# Patient Record
Sex: FEMALE | Race: WHITE | HISPANIC OR LATINO | Employment: OTHER | URBAN - METROPOLITAN AREA
[De-identification: names, ages, dates, MRNs, and addresses within clinical notes are randomized per-mention and may not be internally consistent; named-entity substitution may affect disease eponyms.]

---

## 2017-11-07 ENCOUNTER — OFFICE VISIT (OUTPATIENT)
Dept: FAMILY MEDICINE | Facility: CLINIC | Age: 62
End: 2017-11-07
Payer: MEDICAID

## 2017-11-07 VITALS
OXYGEN SATURATION: 98 % | BODY MASS INDEX: 34.61 KG/M2 | DIASTOLIC BLOOD PRESSURE: 84 MMHG | SYSTOLIC BLOOD PRESSURE: 128 MMHG | WEIGHT: 233.69 LBS | TEMPERATURE: 98 F | HEART RATE: 108 BPM | HEIGHT: 69 IN

## 2017-11-07 DIAGNOSIS — I10 ESSENTIAL HYPERTENSION: ICD-10-CM

## 2017-11-07 DIAGNOSIS — E78.5 DM TYPE 2 WITH DIABETIC DYSLIPIDEMIA: ICD-10-CM

## 2017-11-07 DIAGNOSIS — J45.20 MILD INTERMITTENT ASTHMA, UNSPECIFIED WHETHER COMPLICATED: ICD-10-CM

## 2017-11-07 DIAGNOSIS — E11.69 DM TYPE 2 WITH DIABETIC DYSLIPIDEMIA: ICD-10-CM

## 2017-11-07 PROCEDURE — 99213 OFFICE O/P EST LOW 20 MIN: CPT | Mod: PBBFAC,PO | Performed by: FAMILY MEDICINE

## 2017-11-07 PROCEDURE — 99999 PR PBB SHADOW E&M-EST. PATIENT-LVL III: CPT | Mod: PBBFAC,,, | Performed by: FAMILY MEDICINE

## 2017-11-07 PROCEDURE — 90732 PPSV23 VACC 2 YRS+ SUBQ/IM: CPT | Mod: PBBFAC,PO

## 2017-11-07 PROCEDURE — 90472 IMMUNIZATION ADMIN EACH ADD: CPT | Mod: PBBFAC,PO

## 2017-11-07 PROCEDURE — 99204 OFFICE O/P NEW MOD 45 MIN: CPT | Mod: 25,S$PBB,, | Performed by: FAMILY MEDICINE

## 2017-11-07 PROCEDURE — 90736 HZV VACCINE LIVE SUBQ: CPT | Mod: PBBFAC,PO

## 2017-11-07 RX ORDER — GABAPENTIN 100 MG/1
100 CAPSULE ORAL 3 TIMES DAILY
Qty: 90 CAPSULE | Refills: 11 | Status: SHIPPED | OUTPATIENT
Start: 2017-11-07 | End: 2018-02-20 | Stop reason: SDUPTHER

## 2017-11-07 RX ORDER — LISINOPRIL AND HYDROCHLOROTHIAZIDE 20; 25 MG/1; MG/1
1 TABLET ORAL DAILY
COMMUNITY
End: 2018-02-08

## 2017-11-07 RX ORDER — LOVASTATIN 20 MG/1
20 TABLET ORAL NIGHTLY
COMMUNITY
End: 2017-11-10

## 2017-11-07 RX ORDER — INSULIN GLARGINE 300 [IU]/ML
38 INJECTION, SOLUTION SUBCUTANEOUS
COMMUNITY
End: 2017-12-11 | Stop reason: SDUPTHER

## 2017-11-07 RX ORDER — METFORMIN HYDROCHLORIDE 850 MG/1
850 TABLET ORAL 2 TIMES DAILY WITH MEALS
COMMUNITY
End: 2017-12-11 | Stop reason: SDUPTHER

## 2017-11-07 RX ORDER — ALBUTEROL SULFATE 90 UG/1
2 AEROSOL, METERED RESPIRATORY (INHALATION) EVERY 6 HOURS PRN
COMMUNITY
End: 2018-02-20 | Stop reason: SDUPTHER

## 2017-11-07 RX ORDER — GLIMEPIRIDE 2 MG/1
2 TABLET ORAL
COMMUNITY
End: 2017-12-11 | Stop reason: SDUPTHER

## 2017-11-07 RX ORDER — TRAZODONE HYDROCHLORIDE 50 MG/1
50 TABLET ORAL NIGHTLY PRN
Qty: 30 TABLET | Refills: 3 | Status: SHIPPED | OUTPATIENT
Start: 2017-11-07 | End: 2018-01-09 | Stop reason: SDUPTHER

## 2017-11-07 NOTE — PROGRESS NOTES
Subjective:       Patient ID: Kay Rob is a 62 y.o. female.    Chief Complaint: Annual Exam    62 y old female here to get established with DM , ht, dlp , mild intermittent asthma and obesity . Not On aspirin,will like to have immunizations UTD . exercise : sedentary . Opthalmology  : due for itzel , Fastin bs are 220  and  2 hrs pp : 220 .seldom uses recsue inhalre . + feet pain and insomnia . Forgetuful also        Review of Systems   Constitutional: Negative.    HENT: Negative.    Respiratory: Negative.    Cardiovascular: Negative.    Gastrointestinal: Negative.    Genitourinary: Negative.    Musculoskeletal: Positive for arthralgias.   Hematological: Negative.    Psychiatric/Behavioral: Negative.        Objective:      Physical Exam   Constitutional: She is oriented to person, place, and time. No distress.   HENT:   Head: Normocephalic and atraumatic.   Right Ear: External ear normal.   Left Ear: External ear normal.   Mouth/Throat: No oropharyngeal exudate.   Eyes: Conjunctivae and EOM are normal. Pupils are equal, round, and reactive to light. Right eye exhibits no discharge. Left eye exhibits no discharge. No scleral icterus.   Neck: Normal range of motion. Neck supple. No JVD present. No tracheal deviation present. No thyromegaly present.   Cardiovascular: Normal rate, regular rhythm and normal heart sounds.  Exam reveals no gallop and no friction rub.    No murmur heard.  Pulses:       Dorsalis pedis pulses are 2+ on the right side, and 2+ on the left side.        Posterior tibial pulses are 2+ on the right side, and 2+ on the left side.   Pulmonary/Chest: Effort normal and breath sounds normal. No stridor. No respiratory distress. She has no wheezes. She has no rales. She exhibits no tenderness.   Abdominal: Soft. Bowel sounds are normal. She exhibits no distension. There is no tenderness. There is no rebound and no guarding.   Musculoskeletal: Normal range of motion.        Right foot: There is normal  range of motion and no deformity.        Left foot: There is normal range of motion and no deformity.   Feet:   Right Foot:   Protective Sensation: 10 sites tested. 0 sites sensed.   Skin Integrity: Positive for dry skin.   Left Foot:   Protective Sensation: 10 sites tested. 0 sites sensed.   Skin Integrity: Positive for dry skin.   Lymphadenopathy:     She has no cervical adenopathy.   Neurological: She is alert and oriented to person, place, and time.   Skin: Skin is warm and dry. She is not diaphoretic.   Psychiatric: She has a normal mood and affect. Her behavior is normal. Judgment and thought content normal.       Assessment:         Kay was seen today for annual exam.    Diagnoses and all orders for this visit:    DM type 2 with diabetic dyslipidemia  -     Ambulatory referral to Ophthalmology  -     CBC auto differential; Future  -     Comprehensive metabolic panel; Future  -     Hemoglobin A1c; Future  -     Lipid panel; Future  -     Microalbumin/creatinine urine ratio; Future    Essential hypertension  -     Ambulatory referral to Ophthalmology  -     CBC auto differential; Future  -     Comprehensive metabolic panel; Future  -     Hemoglobin A1c; Future  -     Lipid panel; Future  -     Microalbumin/creatinine urine ratio; Future    Mild intermittent asthma, unspecified whether complicated    Other orders  -     traZODone (DESYREL) 50 MG tablet; Take 1 tablet (50 mg total) by mouth nightly as needed for Insomnia.  -     (In Office Administered) Zoster Vaccine - Live  -     (In Office Administered) Pneumococcal Polysaccharide Vaccine (23 Valent) (SQ/IM)  -     gabapentin (NEURONTIN) 100 MG capsule; Take 1 capsule (100 mg total) by mouth 3 (three) times daily.      Plan:   .Go up on Toujeo by 6 units every 3 days  until fbs and 2 hr pp are less than 130   Pt. encouraged to follow a strict diabetic type diet.   Encouraged daily physical activity/exercise for at least 30mins if tolerated.   Weight  control recommenced as always.   Discussed how lifestyle changes make biggest impact on diabetes control.   Continue home glucose monitoring once daily and keep log.   Counseling provided today about hypoglycemia as well as about how diabetes increases risk of cardiovascular, cerebrovascular ,peripheral vascular disease and other serious health complications. He has been instructed to call if developing any new symptoms or hypoglycemia related symptoms.   See encounter for associated orders/medications.   - Lipid panel; Future    Controlled. Cont  Med   Stable .     Kay was seen today for annual exam.    Diagnoses and all orders for this visit:    DM type 2 with diabetic dyslipidemia    Essential hypertension    Mild intermittent asthma, unspecified whether complicated

## 2017-11-07 NOTE — PATIENT INSTRUCTIONS
empiece a subir las dosis de insulina de las siguiente manera:   6 unidades  cada 3 pinto hasat que las azucares en ayuna  y dos horas despues de wiley esten por debajo de 130 .

## 2017-11-09 ENCOUNTER — LAB VISIT (OUTPATIENT)
Dept: LAB | Facility: HOSPITAL | Age: 62
End: 2017-11-09
Attending: FAMILY MEDICINE
Payer: MEDICAID

## 2017-11-09 DIAGNOSIS — E11.69 DM TYPE 2 WITH DIABETIC DYSLIPIDEMIA: ICD-10-CM

## 2017-11-09 DIAGNOSIS — I10 ESSENTIAL HYPERTENSION: ICD-10-CM

## 2017-11-09 DIAGNOSIS — E78.5 DM TYPE 2 WITH DIABETIC DYSLIPIDEMIA: ICD-10-CM

## 2017-11-09 LAB
CREAT UR-MCNC: 116 MG/DL
MICROALBUMIN UR DL<=1MG/L-MCNC: 101 UG/ML
MICROALBUMIN/CREATININE RATIO: 87.1 UG/MG

## 2017-11-09 PROCEDURE — 82570 ASSAY OF URINE CREATININE: CPT

## 2017-11-10 ENCOUNTER — OFFICE VISIT (OUTPATIENT)
Dept: FAMILY MEDICINE | Facility: CLINIC | Age: 62
End: 2017-11-10
Payer: MEDICAID

## 2017-11-10 ENCOUNTER — TELEPHONE (OUTPATIENT)
Dept: FAMILY MEDICINE | Facility: CLINIC | Age: 62
End: 2017-11-10

## 2017-11-10 VITALS
OXYGEN SATURATION: 97 % | WEIGHT: 231 LBS | DIASTOLIC BLOOD PRESSURE: 88 MMHG | TEMPERATURE: 98 F | BODY MASS INDEX: 36.26 KG/M2 | HEART RATE: 100 BPM | SYSTOLIC BLOOD PRESSURE: 130 MMHG | HEIGHT: 67 IN

## 2017-11-10 DIAGNOSIS — E66.01 SEVERE OBESITY (BMI 35.0-39.9) WITH COMORBIDITY: ICD-10-CM

## 2017-11-10 DIAGNOSIS — G47.00 INSOMNIA, UNSPECIFIED TYPE: ICD-10-CM

## 2017-11-10 DIAGNOSIS — E78.5 DM TYPE 2 WITH DIABETIC DYSLIPIDEMIA: Primary | ICD-10-CM

## 2017-11-10 DIAGNOSIS — I10 HYPERTENSION, UNSPECIFIED TYPE: ICD-10-CM

## 2017-11-10 DIAGNOSIS — E11.69 DM TYPE 2 WITH DIABETIC DYSLIPIDEMIA: Primary | ICD-10-CM

## 2017-11-10 PROCEDURE — 99214 OFFICE O/P EST MOD 30 MIN: CPT | Mod: S$PBB,,, | Performed by: FAMILY MEDICINE

## 2017-11-10 PROCEDURE — 99999 PR PBB SHADOW E&M-EST. PATIENT-LVL III: CPT | Mod: PBBFAC,,, | Performed by: FAMILY MEDICINE

## 2017-11-10 PROCEDURE — 99213 OFFICE O/P EST LOW 20 MIN: CPT | Mod: PBBFAC,PO | Performed by: FAMILY MEDICINE

## 2017-11-10 RX ORDER — ATORVASTATIN CALCIUM 40 MG/1
40 TABLET, FILM COATED ORAL DAILY
Qty: 30 TABLET | Refills: 11 | Status: SHIPPED | OUTPATIENT
Start: 2017-11-10 | End: 2017-12-11

## 2017-11-10 NOTE — TELEPHONE ENCOUNTER
Patient has order for appt to see podiatrist per Dr. Stanton's request. I can not schedule appt due to insurance. Forwarding appt to podiatry to see if they can schedule appt. Patient is [place],  also is a patient (jhony Verduzco)

## 2017-11-11 NOTE — PROGRESS NOTES
Subjective:       Patient ID: Kay Rob is a 62 y.o. female.    Chief Complaint: No chief complaint on file.    62 y old female with Sever obesity , dlp , htn , insomnia and uncontrolled dm here for f.u . Doing well on trazodone . FBS are coming down since increasing Toujeo to 44 units . She has not had any 200 mg/d;l readings . She ha sot started exercising . She has been consistent with statin       Review of Systems   Constitutional: Negative.    HENT: Negative.    Respiratory: Negative.    Cardiovascular: Negative.    Gastrointestinal: Negative.    Genitourinary: Negative.    Musculoskeletal: Negative.        Objective:      Physical Exam   Constitutional: She is oriented to person, place, and time. She appears well-developed and well-nourished. No distress.   HENT:   Head: Normocephalic and atraumatic.   Right Ear: External ear normal.   Left Ear: External ear normal.   Mouth/Throat: No oropharyngeal exudate.   Eyes: Conjunctivae and EOM are normal. Pupils are equal, round, and reactive to light. Right eye exhibits no discharge. Left eye exhibits no discharge. No scleral icterus.   Neck: Normal range of motion. Neck supple. No JVD present. No tracheal deviation present. No thyromegaly present.   Cardiovascular: Normal rate, regular rhythm and normal heart sounds.  Exam reveals no gallop and no friction rub.    No murmur heard.  Pulmonary/Chest: Effort normal and breath sounds normal. No stridor. No respiratory distress. She has no wheezes. She has no rales. She exhibits no tenderness.   Abdominal: Soft. Bowel sounds are normal. She exhibits no distension. There is no tenderness. There is no rebound and no guarding.   Musculoskeletal: Normal range of motion.   Lymphadenopathy:     She has no cervical adenopathy.   Neurological: She is alert and oriented to person, place, and time.   Skin: Skin is warm and dry. She is not diaphoretic.   Psychiatric: She has a normal mood and affect. Her behavior is normal.  Judgment and thought content normal.       Assessment:       Diagnoses and all orders for this visit:    DM type 2 with diabetic dyslipidemia  -     Ambulatory consult to Podiatry    Hypertension, unspecified type    Insomnia, unspecified type    Severe obesity (BMI 35.0-39.9) with comorbidity    Other orders  -     atorvastatin (LIPITOR) 40 MG tablet; Take 1 tablet (40 mg total) by mouth once daily.      Plan:     Diagnoses and all orders for this visit:    DM type 2 with diabetic dyslipidemia  -     Ambulatory consult to Podiatry     Improving >cont increasing Toujeo by 6 units every 4 d until fbs and 2 hr pp are less than 130   Pt. encouraged to follow a strict diabetic type diet.   Encouraged daily physical activity/exercise for at least 30mins if tolerated.   Weight control recommenced as always.   Discussed how lifestyle changes make biggest impact on diabetes control.   Continue home glucose monitoring once daily and keep log.   Counseling provided today about hypoglycemia as well as about how diabetes increases risk of cardiovascular, cerebrovascular ,peripheral vascular disease and other serious health complications. He has been instructed to call if developing any new symptoms or hypoglycemia related symptoms.   See encounter for associated orders/medications.   -  Controlled . Cont med   Improving . Cont med   The patient is asked to make an attempt to improve diet and exercise patterns to aid in medical management of this problem.  Hugh in 1 m for JOEY

## 2017-11-24 ENCOUNTER — OFFICE VISIT (OUTPATIENT)
Dept: OPHTHALMOLOGY | Facility: CLINIC | Age: 62
End: 2017-11-24
Payer: MEDICAID

## 2017-11-24 DIAGNOSIS — H25.13 CATARACT, NUCLEAR SCLEROTIC SENILE, BILATERAL: ICD-10-CM

## 2017-11-24 DIAGNOSIS — H25.013 CATARACT CORTICAL, SENILE, BILATERAL: ICD-10-CM

## 2017-11-24 DIAGNOSIS — E11.9 DIABETES MELLITUS TYPE 2 WITHOUT RETINOPATHY: Primary | ICD-10-CM

## 2017-11-24 DIAGNOSIS — H52.7 REFRACTIVE ERROR: ICD-10-CM

## 2017-11-24 PROCEDURE — 92004 COMPRE OPH EXAM NEW PT 1/>: CPT | Mod: S$PBB,,, | Performed by: OPTOMETRIST

## 2017-11-24 PROCEDURE — 99212 OFFICE O/P EST SF 10 MIN: CPT | Mod: PBBFAC | Performed by: OPTOMETRIST

## 2017-11-24 PROCEDURE — 99999 PR PBB SHADOW E&M-EST. PATIENT-LVL II: CPT | Mod: PBBFAC,,, | Performed by: OPTOMETRIST

## 2017-11-24 PROCEDURE — 92015 DETERMINE REFRACTIVE STATE: CPT | Mod: ,,, | Performed by: OPTOMETRIST

## 2017-11-24 NOTE — LETTER
November 24, 2017      Lucy Negron MD  [address] [facility] [address]           [facility]  [address] [place] [id number]  Phone: [phone]  Fax: [phone]          Patient: Kay Rob   MR Number: [id number]   YOB: 1955   Date of Visit: 11/24/2017       Dear Dr. Lucy Negron:    Thank you for referring Kay Rob to me for evaluation. Attached you will find relevant portions of my assessment and plan of care.    If you have questions, please do not hesitate to call me. I look forward to following Kay Rob along with you.    Sincerely,    MALLY Garcia, OD    Enclosure  CC:  No Recipients    If you would like to receive this communication electronically, please contact [email or web address] or [phone] to request more information on [employer] Link access.    For providers and/or their staff who would like to refer a patient to [facility], please contact us through our one-stop-shop provider referral line, [facility], at [phone].    If you feel you have received this communication in error or would no longer like to receive these types of communications, please e-mail [email or web address]

## 2017-11-24 NOTE — PROGRESS NOTES
HPI     Diabetic Eye Exam    Additional comments: Latest BS was 175 this morning.            Comments   [place] [facility] [place] states that her eye sight is bad. Pt is a   diabetic. Pt does not wear any eye correction. Was diagnosed with DM in   2007.    Patient speaks no english and is with her relative who speaks little   english.       Last edited by MALLY Garcia, OD on 11/24/2017  4:42 PM. (History)            Assessment /Plan     For exam results, see Encounter Report.    Diabetes mellitus type 2 without retinopathy    Cataract, nuclear sclerotic senile, bilateral    Cataract cortical, senile, bilateral    Refractive error      *As noted above she speaks almost no english and her son speaks english only a bit more.  She has marked NS/cortical cataracts OU and needs CE.  I could not adequately view her fundi due to the cataracts.  She is diabetic and conceivably could have undetected DR and CSME but obscured by the cataracts.  We will arrange a CE evaluation with Dr. Miguel (NA).  She will definitely need a MOCT but we are at the [facility] clinic today with no instrument.   I did not change glasses today as MR did not increase her VA much.  RTC to me per Lamont.  OH OK OU otherwise.

## 2017-12-11 ENCOUNTER — OFFICE VISIT (OUTPATIENT)
Dept: FAMILY MEDICINE | Facility: CLINIC | Age: 62
End: 2017-12-11
Payer: MEDICAID

## 2017-12-11 ENCOUNTER — TELEPHONE (OUTPATIENT)
Dept: FAMILY MEDICINE | Facility: CLINIC | Age: 62
End: 2017-12-11

## 2017-12-11 VITALS
HEIGHT: 67 IN | HEART RATE: 106 BPM | OXYGEN SATURATION: 99 % | SYSTOLIC BLOOD PRESSURE: 132 MMHG | BODY MASS INDEX: 36.4 KG/M2 | TEMPERATURE: 98 F | DIASTOLIC BLOOD PRESSURE: 76 MMHG | WEIGHT: 231.94 LBS

## 2017-12-11 DIAGNOSIS — E78.5 DM TYPE 2 WITH DIABETIC DYSLIPIDEMIA: ICD-10-CM

## 2017-12-11 DIAGNOSIS — Z12.11 COLON CANCER SCREENING: ICD-10-CM

## 2017-12-11 DIAGNOSIS — R21 RASH: ICD-10-CM

## 2017-12-11 DIAGNOSIS — E11.69 DM TYPE 2 WITH DIABETIC DYSLIPIDEMIA: ICD-10-CM

## 2017-12-11 DIAGNOSIS — Z12.39 BREAST CANCER SCREENING: Primary | ICD-10-CM

## 2017-12-11 DIAGNOSIS — I10 HYPERTENSION, UNSPECIFIED TYPE: ICD-10-CM

## 2017-12-11 DIAGNOSIS — Z01.419 ENCOUNTER FOR GYNECOLOGICAL EXAMINATION WITHOUT ABNORMAL FINDING: ICD-10-CM

## 2017-12-11 PROCEDURE — 99999 PR PBB SHADOW E&M-EST. PATIENT-LVL IV: CPT | Mod: PBBFAC,,, | Performed by: FAMILY MEDICINE

## 2017-12-11 PROCEDURE — 99214 OFFICE O/P EST MOD 30 MIN: CPT | Mod: PBBFAC,PO | Performed by: FAMILY MEDICINE

## 2017-12-11 PROCEDURE — 90746 HEPB VACCINE 3 DOSE ADULT IM: CPT | Mod: PBBFAC,PO

## 2017-12-11 PROCEDURE — 90686 IIV4 VACC NO PRSV 0.5 ML IM: CPT | Mod: PBBFAC,PO

## 2017-12-11 PROCEDURE — 90472 IMMUNIZATION ADMIN EACH ADD: CPT | Mod: PBBFAC,PO

## 2017-12-11 PROCEDURE — 99214 OFFICE O/P EST MOD 30 MIN: CPT | Mod: S$PBB,,, | Performed by: FAMILY MEDICINE

## 2017-12-11 RX ORDER — GLIMEPIRIDE 2 MG/1
2 TABLET ORAL
Qty: 90 TABLET | Refills: 0 | Status: SHIPPED | OUTPATIENT
Start: 2017-12-11 | End: 2018-02-08 | Stop reason: SDUPTHER

## 2017-12-11 RX ORDER — METFORMIN HYDROCHLORIDE 850 MG/1
850 TABLET ORAL 2 TIMES DAILY WITH MEALS
Qty: 180 TABLET | Refills: 0 | Status: SHIPPED | OUTPATIENT
Start: 2017-12-11 | End: 2018-04-23 | Stop reason: SDUPTHER

## 2017-12-11 RX ORDER — LOVASTATIN 20 MG/1
20 TABLET ORAL NIGHTLY
COMMUNITY
End: 2017-12-11

## 2017-12-11 RX ORDER — PEN NEEDLE, DIABETIC 30 GX3/16"
NEEDLE, DISPOSABLE MISCELLANEOUS
Qty: 90 EACH | Refills: 0 | Status: SHIPPED | OUTPATIENT
Start: 2017-12-11 | End: 2018-10-15 | Stop reason: SDUPTHER

## 2017-12-11 RX ORDER — ATORVASTATIN CALCIUM 40 MG/1
40 TABLET, FILM COATED ORAL DAILY
Qty: 30 TABLET | Refills: 11 | Status: SHIPPED | OUTPATIENT
Start: 2017-12-11 | End: 2019-01-16 | Stop reason: SDUPTHER

## 2017-12-11 RX ORDER — ALBUTEROL SULFATE 90 UG/1
2 AEROSOL, METERED RESPIRATORY (INHALATION) EVERY 6 HOURS PRN
Qty: 18 G | Status: CANCELLED | OUTPATIENT
Start: 2017-12-11

## 2017-12-11 RX ORDER — INSULIN GLARGINE 300 [IU]/ML
INJECTION, SOLUTION SUBCUTANEOUS
Qty: 1 SYRINGE | Refills: 0 | Status: SHIPPED | OUTPATIENT
Start: 2017-12-11 | End: 2018-01-09 | Stop reason: SDUPTHER

## 2017-12-11 NOTE — TELEPHONE ENCOUNTER
Patient has referral to see GYN but at this time it will not allow me to schedule patient until April or may and patient needs appointment sooner than that. Patient also needs a Bhutanese speaking doctor. Can someone please help with scheduling patient? Please advise.

## 2017-12-11 NOTE — PROGRESS NOTES
"Subjective:       Patient ID: Kay Rob is a 62 y.o. female.    Chief Complaint: Follow-up and Diabetes    62 y old female with type 2 uncontrolled diabetes , HTN and DLP here for f.u . checking FBS: 195, 178  And 2 hrs pp  210 .  Doing some stretches at home .  Not on any diet  . On Toujeo 68 units , amaryl 2 mg qd and metformin 1700 mg qd. She also has burning  plaques on scalp . She has a hx of " allergies" . She has not changed  any cosmetic product .       Diabetes       Review of Systems   Constitutional: Negative.    HENT: Negative.    Respiratory: Negative.    Cardiovascular: Negative.    Genitourinary: Negative.    Musculoskeletal: Negative.    Skin: Positive for rash.   Psychiatric/Behavioral: Negative.        Objective:      Physical Exam   Constitutional: She is oriented to person, place, and time. She appears well-developed and well-nourished. No distress.   HENT:   Head: Normocephalic and atraumatic.   Right Ear: External ear normal.   Left Ear: External ear normal.   Mouth/Throat: No oropharyngeal exudate.   Eyes: Conjunctivae and EOM are normal. Pupils are equal, round, and reactive to light. Right eye exhibits no discharge. Left eye exhibits no discharge. No scleral icterus.   Neck: Normal range of motion. Neck supple. No JVD present. No tracheal deviation present. No thyromegaly present.   Cardiovascular: Normal rate, regular rhythm and normal heart sounds.  Exam reveals no gallop and no friction rub.    No murmur heard.  Pulmonary/Chest: Effort normal and breath sounds normal. No stridor. No respiratory distress. She has no wheezes. She has no rales. She exhibits no tenderness.   Abdominal: Soft. Bowel sounds are normal. She exhibits no distension. There is no tenderness. There is no rebound and no guarding.   Musculoskeletal: Normal range of motion.   Lymphadenopathy:     She has no cervical adenopathy.   Neurological: She is alert and oriented to person, place, and time.   Skin: Skin is " "warm and dry. She is not diaphoretic.        erythematous plaques with desquamation noted    Psychiatric: She has a normal mood and affect. Her behavior is normal. Judgment and thought content normal.       Assessment:       Kay was seen today for follow-up and diabetes.    Diagnoses and all orders for this visit:    Breast cancer screening  -     Mammo Digital Screening Bilateral With CAD; Future    Colon cancer screening  -     Fecal Immunochemical Test (iFOBT); Future    Encounter for gynecological examination without abnormal finding  -     Ambulatory consult to Gynecology    Hypertension, unspecified type    DM type 2 with diabetic dyslipidemia    Rash    Other orders  -     insulin glargine, TOUJEO, (TOUJEO SOLOSTAR) 300 unit/mL (1.5 mL) InPn pen; 68 units sq qd  -     glimepiride (AMARYL) 2 MG tablet; Take 1 tablet (2 mg total) by mouth before breakfast.  -     metFORMIN (GLUCOPHAGE) 850 MG tablet; Take 1 tablet (850 mg total) by mouth 2 (two) times daily with meals.  -     Cancel: albuterol (VENTOLIN HFA) 90 mcg/actuation inhaler; Inhale 2 puffs into the lungs every 6 (six) hours as needed for Wheezing. Rescue  -     atorvastatin (LIPITOR) 40 MG tablet; Take 1 tablet (40 mg total) by mouth once daily.  -     (In Office Administered) Tdap Vaccine  -     (In Office Administered) Hepatitis B Vaccine (Adult) (IM)  -     liraglutide 0.6 mg/0.1 mL, 18 mg/3 mL, subq PNIJ (VICTOZA 2-KIRK) 0.6 mg/0.1 mL (18 mg/3 mL) PnIj; Inject 0.6 mg into the skin once daily.  -     pen needle, diabetic 31 gauge x 5/16" Ndle; Use daily with Victoza  -     fluocinolone (SYNALAR) 0.01 % Sham; Apply no more than 1 ounce to scalp once daily; work into lather and allow to remain on scalp for ~5 minutes. Remove from hair and scalp by rinsing thoroughly with water.      Plan:   Controlled. Cont med   Uncontrolled. Start  Victoza. SE Discussed. Diet and ex  F.u in 1 m    Seborrheic derm ?     "

## 2018-01-04 ENCOUNTER — HOSPITAL ENCOUNTER (OUTPATIENT)
Dept: RADIOLOGY | Facility: HOSPITAL | Age: 63
Discharge: HOME | End: 2018-01-04
Attending: FAMILY MEDICINE
Payer: MEDICAID

## 2018-01-04 ENCOUNTER — APPOINTMENT (OUTPATIENT)
Dept: LAB | Facility: HOSPITAL | Age: 63
End: 2018-01-04
Attending: FAMILY MEDICINE
Payer: MEDICAID

## 2018-01-04 DIAGNOSIS — Z12.39 BREAST CANCER SCREENING: ICD-10-CM

## 2018-01-04 PROCEDURE — 82274 ASSAY TEST FOR BLOOD FECAL: CPT

## 2018-01-04 PROCEDURE — 77067 SCR MAMMO BI INCL CAD: CPT | Mod: TC

## 2018-01-04 PROCEDURE — 77067 SCR MAMMO BI INCL CAD: CPT | Mod: 26,,, | Performed by: RADIOLOGY

## 2018-01-08 ENCOUNTER — TELEPHONE (OUTPATIENT)
Dept: FAMILY MEDICINE | Facility: CLINIC | Age: 63
End: 2018-01-08

## 2018-01-08 DIAGNOSIS — K92.1 BLOOD IN STOOL: Primary | ICD-10-CM

## 2018-01-08 NOTE — TELEPHONE ENCOUNTER
----- Message from Nithin Washington sent at 1/8/2018  9:24 AM CST -----  Contact: pt daughter - matthias   States she's rtn nurses call rg her mammo and can be reached at [phone]/thanks/dbw

## 2018-01-09 ENCOUNTER — OFFICE VISIT (OUTPATIENT)
Dept: FAMILY MEDICINE | Facility: CLINIC | Age: 63
End: 2018-01-09
Payer: MEDICAID

## 2018-01-09 VITALS
OXYGEN SATURATION: 96 % | WEIGHT: 229.63 LBS | HEART RATE: 104 BPM | SYSTOLIC BLOOD PRESSURE: 138 MMHG | BODY MASS INDEX: 36.04 KG/M2 | TEMPERATURE: 96 F | DIASTOLIC BLOOD PRESSURE: 88 MMHG | HEIGHT: 67 IN

## 2018-01-09 DIAGNOSIS — G47.00 INSOMNIA, UNSPECIFIED TYPE: ICD-10-CM

## 2018-01-09 DIAGNOSIS — E11.69 DM TYPE 2 WITH DIABETIC DYSLIPIDEMIA: ICD-10-CM

## 2018-01-09 DIAGNOSIS — L21.9 SEBORRHEIC DERMATITIS: Primary | ICD-10-CM

## 2018-01-09 DIAGNOSIS — E78.5 DM TYPE 2 WITH DIABETIC DYSLIPIDEMIA: ICD-10-CM

## 2018-01-09 PROCEDURE — 99999 PR PBB SHADOW E&M-EST. PATIENT-LVL IV: CPT | Mod: PBBFAC,,, | Performed by: FAMILY MEDICINE

## 2018-01-09 PROCEDURE — 99214 OFFICE O/P EST MOD 30 MIN: CPT | Mod: PBBFAC,PO | Performed by: FAMILY MEDICINE

## 2018-01-09 PROCEDURE — 99214 OFFICE O/P EST MOD 30 MIN: CPT | Mod: S$PBB,,, | Performed by: FAMILY MEDICINE

## 2018-01-09 RX ORDER — TRAZODONE HYDROCHLORIDE 50 MG/1
50 TABLET ORAL NIGHTLY PRN
Qty: 30 TABLET | Refills: 3 | Status: SHIPPED | OUTPATIENT
Start: 2018-01-09 | End: 2018-02-20 | Stop reason: SDUPTHER

## 2018-01-09 RX ORDER — INSULIN GLARGINE 300 [IU]/ML
INJECTION, SOLUTION SUBCUTANEOUS
Qty: 1 SYRINGE | Refills: 0 | Status: SHIPPED | OUTPATIENT
Start: 2018-01-09 | End: 2018-01-31 | Stop reason: SDUPTHER

## 2018-01-09 NOTE — PROGRESS NOTES
Subjective:       Patient ID: Kay Rob is a 62 y.o. female.    Chief Complaint: Follow-up    62 y old female with uncontrolled dm , Seborrheic dermatitis of scalp and insomnia here for f.u after starting victoza and topical fluocinolone . Fastin : 174   2 hr pp : 250 . Not on any diet .Using 86 units of Toujeo. Rash on scalp resolved but it recur  once she stopped  using  Shampoo. Previously sleeping well with trazodone but meds has not been dispensed, she is unsure why       Review of Systems   Constitutional: Negative.    HENT: Negative.    Respiratory: Negative.    Cardiovascular: Negative.    Genitourinary: Negative.    Musculoskeletal: Negative.    Hematological: Negative.    Psychiatric/Behavioral: Negative.        Objective:      Physical Exam   Constitutional: She is oriented to person, place, and time. She appears well-developed and well-nourished. No distress.   HENT:   Head: Normocephalic and atraumatic.   Right Ear: External ear normal.   Left Ear: External ear normal.   Mouth/Throat: No oropharyngeal exudate.   Eyes: Conjunctivae and EOM are normal. Pupils are equal, round, and reactive to light. Right eye exhibits no discharge. Left eye exhibits no discharge. No scleral icterus.   Neck: Normal range of motion. Neck supple. No JVD present. No tracheal deviation present. No thyromegaly present.   Cardiovascular: Normal rate, regular rhythm and normal heart sounds.  Exam reveals no gallop and no friction rub.    No murmur heard.  Pulmonary/Chest: Effort normal and breath sounds normal. No stridor. No respiratory distress. She has no wheezes. She has no rales. She exhibits no tenderness.   Abdominal: Soft. Bowel sounds are normal. She exhibits no distension. There is no tenderness. There is no rebound and no guarding.   Musculoskeletal: Normal range of motion.   Lymphadenopathy:     She has no cervical adenopathy.   Neurological: She is alert and oriented to person, place, and time.   Skin: Skin is  warm and dry. She is not diaphoretic.   Psychiatric: She has a normal mood and affect. Her behavior is normal. Judgment and thought content normal.       Assessment:     Kay was seen today for follow-up.    Diagnoses and all orders for this visit:    Seborrheic dermatitis  -     Ambulatory consult to Dermatology    DM type 2 with diabetic dyslipidemia    Insomnia, unspecified type    Other orders  -     traZODone (DESYREL) 50 MG tablet; Take 1 tablet (50 mg total) by mouth nightly as needed for Insomnia.  -     insulin glargine, TOUJEO, (TOUJEO SOLOSTAR) 300 unit/mL (1.5 mL) InPn pen; 86  units sq qd  -     fluocinolone (SYNALAR) 0.01 % Sham; Apply no more than 1 ounce to scalp once daily; work into lather and allow to remain on scalp for ~5 minutes. Remove from hair and scalp by rinsing thoroughly with water.      Plan:   See derm , use shampoo once weekly to prevent recurrence   Increase toujeo to 92 unit s, increase victoza also to 1.2 mg sq qd .Keep BSL . Diet and ex. F.u in 1 m    Resume trazodone

## 2018-01-15 ENCOUNTER — DOCUMENTATION ONLY (OUTPATIENT)
Dept: ENDOSCOPY | Facility: HOSPITAL | Age: 63
End: 2018-01-15

## 2018-01-31 RX ORDER — INSULIN GLARGINE 300 U/ML
INJECTION, SOLUTION SUBCUTANEOUS
Qty: 6 ML | Refills: 0 | Status: SHIPPED | OUTPATIENT
Start: 2018-01-31 | End: 2018-02-20 | Stop reason: SDUPTHER

## 2018-02-06 ENCOUNTER — OFFICE VISIT (OUTPATIENT)
Dept: OBSTETRICS AND GYNECOLOGY | Facility: CLINIC | Age: 63
End: 2018-02-06
Payer: MEDICAID

## 2018-02-06 VITALS
SYSTOLIC BLOOD PRESSURE: 150 MMHG | BODY MASS INDEX: 36.88 KG/M2 | HEIGHT: 67 IN | WEIGHT: 235 LBS | DIASTOLIC BLOOD PRESSURE: 98 MMHG

## 2018-02-06 DIAGNOSIS — Z01.419 WELL WOMAN EXAM WITH ROUTINE GYNECOLOGICAL EXAM: Primary | ICD-10-CM

## 2018-02-06 PROCEDURE — 99212 OFFICE O/P EST SF 10 MIN: CPT | Mod: PBBFAC | Performed by: OBSTETRICS & GYNECOLOGY

## 2018-02-06 PROCEDURE — 99386 PREV VISIT NEW AGE 40-64: CPT | Mod: S$PBB,,, | Performed by: OBSTETRICS & GYNECOLOGY

## 2018-02-06 PROCEDURE — 88175 CYTOPATH C/V AUTO FLUID REDO: CPT

## 2018-02-06 PROCEDURE — 99999 PR PBB SHADOW E&M-EST. PATIENT-LVL II: CPT | Mod: PBBFAC,,, | Performed by: OBSTETRICS & GYNECOLOGY

## 2018-02-06 NOTE — PROGRESS NOTES
Subjective:       Patient ID: Kay Rob is a 62 y.o. female.    Chief Complaint:  Annual Exam      History of Present Illness  HPI  Annual Exam-Postmenopausal  Patient presents for annual exam. The patient has no complaints today. The patient is not sexually active. GYN screening history: last pap: approximate date  and was normal and last mammogram: approximate date  and was normal. The patient is not taking hormone replacement therapy. Patient denies post-menopausal vaginal bleeding. The patient wears seatbelts: yes. The patient participates in regular exercise: no. Has the patient ever been transfused or tattooed?: no. The patient reports that there is not domestic violence in her life.  Pt reports that a fibroid was noted on ultrasound in .  No associated symptoms.        GYN & OB History  No LMP recorded. Patient is postmenopausal.   Date of Last Pap: No result found    OB History    Para Term  AB Living   3 2 2   1 2   SAB TAB Ectopic Multiple Live Births   1       2      # Outcome Date GA Lbr Oliver/2nd Weight Sex Delivery Anes PTL Lv   3 SAB            2 Term     M Vag-Spont   ESEQUIEL   1 Term     F Vag-Spont   ESEQUIEL          Review of Systems  Review of Systems   Constitutional: Negative for activity change, appetite change, chills, fatigue, fever and unexpected weight change.   Respiratory: Negative for shortness of breath.    Cardiovascular: Negative for chest pain, palpitations and leg swelling.   Gastrointestinal: Negative for abdominal pain, constipation, diarrhea, nausea and vomiting.   Genitourinary: Negative for dysuria, flank pain, frequency, genital sores, hematuria, pelvic pain, vaginal bleeding, vaginal discharge, vaginal pain, urinary incontinence, postcoital bleeding and vaginal odor.   Musculoskeletal: Negative for back pain.   Neurological: Negative for syncope and headaches.   Breast: Negative for breast mass, breast pain, nipple discharge and skin changes           Objective:    Physical Exam:   Constitutional: She is oriented to person, place, and time. She appears well-developed and well-nourished. No distress.    HENT:   Head: Normocephalic and atraumatic.    Eyes: EOM are normal. Pupils are equal, round, and reactive to light.    Neck: Normal range of motion. Neck supple.    Cardiovascular: Normal rate, regular rhythm and normal heart sounds.     Pulmonary/Chest: Effort normal and breath sounds normal.        Abdominal: Soft. Bowel sounds are normal. She exhibits no distension. There is no tenderness.     Genitourinary: Vagina normal and uterus normal. Pelvic exam was performed with patient supine. There is no rash, tenderness, lesion or injury on the right labia. There is no rash, tenderness, lesion or injury on the left labia. Uterus is not deviated, not enlarged and not tender. Cervix is normal. Right adnexum displays no mass, no tenderness and no fullness. Left adnexum displays no mass, no tenderness and no fullness. No erythema, tenderness or bleeding in the vagina. No foreign body in the vagina. No signs of injury around the vagina. No vaginal discharge found. Cervix exhibits no motion tenderness, no discharge and no friability. Additional cervical findings: pap smear done          Musculoskeletal: Normal range of motion and moves all extremeties. She exhibits no edema or tenderness.       Neurological: She is alert and oriented to person, place, and time.    Skin: Skin is warm and dry.    Psychiatric: She has a normal mood and affect. Her behavior is normal. Thought content normal.          Assessment:        1. Well woman exam with routine gynecological exam              Plan:      Well woman exam with routine gynecological exam  -     Liquid-based pap smear, screening  -     Pt was counseled on cervical/vaginal screening guidelines and recommendations.  Pt has a history of at least 3 consecutive normal pap results in the past 10 years.  Denies history of Gyn  malignancy or dysplasia.  If today's result is negative, pt will no longer require routine pap screening or routine screening pelvic examinations.  If pt still desires screening Gyn exams, would recommend minimum 2 year interval.  Pt may follow with PCP for routine health maintenance needs.  -     Pt was advised on current breast cancer screening recommendations.  Pt defers breast exam today and screening MMG is up to date.      Follow-up in about 2 years (around 2/6/2020).

## 2018-02-08 ENCOUNTER — OFFICE VISIT (OUTPATIENT)
Dept: FAMILY MEDICINE | Facility: CLINIC | Age: 63
End: 2018-02-08
Payer: MEDICAID

## 2018-02-08 ENCOUNTER — LAB VISIT (OUTPATIENT)
Dept: LAB | Facility: HOSPITAL | Age: 63
End: 2018-02-08
Attending: FAMILY MEDICINE
Payer: MEDICAID

## 2018-02-08 VITALS
HEIGHT: 67 IN | HEART RATE: 103 BPM | TEMPERATURE: 98 F | WEIGHT: 232.69 LBS | BODY MASS INDEX: 36.52 KG/M2 | SYSTOLIC BLOOD PRESSURE: 130 MMHG | DIASTOLIC BLOOD PRESSURE: 84 MMHG | OXYGEN SATURATION: 97 %

## 2018-02-08 DIAGNOSIS — E78.5 DM TYPE 2 WITH DIABETIC DYSLIPIDEMIA: Primary | ICD-10-CM

## 2018-02-08 DIAGNOSIS — E78.5 DM TYPE 2 WITH DIABETIC DYSLIPIDEMIA: ICD-10-CM

## 2018-02-08 DIAGNOSIS — E11.69 DM TYPE 2 WITH DIABETIC DYSLIPIDEMIA: ICD-10-CM

## 2018-02-08 DIAGNOSIS — I10 HYPERTENSION, UNSPECIFIED TYPE: ICD-10-CM

## 2018-02-08 DIAGNOSIS — E11.69 DM TYPE 2 WITH DIABETIC DYSLIPIDEMIA: Primary | ICD-10-CM

## 2018-02-08 LAB
ALBUMIN SERPL BCP-MCNC: 3.5 G/DL
ALP SERPL-CCNC: 129 U/L
ALT SERPL W/O P-5'-P-CCNC: 13 U/L
ANION GAP SERPL CALC-SCNC: 8 MMOL/L
AST SERPL-CCNC: 17 U/L
BASOPHILS # BLD AUTO: 0.05 K/UL
BASOPHILS NFR BLD: 0.5 %
BILIRUB SERPL-MCNC: 0.5 MG/DL
BUN SERPL-MCNC: 15 MG/DL
CALCIUM SERPL-MCNC: 10.2 MG/DL
CHLORIDE SERPL-SCNC: 101 MMOL/L
CHOLEST SERPL-MCNC: 154 MG/DL
CHOLEST/HDLC SERPL: 4.5 {RATIO}
CO2 SERPL-SCNC: 30 MMOL/L
CREAT SERPL-MCNC: 0.8 MG/DL
DIFFERENTIAL METHOD: ABNORMAL
EOSINOPHIL # BLD AUTO: 0.3 K/UL
EOSINOPHIL NFR BLD: 2.5 %
ERYTHROCYTE [DISTWIDTH] IN BLOOD BY AUTOMATED COUNT: 12.9 %
EST. GFR  (AFRICAN AMERICAN): >60 ML/MIN/1.73 M^2
EST. GFR  (NON AFRICAN AMERICAN): >60 ML/MIN/1.73 M^2
ESTIMATED AVG GLUCOSE: 214 MG/DL
GLUCOSE SERPL-MCNC: 190 MG/DL
HBA1C MFR BLD HPLC: 9.1 %
HCT VFR BLD AUTO: 44.2 %
HDLC SERPL-MCNC: 34 MG/DL
HDLC SERPL: 22.1 %
HGB BLD-MCNC: 14.6 G/DL
IMM GRANULOCYTES # BLD AUTO: 0.04 K/UL
IMM GRANULOCYTES NFR BLD AUTO: 0.4 %
LDLC SERPL CALC-MCNC: 90.4 MG/DL
LYMPHOCYTES # BLD AUTO: 1.8 K/UL
LYMPHOCYTES NFR BLD: 18.1 %
MCH RBC QN AUTO: 28.5 PG
MCHC RBC AUTO-ENTMCNC: 33 G/DL
MCV RBC AUTO: 86 FL
MONOCYTES # BLD AUTO: 0.5 K/UL
MONOCYTES NFR BLD: 4.7 %
NEUTROPHILS # BLD AUTO: 7.4 K/UL
NEUTROPHILS NFR BLD: 73.8 %
NONHDLC SERPL-MCNC: 120 MG/DL
NRBC BLD-RTO: 0 /100 WBC
PLATELET # BLD AUTO: 266 K/UL
PMV BLD AUTO: 10.4 FL
POTASSIUM SERPL-SCNC: 4.7 MMOL/L
PROT SERPL-MCNC: 7.4 G/DL
RBC # BLD AUTO: 5.12 M/UL
SODIUM SERPL-SCNC: 139 MMOL/L
TRIGL SERPL-MCNC: 148 MG/DL
WBC # BLD AUTO: 10.05 K/UL

## 2018-02-08 PROCEDURE — 99214 OFFICE O/P EST MOD 30 MIN: CPT | Mod: 25,S$PBB,, | Performed by: FAMILY MEDICINE

## 2018-02-08 PROCEDURE — 80053 COMPREHEN METABOLIC PANEL: CPT

## 2018-02-08 PROCEDURE — 83036 HEMOGLOBIN GLYCOSYLATED A1C: CPT

## 2018-02-08 PROCEDURE — 85025 COMPLETE CBC W/AUTO DIFF WBC: CPT

## 2018-02-08 PROCEDURE — 36415 COLL VENOUS BLD VENIPUNCTURE: CPT | Mod: PO

## 2018-02-08 PROCEDURE — 90471 IMMUNIZATION ADMIN: CPT | Mod: PBBFAC,PO

## 2018-02-08 PROCEDURE — 99999 PR PBB SHADOW E&M-EST. PATIENT-LVL III: CPT | Mod: PBBFAC,,, | Performed by: FAMILY MEDICINE

## 2018-02-08 PROCEDURE — 80061 LIPID PANEL: CPT

## 2018-02-08 PROCEDURE — 99213 OFFICE O/P EST LOW 20 MIN: CPT | Mod: PO | Performed by: FAMILY MEDICINE

## 2018-02-08 PROCEDURE — 3008F BODY MASS INDEX DOCD: CPT | Mod: ,,, | Performed by: FAMILY MEDICINE

## 2018-02-08 RX ORDER — METFORMIN HYDROCHLORIDE 850 MG/1
850 TABLET ORAL 2 TIMES DAILY WITH MEALS
Qty: 180 TABLET | Refills: 0 | Status: CANCELLED | OUTPATIENT
Start: 2018-02-08

## 2018-02-08 RX ORDER — ALBUTEROL SULFATE 90 UG/1
2 AEROSOL, METERED RESPIRATORY (INHALATION) EVERY 6 HOURS PRN
Qty: 18 G | Status: CANCELLED | OUTPATIENT
Start: 2018-02-08

## 2018-02-08 RX ORDER — INSULIN GLARGINE 300 [IU]/ML
INJECTION, SOLUTION SUBCUTANEOUS
Qty: 6 ML | Refills: 0 | Status: CANCELLED | OUTPATIENT
Start: 2018-02-08

## 2018-02-08 RX ORDER — LISINOPRIL AND HYDROCHLOROTHIAZIDE 20; 25 MG/1; MG/1
1 TABLET ORAL DAILY
Qty: 30 TABLET | Refills: 11 | Status: SHIPPED | OUTPATIENT
Start: 2018-02-08 | End: 2019-01-29 | Stop reason: SDUPTHER

## 2018-02-08 RX ORDER — GLIMEPIRIDE 2 MG/1
2 TABLET ORAL
Qty: 90 TABLET | Refills: 0 | Status: CANCELLED | OUTPATIENT
Start: 2018-02-08

## 2018-02-08 RX ORDER — GLIMEPIRIDE 2 MG/1
2 TABLET ORAL
Qty: 90 TABLET | Refills: 0 | Status: SHIPPED | OUTPATIENT
Start: 2018-02-08 | End: 2018-03-19 | Stop reason: SDUPTHER

## 2018-02-08 NOTE — PROGRESS NOTES
Subjective:       Patient ID: Kay Rob is a 62 y.o. female.    Chief Complaint: Follow-up and Headache    62 y old  Female here for f/u on DM , HTN and DLP . On aspirin,  hep B   vacc :1/3 , exercises: some walking , Opthalmology: seen By Adam Diaz bs are : 200, 275  and  2 hrs pp : 180, 200    , off note is that she has been off of metformin , glimepiride , atorvastatin and victoza  Since december . For some reason  pharmacy has not dispense them   . On Toujeo : 92 units qd       Headache        Review of Systems   Constitutional: Negative.    HENT: Negative.    Respiratory: Negative.    Cardiovascular: Negative.    Gastrointestinal: Negative.    Genitourinary: Negative.    Musculoskeletal: Negative.    Neurological: Positive for headaches.       Objective:      Physical Exam   Constitutional: She is oriented to person, place, and time. She appears well-developed and well-nourished. No distress.   HENT:   Head: Normocephalic and atraumatic.   Right Ear: External ear normal.   Left Ear: External ear normal.   Mouth/Throat: No oropharyngeal exudate.   Eyes: Conjunctivae and EOM are normal. Pupils are equal, round, and reactive to light. Right eye exhibits no discharge. Left eye exhibits no discharge. No scleral icterus.   Neck: Normal range of motion. Neck supple. No JVD present. No tracheal deviation present. No thyromegaly present.   Cardiovascular: Normal rate, regular rhythm and normal heart sounds.  Exam reveals no gallop and no friction rub.    No murmur heard.  Pulmonary/Chest: Effort normal and breath sounds normal. No stridor. No respiratory distress. She has no wheezes. She has no rales. She exhibits no tenderness.   Abdominal: Soft. Bowel sounds are normal. She exhibits no distension. There is no tenderness. There is no rebound and no guarding.   Musculoskeletal: Normal range of motion.   Lymphadenopathy:     She has no cervical adenopathy.   Neurological: She is alert and oriented to  person, place, and time.   Skin: Skin is warm and dry. She is not diaphoretic.   Psychiatric: She has a normal mood and affect. Her behavior is normal. Judgment and thought content normal.       Assessment:     Kay was seen today for follow-up and headache.    Diagnoses and all orders for this visit:    DM type 2 with diabetic dyslipidemia  -     CBC auto differential; Future  -     Comprehensive metabolic panel; Future  -     Hemoglobin A1c; Future  -     Lipid panel; Future    Hypertension, unspecified type    Other orders  -     Cancel: insulin glargine, TOUJEO, (TOUJEO SOLOSTAR) 300 unit/mL (1.5 mL) InPn pen;   -     Cancel: metFORMIN (GLUCOPHAGE) 850 MG tablet; Take 1 tablet (850 mg total) by mouth 2 (two) times daily with meals.  -     Cancel: glimepiride (AMARYL) 2 MG tablet; Take 1 tablet (2 mg total) by mouth before breakfast.  -     Cancel: albuterol (VENTOLIN HFA) 90 mcg/actuation inhaler; Inhale 2 puffs into the lungs every 6 (six) hours as needed for Wheezing. Rescue  -     lisinopril-hydrochlorothiazide (PRINZIDE,ZESTORETIC) 20-25 mg Tab; Take 1 tablet by mouth once daily.  -     Hepatitis B Vaccine (Adult) (IM)      Plan:   .Pt. encouraged to follow a strict diabetic type diet.   Encouraged daily physical activity/exercise for at least 30mins if tolerated.   Weight control recommenced as always.   Discussed how lifestyle changes make biggest impact on diabetes control.   Continue home glucose monitoring once daily and keep log.   Counseling provided today about hypoglycemia as well as about how diabetes increases risk of cardiovascular, cerebrovascular ,peripheral vascular disease and other serious health complications. He has been instructed to call if developing any new symptoms or hypoglycemia related symptoms.   See encounter for associated orders/medications.   - Lipid panel; Future      Controlled. Cont med

## 2018-02-08 NOTE — TELEPHONE ENCOUNTER
Spoke with pharmacist and she stated that all patients meds have been ready to  for the past 8 days except for glimepride which is out of refills. Also states pts meds from December were picked up. Informed would forward info to provider.

## 2018-02-13 ENCOUNTER — TELEPHONE (OUTPATIENT)
Dept: OBSTETRICS AND GYNECOLOGY | Facility: CLINIC | Age: 63
End: 2018-02-13

## 2018-02-13 NOTE — TELEPHONE ENCOUNTER
----- Message from Andrzej Roman MD sent at 2/12/2018  5:27 PM CST -----  Please inform patient that her pap smear result is normal.

## 2018-02-20 ENCOUNTER — OFFICE VISIT (OUTPATIENT)
Dept: FAMILY MEDICINE | Facility: CLINIC | Age: 63
End: 2018-02-20
Payer: MEDICAID

## 2018-02-20 ENCOUNTER — LAB VISIT (OUTPATIENT)
Dept: LAB | Facility: HOSPITAL | Age: 63
End: 2018-02-20
Attending: FAMILY MEDICINE
Payer: MEDICAID

## 2018-02-20 VITALS
DIASTOLIC BLOOD PRESSURE: 70 MMHG | HEIGHT: 67 IN | BODY MASS INDEX: 36.36 KG/M2 | OXYGEN SATURATION: 96 % | SYSTOLIC BLOOD PRESSURE: 130 MMHG | HEART RATE: 100 BPM | WEIGHT: 231.69 LBS | TEMPERATURE: 99 F

## 2018-02-20 DIAGNOSIS — E78.5 DM TYPE 2 WITH DIABETIC DYSLIPIDEMIA: ICD-10-CM

## 2018-02-20 DIAGNOSIS — M79.10 MYALGIA: ICD-10-CM

## 2018-02-20 DIAGNOSIS — M79.10 MYALGIA: Primary | ICD-10-CM

## 2018-02-20 DIAGNOSIS — E11.69 DM TYPE 2 WITH DIABETIC DYSLIPIDEMIA: ICD-10-CM

## 2018-02-20 DIAGNOSIS — J45.20 MILD INTERMITTENT ASTHMA, UNSPECIFIED WHETHER COMPLICATED: ICD-10-CM

## 2018-02-20 LAB
25(OH)D3+25(OH)D2 SERPL-MCNC: 23 NG/ML
CK SERPL-CCNC: 71 U/L

## 2018-02-20 PROCEDURE — 36415 COLL VENOUS BLD VENIPUNCTURE: CPT | Mod: PO

## 2018-02-20 PROCEDURE — 99999 PR PBB SHADOW E&M-EST. PATIENT-LVL III: CPT | Mod: PBBFAC,,, | Performed by: FAMILY MEDICINE

## 2018-02-20 PROCEDURE — 99214 OFFICE O/P EST MOD 30 MIN: CPT | Mod: S$PBB,,, | Performed by: FAMILY MEDICINE

## 2018-02-20 PROCEDURE — 82306 VITAMIN D 25 HYDROXY: CPT

## 2018-02-20 PROCEDURE — 84443 ASSAY THYROID STIM HORMONE: CPT

## 2018-02-20 PROCEDURE — 3008F BODY MASS INDEX DOCD: CPT | Mod: ,,, | Performed by: FAMILY MEDICINE

## 2018-02-20 PROCEDURE — 82550 ASSAY OF CK (CPK): CPT

## 2018-02-20 PROCEDURE — 99213 OFFICE O/P EST LOW 20 MIN: CPT | Mod: PBBFAC,PO | Performed by: FAMILY MEDICINE

## 2018-02-20 RX ORDER — ALBUTEROL SULFATE 90 UG/1
2 AEROSOL, METERED RESPIRATORY (INHALATION) EVERY 6 HOURS PRN
Qty: 18 G | Refills: 0 | Status: SHIPPED | OUTPATIENT
Start: 2018-02-20 | End: 2019-01-29 | Stop reason: SDUPTHER

## 2018-02-20 RX ORDER — INSULIN GLARGINE 300 [IU]/ML
INJECTION, SOLUTION SUBCUTANEOUS
Qty: 6 ML | Refills: 0 | Status: SHIPPED | OUTPATIENT
Start: 2018-02-20 | End: 2018-03-13 | Stop reason: SDUPTHER

## 2018-02-20 RX ORDER — TRAZODONE HYDROCHLORIDE 50 MG/1
50 TABLET ORAL NIGHTLY PRN
Qty: 30 TABLET | Refills: 3 | Status: SHIPPED | OUTPATIENT
Start: 2018-02-20 | End: 2018-07-26 | Stop reason: SDUPTHER

## 2018-02-20 RX ORDER — KETOCONAZOLE 20 MG/ML
SHAMPOO, SUSPENSION TOPICAL
Qty: 120 ML | Refills: 0 | Status: SHIPPED | OUTPATIENT
Start: 2018-02-20 | End: 2018-03-19 | Stop reason: SDUPTHER

## 2018-02-20 RX ORDER — GABAPENTIN 100 MG/1
100 CAPSULE ORAL 3 TIMES DAILY
Qty: 90 CAPSULE | Refills: 11 | Status: SHIPPED | OUTPATIENT
Start: 2018-02-20 | End: 2019-01-29 | Stop reason: SDUPTHER

## 2018-02-20 NOTE — PROGRESS NOTES
Subjective:       Patient ID: Kay Rob is a 62 y.o. female.    Chief Complaint: Follow-up and Diabetes    62 y old female with type 2 dm , DLP and mild intermittent  asthma here for f.u after taking Med for DM as prescribed. FBS: 130 , 2 hr pp 150 . Not exercising  , tries to eat healthy . Uses rescue inhaler  no more than once montshly . + NOcturanl   leg mylagias over the last  3 m . No mayor back issues.  rubs legs which help       Review of Systems   Constitutional: Negative.    HENT: Negative.    Respiratory: Negative.    Cardiovascular: Negative.    Gastrointestinal: Negative.    Genitourinary: Negative.    Musculoskeletal: Positive for myalgias.   Neurological: Negative.    Hematological: Negative.        Objective:      Physical Exam   Constitutional: She is oriented to person, place, and time. She appears well-developed and well-nourished. No distress.   HENT:   Head: Normocephalic and atraumatic.   Right Ear: External ear normal.   Left Ear: External ear normal.   Mouth/Throat: No oropharyngeal exudate.   Eyes: Conjunctivae and EOM are normal. Pupils are equal, round, and reactive to light. Right eye exhibits no discharge. Left eye exhibits no discharge. No scleral icterus.   Neck: Normal range of motion. Neck supple. No JVD present. No tracheal deviation present. No thyromegaly present.   Cardiovascular: Normal rate, regular rhythm and normal heart sounds.  Exam reveals no gallop and no friction rub.    No murmur heard.  Pulmonary/Chest: Effort normal and breath sounds normal. No stridor. No respiratory distress. She has no wheezes. She has no rales. She exhibits no tenderness.   Abdominal: Soft. Bowel sounds are normal. She exhibits no distension. There is no tenderness. There is no rebound and no guarding.   Musculoskeletal: Normal range of motion.   Lymphadenopathy:     She has no cervical adenopathy.   Neurological: She is alert and oriented to person, place, and time.   Skin: Skin is  warm and dry. She is not diaphoretic.   Psychiatric: She has a normal mood and affect. Her behavior is normal. Judgment and thought content normal.       Assessment:       1. Myalgia    2. Mild intermittent asthma, unspecified whether complicated    3. DM type 2 with diabetic dyslipidemia        Plan:     Kay was seen today for follow-up and diabetes.    Diagnoses and all orders for this visit:    Myalgia  -     CK; Future    Mild intermittent asthma, unspecified whether complicated    DM type 2 with diabetic dyslipidemia    Other orders  -     Cancel: fluocinolone (SYNALAR) 0.01 % Sham; Apply no more than 1 ounce to scalp once daily; work into lather and allow to remain on scalp for ~5 minutes. Remove from hair and scalp by rinsing thoroughly with water.  -     insulin glargine, TOUJEO, (TOUJEO SOLOSTAR U-300 INSULIN) 300 unit/mL (1.5 mL) InPn pen; Administer 92 units sq  Daily  -     gabapentin (NEURONTIN) 100 MG capsule; Take 1 capsule (100 mg total) by mouth 3 (three) times daily.  -     traZODone (DESYREL) 50 MG tablet; Take 1 tablet (50 mg total) by mouth nightly as needed for Insomnia.  -     albuterol (VENTOLIN HFA) 90 mcg/actuation inhaler; Inhale 2 puffs into the lungs every 6 (six) hours as needed for Wheezing. Rescue  -     ketoconazole (NIZORAL) 2 % shampoo; Apply 5 to 10 mL to wet scalp, lather, leave on 3 to 5 minutes, and rinse; apply twice weekly for 2 to 4 weeks.     labs . Stop statin for 1 w   Controlled. Med rf   Controlled . Cont meds. F.u in 3 m

## 2018-02-21 LAB — TSH SERPL DL<=0.005 MIU/L-ACNC: 1.56 UIU/ML

## 2018-03-13 ENCOUNTER — TELEPHONE (OUTPATIENT)
Dept: FAMILY MEDICINE | Facility: CLINIC | Age: 63
End: 2018-03-13

## 2018-03-13 RX ORDER — INSULIN GLARGINE 300 U/ML
INJECTION, SOLUTION SUBCUTANEOUS
Qty: 6 SYRINGE | Refills: 0 | Status: SHIPPED | OUTPATIENT
Start: 2018-03-13 | End: 2018-03-19 | Stop reason: SDUPTHER

## 2018-03-13 NOTE — TELEPHONE ENCOUNTER
----- Message from Saima Mon sent at 3/13/2018  1:56 PM CDT -----  Contact: pt's daughter  She's returning a missed call, please advise [phone] (home)

## 2018-03-13 NOTE — TELEPHONE ENCOUNTER
Contact interpretor and left message for patient to contact Dr. Stanton office. Pt needs to schedule an appointment for leg cramps and resume taking her lipitor.

## 2018-03-13 NOTE — TELEPHONE ENCOUNTER
Called interpretor and had her communicate test results to patient. Pt verbalized understanding but stated that she stopped taking the atorvastatin in Jan and February but still is experiencing arm and leg cramps after being off of medication. Dylon sheldone

## 2018-03-13 NOTE — TELEPHONE ENCOUNTER
----- Message from Maryam Crowder sent at 3/13/2018 10:21 AM CDT -----  results needed.....[phone]

## 2018-03-19 RX ORDER — INSULIN GLARGINE 300 U/ML
INJECTION, SOLUTION SUBCUTANEOUS
Qty: 15 ML | Refills: 0 | Status: SHIPPED | OUTPATIENT
Start: 2018-03-19 | End: 2018-07-20 | Stop reason: SDUPTHER

## 2018-03-19 RX ORDER — INSULIN GLARGINE 300 U/ML
INJECTION, SOLUTION SUBCUTANEOUS
Qty: 6 ML | Refills: 0 | Status: SHIPPED | OUTPATIENT
Start: 2018-03-19 | End: 2018-05-07 | Stop reason: SDUPTHER

## 2018-03-19 RX ORDER — KETOCONAZOLE 20 MG/ML
SHAMPOO, SUSPENSION TOPICAL
Qty: 120 ML | Refills: 0 | Status: SHIPPED | OUTPATIENT
Start: 2018-03-19 | End: 2018-11-01 | Stop reason: SDUPTHER

## 2018-03-20 RX ORDER — GLIMEPIRIDE 2 MG/1
TABLET ORAL
Qty: 90 TABLET | Refills: 0 | Status: SHIPPED | OUTPATIENT
Start: 2018-03-20 | End: 2018-10-18 | Stop reason: SDUPTHER

## 2018-04-23 RX ORDER — METFORMIN HYDROCHLORIDE 850 MG/1
TABLET ORAL
Qty: 180 TABLET | Refills: 0 | Status: SHIPPED | OUTPATIENT
Start: 2018-04-23 | End: 2018-07-20 | Stop reason: SDUPTHER

## 2018-04-23 RX ORDER — INSULIN GLARGINE 300 U/ML
INJECTION, SOLUTION SUBCUTANEOUS
Qty: 6 ML | Refills: 0 | Status: SHIPPED | OUTPATIENT
Start: 2018-04-23 | End: 2018-05-07 | Stop reason: SDUPTHER

## 2018-04-23 RX ORDER — KETOCONAZOLE 20 MG/ML
SHAMPOO, SUSPENSION TOPICAL
Qty: 120 ML | Refills: 0 | Status: SHIPPED | OUTPATIENT
Start: 2018-04-23 | End: 2018-05-07 | Stop reason: SDUPTHER

## 2018-04-25 ENCOUNTER — TELEPHONE (OUTPATIENT)
Dept: FAMILY MEDICINE | Facility: CLINIC | Age: 63
End: 2018-04-25

## 2018-05-07 ENCOUNTER — INITIAL CONSULT (OUTPATIENT)
Dept: DERMATOLOGY | Facility: CLINIC | Age: 63
End: 2018-05-07
Payer: MEDICARE

## 2018-05-07 DIAGNOSIS — L85.3 XEROSIS CUTIS: ICD-10-CM

## 2018-05-07 DIAGNOSIS — Q82.8 KERATODERMA: ICD-10-CM

## 2018-05-07 DIAGNOSIS — L21.9 SEBORRHEIC DERMATITIS: Primary | ICD-10-CM

## 2018-05-07 PROCEDURE — 99202 OFFICE O/P NEW SF 15 MIN: CPT | Mod: S$PBB,,, | Performed by: DERMATOLOGY

## 2018-05-07 PROCEDURE — 99213 OFFICE O/P EST LOW 20 MIN: CPT | Mod: PBBFAC,PO | Performed by: DERMATOLOGY

## 2018-05-07 PROCEDURE — 99999 PR PBB SHADOW E&M-EST. PATIENT-LVL III: CPT | Mod: PBBFAC,,, | Performed by: DERMATOLOGY

## 2018-05-07 RX ORDER — CLOBETASOL PROPIONATE 0.46 MG/ML
SOLUTION TOPICAL
Qty: 50 ML | Refills: 3 | Status: SHIPPED | OUTPATIENT
Start: 2018-05-07 | End: 2019-01-02 | Stop reason: SDUPTHER

## 2018-05-07 RX ORDER — AMMONIUM LACTATE 12 G/100G
LOTION TOPICAL
Qty: 225 G | Refills: 11 | Status: SHIPPED | OUTPATIENT
Start: 2018-05-07 | End: 2019-01-29 | Stop reason: SDUPTHER

## 2018-05-07 RX ORDER — TRIAMCINOLONE ACETONIDE 0.25 MG/G
CREAM TOPICAL
Qty: 80 G | Refills: 1 | Status: SHIPPED | OUTPATIENT
Start: 2018-05-07 | End: 2018-07-26 | Stop reason: SDUPTHER

## 2018-05-07 NOTE — LETTER
May 7, 2018      Lucy Negron MD  [address] [address]           [facility]  [address]  [place] [address]  Phone: [phone]  Fax: [phone]          Patient: Kay Rob   MR Number: [id number]   YOB: 1955   Date of Visit: 5/7/2018       Dear Dr. Lucy Negron:    Thank you for referring Kay Rob to me for evaluation. Attached you will find relevant portions of my assessment and plan of care.    If you have questions, please do not hesitate to call me. I look forward to following Kay Rob along with you.    Sincerely,    Erin Yi MD    Enclosure  CC:  No Recipients    If you would like to receive this communication electronically, please contact [email or web address] or [phone] to request more information on [employer] Link access.    For providers and/or their staff who would like to refer a patient to [facility], please contact us through our one-stop-shop provider referral line, [facility], at [phone].    If you feel you have received this communication in error or would no longer like to receive these types of communications, please e-mail [email or web address]

## 2018-05-07 NOTE — PATIENT INSTRUCTIONS
Start biotin 2876-3405 mcg daily    XEROSIS (DRY SKIN)      1. Definition    Xerosis is the term for dry skin.  We all have a natural oil coating over our skin produced by the skin oil glands.  If this oil is removed, the skin becomes dry which can lead to cracking, which can lead to inflammation.  Xerosis is usually a long-term problem that recurs often, especially in the winter.    2. Cause     Long hot baths or showers can remove our natural oil and lead to xerosis.  One should never take more than one bath or shower a day and for no longer than ten minutes.   Use of harsh soaps such as Zest, Dial, Ethiopian Spring, Lever and Ivory can worsen and cause xerosis.   Cold winter weather worsens xerosis because the amount of moisture contained in cold air is much less than the amount of moisture in warm air.    3. Treatment     Treatment is intended to restore the natural oil to your skin.  Keep the skin lubricated.     Do not take more than one bath or shower a day.  Use lukewarm water, not hot.  Hot water dries out the skin.     Use a gentle moisturizing soap such as Cetaphil soap, Dove, or Cetaphil Restoraderm cleanser.     When toweling dry, dont rub.  Blot the skin so there is still some water left on the skin.  You should apply a moisturizing cream to all of the skin such as Cerave cream, Cetaphil cream, Restoraderm or Eucerin Original Formula cream.   Alpha hydroxyacid lotions, i.e., AmLactin, also work very well for preventing dry skin, but may burn when used on inflamed or reddened skin.      [facility]   [facility]  [address] [place] [address]    Dept: [phone]   Dept Fax: [phone]

## 2018-05-07 NOTE — PROGRESS NOTES
Subjective:       Patient ID:  Kay Rob is a 63 y.o. female who presents for   Chief Complaint   Patient presents with    Dry Skin     History of Present Illness: The patient presents with chief complaint of dryness.  Location: scalp  Duration: 1 year  Signs/Symptoms: dryness, scaling    Prior treatments: fluocinolone solution once-twice/week, ketoconazole shampoo once/week    She also c/o dryness of the feet, currently using vitamin E and eucerin.           Review of Systems   Constitutional: Negative for fever and chills.   Gastrointestinal: Negative for nausea and vomiting.   Skin: Positive for itching and dry skin. Negative for daily sunscreen use, activity-related sunscreen use and recent sunburn.   Hematologic/Lymphatic: Does not bruise/bleed easily.        Objective:    Physical Exam   Constitutional: She appears well-developed and well-nourished. No distress.   Neurological: She is alert and oriented to person, place, and time. She is not disoriented.   Psychiatric: She has a normal mood and affect.   Skin:   Areas Examined (abnormalities noted in diagram):   Scalp / Hair Palpated and Inspected  Head / Face Inspection Performed  Neck Inspection Performed  Chest / Axilla Inspection Performed  Abdomen Inspection Performed  Back Inspection Performed  RUE Inspected  LUE Inspection Performed  Nails and Digits Inspection Performed                   Assessment / Plan:        Seborrheic dermatitis  -     clobetasol (TEMOVATE) 0.05 % external solution; Apply to affected areas of scalp 1-2 times daily as needed for itch  Dispense: 50 mL; Refill: 3  -     triamcinolone acetonide 0.025% (KENALOG) 0.025 % cream; AAA bid as needed for flares.  Mild steroid.  Dispense: 80 g; Refill: 1  -     Mild, will start above meds, continue ketoconazole shampoo, increase to 2 times/week.     Xerosis cutis  -     ammonium lactate (AMLACTIN) 12 % lotion; Use daily.  Apply to damp skin after bathing.  Dispense: 225 g; Refill:  11  -     Discussed dry skin care, AVS given.  Will start ammonium lactate 12% lotion lotion daily to damp skin.     Keratoderma  -     Ambulatory Referral to Podiatry  -     In setting of diabetes, will refer to podiatry.              Follow-up if symptoms worsen or fail to improve.

## 2018-05-28 ENCOUNTER — OFFICE VISIT (OUTPATIENT)
Dept: PODIATRY | Facility: CLINIC | Age: 63
End: 2018-05-28
Payer: MEDICARE

## 2018-05-28 VITALS
HEART RATE: 102 BPM | BODY MASS INDEX: 36.79 KG/M2 | WEIGHT: 234.38 LBS | SYSTOLIC BLOOD PRESSURE: 127 MMHG | DIASTOLIC BLOOD PRESSURE: 81 MMHG | HEIGHT: 67 IN

## 2018-05-28 DIAGNOSIS — E11.49 TYPE II DIABETES MELLITUS WITH NEUROLOGICAL MANIFESTATIONS: ICD-10-CM

## 2018-05-28 DIAGNOSIS — R23.4 FISSURE IN SKIN OF FOOT: ICD-10-CM

## 2018-05-28 DIAGNOSIS — E11.9 COMPREHENSIVE DIABETIC FOOT EXAMINATION, TYPE 2 DM, ENCOUNTER FOR: Primary | ICD-10-CM

## 2018-05-28 PROCEDURE — 99213 OFFICE O/P EST LOW 20 MIN: CPT | Mod: PBBFAC | Performed by: PODIATRIST

## 2018-05-28 PROCEDURE — 99204 OFFICE O/P NEW MOD 45 MIN: CPT | Mod: S$PBB,,, | Performed by: PODIATRIST

## 2018-05-28 PROCEDURE — 99999 PR PBB SHADOW E&M-EST. PATIENT-LVL III: CPT | Mod: PBBFAC,,, | Performed by: PODIATRIST

## 2018-05-28 RX ORDER — MUPIROCIN 20 MG/G
OINTMENT TOPICAL DAILY
Qty: 30 G | Refills: 1 | Status: SHIPPED | OUTPATIENT
Start: 2018-05-28 | End: 2019-01-29 | Stop reason: SDUPTHER

## 2018-05-28 RX ORDER — AMMONIUM LACTATE 12 G/100G
1 CREAM TOPICAL 2 TIMES DAILY
Qty: 1 TUBE | Refills: 3 | Status: SHIPPED | OUTPATIENT
Start: 2018-05-28 | End: 2018-06-04 | Stop reason: SDUPTHER

## 2018-05-28 NOTE — LETTER
May 28, 2018      Erin Yi MD  [address] [place]  [place] [facility] [address]           [facility]  [address] [place] [address]  Phone: [phone]  Fax: [phone]          Patient: Kay Rob   MR Number: [id number]   YOB: 1955   Date of Visit: 5/28/2018       Dear Dr. Erin Yi:    Thank you for referring Kay Rob to me for evaluation. Attached you will find relevant portions of my assessment and plan of care.    If you have questions, please do not hesitate to call me. I look forward to following Kay Rob along with you.    Sincerely,    Molly Gil DPM    Enclosure  CC:  No Recipients    If you would like to receive this communication electronically, please contact [email or web address] or [phone] to request more information on [employer] Link access.    For providers and/or their staff who would like to refer a patient to [facility], please contact us through our one-stop-shop provider referral line, [facility], at [phone].    If you feel you have received this communication in error or would no longer like to receive these types of communications, please e-mail [email or web address]

## 2018-05-29 NOTE — PROGRESS NOTES
Subjective:     Patient ID: Kay Rob is a 63 y.o. female.    Chief Complaint: Diabetic Foot Exam (PCP: LEXIE Ramirez 2/20/2018, patient is accompanied by her daughter and granddaughters )    Kay is a 63 y.o. female who presents to the clinic for evaluation and treatment of high risk feet. Kay has a past medical history of Arthritis; Asthma; Diabetes mellitus; Hyperlipidemia; and Hypertension. The patient's chief complaint is crack in her heel. This patient has documented high risk feet requiring routine maintenance secondary to diabetes mellitis and those secondary complications of diabetes, as mentioned.. Patient states she applies Eucerin to heels.     PCP: JUAN A RAMIREZ MD    Date Last Seen by PCP: 2/20/18    Current shoe gear:  Affected Foot: Extra depth shoes     Unaffected Foot: Extra depth shoes    Hemoglobin A1C   Date Value Ref Range Status   02/08/2018 9.1 (H) 4.0 - 5.6 % Final     Comment:     According to ADA guidelines, hemoglobin A1c <7.0% represents  optimal control in non-pregnant diabetic patients. Different  metrics may apply to specific patient populations.   Standards of Medical Care in Diabetes-2016.  For the purpose of screening for the presence of diabetes:  <5.7%     Consistent with the absence of diabetes  5.7-6.4%  Consistent with increasing risk for diabetes   (prediabetes)  >or=6.5%  Consistent with diabetes  Currently, no consensus exists for use of hemoglobin A1c  for diagnosis of diabetes for children.  This Hemoglobin A1c assay has significant interference with fetal   hemoglobin   (HbF). The results are invalid for patients with abnormal amounts of   HbF,   including those with known Hereditary Persistence   of Fetal Hemoglobin. Heterozygous hemoglobin variants (HbAS, HbAC,   HbAD, HbAE, HbA2) do not significantly interfere with this assay;   however, presence of multiple variants in a sample may impact the %   interference.     11/09/2017 10.9 (H) 4.0 -  5.6 % Final     Comment:     According to ADA guidelines, hemoglobin A1c <7.0% represents  optimal control in non-pregnant diabetic patients. Different  metrics may apply to specific patient populations.   Standards of Medical Care in Diabetes-2016.  For the purpose of screening for the presence of diabetes:  <5.7%     Consistent with the absence of diabetes  5.7-6.4%  Consistent with increasing risk for diabetes   (prediabetes)  >or=6.5%  Consistent with diabetes  Currently, no consensus exists for use of hemoglobin A1c  for diagnosis of diabetes for children.  This Hemoglobin A1c assay has significant interference with fetal   hemoglobin   (HbF). The results are invalid for patients with abnormal amounts of   HbF,   including those with known Hereditary Persistence   of Fetal Hemoglobin. Heterozygous hemoglobin variants (HbAS, HbAC,   HbAD, HbAE, HbA2) do not significantly interfere with this assay;   however, presence of multiple variants in a sample may impact the %   interference.             Patient Active Problem List   Diagnosis    DM type 2 with diabetic dyslipidemia    Essential hypertension    Mild intermittent asthma    Severe obesity (BMI 35.0-39.9) with comorbidity       Medication List with Changes/Refills   New Medications    AMMONIUM LACTATE 12 % CREA    Apply 1 Act topically 2 (two) times daily.    MUPIROCIN (BACTROBAN) 2 % OINTMENT    Apply topically once daily.   Current Medications    ALBUTEROL (VENTOLIN HFA) 90 MCG/ACTUATION INHALER    Inhale 2 puffs into the lungs every 6 (six) hours as needed for Wheezing. Rescue    AMMONIUM LACTATE (AMLACTIN) 12 % LOTION    Use daily.  Apply to damp skin after bathing.    ATORVASTATIN (LIPITOR) 40 MG TABLET    Take 1 tablet (40 mg total) by mouth once daily.    CLOBETASOL (TEMOVATE) 0.05 % EXTERNAL SOLUTION    Apply to affected areas of scalp 1-2 times daily as needed for itch    EMOLLIENT COMBINATION NO.69 (EUCERIN SKIN CALMING) CREA    Apply topically.  "   FLUOCINOLONE (SYNALAR) 0.01 % SHAM    Apply no more than 1 ounce to scalp once daily; work into lather and allow to remain on scalp for ~5 minutes. Remove from hair and scalp by rinsing thoroughly with water.    GABAPENTIN (NEURONTIN) 100 MG CAPSULE    Take 1 capsule (100 mg total) by mouth 3 (three) times daily.    GLIMEPIRIDE (AMARYL) 2 MG TABLET    TAKE 1 TABLET BY MOUTH ONCE DAILY BEFORE  BREAKFAST    KETOCONAZOLE (NIZORAL) 2 % SHAMPOO    APPLY 5 TO 10 MLS TO WET SCALP, LATHER, LEAVE ON 3 TO 5 MINUTES, AND RINSE, APPLY TWICE WEEKLY FOR 2 TO 4 WEEKS    LIRAGLUTIDE 0.6 MG/0.1 ML, 18 MG/3 ML, SUBQ PNIJ (VICTOZA 2-KIRK) 0.6 MG/0.1 ML (18 MG/3 ML) PNIJ    Inject 1.2 mg into the skin once daily.    LISINOPRIL-HYDROCHLOROTHIAZIDE (PRINZIDE,ZESTORETIC) 20-25 MG TAB    Take 1 tablet by mouth once daily.    METFORMIN (GLUCOPHAGE) 850 MG TABLET    TAKE ONE TABLET BY MOUTH TWICE DAILY WITH MEALS    PEN NEEDLE, DIABETIC 31 GAUGE X 5/16" NDLE    Use daily with Victoza    TOUJEO SOLOSTAR U-300 INSULIN 300 UNIT/ML (1.5 ML) INPN PEN    INJECT 92 UNITS SUBCUTANEOUSLY ONCE DAILY    TRAZODONE (DESYREL) 50 MG TABLET    Take 1 tablet (50 mg total) by mouth nightly as needed for Insomnia.    TRIAMCINOLONE ACETONIDE 0.025% (KENALOG) 0.025 % CREAM    AAA bid as needed for flares.  Mild steroid.       Review of patient's allergies indicates:   Allergen Reactions    Pollen extracts        History reviewed. No pertinent surgical history.    Family History   Problem Relation Age of Onset    Cancer Father         Prostate Ca    Diabetes Sister        Social History     Social History    Marital status:      Spouse name: N/A    Number of children: N/A    Years of education: N/A     Occupational History    Not on file.     Social History Main Topics    Smoking status: Never Smoker    Smokeless tobacco: Never Used    Alcohol use No    Drug use: No    Sexual activity: Yes     Other Topics Concern    Not on file     Social " "History Narrative    No narrative on file       Vitals:    05/28/18 1441   BP: 127/81   Pulse: 102   Weight: 106.3 kg (234 lb 5.6 oz)   Height: 5' 7" (1.702 m)   PainSc: 0-No pain       Hemoglobin A1C   Date Value Ref Range Status   02/08/2018 9.1 (H) 4.0 - 5.6 % Final     Comment:     According to ADA guidelines, hemoglobin A1c <7.0% represents  optimal control in non-pregnant diabetic patients. Different  metrics may apply to specific patient populations.   Standards of Medical Care in Diabetes-2016.  For the purpose of screening for the presence of diabetes:  <5.7%     Consistent with the absence of diabetes  5.7-6.4%  Consistent with increasing risk for diabetes   (prediabetes)  >or=6.5%  Consistent with diabetes  Currently, no consensus exists for use of hemoglobin A1c  for diagnosis of diabetes for children.  This Hemoglobin A1c assay has significant interference with fetal   hemoglobin   (HbF). The results are invalid for patients with abnormal amounts of   HbF,   including those with known Hereditary Persistence   of Fetal Hemoglobin. Heterozygous hemoglobin variants (HbAS, HbAC,   HbAD, HbAE, HbA2) do not significantly interfere with this assay;   however, presence of multiple variants in a sample may impact the %   interference.     11/09/2017 10.9 (H) 4.0 - 5.6 % Final     Comment:     According to ADA guidelines, hemoglobin A1c <7.0% represents  optimal control in non-pregnant diabetic patients. Different  metrics may apply to specific patient populations.   Standards of Medical Care in Diabetes-2016.  For the purpose of screening for the presence of diabetes:  <5.7%     Consistent with the absence of diabetes  5.7-6.4%  Consistent with increasing risk for diabetes   (prediabetes)  >or=6.5%  Consistent with diabetes  Currently, no consensus exists for use of hemoglobin A1c  for diagnosis of diabetes for children.  This Hemoglobin A1c assay has significant interference with fetal   hemoglobin   (HbF). " The results are invalid for patients with abnormal amounts of   HbF,   including those with known Hereditary Persistence   of Fetal Hemoglobin. Heterozygous hemoglobin variants (HbAS, HbAC,   HbAD, HbAE, HbA2) do not significantly interfere with this assay;   however, presence of multiple variants in a sample may impact the %   interference.         Review of Systems   Constitutional: Negative for chills and fever.   Respiratory: Negative for shortness of breath.    Cardiovascular: Negative for chest pain, palpitations, orthopnea, claudication and leg swelling.   Gastrointestinal: Negative for diarrhea, nausea and vomiting.   Musculoskeletal: Negative for joint pain.   Skin: Negative for rash.        Cracks in bilateral heels   Neurological: Negative for dizziness, tingling, sensory change, focal weakness and weakness.   Psychiatric/Behavioral: Negative.              Objective:      PHYSICAL EXAM: Apperance: Alert and orient in no distress,well developed, and with good attention to grooming and body habits  Patient presents ambulating in extra depth shoes.   LOWER EXTREMITY EXAM:  VASCULAR: Dorsalis pedis pulses 2/4 bilateral and Posterior Tibial pulses 2/4 bilateral. Capillary fill time <4 seconds bilateral. Mild edema observed bilateral. Varicosities absent bilateral. Skin temperature of the lower extremities is warm to warm, proximal to distal. Hair growth dim bilateral.  DERMATOLOGICAL: No skin rashes, subcutaneous nodules, lesions, or ulcers observed bilateral. Nails 1,2,3,4,5 bilateral normal length. Webspaces 1,2,3,4 bilateral clean, dry and without evidence of break in skin integrity. Dry skin with cracks noted to bilateral plantar center heels. No erythema, drainage, or increased temp noted.   NEUROLOGICAL: Light touch, sharp-dull, proprioception all present and equal bilaterally.  Vibratory sensation diminished at bilateral hallux. Protective sensation absent at 6/10 sites as tested with a [place]-Arnie  5.07 monofilament.   MUSCULOSKELETAL: Muscle strength is 5/5 for foot inverters, everters, plantarflexors, and dorsiflexors. Muscle tone is normal.       Assessment:       Encounter Diagnoses   Name Primary?    Comprehensive diabetic foot examination, type 2 DM, encounter for Yes    Fissure in skin of foot     Type II diabetes mellitus with neurological manifestations          Plan:   Comprehensive diabetic foot examination, type 2 DM, encounter for    Fissure in skin of foot  -     mupirocin (BACTROBAN) 2 % ointment; Apply topically once daily.  Dispense: 30 g; Refill: 1  -     ammonium lactate 12 % Crea; Apply 1 Act topically 2 (two) times daily.  Dispense: 1 Tube; Refill: 3    Type II diabetes mellitus with neurological manifestations      I counseled the patient on her conditions, regarding findings of my examination, my impressions, and usual treatment plan.   Greater than 50% of this visit spent on counseling and coordination of care.  Greater than 15 minutes of a 20 minute appointment spent on education about the diabetic foot, neuropathy, and prevention of limb loss.  Shoe inspection. Diabetic Foot Education. Patient reminded of the importance of good nutrition and blood sugar control to help prevent podiatric complications of diabetes. Patient instructed on proper foot hygeine. We discussed wearing proper shoe gear, daily foot inspections, never walking without protective shoe gear, never putting sharp instruments to feet.    Prescription written for Ammonium lactate cream to be applied to heels twice daily.   Prescription written for Bactroban ointment to be applied once daily.   Patient  will continue to monitor the areas daily, inspect feet, wear protective shoe gear when ambulatory, moisturizer to maintain skin integrity. Patient reminded of the importance of good nutrition and blood sugar control to help prevent podiatric complications of diabetes.  Patient to return 2 weeks or sooner if needed.                  Molly Gil, DPM  [facility]

## 2018-06-04 ENCOUNTER — OFFICE VISIT (OUTPATIENT)
Dept: OPHTHALMOLOGY | Facility: CLINIC | Age: 63
End: 2018-06-04
Payer: MEDICARE

## 2018-06-04 DIAGNOSIS — H40.013 OPEN ANGLE WITH BORDERLINE FINDINGS, LOW RISK, BILATERAL: ICD-10-CM

## 2018-06-04 DIAGNOSIS — H25.13 CATARACT, NUCLEAR SCLEROTIC SENILE, BILATERAL: Primary | ICD-10-CM

## 2018-06-04 PROCEDURE — 99999 PR PBB SHADOW E&M-EST. PATIENT-LVL III: CPT | Mod: PBBFAC,,, | Performed by: OPHTHALMOLOGY

## 2018-06-04 PROCEDURE — 99213 OFFICE O/P EST LOW 20 MIN: CPT | Mod: PBBFAC,PO | Performed by: OPHTHALMOLOGY

## 2018-06-04 PROCEDURE — 92136 OPHTHALMIC BIOMETRY: CPT | Mod: PBBFAC,PO,RT | Performed by: OPHTHALMOLOGY

## 2018-06-04 PROCEDURE — 92014 COMPRE OPH EXAM EST PT 1/>: CPT | Mod: S$PBB,,, | Performed by: OPHTHALMOLOGY

## 2018-06-04 RX ORDER — GATIFLOXACIN 5 MG/ML
1 SOLUTION/ DROPS OPHTHALMIC 2 TIMES DAILY
Qty: 1 BOTTLE | Refills: 2 | Status: SHIPPED | OUTPATIENT
Start: 2018-06-04 | End: 2018-08-21

## 2018-06-04 RX ORDER — PREDNISOLONE ACETATE 10 MG/ML
1 SUSPENSION/ DROPS OPHTHALMIC 4 TIMES DAILY
Qty: 1 BOTTLE | Refills: 2 | Status: SHIPPED | OUTPATIENT
Start: 2018-06-04 | End: 2019-01-02

## 2018-06-04 RX ORDER — KETOROLAC TROMETHAMINE 5 MG/ML
1 SOLUTION OPHTHALMIC 4 TIMES DAILY
Qty: 1 BOTTLE | Refills: 2 | Status: SHIPPED | OUTPATIENT
Start: 2018-06-04 | End: 2018-08-21

## 2018-06-04 NOTE — PROGRESS NOTES
SUBJECTIVE:   Kay Rob is a 63 y.o. female   Uncorrected distance visual acuity was 20/80 in the right eye and 20/100 in the left eye.   Chief Complaint   Patient presents with    Blurred Vision        HPI:  HPI     Cataract Eval  Blurred VA, worse with glare OU  No pain or discomfort  BS not under control (200 range)    MLC Referral    FMHX: Ejg, mother, grandmother  DM 1998  NSC OU    Last edited by Marilynn Bragg on 6/4/2018 10:06 AM. (History)        Assessment /Plan :  1. Cataract, nuclear sclerotic senile, bilateral    Visually Significant Cataract OU:   Patient reports decreased vision consistent with the clinical amount of lenticular opacity,  which reaches the level of visual significance and affects activities of daily living such as  read, watch TV. Risks, benefits, and alternatives to cataract surgery were  discussed.  IOL options were discussed, including Premium IOL'S and the associated  side effects and additional patient cost associated with them as well as patient's visual  goals. The pt expressed a desire to proceed with surgery with the potential for some  reasonable degree of visual improvement and was consented.  Risks of loss of vision and eye reviewed as well as possibility of need for spectacle correction after surgery even with premium implants. Verbal and written preop  instructions were provided to the pt.            Pt is interested in traditional monofocal IOL aiming for:       Distance OU and understands that  glasses will be generally needed at all times for near vision and often for finer distance correction especially for higher degrees of astigmatism.             Final visual acuity may be limited by astigmatism, diabetes and glaucoma damage    Pt wishes to have Phaco/IOL  OD     Requests a Monofocal IOL.    Will aim for distance    Other considerations: NONE       2. Open angle with borderline findings, low risk, bilateral will reeval IOP after CE's   3. Uncontrolled type 2  diabetes mellitus with both eyes affected by moderate nonproliferative retinopathy and macular edema, with long-term current use of insulin     Cataract OS as well but has BDR with ME OS and will see [facility] first for Rx before CE OS

## 2018-06-05 RX ORDER — INSULIN GLARGINE 300 U/ML
INJECTION, SOLUTION SUBCUTANEOUS
Qty: 6 SYRINGE | Refills: 0 | Status: SHIPPED | OUTPATIENT
Start: 2018-06-05 | End: 2018-07-19 | Stop reason: SDUPTHER

## 2018-06-11 ENCOUNTER — OFFICE VISIT (OUTPATIENT)
Dept: OPHTHALMOLOGY | Facility: CLINIC | Age: 63
End: 2018-06-11
Payer: MEDICARE

## 2018-06-11 DIAGNOSIS — H25.13 CATARACT, NUCLEAR SCLEROTIC SENILE, BILATERAL: ICD-10-CM

## 2018-06-11 PROCEDURE — 99999 PR PBB SHADOW E&M-EST. PATIENT-LVL II: CPT | Mod: PBBFAC,,, | Performed by: OPHTHALMOLOGY

## 2018-06-11 PROCEDURE — 92014 COMPRE OPH EXAM EST PT 1/>: CPT | Mod: S$PBB,,, | Performed by: OPHTHALMOLOGY

## 2018-06-11 PROCEDURE — 99212 OFFICE O/P EST SF 10 MIN: CPT | Mod: PBBFAC,PO | Performed by: OPHTHALMOLOGY

## 2018-06-11 NOTE — PROGRESS NOTES
===============================  06/11/2018   Kay Rob,   63 y.o. female   Last visit [facility]: :Visit date not found   Last visit eye dept. 6/4/2018  VA:  Uncorrected distance visual acuity was 20/100 in the right eye and 20/200 in the left eye.  Tonometry     Tonometry (Applanation, 11:00 AM)       Right Left    Pressure 21 22               Not recorded         Not recorded        Chief Complaint   Patient presents with    Diabetes     RETINAL EVAL PER  CPG     Ophthalmic Medications     Ophthalmic - Anti-inflammatory, Glucocorticoids Start End    prednisoLONE acetate (PRED FORTE) 1 % DrpS 6/4/2018     Sig: Place 1 drop into the right eye 4 (four) times daily. Start one day before surgery    Route: Right Eye    Ophthalmic - Anti-inflammatory, NSAIDs Start End    ketorolac 0.5% (ACULAR) 0.5 % Drop 6/4/2018     Sig: Place 1 drop into the right eye 4 (four) times daily. Eyedrops to start one day before surgery    Route: Right Eye         HPI     Diabetes    Additional comments: RETINAL EVAL PER  CPG           Comments   MLC Referral    FMX COAG: Mother, Grandmother  DM 1998  NSC OU       Last edited by Eli Kothari on 6/11/2018 10:49 AM. 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 Miguel OD ok, OS DME/BVO  Pending CE OD  OS avgf to plateau  Ok OD CE  Hold off OS until treatment complete  rtc with me for 5 week post op   Repeat bilateral MOCT    *Does not speak english*       ===========================

## 2018-07-19 ENCOUNTER — OFFICE VISIT (OUTPATIENT)
Dept: OPHTHALMOLOGY | Facility: CLINIC | Age: 63
End: 2018-07-19
Payer: MEDICARE

## 2018-07-19 DIAGNOSIS — Z98.890 POST-OPERATIVE STATE: Primary | ICD-10-CM

## 2018-07-19 PROCEDURE — 99999 PR PBB SHADOW E&M-EST. PATIENT-LVL II: CPT | Mod: PBBFAC,,, | Performed by: OPHTHALMOLOGY

## 2018-07-19 PROCEDURE — 99024 POSTOP FOLLOW-UP VISIT: CPT | Mod: POP,,, | Performed by: OPHTHALMOLOGY

## 2018-07-19 PROCEDURE — 99212 OFFICE O/P EST SF 10 MIN: CPT | Mod: PBBFAC,PO | Performed by: OPHTHALMOLOGY

## 2018-07-19 RX ORDER — INSULIN GLARGINE 300 U/ML
INJECTION, SOLUTION SUBCUTANEOUS
Refills: 0 | OUTPATIENT
Start: 2018-07-19

## 2018-07-19 RX ORDER — GLIMEPIRIDE 2 MG/1
TABLET ORAL
Qty: 90 TABLET | Refills: 0 | OUTPATIENT
Start: 2018-07-19

## 2018-07-19 RX ORDER — METFORMIN HYDROCHLORIDE 850 MG/1
TABLET ORAL
Qty: 180 TABLET | Refills: 0 | OUTPATIENT
Start: 2018-07-19

## 2018-07-19 NOTE — PROGRESS NOTES
SUBJECTIVE:   Kay Rob is a 63 y.o. female   Uncorrected distance visual acuity was 20/200 in the right eye and not recorded in the left eye.   Chief Complaint   Patient presents with    Post-op Evaluation     1 day PO PCIOL OD        HPI:  HPI     Post-op Evaluation    Additional comments: 1 day PO PCIOL OD           Comments   1 day PO PCIOL OD  Scratchy OD    MLC Referral    FMX COAG: Mother, Grandmother  DM 1998  NSC OS  PCIOL OD +21.5 SN60WF (distance) 7-18-18    OD: Gatifloxacin, BID, Pred, Ketorolac QID       Last edited by Marilynn Bragg on 7/19/2018  7:33 AM. (History)        Assessment /Plan :  1. Post-operative state Exam:  SLE:  S/C: normal  K : trace k fold  AC: 1+ cell and flare  Iris: normal  Lens: PCIOL    IMP:  PO Day 1 S/P Phaco/IOL OD : Doing well.    Plan:  Continue gtts to operative eye:  Pred 1% QID  Ketorolac QID  Zymaxid BID  Reinstructed in importance of absolute compliance with Post-OP instructions including medications, shield at bedtime, and limitation of activities. Follow up appointments in approximately one and six weeks or call immediately for increased pain, redness or vision loss.         Dilated OD to assure no changes since cataract surgery

## 2018-07-20 RX ORDER — METFORMIN HYDROCHLORIDE 850 MG/1
850 TABLET ORAL 2 TIMES DAILY WITH MEALS
Qty: 60 TABLET | Refills: 0 | Status: SHIPPED | OUTPATIENT
Start: 2018-07-20 | End: 2018-10-15 | Stop reason: SDUPTHER

## 2018-07-20 RX ORDER — INSULIN GLARGINE 300 [IU]/ML
INJECTION, SOLUTION SUBCUTANEOUS
Qty: 15 ML | Refills: 0 | Status: SHIPPED | OUTPATIENT
Start: 2018-07-20 | End: 2018-09-11 | Stop reason: SDUPTHER

## 2018-07-24 ENCOUNTER — OFFICE VISIT (OUTPATIENT)
Dept: OPHTHALMOLOGY | Facility: CLINIC | Age: 63
End: 2018-07-24
Payer: MEDICARE

## 2018-07-24 DIAGNOSIS — H25.12 NUCLEAR SCLEROSIS OF LEFT EYE: ICD-10-CM

## 2018-07-24 DIAGNOSIS — Z98.890 POST-OPERATIVE STATE: Primary | ICD-10-CM

## 2018-07-24 PROCEDURE — 99999 PR PBB SHADOW E&M-EST. PATIENT-LVL II: CPT | Mod: PBBFAC,,, | Performed by: OPHTHALMOLOGY

## 2018-07-24 PROCEDURE — 99212 OFFICE O/P EST SF 10 MIN: CPT | Mod: PBBFAC | Performed by: OPHTHALMOLOGY

## 2018-07-24 PROCEDURE — 99024 POSTOP FOLLOW-UP VISIT: CPT | Mod: POP,,, | Performed by: OPHTHALMOLOGY

## 2018-07-24 NOTE — PROGRESS NOTES
SUBJECTIVE:   Kay Rob is a 63 y.o. female   Uncorrected distance visual acuity was 20/30 -1 in the right eye and not recorded in the left eye.   Chief Complaint   Patient presents with    Post-op Evaluation     Gatifloxacin BID OD, Pred and Ketorolac QID OD        HPI:  HPI     Post-op Evaluation    Additional comments: Gatifloxacin BID OD, Pred and Ketorolac QID OD           Comments   5 days post phaco OD    Patient states that her right eye itches occasionally. Her right eye feels   dry.    [facility] Referral    [facility] COAG: Mother, Grandmother  DM 1998  NSC OS  PCIOL OD +21.5 SN60WF (distance) 7-18-18    OD: Gatifloxacin, BID, Pred and Ketorolac QID       Last edited by Karo Nichols on 7/24/2018  9:02 AM. (History)        Assessment /Plan :  1. Post-operative state Slit lamp exam:  L/L: nl  K: clear, wound sealed  AC: trace cell and flare  Iris/Lens: IOL centered and stable      IMP:  PO Week 1 S/P Phaco/ IOL OD : Doing well with no evidence of infection or abnormal inflammation.     Plan:  Pt given and instructed in one week PO instructions. D/C zymaxid and start to taper off Ketorlac and Pred Forte 1% weekly. Can resume normal activitites and d/c eye shield. OTC reading glasses can be used until evaluated for final MR. Follow up in one month or PRN pain, redness, vision loss, or other concerns.     2. Nuclear sclerosis of left eye    3. 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

## 2018-07-26 ENCOUNTER — OFFICE VISIT (OUTPATIENT)
Dept: FAMILY MEDICINE | Facility: CLINIC | Age: 63
End: 2018-07-26
Payer: MEDICARE

## 2018-07-26 VITALS
TEMPERATURE: 98 F | HEART RATE: 98 BPM | HEIGHT: 67 IN | SYSTOLIC BLOOD PRESSURE: 120 MMHG | DIASTOLIC BLOOD PRESSURE: 86 MMHG | BODY MASS INDEX: 35.9 KG/M2 | WEIGHT: 228.75 LBS | OXYGEN SATURATION: 98 %

## 2018-07-26 DIAGNOSIS — L21.9 SEBORRHEIC DERMATITIS: ICD-10-CM

## 2018-07-26 DIAGNOSIS — E78.5 DM TYPE 2 WITH DIABETIC DYSLIPIDEMIA: Primary | ICD-10-CM

## 2018-07-26 DIAGNOSIS — G47.00 INSOMNIA, UNSPECIFIED TYPE: ICD-10-CM

## 2018-07-26 DIAGNOSIS — E11.69 DM TYPE 2 WITH DIABETIC DYSLIPIDEMIA: Primary | ICD-10-CM

## 2018-07-26 DIAGNOSIS — I10 HYPERTENSION, UNSPECIFIED TYPE: ICD-10-CM

## 2018-07-26 LAB
ALBUMIN/CREAT UR: 287.6 UG/MG
CREAT UR-MCNC: 129 MG/DL
MICROALBUMIN UR DL<=1MG/L-MCNC: 371 UG/ML

## 2018-07-26 PROCEDURE — 82043 UR ALBUMIN QUANTITATIVE: CPT

## 2018-07-26 PROCEDURE — 99213 OFFICE O/P EST LOW 20 MIN: CPT | Mod: PBBFAC,PO | Performed by: FAMILY MEDICINE

## 2018-07-26 PROCEDURE — 99214 OFFICE O/P EST MOD 30 MIN: CPT | Mod: S$PBB,,, | Performed by: FAMILY MEDICINE

## 2018-07-26 PROCEDURE — 99999 PR PBB SHADOW E&M-EST. PATIENT-LVL III: CPT | Mod: PBBFAC,,, | Performed by: FAMILY MEDICINE

## 2018-07-26 RX ORDER — TRIAMCINOLONE ACETONIDE 0.25 MG/G
CREAM TOPICAL
Qty: 80 G | Refills: 1 | Status: SHIPPED | OUTPATIENT
Start: 2018-07-26 | End: 2019-12-10 | Stop reason: SDUPTHER

## 2018-07-26 RX ORDER — TRAZODONE HYDROCHLORIDE 100 MG/1
100 TABLET ORAL NIGHTLY PRN
Qty: 30 TABLET | Refills: 1 | Status: SHIPPED | OUTPATIENT
Start: 2018-07-26 | End: 2019-01-29 | Stop reason: SDUPTHER

## 2018-07-26 RX ORDER — DICLOFENAC SODIUM 10 MG/G
2 GEL TOPICAL DAILY
Qty: 100 G | Refills: 0 | Status: SHIPPED | OUTPATIENT
Start: 2018-07-26 | End: 2019-01-29 | Stop reason: SDUPTHER

## 2018-07-26 NOTE — PROGRESS NOTES
Subjective:       Patient ID: Kay Rob is a 63 y.o. female.    Chief Complaint: Annual Exam    63 y old female with type 2 dm , DLP ,seborrheic dermatitis of scalp , insomnia and mild intermittent  asthma here for f.u . On aspirin .  Not exercising  , tries to eat healthy . Uses rescue inhaler  no more than once monthly . Not sleeping too good  On trazodone  50 mg . Stressed out  .She has non proliferative retinopathy and macular edema on L eye. Just had R eye phacoemulsification . Using 80 units of Toujoe over the last 3 m since pen was not lasting 1 whole month       Review of Systems   Constitutional: Negative.    HENT: Negative.    Eyes: Negative.    Respiratory: Negative.    Cardiovascular: Negative.    Gastrointestinal: Negative.    Genitourinary: Negative.    Musculoskeletal: Negative.    Skin: Negative.    Neurological: Negative.    Hematological: Negative.    Psychiatric/Behavioral: Positive for agitation.       Objective:      Physical Exam   Constitutional: She is oriented to person, place, and time. No distress.   HENT:   Head: Normocephalic and atraumatic.   Right Ear: External ear normal.   Left Ear: External ear normal.   Mouth/Throat: No oropharyngeal exudate.   Eyes: Conjunctivae and EOM are normal. Pupils are equal, round, and reactive to light. Right eye exhibits no discharge. Left eye exhibits no discharge. No scleral icterus.   Neck: Normal range of motion. Neck supple. No JVD present. No tracheal deviation present. No thyromegaly present.   Cardiovascular: Normal rate, regular rhythm and normal heart sounds.  Exam reveals no gallop and no friction rub.    No murmur heard.  Pulmonary/Chest: Effort normal and breath sounds normal. No stridor. No respiratory distress. She has no wheezes. She has no rales. She exhibits no tenderness.   Abdominal: Soft. Bowel sounds are normal. She exhibits no distension. There is no tenderness. There is no rebound and no guarding.   Musculoskeletal: Normal  range of motion.   Lymphadenopathy:     She has no cervical adenopathy.   Neurological: She is alert and oriented to person, place, and time.   Skin: Skin is warm and dry. She is not diaphoretic.   Psychiatric: She has a normal mood and affect. Her behavior is normal. Judgment and thought content normal.       Assessment:      Kay was seen today for annual exam.    Diagnoses and all orders for this visit:    DM type 2 with diabetic dyslipidemia  -     CBC auto differential; Future  -     Comprehensive metabolic panel; Future  -     Hemoglobin A1c; Future  -     Lipid panel; Future  -     Microalbumin/creatinine urine ratio    Seborrheic dermatitis  -     triamcinolone acetonide 0.025% (KENALOG) 0.025 % cream; AAA bid as needed for flares.  Mild steroid.    Hypertension, unspecified type    Insomnia, unspecified type    Other orders  -     diclofenac sodium 1 % Gel; Apply 2 g topically once daily.  -     traZODone (DESYREL) 100 MG tablet; Take 1 tablet (100 mg total) by mouth nightly as needed for Insomnia.  -     fluocinolone (SYNALAR) 0.01 % Sham; Apply no more than 1 ounce to scalp once daily; work into lather and allow to remain on scalp for ~5 minutes. Remove from hair and scalp by rinsing thoroughly with water.      Plan:   . Type II diabetes mellitus  Stable and well controlled currently. Continue Current medications as prescribed. No medication changes made today.   Pt. encouraged to follow a strict diabetic type diet.   Encouraged daily physical activity/exercise for at least 30mins if tolerated.   Weight control recommenced as always.   Discussed how lifestyle changes make biggest impact on diabetes control.   Continue home glucose monitoring once daily and keep log.   Counseling provided today about hypoglycemia as well as about how diabetes increases risk of cardiovascular, cerebrovascular ,peripheral vascular disease and other serious health complications. He has been instructed to call if developing  any new symptoms or hypoglycemia related symptoms.   See encounter for associated orders/medications.     Stable . Med rf   Controlled. Cont med   Increase trazodone . Contact us in 2 w with progress

## 2018-07-31 ENCOUNTER — LAB VISIT (OUTPATIENT)
Dept: LAB | Facility: HOSPITAL | Age: 63
End: 2018-07-31
Attending: FAMILY MEDICINE
Payer: MEDICARE

## 2018-07-31 DIAGNOSIS — E78.5 DM TYPE 2 WITH DIABETIC DYSLIPIDEMIA: ICD-10-CM

## 2018-07-31 DIAGNOSIS — E11.69 DM TYPE 2 WITH DIABETIC DYSLIPIDEMIA: ICD-10-CM

## 2018-07-31 LAB
ALBUMIN SERPL BCP-MCNC: 3.7 G/DL
ALP SERPL-CCNC: 136 U/L
ALT SERPL W/O P-5'-P-CCNC: 14 U/L
ANION GAP SERPL CALC-SCNC: 9 MMOL/L
AST SERPL-CCNC: 18 U/L
BASOPHILS # BLD AUTO: 0.04 K/UL
BASOPHILS NFR BLD: 0.5 %
BILIRUB SERPL-MCNC: 0.7 MG/DL
BUN SERPL-MCNC: 16 MG/DL
CALCIUM SERPL-MCNC: 9.9 MG/DL
CHLORIDE SERPL-SCNC: 101 MMOL/L
CHOLEST SERPL-MCNC: 117 MG/DL
CHOLEST/HDLC SERPL: 4.5 {RATIO}
CO2 SERPL-SCNC: 31 MMOL/L
CREAT SERPL-MCNC: 0.8 MG/DL
DIFFERENTIAL METHOD: ABNORMAL
EOSINOPHIL # BLD AUTO: 0.2 K/UL
EOSINOPHIL NFR BLD: 2.7 %
ERYTHROCYTE [DISTWIDTH] IN BLOOD BY AUTOMATED COUNT: 13.3 %
EST. GFR  (AFRICAN AMERICAN): >60 ML/MIN/1.73 M^2
EST. GFR  (NON AFRICAN AMERICAN): >60 ML/MIN/1.73 M^2
ESTIMATED AVG GLUCOSE: 266 MG/DL
GLUCOSE SERPL-MCNC: 196 MG/DL
HBA1C MFR BLD HPLC: 10.9 %
HCT VFR BLD AUTO: 43.2 %
HDLC SERPL-MCNC: 26 MG/DL
HDLC SERPL: 22.2 %
HGB BLD-MCNC: 14.4 G/DL
IMM GRANULOCYTES # BLD AUTO: 0.02 K/UL
IMM GRANULOCYTES NFR BLD AUTO: 0.2 %
LDLC SERPL CALC-MCNC: 68.2 MG/DL
LYMPHOCYTES # BLD AUTO: 1.4 K/UL
LYMPHOCYTES NFR BLD: 17.7 %
MCH RBC QN AUTO: 29.4 PG
MCHC RBC AUTO-ENTMCNC: 33.3 G/DL
MCV RBC AUTO: 88 FL
MONOCYTES # BLD AUTO: 0.4 K/UL
MONOCYTES NFR BLD: 5.5 %
NEUTROPHILS # BLD AUTO: 5.9 K/UL
NEUTROPHILS NFR BLD: 73.4 %
NONHDLC SERPL-MCNC: 91 MG/DL
NRBC BLD-RTO: 0 /100 WBC
PLATELET # BLD AUTO: 247 K/UL
PMV BLD AUTO: 11.6 FL
POTASSIUM SERPL-SCNC: 4.9 MMOL/L
PROT SERPL-MCNC: 7 G/DL
RBC # BLD AUTO: 4.89 M/UL
SODIUM SERPL-SCNC: 141 MMOL/L
TRIGL SERPL-MCNC: 114 MG/DL
WBC # BLD AUTO: 8.06 K/UL

## 2018-07-31 PROCEDURE — 36415 COLL VENOUS BLD VENIPUNCTURE: CPT | Mod: PO

## 2018-07-31 PROCEDURE — 83036 HEMOGLOBIN GLYCOSYLATED A1C: CPT

## 2018-07-31 PROCEDURE — 80061 LIPID PANEL: CPT

## 2018-07-31 PROCEDURE — 85025 COMPLETE CBC W/AUTO DIFF WBC: CPT

## 2018-07-31 PROCEDURE — 80053 COMPREHEN METABOLIC PANEL: CPT

## 2018-08-01 ENCOUNTER — TELEPHONE (OUTPATIENT)
Dept: FAMILY MEDICINE | Facility: CLINIC | Age: 63
End: 2018-08-01

## 2018-08-09 ENCOUNTER — TELEPHONE (OUTPATIENT)
Dept: DERMATOLOGY | Facility: CLINIC | Age: 63
End: 2018-08-09

## 2018-08-09 NOTE — TELEPHONE ENCOUNTER
Patient does not have a phone number other than the daughters. I spoke with daughter to let her know that we are having trouble processing mom's medication. I have been on the phone with her insurance for 2 days and cannot get it sorted out. Her address that is on file and verified by daughter is not what medicare has. Prior Auth will not go through. I have tried handling it with the Pharmacy and they were not able to help either. I informed daughter that her mom would have to call her insurance company to find out why her PA will not go through.

## 2018-08-14 ENCOUNTER — TELEPHONE (OUTPATIENT)
Dept: FAMILY MEDICINE | Facility: CLINIC | Age: 63
End: 2018-08-14

## 2018-08-14 NOTE — TELEPHONE ENCOUNTER
----- Message from Faith Mcgarry sent at 8/14/2018  3:37 PM CDT -----  Contact: daughter/Cass Verduzco   States she's returning call regarding her lab results. Please call Cass Verduzco at [phone]. Thank you

## 2018-08-21 ENCOUNTER — OFFICE VISIT (OUTPATIENT)
Dept: OPHTHALMOLOGY | Facility: CLINIC | Age: 63
End: 2018-08-21
Payer: MEDICARE

## 2018-08-21 DIAGNOSIS — H40.013 OPEN ANGLE WITH BORDERLINE FINDINGS, LOW RISK, BILATERAL: ICD-10-CM

## 2018-08-21 PROCEDURE — 92014 COMPRE OPH EXAM EST PT 1/>: CPT | Mod: S$PBB,,, | Performed by: OPHTHALMOLOGY

## 2018-08-21 PROCEDURE — 99213 OFFICE O/P EST LOW 20 MIN: CPT | Mod: PBBFAC,PO,25 | Performed by: OPHTHALMOLOGY

## 2018-08-21 PROCEDURE — 99999 PR PBB SHADOW E&M-EST. PATIENT-LVL III: CPT | Mod: PBBFAC,,, | Performed by: OPHTHALMOLOGY

## 2018-08-21 PROCEDURE — 92134 CPTRZ OPH DX IMG PST SGM RTA: CPT | Mod: PBBFAC,PO | Performed by: OPHTHALMOLOGY

## 2018-08-21 NOTE — PROGRESS NOTES
===============================  08/21/2018   Kay Rob,   63 y.o. female   Last visit [facility]: :6/11/2018   Last visit [facility] dept. 7/19/2018  [facility]:  Uncorrected distance visual acuity was 20/30 in the right eye and 20/80 in the left eye.  Tonometry     Tonometry (Applanation, 8:53 AM)       Right Left    Pressure 20 22               Not recorded        Manifest Refraction     Manifest Refraction       Sphere Cylinder [place] Dist [facility]    Right [place] +[phone] 20/30    Left [place]   20/80              Chief Complaint   Patient presents with    Post-op Evaluation     S/P CE OD 7/18/18, pt states od vision is good and os is blurry.used AB drops and pred forte yesterday, AB is now out hasnt used any drops today     Ophthalmic Medications     Ophthalmic - Anti-inflammatory, Glucocorticoids Start End    prednisoLONE acetate (PRED FORTE) 1 % DrpS 6/4/2018     Sig: Place 1 drop into the right eye 4 (four) times daily. Start one day before surgery    Route: Right Eye    Ophthalmic - Anti-inflammatory, NSAIDs Start End    ketorolac 0.5% (ACULAR) 0.5 % Drop (Discontinued) 6/4/2018 8/21/2018    Sig: Place 1 drop into the right eye 4 (four) times daily. Eyedrops to start one day before surgery    Route: Right Eye         HPI     Post-op Evaluation      Additional comments: S/P CE OD 7/18/18, pt states od vision is good and   os is blurry.used AB drops and pred forte yesterday, AB is now out hasnt   used any drops today              Comments     FMHX COAG: Mother, Grandmother  DM 1998  BDR OU / CME OS  NSC OS  PCIOL OD +21.5 SN60WF (distance) 7-18-18            Last edited by MARYAM Knox MD on 8/21/2018  8:55 AM. 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 To plateau then CE    rtc for OS Avastin       ===========================

## 2018-08-28 ENCOUNTER — OFFICE VISIT (OUTPATIENT)
Dept: PODIATRY | Facility: CLINIC | Age: 63
End: 2018-08-28
Payer: MEDICARE

## 2018-08-28 VITALS
SYSTOLIC BLOOD PRESSURE: 137 MMHG | RESPIRATION RATE: 16 BRPM | BODY MASS INDEX: 36.06 KG/M2 | HEART RATE: 93 BPM | WEIGHT: 229.75 LBS | HEIGHT: 67 IN | DIASTOLIC BLOOD PRESSURE: 82 MMHG

## 2018-08-28 DIAGNOSIS — L85.3 XEROSIS CUTIS: ICD-10-CM

## 2018-08-28 DIAGNOSIS — B35.1 ONYCHOMYCOSIS: ICD-10-CM

## 2018-08-28 DIAGNOSIS — E11.49 TYPE II DIABETES MELLITUS WITH NEUROLOGICAL MANIFESTATIONS: ICD-10-CM

## 2018-08-28 DIAGNOSIS — R23.4 FISSURE IN SKIN OF FOOT: Primary | ICD-10-CM

## 2018-08-28 PROCEDURE — 11719 TRIM NAIL(S) ANY NUMBER: CPT | Mod: 59,PBBFAC | Performed by: PODIATRIST

## 2018-08-28 PROCEDURE — 11720 DEBRIDE NAIL 1-5: CPT | Mod: PBBFAC | Performed by: PODIATRIST

## 2018-08-28 PROCEDURE — 97597 DBRDMT OPN WND 1ST 20 CM/<: CPT | Mod: PBBFAC

## 2018-08-28 PROCEDURE — 11720 DEBRIDE NAIL 1-5: CPT | Mod: 59,S$PBB,, | Performed by: PODIATRIST

## 2018-08-28 PROCEDURE — 11719 TRIM NAIL(S) ANY NUMBER: CPT | Mod: S$PBB,,, | Performed by: PODIATRIST

## 2018-08-28 PROCEDURE — 99213 OFFICE O/P EST LOW 20 MIN: CPT | Mod: PBBFAC | Performed by: PODIATRIST

## 2018-08-28 PROCEDURE — 97597 DBRDMT OPN WND 1ST 20 CM/<: CPT | Mod: S$PBB,59,, | Performed by: PODIATRIST

## 2018-08-28 PROCEDURE — 99213 OFFICE O/P EST LOW 20 MIN: CPT | Mod: 25,S$PBB,, | Performed by: PODIATRIST

## 2018-08-28 PROCEDURE — 99999 PR PBB SHADOW E&M-EST. PATIENT-LVL III: CPT | Mod: PBBFAC,,, | Performed by: PODIATRIST

## 2018-08-28 RX ORDER — UREA 40 %
CREAM (GRAM) TOPICAL 2 TIMES DAILY
Qty: 85 G | Refills: 5 | Status: SHIPPED | OUTPATIENT
Start: 2018-08-28 | End: 2019-01-29 | Stop reason: SDUPTHER

## 2018-08-28 NOTE — PROGRESS NOTES
Subjective:       Patient ID: Kay Rob is a 63 y.o. female.    Chief Complaint: Nail Care (bottoms of heels, toe nails, pt wearing sandles, c/o no pain, PCP Maximino)      HPI: Patient presents to the office with the chief complaint of elongated, thickened and dystrophic nail plates to the B/L foot. This patient is a Diabetic Type II, complicated with Peripheral Neuropathy. Patient does follow with Primary Care and/or Endocrinology for management of Diabetes Mellitus. This patient's PMD is Lucy Negron MD. This patient last saw his/her primary care provider on 07/26/2018.  She also presents for follow-up concerning bilateral heel fissures.  States improvement.  She has been using AmLactin + topical steroid.  She presents this afternoon with her son-in-law as well as her .    Hemoglobin A1C   Date Value Ref Range Status   07/31/2018 10.9 (H) 4.0 - 5.6 % Final     Comment:     ADA Screening Guidelines:  5.7-6.4%  Consistent with prediabetes  >or=6.5%  Consistent with diabetes  High levels of fetal hemoglobin interfere with the HbA1C  assay. Heterozygous hemoglobin variants (HbS, HgC, etc)do  not significantly interfere with this assay.   However, presence of multiple variants may affect accuracy.     02/08/2018 9.1 (H) 4.0 - 5.6 % Final     Comment:     According to ADA guidelines, hemoglobin A1c <7.0% represents  optimal control in non-pregnant diabetic patients. Different  metrics may apply to specific patient populations.   Standards of Medical Care in Diabetes-2016.  For the purpose of screening for the presence of diabetes:  <5.7%     Consistent with the absence of diabetes  5.7-6.4%  Consistent with increasing risk for diabetes   (prediabetes)  >or=6.5%  Consistent with diabetes  Currently, no consensus exists for use of hemoglobin A1c  for diagnosis of diabetes for children.  This Hemoglobin A1c assay has significant interference with fetal   hemoglobin   (HbF). The results are invalid  for patients with abnormal amounts of   HbF,   including those with known Hereditary Persistence   of Fetal Hemoglobin. Heterozygous hemoglobin variants (HbAS, HbAC,   HbAD, HbAE, HbA2) do not significantly interfere with this assay;   however, presence of multiple variants in a sample may impact the %   interference.     11/09/2017 10.9 (H) 4.0 - 5.6 % Final     Comment:     According to ADA guidelines, hemoglobin A1c <7.0% represents  optimal control in non-pregnant diabetic patients. Different  metrics may apply to specific patient populations.   Standards of Medical Care in Diabetes-2016.  For the purpose of screening for the presence of diabetes:  <5.7%     Consistent with the absence of diabetes  5.7-6.4%  Consistent with increasing risk for diabetes   (prediabetes)  >or=6.5%  Consistent with diabetes  Currently, no consensus exists for use of hemoglobin A1c  for diagnosis of diabetes for children.  This Hemoglobin A1c assay has significant interference with fetal   hemoglobin   (HbF). The results are invalid for patients with abnormal amounts of   HbF,   including those with known Hereditary Persistence   of Fetal Hemoglobin. Heterozygous hemoglobin variants (HbAS, HbAC,   HbAD, HbAE, HbA2) do not significantly interfere with this assay;   however, presence of multiple variants in a sample may impact the %   interference.     .    Review of patient's allergies indicates:   Allergen Reactions    Pollen extracts        Past Medical History:   Diagnosis Date    Arthritis     Asthma     Cataract     Diabetes mellitus     Diabetic retinopathy     Hyperlipidemia     Hypertension        Family History   Problem Relation Age of Onset    Cancer Father         Prostate Ca    Hypertension Father     Diabetes Sister     Glaucoma Mother     Hypertension Mother     Glaucoma Maternal Grandmother     Blindness Neg Hx     Macular degeneration Neg Hx     Retinal detachment Neg Hx     Strabismus Neg Hx   "      Social History     Socioeconomic History    Marital status:      Spouse name: Not on file    Number of children: Not on file    Years of education: Not on file    Highest education level: Not on file   Social Needs    Financial resource strain: Not on file    Food insecurity - worry: Not on file    Food insecurity - inability: Not on file    Transportation needs - medical: Not on file    Transportation needs - non-medical: Not on file   Occupational History    Not on file   Tobacco Use    Smoking status: Never Smoker    Smokeless tobacco: Never Used   Substance and Sexual Activity    Alcohol use: No    Drug use: No    Sexual activity: Yes   Other Topics Concern    Not on file   Social History Narrative    Not on file       Past Surgical History:   Procedure Laterality Date    CATARACT EXTRACTION W/  INTRAOCULAR LENS IMPLANT Right 07/18/2018       Review of Systems   Constitutional: Negative for chills, fatigue and fever.   HENT: Negative for hearing loss.    Eyes: Negative for photophobia and visual disturbance.   Respiratory: Negative for cough, chest tightness, shortness of breath and wheezing.    Cardiovascular: Positive for leg swelling. Negative for chest pain and palpitations.   Gastrointestinal: Negative for constipation, diarrhea, nausea and vomiting.   Endocrine: Negative for cold intolerance and heat intolerance.   Genitourinary: Negative for flank pain.   Musculoskeletal: Positive for arthralgias and gait problem. Negative for neck pain and neck stiffness.   Skin: Positive for color change and wound.   Neurological: Positive for numbness. Negative for light-headedness and headaches.   Psychiatric/Behavioral: Negative for sleep disturbance.          Objective:   /82 (BP Location: Right arm, Patient Position: Sitting, BP Method: Medium (Automatic))   Pulse 93   Resp 16   Ht 5' 7" (1.702 m)   Wt 104.2 kg (229 lb 11.5 oz)   BMI 35.98 kg/m²     LOWER EXTREMITY PHYSICAL " EXAMINATION    NEUROLOGY: Protective sensation is not intact to the left and right plantar surfaces of the foot and digits, as the patient has no sensation/detection at greater than 4 distinct points of contact with 5.07 Hornell Arnie monofilament. Sensation to light touch is intact on the left and right foot. Proprioception is intact, bilateral. Sensation to pin prick is reduced to absent. Vibratory sensation is diminished    DERMATOLOGY: On the left foot, nails 1 and 5 are suggestive of onychomycotic changes. On the right foot, nails 1 and 2 are suggestive of onychomycotic changes. These nail plates are thickened, are dystrophic, chaulky in appearance and malodorous with substantial subungual debris. These nail plates are yellow to brown in appearance. The remaining nail plates are elongated and do not have suggestive clinical features of onychomycosis.  Bilateral plantar, and posterior heel calluses and fissures are noted. The fissuring is worse on the right lower extremity, in terms of discomfort to palpation.  In terms of this year of volume of hyperkeratotic tissues, the left lower extremity is worse.  No local signs infection.  There is no erythema or cellulitis noted. No fluctuance.  No drainage.  No calor.    ORTHOPEDIC: Manual Muscle Testing is 5/5 in all planes on the left and right, without pains, with and without resistance. Gait pattern is non-antalgic.    VASCULAR: Hair growth is sparse on the left and right dorsal foot and at the digits. The left dorsalis pedis pulse is 1/4 and on the right is 1/4, and the left posterior tibial pulse is  1/4 on the left and is  1/4 on the right. Varicosities are absent. Spider veins are noted to the bilateral lower extremity. Warm to warm, proximal to distal. Capillary refill time is within normal limits and less  than 3 seconds.  No lower extremity edema is noted.    Physical Exam    Assessment:     1. Fissure in skin of foot    2. Xerosis cutis    3. Type II  diabetes mellitus with neurological manifestations    4. Onychomycosis        Plan:     Fissure in skin of foot  Xerosis cutis  Urea (CARMOL) 40 % Crea; Apply topically 2 (two) times daily.  Dispense: 85 g; Refill: 5    Recommend twice to 3 times daily topical emollient. Did discuss with the patient concerning dry and thin skin in relation to complications due to diabetes.    The bilateral heel fissures were surgically debrided after adequate prep with alcohol and/or betadine paint. Selective wound debridement was performed using sharp #10/#15 blade and tissue nipper, with removal of all non-viable skin and soft tissues; all necrotic tissues were debrided. Post debridement measurements are as above. Hemostasis was achieved. No viable tissues were debrided. Patient tolerated procedure well and without complications.  She may continue daily AmLactate for now.  I did prescribe Urea topical, to be used at least twice daily.  Thorough discussion is had with the patient today, concerning the diagnosis, its etiology, and the treatment algorithm at present.      Type II diabetes mellitus with neurological manifestations  Patient advised to follow up with Primary Care Physician for management of comorbid states.  (Diabetic) Foot counseling and education is provided at this visit. Patient is advised to wear socks and shoes at all times.  Do not walk barefoot, or with just socks, even when indoors.  Be sure to check and inspect the inside of the shoe before putting them on her feet.  Protect your feet at all times.  Walking shoes and/or athletic shoes are the best types of shoe gear. Do not wear vinyl or plastic type shoe gear, as they do not stretch and/or breathe.  Protect your feet from hot and/or cold. Elevate the extremities when sitting.  Do not wear excessively tight socks and/or shoe gear. Wiggle your toes for a few minutes throughout the day. Move your ankles up and down, in and out, to help blood flow in your lower  extremity.     Onychomycosis  The onychomycotic nail plates, as outlined above, are sharply debrided with double action nail nipper, and/or with the assistance of a mechanical rotary aditya, with removal of all offending nail and nail border(s), for reduction of pains. Nails are reduced in terms of length, width and girth with removal of subungual debris to facilitate pain free weight bearing and ambulation. The elongated nails as outlined in the objective portion of this note, were trimmed to appropriate length, with a double action nail nipper, for alleviation/reduction of pains as well. Follow up in approx. 3-4 months.      Protective Sensation (w/ 10 gram monofilament):  Right: Absent  Left: Absent    Visual Inspection:  Callus -  Bilateral, Dry Skin -  Bilateral and Onychomycosis -  Bilateral    Pedal Pulses:   Right: Diminshed  Left: Diminshed    Posterior tibialis:   Right:Diminshed  Left: Diminshed        Future Appointments   Date Time Provider Department Center   9/26/2018  7:45 AM MARYAM Knox MD [facility]   1/2/2019  9:30 AM Kirt Gray DPM [facility]

## 2018-08-31 ENCOUNTER — TELEPHONE (OUTPATIENT)
Dept: DIABETES | Facility: CLINIC | Age: 63
End: 2018-08-31

## 2018-09-11 ENCOUNTER — TELEPHONE (OUTPATIENT)
Dept: OPHTHALMOLOGY | Facility: CLINIC | Age: 63
End: 2018-09-11

## 2018-09-11 RX ORDER — INSULIN GLARGINE 300 U/ML
INJECTION, SOLUTION SUBCUTANEOUS
Qty: 6 ML | Refills: 0 | Status: SHIPPED | OUTPATIENT
Start: 2018-09-11 | End: 2018-11-27 | Stop reason: SDUPTHER

## 2018-09-11 NOTE — TELEPHONE ENCOUNTER
Dr. Miguel received a fax today requesting prior authorization for Gatifloxacin (instill one drop twice daily into the right eye (start one day before surgery)). The patient had her cataract surgery on 7/18/18. At her visit on 7/24/18, the patient was to discontinue the Gatifloxacin on 7/26/18. I called and notified the patient's pharmacy, the [facility] in [place]. I spoke to Adriana at the pharmacy. I attempted to call the patient, but her voice mailbox was full.

## 2018-09-19 ENCOUNTER — TELEPHONE (OUTPATIENT)
Dept: DERMATOLOGY | Facility: CLINIC | Age: 63
End: 2018-09-19

## 2018-09-26 ENCOUNTER — PROCEDURE VISIT (OUTPATIENT)
Dept: OPHTHALMOLOGY | Facility: CLINIC | Age: 63
End: 2018-09-26
Payer: MEDICARE

## 2018-09-26 DIAGNOSIS — H40.013 OPEN ANGLE WITH BORDERLINE FINDINGS, LOW RISK, BILATERAL: ICD-10-CM

## 2018-09-26 PROCEDURE — 92134 CPTRZ OPH DX IMG PST SGM RTA: CPT | Mod: PBBFAC,PO | Performed by: OPHTHALMOLOGY

## 2018-09-26 PROCEDURE — 67028 INJECTION EYE DRUG: CPT | Mod: PBBFAC,PO,LT | Performed by: OPHTHALMOLOGY

## 2018-09-26 PROCEDURE — 67028 INJECTION EYE DRUG: CPT | Mod: S$PBB,79,LT, | Performed by: OPHTHALMOLOGY

## 2018-09-26 PROCEDURE — 99499 UNLISTED E&M SERVICE: CPT | Mod: S$PBB,,, | Performed by: OPHTHALMOLOGY

## 2018-09-26 RX ORDER — POLYMYXIN B SULFATE AND TRIMETHOPRIM 1; 10000 MG/ML; [USP'U]/ML
1 SOLUTION OPHTHALMIC 4 TIMES DAILY
Qty: 10 ML | Refills: 0 | Status: SHIPPED | OUTPATIENT
Start: 2018-09-26 | End: 2018-10-26

## 2018-09-26 RX ADMIN — BEVACIZUMAB 1.25 MG: 100 INJECTION, SOLUTION INTRAVENOUS at 11:09

## 2018-09-26 NOTE — PROGRESS NOTES
===============================  09/26/2018   Kay Rob,   63 y.o. female   Last visit [facility]: :8/21/2018   Last visit [facility] dept. 8/21/2018  [facility]:  Uncorrected distance visual acuity was 20/30 in the right eye and 20/200 in the left eye.   Not recorded         Not recorded         Not recorded        Chief Complaint   Patient presents with    PDR/ME     AVASTIN OS     Ophthalmic Medications     Ophthalmic - Anti-inflammatory, Glucocorticoids Start End    prednisoLONE acetate (PRED FORTE) 1 % DrpS 6/4/2018     Sig: Place 1 drop into the right eye 4 (four) times daily. Start one day before surgery    Route: Right Eye         HPI     PDR/ME      Additional comments: AVASTIN OS              Comments     MLC Referral    [facility] COAG: Mother, Grandmother  DM 1998  BDR OU / CME OS  NSC OS  PCIOL OD +21.5 SN60WF (distance) 7-18-18    OD:pred forte 1 x a day          Last edited by Eli Kothari on 9/26/2018  7:44 AM. 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, with long-term current use of insulin - Primary    Relevant Medications    bevacizumab (AVASTIN) 2.5 mg/0.10 mL 1.25 mg (Start on 9/26/2018 11:45 AM)    Other Relevant Orders    Posterior Segment OCT Retina-Both eyes    Prior Authorization Order    Open angle with borderline findings, low risk, bilateral        Os dme  preop to os CE   oct musb better    could guess that  CE des fiollow shot 2 /\ 9/26/2018  Diagnosis :  Os dme 3  Today:   Avastin (Bevacizumab) 1.25 mg/0.05 ml Intravitreal Injection , OS   Follow up: rtc 1  mo    Cat eval this monts dr mgm      Instructed to call 24/7 for any worsening of vision. Check Both eyes daily. Gave patient my home phone number.      Procedure  Note:   OS}  Avastin (Bevacizumab) 1.25 mg/0.05 ml Intravitreal Injection    I have  explained the Risks, Benefits and Alternatives of the procedure in detail.  The patient voices understanding and all questions have been answered.  The patient agrees to proceed as discussed.  Xylocaine with Epi 2%  subconj bleb  was used for anesthesia.  Topical betadine was used for antisepsis.  0.05 cc was  injected 3.7 mm from corneal limbus in the inferotemporal quadrant.  Following injection the IOP was less than thirty (<30) by tonopen.  The eye was then thoroughly irrigated with BSS.  Patient tolerated procedure well.  No complications were observed.  The Patient was educated that mild irritation tonight was normal secondary to topical antispsis use.  Pt was advised to call at any time day or night for pain, redness, or any decline in vision. I gave the patient my home number as well as the clinic on call number. Daily visual checks and Amsler grid testing were reviewed.  polytrim Antibiotic Drops to be used 4 times daily for 4 days  MARYAM Knox MD  Procedure ordered: y  Consent: y  Pre auth: y  MAR:y  Opnote: y  Charge capture:y      .       ===========================

## 2018-10-09 ENCOUNTER — OFFICE VISIT (OUTPATIENT)
Dept: OPHTHALMOLOGY | Facility: CLINIC | Age: 63
End: 2018-10-09
Payer: MEDICARE

## 2018-10-09 DIAGNOSIS — Z98.890 POST-OPERATIVE STATE: Primary | ICD-10-CM

## 2018-10-09 PROCEDURE — 99024 POSTOP FOLLOW-UP VISIT: CPT | Mod: POP,,, | Performed by: OPHTHALMOLOGY

## 2018-10-09 PROCEDURE — 99999 PR PBB SHADOW E&M-EST. PATIENT-LVL II: CPT | Mod: PBBFAC,,, | Performed by: OPHTHALMOLOGY

## 2018-10-09 PROCEDURE — 99212 OFFICE O/P EST SF 10 MIN: CPT | Mod: PBBFAC,PO | Performed by: OPHTHALMOLOGY

## 2018-10-09 RX ORDER — NEOMYCIN SULFATE, POLYMYXIN B SULFATE AND DEXAMETHASONE 3.5; 10000; 1 MG/ML; [USP'U]/ML; MG/ML
1 SUSPENSION/ DROPS OPHTHALMIC 4 TIMES DAILY
Qty: 5 ML | Refills: 0 | Status: SHIPPED | OUTPATIENT
Start: 2018-10-09 | End: 2018-10-16

## 2018-10-09 NOTE — PROGRESS NOTES
===============================  10/09/2018   Kay Rob,   63 y.o. female   Last visit [facility]: :9/26/2018   Last visit eye dept. 9/26/2018  [facility]:  Uncorrected distance visual acuity was 20/30 in the right eye and 20/100 in the left eye.   Not recorded         Not recorded         Not recorded        Chief Complaint   Patient presents with    PDR/ME     pt does not have blood shot eyes/  some blurry vision today/ sometimes they itch     Ophthalmic Medications     Ophthalmic - Anti-inflammatory, Glucocorticoids Start End    prednisoLONE acetate (PRED FORTE) 1 % DrpS 6/4/2018     Sig: Place 1 drop into the right eye 4 (four) times daily. Start one day before surgery    Route: Right Eye         HPI     PDR/ME      Additional comments: pt does not have blood shot eyes/  some blurry   vision today/ sometimes they itch              Comments     MLC Referral    FMX COAG: Mother, Grandmother  DM 1998  BDR OU / CME OS  NSC OS  PCIOL OD +21.5 SN60WF (distance) 7-18-18    AVASTIN OS 9/26/18    OD:pred forte 1 x a day          Last edited by Eli Kothari on 10/9/2018 10:44 AM. (History)          ________________  10/9/2018  Problem List Items Addressed This Visit     None      Visit Diagnoses     Post-operative state    -  Primary          .here today with questions regarding tx.  Long discussion  rtc as scheduled.       ===========================

## 2018-10-15 RX ORDER — GLIMEPIRIDE 2 MG/1
TABLET ORAL
Qty: 90 TABLET | Refills: 0 | Status: CANCELLED | OUTPATIENT
Start: 2018-10-15

## 2018-10-15 RX ORDER — PEN NEEDLE, DIABETIC 30 GX3/16"
NEEDLE, DISPOSABLE MISCELLANEOUS
Qty: 100 EACH | Refills: 0 | Status: SHIPPED | OUTPATIENT
Start: 2018-10-15 | End: 2019-03-07 | Stop reason: SDUPTHER

## 2018-10-15 RX ORDER — METFORMIN HYDROCHLORIDE 850 MG/1
TABLET ORAL
Qty: 60 TABLET | Refills: 0 | Status: SHIPPED | OUTPATIENT
Start: 2018-10-15 | End: 2019-01-29 | Stop reason: SDUPTHER

## 2018-10-18 ENCOUNTER — OFFICE VISIT (OUTPATIENT)
Dept: FAMILY MEDICINE | Facility: CLINIC | Age: 63
End: 2018-10-18
Payer: MEDICARE

## 2018-10-18 ENCOUNTER — LAB VISIT (OUTPATIENT)
Dept: LAB | Facility: HOSPITAL | Age: 63
End: 2018-10-18
Attending: FAMILY MEDICINE
Payer: MEDICARE

## 2018-10-18 ENCOUNTER — PATIENT OUTREACH (OUTPATIENT)
Dept: ADMINISTRATIVE | Facility: HOSPITAL | Age: 63
End: 2018-10-18

## 2018-10-18 VITALS
HEART RATE: 114 BPM | WEIGHT: 228.81 LBS | HEIGHT: 67 IN | SYSTOLIC BLOOD PRESSURE: 108 MMHG | OXYGEN SATURATION: 96 % | DIASTOLIC BLOOD PRESSURE: 68 MMHG | BODY MASS INDEX: 35.91 KG/M2 | TEMPERATURE: 96 F

## 2018-10-18 DIAGNOSIS — E11.3299 TYPE 2 DIABETES MELLITUS WITH MILD NONPROLIFERATIVE RETINOPATHY, WITH LONG-TERM CURRENT USE OF INSULIN, MACULAR EDEMA PRESENCE UNSPECIFIED, UNSPECIFIED LATERALITY: ICD-10-CM

## 2018-10-18 DIAGNOSIS — K92.1 BLOOD IN STOOL: Primary | ICD-10-CM

## 2018-10-18 DIAGNOSIS — E08.3213 DIABETES MELLITUS DUE TO UNDERLYING CONDITION WITH BOTH EYES AFFECTED BY MILD NONPROLIFERATIVE RETINOPATHY AND MACULAR EDEMA, WITH LONG-TERM CURRENT USE OF INSULIN: ICD-10-CM

## 2018-10-18 DIAGNOSIS — Z79.4 TYPE 2 DIABETES MELLITUS WITH MILD NONPROLIFERATIVE RETINOPATHY, WITH LONG-TERM CURRENT USE OF INSULIN, MACULAR EDEMA PRESENCE UNSPECIFIED, UNSPECIFIED LATERALITY: ICD-10-CM

## 2018-10-18 DIAGNOSIS — Z79.4 DIABETES MELLITUS DUE TO UNDERLYING CONDITION WITH BOTH EYES AFFECTED BY MILD NONPROLIFERATIVE RETINOPATHY AND MACULAR EDEMA, WITH LONG-TERM CURRENT USE OF INSULIN: ICD-10-CM

## 2018-10-18 DIAGNOSIS — I10 HYPERTENSION, UNSPECIFIED TYPE: ICD-10-CM

## 2018-10-18 LAB
ALBUMIN SERPL BCP-MCNC: 3.7 G/DL
ALP SERPL-CCNC: 129 U/L
ALT SERPL W/O P-5'-P-CCNC: 13 U/L
ANION GAP SERPL CALC-SCNC: 7 MMOL/L
AST SERPL-CCNC: 18 U/L
BASOPHILS # BLD AUTO: 0.04 K/UL
BASOPHILS NFR BLD: 0.4 %
BILIRUB SERPL-MCNC: 0.7 MG/DL
BUN SERPL-MCNC: 25 MG/DL
CALCIUM SERPL-MCNC: 10.5 MG/DL
CHLORIDE SERPL-SCNC: 99 MMOL/L
CO2 SERPL-SCNC: 31 MMOL/L
CREAT SERPL-MCNC: 1 MG/DL
DIFFERENTIAL METHOD: ABNORMAL
EOSINOPHIL # BLD AUTO: 0.2 K/UL
EOSINOPHIL NFR BLD: 1.5 %
ERYTHROCYTE [DISTWIDTH] IN BLOOD BY AUTOMATED COUNT: 12.6 %
EST. GFR  (AFRICAN AMERICAN): >60 ML/MIN/1.73 M^2
EST. GFR  (NON AFRICAN AMERICAN): 59.3 ML/MIN/1.73 M^2
ESTIMATED AVG GLUCOSE: 237 MG/DL
GLUCOSE SERPL-MCNC: 207 MG/DL
HBA1C MFR BLD HPLC: 9.9 %
HCT VFR BLD AUTO: 43.5 %
HGB BLD-MCNC: 14.3 G/DL
IMM GRANULOCYTES # BLD AUTO: 0.04 K/UL
IMM GRANULOCYTES NFR BLD AUTO: 0.4 %
LYMPHOCYTES # BLD AUTO: 1.6 K/UL
LYMPHOCYTES NFR BLD: 13.7 %
MCH RBC QN AUTO: 30 PG
MCHC RBC AUTO-ENTMCNC: 32.9 G/DL
MCV RBC AUTO: 91 FL
MONOCYTES # BLD AUTO: 0.6 K/UL
MONOCYTES NFR BLD: 5.4 %
NEUTROPHILS # BLD AUTO: 9 K/UL
NEUTROPHILS NFR BLD: 78.6 %
NRBC BLD-RTO: 0 /100 WBC
PLATELET # BLD AUTO: 284 K/UL
PMV BLD AUTO: 11.3 FL
POTASSIUM SERPL-SCNC: 4.8 MMOL/L
PROT SERPL-MCNC: 7.1 G/DL
RBC # BLD AUTO: 4.76 M/UL
SODIUM SERPL-SCNC: 137 MMOL/L
WBC # BLD AUTO: 11.42 K/UL

## 2018-10-18 PROCEDURE — G0010 ADMIN HEPATITIS B VACCINE: HCPCS | Mod: PBBFAC,PO

## 2018-10-18 PROCEDURE — 80053 COMPREHEN METABOLIC PANEL: CPT

## 2018-10-18 PROCEDURE — 85025 COMPLETE CBC W/AUTO DIFF WBC: CPT

## 2018-10-18 PROCEDURE — 99214 OFFICE O/P EST MOD 30 MIN: CPT | Mod: PBBFAC,PO,25 | Performed by: FAMILY MEDICINE

## 2018-10-18 PROCEDURE — 36415 COLL VENOUS BLD VENIPUNCTURE: CPT | Mod: PO

## 2018-10-18 PROCEDURE — 90662 IIV NO PRSV INCREASED AG IM: CPT | Mod: PBBFAC,PO

## 2018-10-18 PROCEDURE — 99999 PR PBB SHADOW E&M-EST. PATIENT-LVL IV: CPT | Mod: PBBFAC,,, | Performed by: FAMILY MEDICINE

## 2018-10-18 PROCEDURE — 99214 OFFICE O/P EST MOD 30 MIN: CPT | Mod: 25,S$PBB,, | Performed by: FAMILY MEDICINE

## 2018-10-18 PROCEDURE — 83036 HEMOGLOBIN GLYCOSYLATED A1C: CPT

## 2018-10-18 RX ORDER — GLIMEPIRIDE 2 MG/1
2 TABLET ORAL DAILY
Qty: 90 TABLET | Refills: 0 | Status: SHIPPED | OUTPATIENT
Start: 2018-10-18 | End: 2019-01-16 | Stop reason: SDUPTHER

## 2018-10-18 NOTE — PROGRESS NOTES
Pt's son in law called me back. Pt will see Dr Negron at 9:40am today. I notified Dr Negron who will discuss with pt and let me know if pt will have or has had colonoscopy outside of [facility].

## 2018-10-18 NOTE — PROGRESS NOTES
Subjective:       Patient ID: Kay Rob is a 63 y.o. female.    Chief Complaint: Follow-up    63 y old  Female here for f/u on DM, HTN and dlp .  On aspirin, due for 3rd hep B and seasonal influenza  , exercises chasing after grandchildren . , Opthalmology  : sees Dr Knox , getting avastin injections , Fastin bs are  160, 170 and  2 hrs pp  : 150 - 160 .  + Losses stools  over the last 3 m , Not daily ,  No blood in stools , no  Recent  abx intake , no sick contacts .  + FIT in January . She has not had colo       Review of Systems   Constitutional: Negative.    HENT: Negative.    Eyes: Negative.    Respiratory: Negative.    Cardiovascular: Negative.    Gastrointestinal: Positive for diarrhea. Negative for blood in stool.   Genitourinary: Negative.    Musculoskeletal: Negative.    Skin: Negative.    Hematological: Negative.        Objective:      Physical Exam   Constitutional: She is oriented to person, place, and time. She appears well-developed and well-nourished. No distress.   HENT:   Head: Normocephalic and atraumatic.   Right Ear: External ear normal.   Left Ear: External ear normal.   Mouth/Throat: No oropharyngeal exudate.   Eyes: Conjunctivae and EOM are normal. Pupils are equal, round, and reactive to light. Right eye exhibits no discharge. Left eye exhibits no discharge. No scleral icterus.   Neck: Normal range of motion. Neck supple. No JVD present. No tracheal deviation present. No thyromegaly present.   Cardiovascular: Normal rate, regular rhythm and normal heart sounds. Exam reveals no gallop and no friction rub.   No murmur heard.  Pulmonary/Chest: Effort normal and breath sounds normal. No stridor. No respiratory distress. She has no wheezes. She has no rales. She exhibits no tenderness.   Abdominal: Soft. Bowel sounds are normal. She exhibits no distension. There is no tenderness. There is no rebound and no guarding.   Musculoskeletal: Normal range of motion.   Lymphadenopathy:     She has  no cervical adenopathy.   Neurological: She is alert and oriented to person, place, and time.   Skin: Skin is warm and dry. She is not diaphoretic.   Psychiatric: She has a normal mood and affect. Her behavior is normal. Judgment and thought content normal.       Assessment:     Kay was seen today for follow-up.    Diagnoses and all orders for this visit:    Blood in stool  -     Case request GI: COLONOSCOPY  -     Ambulatory consult to Gastroenterology    Type 2 diabetes mellitus with mild nonproliferative retinopathy, with long-term current use of insulin, macular edema presence unspecified, unspecified laterality  -     Ambulatory consult to Diabetic Education  -     CBC auto differential; Future  -     Comprehensive metabolic panel; Future  -     Hemoglobin A1c; Future  -     Microalbumin/creatinine urine ratio    Diabetes mellitus due to underlying condition with both eyes affected by mild nonproliferative retinopathy and macular edema, with long-term current use of insulin   -     Ambulatory consult to Diabetic Education    Hypertension, unspecified type    Other orders  -     (In Office Administered) Hepatitis B Vaccine (Pediatric/Adolescent) (3-Dose) (IM)  -     (In Office Administered) Hepatitis B Vaccine (Pediatric/Adolescent) (3-Dose) (IM)  -     glimepiride (AMARYL) 2 MG tablet; Take 1 tablet (2 mg total) by mouth once daily.  -     Influenza - High Dose (65+) (PF) (IM)      Plan:   [place] , GI     Pt. encouraged to follow a strict diabetic type diet.   Encouraged daily physical activity/exercise for at least 30mins if tolerated.   Weight control recommenced as always.   Discussed how lifestyle changes make biggest impact on diabetes control.   Continue home glucose monitoring once daily and keep log.   Counseling provided today about hypoglycemia as well as about how diabetes increases risk of cardiovascular, cerebrovascular ,peripheral vascular disease and other serious health complications. He has  been instructed to call if developing any new symptoms or hypoglycemia related symptoms.   See encounter for associated orders/medications.   - Lipid panel; Future  Controlled. Cont med

## 2018-10-24 ENCOUNTER — DOCUMENTATION ONLY (OUTPATIENT)
Dept: ENDOSCOPY | Facility: HOSPITAL | Age: 63
End: 2018-10-24

## 2018-10-24 RX ORDER — SODIUM, POTASSIUM,MAG SULFATES 17.5-3.13G
SOLUTION, RECONSTITUTED, ORAL ORAL
Qty: 354 ML | Refills: 0 | Status: SHIPPED | OUTPATIENT
Start: 2018-10-24 | End: 2018-12-20 | Stop reason: SDUPTHER

## 2018-10-24 NOTE — PROGRESS NOTES
Pt daughter (Cass) left a message stating she is unable to take calls during our business hours due to her job. She requested her mother appt be scheduled for early am any day and instructions be mailed to address on file.     After reviewing pcp notes dated 10/18/18 I noted it stated pt needs an office visit with GI dept due to blood in stool. Pt has already been scheduled for an office appt on 10/29/18.     I left daughter a message clarifying appt on 10/29/18 with GI provider. Case request has been canceled.

## 2018-10-26 ENCOUNTER — TELEPHONE (OUTPATIENT)
Dept: DIABETES | Facility: CLINIC | Age: 63
End: 2018-10-26

## 2018-10-26 NOTE — TELEPHONE ENCOUNTER
----- Message from Arleen Gonzalez sent at 10/26/2018 12:03 PM CDT -----  Contact: Cass- daughter  Calling to r/s 12:30 appt on today. Please call Cass back at [phone].      Thanks,   Arleen Gonzalez

## 2018-10-29 ENCOUNTER — TELEPHONE (OUTPATIENT)
Dept: OPHTHALMOLOGY | Facility: CLINIC | Age: 63
End: 2018-10-29

## 2018-10-29 NOTE — TELEPHONE ENCOUNTER
----- Message from Lynne Macias sent at 10/29/2018 11:22 AM CDT -----  Contact: pt  Requesting call back to reschedule proc . Please call back at [phone].

## 2018-10-30 ENCOUNTER — TELEPHONE (OUTPATIENT)
Dept: OPHTHALMOLOGY | Facility: CLINIC | Age: 63
End: 2018-10-30

## 2018-10-30 NOTE — TELEPHONE ENCOUNTER
----- Message from Lynne Macias sent at 10/30/2018  1:24 PM CDT -----  Contact: pt  Returning a call from Dr. Knox's office. Please call patient at  [phone].

## 2018-11-01 ENCOUNTER — OFFICE VISIT (OUTPATIENT)
Dept: DERMATOLOGY | Facility: CLINIC | Age: 63
End: 2018-11-01
Payer: MEDICARE

## 2018-11-01 DIAGNOSIS — L21.9 SEBORRHEIC DERMATITIS: Primary | ICD-10-CM

## 2018-11-01 PROCEDURE — 99999 PR PBB SHADOW E&M-EST. PATIENT-LVL II: CPT | Mod: PBBFAC,,, | Performed by: STUDENT IN AN ORGANIZED HEALTH CARE EDUCATION/TRAINING PROGRAM

## 2018-11-01 PROCEDURE — 99213 OFFICE O/P EST LOW 20 MIN: CPT | Mod: S$PBB,,, | Performed by: STUDENT IN AN ORGANIZED HEALTH CARE EDUCATION/TRAINING PROGRAM

## 2018-11-01 PROCEDURE — 99212 OFFICE O/P EST SF 10 MIN: CPT | Mod: PBBFAC,PO | Performed by: STUDENT IN AN ORGANIZED HEALTH CARE EDUCATION/TRAINING PROGRAM

## 2018-11-01 RX ORDER — KETOCONAZOLE 20 MG/ML
SHAMPOO, SUSPENSION TOPICAL
Qty: 120 ML | Refills: 3 | Status: SHIPPED | OUTPATIENT
Start: 2018-11-02 | End: 2019-12-10 | Stop reason: SDUPTHER

## 2018-11-01 RX ORDER — FLUCONAZOLE 200 MG/1
TABLET ORAL
Qty: 4 TABLET | Refills: 0 | Status: SHIPPED | OUTPATIENT
Start: 2018-11-01 | End: 2020-07-20

## 2018-11-01 RX ORDER — CLOBETASOL PROPIONATE 0.46 MG/ML
SOLUTION TOPICAL 2 TIMES DAILY
Qty: 50 ML | Refills: 2 | Status: SHIPPED | OUTPATIENT
Start: 2018-11-01 | End: 2019-01-29 | Stop reason: SDUPTHER

## 2018-11-01 NOTE — PROGRESS NOTES
Subjective:       Patient ID:  Kay Rob is a 63 y.o. female who presents for   Chief Complaint   Patient presents with    Hair/Scalp Problem     c/o itching to scalp tx triamcinolone, clobetasol, fluocinolone     Itching     c/o itching to neck and chest      History of Present Illness: The patient presents with chief complaint of itching. She was last seen on 5/7/18. She reports continued itching of the scalp. She states that the medications help temporarily, but do not make the itching go away completely.    Location: scalp and neck  Duration: 1 year   Signs/Symptoms: itching   Prior treatments: clobetasol, triamcinolone, fluocinolone, mupirocin     Of note, patient with asthma and while during the examination, appeared to have labored breathing and appeared uncomfortable.           Review of Systems   Skin: Positive for itching and rash.        Objective:    Physical Exam   Constitutional: She appears well-developed and well-nourished. No distress (labored breathing).   Neurological: She is alert and oriented to person, place, and time. She is not disoriented.   Psychiatric: She has a normal mood and affect.   Skin:   Areas Examined (abnormalities noted in diagram):   Scalp / Hair Palpated and Inspected  Head / Face Inspection Performed  Neck Inspection Performed  RUE Inspected  LUE Inspection Performed  Nails and Digits Inspection Performed              Diagram Legend     Erythematous scaling macule/papule c/w actinic keratosis       Vascular papule c/w angioma      Pigmented verrucoid papule/plaque c/w seborrheic keratosis      Yellow umbilicated papule c/w sebaceous hyperplasia      Irregularly shaped tan macule c/w lentigo     1-2 mm smooth white papules consistent with Milia      Movable subcutaneous cyst with punctum c/w epidermal inclusion cyst      Subcutaneous movable cyst c/w pilar cyst      Firm pink to brown papule c/w dermatofibroma      Pedunculated fleshy papule(s) c/w skin tag(s)       Evenly pigmented macule c/w junctional nevus     Mildly variegated pigmented, slightly irregular-bordered macule c/w mildly atypical nevus      Flesh colored to evenly pigmented papule c/w intradermal nevus       Pink pearly papule/plaque c/w basal cell carcinoma      Erythematous hyperkeratotic cursted plaque c/w SCC      Surgical scar with no sign of skin cancer recurrence      Open and closed comedones      Inflammatory papules and pustules      Verrucoid papule consistent consistent with wart     Erythematous eczematous patches and plaques     Dystrophic onycholytic nail with subungual debris c/w onychomycosis     Umbilicated papule    Erythematous-base heme-crusted tan verrucoid plaque consistent with inflamed seborrheic keratosis     Erythematous Silvery Scaling Plaque c/w Psoriasis     See annotation      Assessment / Plan:        Seborrheic dermatitis  -     ketoconazole (NIZORAL) 2 % shampoo; Apply topically 3 (three) times a week.  Dispense: 120 mL; Refill: 3  -     clobetasol (TEMOVATE) 0.05 % external solution; Apply topically 2 (two) times daily. To scalp only  Dispense: 50 mL; Refill: 2  -     fluconazole (DIFLUCAN) 200 MG Tab; Take 1 tablet by mouth once weekly.  Dispense: 4 tablet; Refill: 0    Patient with asthma and labored breathing. She was able to talk in full sentences and did not appear to be in distress. No audible wheezing was appreciated. Discussed in depth with patient and son to go to the urgent care or ER immediately for evaluation.            Follow-up in about 6 weeks (around 12/13/2018).

## 2018-11-01 NOTE — PATIENT INSTRUCTIONS
¿Qué es la dermatitis seborreica?    La dermatitis seborreica es un tipo común de erupción que hace que la piel se enrojezca, tenga escamas y grasitud. Aparece en la piel que tiene glándulas sebáceas, robert en la jose, el cuero cabelludo y la parte superior del pecho. Tiende a durar mucho tiempo o a irse y regresar. La dermatitis seborreica no se propaga de paris persona a otra.  ¿Cuáles son las causas de la dermatitis seborreica?  No se sabe aún cuál es la causa. En parte, puede deberse a un tipo de levadura que crece en la piel, junto con un exceso de grasitud. Los expertos siguen estudiando esta afección. Es más común en los hombres que en las mujeres y puede ocurrir a cualquier edad. Sucede con más frecuencia en las personas que tienen VIH/SIDA, enfermedad de Parkinson, pancreatitis alcohólica, hepatitis o cáncer. También puede empeorar fatou los momentos de estrés.  Síntomas de la dermatitis seborreica  Los síntomas pueden incluir las siguientes cosas en la piel:  · Bultos  · Escamas o costras amarillentas  · Grietas  · Grasitud  · Picazón  · Supuración de líquido  · Dolor  · Piel enrojecida o de color naranja  Estos síntomas pueden darse en la piel de:  · Alrededor de la nariz  · Detrás de las orejas  · La perkins  · Las araceli  · El cuero cabelludo, también conocido robert caspa  · La parte superior del pecho  También es posible que tenga acné, los párpados inflamados (blefaritis) u otras afecciones de la piel al mismo tiempo.  Tratamiento para la dermatitis seborreica  El tratamiento puede disminuir los síntomas fatou cierto tiempo. Los tipos de tratamientos que se usan con más frecuencia incluyen:  · Champú, enjuague corporal o crema antimicóticos. Contienen medicamentos tales robert ketoconazol, fluconazol, sulfuro de selenio, ciclopirox o aceite del árbol del té.  · Crema o pomada con corticosteroides. Contienenmedicamentos tales comohidrocortisona o acetónido de fluocinolona.  · Crema o pomadainhibidora de  la calcineurina. Contienen medicamentos tales robert pimecrolimus o tacrolimus.  · Champú o crema con otros medicamentos. Contienen medicamentos tales robert alquitrán de hulla, ácido salicílico o piritiona de zinc.  · Cremas y enjuagues con sulfacetamida sódica. También pueden ser de ayuda.  Lave shelby piel con suavidad. Puede quitarse las escamas con aceite o un cepillo, restregando muy suavemente.  Cómo vivir con dermatitis seborreica  La dermatitis seborreica es paris afección crónica (continua). Puede irse y luego volver. Probablemente necesitará usar champú, crema o pomada con medicamento paris o dos veces a la semana. [place] puede ayudar a prevenir que los síntomas regresen o empeoren.  Cuándo llamar a shelby proveedor de atención médica  Llame a shelby proveedor de atención médica de inmediato si nota alguno de los siguientes síntomas:  · Los síntomas no mejoran, o empeoran  · Aparecen síntomas nuevos   Date Last Reviewed: 5/1/2016  © 7225-7668 [employer]. [address] [place] [address], [place] [address]. All rights reserved. This information is not intended as a substitute for professional medical care. Always follow your healthcare professional's instructions.

## 2018-11-07 ENCOUNTER — TELEPHONE (OUTPATIENT)
Dept: DIABETES | Facility: CLINIC | Age: 63
End: 2018-11-07

## 2018-11-07 NOTE — TELEPHONE ENCOUNTER
----- Message from Zaheer Ivey LPN sent at 10/26/2018  4:16 PM CDT -----  Can you pls. Reach out and call this patient. She needs to rs her appt.

## 2018-11-21 ENCOUNTER — PROCEDURE VISIT (OUTPATIENT)
Dept: OPHTHALMOLOGY | Facility: CLINIC | Age: 63
End: 2018-11-21
Payer: MEDICARE

## 2018-11-21 PROCEDURE — 67028 INJECTION EYE DRUG: CPT | Mod: S$PBB,LT,, | Performed by: OPHTHALMOLOGY

## 2018-11-21 PROCEDURE — 67028 INJECTION EYE DRUG: CPT | Mod: PBBFAC,PO,LT | Performed by: OPHTHALMOLOGY

## 2018-11-21 PROCEDURE — C9257 BEVACIZUMAB INJECTION: HCPCS | Mod: PBBFAC,JG,PO | Performed by: OPHTHALMOLOGY

## 2018-11-21 PROCEDURE — 92134 CPTRZ OPH DX IMG PST SGM RTA: CPT | Mod: PBBFAC,PO | Performed by: OPHTHALMOLOGY

## 2018-11-21 PROCEDURE — 99499 UNLISTED E&M SERVICE: CPT | Mod: S$PBB,,, | Performed by: OPHTHALMOLOGY

## 2018-11-21 RX ADMIN — BEVACIZUMAB 1.25 MG: 100 INJECTION, SOLUTION INTRAVENOUS at 09:11

## 2018-11-21 NOTE — PROGRESS NOTES
HPI     pdr / dme      Additional comments: avastin os              Comments     FMHX COAG: Mother, Grandmother  DM 1998  BDR OU / CME OS  NSC OS  PCIOL OD +21.5 SN60WF (distance) 7-18-18    AVASTIN OS last 9/26/18    OD:pred forte 1 x a day          Last edited by ANDREE Stroud on 11/21/2018  8:39 AM. (History)            Assessment /Plan     For exam results, see Encounter Report.    There are no diagnoses linked to this encounter.  ***

## 2018-11-21 NOTE — PROGRESS NOTES
===============================  11/21/2018   Kay Rob,   63 y.o. female   Last visit [facility]: :10/9/2018   Last visit eye dept. 10/9/2018  [facility]:  Uncorrected distance visual acuity was 20/30 in the right eye and 20/100 in the left eye.   Not recorded         Not recorded         Not recorded        Chief Complaint   Patient presents with    pdr / dme     avastin os     Ophthalmic Medications     Ophthalmic - Anti-inflammatory, Glucocorticoids Start End    prednisoLONE acetate (PRED FORTE) 1 % DrpS 6/4/2018     Sig: Place 1 drop into the right eye 4 (four) times daily. Start one day before surgery    Route: Right Eye         HPI     pdr / dme      Additional comments: avastin os              Comments     FMX COAG: Mother, Grandmother  DM 1998  BDR OU / CME OS  NSC OS  PCIOL OD +21.5 SN60WF (distance) 7-18-18    AVASTIN OS last 9/26/18    OD:pred forte 1 x a day          Last edited by ANDREE Stroud on 11/21/2018  8:39 AM. 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 Check Both eyes daily. Gave patient my home phone number.      Procedure  Note:   OS}  avstpnp    I have explained the Risks, Benefits and Alternatives of the procedure in detail.  The patient voices understanding and all questions have been  answered.  The patient agrees to proceed as discussed.  Xylocaine with Epi 2%  subconj bleb  was used for anesthesia.  Topical betadine was used for antisepsis.  0.05 cc was  injected 3.7 mm from corneal limbus in the inferotemporal quadrant.  Following injection the IOP was less than thirty (<30) by tonopen.  The eye was then thoroughly irrigated with BSS.  Patient tolerated procedure well.  No complications were observed.  The Patient was educated that mild irritation tonight was normal secondary to topical antispsis use.  Pt was advised to call at any time day or night for pain, redness, or any decline in vision. I gave the patient my home number as well as the clinic on call number. Daily visual checks and Amsler grid testing were reviewed.  polytrim Antibiotic Drops to be used 4 times daily for 4 days  MARYAM Knox MD  Procedure ordered: y  Consent: y  Pre auth: y  MAR:y  Opnote: y  Charge capture:y            .       ===========================

## 2018-11-28 ENCOUNTER — TELEPHONE (OUTPATIENT)
Dept: ADMINISTRATIVE | Facility: HOSPITAL | Age: 63
End: 2018-11-28

## 2018-11-28 ENCOUNTER — PATIENT OUTREACH (OUTPATIENT)
Dept: ADMINISTRATIVE | Facility: HOSPITAL | Age: 63
End: 2018-11-28

## 2018-11-28 DIAGNOSIS — Z12.11 COLON CANCER SCREENING: Primary | ICD-10-CM

## 2018-11-28 RX ORDER — INSULIN GLARGINE 300 U/ML
INJECTION, SOLUTION SUBCUTANEOUS
Qty: 6 ML | Refills: 1 | Status: SHIPPED | OUTPATIENT
Start: 2018-11-28 | End: 2019-03-04 | Stop reason: SDUPTHER

## 2018-11-28 NOTE — PROGRESS NOTES
I spoke with pt's daughter Cass that stated she had not scheduled her Colonoscopy yet. She did take down Procedure 's phone number [phone]. She was instructed if no one answered to leave her name and phone number for someone to return her call this is per Procedure 's instructions.

## 2018-12-04 ENCOUNTER — TELEPHONE (OUTPATIENT)
Dept: ENDOSCOPY | Facility: HOSPITAL | Age: 63
End: 2018-12-04

## 2018-12-10 ENCOUNTER — TELEPHONE (OUTPATIENT)
Dept: ENDOSCOPY | Facility: HOSPITAL | Age: 63
End: 2018-12-10

## 2018-12-12 ENCOUNTER — DOCUMENTATION ONLY (OUTPATIENT)
Dept: ENDOSCOPY | Facility: HOSPITAL | Age: 63
End: 2018-12-12

## 2018-12-12 NOTE — PROGRESS NOTES
Endoscopy Scheduling Questionnaire:    Call Type:Incoming call    1. Have you been admitted overnight to the hospital in the past 3 months? no  2. Do you get CP and SOB while walking up a flight of stairs? no  3. Have you had a stent placed in the past 12 months? no  4. Have you had a stroke or heart attack in the past 6 months? no  5. Have you had any chest pain in the past 3 months? no      If so, have you been evaluated by your PCP or Cardiologist? no  6. Do you take weight loss medications? no  7. Have you been diagnosed with Diverticulitis within the past 3 months? no  8. Are you having any GI symptoms that you feel need to be evaluated prior to your procedure? no  9. Are you on dialysis? no  10. Are you diabetic? yes  11. Do you have any other health issues that you feel might limit your ability to safely have the procedure and/or sedation? no  12. Is the patient over 79 yo? no        If so, has the patient been seen by their PCP or GI in the last 3 months? N/A       -I have reviewed the last colonoscopy for recommendations regarding surveillance and bowel prep  is NA  -I have reviewed the patient's medications and allergies. She is not on high risk medications and will require cardiac clearance. A clearance request NA  -I have verified the pharmacy information. The prep being used is Suprep. The patient's prep instructions were sent via [facility] patient portal..    Date Endoscopy Scheduled: 12/22/18  Or  Date Gastro office visit Scheduled: NA

## 2018-12-20 RX ORDER — SODIUM, POTASSIUM,MAG SULFATES 17.5-3.13G
SOLUTION, RECONSTITUTED, ORAL ORAL
Qty: 1 KIT | Refills: 0 | Status: ON HOLD | OUTPATIENT
Start: 2018-12-20 | End: 2018-12-22 | Stop reason: HOSPADM

## 2018-12-21 ENCOUNTER — ANESTHESIA EVENT (OUTPATIENT)
Dept: ENDOSCOPY | Facility: HOSPITAL | Age: 63
End: 2018-12-21
Payer: MEDICARE

## 2018-12-21 NOTE — ANESTHESIA PREPROCEDURE EVALUATION
12/21/2018  Kay Rob is a 63 y.o., female.    Anesthesia Evaluation    I have reviewed the Patient Summary Reports.    I have reviewed the Nursing Notes.   I have reviewed the Medications.     Review of Systems  Anesthesia Hx:  No problems with previous Anesthesia  Denies Family Hx of Anesthesia complications.   Denies Personal Hx of Anesthesia complications.   Social:  Non-Smoker    Cardiovascular:   Hypertension, well controlled hyperlipidemia    Pulmonary:   Asthma mild    Hepatic/GI:   Bowel Prep. Last drink at 9a   Neurological:  Neurology Normal  Denies CVA. Denies Seizures.    Endocrine:   Diabetes, poorly controlled, type 2        Physical Exam   Airway/Jaw/Neck:  Airway Findings: Mouth Opening: Normal Tongue: Normal  General Airway Assessment: Adult  Mallampati: II  TM Distance: Normal, at least 6 cm  Jaw/Neck Findings:  Neck ROM: Normal ROM       Chest/Lungs:  Chest/Lungs Findings: Clear to auscultation, Normal Respiratory Rate     Heart/Vascular:  Heart Findings: Rhythm: Regular Rhythm             Anesthesia Plan  Type of Anesthesia, risks & benefits discussed:  Anesthesia Type:  MAC  Patient's Preference:   Intra-op Monitoring Plan:   Intra-op Monitoring Plan Comments:   Post Op Pain Control Plan:   Post Op Pain Control Plan Comments:   Induction:    Beta Blocker:  Patient is not currently on a Beta-Blocker (No further documentation required).       Informed Consent: Patient understands risks and agrees with Anesthesia plan.  Questions answered. Anesthesia consent signed with patient.  ASA Score: 2     Day of Surgery Review of History & Physical: I have interviewed and examined the patient. I have reviewed the patient's H&P dated:  There are no significant changes.  H&P update referred to the surgeon.         Ready For Surgery From Anesthesia Perspective.

## 2018-12-22 ENCOUNTER — ANESTHESIA (OUTPATIENT)
Dept: ENDOSCOPY | Facility: HOSPITAL | Age: 63
End: 2018-12-22
Payer: MEDICARE

## 2018-12-22 ENCOUNTER — HOSPITAL ENCOUNTER (OUTPATIENT)
Facility: HOSPITAL | Age: 63
Discharge: HOME | End: 2018-12-22
Attending: INTERNAL MEDICINE | Admitting: INTERNAL MEDICINE
Payer: MEDICARE

## 2018-12-22 VITALS
HEART RATE: 82 BPM | RESPIRATION RATE: 18 BRPM | SYSTOLIC BLOOD PRESSURE: 116 MMHG | TEMPERATURE: 98 F | WEIGHT: 227 LBS | BODY MASS INDEX: 33.62 KG/M2 | HEIGHT: 69 IN | DIASTOLIC BLOOD PRESSURE: 77 MMHG | OXYGEN SATURATION: 95 %

## 2018-12-22 DIAGNOSIS — D12.4 BENIGN NEOPLASM OF DESCENDING COLON: ICD-10-CM

## 2018-12-22 DIAGNOSIS — D12.0 BENIGN NEOPLASM OF CECUM: ICD-10-CM

## 2018-12-22 DIAGNOSIS — Z12.11 SPECIAL SCREENING FOR MALIGNANT NEOPLASMS, COLON: Primary | ICD-10-CM

## 2018-12-22 DIAGNOSIS — D12.7 BENIGN NEOPLASM OF RECTOSIGMOID JUNCTION: ICD-10-CM

## 2018-12-22 DIAGNOSIS — D12.2 BENIGN NEOPLASM OF ASCENDING COLON: ICD-10-CM

## 2018-12-22 DIAGNOSIS — D12.3 BENIGN NEOPLASM OF TRANSVERSE COLON: ICD-10-CM

## 2018-12-22 PROCEDURE — 45385 COLONOSCOPY W/LESION REMOVAL: CPT | Performed by: INTERNAL MEDICINE

## 2018-12-22 PROCEDURE — 37000009 HC ANESTHESIA EA ADD 15 MINS: Performed by: INTERNAL MEDICINE

## 2018-12-22 PROCEDURE — 27201089 HC SNARE, DISP (ANY): Performed by: INTERNAL MEDICINE

## 2018-12-22 PROCEDURE — 25000003 PHARM REV CODE 250: Performed by: NURSE ANESTHETIST, CERTIFIED REGISTERED

## 2018-12-22 PROCEDURE — 88305 TISSUE EXAM BY PATHOLOGIST: CPT | Performed by: PATHOLOGY

## 2018-12-22 PROCEDURE — 63600175 PHARM REV CODE 636 W HCPCS: Performed by: NURSE ANESTHETIST, CERTIFIED REGISTERED

## 2018-12-22 PROCEDURE — 45380 COLONOSCOPY AND BIOPSY: CPT | Performed by: INTERNAL MEDICINE

## 2018-12-22 PROCEDURE — 88305 TISSUE SPECIMEN TO PATHOLOGY - SURGERY: ICD-10-PCS | Mod: 26,,, | Performed by: PATHOLOGY

## 2018-12-22 PROCEDURE — 25000003 PHARM REV CODE 250: Performed by: INTERNAL MEDICINE

## 2018-12-22 PROCEDURE — 45380 PR COLONOSCOPY,BIOPSY: ICD-10-PCS | Mod: 59,,, | Performed by: INTERNAL MEDICINE

## 2018-12-22 PROCEDURE — 45385 PR COLONOSCOPY,REMV LESN,SNARE: ICD-10-PCS | Mod: PT,,, | Performed by: INTERNAL MEDICINE

## 2018-12-22 PROCEDURE — 88305 TISSUE EXAM BY PATHOLOGIST: CPT | Mod: 26,,, | Performed by: PATHOLOGY

## 2018-12-22 PROCEDURE — 45380 COLONOSCOPY AND BIOPSY: CPT | Mod: 59,,, | Performed by: INTERNAL MEDICINE

## 2018-12-22 PROCEDURE — 45385 COLONOSCOPY W/LESION REMOVAL: CPT | Mod: PT,,, | Performed by: INTERNAL MEDICINE

## 2018-12-22 PROCEDURE — 27201012 HC FORCEPS, HOT/COLD, DISP: Performed by: INTERNAL MEDICINE

## 2018-12-22 PROCEDURE — 37000008 HC ANESTHESIA 1ST 15 MINUTES: Performed by: INTERNAL MEDICINE

## 2018-12-22 RX ORDER — SODIUM CHLORIDE 0.9 % (FLUSH) 0.9 %
3 SYRINGE (ML) INJECTION
Status: DISCONTINUED | OUTPATIENT
Start: 2018-12-22 | End: 2018-12-22 | Stop reason: HOSPADM

## 2018-12-22 RX ORDER — PROPOFOL 10 MG/ML
VIAL (ML) INTRAVENOUS
Status: DISCONTINUED | OUTPATIENT
Start: 2018-12-22 | End: 2018-12-22

## 2018-12-22 RX ORDER — SODIUM CHLORIDE, SODIUM LACTATE, POTASSIUM CHLORIDE, CALCIUM CHLORIDE 600; 310; 30; 20 MG/100ML; MG/100ML; MG/100ML; MG/100ML
INJECTION, SOLUTION INTRAVENOUS CONTINUOUS
Status: DISCONTINUED | OUTPATIENT
Start: 2018-12-22 | End: 2018-12-22 | Stop reason: HOSPADM

## 2018-12-22 RX ORDER — LIDOCAINE HYDROCHLORIDE 20 MG/ML
INJECTION, SOLUTION EPIDURAL; INFILTRATION; INTRACAUDAL; PERINEURAL
Status: DISCONTINUED | OUTPATIENT
Start: 2018-12-22 | End: 2018-12-22

## 2018-12-22 RX ADMIN — PROPOFOL 50 MG: 10 INJECTION, EMULSION INTRAVENOUS at 02:12

## 2018-12-22 RX ADMIN — LIDOCAINE HYDROCHLORIDE 50 MG: 20 INJECTION, SOLUTION EPIDURAL; INFILTRATION; INTRACAUDAL; PERINEURAL at 01:12

## 2018-12-22 RX ADMIN — PROPOFOL 150 MG: 10 INJECTION, EMULSION INTRAVENOUS at 01:12

## 2018-12-22 RX ADMIN — PROPOFOL 50 MG: 10 INJECTION, EMULSION INTRAVENOUS at 01:12

## 2018-12-22 RX ADMIN — SODIUM CHLORIDE, SODIUM LACTATE, POTASSIUM CHLORIDE, AND CALCIUM CHLORIDE: .6; .31; .03; .02 INJECTION, SOLUTION INTRAVENOUS at 12:12

## 2018-12-22 NOTE — DISCHARGE SUMMARY
Endoscopy Discharge Summary      Admit Date: 12/22/2018    Discharge Date and Time:  12/22/2018 2:15 PM    Attending Physician: Zak Dover MD     Discharge Physician: Zak Dover MD     Principal Admitting Diagnoses: Special screening for malignant neoplasms, colon         Discharge Diagnosis: The primary encounter diagnosis was Special screening for malignant neoplasms, colon. Diagnoses of Benign neoplasm of cecum, Benign neoplasm of ascending colon, Benign neoplasm of transverse colon, Benign neoplasm of descending colon, and Benign neoplasm of rectosigmoid junction were also pertinent to this visit.     Discharged Condition: Good    Indication for Admission: Special screening for malignant neoplasms, colon     Hospital Course: Patient was admitted for an inpatient procedure and tolerated the procedure well with no complications.    Significant Diagnostic Studies:  COLON    Pathology (if any):  Specimen (12h ago, onward)    Start     Ordered    12/22/18 1402  Specimen to Pathology - Surgery  Once     Comments:  1. Colon polyps     Start Status   12/22/18 1402 Collected (12/22/18 1408)       12/22/18 1408          Disposition: Home.    Bleeding: None    No Implants         Follow Up/Patient Instructions:   Current Discharge Medication List      CONTINUE these medications which have NOT CHANGED    Details   ammonium lactate (AMLACTIN) 12 % lotion Use daily.  Apply to damp skin after bathing.  Qty: 225 g, Refills: 11    Associated Diagnoses: Xerosis cutis      atorvastatin (LIPITOR) 40 MG tablet Take 1 tablet (40 mg total) by mouth once daily.  Qty: 30 tablet, Refills: 11      !! clobetasol (TEMOVATE) 0.05 % external solution Apply to affected areas of scalp 1-2 times daily as needed for itch  Qty: 50 mL, Refills: 3    Associated Diagnoses: Seborrheic dermatitis      !! clobetasol (TEMOVATE) 0.05 % external solution Apply topically 2 (two) times daily. To scalp only  Qty: 50 mL, Refills: 2    Associated  "Diagnoses: Seborrheic dermatitis      diclofenac sodium 1 % Gel Apply 2 g topically once daily.  Qty: 100 g, Refills: 0      emollient combination no.69 (EUCERIN SKIN CALMING) Crea Apply topically.      fluconazole (DIFLUCAN) 200 MG Tab Take 1 tablet by mouth once weekly.  Qty: 4 tablet, Refills: 0    Associated Diagnoses: Seborrheic dermatitis      fluocinolone (SYNALAR) 0.01 % Sham Apply no more than 1 ounce to scalp once daily; work into lather and allow to remain on scalp for ~5 minutes. Remove from hair and scalp by rinsing thoroughly with water.  Qty: 120 mL, Refills: 2      gabapentin (NEURONTIN) 100 MG capsule Take 1 capsule (100 mg total) by mouth 3 (three) times daily.  Qty: 90 capsule, Refills: 11      glimepiride (AMARYL) 2 MG tablet Take 1 tablet (2 mg total) by mouth once daily.  Qty: 90 tablet, Refills: 0      ketoconazole (NIZORAL) 2 % shampoo Apply topically 3 (three) times a week.  Qty: 120 mL, Refills: 3    Comments: Please consider 90 day supplies to promote better adherence  Associated Diagnoses: Seborrheic dermatitis      liraglutide 0.6 mg/0.1 mL, 18 mg/3 mL, subq PNIJ (VICTOZA 2-KIRK) 0.6 mg/0.1 mL (18 mg/3 mL) PnIj Inject 1.2 mg into the skin once daily.  Qty: 6 mL, Refills: 11      lisinopril-hydrochlorothiazide (PRINZIDE,ZESTORETIC) 20-25 mg Tab Take 1 tablet by mouth once daily.  Qty: 30 tablet, Refills: 11      metFORMIN (GLUCOPHAGE) 850 MG tablet TAKE 1 TABLET BY MOUTH TWICE DAILY WITH MEALS  Qty: 60 tablet, Refills: 0    Comments: Please consider 90 day supplies to promote better adherence      mupirocin (BACTROBAN) 2 % ointment Apply topically once daily.  Qty: 30 g, Refills: 1    Associated Diagnoses: Fissure in skin of foot      pen needle, diabetic (BD ULTRA-FINE SHORT PEN NEEDLE) 31 gauge x 5/16" Ndle USE ONE  ONCE DAILY WITH VICTOZA  Qty: 100 each, Refills: 0      prednisoLONE acetate (PRED FORTE) 1 % DrpS Place 1 drop into the right eye 4 (four) times daily. Start one day before " surgery  Qty: 1 Bottle, Refills: 2    Associated Diagnoses: Cataract, nuclear sclerotic senile, bilateral      TOUJEO SOLOSTAR U-300 INSULIN 300 unit/mL (1.5 mL) InPn pen INJECT 92 UNITS SUBCUTANEOUSLY ONCE DAILY  Qty: 6 mL, Refills: 1    Comments: Please consider 90 day supplies to promote better adherence      traZODone (DESYREL) 100 MG tablet Take 1 tablet (100 mg total) by mouth nightly as needed for Insomnia.  Qty: 30 tablet, Refills: 1      triamcinolone acetonide 0.025% (KENALOG) 0.025 % cream AAA bid as needed for flares.  Mild steroid.  Qty: 80 g, Refills: 1    Associated Diagnoses: Seborrheic dermatitis      urea (CARMOL) 40 % Crea Apply topically 2 (two) times daily.  Qty: 85 g, Refills: 5    Associated Diagnoses: Xerosis cutis; Fissure in skin of foot      albuterol (VENTOLIN HFA) 90 mcg/actuation inhaler Inhale 2 puffs into the lungs every 6 (six) hours as needed for Wheezing. Rescue  Qty: 18 g, Refills: 0       !! - Potential duplicate medications found. Please discuss with provider.      STOP taking these medications       sodium,potassium,mag sulfates (SUPREP BOWEL PREP KIT) 17.5-3.13-1.6 gram SolR Comments:   Reason for Stopping:               Discharge Procedure Orders   Diet general     Call MD for:  temperature >100.4     Call MD for:  persistent nausea and vomiting     Call MD for:  severe uncontrolled pain     Call MD for:  difficulty breathing, headache or visual disturbances     Call MD for:  redness, tenderness, or signs of infection (pain, swelling, redness, odor or green/yellow discharge around incision site)     Call MD for:  hives     Call MD for:  persistent dizziness or light-headedness     No dressing needed       Follow-up Information     Lucy Negron MD.    Specialty:  Family Medicine  Why:  As needed  Contact information:  [address] [place] [id number] [phone]

## 2018-12-22 NOTE — PROGRESS NOTES
Pt adamant about daughter being at bs to assist with any possible questions or understanding, does not want a

## 2018-12-22 NOTE — ANESTHESIA RELEASE NOTE
"Anesthesia Release from PACU Note    Patient: Kay Galarza    Procedure(s) Performed: Procedure(s) (LRB):  COLONOSCOPY (N/A)    Anesthesia type: MAC    Post pain: Adequate analgesia    Post assessment: no apparent anesthetic complications, tolerated procedure well and no evidence of recall    Last Vitals:   Visit Vitals  /63   Pulse 80   Temp 36.6 °C (97.9 °F) (Tympanic)   Resp 16   Ht 5' 9" (1.753 m)   Wt 103 kg (227 lb)   SpO2 (!) 94%   Breastfeeding? No   BMI 33.52 kg/m²       Post vital signs: stable    Level of consciousness: awake, alert  and oriented    Nausea/Vomiting: no nausea/no vomiting    Complications: none    Airway Patency: patent    Respiratory: unassisted, spontaneous ventilation, room air    Cardiovascular: stable and blood pressure at baseline    Hydration: euvolemic  "

## 2018-12-22 NOTE — [employer] PATIENT INSTRUCTIONS
Discharge Summary/Instructions after an Endoscopic Procedure  Patient Name: Kay Galarza  Patient MRN: [id number]  Patient   YOB: 1955 Saturday, December 22, 2018 Zak Dover MD  RESTRICTIONS:  During your procedure today, you received medications for sedation.  These   medications may affect your judgment, balance and coordination.  Therefore,   for 24 hours, you have the following restrictions:   - DO NOT drive a car, operate machinery, make legal/financial decisions,   sign important papers or drink alcohol.    ACTIVITY:  Today: no heavy lifting, straining or running due to procedural   sedation/anesthesia.  The following day: return to full activity including work.  DIET:  Eat and drink normally unless instructed otherwise.     TREATMENT FOR COMMON SIDE EFFECTS:  - Mild abdominal pain, nausea, belching, bloating or excessive gas:  rest,   eat lightly and use a heating pad.  - Sore Throat: treat with throat lozenges and/or gargle with warm salt   water.  - Because air was used during the procedure, expelling large amounts of air   from your rectum or belching is normal.  - If a bowel prep was taken, you may not have a bowel movement for 1-3 days.    This is normal.  SYMPTOMS TO WATCH FOR AND REPORT TO YOUR PHYSICIAN:  1. Abdominal pain or bloating, other than gas cramps.  2. Chest pain.  3. Back pain.  4. Signs of infection such as: chills or fever occurring within 24 hours   after the procedure.  5. Rectal bleeding, which would show as bright red, maroon, or black stools.   (A tablespoon of blood from the rectum is not serious, especially if   hemorrhoids are present.)  6. Vomiting.  7. Weakness or dizziness.  GO DIRECTLY TO THE NEAREST EMERGENCY ROOM IF YOU HAVE ANY OF THE FOLLOWING:      Difficulty breathing              Chills and/or fever over 101 F   Persistent vomiting and/or vomiting blood   Severe abdominal pain   Severe chest pain   Black, tarry stools   Bleeding- more than one  tablespoon   Any other symptom or condition that you feel may need urgent attention  Your doctor recommends these additional instructions:  If any biopsies were taken, your doctors clinic will contact you in 1 to 2   weeks with any results.  - The patient will be observed post-procedure, until all discharge criteria   are met.   - Discharge patient to home (ambulatory).   - Patient has a contact number available for emergencies.  The signs and   symptoms of potential delayed complications were discussed with the   patient.  Return to normal activities tomorrow.  Written discharge   instructions were provided to the patient.   - Resume previous diet.   - Continue present medications.   - Await pathology results.   - Repeat colonoscopy in 3 years for surveillance of multiple adenomas.   - Return to referring physician as previously scheduled.  For questions, problems or results please call your physician Zak Dover MD at Work:  [phone]  If you have any questions about the above instructions, call the GI   department at [phone] or call the endoscopy unit at [phone]   from 7am until 3 pm.  [facility], EMERGENCY ROOM PHONE NUMBER:   [phone]  IF A COMPLICATION OR EMERGENCY SITUATION ARISES AND YOU ARE UNABLE TO REACH   YOUR PHYSICIAN - GO DIRECTLY TO THE EMERGENCY ROOM.  I have read or have had read to me these discharge instructions for my   procedure and have received a written copy.  I understand these   instructions and will follow-up with my physician if I have any questions.     __________________________________       _____________________________________  Nurse Signature                                          Patient/Designated   Responsible Party Signature  Zak Dover MD  12/22/2018 2:12:54 PM  This report has been verified and signed electronically.  [employer]

## 2018-12-22 NOTE — TRANSFER OF CARE
"Anesthesia Transfer of Care Note    Patient: Kay Galarza    Procedure(s) Performed: Procedure(s) (LRB):  COLONOSCOPY (N/A)    Patient location: PACU    Anesthesia Type: MAC    Post pain: adequate analgesia    Post assessment: no apparent anesthetic complications and tolerated procedure well    Post vital signs: stable    Level of consciousness: awake and responds to stimulation    Nausea/Vomiting: no nausea/vomiting    Complications: none    Transfer of care protocol was followed      Last vitals:   Visit Vitals  /63   Pulse 80   Temp 36.6 °C (97.9 °F) (Tympanic)   Resp 16   Ht 5' 9" (1.753 m)   Wt 103 kg (227 lb)   SpO2 (!) 94%   Breastfeeding? No   BMI 33.52 kg/m²     "

## 2018-12-22 NOTE — DISCHARGE INSTRUCTIONS
Dear Kay Galarza      If you develop fevers, severe abdominal pain, significant bleeding, nausea or vomiting, please contact us or come to our Emergency Department at [facility].  Our  contact number is [phone] available for emergencies or any question that you may have.      You may return to normal activities tomorrow.  Written discharge instructions were provided to you today and have them handy since you may not remember our conversation today.       If you take Aspirin, please resume taking it at your prior dose today. If we obtained biopsies or removed polyps during your procedure, we will contact you within 5 to 6 days with the results.      Thanks for trusting us with your healthcare needs.      Sincerely,     Zak Dover M.D.        To rate your experience with Dr. Dover, please click on the link below     [email or web address]

## 2018-12-22 NOTE — ANESTHESIA POSTPROCEDURE EVALUATION
"Anesthesia Post Evaluation    Patient: Kay Galarza    Procedure(s) Performed: Procedure(s) (LRB):  COLONOSCOPY (N/A)    Final Anesthesia Type: MAC  Patient location during evaluation: PACU  Patient participation: Yes- Able to Participate  Level of consciousness: awake and alert and oriented  Post-procedure vital signs: reviewed and stable  Pain management: adequate  Airway patency: patent  PONV status at discharge: No PONV  Anesthetic complications: no      Cardiovascular status: blood pressure returned to baseline, stable and hemodynamically stable  Respiratory status: unassisted, spontaneous ventilation and room air  Hydration status: euvolemic  Follow-up not needed.        Visit Vitals  /63   Pulse 80   Temp 36.6 °C (97.9 °F) (Tympanic)   Resp 16   Ht 5' 9" (1.753 m)   Wt 103 kg (227 lb)   SpO2 (!) 94%   Breastfeeding? No   BMI 33.52 kg/m²       Pain/Pool Score: Pool Score: 8 (12/22/2018  2:14 PM)        "

## 2018-12-22 NOTE — H&P
[facility]  Gastroenterology  H&P    Patient Name: Kay Galarza  MRN: [id number]  Admission Date: 12/22/2018  Code Status: Full Code    Attending Provider: Zak Dover MD   Primary Care Physician: Lucy Negron MD  Principal Problem:Special screening for malignant neoplasms, colon    Subjective:     History of Present Illness/Reasons for procedure(s): Screening colonoscopy.     Past Medical History:   Diagnosis Date    Arthritis     Asthma     Cataract     Diabetes mellitus     Diabetic retinopathy     Hyperlipidemia     Hypertension        No current facility-administered medications on file prior to encounter.      Current Outpatient Medications on File Prior to Encounter   Medication Sig Dispense Refill    ammonium lactate (AMLACTIN) 12 % lotion Use daily.  Apply to damp skin after bathing. 225 g 11    atorvastatin (LIPITOR) 40 MG tablet Take 1 tablet (40 mg total) by mouth once daily. 30 tablet 11    clobetasol (TEMOVATE) 0.05 % external solution Apply to affected areas of scalp 1-2 times daily as needed for itch 50 mL 3    clobetasol (TEMOVATE) 0.05 % external solution Apply topically 2 (two) times daily. To scalp only 50 mL 2    diclofenac sodium 1 % Gel Apply 2 g topically once daily. 100 g 0    emollient combination no.69 (EUCERIN SKIN CALMING) Crea Apply topically.      fluconazole (DIFLUCAN) 200 MG Tab Take 1 tablet by mouth once weekly. 4 tablet 0    fluocinolone (SYNALAR) 0.01 % Sham Apply no more than 1 ounce to scalp once daily; work into lather and allow to remain on scalp for ~5 minutes. Remove from hair and scalp by rinsing thoroughly with water. 120 mL 2    gabapentin (NEURONTIN) 100 MG capsule Take 1 capsule (100 mg total) by mouth 3 (three) times daily. 90 capsule 11    glimepiride (AMARYL) 2 MG tablet Take 1 tablet (2 mg total) by mouth once daily. 90 tablet 0    ketoconazole (NIZORAL) 2 % shampoo Apply topically 3 (three) times a  "week. 120 mL 3    liraglutide 0.6 mg/0.1 mL, 18 mg/3 mL, subq PNIJ (VICTOZA 2-KIRK) 0.6 mg/0.1 mL (18 mg/3 mL) PnIj Inject 1.2 mg into the skin once daily. (Patient taking differently: Inject 0.6 mg into the skin once daily. ) 6 mL 11    lisinopril-hydrochlorothiazide (PRINZIDE,ZESTORETIC) 20-25 mg Tab Take 1 tablet by mouth once daily. 30 tablet 11    metFORMIN (GLUCOPHAGE) 850 MG tablet TAKE 1 TABLET BY MOUTH TWICE DAILY WITH MEALS 60 tablet 0    mupirocin (BACTROBAN) 2 % ointment Apply topically once daily. 30 g 1    pen needle, diabetic (BD ULTRA-FINE SHORT PEN NEEDLE) 31 gauge x 5/16" Ndle USE ONE  ONCE DAILY WITH VICTOZA 100 each 0    prednisoLONE acetate (PRED FORTE) 1 % DrpS Place 1 drop into the right eye 4 (four) times daily. Start one day before surgery 1 Bottle 2    TOUJEO SOLOSTAR U-300 INSULIN 300 unit/mL (1.5 mL) InPn pen INJECT 92 UNITS SUBCUTANEOUSLY ONCE DAILY 6 mL 1    traZODone (DESYREL) 100 MG tablet Take 1 tablet (100 mg total) by mouth nightly as needed for Insomnia. 30 tablet 1    triamcinolone acetonide 0.025% (KENALOG) 0.025 % cream AAA bid as needed for flares.  Mild steroid. 80 g 1    urea (CARMOL) 40 % Crea Apply topically 2 (two) times daily. 85 g 5    albuterol (VENTOLIN HFA) 90 mcg/actuation inhaler Inhale 2 puffs into the lungs every 6 (six) hours as needed for Wheezing. Rescue 18 g 0       Past Surgical History:   Procedure Laterality Date    CATARACT EXTRACTION W/  INTRAOCULAR LENS IMPLANT Right 07/18/2018       Review of patient's allergies indicates:   Allergen Reactions    Pollen extracts      Family History     Problem Relation (Age of Onset)    Cancer Father    Diabetes Sister    Glaucoma Mother, Maternal Grandmother    Hypertension Father, Mother    Stroke Father        Tobacco Use    Smoking status: Never Smoker    Smokeless tobacco: Never Used   Substance and Sexual Activity    Alcohol use: No    Drug use: No    Sexual activity: Yes     Review of Systems "   Constitutional: Negative for activity change, chills, diaphoresis, fatigue and fever.   HENT: Negative for ear pain, facial swelling, nosebleeds, rhinorrhea, sneezing, sore throat and trouble swallowing.    Eyes: Negative for photophobia, pain and visual disturbance.   Respiratory: Negative for apnea, cough, chest tightness, shortness of breath and wheezing.    Gastrointestinal: Negative for abdominal pain, blood in stool, constipation, nausea, rectal pain and vomiting.   Genitourinary: Negative for decreased urine volume, difficulty urinating, dysuria, flank pain and hematuria.   Musculoskeletal: Negative for arthralgias, back pain, gait problem, neck pain and neck stiffness.   Skin: Negative for pallor and rash.   Neurological: Negative for seizures, syncope, speech difficulty, weakness and headaches.   Hematological: Negative for adenopathy. Does not bruise/bleed easily.   Psychiatric/Behavioral: Negative for behavioral problems, confusion and hallucinations.     Objective:     Vital Signs (Most Recent):  Temp: 97.9 °F (36.6 °C) (12/22/18 1229)  Pulse: 77 (12/22/18 1229)  Resp: 18 (12/22/18 1229)  BP: 109/60 (12/22/18 1229)  SpO2: 98 % (12/22/18 1229) Vital Signs (24h Range):  Temp:  [97.9 °F (36.6 °C)] 97.9 °F (36.6 °C)  Pulse:  [77] 77  Resp:  [18] 18  SpO2:  [98 %] 98 %  BP: (109)/(60) 109/60     Weight: 103 kg (227 lb) (12/22/18 1229)  Body mass index is 33.52 kg/m².    No intake or output data in the 24 hours ending 12/22/18 1304    Lines/Drains/Airways     Peripheral Intravenous Line                 Peripheral IV - Single Lumen 12/22/18 1230 Hand less than 1 day                Physical Exam   Constitutional: She is oriented to person, place, and time. She appears well-developed and well-nourished.   HENT:   Head: Normocephalic and atraumatic.   Eyes: EOM are normal. Pupils are equal, round, and reactive to light.   Neck: Normal range of motion. Neck supple. No thyromegaly present.   Cardiovascular: Normal  rate, regular rhythm, normal heart sounds and intact distal pulses.   No murmur heard.  Pulmonary/Chest: Effort normal and breath sounds normal. No respiratory distress. She has no wheezes. She has no rales.   Abdominal: Soft. Bowel sounds are normal. She exhibits no distension and no mass. There is no tenderness.   Musculoskeletal: Normal range of motion. She exhibits no edema or tenderness.   Lymphadenopathy:     She has no cervical adenopathy.   Neurological: She is alert and oriented to person, place, and time. She has normal reflexes. No cranial nerve deficit.   Skin: Skin is warm and dry. No erythema.   Psychiatric: She has a normal mood and affect. Her behavior is normal.       Lab Summary Latest Ref Rng & Units 10/18/2018   Hemoglobin 12.0 - 16.0 g/dL 14.3   Hematocrit 37.0 - 48.5 % 43.5   White count 3.90 - 12.70 K/uL 11.42   Platelet count 150 - 350 K/uL 284   Sodium 136 - 145 mmol/L 137   Potassium 3.5 - 5.1 mmol/L 4.8   Calcium 8.7 - 10.5 mg/dL 10.5   Phosphorus - (None)   Creatinine 0.5 - 1.4 mg/dL 1.0   AST 10 - 40 U/L 18   Alk Phos 55 - 135 U/L 129   Bilirubin 0.1 - 1.0 mg/dL 0.7   Glucose 70 - 110 mg/dL 207(H)   Cholesterol - (None)   HDL cholesterol - (None)   Triglycerides - (None)   LDL calc - (None)   Total protein 6.0 - 8.4 g/dL 7.1   Albumin 3.5 - 5.2 g/dL 3.7   A1C - (None)   PSA Screen - (None)   Some recent data might be hidden        Assessment/Plan:     Active Diagnoses:    Diagnosis Date Noted POA    PRINCIPAL PROBLEM:  Special screening for malignant neoplasms, colon [Z12.11] 12/22/2018 Not Applicable      Problems Resolved During this Admission:       Risks, benefits and alternative options were discussed with the patient. Risks including but not limited to infection, bleeding, heart or respiratory problems and perforation that may require surgery were all explained to the patient. The patient had a chance for questions if there were doubts or concerns about the test. The referring  provider will be notified that our consultation is complete and available through the patient's records.    Thanks for letting us participate in the care of this nice patient,      Zak Doevr MD  Gastroenterology  [facility]

## 2018-12-24 LAB — POCT GLUCOSE: 257 MG/DL (ref 70–110)

## 2019-01-02 ENCOUNTER — PROCEDURE VISIT (OUTPATIENT)
Dept: OPHTHALMOLOGY | Facility: CLINIC | Age: 64
End: 2019-01-02
Payer: MEDICARE

## 2019-01-02 PROCEDURE — 67028 PR INJECT INTRAVITREAL PHARMCOLOGIC: ICD-10-PCS | Mod: S$PBB,LT,, | Performed by: OPHTHALMOLOGY

## 2019-01-02 PROCEDURE — 99499 UNLISTED E&M SERVICE: CPT | Mod: S$PBB,,, | Performed by: OPHTHALMOLOGY

## 2019-01-02 PROCEDURE — 67028 INJECTION EYE DRUG: CPT | Mod: PBBFAC,PO,LT | Performed by: OPHTHALMOLOGY

## 2019-01-02 PROCEDURE — 92134 CPTRZ OPH DX IMG PST SGM RTA: CPT | Mod: PBBFAC,PO | Performed by: OPHTHALMOLOGY

## 2019-01-02 PROCEDURE — 67028 INJECTION EYE DRUG: CPT | Mod: S$PBB,LT,, | Performed by: OPHTHALMOLOGY

## 2019-01-02 PROCEDURE — 92134 POSTERIOR SEGMENT OCT RETINA (OCULAR COHERENCE TOMOGRAPHY)-BOTH EYES: ICD-10-PCS | Mod: 26,S$PBB,, | Performed by: OPHTHALMOLOGY

## 2019-01-02 PROCEDURE — C9257 BEVACIZUMAB INJECTION: HCPCS | Mod: PBBFAC,JG,PO | Performed by: OPHTHALMOLOGY

## 2019-01-02 PROCEDURE — 99499 NO LOS: ICD-10-PCS | Mod: S$PBB,,, | Performed by: OPHTHALMOLOGY

## 2019-01-02 RX ORDER — POLYMYXIN B SULFATE AND TRIMETHOPRIM 1; 10000 MG/ML; [USP'U]/ML
1 SOLUTION OPHTHALMIC 4 TIMES DAILY
Qty: 1 BOTTLE | Refills: 0 | Status: SHIPPED | OUTPATIENT
Start: 2019-01-02 | End: 2019-01-06

## 2019-01-02 RX ADMIN — BEVACIZUMAB 1.25 MG: 100 INJECTION, SOLUTION INTRAVENOUS at 08:01

## 2019-01-02 NOTE — PROGRESS NOTES
===============================  01/02/2019   Kay Galarza,   63 y.o. female   Last visit JC: :11/21/2018   Last visit eye dept. 11/21/2018  [facility]:  Uncorrected distance visual acuity was 20/30 in the right eye and 20/100 in the left eye.   Not recorded         Not recorded         Not recorded        Chief Complaint   Patient presents with    dme     avastin os     Ophthalmic Medications     Ophthalmic - Anti-inflammatory, Glucocorticoids Start End    prednisoLONE acetate (PRED FORTE) 1 % DrpS (Discontinued) 6/4/2018 1/2/2019    Sig: Place 1 drop into the right eye 4 (four) times daily. Start one day before surgery    Route: Right Eye    Reason for Discontinue: Patient no longer taking         HPI     dme      Additional comments: avastin os              Comments     FMHX COAG: Mother, Grandmother  DM 1998  BDR OU / CME OS  NSC OS  PCIOL OD +21.5 SN60WF (distance) 7-18-18    AVASTIN OS last 11/21/18    OD:pred forte 1 x a day          Last edited by ANDREE Stroud on 1/2/2019  7:38 AM. 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  1/2/2019     Follow up: 1 mo        Instructed to call 24/7 for any worsening of vision. Check Both eyes daily. Gave patient my home phone number.      Procedure  Note:   OS}  Avastin (Bevacizumab) 1.25 mg/0.05 ml Intravitreal Injection    I have explained the Risks, Benefits and Alternatives of the procedure in  detail.  The patient voices understanding and all questions have been answered.  The patient agrees to proceed as discussed.  Xylocaine with Epi 2%  subconj bleb  was used for anesthesia.  Topical betadine was used for antisepsis.  0.05 cc was  injected 3.7 mm from corneal limbus in the inferotemporal quadrant.  Following injection the IOP was less than thirty (<30) by tonopen.  The eye was then thoroughly irrigated with BSS.  Patient tolerated procedure well.  No complications were observed.  The Patient was educated that mild irritation tonight was normal secondary to topical antispsis use.  Pt was advised to call at any time day or night for pain, redness, or any decline in vision. I gave the patient my home number as well as the clinic on call number. Daily visual checks and Amsler grid testing were reviewed.  polytrim Antibiotic Drops to be used 4 times daily for 4 days  MARYAM Knox MD  Procedure ordered: y  Consent: y  Pre auth: y  MAR:y  Opnote: y  Charge capture:y           ===========================

## 2019-01-10 ENCOUNTER — OFFICE VISIT (OUTPATIENT)
Dept: PODIATRY | Facility: CLINIC | Age: 64
End: 2019-01-10
Payer: MEDICARE

## 2019-01-10 VITALS
WEIGHT: 229.25 LBS | BODY MASS INDEX: 33.96 KG/M2 | DIASTOLIC BLOOD PRESSURE: 100 MMHG | SYSTOLIC BLOOD PRESSURE: 161 MMHG | RESPIRATION RATE: 16 BRPM | HEIGHT: 69 IN

## 2019-01-10 DIAGNOSIS — R23.4 FISSURE IN SKIN OF FOOT: ICD-10-CM

## 2019-01-10 DIAGNOSIS — B35.1 ONYCHOMYCOSIS: ICD-10-CM

## 2019-01-10 DIAGNOSIS — L85.3 XEROSIS CUTIS: ICD-10-CM

## 2019-01-10 DIAGNOSIS — E11.49 TYPE II DIABETES MELLITUS WITH NEUROLOGICAL MANIFESTATIONS: Primary | ICD-10-CM

## 2019-01-10 DIAGNOSIS — L84 CORN OR CALLUS: ICD-10-CM

## 2019-01-10 PROCEDURE — 11720 PR DEBRIDEMENT OF NAIL(S), 1-5: ICD-10-PCS | Mod: S$PBB,59,, | Performed by: PODIATRIST

## 2019-01-10 PROCEDURE — 11720 DEBRIDE NAIL 1-5: CPT | Mod: PBBFAC | Performed by: PODIATRIST

## 2019-01-10 PROCEDURE — 11056 PR TRIM BENIGN HYPERKERATOTIC SKIN LESION,2-4: ICD-10-PCS | Mod: S$PBB,,, | Performed by: PODIATRIST

## 2019-01-10 PROCEDURE — 11719 TRIM NAIL(S) ANY NUMBER: CPT | Mod: 59,PBBFAC | Performed by: PODIATRIST

## 2019-01-10 PROCEDURE — 11719 PR TRIM NAIL(S): ICD-10-PCS | Mod: 59,S$PBB,, | Performed by: PODIATRIST

## 2019-01-10 PROCEDURE — 11719 TRIM NAIL(S) ANY NUMBER: CPT | Mod: 59,S$PBB,, | Performed by: PODIATRIST

## 2019-01-10 PROCEDURE — 11720 DEBRIDE NAIL 1-5: CPT | Mod: S$PBB,59,, | Performed by: PODIATRIST

## 2019-01-10 PROCEDURE — 99213 OFFICE O/P EST LOW 20 MIN: CPT | Mod: 25,S$PBB,, | Performed by: PODIATRIST

## 2019-01-10 PROCEDURE — 99213 PR OFFICE/OUTPT VISIT, EST, LEVL III, 20-29 MIN: ICD-10-PCS | Mod: 25,S$PBB,, | Performed by: PODIATRIST

## 2019-01-10 PROCEDURE — 99999 PR PBB SHADOW E&M-EST. PATIENT-LVL III: ICD-10-PCS | Mod: PBBFAC,,, | Performed by: PODIATRIST

## 2019-01-10 PROCEDURE — 99213 OFFICE O/P EST LOW 20 MIN: CPT | Mod: PBBFAC,25 | Performed by: PODIATRIST

## 2019-01-10 PROCEDURE — 11056 PARNG/CUTG B9 HYPRKR LES 2-4: CPT | Mod: S$PBB,,, | Performed by: PODIATRIST

## 2019-01-10 PROCEDURE — 99999 PR PBB SHADOW E&M-EST. PATIENT-LVL III: CPT | Mod: PBBFAC,,, | Performed by: PODIATRIST

## 2019-01-10 PROCEDURE — 11056 PARNG/CUTG B9 HYPRKR LES 2-4: CPT | Mod: 59,PBBFAC | Performed by: PODIATRIST

## 2019-01-10 NOTE — PROGRESS NOTES
Subjective:       Patient ID: Kay Galarza is a 63 y.o. female.    Chief Complaint: Routine Foot Care (no c/o pain, wears tennis shoes with socks, diabetic Pt, PCP Dr. Abebe.)      HPI: Patient presents to the office with the chief complaint of elongated, thickened and dystrophic nail plates to the B/L foot. Patient also complains of calluses to the left and/or right foot. This patient is a Diabetic Type II, complicated with Peripheral Neuropathy. Patient does follow with Primary Care and/or Endocrinology for management of Diabetes Mellitus. This patient's PMD is Lucy Negron MD. This patient last saw his/her primary care provider on 10/18/18. Patient was prescribed Urea 40% at her last appointment, but she could not obtain the medication.    Hemoglobin A1C   Date Value Ref Range Status   10/18/2018 9.9 (H) 4.0 - 5.6 % Final     Comment:     ADA Screening Guidelines:  5.7-6.4%  Consistent with prediabetes  >or=6.5%  Consistent with diabetes  High levels of fetal hemoglobin interfere with the HbA1C  assay. Heterozygous hemoglobin variants (HbS, HgC, etc)do  not significantly interfere with this assay.   However, presence of multiple variants may affect accuracy.     07/31/2018 10.9 (H) 4.0 - 5.6 % Final     Comment:     ADA Screening Guidelines:  5.7-6.4%  Consistent with prediabetes  >or=6.5%  Consistent with diabetes  High levels of fetal hemoglobin interfere with the HbA1C  assay. Heterozygous hemoglobin variants (HbS, HgC, etc)do  not significantly interfere with this assay.   However, presence of multiple variants may affect accuracy.     02/08/2018 9.1 (H) 4.0 - 5.6 % Final     Comment:     According to ADA guidelines, hemoglobin A1c <7.0% represents  optimal control in non-pregnant diabetic patients. Different  metrics may apply to specific patient populations.   Standards of Medical Care in Diabetes-2016.  For the purpose of screening for the presence of diabetes:  <5.7%      Consistent with the absence of diabetes  5.7-6.4%  Consistent with increasing risk for diabetes   (prediabetes)  >or=6.5%  Consistent with diabetes  Currently, no consensus exists for use of hemoglobin A1c  for diagnosis of diabetes for children.  This Hemoglobin A1c assay has significant interference with fetal   hemoglobin   (HbF). The results are invalid for patients with abnormal amounts of   HbF,   including those with known Hereditary Persistence   of Fetal Hemoglobin. Heterozygous hemoglobin variants (HbAS, HbAC,   HbAD, HbAE, HbA2) do not significantly interfere with this assay;   however, presence of multiple variants in a sample may impact the %   interference.     .     Review of patient's allergies indicates:   Allergen Reactions    Pollen extracts        Past Medical History:   Diagnosis Date    Arthritis     Asthma     Cataract     Diabetes mellitus     Diabetic retinopathy     Hyperlipidemia     Hypertension        Family History   Problem Relation Age of Onset    Cancer Father         Prostate Ca    Hypertension Father     Stroke Father     Diabetes Sister     Glaucoma Mother     Hypertension Mother     Glaucoma Maternal Grandmother     Blindness Neg Hx     Macular degeneration Neg Hx     Retinal detachment Neg Hx     Strabismus Neg Hx        Social History     Socioeconomic History    Marital status:      Spouse name: Not on file    Number of children: Not on file    Years of education: Not on file    Highest education level: Not on file   Social Needs    Financial resource strain: Not on file    Food insecurity - worry: Not on file    Food insecurity - inability: Not on file    Transportation needs - medical: Not on file    Transportation needs - non-medical: Not on file   Occupational History    Not on file   Tobacco Use    Smoking status: Never Smoker    Smokeless tobacco: Never Used   Substance and Sexual Activity    Alcohol use: No    Drug use: No     "Sexual activity: Yes   Other Topics Concern    Not on file   Social History Narrative    Not on file       Past Surgical History:   Procedure Laterality Date    CATARACT EXTRACTION W/  INTRAOCULAR LENS IMPLANT Right 07/18/2018    COLONOSCOPY N/A 12/22/2018    Performed by Zak Dover MD at [facility]       Review of Systems   Constitutional: Negative for chills, fatigue and fever.   HENT: Negative for hearing loss.    Eyes: Negative for photophobia and visual disturbance.   Respiratory: Negative for cough, chest tightness, shortness of breath and wheezing.    Cardiovascular: Negative for chest pain and palpitations.   Gastrointestinal: Negative for constipation, diarrhea, nausea and vomiting.   Endocrine: Negative for cold intolerance and heat intolerance.   Genitourinary: Negative for flank pain.   Musculoskeletal: Negative for neck pain and neck stiffness.   Skin: Negative for wound.   Neurological: Positive for numbness. Negative for light-headedness and headaches.   Psychiatric/Behavioral: Negative for sleep disturbance.          Objective:   BP (!) 161/100 (BP Location: Right arm, Patient Position: Sitting, BP Method: Large (Automatic))   Resp 16   Ht 5' 9" (1.753 m)   Wt 104 kg (229 lb 4.5 oz)   BMI 33.86 kg/m²     LOWER EXTREMITY PHYSICAL EXAMINATION  ORTHOPEDIC: Manual Muscle Testing is 5/5 in all planes on the left and right, without pains, with and without resistance. Gait pattern is non-antalgic.    NEUROLOGY: Sensation to light touch is intact. Proprioception is intact, bilateral. Sensation to pin prick is reduced to absent. Vibratory sensation is diminished to the left and right lower extremity. Examination with 5.07 [place] Arnie monofilament reveals that protective sensation is not intact to the left and right plantar surfaces of the foot and digits, as the patient has no sensation/detection at greater than 4 distinct points of contact.    DERMATOLOGY: On the left foot, nails 1 and 5 are " suggestive of onychomycotic changes. On the right foot, nails 1 and 2 are suggestive of onychomycotic changes. These nail plates are thickened, are dystrophic, chaulky in appearance and malodorous with substantial subungual debris. These nail plates are yellow to brown in appearance. The remaining nail plates are elongated and do not have suggestive clinical features of onychomycosis. Bilateral plantar, and posterior heel calluses and fissures are noted. No local signs infection.      VASCULAR: Hair growth is sparse on the left and right dorsal foot and at the digits. The left dorsalis pedis pulse is 1/4 and on the right is 1/4, and the left posterior tibial pulse is  1/4 on the left and is  1/4 on the right. Varicosities are absent. Spider veins are noted to the bilateral lower extremity. Warm to warm, proximal to distal. Capillary refill time is within normal limits and less  than 3 seconds.       Assessment:     1. Type II diabetes mellitus with neurological manifestations    2. Xerosis cutis    3. Fissure in skin of foot    4. Onychomycosis    5. Corn or callus        Plan:     Type II diabetes mellitus with neurological manifestations  I counseled the patient on his/her Diabetic Mellitus regarding today's clinical examination and objection findings. We did also discuss recent medication changes, pertinent labs and imaging evaluations and other medical consultation notes and progress notes. Greater than 50% of this visit was spent on counseling and coordination of care. Greater than 20 minutes of this appt. was spent on education about the diabetic foot, in relation to PVD and/or neuropathy, and the prevention of limb loss.    Shoe gear is inspected and wear and proper fit/type. Patient is reminded of the importance of good nutrition and blood sugar control to help prevent podiatric complications of diabetes. Patient instructed on proper foot hygeine. We discussed wearing proper shoe gear, daily foot  inspections, never walking without protective shoe gear, never putting sharp instruments to feet.  Patient  will continue to monitor the areas daily, inspect feet, wear protective shoe gear when ambulatory, moisturizer to maintain skin integrity.    (Diabetic) Foot counseling and education is provided at this visit. Patient is advised to wear socks and shoes at all times.  Do not walk barefoot, or with just socks, even when indoors.  Be sure to check and inspect the inside of the shoe before putting them on her feet.  Protect your feet at all times.  Walking shoes and/or athletic shoes are the best types of shoe gear. Do not wear vinyl or plastic type shoe gear, as they do not stretch and/or breathe.  Protect your feet from hot and/or cold. Elevate the extremities when sitting.  Do not wear excessively tight socks and/or shoe gear. Wiggle your toes for a few minutes throughout the day. Move your ankles up and down, in and out, to help blood flow in your lower extremity.      Xerosis cutis  Fissure in skin of foot  The corn or callus  Hypertrophic skin formation, as outlined within the examination portion of this note, is/are trimmed/pared surgically debrided with sharp #10/#15 blade, to alleviate discomfort with weight bearing and ambulation, and to lessen the possibility of skin complications, e.g., ulceration due to pressure. No ulceration(s) is are noted with/post debridement.    Prescription is written for Urea compounding cream/ointment/lotion. Prescription will be filled by [facility], [place], [place].     Onychomycosis  The onychomycotic nail plates, as outlined above, are sharply debrided with double action nail nipper, and/or with the assistance of a mechanical rotary aditya, with removal of all offending nail and nail border(s), for reduction of pains. Nails are reduced in terms of length, width and girth with removal of subungual debris to facilitate pain free weight bearing and ambulation.  The elongated nails as outlined in the objective portion of this note, were trimmed to appropriate length, with a double action nail nipper, for alleviation/reduction of pains as well. Follow up in approx. 3-4 months.      Protective Sensation (w/ 10 gram monofilament):  Right: Absent  Left: Absent    Visual Inspection:  Callus -  Bilateral, Dry Skin -  Bilateral and Onychomycosis -  Bilateral    Pedal Pulses:   Right: Diminshed  Left: Diminshed    Posterior tibialis:   Right:Diminshed  Left: Diminshed          Future Appointments   Date Time Provider Department Center   1/30/2019  2:00 PM MARYAM Knox MD [facility]   2/6/2019  2:15 PM Jose De Jesus Miguel MD [facility]   5/9/2019 10:00 AM Kirt Gray DPM [facility]

## 2019-01-15 ENCOUNTER — TELEPHONE (OUTPATIENT)
Dept: ENDOSCOPY | Facility: HOSPITAL | Age: 64
End: 2019-01-15

## 2019-01-15 NOTE — TELEPHONE ENCOUNTER
11/29/2018 Per [facility], Person answered & did not listen to the entire message////

## 2019-01-17 RX ORDER — GLIMEPIRIDE 2 MG/1
TABLET ORAL
Qty: 30 TABLET | Refills: 0 | Status: SHIPPED | OUTPATIENT
Start: 2019-01-17 | End: 2019-01-29 | Stop reason: SDUPTHER

## 2019-01-17 RX ORDER — ATORVASTATIN CALCIUM 40 MG/1
TABLET, FILM COATED ORAL
Qty: 30 TABLET | Refills: 0 | Status: SHIPPED | OUTPATIENT
Start: 2019-01-17 | End: 2019-01-29 | Stop reason: SDUPTHER

## 2019-01-17 RX ORDER — LIRAGLUTIDE 6 MG/ML
INJECTION SUBCUTANEOUS
Qty: 1 SYRINGE | Refills: 0 | Status: SHIPPED | OUTPATIENT
Start: 2019-01-17 | End: 2019-03-04 | Stop reason: SDUPTHER

## 2019-01-18 NOTE — TELEPHONE ENCOUNTER
Spoke with pt's daughter, Cass, informed her that patient is due for appointment. She verbalized understanding and scheduled appointment on 1/29/19 at 8:40 am.

## 2019-01-29 ENCOUNTER — OFFICE VISIT (OUTPATIENT)
Dept: FAMILY MEDICINE | Facility: CLINIC | Age: 64
End: 2019-01-29
Attending: FAMILY MEDICINE
Payer: MEDICARE

## 2019-01-29 ENCOUNTER — LAB VISIT (OUTPATIENT)
Dept: LAB | Facility: HOSPITAL | Age: 64
End: 2019-01-29
Attending: FAMILY MEDICINE
Payer: MEDICARE

## 2019-01-29 VITALS
DIASTOLIC BLOOD PRESSURE: 74 MMHG | HEART RATE: 100 BPM | OXYGEN SATURATION: 95 % | HEIGHT: 69 IN | SYSTOLIC BLOOD PRESSURE: 118 MMHG | WEIGHT: 233.25 LBS | TEMPERATURE: 97 F | BODY MASS INDEX: 34.55 KG/M2

## 2019-01-29 DIAGNOSIS — L85.3 XEROSIS CUTIS: ICD-10-CM

## 2019-01-29 DIAGNOSIS — E11.69 DM TYPE 2 WITH DIABETIC DYSLIPIDEMIA: Primary | ICD-10-CM

## 2019-01-29 DIAGNOSIS — E78.5 DM TYPE 2 WITH DIABETIC DYSLIPIDEMIA: ICD-10-CM

## 2019-01-29 DIAGNOSIS — M89.9 DISORDER OF BONE: ICD-10-CM

## 2019-01-29 DIAGNOSIS — Z13.820 OSTEOPOROSIS SCREENING: ICD-10-CM

## 2019-01-29 DIAGNOSIS — Z12.39 BREAST CANCER SCREENING: ICD-10-CM

## 2019-01-29 DIAGNOSIS — E11.69 DM TYPE 2 WITH DIABETIC DYSLIPIDEMIA: ICD-10-CM

## 2019-01-29 DIAGNOSIS — L21.9 SEBORRHEIC DERMATITIS: ICD-10-CM

## 2019-01-29 DIAGNOSIS — I10 HYPERTENSION, UNSPECIFIED TYPE: ICD-10-CM

## 2019-01-29 DIAGNOSIS — R23.4 FISSURE IN SKIN OF FOOT: ICD-10-CM

## 2019-01-29 DIAGNOSIS — E78.5 DM TYPE 2 WITH DIABETIC DYSLIPIDEMIA: Primary | ICD-10-CM

## 2019-01-29 LAB
ALBUMIN SERPL BCP-MCNC: 3.7 G/DL
ALBUMIN/CREAT UR: 92.2 UG/MG
ALP SERPL-CCNC: 149 U/L
ALT SERPL W/O P-5'-P-CCNC: 14 U/L
ANION GAP SERPL CALC-SCNC: 9 MMOL/L
AST SERPL-CCNC: 15 U/L
BASOPHILS # BLD AUTO: 0.06 K/UL
BASOPHILS NFR BLD: 0.6 %
BILIRUB SERPL-MCNC: 0.7 MG/DL
BUN SERPL-MCNC: 29 MG/DL
CALCIUM SERPL-MCNC: 10 MG/DL
CHLORIDE SERPL-SCNC: 95 MMOL/L
CO2 SERPL-SCNC: 30 MMOL/L
CREAT SERPL-MCNC: 1.1 MG/DL
CREAT UR-MCNC: 116 MG/DL
DIFFERENTIAL METHOD: ABNORMAL
EOSINOPHIL # BLD AUTO: 0.3 K/UL
EOSINOPHIL NFR BLD: 2.7 %
ERYTHROCYTE [DISTWIDTH] IN BLOOD BY AUTOMATED COUNT: 12.6 %
EST. GFR  (AFRICAN AMERICAN): >60 ML/MIN/1.73 M^2
EST. GFR  (NON AFRICAN AMERICAN): 52.8 ML/MIN/1.73 M^2
ESTIMATED AVG GLUCOSE: 298 MG/DL
GLUCOSE SERPL-MCNC: 319 MG/DL
HBA1C MFR BLD HPLC: 12 %
HCT VFR BLD AUTO: 44.4 %
HGB BLD-MCNC: 15.1 G/DL
IMM GRANULOCYTES # BLD AUTO: 0.03 K/UL
IMM GRANULOCYTES NFR BLD AUTO: 0.3 %
LYMPHOCYTES # BLD AUTO: 1.6 K/UL
LYMPHOCYTES NFR BLD: 17.1 %
MCH RBC QN AUTO: 30.7 PG
MCHC RBC AUTO-ENTMCNC: 34 G/DL
MCV RBC AUTO: 90 FL
MICROALBUMIN UR DL<=1MG/L-MCNC: 107 UG/ML
MONOCYTES # BLD AUTO: 0.5 K/UL
MONOCYTES NFR BLD: 5.5 %
NEUTROPHILS # BLD AUTO: 6.9 K/UL
NEUTROPHILS NFR BLD: 73.8 %
NRBC BLD-RTO: 0 /100 WBC
PLATELET # BLD AUTO: 286 K/UL
PMV BLD AUTO: 11 FL
POTASSIUM SERPL-SCNC: 4.5 MMOL/L
PROT SERPL-MCNC: 7.4 G/DL
RBC # BLD AUTO: 4.92 M/UL
SODIUM SERPL-SCNC: 134 MMOL/L
WBC # BLD AUTO: 9.34 K/UL

## 2019-01-29 PROCEDURE — G0009 ADMIN PNEUMOCOCCAL VACCINE: HCPCS | Mod: PBBFAC,PO

## 2019-01-29 PROCEDURE — 85025 COMPLETE CBC W/AUTO DIFF WBC: CPT

## 2019-01-29 PROCEDURE — 83036 HEMOGLOBIN GLYCOSYLATED A1C: CPT

## 2019-01-29 PROCEDURE — 99214 PR OFFICE/OUTPT VISIT, EST, LEVL IV, 30-39 MIN: ICD-10-PCS | Mod: 25,S$PBB,, | Performed by: FAMILY MEDICINE

## 2019-01-29 PROCEDURE — 82043 UR ALBUMIN QUANTITATIVE: CPT

## 2019-01-29 PROCEDURE — 99214 OFFICE O/P EST MOD 30 MIN: CPT | Mod: PBBFAC,PO,25 | Performed by: FAMILY MEDICINE

## 2019-01-29 PROCEDURE — 99999 PR PBB SHADOW E&M-EST. PATIENT-LVL IV: ICD-10-PCS | Mod: PBBFAC,,, | Performed by: FAMILY MEDICINE

## 2019-01-29 PROCEDURE — 99999 PR PBB SHADOW E&M-EST. PATIENT-LVL IV: CPT | Mod: PBBFAC,,, | Performed by: FAMILY MEDICINE

## 2019-01-29 PROCEDURE — 80053 COMPREHEN METABOLIC PANEL: CPT

## 2019-01-29 PROCEDURE — 99214 OFFICE O/P EST MOD 30 MIN: CPT | Mod: 25,S$PBB,, | Performed by: FAMILY MEDICINE

## 2019-01-29 PROCEDURE — 36415 COLL VENOUS BLD VENIPUNCTURE: CPT | Mod: PO

## 2019-01-29 RX ORDER — TRAZODONE HYDROCHLORIDE 100 MG/1
100 TABLET ORAL NIGHTLY PRN
Qty: 30 TABLET | Refills: 11 | Status: SHIPPED | OUTPATIENT
Start: 2019-01-29 | End: 2019-05-07 | Stop reason: SDUPTHER

## 2019-01-29 RX ORDER — MUPIROCIN 20 MG/G
OINTMENT TOPICAL DAILY
Qty: 30 G | Refills: 1 | Status: SHIPPED | OUTPATIENT
Start: 2019-01-29 | End: 2019-05-07 | Stop reason: SDUPTHER

## 2019-01-29 RX ORDER — ALBUTEROL SULFATE 90 UG/1
2 AEROSOL, METERED RESPIRATORY (INHALATION) EVERY 6 HOURS PRN
Qty: 18 G | Refills: 2 | Status: SHIPPED | OUTPATIENT
Start: 2019-01-29 | End: 2019-12-10 | Stop reason: SDUPTHER

## 2019-01-29 RX ORDER — UREA 40 %
CREAM (GRAM) TOPICAL 2 TIMES DAILY
Qty: 85 G | Refills: 5 | Status: SHIPPED | OUTPATIENT
Start: 2019-01-29 | End: 2019-12-10

## 2019-01-29 RX ORDER — LISINOPRIL AND HYDROCHLOROTHIAZIDE 20; 25 MG/1; MG/1
1 TABLET ORAL DAILY
Qty: 30 TABLET | Refills: 11 | Status: SHIPPED | OUTPATIENT
Start: 2019-01-29 | End: 2019-05-07 | Stop reason: SDUPTHER

## 2019-01-29 RX ORDER — AMMONIUM LACTATE 12 G/100G
LOTION TOPICAL
Qty: 225 G | Refills: 11 | Status: SHIPPED | OUTPATIENT
Start: 2019-01-29 | End: 2019-05-07 | Stop reason: SDUPTHER

## 2019-01-29 RX ORDER — METFORMIN HYDROCHLORIDE 850 MG/1
850 TABLET ORAL 2 TIMES DAILY WITH MEALS
Qty: 60 TABLET | Refills: 6 | Status: SHIPPED | OUTPATIENT
Start: 2019-01-29 | End: 2019-06-26 | Stop reason: DRUGHIGH

## 2019-01-29 RX ORDER — DICLOFENAC SODIUM 10 MG/G
2 GEL TOPICAL DAILY
Qty: 100 G | Refills: 0 | Status: SHIPPED | OUTPATIENT
Start: 2019-01-29 | End: 2019-05-15 | Stop reason: SDUPTHER

## 2019-01-29 RX ORDER — GLIMEPIRIDE 2 MG/1
2 TABLET ORAL DAILY
Qty: 30 TABLET | Refills: 6 | Status: SHIPPED | OUTPATIENT
Start: 2019-01-29 | End: 2019-05-07 | Stop reason: SDUPTHER

## 2019-01-29 RX ORDER — GABAPENTIN 100 MG/1
100 CAPSULE ORAL 3 TIMES DAILY
Qty: 90 CAPSULE | Refills: 11 | Status: SHIPPED | OUTPATIENT
Start: 2019-01-29 | End: 2019-12-10 | Stop reason: SDUPTHER

## 2019-01-29 RX ORDER — CLOBETASOL PROPIONATE 0.46 MG/ML
SOLUTION TOPICAL 2 TIMES DAILY
Qty: 50 ML | Refills: 2 | Status: SHIPPED | OUTPATIENT
Start: 2019-01-29 | End: 2019-05-07 | Stop reason: SDUPTHER

## 2019-01-29 RX ORDER — ATORVASTATIN CALCIUM 40 MG/1
40 TABLET, FILM COATED ORAL DAILY
Qty: 30 TABLET | Refills: 11 | Status: SHIPPED | OUTPATIENT
Start: 2019-01-29 | End: 2020-04-02

## 2019-01-29 NOTE — PROGRESS NOTES
Subjective:       Patient ID: Kay Galarza is a 63 y.o. female.    Chief Complaint: Follow-up    63 y old  Female here for f/u on DM, HTN and dlp .  On aspirin, , exercises chasing after grandchildren . , Opthalmology  : sees Dr Knox , getting avastin injections , Fastin bs are   200-250 nd  2 hrs pp  : 150 - 160 . She was not able to keep DM educ due to conflict on schedules and lack of trasnporation       Review of Systems   Constitutional: Negative.    HENT: Negative.    Eyes: Negative.    Respiratory: Negative.    Cardiovascular: Negative.    Gastrointestinal: Negative.    Genitourinary: Negative.    Musculoskeletal: Negative.    Skin: Negative.    Hematological: Negative.        Objective:      Physical Exam   Constitutional: She is oriented to person, place, and time. No distress.   HENT:   Head: Normocephalic and atraumatic.   Right Ear: External ear normal.   Left Ear: External ear normal.   Mouth/Throat: No oropharyngeal exudate.   Eyes: Conjunctivae and EOM are normal. Pupils are equal, round, and reactive to light. Right eye exhibits no discharge. Left eye exhibits no discharge. No scleral icterus.   Neck: Normal range of motion. Neck supple. No JVD present. No tracheal deviation present. No thyromegaly present.   Cardiovascular: Normal rate, regular rhythm and normal heart sounds. Exam reveals no gallop and no friction rub.   No murmur heard.  Pulmonary/Chest: Effort normal and breath sounds normal. No stridor. No respiratory distress. She has no wheezes. She has no rales. She exhibits no tenderness.   Abdominal: Soft. Bowel sounds are normal. She exhibits no distension. There is no tenderness. There is no rebound and no guarding.   Musculoskeletal: Normal range of motion.   Lymphadenopathy:     She has no cervical adenopathy.   Neurological: She is alert and oriented to person, place, and time.   Skin: Skin is warm and dry. She is not diaphoretic.   Psychiatric: She has a normal mood and  affect. Her behavior is normal. Judgment and thought content normal.       Assessment:       Kay was seen today for follow-up.    Diagnoses and all orders for this visit:    DM type 2 with diabetic dyslipidemia  -     CBC auto differential; Future  -     Comprehensive metabolic panel; Future  -     Hemoglobin A1c; Future  -     Microalbumin/creatinine urine ratio    Xerosis cutis  -     urea (CARMOL) 40 % Crea; Apply topically 2 (two) times daily.  -     ammonium lactate (AMLACTIN) 12 % lotion; Use daily.  Apply to damp skin after bathing.    Fissure in skin of foot  -     urea (CARMOL) 40 % Crea; Apply topically 2 (two) times daily.  -     mupirocin (BACTROBAN) 2 % ointment; Apply topically once daily.    Seborrheic dermatitis  -     clobetasol (TEMOVATE) 0.05 % external solution; Apply topically 2 (two) times daily. To scalp only    Hypertension, unspecified type  -     CBC auto differential; Future  -     Comprehensive metabolic panel; Future  -     Hemoglobin A1c; Future  -     Microalbumin/creatinine urine ratio    Breast cancer screening  -     Mammo Digital Screening Bilateral With CAD; Future    Osteoporosis screening  -     DXA Bone Density Spine And Hip; Future    Disorder of bone   -     DXA Bone Density Spine And Hip; Future    Other orders  -     traZODone (DESYREL) 100 MG tablet; Take 1 tablet (100 mg total) by mouth nightly as needed for Insomnia.  -     metFORMIN (GLUCOPHAGE) 850 MG tablet; Take 1 tablet (850 mg total) by mouth 2 (two) times daily with meals.  -     lisinopril-hydrochlorothiazide (PRINZIDE,ZESTORETIC) 20-25 mg Tab; Take 1 tablet by mouth once daily.  -     glimepiride (AMARYL) 2 MG tablet; Take 1 tablet (2 mg total) by mouth once daily.  -     gabapentin (NEURONTIN) 100 MG capsule; Take 1 capsule (100 mg total) by mouth 3 (three) times daily.  -     atorvastatin (LIPITOR) 40 MG tablet; Take 1 tablet (40 mg total) by mouth once daily.  -     albuterol (VENTOLIN HFA) 90  mcg/actuation inhaler; Inhale 2 puffs into the lungs every 6 (six) hours as needed for Wheezing. Rescue  -     diclofenac sodium (VOLTAREN) 1 % Gel; Apply 2 g topically once daily.  -     Discontinue: fluocinolone (SYNALAR) 0.01 % Sham; Apply no more than 1 ounce to scalp once daily; work into lather and allow to remain on scalp for ~5 minutes. Remove from hair and scalp by rinsing thoroughly with water.  -     emollient combination no.69 (EUCERIN SKIN CALMING) Crea; Apply BID  -     (In Office Administered) Pneumococcal Conjugate Vaccine (13 Valent) (IM)  -     fluocinolone (SYNALAR) 0.01 % Sham; Apply no more than 1 ounce to scalp once daily; work into lather and allow to remain on scalp for ~5 minutes. Remove from hair and scalp by rinsing thoroughly with water.      Plan:     Kay was seen today for follow-up.    Diagnoses and all orders for this visit:    Xerosis cutis  -     urea (CARMOL) 40 % Crea; Apply topically 2 (two) times daily.    Fissure in skin of foot  -     urea (CARMOL) 40 % Crea; Apply topically 2 (two) times daily.    Other orders  -     traZODone (DESYREL) 100 MG tablet; Take 1 tablet (100 mg total) by mouth nightly as needed for Insomnia.  -     metFORMIN (GLUCOPHAGE) 850 MG tablet; Take 1 tablet (850 mg total) by mouth 2 (two) times daily with meals.  -     lisinopril-hydrochlorothiazide (PRINZIDE,ZESTORETIC) 20-25 mg Tab; Take 1 tablet by mouth once daily.  -     glimepiride (AMARYL) 2 MG tablet; Take 1 tablet (2 mg total) by mouth once daily.  -     gabapentin (NEURONTIN) 100 MG capsule; Take 1 capsule (100 mg total) by mouth 3 (three) times daily.  -     atorvastatin (LIPITOR) 40 MG tablet; Take 1 tablet (40 mg total) by mouth once daily.  -     albuterol (VENTOLIN HFA) 90 mcg/actuation inhaler; Inhale 2 puffs into the lungs every 6 (six) hours as needed for Wheezing. Rescue     Pt. encouraged to follow a strict diabetic type diet.   Encouraged daily physical activity/exercise for at  least 30mins if tolerated.   Weight control recommenced as always.   Discussed how lifestyle changes make biggest impact on diabetes control.   Continue home glucose monitoring once daily and keep log.   Counseling provided today about hypoglycemia as well as about how diabetes increases risk of cardiovascular, cerebrovascular ,peripheral vascular disease and other serious health complications. He has been instructed to call if developing any new symptoms or hypoglycemia related symptoms.   See encounter for associated orders/medications.   - Lipid panel; Future  Controlled. Cont med

## 2019-02-04 RX ORDER — ALBUTEROL SULFATE 90 UG/1
2 AEROSOL, METERED RESPIRATORY (INHALATION) EVERY 6 HOURS PRN
Qty: 8.5 INHALER | Refills: 0 | Status: SHIPPED | OUTPATIENT
Start: 2019-02-04 | End: 2019-05-07 | Stop reason: SDUPTHER

## 2019-02-04 NOTE — TELEPHONE ENCOUNTER
Received fax from [email or web address] stating a PA is needed for Ventolin HFA. Asked to send a new RX for plain Albuterol HFA so they can run different brands to see which is covered.

## 2019-02-06 ENCOUNTER — OFFICE VISIT (OUTPATIENT)
Dept: OPHTHALMOLOGY | Facility: CLINIC | Age: 64
End: 2019-02-06
Payer: MEDICARE

## 2019-02-06 DIAGNOSIS — H25.12 NUCLEAR SCLEROSIS OF LEFT EYE: Primary | ICD-10-CM

## 2019-02-06 DIAGNOSIS — H04.129 DRY EYE: ICD-10-CM

## 2019-02-06 DIAGNOSIS — H26.9 CORTICAL CATARACT OF LEFT EYE: ICD-10-CM

## 2019-02-06 DIAGNOSIS — Z96.1 PSEUDOPHAKIA OF RIGHT EYE: ICD-10-CM

## 2019-02-06 PROCEDURE — 92014 COMPRE OPH EXAM EST PT 1/>: CPT | Mod: S$PBB,,, | Performed by: OPHTHALMOLOGY

## 2019-02-06 PROCEDURE — 99999 PR PBB SHADOW E&M-EST. PATIENT-LVL II: ICD-10-PCS | Mod: PBBFAC,,, | Performed by: OPHTHALMOLOGY

## 2019-02-06 PROCEDURE — 99999 PR PBB SHADOW E&M-EST. PATIENT-LVL II: CPT | Mod: PBBFAC,,, | Performed by: OPHTHALMOLOGY

## 2019-02-06 PROCEDURE — 92014 PR EYE EXAM, EST PATIENT,COMPREHESV: ICD-10-PCS | Mod: S$PBB,,, | Performed by: OPHTHALMOLOGY

## 2019-02-06 PROCEDURE — 99212 OFFICE O/P EST SF 10 MIN: CPT | Mod: PBBFAC,PN | Performed by: OPHTHALMOLOGY

## 2019-02-06 RX ORDER — PREDNISOLONE ACETATE 10 MG/ML
1 SUSPENSION/ DROPS OPHTHALMIC 4 TIMES DAILY
Qty: 1 BOTTLE | Refills: 2 | Status: SHIPPED | OUTPATIENT
Start: 2019-02-06 | End: 2019-05-07

## 2019-02-06 RX ORDER — KETOROLAC TROMETHAMINE 5 MG/ML
1 SOLUTION OPHTHALMIC 4 TIMES DAILY
Qty: 1 BOTTLE | Refills: 2 | Status: SHIPPED | OUTPATIENT
Start: 2019-02-06 | End: 2019-05-07

## 2019-02-06 RX ORDER — GATIFLOXACIN 5 MG/ML
1 SOLUTION/ DROPS OPHTHALMIC 2 TIMES DAILY
Qty: 1 BOTTLE | Refills: 2 | Status: SHIPPED | OUTPATIENT
Start: 2019-02-06 | End: 2019-05-07

## 2019-02-06 NOTE — PROGRESS NOTES
SUBJECTIVE:   Kay Galarza is a 63 y.o. female   Uncorrected distance visual acuity was 20/25 in the right eye and 20/200 in the left eye.   Chief Complaint   Patient presents with    Cataract        HPI:  HPI     Pt here for cataract eval OS. No pain or discomfort. VA stable. Pt states   that she wanted to stop receiving the shots from Dr. Knox due to them   bothering her. She used the antibiotic gtts for 4 days and then stopped.     FMHX COAG: Mother, Grandmother  DM 1998  BDR OU / CME OS  NSC OS  PCIOL OD +21.5 SN60WF (distance) 7-18-18    AVASTIN OS last 1/2/19    OD:pred forte 1 x a day    Last edited by Steve Johnson, Patient Care Assistant on 2/6/2019  2:32   PM. (History)        Assessment /Plan :  1. Nuclear sclerosis of left eye and cortical cataract left eye Visually Significant Cataract OS:   Patient reports decreased vision consistent with the clinical amount of lenticular opacity,  which reaches the level of visual significance and affects activities of daily living such as  watch TV. Risks, benefits, and alternatives to cataract surgery were  discussed.  IOL options were discussed, including Premium IOL'S and the associated  side effects and additional patient cost associated with them as well as patient's visual  goals. The pt expressed a desire to proceed with surgery with the potential for some  reasonable degree of visual improvement and was consented.  Risks of loss of vision and eye reviewed as well as possibility of need for spectacle correction after surgery even with premium implants. Verbal and written preop  instructions were provided to the patient.       Pt is interested in traditional monofocal IOL aiming for:    Distance OU and understands that glasses will be generally needed at all times for near vision and often for finer distance correction especially for higher degrees of astigmatism.       Final visual acuity may be limited by astigmatism and diabetes    Pt wishes  to have Phaco/IOL  OS     Requests a Monofocal IOL.    Will aim for distance    Other considerations: None                 2. Pseudophakia of right eye stable   3. 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 Knox   4.      Dry eyes Findings and symptoms consistent with dry eyes. Dry eye instruction sheet reviewed with patient           recommending:AT's qid/titrate prn and Lubricating Oint qhs and prn

## 2019-02-21 ENCOUNTER — HOSPITAL ENCOUNTER (OUTPATIENT)
Dept: RADIOLOGY | Facility: HOSPITAL | Age: 64
Discharge: HOME | End: 2019-02-21
Attending: FAMILY MEDICINE
Payer: MEDICARE

## 2019-02-21 VITALS — BODY MASS INDEX: 34.51 KG/M2 | WEIGHT: 233 LBS | HEIGHT: 69 IN

## 2019-02-21 DIAGNOSIS — Z12.39 BREAST CANCER SCREENING: ICD-10-CM

## 2019-02-21 PROCEDURE — 77067 SCR MAMMO BI INCL CAD: CPT | Mod: TC

## 2019-02-21 PROCEDURE — 77067 SCR MAMMO BI INCL CAD: CPT | Mod: 26,,, | Performed by: RADIOLOGY

## 2019-02-21 PROCEDURE — 77067 MAMMO DIGITAL SCREENING BILAT WITH CAD: ICD-10-PCS | Mod: 26,,, | Performed by: RADIOLOGY

## 2019-03-04 RX ORDER — LIRAGLUTIDE 6 MG/ML
INJECTION SUBCUTANEOUS
Qty: 6 SYRINGE | Refills: 0 | Status: SHIPPED | OUTPATIENT
Start: 2019-03-04 | End: 2019-05-02 | Stop reason: SDUPTHER

## 2019-03-05 RX ORDER — INSULIN GLARGINE 300 U/ML
INJECTION, SOLUTION SUBCUTANEOUS
Qty: 6 ML | Refills: 1 | Status: SHIPPED | OUTPATIENT
Start: 2019-03-05 | End: 2019-05-15 | Stop reason: SDUPTHER

## 2019-03-08 RX ORDER — PEN NEEDLE, DIABETIC 30 GX3/16"
NEEDLE, DISPOSABLE MISCELLANEOUS
Qty: 100 EACH | Refills: 0 | Status: SHIPPED | OUTPATIENT
Start: 2019-03-08 | End: 2021-01-25 | Stop reason: SDUPTHER

## 2019-03-13 ENCOUNTER — OUTSIDE PLACE OF SERVICE (OUTPATIENT)
Dept: ADMINISTRATIVE | Facility: OTHER | Age: 64
End: 2019-03-13
Payer: MEDICARE

## 2019-03-13 PROCEDURE — 66984 XCAPSL CTRC RMVL W/O ECP: CPT | Mod: LT,,, | Performed by: OPHTHALMOLOGY

## 2019-03-13 PROCEDURE — 66984 PR REMOVAL, CATARACT, W/INSRT INTRAOC LENS, W/O ENDO CYCLO: ICD-10-PCS | Mod: LT,,, | Performed by: OPHTHALMOLOGY

## 2019-03-14 ENCOUNTER — OFFICE VISIT (OUTPATIENT)
Dept: OPHTHALMOLOGY | Facility: CLINIC | Age: 64
End: 2019-03-14
Payer: MEDICARE

## 2019-03-14 DIAGNOSIS — Z98.890 POST-OPERATIVE STATE: Primary | ICD-10-CM

## 2019-03-14 PROCEDURE — 99024 POSTOP FOLLOW-UP VISIT: CPT | Mod: POP,,, | Performed by: OPHTHALMOLOGY

## 2019-03-14 PROCEDURE — 99212 OFFICE O/P EST SF 10 MIN: CPT | Mod: PBBFAC,PN | Performed by: OPHTHALMOLOGY

## 2019-03-14 PROCEDURE — 99999 PR PBB SHADOW E&M-EST. PATIENT-LVL II: CPT | Mod: PBBFAC,,, | Performed by: OPHTHALMOLOGY

## 2019-03-14 PROCEDURE — 99024 PR POST-OP FOLLOW-UP VISIT: ICD-10-PCS | Mod: POP,,, | Performed by: OPHTHALMOLOGY

## 2019-03-14 PROCEDURE — 99999 PR PBB SHADOW E&M-EST. PATIENT-LVL II: ICD-10-PCS | Mod: PBBFAC,,, | Performed by: OPHTHALMOLOGY

## 2019-03-14 RX ORDER — GATIFLOXACIN 5 MG/ML
1 SOLUTION/ DROPS OPHTHALMIC 2 TIMES DAILY
Qty: 1 BOTTLE | Refills: 2 | Status: SHIPPED | OUTPATIENT
Start: 2019-03-14 | End: 2019-05-07

## 2019-03-14 NOTE — PROGRESS NOTES
SUBJECTIVE:   Kay Galarza is a 63 y.o. female   Uncorrected distance visual acuity was not recorded in the right eye and 20/100 +2 in the left eye.   Chief Complaint   Patient presents with    Post-op Evaluation        HPI:  HPI     Patient returns for a one day Phaco OS , patient states 0/10 on pain   scale.      MUST GET ERICA  FMHX COAG: Mother, Grandmother  DM 1998  BDR OU / CME OS  PCIOL OD +21.5 SN60WF (distance) 7-18-18  PCIOL OS +21.5 SN60WF (distance) 3-13-19  AVASTIN OS last 1/2/19      OS Pred Qid, Ket Qid, Gat bid     Last edited by GORGE Alejo on 3/14/2019  8:46 AM. (History)        Assessment /Plan :  1. Post-operative state Exam:  SLE:  S/C: normal  K : trace k fold  AC: trace cell and flare  Iris: normal  Lens: PCIOL    IMP:  PO Day 1 S/P Phaco/IOL OS : Doing well.    Plan:  Continue gtts to operative eye:  Pred 1% QID  Ketorolac QID  Zymaxid BID  Reinstructed in importance of absolute compliance with Post-OP instructions including medications, shield at bedtime, and limitation of activities. Follow up appointments in approximately one and six weeks or call immediately for increased pain, redness or vision loss.

## 2019-03-21 ENCOUNTER — OFFICE VISIT (OUTPATIENT)
Dept: OPHTHALMOLOGY | Facility: CLINIC | Age: 64
End: 2019-03-21
Payer: MEDICARE

## 2019-03-21 DIAGNOSIS — Z98.890 POST-OPERATIVE STATE: Primary | ICD-10-CM

## 2019-03-21 PROCEDURE — 99212 OFFICE O/P EST SF 10 MIN: CPT | Mod: PBBFAC,PN | Performed by: OPHTHALMOLOGY

## 2019-03-21 PROCEDURE — 99024 POSTOP FOLLOW-UP VISIT: CPT | Mod: POP,,, | Performed by: OPHTHALMOLOGY

## 2019-03-21 PROCEDURE — 99999 PR PBB SHADOW E&M-EST. PATIENT-LVL II: CPT | Mod: PBBFAC,,, | Performed by: OPHTHALMOLOGY

## 2019-03-21 PROCEDURE — 99024 PR POST-OP FOLLOW-UP VISIT: ICD-10-PCS | Mod: POP,,, | Performed by: OPHTHALMOLOGY

## 2019-03-21 PROCEDURE — 99999 PR PBB SHADOW E&M-EST. PATIENT-LVL II: ICD-10-PCS | Mod: PBBFAC,,, | Performed by: OPHTHALMOLOGY

## 2019-03-21 NOTE — PROGRESS NOTES
SUBJECTIVE:   Kay Galarza is a 63 y.o. female   Uncorrected distance visual acuity was not recorded in the right eye and 20/30 -1 in the left eye.   Chief Complaint   Patient presents with    Post-op Evaluation     1 week PCIOL OS        HPI:  HPI     Post-op Evaluation      Additional comments: 1 week PCIOL OS              Comments     The patient states her eyes are doing well and she denies any ocular pain   at this time.  100% drop compliance    MUST GET ERICA    FMHX COAG: Mother, Grandmother  DM 1998  BDR OU / CME OS  PCIOL OD +21.5 SN60WF (distance) 7-18-18  PCIOL OS +21.5 SN60WF (distance) 3-13-19  AVASTIN OS last 1/2/19      OS Pred Qid, Ket Qid, cipro bid          Last edited by Anahy Curtis on 3/21/2019  8:31 AM. (History)        Assessment /Plan :  1. Post-operative state Slit lamp exam:  L/L: nl  K: clear, wound sealed  AC: 0 cell and flare  Iris/Lens: IOL centered and stable      IMP:  PO Week 1 S/P Phaco/ IOL OS : Doing well with no evidence of infection or abnormal inflammation.     Plan:  Pt given and instructed in one week PO instructions. D/C zymaxid and start to taper off Ketorlac and Pred Forte 1% weekly. Can resume normal activitites and d/c eye shield. OTC reading glasses can be used until evaluated for final MR. Follow up in one month or PRN pain, redness, vision loss, or other concerns.

## 2019-04-26 ENCOUNTER — OFFICE VISIT (OUTPATIENT)
Dept: OPHTHALMOLOGY | Facility: CLINIC | Age: 64
End: 2019-04-26
Payer: MEDICARE

## 2019-04-26 DIAGNOSIS — Z96.1 PSEUDOPHAKIA OF LEFT EYE: ICD-10-CM

## 2019-04-26 DIAGNOSIS — Z96.1 PSEUDOPHAKIA OF RIGHT EYE: ICD-10-CM

## 2019-04-26 DIAGNOSIS — Z98.890 POST-OPERATIVE STATE: Primary | ICD-10-CM

## 2019-04-26 PROCEDURE — 99999 PR PBB SHADOW E&M-EST. PATIENT-LVL II: CPT | Mod: PBBFAC,,, | Performed by: OPTOMETRIST

## 2019-04-26 PROCEDURE — 99024 PR POST-OP FOLLOW-UP VISIT: ICD-10-PCS | Mod: POP,,, | Performed by: OPTOMETRIST

## 2019-04-26 PROCEDURE — 99212 OFFICE O/P EST SF 10 MIN: CPT | Mod: PBBFAC | Performed by: OPTOMETRIST

## 2019-04-26 PROCEDURE — 99999 PR PBB SHADOW E&M-EST. PATIENT-LVL II: ICD-10-PCS | Mod: PBBFAC,,, | Performed by: OPTOMETRIST

## 2019-04-26 PROCEDURE — 99024 POSTOP FOLLOW-UP VISIT: CPT | Mod: POP,,, | Performed by: OPTOMETRIST

## 2019-04-26 NOTE — PROGRESS NOTES
HPI     Last CPG exam 03/21/2019  5 wk PO  PCIOL OS 03/13/2019  PCIOL OD 07/18/2018  Medication: Pred QID, KET QID, Cipro BID  (Completed)    Diabetic/NIDDM  Does not speak english well      Last edited by Petar De La Torre MA on 4/26/2019  9:25 AM. (History)            Assessment /Plan     For exam results, see Encounter Report.    Post-operative state    Pseudophakia of right eye    Pseudophakia of left eye      Stable OU at 5 week PO OU.  RTC 6 months.  +2.50 readers OTC.

## 2019-05-03 RX ORDER — PEN NEEDLE, DIABETIC 30 GX3/16"
NEEDLE, DISPOSABLE MISCELLANEOUS
Qty: 100 EACH | Refills: 0 | OUTPATIENT
Start: 2019-05-03

## 2019-05-03 RX ORDER — INSULIN GLARGINE 300 U/ML
INJECTION, SOLUTION SUBCUTANEOUS
Refills: 1 | OUTPATIENT
Start: 2019-05-03

## 2019-05-03 RX ORDER — LIRAGLUTIDE 6 MG/ML
INJECTION SUBCUTANEOUS
Qty: 6 SYRINGE | Refills: 0 | Status: SHIPPED | OUTPATIENT
Start: 2019-05-03 | End: 2019-06-26 | Stop reason: ALTCHOICE

## 2019-05-03 NOTE — TELEPHONE ENCOUNTER
Informed pts daughter about syringes at the pharmacy. Appt scheduled for 5/7/19 at 10:20 a.m. Pts daughter verbalized understanding.

## 2019-05-07 ENCOUNTER — TELEPHONE (OUTPATIENT)
Dept: FAMILY MEDICINE | Facility: CLINIC | Age: 64
End: 2019-05-07

## 2019-05-07 ENCOUNTER — OFFICE VISIT (OUTPATIENT)
Dept: FAMILY MEDICINE | Facility: CLINIC | Age: 64
End: 2019-05-07
Attending: FAMILY MEDICINE
Payer: MEDICARE

## 2019-05-07 ENCOUNTER — LAB VISIT (OUTPATIENT)
Dept: LAB | Facility: HOSPITAL | Age: 64
End: 2019-05-07
Attending: FAMILY MEDICINE
Payer: MEDICARE

## 2019-05-07 VITALS
TEMPERATURE: 99 F | HEIGHT: 69 IN | HEART RATE: 105 BPM | BODY MASS INDEX: 35.15 KG/M2 | SYSTOLIC BLOOD PRESSURE: 138 MMHG | WEIGHT: 237.31 LBS | DIASTOLIC BLOOD PRESSURE: 84 MMHG | OXYGEN SATURATION: 98 %

## 2019-05-07 DIAGNOSIS — E66.01 SEVERE OBESITY (BMI 35.0-35.9 WITH COMORBIDITY): ICD-10-CM

## 2019-05-07 DIAGNOSIS — R23.4 FISSURE IN SKIN OF FOOT: ICD-10-CM

## 2019-05-07 DIAGNOSIS — E11.69 DM TYPE 2 WITH DIABETIC DYSLIPIDEMIA: ICD-10-CM

## 2019-05-07 DIAGNOSIS — L85.3 XEROSIS CUTIS: ICD-10-CM

## 2019-05-07 DIAGNOSIS — I10 HYPERTENSION, UNSPECIFIED TYPE: ICD-10-CM

## 2019-05-07 DIAGNOSIS — E78.5 DM TYPE 2 WITH DIABETIC DYSLIPIDEMIA: ICD-10-CM

## 2019-05-07 DIAGNOSIS — E11.69 DM TYPE 2 WITH DIABETIC DYSLIPIDEMIA: Primary | ICD-10-CM

## 2019-05-07 DIAGNOSIS — L21.9 SEBORRHEIC DERMATITIS: ICD-10-CM

## 2019-05-07 DIAGNOSIS — E78.5 DM TYPE 2 WITH DIABETIC DYSLIPIDEMIA: Primary | ICD-10-CM

## 2019-05-07 DIAGNOSIS — I87.2 VENOUS INSUFFICIENCY: ICD-10-CM

## 2019-05-07 LAB
ESTIMATED AVG GLUCOSE: 255 MG/DL (ref 68–131)
HBA1C MFR BLD HPLC: 10.5 % (ref 4–5.6)

## 2019-05-07 PROCEDURE — 99214 PR OFFICE/OUTPT VISIT, EST, LEVL IV, 30-39 MIN: ICD-10-PCS | Mod: S$PBB,,, | Performed by: FAMILY MEDICINE

## 2019-05-07 PROCEDURE — 83036 HEMOGLOBIN GLYCOSYLATED A1C: CPT

## 2019-05-07 PROCEDURE — 99214 OFFICE O/P EST MOD 30 MIN: CPT | Mod: S$PBB,,, | Performed by: FAMILY MEDICINE

## 2019-05-07 PROCEDURE — 99214 OFFICE O/P EST MOD 30 MIN: CPT | Mod: PBBFAC,PO | Performed by: FAMILY MEDICINE

## 2019-05-07 PROCEDURE — 99999 PR PBB SHADOW E&M-EST. PATIENT-LVL IV: ICD-10-PCS | Mod: PBBFAC,,, | Performed by: FAMILY MEDICINE

## 2019-05-07 PROCEDURE — 99999 PR PBB SHADOW E&M-EST. PATIENT-LVL IV: CPT | Mod: PBBFAC,,, | Performed by: FAMILY MEDICINE

## 2019-05-07 PROCEDURE — 36415 COLL VENOUS BLD VENIPUNCTURE: CPT | Mod: PO

## 2019-05-07 RX ORDER — TRAZODONE HYDROCHLORIDE 100 MG/1
100 TABLET ORAL NIGHTLY PRN
Qty: 30 TABLET | Refills: 11 | Status: SHIPPED | OUTPATIENT
Start: 2019-05-07 | End: 2019-08-22 | Stop reason: SDUPTHER

## 2019-05-07 RX ORDER — MUPIROCIN 20 MG/G
OINTMENT TOPICAL DAILY
Qty: 30 G | Refills: 1 | Status: SHIPPED | OUTPATIENT
Start: 2019-05-07 | End: 2020-07-13

## 2019-05-07 RX ORDER — CLOBETASOL PROPIONATE 0.46 MG/ML
SOLUTION TOPICAL 2 TIMES DAILY
Qty: 50 ML | Refills: 2 | Status: SHIPPED | OUTPATIENT
Start: 2019-05-07 | End: 2019-12-10 | Stop reason: SDUPTHER

## 2019-05-07 RX ORDER — AMMONIUM LACTATE 12 G/100G
LOTION TOPICAL
Qty: 225 G | Refills: 11 | Status: SHIPPED | OUTPATIENT
Start: 2019-05-07 | End: 2019-12-10 | Stop reason: SDUPTHER

## 2019-05-07 RX ORDER — GLIMEPIRIDE 2 MG/1
2 TABLET ORAL DAILY
Qty: 30 TABLET | Refills: 6 | Status: SHIPPED | OUTPATIENT
Start: 2019-05-07 | End: 2019-06-26 | Stop reason: DRUGHIGH

## 2019-05-07 RX ORDER — ALBUTEROL SULFATE 90 UG/1
2 AEROSOL, METERED RESPIRATORY (INHALATION) EVERY 6 HOURS PRN
Qty: 8.5 INHALER | Refills: 0 | Status: SHIPPED | OUTPATIENT
Start: 2019-05-07 | End: 2019-12-10 | Stop reason: SDUPTHER

## 2019-05-07 RX ORDER — TRAZODONE HYDROCHLORIDE 100 MG/1
100 TABLET ORAL NIGHTLY PRN
Qty: 30 TABLET | Refills: 11 | Status: SHIPPED | OUTPATIENT
Start: 2019-05-07 | End: 2019-05-07 | Stop reason: SDUPTHER

## 2019-05-07 RX ORDER — LISINOPRIL AND HYDROCHLOROTHIAZIDE 20; 25 MG/1; MG/1
1 TABLET ORAL DAILY
Qty: 30 TABLET | Refills: 11 | Status: SHIPPED | OUTPATIENT
Start: 2019-05-07 | End: 2019-08-22 | Stop reason: SDUPTHER

## 2019-05-07 NOTE — PROGRESS NOTES
Subjective:       Patient ID: Kay Galarza is a 64 y.o. female.    Chief Complaint: Follow-up    63 y old  Female here for f/u on DM, severe obesity , HTN and dlp .  On aspirin, , exercises chasing after grandchildren . , Opthalmology  : sees Dr Knox , getting avastin injections , Fastin bs are   148, 130  And  2 hrs pp  : 200-212 . ON 80 units of Toujeo and 0.6 mg of vicotoza. She was not able to keep DM educ due to conflict on schedules and lack of trasnporation          Review of Systems   Constitutional: Negative.    HENT: Negative.    Eyes: Negative.    Respiratory: Negative.    Cardiovascular: Negative.    Gastrointestinal: Negative.    Genitourinary: Negative.    Musculoskeletal: Negative.    Skin: Negative.    Hematological: Negative.        Objective:      Physical Exam   Constitutional: She is oriented to person, place, and time. No distress.   HENT:   Head: Normocephalic and atraumatic.   Right Ear: External ear normal.   Left Ear: External ear normal.   Mouth/Throat: No oropharyngeal exudate.   Eyes: Pupils are equal, round, and reactive to light. Conjunctivae and EOM are normal. Right eye exhibits no discharge. Left eye exhibits no discharge. No scleral icterus.   Neck: Normal range of motion. Neck supple. No JVD present. No tracheal deviation present. No thyromegaly present.   Cardiovascular: Normal rate, regular rhythm and normal heart sounds. Exam reveals no gallop and no friction rub.   No murmur heard.  Pulmonary/Chest: Effort normal and breath sounds normal. No stridor. No respiratory distress. She has no wheezes. She has no rales. She exhibits no tenderness.   Abdominal: Soft. Bowel sounds are normal. She exhibits no distension. There is no tenderness. There is no rebound and no guarding.   Musculoskeletal: Normal range of motion.   Lymphadenopathy:     She has no cervical adenopathy.   Neurological: She is alert and oriented to person, place, and time.   Skin: Skin is warm and  dry. She is not diaphoretic.   Psychiatric: She has a normal mood and affect. Her behavior is normal. Judgment and thought content normal.       Assessment:     Kay was seen today for follow-up.    Diagnoses and all orders for this visit:    DM type 2 with diabetic dyslipidemia  -     Hemoglobin A1c; Future  -     Ambulatory consult to Diabetic Education    Fissure in skin of foot  -     mupirocin (BACTROBAN) 2 % ointment; Apply topically once daily.    Seborrheic dermatitis  -     clobetasol (TEMOVATE) 0.05 % external solution; Apply topically 2 (two) times daily. To scalp only    Xerosis cutis  -     ammonium lactate (AMLACTIN) 12 % lotion; Use daily.  Apply to damp skin after bathing.    Venous insufficiency  -     Ambulatory Referral to Vascular Surgery    Hypertension, unspecified type    Severe obesity (BMI 35.0-35.9 with comorbidity)    Other orders  -     glimepiride (AMARYL) 2 MG tablet; Take 1 tablet (2 mg total) by mouth once daily.  -     lisinopril-hydrochlorothiazide (PRINZIDE,ZESTORETIC) 20-25 mg Tab; Take 1 tablet by mouth once daily.  -     traZODone (DESYREL) 100 MG tablet; Take 1 tablet (100 mg total) by mouth nightly as needed for Insomnia.  -     albuterol (PROVENTIL/VENTOLIN HFA) 90 mcg/actuation inhaler; Inhale 2 puffs into the lungs every 6 (six) hours as needed for Wheezing. Rescue      Plan:   Keep DM educ itzel   Controlled BP . Cont meds  The patient is asked to make an attempt to improve diet and exercise patterns to aid in medical management of this problem.

## 2019-05-09 ENCOUNTER — TELEPHONE (OUTPATIENT)
Dept: FAMILY MEDICINE | Facility: CLINIC | Age: 64
End: 2019-05-09

## 2019-05-09 NOTE — TELEPHONE ENCOUNTER
Spoke with pt's daughter, informed her of patients test results and Dr. Stanton's recommendations. She verbalized understanding.

## 2019-05-09 NOTE — TELEPHONE ENCOUNTER
----- Message from Memo Lucas sent at 5/9/2019 12:06 PM CDT -----  Contact: Nmecacrw-Drgs-[phone]  .Type:  Patient Returning Call    Who Called: Tea  Who Left Message for Patient:pt does not know  Does the patient know what this is regarding?:no  Would the patient rather a call back or a response via [facility]? Call back  Best Call Back Number:[phone]  Additional Information:

## 2019-05-14 DIAGNOSIS — L21.9 SEBORRHEIC DERMATITIS: ICD-10-CM

## 2019-05-15 RX ORDER — INSULIN GLARGINE 300 U/ML
INJECTION, SOLUTION SUBCUTANEOUS
Qty: 12 ML | Refills: 0 | Status: SHIPPED | OUTPATIENT
Start: 2019-05-15 | End: 2019-06-26

## 2019-05-15 RX ORDER — FLUCONAZOLE 200 MG/1
TABLET ORAL
Qty: 4 TABLET | Refills: 0 | OUTPATIENT
Start: 2019-05-15

## 2019-05-15 RX ORDER — DICLOFENAC SODIUM 10 MG/G
2 GEL TOPICAL DAILY
Qty: 100 G | Refills: 0 | Status: SHIPPED | OUTPATIENT
Start: 2019-05-15 | End: 2019-12-10 | Stop reason: SDUPTHER

## 2019-06-26 ENCOUNTER — OFFICE VISIT (OUTPATIENT)
Dept: DIABETES | Facility: CLINIC | Age: 64
End: 2019-06-26
Attending: FAMILY MEDICINE
Payer: MEDICARE

## 2019-06-26 VITALS
DIASTOLIC BLOOD PRESSURE: 88 MMHG | WEIGHT: 231.5 LBS | HEIGHT: 69 IN | SYSTOLIC BLOOD PRESSURE: 126 MMHG | BODY MASS INDEX: 34.29 KG/M2

## 2019-06-26 DIAGNOSIS — E66.01 SEVERE OBESITY (BMI 35.0-39.9) WITH COMORBIDITY: ICD-10-CM

## 2019-06-26 DIAGNOSIS — I10 ESSENTIAL HYPERTENSION: ICD-10-CM

## 2019-06-26 LAB — GLUCOSE SERPL-MCNC: 290 MG/DL (ref 70–110)

## 2019-06-26 PROCEDURE — 99999 PR PBB SHADOW E&M-EST. PATIENT-LVL IV: ICD-10-PCS | Mod: PBBFAC,,, | Performed by: NURSE PRACTITIONER

## 2019-06-26 PROCEDURE — 99214 OFFICE O/P EST MOD 30 MIN: CPT | Mod: PBBFAC,PO | Performed by: NURSE PRACTITIONER

## 2019-06-26 PROCEDURE — 99214 PR OFFICE/OUTPT VISIT, EST, LEVL IV, 30-39 MIN: ICD-10-PCS | Mod: S$PBB,,, | Performed by: NURSE PRACTITIONER

## 2019-06-26 PROCEDURE — 99999 PR PBB SHADOW E&M-EST. PATIENT-LVL IV: CPT | Mod: PBBFAC,,, | Performed by: NURSE PRACTITIONER

## 2019-06-26 PROCEDURE — 82962 GLUCOSE BLOOD TEST: CPT | Mod: PBBFAC,PO | Performed by: NURSE PRACTITIONER

## 2019-06-26 PROCEDURE — 99214 OFFICE O/P EST MOD 30 MIN: CPT | Mod: S$PBB,,, | Performed by: NURSE PRACTITIONER

## 2019-06-26 RX ORDER — INSULIN GLARGINE 300 [IU]/ML
50 INJECTION, SOLUTION SUBCUTANEOUS 2 TIMES DAILY
Qty: 10 ML | Refills: 3 | Status: SHIPPED | OUTPATIENT
Start: 2019-06-26 | End: 2019-12-10 | Stop reason: SDUPTHER

## 2019-06-26 RX ORDER — GLIMEPIRIDE 4 MG/1
4 TABLET ORAL
Qty: 30 TABLET | Refills: 3 | Status: SHIPPED | OUTPATIENT
Start: 2019-06-26 | End: 2019-08-22 | Stop reason: DRUGHIGH

## 2019-06-26 RX ORDER — DAPAGLIFLOZIN AND METFORMIN HYDROCHLORIDE 10; 1000 MG/1; MG/1
1 TABLET, FILM COATED, EXTENDED RELEASE ORAL DAILY
Qty: 30 EACH | Refills: 3 | Status: SHIPPED | OUTPATIENT
Start: 2019-06-26 | End: 2019-12-10

## 2019-06-26 NOTE — LETTER
June 28, 2019      Lucy Negron MD  [address] [facility] [address]           [facility] - Diabetes Management  [address] [facility] [address]  Phone: [phone]  Fax: [phone]          Patient: Kay Galarza   MR Number: [id number]   YOB: 1955   Date of Visit: 6/26/2019       Dear Dr. Lucy Negron:    Thank you for referring Kay Galarza to me for evaluation. Attached you will find relevant portions of my assessment and plan of care.    If you have questions, please do not hesitate to call me. I look forward to following Kay Galarza along with you.    Sincerely,    Sana Dennison, PhD, NP-C    Enclosure  CC:  No Recipients    If you would like to receive this communication electronically, please contact [email or web address] or [phone] to request more information on [employer] Link access.    For providers and/or their staff who would like to refer a patient to [facility], please contact us through our one-stop-shop provider referral line, [facility], at [phone].    If you feel you have received this communication in error or would no longer like to receive these types of communications, please e-mail [email or web address]

## 2019-06-26 NOTE — PATIENT INSTRUCTIONS
Increase glimepiride 4 mg before breakfast.    Stop Victoza.  Take Trulicity 0.75 mg weekly.    Split Toujeo 50 units twice a day.    Start Xigduo 10 - 1,000 mg daily

## 2019-06-26 NOTE — PROGRESS NOTES
"PCP: Lucy Negron MD    Subjective:     Chief Complaint: Diabetes Consult    HISTORY OF PRESENT ILLNESS: 64 y.o.  female presenting, with daughter, for diabetes consult. A  was used during the visit.  Patient has had Type II diabetes since 2017 and has the following complications: peripheral neuropathy. She  is to be enrolled in diabetes education classes.  Blood glucose testing is performed regularly. She did not bring meter today, but per recall, fasting BGs > 200 s.  She denies hypoglycemia symptoms.     She denies any recent hospital admissions or emergency room visits.  She voices stomach upset and nausea with Victoza therefore she is only taking low dose of 0.6 mg daily.    Height: 5' 9" (175.3 cm)  //  Weight: 105 kg (231 lb 7.7 oz), Body mass index is 34.18 kg/m².  Home Blood Glucose reading this AM: 220 mg/dl fasting  Her blood sugar in clinic today is: 290 mg/dl at 3:34 pm      Labs Reviewed.       DM MEDICATIONS:   Toujeo 92 units daily  Metformin 850 mg BID  Glimepiride 2 mg before breakfast  Victoza  0.6 mg daily    Review of Systems   Constitutional: Negative for appetite change, fatigue and unexpected weight change.   Eyes: Negative for visual disturbance.   Gastrointestinal: Negative for abdominal pain, constipation, diarrhea and nausea.   Endocrine: Negative for polydipsia, polyphagia and polyuria.   Neurological: Positive for numbness. Negative for dizziness and headaches.   Psychiatric/Behavioral: Negative for confusion and decreased concentration. The patient is not nervous/anxious.        Diabetes Management Status  Statin: Taking  ACE/ARB: Taking    Screening or Prevention Patient's value Goal Complete/Controlled?   HgA1C Testing and Control   Lab Results   Component Value Date    HGBA1C 10.5 (H) 05/07/2019      Annually/Less than 8% No   Lipid profile : 07/31/2018 Annually Yes   LDL control Lab Results   Component Value Date    LDLCALC 68.2 07/31/2018 "    Annually/Less than 100 mg/dl  Yes   Nephropathy screening Lab Results   Component Value Date    MICALBCREAT 92.2 (H) 2019     No results found for: PROTEINUA Annually Yes   Blood pressure BP Readings from Last 1 Encounters:   19 126/88    Less than 140/90 Yes   Dilated retinal exam : 2019 Annually Yes, Dr. Garcia   Foot exam   : 01/10/2019 Annually Yes     ACTIVITY LEVEL: Sedentary. Discussed activities, benefits, methods, and precautions.  MEAL PLANNING: Patient reports number of meals per day to be 3 and number of snacks per day to be 2.   Patient is encouraged to carb count and consume no more than 45 - 60 grams of carbohydrates in each meal, and 1800 k / vinnie per day.      BLOOD GLUCOSE TESTIN to 2 times daily  SOCIAL HISTORY: .  Never smoker.     Objective:      Physical Exam   Constitutional: She appears well-developed and well-nourished.   HENT:   Head: Normocephalic and atraumatic.   Eyes: Pupils are equal, round, and reactive to light. EOM are normal.   Neck: Normal range of motion.   Cardiovascular: Normal rate and regular rhythm.   Pulses:       Dorsalis pedis pulses are 2+ on the right side, and 2+ on the left side.   Pulmonary/Chest: Effort normal and breath sounds normal.   Musculoskeletal: Normal range of motion.   Feet:   Right Foot:   Protective Sensation: 6 sites tested. 6 sites sensed.   Skin Integrity: Negative for ulcer, callus or dry skin.   Left Foot:   Protective Sensation: 6 sites tested. 6 sites sensed.   Skin Integrity: Negative for ulcer, blister, callus or dry skin.   Skin: Skin is warm and dry. No rash noted.   Psychiatric: She has a normal mood and affect. Her behavior is normal. Judgment and thought content normal.       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  Stop Victoza due to GI upset.  We discussed trying an alternative GLP. Start Trulicity 0.75 mg weekly.  Discussed importance of rotating sites.  Reviewed possible GI side effects.  Patient verbalized understanding.  She is taking high dose basal insulin, so advised to split Toujeo 50 units BID.      - I also discussed starting combo metformin / SGLT-2. I explained MOA and potential side effects.  Start Xigduo 10 - 1,000 mg daily.  I advised to stay hydrated and monitor for symptoms of bladder and yeast infection.    Orders:  -   POCT Glucose, Hand-Held Device    2.) Severe obesity (BMI 35.0-39.9) with comorbidity    3.) Essential hypertension - continue meds as prescribed    Additional Plan Details:    1.) Continue monitoring blood sugar 3 x daily, fasting and ac dinner or at bedtime. Discussed ADA goal for fasting blood sugar, 80 - 130 mg/dL; pp blood sugars below 180 mg/dl. Also, discussed prevention of hypoglycemia and the need to adjust goals to higher levels if persistent hypoglycemia.  Reminded to bring BG records or meter to each visit for review.  2.) Return to clinic in 1 month  for follow up. The patient was explained the above plan and given opportunity to ask questions.  She understands, chooses and consents to this plan and accepts all the risks, which include but are not limited to the risks mentioned above. She understands the alternative of having no testing, interventions or treatments at this time. She left content and without further questions.     Sana Dennison, MAYA-C, CDE    A total of 30 minutes was spent in face to face time, of which over 50 % was spent in counseling patient on disease process, complications, treatment, and side effects of medications.

## 2019-08-02 ENCOUNTER — PATIENT OUTREACH (OUTPATIENT)
Dept: ADMINISTRATIVE | Facility: HOSPITAL | Age: 64
End: 2019-08-02

## 2019-08-02 NOTE — PROGRESS NOTES
LIPID PANEL bulk outreach. Attempting to contact pt to schedule over due HM:lipid panel. Unable to reach patient at this time. Left voicemail.

## 2019-08-05 RX ORDER — METFORMIN HYDROCHLORIDE 850 MG/1
TABLET ORAL
Qty: 180 TABLET | Refills: 0 | Status: SHIPPED | OUTPATIENT
Start: 2019-08-05 | End: 2019-08-07 | Stop reason: ALTCHOICE

## 2019-08-06 NOTE — PROGRESS NOTES
"PCP: Lucy Negron MD    Subjective:     Chief Complaint: Diabetes Follow Up    HISTORY OF PRESENT ILLNESS: 64 y.o.  female presenting for diabetes follow up. A  was used during the visit.  Patient has had Type II diabetes since 2017 and has the following complications: peripheral neuropathy. She  is to be enrolled in diabetes education classes.  Blood glucose testing is performed regularly ( see below for readings ).    She denies any recent hospital admissions or emergency room visits.  She is tolerating  Trulicity without GI upset, but does voice that she was seen by Urgent care a few weeks ago due to cold, congestion, and allergies.         Height: 5' 9" (175.3 cm)  //  Weight: 102.6 kg (226 lb 3.1 oz), Body mass index is 33.4 kg/m².  Home Blood Glucose reading this AM: 131  mg/dl fasting  Her blood sugar in clinic today is: 122 mg/dl at 3:34 pm      Labs Reviewed.       DM MEDICATIONS:   Toujeo 50 units BID  Xigduo 10 - 1,000 mg daily  Glimepiride 4 mg before breakfast  Trulicity 0.75 mg weekly    Review of Systems   Constitutional: Negative for appetite change, fatigue and unexpected weight change.   HENT: Positive for congestion and sore throat.    Eyes: Negative for visual disturbance.   Cardiovascular: Positive for leg swelling (L foot).   Gastrointestinal: Negative for abdominal pain, constipation, diarrhea and nausea.   Endocrine: Negative for polydipsia, polyphagia and polyuria.   Neurological: Positive for numbness. Negative for dizziness and headaches.   Psychiatric/Behavioral: Negative for confusion and decreased concentration. The patient is not nervous/anxious.        Diabetes Management Status  Statin: Taking  ACE/ARB: Taking    Screening or Prevention Patient's value Goal Complete/Controlled?   HgA1C Testing and Control   Lab Results   Component Value Date    HGBA1C 10.5 (H) 05/07/2019      Annually/Less than 8% No   Lipid profile : 07/31/2018 Annually Yes "   LDL control Lab Results   Component Value Date    LDLCALC 68.2 2018    Annually/Less than 100 mg/dl  Yes   Nephropathy screening Lab Results   Component Value Date    MICALBCREAT 92.2 (H) 2019     No results found for: PROTEINUA Annually Yes   Blood pressure BP Readings from Last 1 Encounters:   19 108/64    Less than 140/90 Yes   Dilated retinal exam : 2019 Annually Yes, Dr. Garcia   Foot exam   : 2019 Annually Yes     ACTIVITY LEVEL: Sedentary. Discussed activities, benefits, methods, and precautions.  MEAL PLANNING: Patient reports number of meals per day to be 3 and number of snacks per day to be 2.   Patient is encouraged to carb count and consume no more than 45 - 60 grams of carbohydrates in each meal, and 1800 k / vinnie per day.      BLOOD GLUCOSE TESTIN to 2 times daily  SOCIAL HISTORY: .  Never smoker.     Objective:      Physical Exam   Constitutional: She appears well-developed and well-nourished.   HENT:   Head: Normocephalic and atraumatic.   Eyes: Pupils are equal, round, and reactive to light. EOM are normal.   Neck: Normal range of motion.   Cardiovascular: Normal rate and regular rhythm.   Pulses:       Dorsalis pedis pulses are 2+ on the right side, and 2+ on the left side.   Pulmonary/Chest: Effort normal and breath sounds normal.   Musculoskeletal: Normal range of motion.   Feet:   Right Foot:   Protective Sensation: 6 sites tested. 6 sites sensed.   Skin Integrity: Negative for ulcer, callus or dry skin.   Left Foot:   Protective Sensation: 6 sites tested. 6 sites sensed.   Skin Integrity: Negative for ulcer, blister, callus or dry skin (foot swelling).   Skin: Skin is warm and dry. No rash noted.   Psychiatric: She has a normal mood and affect. Her behavior is normal. Judgment and thought content normal.       Assessment / Plan:     1.) Uncontrolled type 2 diabetes mellitus with both eyes affected by moderate nonproliferative retinopathy and macular  edema, with long-term current use of insulin  Comments:  - Continue glimepiride 4 mg before breakfast.  Increase Trulicity 1.5 mg weekly.  ContinueToujeo 50 units BID. Continue Xigduo 10 - 1,000 mg daily. She admits to staying hydrated and denies symptoms of bladder and yeast  Infection.  She is concerned about left leg swelling and symptoms of cold, congestion.   Appointment with PCP scheduled to address these concerns.    Orders:  -   POCT Glucose, Hand-Held Device  -   Future Labs scheduled: A1C, CMP, Lipid    2.) Severe obesity (BMI 35.0-39.9) with comorbidity    3.) Essential hypertension - continue meds as prescribed    Additional Plan Details:    1.) Continue monitoring blood sugar 3 x daily, fasting and ac dinner or at bedtime. Discussed ADA goal for fasting blood sugar, 80 - 130 mg/dL; pp blood sugars below 180 mg/dl. Also, discussed prevention of hypoglycemia and the need to adjust goals to higher levels if persistent hypoglycemia.  Reminded to bring BG records or meter to each visit for review.  2.) Return to clinic in 2 months  for follow up. The patient was explained the above plan and given opportunity to ask questions.  She understands, chooses and consents to this plan and accepts all the risks, which include but are not limited to the risks mentioned above. She understands the alternative of having no testing, interventions or treatments at this time. She left content and without further questions.     Sana Dennison, MAYA-C, CDE    A total of 30 minutes was spent in face to face time, of which over 50 % was spent in counseling patient on disease process, complications, treatment, and side effects of medications.

## 2019-08-07 ENCOUNTER — OFFICE VISIT (OUTPATIENT)
Dept: DIABETES | Facility: CLINIC | Age: 64
End: 2019-08-07
Payer: MEDICARE

## 2019-08-07 VITALS
HEIGHT: 69 IN | WEIGHT: 226.19 LBS | DIASTOLIC BLOOD PRESSURE: 64 MMHG | SYSTOLIC BLOOD PRESSURE: 108 MMHG | BODY MASS INDEX: 33.5 KG/M2

## 2019-08-07 DIAGNOSIS — I10 ESSENTIAL HYPERTENSION: ICD-10-CM

## 2019-08-07 DIAGNOSIS — E66.01 SEVERE OBESITY (BMI 35.0-39.9) WITH COMORBIDITY: ICD-10-CM

## 2019-08-07 LAB — GLUCOSE SERPL-MCNC: 122 MG/DL (ref 70–110)

## 2019-08-07 PROCEDURE — 99999 PR PBB SHADOW E&M-EST. PATIENT-LVL IV: CPT | Mod: PBBFAC,,, | Performed by: NURSE PRACTITIONER

## 2019-08-07 PROCEDURE — 82962 GLUCOSE BLOOD TEST: CPT | Mod: PBBFAC,PO | Performed by: NURSE PRACTITIONER

## 2019-08-07 PROCEDURE — 99214 PR OFFICE/OUTPT VISIT, EST, LEVL IV, 30-39 MIN: ICD-10-PCS | Mod: S$PBB,,, | Performed by: NURSE PRACTITIONER

## 2019-08-07 PROCEDURE — 99999 PR PBB SHADOW E&M-EST. PATIENT-LVL IV: ICD-10-PCS | Mod: PBBFAC,,, | Performed by: NURSE PRACTITIONER

## 2019-08-07 PROCEDURE — 99214 OFFICE O/P EST MOD 30 MIN: CPT | Mod: S$PBB,,, | Performed by: NURSE PRACTITIONER

## 2019-08-07 PROCEDURE — 99214 OFFICE O/P EST MOD 30 MIN: CPT | Mod: PBBFAC,PO | Performed by: NURSE PRACTITIONER

## 2019-08-07 NOTE — PATIENT INSTRUCTIONS
Dividir Toujeo 92 units dos veces al tomás    Suman Xigduo 10 - 1,000 mg diario    Aumentar glimepiride 4 mg desayuo    Aumentar Trulicity 1.5mg semanal

## 2019-08-22 ENCOUNTER — HOSPITAL ENCOUNTER (OUTPATIENT)
Dept: RADIOLOGY | Facility: HOSPITAL | Age: 64
Discharge: HOME | End: 2019-08-22
Attending: FAMILY MEDICINE
Payer: MEDICARE

## 2019-08-22 ENCOUNTER — OFFICE VISIT (OUTPATIENT)
Dept: FAMILY MEDICINE | Facility: CLINIC | Age: 64
End: 2019-08-22
Attending: FAMILY MEDICINE
Payer: MEDICARE

## 2019-08-22 VITALS
HEIGHT: 69 IN | SYSTOLIC BLOOD PRESSURE: 130 MMHG | DIASTOLIC BLOOD PRESSURE: 82 MMHG | WEIGHT: 223.69 LBS | HEART RATE: 94 BPM | OXYGEN SATURATION: 95 % | TEMPERATURE: 98 F | BODY MASS INDEX: 33.13 KG/M2

## 2019-08-22 DIAGNOSIS — R60.9 EDEMA, UNSPECIFIED TYPE: ICD-10-CM

## 2019-08-22 DIAGNOSIS — M25.472 EDEMA OF LEFT ANKLE: Primary | ICD-10-CM

## 2019-08-22 DIAGNOSIS — I10 HYPERTENSION, UNSPECIFIED TYPE: ICD-10-CM

## 2019-08-22 DIAGNOSIS — M25.472 EDEMA OF LEFT ANKLE: ICD-10-CM

## 2019-08-22 LAB
ALBUMIN/CREAT UR: 202.7 UG/MG (ref 0–30)
CREAT UR-MCNC: 226 MG/DL (ref 15–325)
MICROALBUMIN UR DL<=1MG/L-MCNC: 458 UG/ML

## 2019-08-22 PROCEDURE — 82043 UR ALBUMIN QUANTITATIVE: CPT

## 2019-08-22 PROCEDURE — 99214 OFFICE O/P EST MOD 30 MIN: CPT | Mod: S$PBB,,, | Performed by: FAMILY MEDICINE

## 2019-08-22 PROCEDURE — 73610 X-RAY EXAM OF ANKLE: CPT | Mod: TC,PO,LT

## 2019-08-22 PROCEDURE — 99214 OFFICE O/P EST MOD 30 MIN: CPT | Mod: PBBFAC,25,PO | Performed by: FAMILY MEDICINE

## 2019-08-22 PROCEDURE — 73630 X-RAY EXAM OF FOOT: CPT | Mod: TC,PO,LT

## 2019-08-22 PROCEDURE — 73630 X-RAY EXAM OF FOOT: CPT | Mod: 26,LT,, | Performed by: RADIOLOGY

## 2019-08-22 PROCEDURE — 73630 XR FOOT COMPLETE 3 VIEW LEFT: ICD-10-PCS | Mod: 26,LT,, | Performed by: RADIOLOGY

## 2019-08-22 PROCEDURE — 99214 PR OFFICE/OUTPT VISIT, EST, LEVL IV, 30-39 MIN: ICD-10-PCS | Mod: S$PBB,,, | Performed by: FAMILY MEDICINE

## 2019-08-22 PROCEDURE — 73610 X-RAY EXAM OF ANKLE: CPT | Mod: 26,LT,, | Performed by: RADIOLOGY

## 2019-08-22 PROCEDURE — 99999 PR PBB SHADOW E&M-EST. PATIENT-LVL IV: CPT | Mod: PBBFAC,,, | Performed by: FAMILY MEDICINE

## 2019-08-22 PROCEDURE — 73610 XR ANKLE COMPLETE 3 VIEW LEFT: ICD-10-PCS | Mod: 26,LT,, | Performed by: RADIOLOGY

## 2019-08-22 PROCEDURE — 99999 PR PBB SHADOW E&M-EST. PATIENT-LVL IV: ICD-10-PCS | Mod: PBBFAC,,, | Performed by: FAMILY MEDICINE

## 2019-08-22 RX ORDER — GLIMEPIRIDE 4 MG/1
4 TABLET ORAL
COMMUNITY
End: 2019-12-10 | Stop reason: SDUPTHER

## 2019-08-22 RX ORDER — TRAZODONE HYDROCHLORIDE 100 MG/1
100 TABLET ORAL NIGHTLY PRN
Qty: 90 TABLET | Refills: 0 | Status: SHIPPED | OUTPATIENT
Start: 2019-08-22 | End: 2019-12-10

## 2019-08-22 RX ORDER — GLIMEPIRIDE 2 MG/1
2 TABLET ORAL DAILY
Refills: 6 | COMMUNITY
Start: 2019-08-08 | End: 2019-08-22 | Stop reason: DRUGHIGH

## 2019-08-22 RX ORDER — LISINOPRIL AND HYDROCHLOROTHIAZIDE 20; 25 MG/1; MG/1
1 TABLET ORAL DAILY
Qty: 90 TABLET | Refills: 0 | Status: SHIPPED | OUTPATIENT
Start: 2019-08-22 | End: 2019-12-10 | Stop reason: SDUPTHER

## 2019-08-23 ENCOUNTER — TELEPHONE (OUTPATIENT)
Dept: FAMILY MEDICINE | Facility: CLINIC | Age: 64
End: 2019-08-23

## 2019-08-23 ENCOUNTER — HOSPITAL ENCOUNTER (OUTPATIENT)
Dept: RADIOLOGY | Facility: HOSPITAL | Age: 64
Discharge: HOME | End: 2019-08-23
Attending: FAMILY MEDICINE
Payer: MEDICARE

## 2019-08-23 DIAGNOSIS — R60.9 EDEMA, UNSPECIFIED TYPE: ICD-10-CM

## 2019-08-23 DIAGNOSIS — M25.472 EDEMA OF LEFT ANKLE: ICD-10-CM

## 2019-08-23 PROCEDURE — 93971 EXTREMITY STUDY: CPT | Mod: TC,LT

## 2019-08-23 NOTE — TELEPHONE ENCOUNTER
----- Message from Gustavo Gonzalez sent at 8/23/2019  2:14 PM CDT -----  Contact: joy   .Type:  Test Results    Who Called:  Daughter   Name of Test (Lab/Mammo/Etc): ultrasoun d   Date of Test: 8/23   Ordering Provider: guerrero   Where the test was performed: [facility]  Would the patient rather a call back or a response via [facility]?callback  Best Call Back Number:  ..[phone] (home)    Additional Information:            ..[phone] (home)

## 2019-08-26 ENCOUNTER — TELEPHONE (OUTPATIENT)
Dept: FAMILY MEDICINE | Facility: CLINIC | Age: 64
End: 2019-08-26

## 2019-08-26 DIAGNOSIS — M79.672 FOOT PAIN, LEFT: Primary | ICD-10-CM

## 2019-08-26 NOTE — TELEPHONE ENCOUNTER
----- Message from Judy Gonzalez LPN sent at 8/26/2019  3:22 PM CDT -----  Spoke to pts daughter about pts test results. Pts daughter verbalized understanding. Pts daughter states the pt is still having swelling and pain and would like to do the CT before 9/1/19 as discussed at her previous appt. Please advise.

## 2019-08-27 ENCOUNTER — TELEPHONE (OUTPATIENT)
Dept: FAMILY MEDICINE | Facility: CLINIC | Age: 64
End: 2019-08-27

## 2019-08-27 NOTE — TELEPHONE ENCOUNTER
----- Message from Minh Jensen sent at 8/27/2019  4:35 PM CDT -----  Contact: Daughter-Cass- [phone]   .Type:  Patient Returning Call    Who Called:Cass   Who Left Message for Patient:Unsure   Does the patient know what this is regarding?:unsure   Would the patient rather a call back or a response via [facility]? Call back   Best Call Back Number:[phone]    Additional Information:

## 2019-08-27 NOTE — TELEPHONE ENCOUNTER
----- Message from Rachel Beyer sent at 8/27/2019 12:05 PM CDT -----  Contact: Pt/Daughter (Mateusz)  .Type:  Patient Returning Call    Who Called: Pt/Daughter  Who Left Message for Patient: Nurse  Does the patient know what this is regarding?: rteturn call  Would the patient rather a call back or a response via [facility]? Call back   Best Call Back Number: .[phone] (home)   Additional Information:

## 2019-08-28 ENCOUNTER — TELEPHONE (OUTPATIENT)
Dept: FAMILY MEDICINE | Facility: CLINIC | Age: 64
End: 2019-08-28

## 2019-08-28 NOTE — TELEPHONE ENCOUNTER
Spoke with pt's daughter, Cass, informed her that I received a message from the pre-service department regarding CT. Advised her that her CT is not in network in [place] and is only in network at [facility], [place], or [facility]. Rescheduled to [place] location [facility]) on 8/29/19 at 11:30 am. Address given to patient's daughter.

## 2019-08-29 ENCOUNTER — TELEPHONE (OUTPATIENT)
Dept: FAMILY MEDICINE | Facility: CLINIC | Age: 64
End: 2019-08-29

## 2019-08-29 NOTE — TELEPHONE ENCOUNTER
----- Message from Ashlee Macias sent at 8/29/2019 12:52 PM CDT -----  Contact: Cass - Daughter  Request a call concerning this pt CT scan, no additional info given and can pt reached at [phone]///thxMW

## 2019-09-04 ENCOUNTER — TELEPHONE (OUTPATIENT)
Dept: FAMILY MEDICINE | Facility: CLINIC | Age: 64
End: 2019-09-04

## 2019-09-04 NOTE — TELEPHONE ENCOUNTER
Message left for patient's daughter, Cass, to return call to clinic in regards to scheduling CT scan.

## 2019-09-04 NOTE — TELEPHONE ENCOUNTER
----- Message from Lucai Snyder sent at 9/4/2019  3:50 PM CDT -----  Contact: Kimberly's daughter  Type:  Patient Returning Call    Who Called: Kimberly's daughter  Who Left Message for Patient: Kym  Does the patient know what this is regarding?: Yes   Would the patient rather a call back or a response via [facility]? Call back   Best Call Back Number: [phone] ([place])   Additional Information: n/a

## 2019-09-04 NOTE — TELEPHONE ENCOUNTER
Spoke with pt's daughter, NENA Odell scheduled for 9/8/19 at 8 am at the hospital on [facility]. She verbalized understanding.  Message sent to Geovanna Joy (pre-service dept) in [employer] to notify her.

## 2019-09-04 NOTE — TELEPHONE ENCOUNTER
----- Message from Maryam Crowder sent at 9/4/2019  9:49 AM CDT -----  Contact: ms rizzo-daughter  needs call back, will elaborate..[phone] (home)

## 2019-09-04 NOTE — TELEPHONE ENCOUNTER
Pt's daughter, Cass, called stating that pt was not able to make it to her CT scan that was scheduled in [place] on 8/30/19. She states that pt did get [employer] insurance, effective 9/1/19 and she called the insurance company and they will cover CT at [place]. She stated that we need to fax over a referral to them at [phone]. Advised Cass that I will call her back after I get some more information.    Spoke with Tammy at the front, she added pt's new insurance in chart.  Also, spoke with Geovanna in the pre-service derpartment and informed her of above message. She states that she will talk with the ladies who deal with [employer] and get back with me.

## 2019-09-08 ENCOUNTER — HOSPITAL ENCOUNTER (OUTPATIENT)
Dept: RADIOLOGY | Facility: HOSPITAL | Age: 64
Discharge: HOME | End: 2019-09-08
Attending: FAMILY MEDICINE
Payer: MEDICARE

## 2019-09-08 DIAGNOSIS — M79.672 FOOT PAIN, LEFT: ICD-10-CM

## 2019-09-08 PROCEDURE — 73700 CT LOWER EXTREMITY W/O DYE: CPT | Mod: TC,LT

## 2019-09-09 ENCOUNTER — TELEPHONE (OUTPATIENT)
Dept: FAMILY MEDICINE | Facility: CLINIC | Age: 64
End: 2019-09-09

## 2019-09-09 DIAGNOSIS — S92.902A CLOSED FRACTURE OF LEFT FOOT, INITIAL ENCOUNTER: Primary | ICD-10-CM

## 2019-09-10 ENCOUNTER — TELEPHONE (OUTPATIENT)
Dept: FAMILY MEDICINE | Facility: CLINIC | Age: 64
End: 2019-09-10

## 2019-09-10 ENCOUNTER — TELEPHONE (OUTPATIENT)
Dept: ORTHOPEDICS | Facility: CLINIC | Age: 64
End: 2019-09-10

## 2019-09-10 NOTE — TELEPHONE ENCOUNTER
----- Message from Missy Etienne sent at 9/10/2019  3:28 PM CDT -----  Contact: Patient's Daughter- Cass  .Type:  Test Results    Who Called: Cass  Name of Test (Lab/Mammo/Etc): CT scan  Date of Test: 09/08/19  Ordering Provider: Maximino  Where the test was performed: [facility]   Would the patient rather a call back or a response via [facility]? Call  Best Call Back Number: .[phone] (home)     Additional Information:

## 2019-09-11 ENCOUNTER — PATIENT OUTREACH (OUTPATIENT)
Dept: ADMINISTRATIVE | Facility: HOSPITAL | Age: 64
End: 2019-09-11

## 2019-09-11 NOTE — PROGRESS NOTES
DIABETES OUTREACH: Attempting to contact pt to schedule over due HM & F/U. Unable to reach patient at this time. Left voicemail.     LAST HA1c:DATE: 08/22/2019   DUE DATE:11/22/2019  LAST LIPID PANEL: DATE:08/22/2019   DUE DATE:08/22/2020  LAST MICRO ALBUMIN:DATE:08/22/2019    DUE DATE:08/22/2020    LAST HA1c:8.3    F/U:PCP: No appt  F/U DM: 10/02/19

## 2019-09-20 ENCOUNTER — TELEPHONE (OUTPATIENT)
Dept: FAMILY MEDICINE | Facility: CLINIC | Age: 64
End: 2019-09-20

## 2019-09-20 NOTE — TELEPHONE ENCOUNTER
----- Message from Haylee Lama sent at 9/20/2019  9:39 AM CDT -----  Contact: daughter Cass  Type:  Needs Medical Advice    Who Called: Cass  Symptoms (please be specific):  Swollen rt foot   How long has patient had these symptoms:  2 months  Pharmacy name and phone #:    [id number] - [place], [place] - [address] [address]  [address] [address]  [place] [address]  Phone: [phone] Fax: [phone]    Would the patient rather a call back or a response via [facility]? call  Best Call Back Number: [phone]  Additional Information: please call [facility]!!

## 2019-09-24 ENCOUNTER — HOSPITAL ENCOUNTER (EMERGENCY)
Facility: HOSPITAL | Age: 64
Discharge: HOME | End: 2019-09-25
Payer: MEDICARE

## 2019-09-24 DIAGNOSIS — M79.672 LEFT FOOT PAIN: Primary | ICD-10-CM

## 2019-09-24 DIAGNOSIS — M79.89 FOOT SWELLING: ICD-10-CM

## 2019-09-24 DIAGNOSIS — M10.9 ACUTE GOUT OF LEFT FOOT, UNSPECIFIED CAUSE: ICD-10-CM

## 2019-09-24 DIAGNOSIS — S92.902A CLOSED FRACTURE OF LEFT FOOT, INITIAL ENCOUNTER: ICD-10-CM

## 2019-09-24 LAB
ALBUMIN SERPL BCP-MCNC: 3.6 G/DL (ref 3.5–5.2)
ALP SERPL-CCNC: 124 U/L (ref 55–135)
ALT SERPL W/O P-5'-P-CCNC: 9 U/L (ref 10–44)
ANION GAP SERPL CALC-SCNC: 10 MMOL/L (ref 8–16)
AST SERPL-CCNC: 10 U/L (ref 10–40)
BASOPHILS # BLD AUTO: 0.04 K/UL (ref 0–0.2)
BASOPHILS NFR BLD: 0.3 % (ref 0–1.9)
BILIRUB SERPL-MCNC: 0.5 MG/DL (ref 0.1–1)
BUN SERPL-MCNC: 26 MG/DL (ref 8–23)
CALCIUM SERPL-MCNC: 10.3 MG/DL (ref 8.7–10.5)
CHLORIDE SERPL-SCNC: 97 MMOL/L (ref 95–110)
CO2 SERPL-SCNC: 29 MMOL/L (ref 23–29)
CREAT SERPL-MCNC: 1.2 MG/DL (ref 0.5–1.4)
CRP SERPL-MCNC: 12.7 MG/L (ref 0–8.2)
DIFFERENTIAL METHOD: ABNORMAL
EOSINOPHIL # BLD AUTO: 0.3 K/UL (ref 0–0.5)
EOSINOPHIL NFR BLD: 2 % (ref 0–8)
ERYTHROCYTE [DISTWIDTH] IN BLOOD BY AUTOMATED COUNT: 13 % (ref 11.5–14.5)
EST. GFR  (AFRICAN AMERICAN): 54 ML/MIN/1.73 M^2
EST. GFR  (NON AFRICAN AMERICAN): 47 ML/MIN/1.73 M^2
GLUCOSE SERPL-MCNC: 202 MG/DL (ref 70–110)
HCT VFR BLD AUTO: 40.1 % (ref 37–48.5)
HGB BLD-MCNC: 13.6 G/DL (ref 12–16)
LYMPHOCYTES # BLD AUTO: 1.9 K/UL (ref 1–4.8)
LYMPHOCYTES NFR BLD: 14.5 % (ref 18–48)
MCH RBC QN AUTO: 29.9 PG (ref 27–31)
MCHC RBC AUTO-ENTMCNC: 33.9 G/DL (ref 32–36)
MCV RBC AUTO: 88 FL (ref 82–98)
MONOCYTES # BLD AUTO: 0.9 K/UL (ref 0.3–1)
MONOCYTES NFR BLD: 7.1 % (ref 4–15)
NEUTROPHILS # BLD AUTO: 9.8 K/UL (ref 1.8–7.7)
NEUTROPHILS NFR BLD: 76.1 % (ref 38–73)
PLATELET # BLD AUTO: 300 K/UL (ref 150–350)
PMV BLD AUTO: 9.6 FL (ref 9.2–12.9)
POTASSIUM SERPL-SCNC: 4.7 MMOL/L (ref 3.5–5.1)
PROT SERPL-MCNC: 7.4 G/DL (ref 6–8.4)
RBC # BLD AUTO: 4.55 M/UL (ref 4–5.4)
SODIUM SERPL-SCNC: 136 MMOL/L (ref 136–145)
URATE SERPL-MCNC: 6.8 MG/DL (ref 2.4–5.7)
WBC # BLD AUTO: 12.9 K/UL (ref 3.9–12.7)

## 2019-09-24 PROCEDURE — 29515 APPLICATION SHORT LEG SPLINT: CPT | Mod: LT

## 2019-09-24 PROCEDURE — 85025 COMPLETE CBC W/AUTO DIFF WBC: CPT

## 2019-09-24 PROCEDURE — 80053 COMPREHEN METABOLIC PANEL: CPT

## 2019-09-24 PROCEDURE — 86140 C-REACTIVE PROTEIN: CPT

## 2019-09-24 PROCEDURE — 85651 RBC SED RATE NONAUTOMATED: CPT

## 2019-09-24 PROCEDURE — 99284 EMERGENCY DEPT VISIT MOD MDM: CPT | Mod: 25

## 2019-09-24 PROCEDURE — 63600175 PHARM REV CODE 636 W HCPCS: Performed by: NURSE PRACTITIONER

## 2019-09-24 PROCEDURE — 84550 ASSAY OF BLOOD/URIC ACID: CPT

## 2019-09-24 PROCEDURE — 96375 TX/PRO/DX INJ NEW DRUG ADDON: CPT

## 2019-09-24 PROCEDURE — 96374 THER/PROPH/DIAG INJ IV PUSH: CPT | Mod: 59

## 2019-09-24 RX ORDER — MORPHINE SULFATE 4 MG/ML
4 INJECTION, SOLUTION INTRAMUSCULAR; INTRAVENOUS
Status: COMPLETED | OUTPATIENT
Start: 2019-09-24 | End: 2019-09-24

## 2019-09-24 RX ORDER — ONDANSETRON 2 MG/ML
4 INJECTION INTRAMUSCULAR; INTRAVENOUS
Status: COMPLETED | OUTPATIENT
Start: 2019-09-24 | End: 2019-09-24

## 2019-09-24 RX ADMIN — ONDANSETRON 4 MG: 2 INJECTION INTRAMUSCULAR; INTRAVENOUS at 10:09

## 2019-09-24 RX ADMIN — MORPHINE SULFATE 4 MG: 4 INJECTION, SOLUTION INTRAMUSCULAR; INTRAVENOUS at 10:09

## 2019-09-25 VITALS
HEART RATE: 89 BPM | OXYGEN SATURATION: 99 % | WEIGHT: 219.38 LBS | RESPIRATION RATE: 14 BRPM | HEIGHT: 66 IN | SYSTOLIC BLOOD PRESSURE: 123 MMHG | TEMPERATURE: 99 F | BODY MASS INDEX: 35.26 KG/M2 | DIASTOLIC BLOOD PRESSURE: 72 MMHG

## 2019-09-25 LAB — ERYTHROCYTE [SEDIMENTATION RATE] IN BLOOD BY WESTERGREN METHOD: 59 MM/HR (ref 0–20)

## 2019-09-25 RX ORDER — HYDROCODONE BITARTRATE AND ACETAMINOPHEN 7.5; 325 MG/1; MG/1
1 TABLET ORAL EVERY 12 HOURS PRN
Qty: 8 TABLET | Refills: 0 | Status: SHIPPED | OUTPATIENT
Start: 2019-09-25 | End: 2019-09-29

## 2019-09-25 RX ORDER — INDOMETHACIN 50 MG/1
50 CAPSULE ORAL
Qty: 15 CAPSULE | Refills: 0 | Status: SHIPPED | OUTPATIENT
Start: 2019-09-25 | End: 2019-12-10 | Stop reason: SDUPTHER

## 2019-09-25 NOTE — ED PROVIDER NOTES
SCRIBE #1 NOTE: I, Cherie Mueller, am scribing for, and in the presence of, Hudson Pérez NP. I have scribed the entire note.       History     Chief Complaint   Patient presents with    Foot Swelling     left foot swelling for months. Has not been able to follow up with MD. c/o pain     Review of patient's allergies indicates:   Allergen Reactions    Pollen extracts          History of Present Illness     HPI    9/24/2019, 10:10 PM  History obtained from the patient and daughter      History of Present Illness: Kay Galarza is a 64 y.o. female patient with a PMHx of HTN, DM, asthma, HLD, and diabetic retinopathy who presents to the Emergency Department for evaluation of L foot pain which onset gradually a few months ago. Pt had a CT of her L foot on 9/8 and US of LUE on 8/22. Daughter states pt has not been able to follow up with a doctor [place]. Symptoms are constant and moderate in severity. No mitigating or exacerbating factors reported. Associated sxs include increased swelling. Patient denies any fever, chills, n/v, gait problem, back pain, increased warmth/erythema, dizziness, extremity weakness/numbness, and all other sxs at this time. No prior Tx reported. No further complaints or concerns at this time.       Arrival mode: Personal vehicle    PCP: Lucy Negron MD        Past Medical History:  Past Medical History:   Diagnosis Date    Arthritis     Asthma     Cataract     Diabetes mellitus     Diabetic retinopathy     Hyperlipidemia     Hypertension        Past Surgical History:  Past Surgical History:   Procedure Laterality Date    CATARACT EXTRACTION W/  INTRAOCULAR LENS IMPLANT Right 07/18/2018    CATARACT EXTRACTION W/  INTRAOCULAR LENS IMPLANT Left 03/13/2019    COLONOSCOPY N/A 12/22/2018    Procedure: COLONOSCOPY;  Surgeon: Zak Dover MD;  Location: [facility];  Service: Endoscopy;  Laterality: N/A;         Family  History:  Family History   Problem Relation Age of Onset    Cancer Father         Prostate Ca    Hypertension Father     Stroke Father     Diabetes Sister     Glaucoma Mother     Hypertension Mother     Glaucoma Maternal Grandmother     Blindness Neg Hx     Macular degeneration Neg Hx     Retinal detachment Neg Hx     Strabismus Neg Hx        Social History:  Social History     Tobacco Use    Smoking status: Never Smoker    Smokeless tobacco: Never Used   Substance and Sexual Activity    Alcohol use: No    Drug use: No    Sexual activity: Yes        Review of Systems     Review of Systems   Constitutional: Negative for chills and fever.   HENT: Negative for rhinorrhea and sore throat.    Respiratory: Negative for cough and shortness of breath.    Cardiovascular: Negative for chest pain.   Gastrointestinal: Negative for abdominal pain, nausea and vomiting.   Genitourinary: Negative for dysuria, frequency and urgency.   Musculoskeletal: Negative for back pain and gait problem.        (+) L foot pain and swelling   Skin: Negative for rash.        (-) increased warmth/erythema   Neurological: Negative for dizziness, weakness and numbness.   Hematological: Does not bruise/bleed easily.   All other systems reviewed and are negative.       Physical Exam     Initial Vitals [09/24/19 2110]   BP Pulse Resp Temp SpO2   121/73 100 19 97.4 °F (36.3 °C) 99 %      MAP       --          Physical Exam  Nursing Notes and Vital Signs Reviewed.  Constitutional: Patient is in no acute distress. Well-developed and well-nourished.  Head: Atraumatic. Normocephalic.  Eyes: PERRL. EOM intact. Conjunctivae are not pale. No scleral icterus.  ENT: Mucous membranes are moist. Oropharynx is clear and symmetric.    Neck: Supple. Full ROM. No lymphadenopathy.  Cardiovascular: Regular rate. Regular rhythm. No murmurs, rubs, or gallops. Distal pulses are 2+ and symmetric.  Pulmonary/Chest: No respiratory distress. Clear to  "auscultation bilaterally. No wheezing or rales.  Abdominal: Soft and non-distended.  There is no tenderness.  No rebound, guarding, or rigidity. Good bowel sounds.  Genitourinary: No CVA tenderness  Musculoskeletal: Moves all extremities. No obvious deformities. No edema. No calf tenderness.  Left Foot:  No obvious deformity. Swelling and tenderness of the L foot.  She is able to bear weight.   Ankle dorsiflexion and ankle plantar flexion are intact.  Intact sensation to light touch. Distal capillary refill takes less than 2 seconds.  PT and DP pulses are 2+ bilaterally.  Skin: Warm and dry.  Neurological:  Alert, awake, and appropriate.  Normal speech.  No acute focal neurological deficits are appreciated.  Psychiatric: Normal affect. Good eye contact. Appropriate in content.     ED Course   Orthopedic Injury  Date/Time: 9/25/2019 1:31 AM  Performed by: Hudson Pérez NP  Authorized by: Hudson Pérez NP     Injury:     Injury location:  Foot    Location details:  Left foot    Injury type:  Fracture          Selections made in this section will also lock the Injury type section above.:     Manipulation performed?: No      Immobilization:  Brace    Supplies used: walking boot   Post-procedure assessment:     Neurovascular status: Neurovascularly intact      Range of motion: splinted      Patient tolerance:  Patient tolerated the procedure well with no immediate complications      ED Vital Signs:  Vitals:    09/24/19 2110 09/25/19 0059   BP: 121/73 123/72   Pulse: 100 89   Resp: 19 14   Temp: 97.4 °F (36.3 °C) 98.6 °F (37 °C)   TempSrc: Oral Oral   SpO2: 99% 99%   Weight: 99.5 kg (219 lb 5.7 oz)    Height: 5' 6" (1.676 m)        Abnormal Lab Results:  Labs Reviewed   CBC W/ AUTO DIFFERENTIAL - Abnormal; Notable for the following components:       Result Value    WBC 12.90 (*)     Gran # (ANC) 9.8 (*)     Gran% 76.1 (*)     Lymph% 14.5 (*)     All other components within normal limits   COMPREHENSIVE METABOLIC PANEL - " Abnormal; Notable for the following components:    Glucose 202 (*)     BUN, Bld 26 (*)     ALT 9 (*)     eGFR if  54 (*)     eGFR if non  47 (*)     All other components within normal limits   URIC ACID - Abnormal; Notable for the following components:    Uric Acid 6.8 (*)     All other components within normal limits   C-REACTIVE PROTEIN - Abnormal; Notable for the following components:    CRP 12.7 (*)     All other components within normal limits   SEDIMENTATION RATE - Abnormal; Notable for the following components:    Sed Rate 59 (*)     All other components within normal limits        All Lab Results:  Results for orders placed or performed during the hospital encounter of 09/24/19   CBC auto differential   Result Value Ref Range    WBC 12.90 (H) 3.90 - 12.70 K/uL    RBC 4.55 4.00 - 5.40 M/uL    Hemoglobin 13.6 12.0 - 16.0 g/dL    Hematocrit 40.1 37.0 - 48.5 %    Mean Corpuscular Volume 88 82 - 98 fL    Mean Corpuscular Hemoglobin 29.9 27.0 - 31.0 pg    Mean Corpuscular Hemoglobin Conc 33.9 32.0 - 36.0 g/dL    RDW 13.0 11.5 - 14.5 %    Platelets 300 150 - 350 K/uL    MPV 9.6 9.2 - 12.9 fL    Gran # (ANC) 9.8 (H) 1.8 - 7.7 K/uL    Lymph # 1.9 1.0 - 4.8 K/uL    Mono # 0.9 0.3 - 1.0 K/uL    Eos # 0.3 0.0 - 0.5 K/uL    Baso # 0.04 0.00 - 0.20 K/uL    Gran% 76.1 (H) 38.0 - 73.0 %    Lymph% 14.5 (L) 18.0 - 48.0 %    Mono% 7.1 4.0 - 15.0 %    Eosinophil% 2.0 0.0 - 8.0 %    Basophil% 0.3 0.0 - 1.9 %    Differential Method Automated    Comprehensive metabolic panel   Result Value Ref Range    Sodium 136 136 - 145 mmol/L    Potassium 4.7 3.5 - 5.1 mmol/L    Chloride 97 95 - 110 mmol/L    CO2 29 23 - 29 mmol/L    Glucose 202 (H) 70 - 110 mg/dL    BUN, Bld 26 (H) 8 - 23 mg/dL    Creatinine 1.2 0.5 - 1.4 mg/dL    Calcium 10.3 8.7 - 10.5 mg/dL    Total Protein 7.4 6.0 - 8.4 g/dL    Albumin 3.6 3.5 - 5.2 g/dL    Total Bilirubin 0.5 0.1 - 1.0 mg/dL    Alkaline Phosphatase 124 55 - 135 U/L    AST  10 10 - 40 U/L    ALT 9 (L) 10 - 44 U/L    Anion Gap 10 8 - 16 mmol/L    eGFR if African American 54 (A) >60 mL/min/1.73 m^2    eGFR if non African American 47 (A) >60 mL/min/1.73 m^2   Uric acid   Result Value Ref Range    Uric Acid 6.8 (H) 2.4 - 5.7 mg/dL   C-reactive protein   Result Value Ref Range    CRP 12.7 (H) 0.0 - 8.2 mg/L   Sedimentation rate   Result Value Ref Range    Sed Rate 59 (H) 0 - 20 mm/Hr         Imaging Results:  Imaging Results    None       EXAMINATION:  US LOWER EXTREMITY VEINS LEFT    CLINICAL HISTORY:  Edema, unspecified    TECHNIQUE:  Duplex and color flow Doppler evaluation and graded compression of the left lower extremity veins was performed.    COMPARISON:  None    FINDINGS:  Left thigh veins: The common femoral, femoral, popliteal, upper greater saphenous, and deep femoral veins are patent and free of thrombus. The veins are normally compressible and have normal phasic flow and augmentation response.    Left calf veins: The visualized calf veins are patent.    Contralateral CFV: The contralateral (right) common femoral vein is patent and free of thrombus.    Miscellaneous: None   Impression       No evidence of deep venous thrombosis in the left lower extremity.      Electronically signed by: Alfred Vivas  Date: 08/23/2019  Time: 11:15       EXAMINATION:  CT FOOT WITHOUT CONTRAST LEFT    CLINICAL HISTORY:  Fracture, foot;Foot pain, chronic, etiol unknown, first study;  Pain in left foot    TECHNIQUE:  Standard thin-section axial images obtained with 2D reformatted sagittal and coronal images.    COMPARISON:  None    FINDINGS:  The left ankle and hindfoot demonstrate no acute fracture or dislocation.  Small posterior and plantar calcaneal enthesophytes are identified.    The bones of the midfoot demonstrate a small chronic fracture of the distal and dorsal aspect of the middle cuneiform at the 2nd TMT joint.  Fracture fragment measures 0.7 cm in maximal dimension and demonstrates  minimal dorsal displacement.  The remaining bones of the midfoot are intact.    Bones of the forefoot demonstrate a punctate chronic appearing fracture identified at the plantar and lateral margins of the base of the 1st metatarsal measuring 0.3 cm.  A adjacent punctate fracture fragments identified at the dorsal and lateral margins of the distal aspect of the medial cuneiform.  The remaining metatarsals are normal.  The sesamoids are normal.  The phalanges are normal.    The tibiotalar and subtalar joints are well preserved.  Talonavicular and calcaneocuboid joints are well preserved.  A small accessory cuboid is noted.  Navicular-cuneiform joints are well preserved.  Low to moderate degenerative change of the medial-medial cuneiform joint which could represent posttraumatic arthritic change.  The remaining tarsal-tarsal joints are well preserved.  Tarsometatarsal joints remain well preserved despite the previously discussed chronic fractures of the 1st and 2nd TMT joints.  The remaining TMT joints are normal.    Metatarsophalangeal joints and interphalangeal joints of the toes are normal.    Soft tissues demonstrate nonspecific mild-to-moderate dorsal and volar subcutaneous soft tissue edema.   Impression       1. 0.7 cm chronic appearing fracture of the dorsal and distal articular surface of the middle cuneiform at the 2nd TMT joint.  Mild dorsal displacement of fracture fragment.  No significant posttraumatic arthritic change of the 2nd TMT joint.  2. 0.3 cm chronic nondisplaced fracture identified at the plantar medial aspect of the base of the 1st metatarsal.  Adjacent old appearing punctate chip or avulsion fractures identified of the dorsal and lateral margins of the distal articular surface of the medial cuneiform.  Tarsometatarsal joints remain normal in alignment.  No evidence of Lisfranc fracture.  3. Low to moderate degenerative change of the medial-medial cuneiform joint which could reflect  posttraumatic arthritic change.  All CT scans at this facility are performed  using dose modulation techniques as appropriate to performed exam including the following:  automated exposure control; adjustment of mA and/or kV according to the patients size (this includes techniques or standardized protocols for targeted exams where dose is matched to indication/reason for exam: i.e. extremities or head);  iterative reconstruction technique.      Electronically signed by: Antione Baumann  Date: 09/08/2019  Time: 08:34              The Emergency Provider reviewed the vital signs and test results, which are outlined above.     ED Discussion     I discussed with patient and/or family/caretaker that evaluation in the ED does not suggest any emergent or life threatening medical conditions requiring immediate intervention beyond what was provided in the ED, and I believe patient is safe for discharge. Regardless, an unremarkable evaluation in the ED does not preclude the development or presence of a serious of life threatening condition. As such, patient was instructed to return immediately for any worsening or change in current symptoms.    Educated patient on diet and lifestyle changes that may help prevent gouty attacks. Recommendations include: avoid alcohol; reduce purine-rich foods - especially anchovies, sardines, oils, herring, organ meat, legumes (dried beans and peas), gravies, mushrooms, spinach, asparagus, cauliflower, consommé, and baking or morales's yeast; limit amount of food eaten at each meal; avoid fatty foods such as salad dressings, ice cream, and fried foods; and to lose weight slowly if dieting as quick weight loss may cause uric acid kidney stones to form. The patient was advised to take medications for treatment as prescribed and to take NSAIDs for pain relief as soon as symptoms begin. Encouraged patient to follow up with primary care provider for re-evaluation in 2-3 days.       Medical Decision  Making:   Clinical Tests:   Radiological Study: Reviewed           ED Medication(s):  Medications   morphine injection 4 mg (4 mg Intravenous Given 9/24/19 2238)   ondansetron injection 4 mg (4 mg Intravenous Given 9/24/19 2239)       Discharge Medication List as of 9/25/2019 12:11 AM      START taking these medications    Details   HYDROcodone-acetaminophen (NORCO) 7.5-325 mg per tablet Take 1 tablet by mouth every 12 (twelve) hours as needed for Pain., Starting Wed 9/25/2019, Until Sun 9/29/2019, Print      indomethacin (INDOCIN) 50 MG capsule Take 1 capsule (50 mg total) by mouth 3 (three) times daily with meals., Starting Wed 9/25/2019, Print             Follow-up Information     Schedule an appointment as soon as possible for a visit  with Lucy Negron MD.    Specialty:  Family Medicine  Contact information:  [address] [facility] [address]  [phone]             [facility]    Specialty:  Emergency Medicine  Why:  As needed, If symptoms worsen  Contact information:  [id number] [place] [id number] [phone]                     Scribe Attestation:   Scribe #1: I performed the above scribed service and the documentation accurately describes the services I performed. I attest to the accuracy of the note.     Attending:   Physician Attestation Statement for Scribe #1: I, Hudson Pérez NP, personally performed the services described in this documentation, as scribed by Cherie Mueller, in my presence, and it is both accurate and complete.           Clinical Impression       ICD-10-CM ICD-9-CM   1. Left foot pain M79.672 729.5   2. Left Foot swelling M79.89 729.81   3. Closed fracture of left foot, initial encounter S92.902A 825.20   4. Acute gout of left foot, unspecified cause M10.9 274.01       Disposition:   Disposition: Discharged  Condition: Stable         Hudson Pérez NP  09/25/19 0131

## 2019-09-30 ENCOUNTER — TELEPHONE (OUTPATIENT)
Dept: FAMILY MEDICINE | Facility: CLINIC | Age: 64
End: 2019-09-30

## 2019-09-30 NOTE — TELEPHONE ENCOUNTER
----- Message from Oneida Oh sent at 9/30/2019  4:41 PM CDT -----  Contact: Daughter- Cass Odell is requesting call back in regards to swelling of pt's foot.            [facility] call back at [phone]

## 2019-09-30 NOTE — TELEPHONE ENCOUNTER
Spoke with pt's daughter, Cass, she states that pt went to ER on 9/24/19 for foot pain and is scheduled with Podiatry on 10/7/19 but states pt cannot wait that long. Should pt see you or ortho before then? Please advise?

## 2019-10-01 NOTE — TELEPHONE ENCOUNTER
Spoke with pt's daughter, Oz Odell scheduled for 10/2/19 at 1:40 pm. She verbalized understanding.

## 2019-10-03 ENCOUNTER — TELEPHONE (OUTPATIENT)
Dept: ORTHOPEDICS | Facility: CLINIC | Age: 64
End: 2019-10-03

## 2019-10-03 NOTE — TELEPHONE ENCOUNTER
1st attempt to reschedule their appointment. Called the patient's daughter Cass about their appointment for tomorrow with Eli Prieto. Informed the patient's daughter Cass that we have to reschedule their appointment with podiatry due to the fact that they where scheduled incorrectly. Left a message for the patient or Cass to give the office a call back to reschedule. FP

## 2019-10-04 ENCOUNTER — OFFICE VISIT (OUTPATIENT)
Dept: PODIATRY | Facility: CLINIC | Age: 64
End: 2019-10-04
Payer: MEDICARE

## 2019-10-04 VITALS
HEIGHT: 66 IN | HEART RATE: 98 BPM | DIASTOLIC BLOOD PRESSURE: 86 MMHG | SYSTOLIC BLOOD PRESSURE: 140 MMHG | BODY MASS INDEX: 35.5 KG/M2 | WEIGHT: 220.88 LBS

## 2019-10-04 DIAGNOSIS — M14.672 CHARCOT'S JOINT OF LEFT FOOT: Primary | ICD-10-CM

## 2019-10-04 DIAGNOSIS — E66.01 SEVERE OBESITY (BMI 35.0-39.9) WITH COMORBIDITY: ICD-10-CM

## 2019-10-04 DIAGNOSIS — E11.49 TYPE II DIABETES MELLITUS WITH NEUROLOGICAL MANIFESTATIONS: ICD-10-CM

## 2019-10-04 DIAGNOSIS — M10.9 GOUT OF LEFT FOOT, UNSPECIFIED CAUSE, UNSPECIFIED CHRONICITY: ICD-10-CM

## 2019-10-04 DIAGNOSIS — I10 ESSENTIAL HYPERTENSION: ICD-10-CM

## 2019-10-04 PROCEDURE — 99999 PR PBB SHADOW E&M-EST. PATIENT-LVL III: ICD-10-PCS | Mod: PBBFAC,,, | Performed by: PODIATRIST

## 2019-10-04 PROCEDURE — 1101F PT FALLS ASSESS-DOCD LE1/YR: CPT | Mod: CPTII,S$GLB,ICN, | Performed by: PODIATRIST

## 2019-10-04 PROCEDURE — 3079F PR MOST RECENT DIASTOLIC BLOOD PRESSURE 80-89 MM HG: ICD-10-PCS | Mod: CPTII,S$GLB,ICN, | Performed by: PODIATRIST

## 2019-10-04 PROCEDURE — 99214 PR OFFICE/OUTPT VISIT, EST, LEVL IV, 30-39 MIN: ICD-10-PCS | Mod: 25,S$GLB,ICN, | Performed by: PODIATRIST

## 2019-10-04 PROCEDURE — 29515 PR APPLY LOWER LEG SPLINT: ICD-10-PCS | Mod: LT,S$GLB,ICN, | Performed by: PODIATRIST

## 2019-10-04 PROCEDURE — 99214 OFFICE O/P EST MOD 30 MIN: CPT | Mod: 25,S$GLB,ICN, | Performed by: PODIATRIST

## 2019-10-04 PROCEDURE — 99999 PR PBB SHADOW E&M-EST. PATIENT-LVL III: CPT | Mod: PBBFAC,,, | Performed by: PODIATRIST

## 2019-10-04 PROCEDURE — 29515 APPLICATION SHORT LEG SPLINT: CPT | Mod: LT,S$GLB,ICN, | Performed by: PODIATRIST

## 2019-10-04 PROCEDURE — 3079F DIAST BP 80-89 MM HG: CPT | Mod: CPTII,S$GLB,ICN, | Performed by: PODIATRIST

## 2019-10-04 PROCEDURE — 3077F SYST BP >= 140 MM HG: CPT | Mod: CPTII,S$GLB,ICN, | Performed by: PODIATRIST

## 2019-10-04 PROCEDURE — 1101F PR PT FALLS ASSESS DOC 0-1 FALLS W/OUT INJ PAST YR: ICD-10-PCS | Mod: CPTII,S$GLB,ICN, | Performed by: PODIATRIST

## 2019-10-04 PROCEDURE — 3077F PR MOST RECENT SYSTOLIC BLOOD PRESSURE >= 140 MM HG: ICD-10-PCS | Mod: CPTII,S$GLB,ICN, | Performed by: PODIATRIST

## 2019-10-04 NOTE — PROGRESS NOTES
Subjective:       Patient ID: Kay Galarza is a 64 y.o. female.    Chief Complaint: Foot Injury (L Foot swelling for the last 2 months, diabetic, PCP Maximino Bee last seen 8/22/19, wearing walking boot,rates pain 10/10.)      HPI: Kay Galarza presents to the clinic today with her son-in-law present.  The patient speaks Cook Islander and a video  was utilized for this interpretation.    Patient states she has been having a red, hot, swollen foot over the last month or so.  She states that it is extremely painful and she has noticed for a change to her foot appearance over this time.  She recently went to the emergency room approximately 10 days ago for increased pain in the left foot and ankle region foot where an x-ray was performed and labs were done.  Labs showed an elevated uric acid and she was treated for gout with indomethacin 3 times daily and hydrocodone.  Over this time she continued to have increased pain and swelling in the left foot and ankle region.  When asked her to point to the location of the pain she points to her mid foot area.  She denies any recent trauma to the area.  Previously had a CT scan of the left foot which showed old fractures.  She states her pain is a 10/10 and has been ambulating in a full-length Cam boot with little improvement.  She has a past medical history of diabetes mellitus type 2 with peripheral neuropathy, retinopathy and macular edema, hypertension, severe obesity.  She denies a recent trauma to the area.    Per her son along who does speak English, she is very active towards the end of the day while at home for an up and ambulatory throughout this time.     Patient's Primary Care Provider is Lucy Negron MD.     Review of patient's allergies indicates:   Allergen Reactions    Pollen extracts        Past Medical History:   Diagnosis Date    Arthritis     Asthma     Cataract     Diabetes mellitus     Diabetic  retinopathy     Hyperlipidemia     Hypertension        Family History   Problem Relation Age of Onset    Cancer Father         Prostate Ca    Hypertension Father     Stroke Father     Diabetes Sister     Glaucoma Mother     Hypertension Mother     Glaucoma Maternal Grandmother     Blindness Neg Hx     Macular degeneration Neg Hx     Retinal detachment Neg Hx     Strabismus Neg Hx        Social History     Socioeconomic History    Marital status:      Spouse name: Not on file    Number of children: Not on file    Years of education: Not on file    Highest education level: Not on file   Occupational History    Not on file   Social Needs    Financial resource strain: Not on file    Food insecurity:     Worry: Not on file     Inability: Not on file    Transportation needs:     Medical: Not on file     Non-medical: Not on file   Tobacco Use    Smoking status: Never Smoker    Smokeless tobacco: Never Used   Substance and Sexual Activity    Alcohol use: No    Drug use: No    Sexual activity: Yes   Lifestyle    Physical activity:     Days per week: Not on file     Minutes per session: Not on file    Stress: Not on file   Relationships    Social connections:     Talks on phone: Not on file     Gets together: Not on file     Attends [employer] service: Not on file     Active member of club or organization: Not on file     Attends meetings of clubs or organizations: Not on file     Relationship status: Not on file   Other Topics Concern    Not on file   Social History Narrative    Not on file       Past Surgical History:   Procedure Laterality Date    CATARACT EXTRACTION W/  INTRAOCULAR LENS IMPLANT Right 07/18/2018    CATARACT EXTRACTION W/  INTRAOCULAR LENS IMPLANT Left 03/13/2019    COLONOSCOPY N/A 12/22/2018    Procedure: COLONOSCOPY;  Surgeon: Zak Dover MD;  Location: [facility];  Service: Endoscopy;  Laterality: N/A;       Review of Systems   Constitutional: Negative for  "activity change, appetite change, chills and fever.   HENT: Negative for sinus pain, sore throat and voice change.    Eyes: Negative for pain, redness and visual disturbance.   Respiratory: Negative for cough and shortness of breath.    Cardiovascular: Negative for chest pain and palpitations.   Gastrointestinal: Negative for diarrhea, nausea and vomiting.   Musculoskeletal: Positive for gait problem and joint swelling (Left naviculocuneiform joint left). Negative for back pain.   Skin: Positive for color change. Negative for pallor, rash and wound.   Neurological: Negative for dizziness, weakness and numbness.   Psychiatric/Behavioral: The patient is not nervous/anxious.          Objective:   BP (!) 140/86 (BP Location: Left arm, Patient Position: Sitting, BP Method: Large (Automatic))   Pulse 98   Ht 5' 6" (1.676 m)   Wt 100.2 kg (220 lb 14.4 oz)   BMI 35.65 kg/m²     CT Foot Without Contrast Left  Narrative: EXAMINATION:  CT FOOT WITHOUT CONTRAST LEFT    CLINICAL HISTORY:  Fracture, foot;Foot pain, chronic, etiol unknown, first study;  Pain in left foot    TECHNIQUE:  Standard thin-section axial images obtained with 2D reformatted sagittal and coronal images.    COMPARISON:  None    FINDINGS:  The left ankle and hindfoot demonstrate no acute fracture or dislocation.  Small posterior and plantar calcaneal enthesophytes are identified.    The bones of the midfoot demonstrate a small chronic fracture of the distal and dorsal aspect of the middle cuneiform at the 2nd TMT joint.  Fracture fragment measures 0.7 cm in maximal dimension and demonstrates minimal dorsal displacement.  The remaining bones of the midfoot are intact.    Bones of the forefoot demonstrate a punctate chronic appearing fracture identified at the plantar and lateral margins of the base of the 1st metatarsal measuring 0.3 cm.  A adjacent punctate fracture fragments identified at the dorsal and lateral margins of the distal aspect of the medial " cuneiform.  The remaining metatarsals are normal.  The sesamoids are normal.  The phalanges are normal.    The tibiotalar and subtalar joints are well preserved.  Talonavicular and calcaneocuboid joints are well preserved.  A small accessory cuboid is noted.  Navicular-cuneiform joints are well preserved.  Low to moderate degenerative change of the medial-medial cuneiform joint which could represent posttraumatic arthritic change.  The remaining tarsal-tarsal joints are well preserved.  Tarsometatarsal joints remain well preserved despite the previously discussed chronic fractures of the 1st and 2nd TMT joints.  The remaining TMT joints are normal.    Metatarsophalangeal joints and interphalangeal joints of the toes are normal.    Soft tissues demonstrate nonspecific mild-to-moderate dorsal and volar subcutaneous soft tissue edema.  Impression: 1. 0.7 cm chronic appearing fracture of the dorsal and distal articular surface of the middle cuneiform at the 2nd TMT joint.  Mild dorsal displacement of fracture fragment.  No significant posttraumatic arthritic change of the 2nd TMT joint.  2. 0.3 cm chronic nondisplaced fracture identified at the plantar medial aspect of the base of the 1st metatarsal.  Adjacent old appearing punctate chip or avulsion fractures identified of the dorsal and lateral margins of the distal articular surface of the medial cuneiform.  Tarsometatarsal joints remain normal in alignment.  No evidence of Lisfranc fracture.  3. Low to moderate degenerative change of the medial-medial cuneiform joint which could reflect posttraumatic arthritic change.  All CT scans at this facility are performed  using dose modulation techniques as appropriate to performed exam including the following:  automated exposure control; adjustment of mA and/or kV according to the patients size (this includes techniques or standardized protocols for targeted exams where dose is matched to indication/reason for exam: i.e.  extremities or head);  iterative reconstruction technique.    Electronically signed by: Antione Baumann  Date:    09/08/2019  Time:    08:34      Physical Exam   Constitutional: She is oriented to person, place, and time. She appears well-developed and well-nourished. No distress.   HENT:   Head: Normocephalic and atraumatic.   Cardiovascular: Normal rate.   Pulses:       Dorsalis pedis pulses are 2+ on the right side, and 2+ on the left side.        Posterior tibial pulses are 3+ on the right side, and 3+ on the left side.   Pulmonary/Chest: Effort normal.   Musculoskeletal:        Right foot: Normal. There is normal range of motion, no tenderness, no swelling and no deformity.        Left foot: Normal. There is deformity (Swelling with palpable medial navicular cuneiform joint on the left foot). There is normal range of motion, no tenderness and no swelling.   Feet:   Right Foot:   Protective Sensation: 10 sites tested. 4 sites sensed.   Skin Integrity: Negative for ulcer, skin breakdown, erythema, warmth, callus or dry skin.   Left Foot:   Protective Sensation: 10 sites tested. 3 sites sensed.   Skin Integrity: Positive for erythema and warmth. Negative for ulcer, skin breakdown, callus or dry skin.   Neurological: She is alert and oriented to person, place, and time. She has normal strength. She displays normal reflexes. A sensory deficit is present. She exhibits normal muscle tone.   Skin: Skin is warm. Capillary refill takes 2 to 3 seconds. No rash noted. She is not diaphoretic. There is erythema. No pallor.   Nursing note and vitals reviewed.      LOWER EXTREMITY PHYSICAL EXAMINATION    VASCULAR: The right DP pulse is 2/4 and the left DP is 2/4. The right PT pulse is3/4 and the left PT pulse is 3/4.  Temperature of the left lower extremity has increased warmth and compared to the right.. Elevation palor is noted. Capillary refill time is less than 3 seconds. Hair growth is sparse to absent to the dorsal  foot and digits.    DERMATOLOGY: Skin is supple, dry and intact. There is erythema noted to the medial aspect of the left foot at the navicular cuneiform joint with edema present throughout the left lower extremity and foot. Patient is increased discomfort and pain on light palpation of this area medially to the foot. There is no breaks in the skin. There is no signs of ulceration noted. Temperature of the skin is quite warm in comparison to the contralateral limb.    NEUROLOGY: Protective sensation is diminished via 5.07 [place] Arnie monofilament with 4/10 on right and 3/10 on left. Proprioception is intact. Sensation to light touch is decreased    ORTHOPEDIC:  Pain on palpation of the medial left foot at the naviculocuneiform joint.  There is increased prominence at this location.  The foot appears to be in a collapsing position with a valgus appearance to the forefoot.  This was compared to the contralateral limb which has a rectus foot type.  The patient has significant pain on attempted range of motion of the foot and lower leg.  She is able to actively dorsiflex and plantar flex the ankle joint but does have discomfort.  Patient was ambulating with full weight-bearing in a Cam boot.  She does not have pain on compression or palpation of the lateral ankle joint. No pain at sinus tarsi.  No pain with range of motion of the 1st metatarsal cuneiform joint.    Assessment:     1. Charcot's joint of left foot    2. Type II diabetes mellitus with neurological manifestations    3. Uncontrolled type 2 diabetes mellitus with both eyes affected by moderate nonproliferative retinopathy and macular edema, with long-term current use of insulin    4. Severe obesity (BMI 35.0-39.9) with comorbidity    5. Essential hypertension    6. Gout of left foot, unspecified cause, unspecified chronicity          Plan:     Charcot's joint of left foot    Type II diabetes mellitus with neurological manifestations    Uncontrolled type 2  diabetes mellitus with both eyes affected by moderate nonproliferative retinopathy and macular edema, with long-term current use of insulin    Severe obesity (BMI 35.0-39.9) with comorbidity    Essential hypertension    Gout of left foot, unspecified cause, unspecified chronicity      Patient seen and evaluated with a  present via video monitoring.  Her son-in-law who does speak English is present in the room as well. I reviewed the x-rays and CTs which were previously taken.  X-rays do not show any acute fracture dislocation.  The CT scan does show an old fracture to the 2nd tarsometatarsal joint and Lisfranc joint dorsally.  The joint spaces appear to be intact to the ankle and hindfoot without significant diastasis.     Patient has a history of diabetes mellitus with peripheral neuropathy and diabetic retinopathy.  In understanding the timeline of the pain which has occurred for 1-2 months now with increased swelling, redness, edema, I am concerned that she may be in a stage I Charcot event which is causing the significant discomfort.  She did have an elevated uric acid in the emergency room however she has been or indomethacin treatments for this for 10 days now without resolution.  She has also been taking hydrocodone which she was prescribed in the emergency room which also has not helped with this discomfort.  We discussed possible ankle arthrocentesis for a gouty crystals however given that the symptoms would have usually resolved over this period of time I more convinced of a Charcot event.    Patient has uncontrolled diabetes mellitus with increased redness, swelling, warmth centralized to the navicular cuneiform joint or tarsi 0 metatarsal joint medially on the left foot which is consistent with the Brosky I classification for Charcot based on location.    I continue to educate the patient on the importance of diabetes mellitus with control of hyperglycemia and the ramifications of not healing  or development of Charcot associated with these.  We also discussed the importance of continuing nonweightbearing to prevent from developing an open wound which could get an infection and lead to more complex situation.    In treating stage I Charcot (acute), for we will apply a a protective device to the lower extremity and have patient be completely nonweightbearing on the affected lower extremity.  A consider doing a total contact cast however I am concerned about the swelling that she may continue to have.  I discussed with her son-in-law, who is poking lifts, about continuing her in the Cam boot and having her nonweightbearing however he was very concerned that she would continue to walk on this affected extremity against recommendations.  I applied a short leg Sir Antione Xie compression wrap with a posterior splint to assist with swelling and protection of left lower extremity.  I recommend that she continues to elevate the lower extremities to help with the inflammation which will help with thereby pain.  She will also be completely nonweightbearing on the affected extremity and crutches were dispensed.  Signed long discussed purchasing a knee scooter on [employer] as well.  She will keep this on for 2 weeks and not take showers or get this wet.  She will follow up in clinic in 2 weeks at which point we will repeat x-rays and reassess the foot.

## 2019-10-04 NOTE — PATIENT INSTRUCTIONS
Tratamiento del pie de Tayo  La única manera de detener el avance de la enfermedad de Tayo es dejar de apoyar el pie el tiempo suficiente para que sane. Shelby médico le recetará un tratamiento para ayudarle a recuperarse.  Disminución de la carga del pie  Mientras se recupera de paris fractura, es indispensable que usted no apoye ningún peso en el pie para disminuir shelby carga. Shelby médico podría pedirle que se abstenga de caminar, o indicarle dispositivos especiales robert muletas. Recuerde: ya que el pie de Tayo suele ser indoloro, aunque usted se sienta perfectamente, no apoye el pie por ningún motivo hasta que shelby médico le diga que puede hacerlo.  Uso de dispositivos especiales  Ciertos dispositivos especiales pueden ayudarle a disminuir el trabajo del pie, para que los huesos se suelden correctamente. Un yeso le permite caminar sin tener que poner peso en el pie. Omar vez necesite andar en silla de meche, muletas  o aparato ortopédico (con o sin un yeso). Podrían también recetarle paris ortesis dinámica (Charcot restraint orthotic walker o CROW) para ayudarle a proteger shelby pie mientras se mosiliza. Además, es posible que le rabia usar otros dispositivos para mantener elevado el pie mientras está en shelby casa, o paris venda elástica o media especial que comprime el pie para reducir la hinchazón y ayudar con la recuperación.  Uso de calzado a la medida  Paris vez que shelby pie haya sanado, omar vez tenga que usar zapatos o plantillas (ortosis) hechos a la medida. El calzado a la medida se fabrica para ajustarse perfectamente a la forma de shelby pie, lo cual puede evitar que se formen úlceras por la fricción de los zapatos comerciales. Si le aparecen úlceras a pesar de usar el calzado a la medida, omar vez tengan que hacerle paris cirugía para corregir la deformidad.  Date Last Reviewed: 9/21/2015  © 8384-9756 [employer]. [address] [place] [address], [place] [address]. Todos los derechos reservados. Esta información no  pretende sustituir la atención médica profesional. Sólo shelby médico puede diagnosticar y tratar un problema de debby.

## 2019-10-09 ENCOUNTER — TELEPHONE (OUTPATIENT)
Dept: PODIATRY | Facility: CLINIC | Age: 64
End: 2019-10-09

## 2019-10-09 DIAGNOSIS — M14.672 CHARCOT'S JOINT OF LEFT FOOT: Primary | ICD-10-CM

## 2019-10-09 NOTE — TELEPHONE ENCOUNTER
Returned daughter's phone call. There was no answer. I left a voice message requesting a call back.    ----- Message from Holland Jensen sent at 10/9/2019  2:09 PM CDT -----  ..Type:  Patient Returning Call    Who Called:Cass ( daughter )   Who Left Message for Patient:  Does the patient know what this is regarding?: walker   Would the patient rather a call back or a response via [facility]? Call back   Best Call Back Number:[phone]  Additional Information: Cass ( daughter ) is requesting a call from nurse to a knee scooter due to pt is un able to get around

## 2019-10-14 DIAGNOSIS — M14.672 CHARCOT'S JOINT OF LEFT FOOT: Primary | ICD-10-CM

## 2019-10-21 ENCOUNTER — HOSPITAL ENCOUNTER (OUTPATIENT)
Dept: RADIOLOGY | Facility: HOSPITAL | Age: 64
Discharge: HOME | End: 2019-10-21
Attending: PODIATRIST
Payer: MEDICARE

## 2019-10-21 ENCOUNTER — OFFICE VISIT (OUTPATIENT)
Dept: PODIATRY | Facility: CLINIC | Age: 64
End: 2019-10-21
Payer: MEDICARE

## 2019-10-21 VITALS
DIASTOLIC BLOOD PRESSURE: 105 MMHG | BODY MASS INDEX: 35.5 KG/M2 | HEART RATE: 108 BPM | HEIGHT: 66 IN | WEIGHT: 220.88 LBS | SYSTOLIC BLOOD PRESSURE: 158 MMHG

## 2019-10-21 DIAGNOSIS — S93.325S LISFRANC DISLOCATION, LEFT, SEQUELA: ICD-10-CM

## 2019-10-21 DIAGNOSIS — M14.672 CHARCOT'S JOINT OF LEFT FOOT: ICD-10-CM

## 2019-10-21 DIAGNOSIS — E11.49 TYPE II DIABETES MELLITUS WITH NEUROLOGICAL MANIFESTATIONS: ICD-10-CM

## 2019-10-21 DIAGNOSIS — S93.325S LISFRANC DISLOCATION, LEFT, SEQUELA: Primary | ICD-10-CM

## 2019-10-21 PROCEDURE — 3080F DIAST BP >= 90 MM HG: CPT | Mod: CPTII,S$GLB,, | Performed by: PODIATRIST

## 2019-10-21 PROCEDURE — 73700 CT LOWER EXTREMITY W/O DYE: CPT | Mod: TC,LT

## 2019-10-21 PROCEDURE — 73630 X-RAY EXAM OF FOOT: CPT | Mod: 26,LT,, | Performed by: RADIOLOGY

## 2019-10-21 PROCEDURE — 73630 X-RAY EXAM OF FOOT: CPT | Mod: TC,LT

## 2019-10-21 PROCEDURE — 3080F PR MOST RECENT DIASTOLIC BLOOD PRESSURE >= 90 MM HG: ICD-10-PCS | Mod: CPTII,S$GLB,, | Performed by: PODIATRIST

## 2019-10-21 PROCEDURE — 3077F PR MOST RECENT SYSTOLIC BLOOD PRESSURE >= 140 MM HG: ICD-10-PCS | Mod: CPTII,S$GLB,, | Performed by: PODIATRIST

## 2019-10-21 PROCEDURE — 99999 PR PBB SHADOW E&M-EST. PATIENT-LVL III: ICD-10-PCS | Mod: PBBFAC,,, | Performed by: PODIATRIST

## 2019-10-21 PROCEDURE — 29405 APPL SHORT LEG CAST: CPT | Mod: LT,S$GLB,, | Performed by: PODIATRIST

## 2019-10-21 PROCEDURE — 99214 PR OFFICE/OUTPT VISIT, EST, LEVL IV, 30-39 MIN: ICD-10-PCS | Mod: 25,S$GLB,, | Performed by: PODIATRIST

## 2019-10-21 PROCEDURE — 73630 XR FOOT COMPLETE 3 VIEW LEFT: ICD-10-PCS | Mod: 26,LT,, | Performed by: RADIOLOGY

## 2019-10-21 PROCEDURE — 1101F PT FALLS ASSESS-DOCD LE1/YR: CPT | Mod: CPTII,S$GLB,, | Performed by: PODIATRIST

## 2019-10-21 PROCEDURE — 1101F PR PT FALLS ASSESS DOC 0-1 FALLS W/OUT INJ PAST YR: ICD-10-PCS | Mod: CPTII,S$GLB,, | Performed by: PODIATRIST

## 2019-10-21 PROCEDURE — 99214 OFFICE O/P EST MOD 30 MIN: CPT | Mod: 25,S$GLB,, | Performed by: PODIATRIST

## 2019-10-21 PROCEDURE — 99999 PR PBB SHADOW E&M-EST. PATIENT-LVL III: CPT | Mod: PBBFAC,,, | Performed by: PODIATRIST

## 2019-10-21 PROCEDURE — 29405 PR APPLY SHORT LEG CAST: ICD-10-PCS | Mod: LT,S$GLB,, | Performed by: PODIATRIST

## 2019-10-21 PROCEDURE — 3077F SYST BP >= 140 MM HG: CPT | Mod: CPTII,S$GLB,, | Performed by: PODIATRIST

## 2019-10-21 NOTE — PROGRESS NOTES
Subjective:       Patient ID: Kay Galarza is a 64 y.o. female.    Chief Complaint: Follow-up (2 wk Charcot Left Foot)    HPI: Kay Galarza presents to the office today for a follow-up appointment of her left foot pain.  Patient was seen approximately 2 weeks ago without having a traumatic injury or known event and subsequent developed increased pain and tenderness in the left foot with structural changes medially.  She presents today in clinic with her son-in-law, who speaks English, and a  provided.  In discussion with the patient she states that she has been minimally walking on the affected extremity.  And reports that she has not taken off the splint.  Patient reports no pain to the left foot.    In discussion with her son-in-law, who she lives with, he states that she had previously put a sandal on the bottom of the splint and had been ambulating in the house.  She has also ambulated in the CAM boot over the splint at home. He also reports that she did remove the splint several days ago to wash the bandages since they were dirty.  I inquired about a knee scooter, but patient son-in-law stated that they did not purchase the appropriate one and she has been ambulating in the house with a walker. I did also discuss this with the patient's daughter, Cass, over the phone while in the appointment.     A [place]an video  was utilized during this interview.    Review of patient's allergies indicates:   Allergen Reactions    Pollen extracts        Past Medical History:   Diagnosis Date    Arthritis     Asthma     Cataract     Diabetes mellitus     Diabetic retinopathy     Hyperlipidemia     Hypertension        Family History   Problem Relation Age of Onset    Cancer Father         Prostate Ca    Hypertension Father     Stroke Father     Diabetes Sister     Glaucoma Mother     Hypertension Mother     Glaucoma Maternal Grandmother     Blindness Neg  Hx     Macular degeneration Neg Hx     Retinal detachment Neg Hx     Strabismus Neg Hx        Social History     Socioeconomic History    Marital status:      Spouse name: Not on file    Number of children: Not on file    Years of education: Not on file    Highest education level: Not on file   Occupational History    Not on file   Social Needs    Financial resource strain: Not on file    Food insecurity:     Worry: Not on file     Inability: Not on file    Transportation needs:     Medical: Not on file     Non-medical: Not on file   Tobacco Use    Smoking status: Never Smoker    Smokeless tobacco: Never Used   Substance and Sexual Activity    Alcohol use: No    Drug use: No    Sexual activity: Yes   Lifestyle    Physical activity:     Days per week: Not on file     Minutes per session: Not on file    Stress: Not on file   Relationships    Social connections:     Talks on phone: Not on file     Gets together: Not on file     Attends Baptism service: Not on file     Active member of club or organization: Not on file     Attends meetings of clubs or organizations: Not on file     Relationship status: Not on file   Other Topics Concern    Not on file   Social History Narrative    Not on file       Past Surgical History:   Procedure Laterality Date    CATARACT EXTRACTION W/  INTRAOCULAR LENS IMPLANT Right 07/18/2018    CATARACT EXTRACTION W/  INTRAOCULAR LENS IMPLANT Left 03/13/2019    COLONOSCOPY N/A 12/22/2018    Procedure: COLONOSCOPY;  Surgeon: Zak Dover MD;  Location: [facility];  Service: Endoscopy;  Laterality: N/A;       Review of Systems   Constitutional: Negative for activity change, appetite change, chills and fever.   HENT: Negative for sinus pain, sore throat and voice change.    Eyes: Negative for pain, redness and visual disturbance.   Respiratory: Negative for cough and shortness of breath.    Cardiovascular: Negative for chest pain and palpitations.  "  Gastrointestinal: Negative for diarrhea, nausea and vomiting.   Musculoskeletal: Positive for gait problem (Difficulty remaining nonweightbearing as directed on left lower extremity). Negative for back pain and joint swelling.   Skin: Negative for color change and wound.   Neurological: Negative for dizziness, weakness and numbness.   Psychiatric/Behavioral: The patient is not nervous/anxious.           Objective:   BP (!) 158/105   Pulse 108   Ht 5' 6" (1.676 m)   Wt 100.2 kg (220 lb 14.4 oz)   BMI 35.65 kg/m²     CT Foot Without Contrast Left  Narrative: EXAMINATION:  CT FOOT WITHOUT CONTRAST LEFT    CLINICAL HISTORY:  Dislocation of tarsometatarsal joint of left foot, sequela, <Reason For Exam>    TECHNIQUE:  Standard CT technique.  All CT scans at this facility are performed  using dose modulation techniques as appropriate to performed exam including the following:  automated exposure control; adjustment of mA and/or kV according to the patients size (this includes techniques or standardized protocols for targeted exams where dose is matched to indication/reason for exam: i.e. extremities or head);  iterative reconstruction technique.    COMPARISON:  CT scan 09/18/2019 as well as plain films 10/21/2019    FINDINGS:  There is marked deformity now seen along the Lisfranc joint with lateral dislocation or subluxation seen involving the forefoot compared to the tarsal bones.  There appear to be multiple small erosive changes seen involving the cuneiform bones as well as the bases of the 1st 2nd 3rd and 4th metatarsals.  There is diffuse soft tissue thickening seen related to soft tissues around the midfoot.  No focal fluid collections.  There also appears to be interval development of benign appearing periosteal reaction related to the 2nd and 3rd metatarsal shafts.  Impression: Marked deformity involving the Lisfranc joint with lateral dislocation subluxation.  There are now erosive changes related to the " cuneiform bones as well as the bases of the 1st 2nd 3rd and 4th metatarsals.  Findings could be related to a Charcot joint however fracture with secondary infection cannot be excluded.  Correlate.    Electronically signed by: Kitty Horton MD  Date:    10/21/2019  Time:    11:27  X-Ray Foot Complete Left  Narrative: EXAMINATION:  XR FOOT COMPLETE 3 VIEW LEFT    CLINICAL HISTORY:  .  Charcot's joint, left ankle and foot    TECHNIQUE:  AP, lateral and oblique views of the left foot were performed.    COMPARISON:  The CT from 09/08/2019.    FINDINGS:  There is Lisfranc injury with lateral displacement of the base of the 2nd metatarsal with respect to the middle cuneiform.  There is also associated lateral displacement of the 1st metatarsal.  Density and/or debris is noted along the dorsal aspect of the midfoot.  There is surrounding soft tissue swelling.  Scattered multi articular degenerative changes including at the 1st MTP joint and DIP joints and at the junction of the midfoot with the forefoot.  Dorsal and plantar calcaneal enthesophytes are seen.    This report was flagged in Epic as abnormal.  Impression: As above    Electronically signed by: Vivek eWeks MD  Date:    10/21/2019  Time:    09:11       Physical Exam   Constitutional: She is oriented to person, place, and time. She appears well-developed and well-nourished. No distress.   HENT:   Head: Normocephalic and atraumatic.   Cardiovascular: Normal rate.   Pulses:       Dorsalis pedis pulses are 3+ on the right side, and 3+ on the left side.        Posterior tibial pulses are 2+ on the right side, and 2+ on the left side.   Pulmonary/Chest: Effort normal.   Musculoskeletal: She exhibits edema and deformity (Medial dislocation of the navicular or cuneiform medially with lateral deviation of the metatarsals). She exhibits no tenderness.        Left foot: There is swelling and deformity. There is no tenderness, no bony tenderness and normal capillary  refill.   Feet:   Right Foot:   Protective Sensation: 10 sites tested. 2 sites sensed.   Left Foot:   Protective Sensation: 10 sites tested. 0 sites sensed.   Skin Integrity: Positive for warmth. Negative for ulcer, blister, skin breakdown, erythema, callus or dry skin.   Neurological: She is alert and oriented to person, place, and time. She displays no atrophy. A sensory deficit is present. She exhibits normal muscle tone. Gait abnormal.   Skin: Capillary refill takes 2 to 3 seconds. She is not diaphoretic.   Nursing note and vitals reviewed.     LOWER EXTREMITY PHYSICAL EXAMINATION    Vascular: Capillary refill time is intact. Edema remains on the left foot and lower leg.  Proximal to distal temperature is warm to warm. Palpation of pulses to the lower extremity on the left and right. The left dorsalis pedis pulse is 3/4 and on the right is 3/4. The left posterior tibial pulse is 2/4 on the left and is 2/4 on the right. Hair growth is diminished to absent on the bilateral dorsal foot and at the digits.      Neurological: Sensation to light touch is absent. Proprioception is diminshed, bilateral. Sensation to pin prick is reduced to absent. Vibratory sensation is diminished to the left and right lower extremity. Examination with 5.07 [place] Arnie monofilament reveals that protective sensation is 0/ 10 to the left and  2/10 to the right plantar surfaces of the foot and digits, as the patient has no sensation/detection at greater than 4 distinct points of contact.     Musculoskeletal:  Patient is able to actively dorsiflex and plantar flex the at the ankle joint without pain.  There is medial deviation of the midfoot in relation to the forefoot.  It appears that the navicular or the cuneiform is medially dislocated with lateral deviation of the metatarsals.  Patient does not have any pain on palpation of the affected lower extremity on the left foot. Patient has been ambulating against medical advice as there is  dirty noted to the plantar surface of the posterior splint.     Dermatological:  Skin is thin, shiny, and atrophic.  There is slight erythema noted to the medial aspect of the left midfoot region.  There is no open wounds or tenting of the skin.  There are no ulcerations, calluses, or lesions noted to the dorsal or plantar aspect of the left foot.  Hair growth is sparse to absent.    Imaging:     Results for orders placed during the hospital encounter of 10/21/19   X-Ray Foot Complete Left    Narrative EXAMINATION:  XR FOOT COMPLETE 3 VIEW LEFT    CLINICAL HISTORY:  .  Charcot's joint, left ankle and foot    TECHNIQUE:  AP, lateral and oblique views of the left foot were performed.    COMPARISON:  The CT from 09/08/2019.    FINDINGS:  There is Lisfranc injury with lateral displacement of the base of the 2nd metatarsal with respect to the middle cuneiform.  There is also associated lateral displacement of the 1st metatarsal.  Density and/or debris is noted along the dorsal aspect of the midfoot.  There is surrounding soft tissue swelling.  Scattered multi articular degenerative changes including at the 1st MTP joint and DIP joints and at the junction of the midfoot with the forefoot.  Dorsal and plantar calcaneal enthesophytes are seen.    This report was flagged in Epic as abnormal.      Impression As above      Electronically signed by: Vivek Weeks MD  Date:    10/21/2019  Time:    09:11            Assessment:     1. Lisfranc dislocation, left, sequela    2. Charcot's joint of left foot    3. Type II diabetes mellitus with neurological manifestations        Plan:     Lisfranc dislocation, left, sequela  -     CT Foot Without Contrast Left; Future; Expected date: 10/28/2019    Charcot's joint of left foot    Type II diabetes mellitus with neurological manifestations      A serious discussion was undertaken with the patient using a video  about the importance of being non weight-bearing on the left lower  extremity.  I reminded her multiple times that the weight-bearing could lead to breakdown of the skin, and development of wounds, which could ultimately result in bone infection.  I continue to encourage her to remain absolutely nonweightbearing on the left lower extremity.  In discussion with the patient's son-in-law, who speaks English, he states that the patient has been ultimately walking on the affected extremity against medical advice.  We discussed the importance of having a way for the patient to be mobile while in the house.  We discussed multiple times about a knee scooter, but patient states that she did not want to put her weight on her knee.  She has a walker at home.    A CT scan was also ordered after the follow-up x-rays demonstrated medial dislocation of the medial cuneiform with lateral deviation of the 1st and 2nd metatarsals consistent with a Lisfranc dislocation.  This is likely result of noncompliance with nonweightbearing and Charcot neuroarthropathy.  We did discuss surgical intervention, however patient stated that at this time, she would like other options.  We also discussed bracing once the residual inflammation has calmed down and x-rays shows remodeling.    Due to the potential of developing wounds associated with weight-bearing, the patient was placed in a short-leg cast on the left lower extremity.  Patient follow-up in 2-3 weeks for review of CT scan and cast change.  Continue discussed with the patient that she may ultimately require surgical intervention to repair the left foot      Future Appointments   Date Time Provider Department Center   10/25/2019 10:20 AM MALLY Garcia OD [facility]   11/4/2019  9:00 AM Martha Abebe DPM [facility]

## 2019-11-04 ENCOUNTER — OFFICE VISIT (OUTPATIENT)
Dept: PODIATRY | Facility: CLINIC | Age: 64
End: 2019-11-04
Payer: MEDICARE

## 2019-11-04 ENCOUNTER — HOSPITAL ENCOUNTER (OUTPATIENT)
Dept: RADIOLOGY | Facility: HOSPITAL | Age: 64
Discharge: HOME | End: 2019-11-04
Attending: PODIATRIST
Payer: MEDICARE

## 2019-11-04 VITALS
DIASTOLIC BLOOD PRESSURE: 92 MMHG | HEIGHT: 66 IN | WEIGHT: 220 LBS | BODY MASS INDEX: 35.36 KG/M2 | SYSTOLIC BLOOD PRESSURE: 143 MMHG | HEART RATE: 82 BPM

## 2019-11-04 DIAGNOSIS — S93.325S LISFRANC DISLOCATION, LEFT, SEQUELA: ICD-10-CM

## 2019-11-04 DIAGNOSIS — E11.49 TYPE II DIABETES MELLITUS WITH NEUROLOGICAL MANIFESTATIONS: ICD-10-CM

## 2019-11-04 DIAGNOSIS — S91.109A OPEN WOUND OF TOE OF LEFT FOOT: ICD-10-CM

## 2019-11-04 DIAGNOSIS — M14.672 CHARCOT'S JOINT OF LEFT FOOT: Primary | ICD-10-CM

## 2019-11-04 DIAGNOSIS — M14.672 CHARCOT'S JOINT OF LEFT FOOT: ICD-10-CM

## 2019-11-04 PROCEDURE — 99214 PR OFFICE/OUTPT VISIT, EST, LEVL IV, 30-39 MIN: ICD-10-PCS | Mod: S$GLB,,, | Performed by: PODIATRIST

## 2019-11-04 PROCEDURE — 3077F SYST BP >= 140 MM HG: CPT | Mod: CPTII,S$GLB,, | Performed by: PODIATRIST

## 2019-11-04 PROCEDURE — 73630 X-RAY EXAM OF FOOT: CPT | Mod: 26,LT,, | Performed by: RADIOLOGY

## 2019-11-04 PROCEDURE — 99999 PR PBB SHADOW E&M-EST. PATIENT-LVL III: CPT | Mod: PBBFAC,,, | Performed by: PODIATRIST

## 2019-11-04 PROCEDURE — 3080F PR MOST RECENT DIASTOLIC BLOOD PRESSURE >= 90 MM HG: ICD-10-PCS | Mod: CPTII,S$GLB,, | Performed by: PODIATRIST

## 2019-11-04 PROCEDURE — 1101F PT FALLS ASSESS-DOCD LE1/YR: CPT | Mod: CPTII,S$GLB,, | Performed by: PODIATRIST

## 2019-11-04 PROCEDURE — 1101F PR PT FALLS ASSESS DOC 0-1 FALLS W/OUT INJ PAST YR: ICD-10-PCS | Mod: CPTII,S$GLB,, | Performed by: PODIATRIST

## 2019-11-04 PROCEDURE — 99214 OFFICE O/P EST MOD 30 MIN: CPT | Mod: S$GLB,,, | Performed by: PODIATRIST

## 2019-11-04 PROCEDURE — 73630 XR FOOT COMPLETE 3 VIEW LEFT: ICD-10-PCS | Mod: 26,LT,, | Performed by: RADIOLOGY

## 2019-11-04 PROCEDURE — 3077F PR MOST RECENT SYSTOLIC BLOOD PRESSURE >= 140 MM HG: ICD-10-PCS | Mod: CPTII,S$GLB,, | Performed by: PODIATRIST

## 2019-11-04 PROCEDURE — 3080F DIAST BP >= 90 MM HG: CPT | Mod: CPTII,S$GLB,, | Performed by: PODIATRIST

## 2019-11-04 PROCEDURE — 73630 X-RAY EXAM OF FOOT: CPT | Mod: TC,LT

## 2019-11-04 PROCEDURE — 99999 PR PBB SHADOW E&M-EST. PATIENT-LVL III: ICD-10-PCS | Mod: PBBFAC,,, | Performed by: PODIATRIST

## 2019-11-04 RX ORDER — MUPIROCIN 20 MG/G
OINTMENT TOPICAL 2 TIMES DAILY
Status: DISCONTINUED | OUTPATIENT
Start: 2019-11-04 | End: 2021-01-27 | Stop reason: HOSPADM

## 2019-11-04 RX ORDER — CEPHALEXIN 500 MG/1
500 CAPSULE ORAL EVERY 8 HOURS
Qty: 21 CAPSULE | Refills: 0 | Status: SHIPPED | OUTPATIENT
Start: 2019-11-04 | End: 2019-11-11

## 2019-11-04 NOTE — PROGRESS NOTES
Subjective:       Patient ID: Kay Galarza is a 64 y.o. female.    Chief Complaint: Follow-up (2 week charcot foot; pt presents with wound to dorsal aspect to left great toe; pt reports cast rubbed the wound; pt reports the wound doesn't hurt; pt reports no pain to foot/ankle.)    HPI: Kay Galarza presents to the office with her son present to evaluate the left foot. She states that the cast rubbed a wound into the top of the left big toe. Per patient, states that she has been compliant with the nonweightbearing.  She has been utilizing a knee scooter to continue protecting the left foot.  Patient reports that she is having 0/10 pain to the left foot and ankle     Aura Zavala (nursing manager) was able to provide [place]an translation as the video monitor was not working properly.     Review of patient's allergies indicates:   Allergen Reactions    Pollen extracts        Past Medical History:   Diagnosis Date    Arthritis     Asthma     Cataract     Diabetes mellitus     Diabetic retinopathy     Hyperlipidemia     Hypertension        Family History   Problem Relation Age of Onset    Cancer Father         Prostate Ca    Hypertension Father     Stroke Father     Diabetes Sister     Glaucoma Mother     Hypertension Mother     Glaucoma Maternal Grandmother     Blindness Neg Hx     Macular degeneration Neg Hx     Retinal detachment Neg Hx     Strabismus Neg Hx        Social History     Socioeconomic History    Marital status:      Spouse name: Not on file    Number of children: Not on file    Years of education: Not on file    Highest education level: Not on file   Occupational History    Not on file   Social Needs    Financial resource strain: Not on file    Food insecurity:     Worry: Not on file     Inability: Not on file    Transportation needs:     Medical: Not on file     Non-medical: Not on file   Tobacco Use    Smoking status: Never Smoker     "Smokeless tobacco: Never Used   Substance and Sexual Activity    Alcohol use: No    Drug use: No    Sexual activity: Yes   Lifestyle    Physical activity:     Days per week: Not on file     Minutes per session: Not on file    Stress: Not on file   Relationships    Social connections:     Talks on phone: Not on file     Gets together: Not on file     Attends Anabaptism service: Not on file     Active member of club or organization: Not on file     Attends meetings of clubs or organizations: Not on file     Relationship status: Not on file   Other Topics Concern    Not on file   Social History Narrative    Not on file       Past Surgical History:   Procedure Laterality Date    CATARACT EXTRACTION W/  INTRAOCULAR LENS IMPLANT Right 07/18/2018    CATARACT EXTRACTION W/  INTRAOCULAR LENS IMPLANT Left 03/13/2019    COLONOSCOPY N/A 12/22/2018    Procedure: COLONOSCOPY;  Surgeon: Zak Dover MD;  Location: [facility];  Service: Endoscopy;  Laterality: N/A;       Review of Systems   Constitutional: Negative for activity change, appetite change, chills and fever.   HENT: Negative for sinus pain, sore throat and voice change.    Eyes: Negative for pain, redness and visual disturbance.   Respiratory: Negative for cough and shortness of breath.    Cardiovascular: Negative for chest pain and palpitations.   Gastrointestinal: Negative for diarrhea, nausea and vomiting.   Musculoskeletal: Positive for gait problem. Negative for back pain and joint swelling.   Skin: Positive for wound. Negative for color change, pallor and rash.   Neurological: Negative for dizziness, weakness and numbness.   Psychiatric/Behavioral: The patient is not nervous/anxious.           Objective:   BP (!) 143/92 (BP Location: Left arm, Patient Position: Sitting)   Pulse 82   Ht 5' 6" (1.676 m)   Wt 99.8 kg (220 lb)   BMI 35.51 kg/m²     X-Ray Foot Complete Left  Narrative: EXAMINATION:  XR FOOT COMPLETE 3 VIEW LEFT    CLINICAL " HISTORY:  evaluate worsening of Charcot event with TMTJ dislocation;. Dislocation of tarsometatarsal joint of left foot, sequela    TECHNIQUE:  AP, lateral, and oblique views of the foot were performed.    COMPARISON:  10/21/2019    FINDINGS:  There is a Lisfranc fracture dislocation again noted.  With lateral displacement the 2nd through 5th metatarsals and lateral displacement of the 1st metatarsal also noted.  On the lateral film there are is the suggestion of some debris versus fracture fragments dorsal to the midfoot.  The alignment is thought to be similar to the pre casting films from 10/21/2019. Dorsal and plantar calcaneal enthesophytes are present.  Impression: As above    Electronically signed by: Oracio Bone DO  Date:    11/04/2019  Time:    09:17       Physical Exam   LOWER EXTREMITY PHYSICAL EXAMINATION    Vascular:  Capillary refill is intact. Edema remains but is decreased the left foot and lower leg.  Proximal to distal temperature is warm to warm to touch.  Palpation of pulses to the left lower extremity demonstrating dorsalis pedis 3/4 and posterior tibial 2/4.  Pedal hair growth is diminished to the dorsal aspect the left foot digits     Neurological:  Sensation light touch is absent.  Proprioception is diminished.  Sensation to pinprick is absent.  Vibratory sensation is diminished.  Examination was 5.07 [place]-Arnie monofilament demonstrating 0/10 to left foot.    Musculoskeletal:  Patient is able to actively dorsiflex and plantar flex at the ankle joint without pain.  There is medial deviation of the midfoot in relation to the forefoot which has been constant since last appointment.  The medial cuneiform is dislocated medially in relation to the metatarsals.  Patient does not have pain to the lower extremity.  The cast has been dorsally proximal to the metatarsophalangeal joints as there is a wound which has developed likely associated with friction.    Dermatological:  Skin is thin,  shiny, and atrophic.  Slight erythema remains to the medial aspect of the left midfoot, however this is decreased.  There is no open wounds or tenting of the skin at this location.  There is an open wound dorsally over the 1st metatarsophalangeal joint as there was friction with the cast. This wound is limited to the breakdown of skin. There is a slight increase in erythema however there is no increase in temperature or drainage, purulence or streaking.  The wound does not probe to deep soft tissue, tendon, or bone.    Imaging:     Results for orders placed during the hospital encounter of 11/04/19   X-Ray Foot Complete Left    Narrative EXAMINATION:  XR FOOT COMPLETE 3 VIEW LEFT    CLINICAL HISTORY:  evaluate worsening of Charcot event with TMTJ dislocation;. Dislocation of tarsometatarsal joint of left foot, sequela    TECHNIQUE:  AP, lateral, and oblique views of the foot were performed.    COMPARISON:  10/21/2019    FINDINGS:  There is a Lisfranc fracture dislocation again noted.  With lateral displacement the 2nd through 5th metatarsals and lateral displacement of the 1st metatarsal also noted.  On the lateral film there are is the suggestion of some debris versus fracture fragments dorsal to the midfoot.  The alignment is thought to be similar to the pre casting films from 10/21/2019. Dorsal and plantar calcaneal enthesophytes are present.      Impression As above      Electronically signed by: Oracio Bone DO  Date:    11/04/2019  Time:    09:17        No results found for this or any previous visit.        Assessment:     1. Charcot's joint of left foot    2. Lisfranc dislocation, left, sequela    3. Open wound of toe of left foot    4. Type II diabetes mellitus with neurological manifestations        Plan:     Charcot's joint of left foot    Lisfranc dislocation, left, sequela    Open wound of toe of left foot    Type II diabetes mellitus with neurological manifestations    Other orders  -     cephALEXin  (KEFLEX) 500 MG capsule; Take 1 capsule (500 mg total) by mouth every 8 (eight) hours. for 7 days  Dispense: 21 capsule; Refill: 0  -     mupirocin 2 % ointment     X-rays were taken today in clinic to evaluate the changes due to Charcot foot deformity with Lisfranc dislocation.  These were also compared to CT scans taken from 10/21.  Both her showing Lisfranc joint disruption with lateral dislocation and subluxation of the forefoot in relation to the tarsal bones.  There are multiple small fractures and fragments noted as well. It appears the 2nd and 3rd metatarsals are deviated proximally and laterally from the adjacent cuneiform.  There are so erosive changes to the bases of the 1st, 2nd, 3rd metatarsals which is consistent with Charcot neuroarthropathy.    Discussed options spur both surgically and nonsurgically to help correct this pathology.  We discussed bracing, I do not recommend as her foot is destabilized and could worsen which could lead to development of further wounds or skin breakdown and can lead to increased risk of bone infection.  We also discussed surgical intervention which would result in fusions of the tarsometatarsal joints 1, 2, 3 with robust hardware and absolute nonweightbearing throughout the postoperative period to prevent from recurrence and stabilize the midfoot.  Patient and son, who spoke English, are interested in surgically reconstructing the left foot. Patient would like to wait until January for this to be performed.  The cast was removed and local wound care was performed with Betadine paint, 4 x 4, and Kerlix.  A prescription for mupirocin was sent to the pharmacy as well as states cephalexin to prevent any infection.  Patient perform daily dressing changes.    Patient will need to be partial to nonweightbearing on the left foot with a Cam boot until surgical intervention.  Recommend patient continues to monitor the foot to make sure there is no further changes or dislocations  which could lead to development of wound.    Patient follow-up in 1 month       Future Appointments   Date Time Provider Department Center   12/19/2019  9:20 AM Martha Abebe DPM [facility]

## 2019-11-21 ENCOUNTER — PATIENT OUTREACH (OUTPATIENT)
Dept: ADMINISTRATIVE | Facility: HOSPITAL | Age: 64
End: 2019-11-21

## 2019-11-21 NOTE — PROGRESS NOTES
DIABETES OUTREACH: Attempting to contact pt to schedule over due HM & F/U. Unable to reach patient at this time. Left voicemail.

## 2019-12-09 RX ORDER — GLIMEPIRIDE 2 MG/1
TABLET ORAL
Qty: 30 TABLET | Refills: 2 | Status: SHIPPED | OUTPATIENT
Start: 2019-12-09 | End: 2019-12-10

## 2019-12-10 ENCOUNTER — OFFICE VISIT (OUTPATIENT)
Dept: FAMILY MEDICINE | Facility: CLINIC | Age: 64
End: 2019-12-10
Attending: FAMILY MEDICINE
Payer: MEDICARE

## 2019-12-10 ENCOUNTER — HOSPITAL ENCOUNTER (OUTPATIENT)
Dept: RADIOLOGY | Facility: HOSPITAL | Age: 64
Discharge: HOME | End: 2019-12-10
Attending: FAMILY MEDICINE
Payer: MEDICARE

## 2019-12-10 ENCOUNTER — TELEPHONE (OUTPATIENT)
Dept: FAMILY MEDICINE | Facility: CLINIC | Age: 64
End: 2019-12-10

## 2019-12-10 VITALS
HEART RATE: 101 BPM | BODY MASS INDEX: 34.91 KG/M2 | OXYGEN SATURATION: 98 % | DIASTOLIC BLOOD PRESSURE: 78 MMHG | TEMPERATURE: 98 F | WEIGHT: 217.25 LBS | HEIGHT: 66 IN | SYSTOLIC BLOOD PRESSURE: 128 MMHG

## 2019-12-10 DIAGNOSIS — E66.01 SEVERE OBESITY (BMI 35.0-35.9 WITH COMORBIDITY): ICD-10-CM

## 2019-12-10 DIAGNOSIS — L21.9 SEBORRHEIC DERMATITIS: ICD-10-CM

## 2019-12-10 DIAGNOSIS — I10 HYPERTENSION, UNSPECIFIED TYPE: ICD-10-CM

## 2019-12-10 DIAGNOSIS — E11.610 CHARCOT FOOT DUE TO DIABETES MELLITUS: ICD-10-CM

## 2019-12-10 DIAGNOSIS — F32.1 CURRENT MODERATE EPISODE OF MAJOR DEPRESSIVE DISORDER WITHOUT PRIOR EPISODE: ICD-10-CM

## 2019-12-10 DIAGNOSIS — Z01.818 PRE-OP EVALUATION: ICD-10-CM

## 2019-12-10 DIAGNOSIS — I51.7 CARDIOMEGALY: Primary | ICD-10-CM

## 2019-12-10 DIAGNOSIS — E11.69 DM TYPE 2 WITH DIABETIC DYSLIPIDEMIA: ICD-10-CM

## 2019-12-10 DIAGNOSIS — E78.5 DM TYPE 2 WITH DIABETIC DYSLIPIDEMIA: ICD-10-CM

## 2019-12-10 DIAGNOSIS — J45.20 MILD INTERMITTENT ASTHMA, UNSPECIFIED WHETHER COMPLICATED: ICD-10-CM

## 2019-12-10 DIAGNOSIS — L85.3 XEROSIS CUTIS: ICD-10-CM

## 2019-12-10 DIAGNOSIS — E11.69 DM TYPE 2 WITH DIABETIC DYSLIPIDEMIA: Primary | ICD-10-CM

## 2019-12-10 DIAGNOSIS — E78.5 DM TYPE 2 WITH DIABETIC DYSLIPIDEMIA: Primary | ICD-10-CM

## 2019-12-10 LAB
ALBUMIN/CREAT UR: 216.9 UG/MG (ref 0–30)
CREAT UR-MCNC: 231 MG/DL (ref 15–325)
MICROALBUMIN UR DL<=1MG/L-MCNC: 501 UG/ML

## 2019-12-10 PROCEDURE — 93010 ELECTROCARDIOGRAM REPORT: CPT | Mod: S$GLB,,, | Performed by: INTERNAL MEDICINE

## 2019-12-10 PROCEDURE — 82043 UR ALBUMIN QUANTITATIVE: CPT

## 2019-12-10 PROCEDURE — 1101F PR PT FALLS ASSESS DOC 0-1 FALLS W/OUT INJ PAST YR: ICD-10-PCS | Mod: CPTII,S$GLB,, | Performed by: FAMILY MEDICINE

## 2019-12-10 PROCEDURE — 99999 PR PBB SHADOW E&M-EST. PATIENT-LVL IV: ICD-10-PCS | Mod: PBBFAC,,, | Performed by: FAMILY MEDICINE

## 2019-12-10 PROCEDURE — 3078F PR MOST RECENT DIASTOLIC BLOOD PRESSURE < 80 MM HG: ICD-10-PCS | Mod: CPTII,S$GLB,, | Performed by: FAMILY MEDICINE

## 2019-12-10 PROCEDURE — G0008 FLU VACCINE - HIGH DOSE (65+) PRESERVATIVE FREE IM: ICD-10-PCS | Mod: S$GLB,,, | Performed by: FAMILY MEDICINE

## 2019-12-10 PROCEDURE — 3074F PR MOST RECENT SYSTOLIC BLOOD PRESSURE < 130 MM HG: ICD-10-PCS | Mod: CPTII,S$GLB,, | Performed by: FAMILY MEDICINE

## 2019-12-10 PROCEDURE — 3078F DIAST BP <80 MM HG: CPT | Mod: CPTII,S$GLB,, | Performed by: FAMILY MEDICINE

## 2019-12-10 PROCEDURE — 99214 PR OFFICE/OUTPT VISIT, EST, LEVL IV, 30-39 MIN: ICD-10-PCS | Mod: 25,S$GLB,, | Performed by: FAMILY MEDICINE

## 2019-12-10 PROCEDURE — 90662 FLU VACCINE - HIGH DOSE (65+) PRESERVATIVE FREE IM: ICD-10-PCS | Mod: S$GLB,,, | Performed by: FAMILY MEDICINE

## 2019-12-10 PROCEDURE — 3074F SYST BP LT 130 MM HG: CPT | Mod: CPTII,S$GLB,, | Performed by: FAMILY MEDICINE

## 2019-12-10 PROCEDURE — 71046 XR CHEST PA AND LATERAL: ICD-10-PCS | Mod: 26,,, | Performed by: RADIOLOGY

## 2019-12-10 PROCEDURE — 99999 PR PBB SHADOW E&M-EST. PATIENT-LVL IV: CPT | Mod: PBBFAC,,, | Performed by: FAMILY MEDICINE

## 2019-12-10 PROCEDURE — 1101F PT FALLS ASSESS-DOCD LE1/YR: CPT | Mod: CPTII,S$GLB,, | Performed by: FAMILY MEDICINE

## 2019-12-10 PROCEDURE — 1159F MED LIST DOCD IN RCRD: CPT | Mod: S$GLB,,, | Performed by: FAMILY MEDICINE

## 2019-12-10 PROCEDURE — 1159F PR MEDICATION LIST DOCUMENTED IN MEDICAL RECORD: ICD-10-PCS | Mod: S$GLB,,, | Performed by: FAMILY MEDICINE

## 2019-12-10 PROCEDURE — 71046 X-RAY EXAM CHEST 2 VIEWS: CPT | Mod: 26,,, | Performed by: RADIOLOGY

## 2019-12-10 PROCEDURE — G0008 ADMIN INFLUENZA VIRUS VAC: HCPCS | Mod: S$GLB,,, | Performed by: FAMILY MEDICINE

## 2019-12-10 PROCEDURE — 93005 EKG 12-LEAD: ICD-10-PCS | Mod: S$GLB,,, | Performed by: FAMILY MEDICINE

## 2019-12-10 PROCEDURE — 1126F AMNT PAIN NOTED NONE PRSNT: CPT | Mod: S$GLB,,, | Performed by: FAMILY MEDICINE

## 2019-12-10 PROCEDURE — 93005 ELECTROCARDIOGRAM TRACING: CPT | Mod: S$GLB,,, | Performed by: FAMILY MEDICINE

## 2019-12-10 PROCEDURE — 99214 OFFICE O/P EST MOD 30 MIN: CPT | Mod: 25,S$GLB,, | Performed by: FAMILY MEDICINE

## 2019-12-10 PROCEDURE — 90662 IIV NO PRSV INCREASED AG IM: CPT | Mod: S$GLB,,, | Performed by: FAMILY MEDICINE

## 2019-12-10 PROCEDURE — 1126F PR PAIN SEVERITY QUANTIFIED, NO PAIN PRESENT: ICD-10-PCS | Mod: S$GLB,,, | Performed by: FAMILY MEDICINE

## 2019-12-10 PROCEDURE — 71046 X-RAY EXAM CHEST 2 VIEWS: CPT | Mod: TC,PO

## 2019-12-10 PROCEDURE — 93010 EKG 12-LEAD: ICD-10-PCS | Mod: S$GLB,,, | Performed by: INTERNAL MEDICINE

## 2019-12-10 RX ORDER — ALBUTEROL SULFATE 90 UG/1
2 AEROSOL, METERED RESPIRATORY (INHALATION) EVERY 6 HOURS PRN
Qty: 8.5 INHALER | Refills: 0 | Status: SHIPPED | OUTPATIENT
Start: 2019-12-10 | End: 2020-07-20 | Stop reason: SDUPTHER

## 2019-12-10 RX ORDER — LISINOPRIL AND HYDROCHLOROTHIAZIDE 20; 25 MG/1; MG/1
1 TABLET ORAL DAILY
Qty: 90 TABLET | Refills: 0 | Status: SHIPPED | OUTPATIENT
Start: 2019-12-10 | End: 2020-07-20 | Stop reason: SDUPTHER

## 2019-12-10 RX ORDER — KETOCONAZOLE 20 MG/ML
SHAMPOO, SUSPENSION TOPICAL
Qty: 120 ML | Refills: 3 | Status: SHIPPED | OUTPATIENT
Start: 2019-12-11 | End: 2020-07-20 | Stop reason: SDUPTHER

## 2019-12-10 RX ORDER — LEVOCETIRIZINE DIHYDROCHLORIDE 5 MG/1
5 TABLET, FILM COATED ORAL NIGHTLY
Qty: 30 TABLET | Refills: 11 | Status: SHIPPED | OUTPATIENT
Start: 2019-12-10 | End: 2020-07-20 | Stop reason: SDUPTHER

## 2019-12-10 RX ORDER — GLIMEPIRIDE 4 MG/1
4 TABLET ORAL
Qty: 90 TABLET | Refills: 4 | Status: SHIPPED | OUTPATIENT
Start: 2019-12-10 | End: 2020-07-20 | Stop reason: SDUPTHER

## 2019-12-10 RX ORDER — INDOMETHACIN 50 MG/1
50 CAPSULE ORAL
Qty: 15 CAPSULE | Refills: 0 | OUTPATIENT
Start: 2019-12-10 | End: 2020-07-04

## 2019-12-10 RX ORDER — TRIAMCINOLONE ACETONIDE 0.25 MG/G
CREAM TOPICAL
Qty: 80 G | Refills: 1 | Status: SHIPPED | OUTPATIENT
Start: 2019-12-10 | End: 2020-10-26 | Stop reason: SDUPTHER

## 2019-12-10 RX ORDER — GABAPENTIN 100 MG/1
100 CAPSULE ORAL 3 TIMES DAILY
Qty: 90 CAPSULE | Refills: 11 | Status: SHIPPED | OUTPATIENT
Start: 2019-12-10 | End: 2020-07-20 | Stop reason: SDUPTHER

## 2019-12-10 RX ORDER — TRAZODONE HYDROCHLORIDE 150 MG/1
150 TABLET ORAL NIGHTLY PRN
Qty: 90 TABLET | Refills: 0 | Status: SHIPPED | OUTPATIENT
Start: 2019-12-10 | End: 2020-07-20 | Stop reason: SDUPTHER

## 2019-12-10 RX ORDER — AMMONIUM LACTATE 12 G/100G
LOTION TOPICAL
Qty: 225 G | Refills: 11 | Status: SHIPPED | OUTPATIENT
Start: 2019-12-10 | End: 2020-10-26 | Stop reason: SDUPTHER

## 2019-12-10 RX ORDER — FLUTICASONE PROPIONATE 50 MCG
2 SPRAY, SUSPENSION (ML) NASAL DAILY
Qty: 16 G | Refills: 0 | Status: SHIPPED | OUTPATIENT
Start: 2019-12-10 | End: 2020-07-20 | Stop reason: SDUPTHER

## 2019-12-10 RX ORDER — CLOBETASOL PROPIONATE 0.46 MG/ML
SOLUTION TOPICAL 2 TIMES DAILY
Qty: 50 ML | Refills: 2 | Status: SHIPPED | OUTPATIENT
Start: 2019-12-10 | End: 2020-10-26 | Stop reason: SDUPTHER

## 2019-12-10 RX ORDER — INSULIN GLARGINE 300 [IU]/ML
50 INJECTION, SOLUTION SUBCUTANEOUS 2 TIMES DAILY
Qty: 10 ML | Refills: 3 | Status: SHIPPED | OUTPATIENT
Start: 2019-12-10 | End: 2020-07-20 | Stop reason: SDUPTHER

## 2019-12-10 RX ORDER — DICLOFENAC SODIUM 10 MG/G
2 GEL TOPICAL DAILY
Qty: 100 G | Refills: 0 | Status: SHIPPED | OUTPATIENT
Start: 2019-12-10 | End: 2020-04-03

## 2019-12-10 NOTE — PROGRESS NOTES
Subjective:       Patient ID: Kay Galarza is a 64 y.o. female.    Chief Complaint: Pre-op Exam    64y old  Female here for f/u on t2 DM , htn ,mild int asthma , depression  dlp and obesity . On aspirin, Immunizations are UTD , Ophthalmology: curret  , Fastin bs are  200 and  2 hrs pp  :200/ Off of trrulicity for 2 m  . Will also have left foot reconstruction by Dr Abebe due to Charcots in January 2020. Frustrated with non weight bearing status . Most recent surgery was lens  implant . Denies any  Adverse  reaction to anesthesia. She has been out of rescue inhaler for months     Review of Systems   Constitutional: Negative.    HENT: Negative.    Eyes: Negative.    Respiratory: Negative.    Cardiovascular: Negative.    Gastrointestinal: Negative.    Genitourinary: Negative.    Musculoskeletal: Positive for arthralgias.   Skin: Negative.    Hematological: Negative.    Psychiatric/Behavioral: Positive for dysphoric mood.       Objective:      Physical Exam   Constitutional: She is oriented to person, place, and time. No distress.   HENT:   Head: Normocephalic and atraumatic.   Right Ear: External ear normal.   Left Ear: External ear normal.   Mouth/Throat: No oropharyngeal exudate.   Eyes: Pupils are equal, round, and reactive to light. Conjunctivae and EOM are normal. Right eye exhibits no discharge. Left eye exhibits no discharge. No scleral icterus.   Neck: Normal range of motion. Neck supple. No JVD present. No tracheal deviation present. No thyromegaly present.   Cardiovascular: Normal rate, regular rhythm and normal heart sounds. Exam reveals no gallop and no friction rub.   No murmur heard.  Pulmonary/Chest: Effort normal and breath sounds normal. No stridor. No respiratory distress. She has no wheezes. She has no rales. She exhibits no tenderness.   Abdominal: Soft. Bowel sounds are normal. She exhibits no distension. There is no tenderness. There is no rebound and no guarding.    Musculoskeletal: Normal range of motion.   Lymphadenopathy:     She has no cervical adenopathy.   Neurological: She is alert and oriented to person, place, and time.   Skin: Skin is warm and dry. She is not diaphoretic.   Psychiatric: She has a normal mood and affect. Her behavior is normal. Judgment and thought content normal.       Assessment:     Kay was seen today for pre-op exam.    Diagnoses and all orders for this visit:    DM type 2 with diabetic dyslipidemia  -     CBC auto differential; Future  -     Hemoglobin A1c; Future  -     Comprehensive metabolic panel; Future  -     Microalbumin/creatinine urine ratio  -     IN OFFICE EKG 12-LEAD (to Muse)  -     X-Ray Chest PA And Lateral; Future    Hypertension, unspecified type  -     CBC auto differential; Future  -     Hemoglobin A1c; Future  -     Comprehensive metabolic panel; Future  -     Microalbumin/creatinine urine ratio  -     IN OFFICE EKG 12-LEAD (to Muse)  -     X-Ray Chest PA And Lateral; Future    Severe obesity (BMI 35.0-35.9 with comorbidity)  -     CBC auto differential; Future  -     Hemoglobin A1c; Future  -     Comprehensive metabolic panel; Future  -     Microalbumin/creatinine urine ratio  -     IN OFFICE EKG 12-LEAD (to Muse)  -     X-Ray Chest PA And Lateral; Future    Current moderate episode of major depressive disorder without prior episode  -     CBC auto differential; Future  -     Hemoglobin A1c; Future  -     Comprehensive metabolic panel; Future  -     Microalbumin/creatinine urine ratio  -     IN OFFICE EKG 12-LEAD (to [place])    Pre-op evaluation    Mild intermittent asthma, unspecified whether complicated    Charcot foot due to diabetes mellitus    Xerosis cutis  -     ammonium lactate (AMLACTIN) 12 % lotion; Use daily.  Apply to damp skin after bathing.    Seborrheic dermatitis  -     clobetasol (TEMOVATE) 0.05 % external solution; Apply topically 2 (two) times daily. To scalp only  -     ketoconazole (NIZORAL) 2 %  shampoo; Apply topically 3 (three) times a week.  -     triamcinolone acetonide 0.025% (KENALOG) 0.025 % cream; AAA bid as needed for flares.  Mild steroid.    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  -     insulin glargine, TOUJEO, (TOUJEO SOLOSTAR U-300 INSULIN) 300 unit/mL (1.5 mL) InPn pen; Inject 50 Units into the skin 2 (two) times daily.    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 Stop Victoza.  Take Trulicity 0.75 mg weekly. Split Toujeo 50 units twice a day. Start Xigduo 10 - 1,000 mg  Orders:  -     dulaglutide (TRULICITY) 1.5 mg/0.5 mL PnIj; Inject 1.5 mg into the skin every 7 days.  -     insulin glargine, TOUJEO, (TOUJEO SOLOSTAR U-300 INSULIN) 300 unit/mL (1.5 mL) InPn pen; Inject 50 Units into the skin 2 (two) times daily.    Other orders  -     albuterol (PROVENTIL/VENTOLIN HFA) 90 mcg/actuation inhaler; Inhale 2 puffs into the lungs every 6 (six) hours as needed for Wheezing. Rescue  -     diclofenac sodium (VOLTAREN) 1 % Gel; Apply 2 g topically once daily.  -     fluocinolone (SYNALAR) 0.01 % Sham; Apply no more than 1 ounce to scalp once daily; work into lather and allow to remain on scalp for ~5 minutes. Remove from hair and scalp by rinsing thoroughly with water.  -     gabapentin (NEURONTIN) 100 MG capsule; Take 1 capsule (100 mg total) by mouth 3 (three) times daily.  -     glimepiride (AMARYL) 4 MG tablet; Take 1 tablet (4 mg total) by mouth daily with breakfast.  -     indomethacin (INDOCIN) 50 MG capsule; Take 1 capsule (50 mg total) by mouth 3 (three) times daily with meals.  -      lisinopril-hydrochlorothiazide (PRINZIDE,ZESTORETIC) 20-25 mg Tab; Take 1 tablet by mouth once daily.  -     traZODone (DESYREL) 150 MG tablet; Take 1 tablet (150 mg total) by mouth nightly as needed for Insomnia.  -     fluticasone propionate (FLONASE) 50 mcg/actuation nasal spray; 2 sprays (100 mcg total) by Each Nostril route once daily.  -     levocetirizine (XYZAL) 5 MG tablet; Take 1 tablet (5 mg total) by mouth every evening.      Plan:   Improved . Still uncontrolled. Diet and ex   Controlled . Cont med    Risk has been discussed with patient who expressed and verbalized understanding. Based on my assessment , it is okay to proceed with surgery PROVIDED RISK IS ACCEPTABLE TO BOTH THE PATIENT AND THE SURGEON PERFORMING THE SURGERY.   Stable ,prn albuterol

## 2019-12-18 DIAGNOSIS — M14.672 CHARCOT'S JOINT OF LEFT FOOT: ICD-10-CM

## 2019-12-18 DIAGNOSIS — S93.325S LISFRANC DISLOCATION, LEFT, SEQUELA: Primary | ICD-10-CM

## 2019-12-19 ENCOUNTER — HOSPITAL ENCOUNTER (OUTPATIENT)
Dept: RADIOLOGY | Facility: HOSPITAL | Age: 64
Discharge: HOME | End: 2019-12-19
Attending: PODIATRIST
Payer: MEDICARE

## 2019-12-19 ENCOUNTER — OFFICE VISIT (OUTPATIENT)
Dept: PODIATRY | Facility: CLINIC | Age: 64
End: 2019-12-19
Payer: MEDICARE

## 2019-12-19 ENCOUNTER — TELEPHONE (OUTPATIENT)
Dept: FAMILY MEDICINE | Facility: CLINIC | Age: 64
End: 2019-12-19

## 2019-12-19 ENCOUNTER — CLINICAL SUPPORT (OUTPATIENT)
Dept: CARDIOLOGY | Facility: CLINIC | Age: 64
End: 2019-12-19
Attending: FAMILY MEDICINE
Payer: MEDICARE

## 2019-12-19 VITALS
BODY MASS INDEX: 34.9 KG/M2 | WEIGHT: 217.13 LBS | SYSTOLIC BLOOD PRESSURE: 129 MMHG | HEIGHT: 66 IN | HEART RATE: 77 BPM | DIASTOLIC BLOOD PRESSURE: 84 MMHG

## 2019-12-19 DIAGNOSIS — E11.49 TYPE II DIABETES MELLITUS WITH NEUROLOGICAL MANIFESTATIONS: ICD-10-CM

## 2019-12-19 DIAGNOSIS — Z91.199 NONCOMPLIANCE: ICD-10-CM

## 2019-12-19 DIAGNOSIS — M14.672 CHARCOT'S JOINT OF LEFT FOOT: Primary | ICD-10-CM

## 2019-12-19 DIAGNOSIS — M14.672 CHARCOT'S JOINT OF LEFT FOOT: ICD-10-CM

## 2019-12-19 DIAGNOSIS — S93.325S LISFRANC DISLOCATION, LEFT, SEQUELA: ICD-10-CM

## 2019-12-19 DIAGNOSIS — I51.7 CARDIOMEGALY: ICD-10-CM

## 2019-12-19 LAB
AORTIC VALVE REGURGITATION: NORMAL
DIASTOLIC DYSFUNCTION: NO
ESTIMATED PA SYSTOLIC PRESSURE: 20.47
RETIRED EF AND QEF - SEE NOTES: 55 (ref 55–65)

## 2019-12-19 PROCEDURE — 1159F MED LIST DOCD IN RCRD: CPT | Mod: S$GLB,,, | Performed by: PODIATRIST

## 2019-12-19 PROCEDURE — 1101F PR PT FALLS ASSESS DOC 0-1 FALLS W/OUT INJ PAST YR: ICD-10-PCS | Mod: CPTII,S$GLB,, | Performed by: PODIATRIST

## 2019-12-19 PROCEDURE — 3079F PR MOST RECENT DIASTOLIC BLOOD PRESSURE 80-89 MM HG: ICD-10-PCS | Mod: CPTII,S$GLB,, | Performed by: PODIATRIST

## 2019-12-19 PROCEDURE — 93306 TTE W/DOPPLER COMPLETE: CPT | Mod: S$GLB,,, | Performed by: INTERNAL MEDICINE

## 2019-12-19 PROCEDURE — 93306 2D ECHO WITH COLOR FLOW DOPPLER: ICD-10-PCS | Mod: S$GLB,,, | Performed by: INTERNAL MEDICINE

## 2019-12-19 PROCEDURE — 99999 PR PBB SHADOW E&M-EST. PATIENT-LVL IV: ICD-10-PCS | Mod: PBBFAC,,, | Performed by: PODIATRIST

## 2019-12-19 PROCEDURE — 73630 X-RAY EXAM OF FOOT: CPT | Mod: TC,LT

## 2019-12-19 PROCEDURE — 1159F PR MEDICATION LIST DOCUMENTED IN MEDICAL RECORD: ICD-10-PCS | Mod: S$GLB,,, | Performed by: PODIATRIST

## 2019-12-19 PROCEDURE — 1101F PT FALLS ASSESS-DOCD LE1/YR: CPT | Mod: CPTII,S$GLB,, | Performed by: PODIATRIST

## 2019-12-19 PROCEDURE — 3074F SYST BP LT 130 MM HG: CPT | Mod: CPTII,S$GLB,, | Performed by: PODIATRIST

## 2019-12-19 PROCEDURE — 99213 OFFICE O/P EST LOW 20 MIN: CPT | Mod: S$GLB,,, | Performed by: PODIATRIST

## 2019-12-19 PROCEDURE — 1125F AMNT PAIN NOTED PAIN PRSNT: CPT | Mod: S$GLB,,, | Performed by: PODIATRIST

## 2019-12-19 PROCEDURE — 73630 XR FOOT COMPLETE 3 VIEW LEFT: ICD-10-PCS | Mod: 26,LT,, | Performed by: RADIOLOGY

## 2019-12-19 PROCEDURE — 3079F DIAST BP 80-89 MM HG: CPT | Mod: CPTII,S$GLB,, | Performed by: PODIATRIST

## 2019-12-19 PROCEDURE — 3074F PR MOST RECENT SYSTOLIC BLOOD PRESSURE < 130 MM HG: ICD-10-PCS | Mod: CPTII,S$GLB,, | Performed by: PODIATRIST

## 2019-12-19 PROCEDURE — 99999 PR PBB SHADOW E&M-EST. PATIENT-LVL IV: CPT | Mod: PBBFAC,,, | Performed by: PODIATRIST

## 2019-12-19 PROCEDURE — 99213 PR OFFICE/OUTPT VISIT, EST, LEVL III, 20-29 MIN: ICD-10-PCS | Mod: S$GLB,,, | Performed by: PODIATRIST

## 2019-12-19 PROCEDURE — 1125F PR PAIN SEVERITY QUANTIFIED, PAIN PRESENT: ICD-10-PCS | Mod: S$GLB,,, | Performed by: PODIATRIST

## 2019-12-19 PROCEDURE — 73630 X-RAY EXAM OF FOOT: CPT | Mod: 26,LT,, | Performed by: RADIOLOGY

## 2019-12-19 NOTE — PROGRESS NOTES
Subjective:       Patient ID: Kay Galarza is a 64 y.o. female.    Chief Complaint: Follow-up (Charcot joint of left foot f/u. Noted swelling. Pt sates pain rating 7/10 and her foot is tired. Ambulates with a scooter. Diabetic pt. Last seen on 12/10/19 by Dr Stanton)    HPI: Kay Galarza presents to the office today, to evaluate the left foot. States she did notice some soreness recently, but states that she is not having pain.  She states that she has been ambulating with her knee scooter but also reports that she has been full weight-bearing while walking to the restroom.  She states that her left foot feels tired and sore because the Cam boot is heavy when she is walking.  States that she last saw Dr. Hall on 12/10/19 for an anticipated preoperative appointment.     was used over the phone as the video  was not working properly    Review of patient's allergies indicates:   Allergen Reactions    Pollen extracts        Past Medical History:   Diagnosis Date    Arthritis     Asthma     Cataract     Diabetes mellitus     Diabetes mellitus, type 2     Diabetic retinopathy     Hyperlipidemia     Hypertension        Family History   Problem Relation Age of Onset    Cancer Father         Prostate Ca    Hypertension Father     Stroke Father     Diabetes Sister     Glaucoma Mother     Hypertension Mother     Glaucoma Maternal Grandmother     Blindness Neg Hx     Macular degeneration Neg Hx     Retinal detachment Neg Hx     Strabismus Neg Hx        Social History     Socioeconomic History    Marital status:      Spouse name: Not on file    Number of children: Not on file    Years of education: Not on file    Highest education level: Not on file   Occupational History    Not on file   Social Needs    Financial resource strain: Not on file    Food insecurity:     Worry: Not on file     Inability: Not on file    Transportation  "needs:     Medical: Not on file     Non-medical: Not on file   Tobacco Use    Smoking status: Never Smoker    Smokeless tobacco: Never Used   Substance and Sexual Activity    Alcohol use: No    Drug use: No    Sexual activity: Yes   Lifestyle    Physical activity:     Days per week: Not on file     Minutes per session: Not on file    Stress: Not on file   Relationships    Social connections:     Talks on phone: Not on file     Gets together: Not on file     Attends [employer] service: Not on file     Active member of club or organization: Not on file     Attends meetings of clubs or organizations: Not on file     Relationship status: Not on file   Other Topics Concern    Not on file   Social History Narrative    Not on file       Past Surgical History:   Procedure Laterality Date    CATARACT EXTRACTION W/  INTRAOCULAR LENS IMPLANT Right 07/18/2018    CATARACT EXTRACTION W/  INTRAOCULAR LENS IMPLANT Left 03/13/2019    COLONOSCOPY N/A 12/22/2018    Procedure: COLONOSCOPY;  Surgeon: Zak Dover MD;  Location: [facility];  Service: Endoscopy;  Laterality: N/A;       Review of Systems   Constitutional: Negative for activity change, appetite change, chills and fever.   HENT: Negative for sinus pain, sore throat and voice change.    Eyes: Negative for pain, redness and visual disturbance.   Respiratory: Negative for cough and shortness of breath.    Cardiovascular: Negative for chest pain and palpitations.   Gastrointestinal: Negative for diarrhea, nausea and vomiting.   Musculoskeletal: Positive for gait problem and joint swelling. Negative for back pain.   Skin: Positive for color change (Erythema confined to the left midfoot at area of Charcot dislocation). Negative for wound.   Neurological: Negative for dizziness, weakness and numbness.   Psychiatric/Behavioral: The patient is not nervous/anxious.           Objective:   /84   Pulse 77   Ht 5' 6" (1.676 m)   Wt 98.5 kg (217 lb 2.5 oz)   BMI " 35.05 kg/m²     X-Ray Foot Complete Left  Narrative: EXAMINATION:  XR FOOT COMPLETE 3 VIEW LEFT    CLINICAL HISTORY:  .  Dislocation of tarsometatarsal joint of left foot, sequela    TECHNIQUE:  AP, lateral and oblique views of the left foot were performed.    COMPARISON:  November 4, 2019    FINDINGS:  Images obtained out of cast.    Stable Lisfranc fracture dislocation.  Widening of the 1st and 2nd metatarsal space at the bases.  There is lateral subluxation of the 2nd through 5th metatarsals as well as minimal lateral subluxation of the 1st metatarsal.  There is appearance of fracture fragments projecting the soft tissues over the dorsum of the midfoot.  Multi articular degenerative changes throughout the foot with pes planus noted.  Dorsal and plantar calcaneal spurs.  Impression: As above.    Electronically signed by: Toby Joseph MD  Date:    12/19/2019  Time:    12:04  2D Echo w/ Color Flow Doppler  Date of Procedure: 12/19/2019    TEST DESCRIPTION   Technical Quality: This is a technically adequate study.     Aorta: The aortic root is normal in size, measuring 2.9 cm at sinotubular junction and 3.1 cm at Sinuses of Valsalva. The proximal ascending aorta is normal in size, measuring 3.3 cm across.     Left Atrium: The left atrial volume index is normal, measuring 30.08 cc/m2.     Left Ventricle: The left ventricle is normal in size, with an end-diastolic diameter of 4.0 cm, and an end-systolic diameter of 2.6 cm. LV wall thickness is normal, with the septum measuring 1.5 cm and the posterior wall measuring 1.3 cm across. Relative   wall thickness was increased at 0.65, and the LV mass index was increased at 119.3 g/m2 consistent with concentric left ventricular hypertrophy. There are no regional wall motion abnormalities. Left ventricular systolic function appears normal. Visually   estimated ejection fraction is 55-60%. The LV Doppler derived stroke volume equals 74.0 ccs.     Diastolic indices: E wave  velocity 0.9 m/s, E/A ratio 0.6,  msec., E/e' ratio(avg) 11. Diastolic function is normal.     Right Atrium: The right atrium is normal in size, measuring 4.3 cm in length and 2.8 cm in width in the apical view.     Right Ventricle: The right ventricle is normal in size measuring 2.6 cm at the base in the apical right ventricle-focused view. Global right ventricular systolic function appears normal. The estimated PA systolic pressure is 20 mmHg.     Aortic Valve:  The aortic valve is normal in structure. Additionally, there is mild aortic regurgitation.     Mitral Valve:  The mitral valve is normal in structure. There is mitral annular calcification.     Tricuspid Valve:  The tricuspid valve is normal in structure.     Pulmonary Valve:  The pulmonic valve is normal in structure. There is mild pulmonic regurgitation.     IVC: IVC is normal in size and collapses > 50% with a sniff, suggesting normal right atrial pressure of 3 mmHg.     Intracavitary: There is no evidence of pericardial effusion, intracavity mass, thrombi, or vegetation.     CONCLUSIONS     1 - Concentric hypertrophy.     2 - No wall motion abnormalities.     3 - Normal left ventricular systolic function (EF 55-60%).     4 - Normal left ventricular diastolic function.     5 - Normal right ventricular systolic function .     6 - The estimated PA systolic pressure is 20 mmHg.     7 - Mild aortic regurgitation.     This document has been electronically    SIGNED BY: Ruddy Castañeda MD On: 12/19/2019 10:09       Physical Exam   LOWER EXTREMITY PHYSICAL EXAMINATION    Vascular:  Capillary refill time is intact.  Edema has decreased to the left lower extremity but still present to the medial left midfoot.  Temperature of the lower leg and the digits is cool.  Temperature to the dislocated midfoot has increased warmth.  Dorsalis pedis pulse on the left 3/4 and posterior tibial pulses 2/4.  Pedal hair growth is diminished to dorsal aspect of foot  digits    Neurological:  Sensation light touch is absent.  Proprioception is diminished.  Sensation to pinprick is absent.  Vibratory sensation is diminished.  Protective sensation is absent with 5.07 [place] monofilament    Musculoskeletal:  Patient able to dorsiflex and plantar flex the foot at the ankle joint without pain.  There is a large medial dislocation of the midfoot with prominence.  The medial cuneiform is medially dislocated in relation to the metatarsals.  Patient has no pain on palpation of the left midfoot.  She does have a Cam boot and utilizes a scooter for mobility.    Dermatological:  Skin is thin, shiny, and atrophic.  Patient still has slight erythema remaining to the medial aspect of the left midfoot.  There is increased warmth at this location when compared to the lower leg and forefoot region likely resulting from continued Charcot changes versus noncompliance with weight-bearing on unstable midfoot.  There are no longer wounds present to the dorsal aspect of the 1st metatarsophalangeal joint.    Imaging:     Results for orders placed during the hospital encounter of 11/04/19   X-Ray Foot Complete Left    Narrative EXAMINATION:  XR FOOT COMPLETE 3 VIEW LEFT    CLINICAL HISTORY:  evaluate worsening of Charcot event with TMTJ dislocation;. Dislocation of tarsometatarsal joint of left foot, sequela    TECHNIQUE:  AP, lateral, and oblique views of the foot were performed.    COMPARISON:  10/21/2019    FINDINGS:  There is a Lisfranc fracture dislocation again noted.  With lateral displacement the 2nd through 5th metatarsals and lateral displacement of the 1st metatarsal also noted.  On the lateral film there are is the suggestion of some debris versus fracture fragments dorsal to the midfoot.  The alignment is thought to be similar to the pre casting films from 10/21/2019. Dorsal and plantar calcaneal enthesophytes are present.      Impression As above      Electronically signed  by: Oracio Bone DO  Date:    11/04/2019  Time:    09:17        No results found for this or any previous visit.        Assessment:     1. Charcot's neuro arthropathy with dislocation of Lisfranc Joint of left foot    2. Noncompliance    3. 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 There continues to be osseous breakdown and fragmentation of the Lisfranc joint on the left foot secondary to Charcot neuro arthropathy with history of diabetes mellitus with peripheral neuropathy.  Discussed with the patient that with the increased warmth through the midfoot and compared to the forefoot and lower leg, I am hesitant to do surgery at this time as it is likely that the joints will continue to change and cause further breakdown of the bones.  Patient appears to still be in stage 1/early stage II Eickenholtz.  I encouraged patient to continue to be protected in the Cam boot.  I did discussed with patient that we could consider bracing/crow walker to allow the area to calm down prior to any surgical plans.    Noncompliance  Patient states that she has been weight-bearing on the left foot which was against medical advice due to the concerns of further breakdown of the joint and underlying osseous structures leading to further disruption and worsening of the Charcot athropathy.  As patient was leaving the room she did walk several steps with complete full weight-bearing on the left leg after instructions to remain nonweightbearing to partial weight-bearing with assistive device which reinforces the need to delay surgery as well.    Type II diabetes mellitus with neurological manifestations  Encourage patient to continue to control blood sugars as this will be detrimental for healing.  Also encouraged her to continue to be more compliant with her  treatment has the underlying bones are not the appropriate position and can lead to wound complications secondary to uncontrolled blood sugars.     Will have patient follow up in several months to reexamine the left foot and determine possible treatment options.      Future Appointments   Date Time Provider Department Center   3/3/2020  9:00 AM Martha Abebe DPM [facility]

## 2019-12-19 NOTE — TELEPHONE ENCOUNTER
----- Message from Lucy Negron MD sent at 12/10/2019  1:00 PM CST -----  Might have fluid built up in lungs. Can we add bnp to labs. Also has enlarged heart , Will order echocardiogram

## 2019-12-30 ENCOUNTER — TELEPHONE (OUTPATIENT)
Dept: PODIATRY | Facility: CLINIC | Age: 64
End: 2019-12-30

## 2019-12-30 DIAGNOSIS — M14.672 CHARCOT'S JOINT OF LEFT FOOT: Primary | ICD-10-CM

## 2019-12-30 NOTE — TELEPHONE ENCOUNTER
Tried contacting pt's daughter message below. She was unavailable, left detailed messaged with call back number.          ----- Message from Memo Lucas sent at 12/30/2019 12:49 PM CST -----  Contact: Fxyqlmjv-Vovw-[phone]  Pt's daughter would like return call from nurse regarding order for foot brace. Please call back at [phone].  Farheen Hwang

## 2020-01-02 ENCOUNTER — TELEPHONE (OUTPATIENT)
Dept: PODIATRY | Facility: CLINIC | Age: 65
End: 2020-01-02

## 2020-01-02 NOTE — TELEPHONE ENCOUNTER
Returned patients daughter's call regarding message below. Informed her that I had faxed off the order for the brace to [facility] on [address] [facility] and provided her with their contact information. At her request I also scheduled an appointment for the patient on 01/07/2020 at 8:00am to discuss surgery options. She was satisfied and phone call ended pleasantly.      ----- Message from Charline Lacey sent at 1/2/2020  1:28 PM CST -----  Contact: Tea-daughter  Requesting a call back regarding foot brace. She would like to know how will she get the brace. Will it be mailed or will they have to pick it up? Also requesting an update about surgery Please call Tea back at [phone]

## 2020-01-03 ENCOUNTER — TELEPHONE (OUTPATIENT)
Dept: PODIATRY | Facility: CLINIC | Age: 65
End: 2020-01-03

## 2020-01-03 NOTE — TELEPHONE ENCOUNTER
Contacted pt about her brace left pt a message that it was fax yesterday to [facility] on [facility].    Lynn Bazzi MA  Podiatry Surgical Department  [facility]              ----- Message from Ana De La Torre sent at 1/3/2020 12:53 PM CST -----  Contact: Amparo/pt daughter   Caller is calling in regards to orders for a foot brace. Caller states that [facility] hasn't received the order and was calling to give the fax number to have order fax. Fax number is [phone].      [phone]

## 2020-01-03 NOTE — TELEPHONE ENCOUNTER
Spoke with pt daughter she wanted to know if the physician has sent of to [facility]. I informed the pt that it was faxed over yesterday I told her i would fax it over again today.         ----- Message from Tatiana Gentile sent at 1/3/2020  1:26 PM CST -----  Contact: pt   Pt daughter calling to get the order for her mother brace fax again please call daughter to discuss. [phone]        Thanks   Tatiana Gentile

## 2020-01-08 ENCOUNTER — OFFICE VISIT (OUTPATIENT)
Dept: PODIATRY | Facility: CLINIC | Age: 65
End: 2020-01-08
Payer: MEDICARE

## 2020-01-08 ENCOUNTER — HOSPITAL ENCOUNTER (OUTPATIENT)
Dept: RADIOLOGY | Facility: HOSPITAL | Age: 65
Discharge: HOME | End: 2020-01-08
Attending: PODIATRIST
Payer: MEDICARE

## 2020-01-08 VITALS
BODY MASS INDEX: 34.9 KG/M2 | WEIGHT: 217.13 LBS | DIASTOLIC BLOOD PRESSURE: 86 MMHG | HEIGHT: 66 IN | SYSTOLIC BLOOD PRESSURE: 133 MMHG | HEART RATE: 94 BPM

## 2020-01-08 DIAGNOSIS — M79.672 FOOT PAIN, LEFT: ICD-10-CM

## 2020-01-08 DIAGNOSIS — M79.672 FOOT PAIN, LEFT: Primary | ICD-10-CM

## 2020-01-08 DIAGNOSIS — E66.01 SEVERE OBESITY (BMI 35.0-39.9) WITH COMORBIDITY: ICD-10-CM

## 2020-01-08 DIAGNOSIS — S93.325S LISFRANC DISLOCATION, LEFT, SEQUELA: ICD-10-CM

## 2020-01-08 DIAGNOSIS — Z91.199 NONCOMPLIANCE: ICD-10-CM

## 2020-01-08 DIAGNOSIS — M14.672 CHARCOT'S JOINT OF LEFT FOOT: Primary | ICD-10-CM

## 2020-01-08 PROCEDURE — 1125F PR PAIN SEVERITY QUANTIFIED, PAIN PRESENT: ICD-10-PCS | Mod: S$GLB,,, | Performed by: PODIATRIST

## 2020-01-08 PROCEDURE — 3075F PR MOST RECENT SYSTOLIC BLOOD PRESS GE 130-139MM HG: ICD-10-PCS | Mod: CPTII,S$GLB,, | Performed by: PODIATRIST

## 2020-01-08 PROCEDURE — 73630 X-RAY EXAM OF FOOT: CPT | Mod: TC,LT

## 2020-01-08 PROCEDURE — 1101F PT FALLS ASSESS-DOCD LE1/YR: CPT | Mod: CPTII,S$GLB,, | Performed by: PODIATRIST

## 2020-01-08 PROCEDURE — 3075F SYST BP GE 130 - 139MM HG: CPT | Mod: CPTII,S$GLB,, | Performed by: PODIATRIST

## 2020-01-08 PROCEDURE — 3079F PR MOST RECENT DIASTOLIC BLOOD PRESSURE 80-89 MM HG: ICD-10-PCS | Mod: CPTII,S$GLB,, | Performed by: PODIATRIST

## 2020-01-08 PROCEDURE — 99213 PR OFFICE/OUTPT VISIT, EST, LEVL III, 20-29 MIN: ICD-10-PCS | Mod: S$GLB,,, | Performed by: PODIATRIST

## 2020-01-08 PROCEDURE — 1101F PR PT FALLS ASSESS DOC 0-1 FALLS W/OUT INJ PAST YR: ICD-10-PCS | Mod: CPTII,S$GLB,, | Performed by: PODIATRIST

## 2020-01-08 PROCEDURE — 99213 OFFICE O/P EST LOW 20 MIN: CPT | Mod: S$GLB,,, | Performed by: PODIATRIST

## 2020-01-08 PROCEDURE — 3079F DIAST BP 80-89 MM HG: CPT | Mod: CPTII,S$GLB,, | Performed by: PODIATRIST

## 2020-01-08 PROCEDURE — 73630 XR FOOT COMPLETE 3 VIEW LEFT: ICD-10-PCS | Mod: 26,LT,, | Performed by: RADIOLOGY

## 2020-01-08 PROCEDURE — 99999 PR PBB SHADOW E&M-EST. PATIENT-LVL III: CPT | Mod: PBBFAC,,, | Performed by: PODIATRIST

## 2020-01-08 PROCEDURE — 99999 PR PBB SHADOW E&M-EST. PATIENT-LVL III: ICD-10-PCS | Mod: PBBFAC,,, | Performed by: PODIATRIST

## 2020-01-08 PROCEDURE — 1159F PR MEDICATION LIST DOCUMENTED IN MEDICAL RECORD: ICD-10-PCS | Mod: S$GLB,,, | Performed by: PODIATRIST

## 2020-01-08 PROCEDURE — 1159F MED LIST DOCD IN RCRD: CPT | Mod: S$GLB,,, | Performed by: PODIATRIST

## 2020-01-08 PROCEDURE — 1125F AMNT PAIN NOTED PAIN PRSNT: CPT | Mod: S$GLB,,, | Performed by: PODIATRIST

## 2020-01-08 PROCEDURE — 73630 X-RAY EXAM OF FOOT: CPT | Mod: 26,LT,, | Performed by: RADIOLOGY

## 2020-01-08 NOTE — PROGRESS NOTES
Subjective:       Patient ID: Kay Galarza is a 64 y.o. female.    Chief Complaint: Follow-up (Charcot's joint of left foot. Pt reports hot sensation and occasional sharp pains rating 8/10. Ambulates in a wheelchair.)    HPI: Kay Galarza presents to the office today with her daughter Cass, who is able to provide Hungarian translation.  The daughter states that the patient does ambulate to and from the bathroom and occasionally continues to ambulate on the foot.  She states that the foot is and continues to be warm to touch patient reports occasional pains to the left foot and rates these is an 8/10.  Patient is currently in a Cam boot and is supposed to be nonweightbearing.  She did get fitted for a crow walker.     Review of patient's allergies indicates:   Allergen Reactions    Pollen extracts        Past Medical History:   Diagnosis Date    Arthritis     Asthma     Cataract     Diabetes mellitus     Diabetes mellitus, type 2     Diabetic retinopathy     Hyperlipidemia     Hypertension        Family History   Problem Relation Age of Onset    Cancer Father         Prostate Ca    Hypertension Father     Stroke Father     Diabetes Sister     Glaucoma Mother     Hypertension Mother     Glaucoma Maternal Grandmother     Blindness Neg Hx     Macular degeneration Neg Hx     Retinal detachment Neg Hx     Strabismus Neg Hx        Social History     Socioeconomic History    Marital status:      Spouse name: Not on file    Number of children: Not on file    Years of education: Not on file    Highest education level: Not on file   Occupational History    Not on file   Social Needs    Financial resource strain: Not on file    Food insecurity:     Worry: Not on file     Inability: Not on file    Transportation needs:     Medical: Not on file     Non-medical: Not on file   Tobacco Use    Smoking status: Never Smoker    Smokeless tobacco: Never Used   Substance and  "Sexual Activity    Alcohol use: No    Drug use: No    Sexual activity: Yes   Lifestyle    Physical activity:     Days per week: Not on file     Minutes per session: Not on file    Stress: Not on file   Relationships    Social connections:     Talks on phone: Not on file     Gets together: Not on file     Attends [employer] service: Not on file     Active member of club or organization: Not on file     Attends meetings of clubs or organizations: Not on file     Relationship status: Not on file   Other Topics Concern    Not on file   Social History Narrative    Not on file       Past Surgical History:   Procedure Laterality Date    CATARACT EXTRACTION W/  INTRAOCULAR LENS IMPLANT Right 07/18/2018    CATARACT EXTRACTION W/  INTRAOCULAR LENS IMPLANT Left 03/13/2019    COLONOSCOPY N/A 12/22/2018    Procedure: COLONOSCOPY;  Surgeon: Zak Dover MD;  Location: [facility];  Service: Endoscopy;  Laterality: N/A;       Review of Systems       Objective:   /86   Pulse 94   Ht 5' 6" (1.676 m)   Wt 98.5 kg (217 lb 2.5 oz)   BMI 35.05 kg/m²     X-Ray Foot Complete Left  Narrative: EXAMINATION:  XR FOOT COMPLETE 3 VIEW LEFT    CLINICAL HISTORY:  .  Pain in left foot    TECHNIQUE:  AP, lateral and oblique views of the left foot were performed.    COMPARISON:  12/19/2019    FINDINGS:  Midfoot demonstrates similar appearance with subcortical cystic changes in chronic Lisfranc injury.  Mild dorsal soft tissue edema suspected.  Remaining foot unchanged.  Impression: Similar appearance of the foot    Electronically signed by: Emanuel Pulido MD  Date:    01/08/2020  Time:    14:28       Physical Exam   LOWER EXTREMITY PHYSICAL EXAMINATION    Vascular:   Dorsalis pedis pulse on the right 2/4, on the left 3/4.  Posterior tibial pulse on the right 2/4, on the left 3/4.  Capillary refill intact less than 3 sec of bilateral lower extremities.  Pedal hair growth is sparse to absent to the dorsal aspect of the foot and " digits bilaterally.  There is increased warmth to the left midfoot and comparison to the digits and the left lower extremity.  There is increased warmth in comparison to the contralateral lower extremity as well..     Neurological:  Sensation light touch is absent.  Proprioception is diminished.  Sensation to pinprick is absent.  Vibratory sensation is diminished.  Protective sensation is absent with 5.07 [place]Arnie monofilament.    Musculoskeletal:  Patient able to dorsiflex plantar flex the ankle joint without pain.  There is medial dislocation of the midfoot with large prominence of the medial cuneiform.  There is no tenting of the skin at this location.  There is also a lateral dislocation of the metatarsals in relation to the cuneiforms.  Patient does not have pain on palpation of the left midfoot region.  She has been ambulating with a knee scooter and Cam boot    Dermatological:  Skin is thin, shiny, and atrophic.  There is still increased erythema to the medial aspect the left midfoot.  There is also increased warmth at this location in comparison to the lower leg and the digits. This is also compared to the contralateral lower extremity which shows increased skin temperature a left medial midfoot as well. This is likely result of continued Charcot changes versus noncompliant with weight-bearing on an unstable midfoot.    Imaging:     Results for orders placed during the hospital encounter of 01/08/20   X-Ray Foot Complete Left    Narrative EXAMINATION:  XR FOOT COMPLETE 3 VIEW LEFT    CLINICAL HISTORY:  .  Pain in left foot    TECHNIQUE:  AP, lateral and oblique views of the left foot were performed.    COMPARISON:  12/19/2019    FINDINGS:  Midfoot demonstrates similar appearance with subcortical cystic changes in chronic Lisfranc injury.  Mild dorsal soft tissue edema suspected.  Remaining foot unchanged.      Impression Similar appearance of the foot      Electronically signed by: Emanuel Pulido  MD  Date:    01/08/2020  Time:    14:28        No results found for this or any previous visit.        Assessment:     1. Charcot's neuro arthropathy with dislocation of Lisfranc Joint of left foot    2. Lisfranc dislocation, left, sequela    3. Severe obesity (BMI 35.0-39.9) with comorbidity    4. Noncompliance        Plan:     Charcot's neuro arthropathy with dislocation of Lisfranc Joint of left foot  New x-rays were taken today in clinic to show continuous osseous breakdown and fragmentation of the Lisfranc joint left foot. This is in combination with Charcot neuro arthropathy and associated shin with diabetes mellitus with peripheral neuropathy.  I discussed with the patient, and her daughter, that I recommend continuing conservative treatments with protected nonweightbearing in a Cam boot until the crow walker is made.  I discussed the importance of waiting for the skin temperature to normalize prior to proceeding with surgical intervention.  I discussed with patient that if she would like a 2nd opinion I would be more than happy to accommodate that for her.    Patient and daughter would like to see Dr. Gray in approximately 1-2 months to see how well the patient does with compliance with nonweightbearing recommendations and determine if external fixator frame versus beaming procedure may be and option    Lisfranc dislocation, left, sequela    Severe obesity (BMI 35.0-39.9) with comorbidity  Patient is counseled and reminded concerning the importance of good nutrition and healthy eating habits, which may include blood sugar control, to prevent and/or help podiatric foot and ankle complications.    Noncompliance  Continue discussed the importance of absolute nonweightbearing on the left lower extremity as this could resulted in further breakdown and lead to worsening of the Charcot.  I discussed the importance of doing this as this will prevent wound breakdown and can lead to osteomyelitis/other infections or  amputations of the lower extremity.          Future Appointments   Date Time Provider Department Center   1/8/2020  3:15 PM [facility] XR1- ON [facility] [place]   2/4/2020  8:15 AM Kirt Gray DPM [facility]

## 2020-01-21 RX ORDER — DULAGLUTIDE 1.5 MG/.5ML
INJECTION, SOLUTION SUBCUTANEOUS
Qty: 4 ML | Refills: 0 | OUTPATIENT
Start: 2020-01-21

## 2020-01-27 NOTE — TELEPHONE ENCOUNTER
----- Message from Stacey Damon sent at 1/27/2020  3:34 PM CST -----  Contact: pt daughter  .Type:  RX Refill Request    Who Called:  Pt daughter   Refill or New Rx: refill   RX Name and Strength: trulicity   How is the patient currently taking it? (ex. 1XDay):   Is this a 30 day or 90 day RX:   Preferred Pharmacy with phone number:     [facility] [address] [place] - [address]  [address]  [place] [address]  Phone: [phone] Fax: [phone]      Local or Mail Order: local   Ordering Provider:   Would the patient rather a call back or a response via [facility]? Call back   Best Call Back Number: [phone] (home)     Additional Information:pharmacy stated authorization was denied

## 2020-03-02 ENCOUNTER — PATIENT OUTREACH (OUTPATIENT)
Dept: ADMINISTRATIVE | Facility: OTHER | Age: 65
End: 2020-03-02

## 2020-03-02 DIAGNOSIS — Z12.31 ENCOUNTER FOR SCREENING MAMMOGRAM FOR MALIGNANT NEOPLASM OF BREAST: Primary | ICD-10-CM

## 2020-03-03 ENCOUNTER — OFFICE VISIT (OUTPATIENT)
Dept: PODIATRY | Facility: CLINIC | Age: 65
End: 2020-03-03
Payer: MEDICARE

## 2020-03-03 VITALS
HEART RATE: 89 BPM | BODY MASS INDEX: 34.9 KG/M2 | RESPIRATION RATE: 17 BRPM | DIASTOLIC BLOOD PRESSURE: 70 MMHG | HEIGHT: 66 IN | WEIGHT: 217.13 LBS | SYSTOLIC BLOOD PRESSURE: 120 MMHG

## 2020-03-03 DIAGNOSIS — E66.01 SEVERE OBESITY (BMI 35.0-39.9) WITH COMORBIDITY: ICD-10-CM

## 2020-03-03 DIAGNOSIS — M14.672 CHARCOT'S JOINT OF LEFT FOOT: Primary | ICD-10-CM

## 2020-03-03 DIAGNOSIS — S93.325S LISFRANC DISLOCATION, LEFT, SEQUELA: ICD-10-CM

## 2020-03-03 DIAGNOSIS — E11.49 TYPE II DIABETES MELLITUS WITH NEUROLOGICAL MANIFESTATIONS: ICD-10-CM

## 2020-03-03 PROCEDURE — 3051F PR MOST RECENT HEMOGLOBIN A1C LEVEL 7.0 - < 8.0%: ICD-10-PCS | Mod: CPTII,S$GLB,, | Performed by: PODIATRIST

## 2020-03-03 PROCEDURE — 99999 PR PBB SHADOW E&M-EST. PATIENT-LVL III: CPT | Mod: PBBFAC,,, | Performed by: PODIATRIST

## 2020-03-03 PROCEDURE — 3078F PR MOST RECENT DIASTOLIC BLOOD PRESSURE < 80 MM HG: ICD-10-PCS | Mod: CPTII,S$GLB,, | Performed by: PODIATRIST

## 2020-03-03 PROCEDURE — 3074F PR MOST RECENT SYSTOLIC BLOOD PRESSURE < 130 MM HG: ICD-10-PCS | Mod: CPTII,S$GLB,, | Performed by: PODIATRIST

## 2020-03-03 PROCEDURE — 3074F SYST BP LT 130 MM HG: CPT | Mod: CPTII,S$GLB,, | Performed by: PODIATRIST

## 2020-03-03 PROCEDURE — 3078F DIAST BP <80 MM HG: CPT | Mod: CPTII,S$GLB,, | Performed by: PODIATRIST

## 2020-03-03 PROCEDURE — 1101F PT FALLS ASSESS-DOCD LE1/YR: CPT | Mod: CPTII,S$GLB,, | Performed by: PODIATRIST

## 2020-03-03 PROCEDURE — 1101F PR PT FALLS ASSESS DOC 0-1 FALLS W/OUT INJ PAST YR: ICD-10-PCS | Mod: CPTII,S$GLB,, | Performed by: PODIATRIST

## 2020-03-03 PROCEDURE — 3051F HG A1C>EQUAL 7.0%<8.0%: CPT | Mod: CPTII,S$GLB,, | Performed by: PODIATRIST

## 2020-03-03 PROCEDURE — 99999 PR PBB SHADOW E&M-EST. PATIENT-LVL III: ICD-10-PCS | Mod: PBBFAC,,, | Performed by: PODIATRIST

## 2020-03-03 PROCEDURE — 99214 PR OFFICE/OUTPT VISIT, EST, LEVL IV, 30-39 MIN: ICD-10-PCS | Mod: S$GLB,,, | Performed by: PODIATRIST

## 2020-03-03 PROCEDURE — 99214 OFFICE O/P EST MOD 30 MIN: CPT | Mod: S$GLB,,, | Performed by: PODIATRIST

## 2020-03-03 PROCEDURE — 1159F MED LIST DOCD IN RCRD: CPT | Mod: S$GLB,,, | Performed by: PODIATRIST

## 2020-03-03 PROCEDURE — 1159F PR MEDICATION LIST DOCUMENTED IN MEDICAL RECORD: ICD-10-PCS | Mod: S$GLB,,, | Performed by: PODIATRIST

## 2020-03-03 NOTE — PROGRESS NOTES
Subjective:       Patient ID: Kay Galarza is a 64 y.o. female.    Chief Complaint: Foot Problem (left foot charcot, rates pain 0/10, wears walking boot, PCP Dr. Negron)    HPI: Kay Galarza presents to the office today, for follow up concerning LLE Charcot Foot. States controlled blood sugars. Presents with a family member. Denies pains, but states tightness and numbness about the left foot. States no wounds at presents. Denies calor or erythema.  Patient presents ambulatory with a CROW Walker.     Hemoglobin A1C   Date Value Ref Range Status   12/10/2019 7.5 (H) 4.0 - 5.6 % Final     Comment:     ADA Screening Guidelines:  5.7-6.4%  Consistent with prediabetes  >or=6.5%  Consistent with diabetes  High levels of fetal hemoglobin interfere with the HbA1C  assay. Heterozygous hemoglobin variants (HbS, HgC, etc)do  not significantly interfere with this assay.   However, presence of multiple variants may affect accuracy.     08/22/2019 8.3 (H) 4.0 - 5.6 % Final     Comment:     ADA Screening Guidelines:  5.7-6.4%  Consistent with prediabetes  >or=6.5%  Consistent with diabetes  High levels of fetal hemoglobin interfere with the HbA1C  assay. Heterozygous hemoglobin variants (HbS, HgC, etc)do  not significantly interfere with this assay.   However, presence of multiple variants may affect accuracy.     05/07/2019 10.5 (H) 4.0 - 5.6 % Final     Comment:     ADA Screening Guidelines:  5.7-6.4%  Consistent with prediabetes  >or=6.5%  Consistent with diabetes  High levels of fetal hemoglobin interfere with the HbA1C  assay. Heterozygous hemoglobin variants (HbS, HgC, etc)do  not significantly interfere with this assay.   However, presence of multiple variants may affect accuracy.           Review of patient's allergies indicates:   Allergen Reactions    Pollen extracts        Past Medical History:   Diagnosis Date    Arthritis     Asthma     Cataract     Diabetes mellitus      Diabetes mellitus, type 2     Diabetic retinopathy     Hyperlipidemia     Hypertension        Family History   Problem Relation Age of Onset    Cancer Father         Prostate Ca    Hypertension Father     Stroke Father     Diabetes Sister     Glaucoma Mother     Hypertension Mother     Glaucoma Maternal Grandmother     Blindness Neg Hx     Macular degeneration Neg Hx     Retinal detachment Neg Hx     Strabismus Neg Hx        Social History     Socioeconomic History    Marital status:      Spouse name: Not on file    Number of children: Not on file    Years of education: Not on file    Highest education level: Not on file   Occupational History    Not on file   Social Needs    Financial resource strain: Not on file    Food insecurity:     Worry: Not on file     Inability: Not on file    Transportation needs:     Medical: Not on file     Non-medical: Not on file   Tobacco Use    Smoking status: Never Smoker    Smokeless tobacco: Never Used   Substance and Sexual Activity    Alcohol use: No    Drug use: No    Sexual activity: Yes   Lifestyle    Physical activity:     Days per week: Not on file     Minutes per session: Not on file    Stress: Not on file   Relationships    Social connections:     Talks on phone: Not on file     Gets together: Not on file     Attends [employer] service: Not on file     Active member of club or organization: Not on file     Attends meetings of clubs or organizations: Not on file     Relationship status: Not on file   Other Topics Concern    Not on file   Social History Narrative    Not on file       Past Surgical History:   Procedure Laterality Date    CATARACT EXTRACTION W/  INTRAOCULAR LENS IMPLANT Right 07/18/2018    CATARACT EXTRACTION W/  INTRAOCULAR LENS IMPLANT Left 03/13/2019    COLONOSCOPY N/A 12/22/2018    Procedure: COLONOSCOPY;  Surgeon: Zak Dover MD;  Location: [facility];  Service: Endoscopy;  Laterality: N/A;       Review of  "Systems   Constitutional: Negative for chills, fatigue and fever.   HENT: Negative for hearing loss.    Eyes: Negative for photophobia and visual disturbance.   Respiratory: Negative for cough, chest tightness, shortness of breath and wheezing.    Cardiovascular: Negative for chest pain and palpitations.   Gastrointestinal: Negative for constipation, diarrhea, nausea and vomiting.   Endocrine: Negative for cold intolerance and heat intolerance.   Genitourinary: Negative for flank pain.   Musculoskeletal: Positive for arthralgias and gait problem. Negative for neck pain and neck stiffness.   Skin: Negative for wound.   Neurological: Positive for numbness. Negative for light-headedness and headaches.   Psychiatric/Behavioral: Negative for sleep disturbance.          Objective:   /70 (BP Location: Left arm, Patient Position: Sitting, BP Method: Medium (Automatic))   Pulse 89   Resp 17   Ht 5' 6" (1.676 m)   Wt 98.5 kg (217 lb 2.5 oz)   BMI 35.05 kg/m²         Physical Exam  LOWER EXTREMITY PHYSICAL EXAMINATION  NEUROLOGY: Sensation to light touch is intact. Proprioception is intact, bilateral. Sensation to pin prick is reduced to absent. Vibratory sensation is diminished to the left and right lower extremity. Examination with 5.07 [place]nie monofilament reveals that protective sensation is not intact to the left and right plantar surfaces of the foot and digits, as the patient has no sensation/detection at greater than 4 distinct points of contact.    VASCULAR: On the left foot, the dorsalis pedis pulse is 1/4 and the posterior tibial pulse is 2/4. Capillary refill time is less than 3 seconds. Hair growth is sparse on the dorsum of the foot and at the digits. No rubor is present. Proximal to distal temperature is warm to warm.    DERMATOLOGY: Skin is supple, dry and intact. No ecchymosis is noted. No hypertrophic skin formation. No erythema or cellulitis is noted. No ulcerations are " noted.    ORTHOPEDIC:  Equinus contracture is noted, left lower extremity. Pes planus foot type with palpable dorsal osteoarthropathy and fractures/fragmentation at the 1st through 3rd tarsometatarsal to relation.  No prominent palpable navicular bone the cuboid bone is noted. Midfoot breech with exaggerated eversion is noted.    Assessment:     1. Charcot's neuro arthropathy with dislocation of Lisfranc Joint of left foot    2. Lisfranc dislocation, left, sequela    3. Severe obesity (BMI 35.0-39.9) with comorbidity    4. Type II diabetes mellitus with neurological manifestations        Plan:     Charcot's neuro arthropathy with dislocation of Lisfranc Joint of left foot  Lisfranc dislocation, left, sequela  Thorough discussion is had with the patient today, concerning the diagnosis, its etiology, and the treatment algorithm at present.  XRAYS are reviewed in detail with the patient. All questions and concerns regarding findings and its/their implications are outlined and discussed.    Patient may continue to ambulate with the CROW Walker for now. Please have the DME provider adjust the CROW Walker as it is causing abrasion at the lateral border of the midfoot.  Did discuss possible transition to orthopedic shoe gear, but patient denies this time as she will likely have surgical intervention in the coming months.    The procedure of (multiple joint arthrodesis with Achilles tendon lengthening, left foot) was thoroughly explained to the patient. Its necessity and/or purpose and the implications therein were outlined, including any pertinent advantages and/or disadvantages, and possible complications, if any. Possible complications include recurrence of pathology and/or deformity, infection (cellulitis, drainage, purulence, malodor, etc...), pain, numbness, neuritis, edema, burning, loss of function, need for further surgery, possible need for removal of any implanted hardware, soft tissue contracture and/or  scarring, etc... No guarantees were given and/or implied. Post-operative expectations and weightbearing protocol is thoroughly explained the patient, who acknowledges understanding.    Severe obesity (BMI 35.0-39.9) with comorbidity  Patient is counseled and reminded concerning the importance of good nutrition and healthy eating habits, which may include blood sugar control, to prevent and/or help podiatric foot and ankle complications.    Type II diabetes mellitus with neurological manifestations  I counseled the patient on his/her Diabetic Mellitus regarding today's clinical examination and objection findings. We did also discuss recent medication changes, pertinent labs and imaging evaluations and other medical consultation notes and progress notes. Greater than 50% of this visit was spent on counseling and coordination of care. Greater than 20 minutes of this appt. was spent on education about the diabetic foot, in relation to PVD and/or neuropathy, and the prevention of limb loss.     Shoe gear is inspected and wear and proper fit/type. Patient is reminded of the importance of good nutrition and blood sugar control to help prevent podiatric complications of diabetes. Patient instructed on proper foot hygeine. We discussed wearing proper shoe gear, daily foot inspections, never walking without protective shoe gear, never putting sharp instruments to feet.  Patient  will continue to monitor the areas daily, inspect feet, wear protective shoe gear when ambulatory, moisturizer to maintain skin integrity.     Patient's DMI/DMII is managed by Primary Care Provider and/or Endocrinology Advanced Practice Provider. As per the most recent glycohemoglobin level, this patient is at goal.              No future appointments.

## 2020-04-02 RX ORDER — ATORVASTATIN CALCIUM 40 MG/1
TABLET, FILM COATED ORAL
Qty: 90 TABLET | Refills: 0 | Status: SHIPPED | OUTPATIENT
Start: 2020-04-02 | End: 2020-07-20 | Stop reason: SDUPTHER

## 2020-04-02 RX ORDER — DULAGLUTIDE 1.5 MG/.5ML
INJECTION, SOLUTION SUBCUTANEOUS
Qty: 12 ML | Refills: 0 | Status: SHIPPED | OUTPATIENT
Start: 2020-04-02 | End: 2020-04-20

## 2020-04-03 RX ORDER — DICLOFENAC SODIUM 10 MG/G
GEL TOPICAL 2 TIMES DAILY
Qty: 100 G | Refills: 0 | Status: SHIPPED | OUTPATIENT
Start: 2020-04-03 | End: 2020-07-20 | Stop reason: SDUPTHER

## 2020-04-20 RX ORDER — DULAGLUTIDE 1.5 MG/.5ML
INJECTION, SOLUTION SUBCUTANEOUS
Qty: 12 ML | Refills: 0 | Status: SHIPPED | OUTPATIENT
Start: 2020-04-20 | End: 2020-07-20 | Stop reason: SDUPTHER

## 2020-04-21 RX ORDER — ATORVASTATIN CALCIUM 40 MG/1
TABLET, FILM COATED ORAL
Qty: 90 TABLET | Refills: 0 | OUTPATIENT
Start: 2020-04-21

## 2020-07-04 ENCOUNTER — HOSPITAL ENCOUNTER (EMERGENCY)
Facility: HOSPITAL | Age: 65
Discharge: HOME | End: 2020-07-04
Attending: EMERGENCY MEDICINE
Payer: MEDICARE

## 2020-07-04 VITALS
SYSTOLIC BLOOD PRESSURE: 182 MMHG | HEIGHT: 64 IN | DIASTOLIC BLOOD PRESSURE: 81 MMHG | BODY MASS INDEX: 37.05 KG/M2 | HEART RATE: 89 BPM | RESPIRATION RATE: 16 BRPM | OXYGEN SATURATION: 97 % | TEMPERATURE: 99 F | WEIGHT: 217 LBS

## 2020-07-04 DIAGNOSIS — S82.842A BIMALLEOLAR FRACTURE OF LEFT ANKLE, CLOSED, INITIAL ENCOUNTER: Primary | ICD-10-CM

## 2020-07-04 DIAGNOSIS — M79.672 LEFT FOOT PAIN: ICD-10-CM

## 2020-07-04 DIAGNOSIS — M79.605 LEFT LEG PAIN: ICD-10-CM

## 2020-07-04 DIAGNOSIS — S99.912A LEFT ANKLE INJURY: ICD-10-CM

## 2020-07-04 PROCEDURE — 27808 TREATMENT OF ANKLE FRACTURE: CPT | Mod: LT

## 2020-07-04 PROCEDURE — 99284 EMERGENCY DEPT VISIT MOD MDM: CPT | Mod: 25

## 2020-07-04 PROCEDURE — 29515 APPLICATION SHORT LEG SPLINT: CPT

## 2020-07-04 PROCEDURE — 25000003 PHARM REV CODE 250: Performed by: EMERGENCY MEDICINE

## 2020-07-04 PROCEDURE — 96376 TX/PRO/DX INJ SAME DRUG ADON: CPT

## 2020-07-04 PROCEDURE — 96375 TX/PRO/DX INJ NEW DRUG ADDON: CPT

## 2020-07-04 PROCEDURE — 96374 THER/PROPH/DIAG INJ IV PUSH: CPT

## 2020-07-04 PROCEDURE — 63600175 PHARM REV CODE 636 W HCPCS: Performed by: EMERGENCY MEDICINE

## 2020-07-04 RX ORDER — OXYCODONE AND ACETAMINOPHEN 5; 325 MG/1; MG/1
1 TABLET ORAL EVERY 4 HOURS PRN
Qty: 25 TABLET | Refills: 0 | Status: SHIPPED | OUTPATIENT
Start: 2020-07-04 | End: 2020-07-17

## 2020-07-04 RX ORDER — FENTANYL CITRATE 50 UG/ML
50 INJECTION, SOLUTION INTRAMUSCULAR; INTRAVENOUS
Status: COMPLETED | OUTPATIENT
Start: 2020-07-04 | End: 2020-07-04

## 2020-07-04 RX ORDER — NAPROXEN 500 MG/1
500 TABLET ORAL 2 TIMES DAILY PRN
Qty: 20 TABLET | Refills: 0 | Status: SHIPPED | OUTPATIENT
Start: 2020-07-04 | End: 2020-07-18

## 2020-07-04 RX ORDER — MORPHINE SULFATE 4 MG/ML
4 INJECTION, SOLUTION INTRAMUSCULAR; INTRAVENOUS
Status: COMPLETED | OUTPATIENT
Start: 2020-07-04 | End: 2020-07-04

## 2020-07-04 RX ORDER — ACETAMINOPHEN 500 MG
1000 TABLET ORAL
Status: COMPLETED | OUTPATIENT
Start: 2020-07-04 | End: 2020-07-04

## 2020-07-04 RX ORDER — KETOROLAC TROMETHAMINE 30 MG/ML
15 INJECTION, SOLUTION INTRAMUSCULAR; INTRAVENOUS
Status: COMPLETED | OUTPATIENT
Start: 2020-07-04 | End: 2020-07-04

## 2020-07-04 RX ADMIN — BACITRACIN, NEOMYCIN, POLYMYXIN B 1 EACH: 400; 3.5; 5 OINTMENT TOPICAL at 10:07

## 2020-07-04 RX ADMIN — FENTANYL CITRATE 50 MCG: 50 INJECTION, SOLUTION INTRAMUSCULAR; INTRAVENOUS at 07:07

## 2020-07-04 RX ADMIN — MORPHINE SULFATE 4 MG: 4 INJECTION INTRAVENOUS at 10:07

## 2020-07-04 RX ADMIN — ACETAMINOPHEN 1000 MG: 500 TABLET ORAL at 10:07

## 2020-07-04 RX ADMIN — KETOROLAC TROMETHAMINE 15 MG: 30 INJECTION, SOLUTION INTRAMUSCULAR at 08:07

## 2020-07-04 RX ADMIN — MORPHINE SULFATE 4 MG: 4 INJECTION INTRAVENOUS at 08:07

## 2020-07-05 NOTE — ED PROVIDER NOTES
SCRIBE #1 NOTE: I, Griselda Grant, am scribing for, and in the presence of, Heri Castorena MD. I have scribed the entire note.      History      Chief Complaint   Patient presents with    Fall     left ankle paon, 100 mcg fentanyl given by EMS       Review of patient's allergies indicates:   Allergen Reactions    Pollen extracts         HPI   HPI    7/4/2020, 7:32 PM   History obtained from the patient      History of Present Illness: Kay Galarza is a 65 y.o. female patient, with a history of charcot joint, diabetes, hypertension, and hyperlipidemia, who presents to the Emergency Department for an evaluation of sharp left ankle pain which onset suddenly just prior to arrival, when the patient states that her dogs ran through her legs, causing her to trip and fall while walking and twist her left ankle. Symptoms are constant and moderate in severity. The pain is exacerbated with palpation and movement. No associated sxs. Patient denies any head trauma, numbness, bleeding, weakness, and all other sxs at this time. No prior treatment reported. No further complaints or concerns at this time.       Arrival mode: EMS      PCP: Lucy Negron MD       Past Medical History:  Past Medical History:   Diagnosis Date    Arthritis     Asthma     Cataract     Diabetes mellitus     Diabetes mellitus, type 2     Diabetic retinopathy     Hyperlipidemia     Hypertension        Past Surgical History:  Past Surgical History:   Procedure Laterality Date    CATARACT EXTRACTION W/  INTRAOCULAR LENS IMPLANT Right 07/18/2018    CATARACT EXTRACTION W/  INTRAOCULAR LENS IMPLANT Left 03/13/2019    COLONOSCOPY N/A 12/22/2018    Procedure: COLONOSCOPY;  Surgeon: Zak Dover MD;  Location: [facility];  Service: Endoscopy;  Laterality: N/A;         Family History:  Family History   Problem Relation Age of Onset    Cancer Father         Prostate Ca    Hypertension Father     Stroke Father      Diabetes Sister     Glaucoma Mother     Hypertension Mother     Glaucoma Maternal Grandmother     Blindness Neg Hx     Macular degeneration Neg Hx     Retinal detachment Neg Hx     Strabismus Neg Hx        Social History:  Social History     Tobacco Use    Smoking status: Never Smoker    Smokeless tobacco: Never Used   Substance and Sexual Activity    Alcohol use: No    Drug use: No    Sexual activity: Yes       ROS   Review of Systems   Constitutional: Negative for fever.        Negative for head trauma.   HENT: Negative for sore throat.    Respiratory: Negative for shortness of breath.    Cardiovascular: Negative for chest pain.   Gastrointestinal: Negative for nausea.   Genitourinary: Negative for dysuria.   Musculoskeletal: Positive for arthralgias (left ankle). Negative for back pain.   Skin: Negative for rash.   Neurological: Negative for weakness and numbness.   Hematological: Does not bruise/bleed easily.   All other systems reviewed and are negative.    Physical Exam      Initial Vitals [07/04/20 1859]   BP Pulse Resp Temp SpO2   (!) 166/86 88 16 98.6 °F (37 °C) 98 %      MAP       --          Physical Exam  Nursing Notes and Vital Signs Reviewed.  Constitutional: Patient is in moderate distress. Well-developed and well-nourished.  Head: Atraumatic. Normocephalic.  Eyes: PERRL. EOM intact. Conjunctivae are not pale. No scleral icterus.  ENT: Mucous membranes are moist. Oropharynx is clear and symmetric.    Neck: Supple. Full ROM.   Cardiovascular: Regular rate. Regular rhythm. Distal pulses are 2+ and symmetric. Thready dorsalis pedis pulse of left ankle with bruising and ankle swelling.  Pulmonary/Chest: No respiratory distress.   Abdominal: Soft and non-distended.  There is no tenderness.  No rebound, guarding, or rigidity. Good bowel sounds.  Genitourinary: No CVA tenderness  Musculoskeletal: Moves all extremities.Left foot with obvious ankle deformity and external rotation. Left ankle  "swelling. No calf tenderness.  Skin: Warm and dry.  Neurological:  Alert, awake, and appropriate.  Normal speech.  No acute focal neurological deficits are appreciated.  Psychiatric: Normal affect. Good eye contact. Appropriate in content.    ED Course    Closed treatment of left ankle bimalleolar fracture    Date/Time: 7/4/2020 10:37 PM  Performed by: Heri Castorena MD  Authorized by: Heri Castorena MD     Injury:     Injury location:  Ankle    Location details:  Left ankle    Injury type:  Fracture    Fracture type: bimalleolar        Pre-procedure assessment:     Neurovascular status: Neurovascularly intact      Distal perfusion: normal      Neurological function: normal      Range of motion: reduced        Selections made in this section will also lock the Injury type section above.:     Manipulation performed?: No      Immobilization:  Splint    Splint type:  Short leg (with ankle stirrup)    Supplies used:  Ortho-Glass, elastic bandage and cotton padding    Complications: No    Post-procedure assessment:     Neurovascular status: Neurovascularly intact      Distal perfusion: normal      Neurological function: normal      Range of motion: normal      Patient tolerance:  Patient tolerated the procedure well with no immediate complications     Imaging obtained, pain medication prescription provided, anticipatory care discussed, and f/u provided with orthopedics.       ED Vital Signs:  Vitals:    07/04/20 1859 07/04/20 1941 07/04/20 2013 07/04/20 2047   BP: (!) 166/86  (!) 176/93    Pulse: 88  86    Resp: 16 16 16 18   Temp: 98.6 °F (37 °C)      TempSrc: Oral      SpO2: 98%      Weight: 98.4 kg (217 lb)      Height: 5' 4" (1.626 m)       07/04/20 2155 07/04/20 2202 07/04/20 2313   BP: (!) 182/90  (!) 182/81   Pulse: 87  89   Resp: 18 18 16   Temp:      TempSrc:      SpO2: 97%  97%   Weight:      Height:          Abnormal Lab Results:  Labs Reviewed - No data to display     All Lab Results:   Results for " orders placed or performed in visit on 12/19/19   2D Echo w/ Color Flow Doppler   Result Value Ref Range    QEF 55 55 - 65    Diastolic Dysfunction No     Aortic Valve Regurgitation MILD     Est. PA Systolic Pressure 20.47          Imaging Results:  Imaging Results          X-Ray Tibia Fibula 2 View Left (Final result)  Result time 07/04/20 20:43:39    Final result by Joseph Padron Jr., MD (07/04/20 20:43:39)                 Impression:      Bimalleolar fracture again demonstrated with no more proximal fracture evident.      Electronically signed by: Joseph Padron Jr., MD  Date:    07/04/2020  Time:    20:43             Narrative:    EXAMINATION:  XR TIBIA FIBULA 2 VIEW LEFT    CLINICAL HISTORY:  Pain in left leg    TECHNIQUE:  AP and lateral views of the left tibia and fibula were performed.    COMPARISON:  None.    FINDINGS:  Osteopenia.  Again demonstrated are the previously described medial and lateral malleolar fractures of the ankle.  The more proximal tibia and fibular intact.  Swelling of the soft tissues.                               X-Ray Foot Complete Left (Final result)  Result time 07/04/20 20:05:37    Final result by Joseph Padron Jr., MD (07/04/20 20:05:37)                 Impression:      Medial and lateral malleolar fractures.  Chronic Lisfranc injury of the midfoot.  No definitive evidence of osteomyelitis.      Electronically signed by: Joseph Padron Jr., MD  Date:    07/04/2020  Time:    20:05             Narrative:    EXAMINATION:  XR FOOT COMPLETE 3 VIEW LEFT    CLINICAL HISTORY:  . Trauma.    COMPARISON:  Multiple prior radiographs dating back to November 2019 were reviewed.    FINDINGS:  Again demonstrated os chronic deformity of the midfoot with chronic Lisfranc injury.  The appearance of the tarsals and metatarsals is unchanged compared to the study from January 8, 2020. No focal osteopenia, osseous erosion, or periosteal reaction.  There are fractures of the lateral and medial  malleoli.Diffuse soft tissue swelling.                               X-Ray Ankle Complete Left (Final result)  Result time 07/04/20 20:03:44    Final result by Joseph Padron Jr., MD (07/04/20 20:03:44)                 Impression:      Bimalleolar fracture of the left ankle.  This could be subacute in nature given the poor definition of the fracture fragment margins.      Electronically signed by: Jsoeph Padron Jr., MD  Date:    07/04/2020  Time:    20:03             Narrative:    EXAMINATION:  XR ANKLE COMPLETE 3 VIEW LEFT    CLINICAL HISTORY:  Unspecified injury of left ankle, initial encounter.    COMPARISON:  None    FINDINGS:  Again demonstrated is chronic deformity of the midfoot relating to trauma and secondary osteoarthritis.  There is an obliquely oriented, complete fracture through the lateral malleolus with an associated medial malleolar transverse fracture.  The fracture fragment margins are somewhat indistinct.  Diffuse soft tissue swelling and edema.                                        The Emergency Provider reviewed the vital signs and test results, which are outlined above.    ED Discussion       8:13 PM: Discussed pt's case with Dr. Moose Calvillo (Orthopedic Surgery) who recommends applying a splint with f/u in clinic.    10:34 PM: Reassessed pt at this time.  Pt states her condition has improved at this time. Discussed with pt all pertinent ED information and results. Discussed pt dx and plan of tx. Gave pt all f/u and return to the ED instructions. All questions and concerns were addressed at this time. Pt expresses understanding of information and instructions, and is comfortable with plan to discharge. Pt is stable for discharge.    I discussed with patient and/or family/caretaker that evaluation in the ED does not suggest any emergent or life threatening medical conditions requiring immediate intervention beyond what was provided in the ED, and I believe patient is safe for discharge.   Regardless, an unremarkable evaluation in the ED does not preclude the development or presence of a serious of life threatening condition. As such, patient was instructed to return immediately for any worsening or change in current symptoms.         ED Medication(s):  Medications   fentaNYL injection 50 mcg (50 mcg Intravenous Given 7/4/20 1941)   morphine injection 4 mg (4 mg Intravenous Given 7/4/20 2047)   ketorolac injection 15 mg (15 mg Intravenous Given 7/4/20 2047)   morphine injection 4 mg (4 mg Intravenous Given 7/4/20 2202)   acetaminophen tablet 1,000 mg (1,000 mg Oral Given 7/4/20 2203)   neomycin-bacitracnZn-polymyxnB packet (1 each Topical (Top) Given 7/4/20 2203)       Follow-up Information     Moose Calvillo MD. Schedule an appointment as soon as possible for a visit in 3 days.    Specialty: Orthopedic Surgery  Contact information:  [address] [place] [address]  [phone]             [facility] - .    Specialty: Emergency Medicine  Why: As needed, If symptoms worsen  Contact information:  [address]  [facility] [id number] [phone]                 Discharge Medication List as of 7/4/2020 10:56 PM      START taking these medications    Details   naproxen (NAPROSYN) 500 MG tablet Take 1 tablet (500 mg total) by mouth 2 (two) times daily as needed (pain)., Starting Sat 7/4/2020, Until Sat 7/18/2020, Print      oxyCODONE-acetaminophen (PERCOCET) 5-325 mg per tablet Take 1 tablet by mouth every 4 (four) hours as needed for Pain (Take 1-2 tabs every 4 hours as needed for pain not relieved by naproxen)., Starting Sat 7/4/2020, Print               Medical Decision Making    Medical Decision Making:   Clinical Tests:   Radiological Study: Ordered and Reviewed           Scribe Attestation:   Scribe #1: I performed the above scribed service and the documentation accurately describes the services I performed. I attest to the accuracy of the  note.    Attending:   Physician Attestation Statement for Scribe #1: I, Heri Castorena MD, personally performed the services described in this documentation, as scribed by Griselda Grant, in my presence, and it is both accurate and complete.          Clinical Impression       ICD-10-CM ICD-9-CM   1. Bimalleolar fracture of left ankle, closed, initial encounter  S82.842A 824.4   2. Left ankle injury  S99.912A 959.7   3. Left foot pain  M79.672 729.5   4. Left leg pain  M79.605 729.5       Disposition:   Disposition: Discharged  Condition: Stable         Heri Castorena MD  07/05/20 0132

## 2020-07-08 ENCOUNTER — TELEPHONE (OUTPATIENT)
Dept: PODIATRY | Facility: CLINIC | Age: 65
End: 2020-07-08

## 2020-07-08 ENCOUNTER — OFFICE VISIT (OUTPATIENT)
Dept: PODIATRY | Facility: CLINIC | Age: 65
End: 2020-07-08
Payer: MEDICARE

## 2020-07-08 ENCOUNTER — PATIENT OUTREACH (OUTPATIENT)
Dept: ADMINISTRATIVE | Facility: OTHER | Age: 65
End: 2020-07-08

## 2020-07-08 ENCOUNTER — HOSPITAL ENCOUNTER (OUTPATIENT)
Dept: RADIOLOGY | Facility: HOSPITAL | Age: 65
Discharge: HOME | End: 2020-07-08
Attending: ORTHOPAEDIC SURGERY
Payer: MEDICARE

## 2020-07-08 VITALS
SYSTOLIC BLOOD PRESSURE: 168 MMHG | WEIGHT: 231.5 LBS | DIASTOLIC BLOOD PRESSURE: 95 MMHG | HEIGHT: 64 IN | HEART RATE: 97 BPM | BODY MASS INDEX: 39.52 KG/M2

## 2020-07-08 DIAGNOSIS — S82.852A CLOSED TRIMALLEOLAR FRACTURE OF LEFT ANKLE, INITIAL ENCOUNTER: Primary | ICD-10-CM

## 2020-07-08 DIAGNOSIS — E66.01 SEVERE OBESITY (BMI 35.0-39.9) WITH COMORBIDITY: ICD-10-CM

## 2020-07-08 DIAGNOSIS — E11.49 TYPE II DIABETES MELLITUS WITH NEUROLOGICAL MANIFESTATIONS: ICD-10-CM

## 2020-07-08 DIAGNOSIS — S90.522A: ICD-10-CM

## 2020-07-08 DIAGNOSIS — Z01.818 PREOP TESTING: Primary | ICD-10-CM

## 2020-07-08 DIAGNOSIS — M25.572 LEFT ANKLE PAIN, UNSPECIFIED CHRONICITY: Primary | ICD-10-CM

## 2020-07-08 DIAGNOSIS — M25.472 PAIN AND SWELLING OF LEFT ANKLE: ICD-10-CM

## 2020-07-08 DIAGNOSIS — M25.572 PAIN AND SWELLING OF LEFT ANKLE: ICD-10-CM

## 2020-07-08 DIAGNOSIS — M14.672 CHARCOT'S JOINT OF LEFT FOOT: ICD-10-CM

## 2020-07-08 DIAGNOSIS — M25.572 LEFT ANKLE PAIN, UNSPECIFIED CHRONICITY: ICD-10-CM

## 2020-07-08 PROCEDURE — 1159F MED LIST DOCD IN RCRD: CPT | Mod: S$GLB,,, | Performed by: PODIATRIST

## 2020-07-08 PROCEDURE — 3080F PR MOST RECENT DIASTOLIC BLOOD PRESSURE >= 90 MM HG: ICD-10-PCS | Mod: CPTII,S$GLB,, | Performed by: PODIATRIST

## 2020-07-08 PROCEDURE — 99214 PR OFFICE/OUTPT VISIT, EST, LEVL IV, 30-39 MIN: ICD-10-PCS | Mod: 25,S$GLB,, | Performed by: PODIATRIST

## 2020-07-08 PROCEDURE — 99999 PR PBB SHADOW E&M-EST. PATIENT-LVL V: CPT | Mod: PBBFAC,,, | Performed by: PODIATRIST

## 2020-07-08 PROCEDURE — 3077F SYST BP >= 140 MM HG: CPT | Mod: CPTII,S$GLB,, | Performed by: PODIATRIST

## 2020-07-08 PROCEDURE — 97597 PR DEBRIDEMENT OPEN WOUND 20 SQ CM<: ICD-10-PCS | Mod: S$GLB,,, | Performed by: PODIATRIST

## 2020-07-08 PROCEDURE — 3051F PR MOST RECENT HEMOGLOBIN A1C LEVEL 7.0 - < 8.0%: ICD-10-PCS | Mod: CPTII,S$GLB,, | Performed by: PODIATRIST

## 2020-07-08 PROCEDURE — 73610 X-RAY EXAM OF ANKLE: CPT | Mod: 26,LT,, | Performed by: RADIOLOGY

## 2020-07-08 PROCEDURE — 1101F PR PT FALLS ASSESS DOC 0-1 FALLS W/OUT INJ PAST YR: ICD-10-PCS | Mod: CPTII,S$GLB,, | Performed by: PODIATRIST

## 2020-07-08 PROCEDURE — 3051F HG A1C>EQUAL 7.0%<8.0%: CPT | Mod: CPTII,S$GLB,, | Performed by: PODIATRIST

## 2020-07-08 PROCEDURE — 29515 APPLICATION SHORT LEG SPLINT: CPT | Mod: 59,LT,S$GLB, | Performed by: PODIATRIST

## 2020-07-08 PROCEDURE — 73610 X-RAY EXAM OF ANKLE: CPT | Mod: TC,LT

## 2020-07-08 PROCEDURE — 1101F PT FALLS ASSESS-DOCD LE1/YR: CPT | Mod: CPTII,S$GLB,, | Performed by: PODIATRIST

## 2020-07-08 PROCEDURE — 3008F PR BODY MASS INDEX (BMI) DOCUMENTED: ICD-10-PCS | Mod: CPTII,S$GLB,, | Performed by: PODIATRIST

## 2020-07-08 PROCEDURE — 3008F BODY MASS INDEX DOCD: CPT | Mod: CPTII,S$GLB,, | Performed by: PODIATRIST

## 2020-07-08 PROCEDURE — 3080F DIAST BP >= 90 MM HG: CPT | Mod: CPTII,S$GLB,, | Performed by: PODIATRIST

## 2020-07-08 PROCEDURE — 97597 DBRDMT OPN WND 1ST 20 CM/<: CPT | Mod: S$GLB,,, | Performed by: PODIATRIST

## 2020-07-08 PROCEDURE — 3077F PR MOST RECENT SYSTOLIC BLOOD PRESSURE >= 140 MM HG: ICD-10-PCS | Mod: CPTII,S$GLB,, | Performed by: PODIATRIST

## 2020-07-08 PROCEDURE — 1159F PR MEDICATION LIST DOCUMENTED IN MEDICAL RECORD: ICD-10-PCS | Mod: S$GLB,,, | Performed by: PODIATRIST

## 2020-07-08 PROCEDURE — 99999 PR PBB SHADOW E&M-EST. PATIENT-LVL V: ICD-10-PCS | Mod: PBBFAC,,, | Performed by: PODIATRIST

## 2020-07-08 PROCEDURE — 99214 OFFICE O/P EST MOD 30 MIN: CPT | Mod: 25,S$GLB,, | Performed by: PODIATRIST

## 2020-07-08 PROCEDURE — 29515 PR APPLY LOWER LEG SPLINT: ICD-10-PCS | Mod: 59,LT,S$GLB, | Performed by: PODIATRIST

## 2020-07-08 PROCEDURE — 73610 XR ANKLE COMPLETE 3 VIEW LEFT: ICD-10-PCS | Mod: 26,LT,, | Performed by: RADIOLOGY

## 2020-07-08 RX ORDER — DOXYCYCLINE 100 MG/1
100 CAPSULE ORAL EVERY 12 HOURS
Qty: 14 CAPSULE | Refills: 0 | Status: SHIPPED | OUTPATIENT
Start: 2020-07-08 | End: 2020-07-15

## 2020-07-08 NOTE — PROGRESS NOTES
Subjective:       Patient ID: Kay Galarza is a 65 y.o. female.    Chief Complaint: Ankle Injury (left foot and ankle, wear soft cast, reports pain 8/10, PCP Dr. Negron )      HPI: Kay Galarza presents to the clinic today for evaluation concerning a left ankle fracture.  Patient was seen in the [facility] ER on 7/4/2020 or x-rays did reveal the aforementioned. She presents with an AO splint.  Patient presents with her daughter.  Patient was referred by Dr. Tafoya from [facility] due to insurance coverage issues. Patient presents with several fracture blisters this afternoon due to no RICE therapy therapy since application of the cast.  Reviewing the most recent x-rays as compared to the pre reduction x-rays, the alignment was better prior.  Patient denies local or systemic signs infection.  She is a DMII with peripheral neuropathy and a known left foot Charcot deformity.  Patient ambulatory with a wheelchair.  Pains are approximately 8/10.    Review of patient's allergies indicates:   Allergen Reactions    Pollen extracts        Past Medical History:   Diagnosis Date    Arthritis     Asthma     Cataract     Diabetes mellitus     Diabetes mellitus, type 2     Diabetic retinopathy     Hyperlipidemia     Hypertension        Family History   Problem Relation Age of Onset    Cancer Father         Prostate Ca    Hypertension Father     Stroke Father     Diabetes Sister     Glaucoma Mother     Hypertension Mother     Glaucoma Maternal Grandmother     Blindness Neg Hx     Macular degeneration Neg Hx     Retinal detachment Neg Hx     Strabismus Neg Hx        Social History     Socioeconomic History    Marital status:      Spouse name: Not on file    Number of children: Not on file    Years of education: Not on file    Highest education level: Not on file   Occupational History    Not on file   Social Needs    Financial resource strain: Not on file     Food insecurity     Worry: Not on file     Inability: Not on file    Transportation needs     Medical: Not on file     Non-medical: Not on file   Tobacco Use    Smoking status: Never Smoker    Smokeless tobacco: Never Used   Substance and Sexual Activity    Alcohol use: No    Drug use: No    Sexual activity: Yes   Lifestyle    Physical activity     Days per week: Not on file     Minutes per session: Not on file    Stress: Not on file   Relationships    Social connections     Talks on phone: Not on file     Gets together: Not on file     Attends Hinduism service: Not on file     Active member of club or organization: Not on file     Attends meetings of clubs or organizations: Not on file     Relationship status: Not on file   Other Topics Concern    Not on file   Social History Narrative    Not on file       Past Surgical History:   Procedure Laterality Date    CATARACT EXTRACTION W/  INTRAOCULAR LENS IMPLANT Right 07/18/2018    CATARACT EXTRACTION W/  INTRAOCULAR LENS IMPLANT Left 03/13/2019    COLONOSCOPY N/A 12/22/2018    Procedure: COLONOSCOPY;  Surgeon: Zak Dover MD;  Location: [facility];  Service: Endoscopy;  Laterality: N/A;       Review of Systems   Constitutional: Negative for chills, fatigue and fever.   HENT: Negative for hearing loss.    Eyes: Negative for photophobia and visual disturbance.   Respiratory: Negative for cough, chest tightness, shortness of breath and wheezing.    Cardiovascular: Positive for leg swelling. Negative for chest pain and palpitations.   Gastrointestinal: Negative for constipation, diarrhea, nausea and vomiting.   Endocrine: Negative for cold intolerance and heat intolerance.   Genitourinary: Negative for flank pain.   Musculoskeletal: Positive for arthralgias and gait problem. Negative for neck pain and neck stiffness.   Skin: Positive for color change and wound.   Neurological: Positive for numbness. Negative for light-headedness and headaches.  "  Psychiatric/Behavioral: Negative for sleep disturbance.         Objective:   BP (!) 168/95   Pulse 97   Ht 5' 4" (1.626 m)   Wt 105 kg (231 lb 7.7 oz)   BMI 39.73 kg/m²     X-Ray Ankle Complete Left  Narrative: EXAMINATION:  XR ANKLE COMPLETE 3 VIEW LEFT    CLINICAL HISTORY:  Pain in left ankle and joints of left foot    TECHNIQUE:  AP, lateral and oblique views of the left ankle were performed.    COMPARISON:  07/04/2020    FINDINGS:  Splint material in place.  Redemonstration of mild malleolar fracture with ankle mortise disruption.  There is medial displacement of the tibia with respect to the talus.  Chronic midfoot findings unchanged.  Ankle soft tissue edema present greater laterally.  Impression: As above    Electronically signed by: Emanuel Pulido MD  Date:    07/08/2020  Time:    09:10      Physical Exam    LOWER EXTREMITY PHYSICAL EXAMINATION    VASCULAR: On the left foot, the dorsalis pedis pulse is 2/4 and the posterior tibial pulse is 2/4. Capillary refill time is less than 3 seconds. Hair growth is present on the dorsum of the foot and at the digits. No rubor is present. Proximal to distal temperature is warm to warm.    DERMATOLOGY: Skin is supple, dry and intact. No ulcerations or wounds are noted. No cellulitis is noted.  Substantial erythema with ecchymosis noted for circumferential distal lower leg/ankle fracture blisters.    NEUROLOGY: Sensation to light touch is intact. Proprioception is intact, bilateral. Sensation to pin prick is reduced to absent. Vibratory sensation is diminished to the left and right lower extremity. Examination with 5.07 [place]nie monofilament reveals that protective sensation is not intact to the left and right plantar surfaces of the foot and digits, as the patient has no sensation/detection at greater than 4 distinct points of contact.     ORTHOPEDIC:  Discomfort to palpation, medial and lateral aspects ankle joint.  Discomfort to palpation posterior and " "anterior aspects of the ankle joint.  Moderate edema is noted.  Patient unable unable to bear weight. Ankle instability is noted.    Assessment:     1. Closed trimalleolar fracture of left ankle, initial encounter    2. Pain and swelling of left ankle    3. 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 Type II diabetes mellitus with neurological manifestations    5. Severe obesity (BMI 35.0-39.9) with comorbidity    6. 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 for 7 days  Dispense: 14 capsule; Refill: 0        Thorough discussion is had with the patient today, concerning the diagnosis, its etiology, and the treatment algorithm at present.  XRAYS are reviewed in detail with the patient. All questions and concerns regarding findings and its/their implications are outlined and discussed.  LLE short leg posterior splint application (AO type) in standard fashion using 5" OrthoGlass fiberglass splinting material (4" at the medial and lateral aspects) approx. 24" with web-roll/cast padding and ACE bandage.  Patient will be nonweightbearing wheelchair. RICE therapy ATC.  Debridement of fracture blister followed by Xeroform application.  Do not change dressings until follow-up.  Patient will have definitive ORIF in the coming weeks. Splint " application within inversion and slight plantarflexion.             Future Appointments   Date Time Provider Department Center   7/8/2020  2:30 PM LABORATORY, [facility]   7/13/2020  1:45 PM Kirt Gray DPM [facility]

## 2020-07-08 NOTE — PROGRESS NOTES
Requested updates within Care Everywhere.  Patient's chart was reviewed for overdue JAMES topics.  Immunizations reconciled.

## 2020-07-08 NOTE — TELEPHONE ENCOUNTER
Spoke with pt daughter to inform her that the medication is been called into the [facility] in [place].        Lynn Bazzi MA  Podiatry Surgical Department  [facility]                          ----- Message from Sana Chen sent at 7/8/2020 10:13 AM CDT -----  Contact: [place], [phone]  Calling regarding the antibotic.  Was it called into [place] [facility]?

## 2020-07-13 ENCOUNTER — OFFICE VISIT (OUTPATIENT)
Dept: PODIATRY | Facility: CLINIC | Age: 65
End: 2020-07-13
Payer: MEDICARE

## 2020-07-13 VITALS
HEIGHT: 64 IN | HEART RATE: 85 BPM | DIASTOLIC BLOOD PRESSURE: 74 MMHG | WEIGHT: 231.5 LBS | BODY MASS INDEX: 39.52 KG/M2 | SYSTOLIC BLOOD PRESSURE: 133 MMHG | RESPIRATION RATE: 17 BRPM

## 2020-07-13 DIAGNOSIS — M14.672 CHARCOT'S JOINT OF LEFT FOOT: Primary | ICD-10-CM

## 2020-07-13 DIAGNOSIS — S90.522D BLISTER OF LEFT ANKLE, SUBSEQUENT ENCOUNTER: ICD-10-CM

## 2020-07-13 DIAGNOSIS — M25.472 PAIN AND SWELLING OF LEFT ANKLE: ICD-10-CM

## 2020-07-13 DIAGNOSIS — E66.01 SEVERE OBESITY (BMI 35.0-39.9) WITH COMORBIDITY: ICD-10-CM

## 2020-07-13 DIAGNOSIS — E11.49 TYPE II DIABETES MELLITUS WITH NEUROLOGICAL MANIFESTATIONS: ICD-10-CM

## 2020-07-13 DIAGNOSIS — M25.572 PAIN AND SWELLING OF LEFT ANKLE: ICD-10-CM

## 2020-07-13 DIAGNOSIS — S82.852D CLOSED TRIMALLEOLAR FRACTURE OF LEFT ANKLE WITH ROUTINE HEALING, SUBSEQUENT ENCOUNTER: Primary | ICD-10-CM

## 2020-07-13 DIAGNOSIS — M14.672 CHARCOT'S JOINT OF LEFT FOOT: ICD-10-CM

## 2020-07-13 PROCEDURE — 29515 PR APPLY LOWER LEG SPLINT: ICD-10-PCS | Mod: LT,S$GLB,, | Performed by: PODIATRIST

## 2020-07-13 PROCEDURE — 3008F PR BODY MASS INDEX (BMI) DOCUMENTED: ICD-10-PCS | Mod: CPTII,S$GLB,, | Performed by: PODIATRIST

## 2020-07-13 PROCEDURE — 3078F PR MOST RECENT DIASTOLIC BLOOD PRESSURE < 80 MM HG: ICD-10-PCS | Mod: CPTII,S$GLB,, | Performed by: PODIATRIST

## 2020-07-13 PROCEDURE — 1101F PT FALLS ASSESS-DOCD LE1/YR: CPT | Mod: CPTII,S$GLB,, | Performed by: PODIATRIST

## 2020-07-13 PROCEDURE — 3051F HG A1C>EQUAL 7.0%<8.0%: CPT | Mod: CPTII,S$GLB,, | Performed by: PODIATRIST

## 2020-07-13 PROCEDURE — 3075F SYST BP GE 130 - 139MM HG: CPT | Mod: CPTII,S$GLB,, | Performed by: PODIATRIST

## 2020-07-13 PROCEDURE — 1159F PR MEDICATION LIST DOCUMENTED IN MEDICAL RECORD: ICD-10-PCS | Mod: S$GLB,,, | Performed by: PODIATRIST

## 2020-07-13 PROCEDURE — 99214 PR OFFICE/OUTPT VISIT, EST, LEVL IV, 30-39 MIN: ICD-10-PCS | Mod: 25,S$GLB,, | Performed by: PODIATRIST

## 2020-07-13 PROCEDURE — 1125F AMNT PAIN NOTED PAIN PRSNT: CPT | Mod: S$GLB,,, | Performed by: PODIATRIST

## 2020-07-13 PROCEDURE — 1125F PR PAIN SEVERITY QUANTIFIED, PAIN PRESENT: ICD-10-PCS | Mod: S$GLB,,, | Performed by: PODIATRIST

## 2020-07-13 PROCEDURE — 3051F PR MOST RECENT HEMOGLOBIN A1C LEVEL 7.0 - < 8.0%: ICD-10-PCS | Mod: CPTII,S$GLB,, | Performed by: PODIATRIST

## 2020-07-13 PROCEDURE — 99214 OFFICE O/P EST MOD 30 MIN: CPT | Mod: 25,S$GLB,, | Performed by: PODIATRIST

## 2020-07-13 PROCEDURE — 99999 PR PBB SHADOW E&M-EST. PATIENT-LVL V: CPT | Mod: PBBFAC,,, | Performed by: PODIATRIST

## 2020-07-13 PROCEDURE — 29515 APPLICATION SHORT LEG SPLINT: CPT | Mod: LT,S$GLB,, | Performed by: PODIATRIST

## 2020-07-13 PROCEDURE — 1159F MED LIST DOCD IN RCRD: CPT | Mod: S$GLB,,, | Performed by: PODIATRIST

## 2020-07-13 PROCEDURE — 3075F PR MOST RECENT SYSTOLIC BLOOD PRESS GE 130-139MM HG: ICD-10-PCS | Mod: CPTII,S$GLB,, | Performed by: PODIATRIST

## 2020-07-13 PROCEDURE — 3008F BODY MASS INDEX DOCD: CPT | Mod: CPTII,S$GLB,, | Performed by: PODIATRIST

## 2020-07-13 PROCEDURE — 3078F DIAST BP <80 MM HG: CPT | Mod: CPTII,S$GLB,, | Performed by: PODIATRIST

## 2020-07-13 PROCEDURE — 99999 PR PBB SHADOW E&M-EST. PATIENT-LVL V: ICD-10-PCS | Mod: PBBFAC,,, | Performed by: PODIATRIST

## 2020-07-13 PROCEDURE — 1101F PR PT FALLS ASSESS DOC 0-1 FALLS W/OUT INJ PAST YR: ICD-10-PCS | Mod: CPTII,S$GLB,, | Performed by: PODIATRIST

## 2020-07-13 RX ORDER — CIPROFLOXACIN 750 MG/1
750 TABLET, FILM COATED ORAL EVERY 12 HOURS
Qty: 14 TABLET | Refills: 0 | Status: SHIPPED | OUTPATIENT
Start: 2020-07-13 | End: 2020-07-20

## 2020-07-13 NOTE — PROGRESS NOTES
Subjective:       Patient ID: Kay Galarza is a 65 y.o. female.    Chief Complaint: Ankle Injury (fracture of left ankle/ankle wound, reports pain 7/10, PCP Dr. Negron )    HPI: Kay Galarza presents to the clinic today for follow-up evaluation concerning left trimalleolar ankle fracture.  Patient was seen in the office on last week for the aforementioned.  At that time, no operative intervention due to fracture blisters.  The fracture blisters were lanced and debrided here in the office on last week.  Patient has been immobilized with a AO splint since that time with petroleum gauze atop the blisters.  Patient is a diabetic, as we controlled with left lower extremity Charcot foot type and peripheral neuropathy.  She presents this afternoon with her son.  RICE therapy is stated.  Patient states frequent limb elevation.  Patient does continue oral Doxycycline.    Hemoglobin A1C   Date Value Ref Range Status   12/10/2019 7.5 (H) 4.0 - 5.6 % Final     Comment:     ADA Screening Guidelines:  5.7-6.4%  Consistent with prediabetes  >or=6.5%  Consistent with diabetes  High levels of fetal hemoglobin interfere with the HbA1C  assay. Heterozygous hemoglobin variants (HbS, HgC, etc)do  not significantly interfere with this assay.   However, presence of multiple variants may affect accuracy.     08/22/2019 8.3 (H) 4.0 - 5.6 % Final     Comment:     ADA Screening Guidelines:  5.7-6.4%  Consistent with prediabetes  >or=6.5%  Consistent with diabetes  High levels of fetal hemoglobin interfere with the HbA1C  assay. Heterozygous hemoglobin variants (HbS, HgC, etc)do  not significantly interfere with this assay.   However, presence of multiple variants may affect accuracy.     05/07/2019 10.5 (H) 4.0 - 5.6 % Final     Comment:     ADA Screening Guidelines:  5.7-6.4%  Consistent with prediabetes  >or=6.5%  Consistent with diabetes  High levels of fetal hemoglobin interfere with the HbA1C  assay.  Heterozygous hemoglobin variants (HbS, HgC, etc)do  not significantly interfere with this assay.   However, presence of multiple variants may affect accuracy.           Review of patient's allergies indicates:   Allergen Reactions    Pollen extracts        Past Medical History:   Diagnosis Date    Arthritis     Asthma     Cataract     Diabetes mellitus     Diabetes mellitus, type 2     Diabetic retinopathy     Hyperlipidemia     Hypertension        Family History   Problem Relation Age of Onset    Cancer Father         Prostate Ca    Hypertension Father     Stroke Father     Diabetes Sister     Glaucoma Mother     Hypertension Mother     Glaucoma Maternal Grandmother     Blindness Neg Hx     Macular degeneration Neg Hx     Retinal detachment Neg Hx     Strabismus Neg Hx        Social History     Socioeconomic History    Marital status:      Spouse name: Not on file    Number of children: Not on file    Years of education: Not on file    Highest education level: Not on file   Occupational History    Not on file   Social Needs    Financial resource strain: Not on file    Food insecurity     Worry: Not on file     Inability: Not on file    Transportation needs     Medical: Not on file     Non-medical: Not on file   Tobacco Use    Smoking status: Never Smoker    Smokeless tobacco: Never Used   Substance and Sexual Activity    Alcohol use: No    Drug use: No    Sexual activity: Yes   Lifestyle    Physical activity     Days per week: Not on file     Minutes per session: Not on file    Stress: Not on file   Relationships    Social connections     Talks on phone: Not on file     Gets together: Not on file     Attends Sikh service: Not on file     Active member of club or organization: Not on file     Attends meetings of clubs or organizations: Not on file     Relationship status: Not on file   Other Topics Concern    Not on file   Social History Narrative    Not on file  "      Past Surgical History:   Procedure Laterality Date    CATARACT EXTRACTION W/  INTRAOCULAR LENS IMPLANT Right 07/18/2018    CATARACT EXTRACTION W/  INTRAOCULAR LENS IMPLANT Left 03/13/2019    COLONOSCOPY N/A 12/22/2018    Procedure: COLONOSCOPY;  Surgeon: Zak Dover MD;  Location: [facility];  Service: Endoscopy;  Laterality: N/A;       Review of Systems   Constitutional: Negative for chills, fatigue and fever.   HENT: Negative for hearing loss.    Eyes: Negative for photophobia and visual disturbance.   Respiratory: Negative for cough, chest tightness, shortness of breath and wheezing.    Cardiovascular: Positive for leg swelling. Negative for chest pain and palpitations.   Gastrointestinal: Negative for constipation, diarrhea, nausea and vomiting.   Endocrine: Negative for cold intolerance and heat intolerance.   Genitourinary: Negative for flank pain.   Musculoskeletal: Positive for arthralgias and gait problem. Negative for neck pain and neck stiffness.   Skin: Negative for wound.   Neurological: Positive for numbness. Negative for light-headedness and headaches.   Psychiatric/Behavioral: Negative for sleep disturbance.         Objective:   /74   Pulse 85   Resp 17   Ht 5' 4" (1.626 m)   Wt 105 kg (231 lb 7.7 oz)   BMI 39.73 kg/m²         Physical Exam    LOWER EXTREMITY PHYSICAL EXAMINATION    DERMATOLOGY: Skin is supple, dry and intact. The fracture blisters at the lateral medial aspects and left ankle are improved.  Granulation tissues are noted.  Prior to removal of dressings there does appear to be some greenish/yellow Pseudomonal growth.  No malodor is noted.  No purulence noted.    VASCULAR: On the left foot, the dorsalis pedis pulse is 2/4 and the posterior tibial pulse is 2/4. Capillary refill time is less than 3 seconds. Hair growth is present on the dorsum of the foot and at the digits. No rubor is present. Proximal to distal temperature is warm to warm.  No calf pains or " "tenderness is noted with palpation.    NEUROLOGY: Protective sensation is not intact to the left plantar surfaces of the foot and digits, as the patient has no sensation/detection at greater than 4 distinct points of contact with 5.07 [place] Arnie monofilament. Sensation to light touch is intact on the right foot. Proprioception is intact. Sensation to pin prick is reduced to absent.     ORTHOPEDIC:  Unstable ankle fracture when not supportive.  Edema is improved.  Discomfort to palpation to every aspect of the ankle joint.  Stable Charcot foot type, left lower extremity.    Assessment:     1. Closed trimalleolar fracture of left ankle with routine healing, subsequent encounter    2. Pain and swelling of left ankle    3. 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 Severe obesity (BMI 35.0-39.9) with comorbidity    5. Type II diabetes mellitus with neurological manifestations    6. Blister of left ankle, subsequent encounter          Plan:     Closed trimalleolar fracture of left ankle with routine healing, subsequent encounter  Pain and swelling of left ankle  Thorough discussion is had with the patient today, concerning the diagnosis, its etiology, and the treatment algorithm at present.    LLE short leg AO splint application in standard fashion using 5" OrthoGlass fiberglass splinting material, approx. 24" with web-roll/cast padding and ACE bandage.    RICE therapy ATC.  Pain control.  Please add Ciprofloxacin to Doxycycline.  Follow-up on Friday.  We will plan for open reduction and internal fixation on next week.    Patient will need preop medical clearance for her primary care provider, Lucy Negron MD.      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  Patient requests that her left Charcot foot " be reconstructed and fixed at the same time as the ankle procedure.  As such, please obtain CT of the left foot without contrast.      Severe obesity (BMI 35.0-39.9) with comorbidity  Patient is counseled and reminded concerning the importance of good nutrition and healthy eating habits, which may include blood sugar control, to prevent and/or help podiatric foot and ankle complications.    Type II diabetes mellitus with neurological manifestations  Patient advised to follow up with Primary Care Physician for management of comorbid states.    Blister of left ankle, subsequent encounter  -     ciprofloxacin HCl (CIPRO) 750 MG tablet; Take 1 tablet (750 mg total) by mouth every 12 (twelve) hours. for 7 days  Dispense: 14 tablet; Refill: 0          Future Appointments   Date Time Provider Department Center   7/14/2020  2:00 PM [facility] CT1 LIMIT 500 LBS [facility] CT SCAN [place]   7/17/2020 12:00 PM Kirt Gray DPM [facility]   7/20/2020 11:20 AM Lucy Negron MD [facility]

## 2020-07-14 ENCOUNTER — HOSPITAL ENCOUNTER (OUTPATIENT)
Dept: RADIOLOGY | Facility: HOSPITAL | Age: 65
Discharge: HOME | End: 2020-07-14
Attending: PODIATRIST
Payer: MEDICARE

## 2020-07-14 DIAGNOSIS — M14.672 CHARCOT'S JOINT OF LEFT FOOT: ICD-10-CM

## 2020-07-14 PROCEDURE — 73700 CT LOWER EXTREMITY W/O DYE: CPT | Mod: TC,LT

## 2020-07-15 DIAGNOSIS — Z09 POSTOP CHECK: Primary | ICD-10-CM

## 2020-07-16 NOTE — DISCHARGE INSTRUCTIONS
Weightbearing Status and Wound care:  1. Do not intentionally place weight upon or walk on the operative extremity.  2. When ambulating, use either crutches, a Knee-Scooter (Roll-A-Bout), rolling walker, standard walker or wheelchair.  3. During daytime/awake hours, if a CAST or POSTERIOR SPLINT is in place, ice the posterior aspect of the leg, behind the knee, 20 minutes on (w/ ice) and followed by 20 minutes off (w/o ice) until follow up; repeat accordingly until follow up. IF no CAST or POSTERIOR SPLINT is in place, ice the anterior aspect of the ankle, in a similar manner. During night time/sleep hours, simply elevating the limb above the level of the heart is sufficient.   4. Elevate the extremity above the level of the heart at all times when sitting/sleeping.  5. Take the pain mediations as prescribed for the initial 3 or so days, then only as needed.  6. Take any prescribed blood thinners as detailed on the prescription.  7. For the initial 3-4 days, when pains are at their worst, taking Motrin/Advil/Ibuprofen or Aleve can help with pain relief, in addition to the prescribed opioid/narcotic medication.   8. Do not change the dressings.  9. Do not get the dressings wet.

## 2020-07-17 ENCOUNTER — OFFICE VISIT (OUTPATIENT)
Dept: PODIATRY | Facility: CLINIC | Age: 65
End: 2020-07-17
Payer: MEDICARE

## 2020-07-17 VITALS
HEIGHT: 64 IN | BODY MASS INDEX: 39.52 KG/M2 | HEART RATE: 93 BPM | SYSTOLIC BLOOD PRESSURE: 103 MMHG | WEIGHT: 231.5 LBS | DIASTOLIC BLOOD PRESSURE: 67 MMHG

## 2020-07-17 DIAGNOSIS — M25.472 PAIN AND SWELLING OF LEFT ANKLE: ICD-10-CM

## 2020-07-17 DIAGNOSIS — E11.49 TYPE II DIABETES MELLITUS WITH NEUROLOGICAL MANIFESTATIONS: ICD-10-CM

## 2020-07-17 DIAGNOSIS — E66.01 SEVERE OBESITY (BMI 35.0-39.9) WITH COMORBIDITY: ICD-10-CM

## 2020-07-17 DIAGNOSIS — S90.522D BLISTER OF LEFT ANKLE, SUBSEQUENT ENCOUNTER: ICD-10-CM

## 2020-07-17 DIAGNOSIS — S82.852D CLOSED TRIMALLEOLAR FRACTURE OF LEFT ANKLE WITH ROUTINE HEALING, SUBSEQUENT ENCOUNTER: Primary | ICD-10-CM

## 2020-07-17 DIAGNOSIS — M14.672 CHARCOT'S JOINT OF LEFT FOOT: ICD-10-CM

## 2020-07-17 DIAGNOSIS — M25.572 PAIN AND SWELLING OF LEFT ANKLE: ICD-10-CM

## 2020-07-17 PROCEDURE — 3008F BODY MASS INDEX DOCD: CPT | Mod: CPTII,S$GLB,, | Performed by: PODIATRIST

## 2020-07-17 PROCEDURE — 1159F MED LIST DOCD IN RCRD: CPT | Mod: S$GLB,,, | Performed by: PODIATRIST

## 2020-07-17 PROCEDURE — 3051F PR MOST RECENT HEMOGLOBIN A1C LEVEL 7.0 - < 8.0%: ICD-10-PCS | Mod: CPTII,S$GLB,, | Performed by: PODIATRIST

## 2020-07-17 PROCEDURE — 1101F PT FALLS ASSESS-DOCD LE1/YR: CPT | Mod: CPTII,S$GLB,, | Performed by: PODIATRIST

## 2020-07-17 PROCEDURE — 3074F PR MOST RECENT SYSTOLIC BLOOD PRESSURE < 130 MM HG: ICD-10-PCS | Mod: CPTII,S$GLB,, | Performed by: PODIATRIST

## 2020-07-17 PROCEDURE — 1159F PR MEDICATION LIST DOCUMENTED IN MEDICAL RECORD: ICD-10-PCS | Mod: S$GLB,,, | Performed by: PODIATRIST

## 2020-07-17 PROCEDURE — 3078F PR MOST RECENT DIASTOLIC BLOOD PRESSURE < 80 MM HG: ICD-10-PCS | Mod: CPTII,S$GLB,, | Performed by: PODIATRIST

## 2020-07-17 PROCEDURE — 99999 PR PBB SHADOW E&M-EST. PATIENT-LVL V: ICD-10-PCS | Mod: PBBFAC,,, | Performed by: PODIATRIST

## 2020-07-17 PROCEDURE — 99999 PR PBB SHADOW E&M-EST. PATIENT-LVL V: CPT | Mod: PBBFAC,,, | Performed by: PODIATRIST

## 2020-07-17 PROCEDURE — 3051F HG A1C>EQUAL 7.0%<8.0%: CPT | Mod: CPTII,S$GLB,, | Performed by: PODIATRIST

## 2020-07-17 PROCEDURE — 3008F PR BODY MASS INDEX (BMI) DOCUMENTED: ICD-10-PCS | Mod: CPTII,S$GLB,, | Performed by: PODIATRIST

## 2020-07-17 PROCEDURE — 1125F PR PAIN SEVERITY QUANTIFIED, PAIN PRESENT: ICD-10-PCS | Mod: S$GLB,,, | Performed by: PODIATRIST

## 2020-07-17 PROCEDURE — 99214 OFFICE O/P EST MOD 30 MIN: CPT | Mod: S$GLB,,, | Performed by: PODIATRIST

## 2020-07-17 PROCEDURE — 1101F PR PT FALLS ASSESS DOC 0-1 FALLS W/OUT INJ PAST YR: ICD-10-PCS | Mod: CPTII,S$GLB,, | Performed by: PODIATRIST

## 2020-07-17 PROCEDURE — 99214 PR OFFICE/OUTPT VISIT, EST, LEVL IV, 30-39 MIN: ICD-10-PCS | Mod: S$GLB,,, | Performed by: PODIATRIST

## 2020-07-17 PROCEDURE — 1125F AMNT PAIN NOTED PAIN PRSNT: CPT | Mod: S$GLB,,, | Performed by: PODIATRIST

## 2020-07-17 PROCEDURE — 3074F SYST BP LT 130 MM HG: CPT | Mod: CPTII,S$GLB,, | Performed by: PODIATRIST

## 2020-07-17 PROCEDURE — 3078F DIAST BP <80 MM HG: CPT | Mod: CPTII,S$GLB,, | Performed by: PODIATRIST

## 2020-07-17 RX ORDER — OXYCODONE AND ACETAMINOPHEN 5; 325 MG/1; MG/1
1 TABLET ORAL EVERY 6 HOURS PRN
Qty: 28 TABLET | Refills: 0 | Status: SHIPPED | OUTPATIENT
Start: 2020-07-17 | End: 2020-07-24

## 2020-07-17 NOTE — PROGRESS NOTES
Subjective:       Patient ID: Kay Galarza is a 65 y.o. female.    Chief Complaint: Wound Check (rates pain 7/10 soft cast w/wrapping diabetic pt pcp Dr. Negron.)    HPI: Kay Galarza presents to the clinic today for follow-up evaluation concerning left trimalleolar ankle fracture.  Patient is preop for open reduction and internal fixation of the left ankle fracture in this coming week.  She did have recent CT scan evaluation of the foot/ankle earlier this week for possible reconstructive surgery of the left Charcot foot.  This was not my original intention, but patient did bring this up on last week, stating that she would like to have the foot and ankle done at the same time.  Patient does not smoke cigarettes.  She is adequately controlled in terms of blood sugar.  He does have peripheral neuropathy.  She presents this afternoon with her son.  Patient's pains or 7/10.  She requests a refill for pain medications.  She does take NSAIDs as needed.  No oral anticoagulation is in use.  Patient does continue oral antibiotic at this time.  States RICE therapy seldom.      Hemoglobin A1C   Date Value Ref Range Status   12/10/2019 7.5 (H) 4.0 - 5.6 % Final     Comment:     ADA Screening Guidelines:  5.7-6.4%  Consistent with prediabetes  >or=6.5%  Consistent with diabetes  High levels of fetal hemoglobin interfere with the HbA1C  assay. Heterozygous hemoglobin variants (HbS, HgC, etc)do  not significantly interfere with this assay.   However, presence of multiple variants may affect accuracy.     08/22/2019 8.3 (H) 4.0 - 5.6 % Final     Comment:     ADA Screening Guidelines:  5.7-6.4%  Consistent with prediabetes  >or=6.5%  Consistent with diabetes  High levels of fetal hemoglobin interfere with the HbA1C  assay. Heterozygous hemoglobin variants (HbS, HgC, etc)do  not significantly interfere with this assay.   However, presence of multiple variants may affect accuracy.     05/07/2019  10.5 (H) 4.0 - 5.6 % Final     Comment:     ADA Screening Guidelines:  5.7-6.4%  Consistent with prediabetes  >or=6.5%  Consistent with diabetes  High levels of fetal hemoglobin interfere with the HbA1C  assay. Heterozygous hemoglobin variants (HbS, HgC, etc)do  not significantly interfere with this assay.   However, presence of multiple variants may affect accuracy.           Review of patient's allergies indicates:   Allergen Reactions    Pollen extracts        Past Medical History:   Diagnosis Date    Arthritis     Asthma     Cataract     Diabetes mellitus     Diabetes mellitus, type 2     Diabetic retinopathy     Hyperlipidemia     Hypertension        Family History   Problem Relation Age of Onset    Cancer Father         Prostate Ca    Hypertension Father     Stroke Father     Diabetes Sister     Glaucoma Mother     Hypertension Mother     Glaucoma Maternal Grandmother     Blindness Neg Hx     Macular degeneration Neg Hx     Retinal detachment Neg Hx     Strabismus Neg Hx        Social History     Socioeconomic History    Marital status:      Spouse name: Not on file    Number of children: Not on file    Years of education: Not on file    Highest education level: Not on file   Occupational History    Not on file   Social Needs    Financial resource strain: Not on file    Food insecurity     Worry: Not on file     Inability: Not on file    Transportation needs     Medical: Not on file     Non-medical: Not on file   Tobacco Use    Smoking status: Never Smoker    Smokeless tobacco: Never Used   Substance and Sexual Activity    Alcohol use: No    Drug use: No    Sexual activity: Yes   Lifestyle    Physical activity     Days per week: Not on file     Minutes per session: Not on file    Stress: Not on file   Relationships    Social connections     Talks on phone: Not on file     Gets together: Not on file     Attends Spiritism service: Not on file     Active member of club  "or organization: Not on file     Attends meetings of clubs or organizations: Not on file     Relationship status: Not on file   Other Topics Concern    Not on file   Social History Narrative    Not on file       Past Surgical History:   Procedure Laterality Date    CATARACT EXTRACTION W/  INTRAOCULAR LENS IMPLANT Right 07/18/2018    CATARACT EXTRACTION W/  INTRAOCULAR LENS IMPLANT Left 03/13/2019    COLONOSCOPY N/A 12/22/2018    Procedure: COLONOSCOPY;  Surgeon: Zak Dover MD;  Location: [facility];  Service: Endoscopy;  Laterality: N/A;       Review of Systems   Constitutional: Negative for chills, fatigue and fever.   HENT: Negative for hearing loss.    Eyes: Negative for photophobia and visual disturbance.   Respiratory: Negative for cough, chest tightness, shortness of breath and wheezing.    Cardiovascular: Positive for leg swelling. Negative for chest pain and palpitations.   Gastrointestinal: Negative for constipation, diarrhea, nausea and vomiting.   Endocrine: Negative for cold intolerance and heat intolerance.   Genitourinary: Negative for flank pain.   Musculoskeletal: Positive for arthralgias and gait problem. Negative for neck pain and neck stiffness.   Skin: Negative for wound.   Neurological: Positive for numbness. Negative for light-headedness and headaches.   Psychiatric/Behavioral: Negative for sleep disturbance.         Objective:   /67   Pulse 93   Ht 5' 4" (1.626 m)   Wt 105 kg (231 lb 7.7 oz)   BMI 39.73 kg/m²         Physical Exam    LOWER EXTREMITY PHYSICAL EXAMINATION  VASCULAR: On the left foot, the dorsalis pedis pulse is 2/4 and the posterior tibial pulse is 2/4. Capillary refill time is less than 3 seconds. Hair growth is present on the dorsum of the foot and at the digits. No rubor is present. Proximal to distal temperature is warm to warm.  No calf pains or tenderness is noted with palpation.    DERMATOLOGY: The fracture blisters at the lateral and medial aspects and " left ankle are improved as compared to prior. Granulation tissues are noted  No local Pseudomonal colonization is noted. No malodor is noted.     NEUROLOGY: Protective sensation is not intact to the left and right plantar surfaces of the foot and digits, as the patient has no sensation/detection at greater than 4 distinct points of contact with 5.07 [place] Arnie monofilament. Sensation to light touch is intact on the left and right foot. Proprioception is intact, bilateral. Sensation to pin prick is reduced to absent. Vibratory sensation is diminished.    ORTHOPEDIC: Unstable ankle fracture when not supported. Improved edema is noted, mild this time. Discomfort to palpation to the ankle joint, circumferentially. Stable Charcot foot type, left lower extremity with pes planus foot type, with palpable 1st TMTJ and midfoot. Dorsal and plantar spurring is noted at the 1st through 2nd TMTJ.     Assessment:     1. Closed trimalleolar fracture of left ankle with routine healing, subsequent encounter    2. Pain and swelling of left ankle    3. Severe obesity (BMI 35.0-39.9) with comorbidity    4. Type II diabetes mellitus with neurological manifestations    5. Blister of left ankle, subsequent encounter    6. 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  Dispense: 28 tablet; Refill: 0    Thorough discussion is had with the patient today, concerning the diagnosis, its etiology, and the treatment algorithm at present.  CT Scan is reviewed in detail with the patient. All questions and concerns regarding findings and its/their implications are outlined and discussed.    Patient does not like the LLE short leg AO splint and requests  walking boot.  Following local wound care to the blisters, the foot is splinted with Ace wrapping and web roll.  Thereafter a large Aircast boot is applied.  Patient will remain nonweightbearing leave the Aircast walking boot on at all times, except for when cleaning the blisters.  Patient will clean the blisters with the assistance of her son who is a nurse or her daughter.  When taking of the boot, please make sure the foot/ankle stays inverted to neutral due to the fracture patterns.    RICE therapy ATC.  Pain control.  Continue oral Ciprofloxacin. We will plan for open reduction and internal fixation on next week.    Patient will need preop medical clearance for her primary care provider, Lucy Negron MD. OOVID19 testing on next Monday prior to PCP appt.       GENERAL anesthesia will be used for this procedure.      HOLD all NSAIDs.       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  XRAYS are reviewed in detail with the patient. All questions and concerns regarding findings and its/their implications are outlined and discussed.  CT Scan is reviewed in detail with the patient. All questions and concerns regarding findings and its/their implications are outlined and discussed.    The procedure of (Charcot foot reconstruction via medial column fusion and 2/3 tarsometatarsal joint arthrodesis) was thoroughly explained to the patient. Its necessity and/or purpose and the implications therein were outlined, including any pertinent advantages and/or disadvantages, and possible complications, if any. Possible complications include recurrence of pathology and/or deformity, infection (cellulitis, drainage, purulence, malodor, etc...), pain, numbness, neuritis, edema, burning, loss of function, need for further surgery, possible need for removal of  any implanted hardware, soft tissue contracture and/or scarring, etc... No guarantees were given and/or implied. Post-operative expectations and weightbearing protocol is thoroughly explained the patient, who acknowledges understanding.     GENERAL anesthesia will be used for this procedure.    HOLD all NSAIDs.       Severe obesity (BMI 35.0-39.9) with comorbidity  Patient is counseled and reminded concerning the importance of good nutrition and healthy eating habits, which may include blood sugar control, to prevent and/or help podiatric foot and ankle complications.    Type II diabetes mellitus with neurological manifestations  Patient advised to follow up with Primary Care Physician for management of comorbid states.    Blister of left ankle, subsequent encounter          Future Appointments   Date Time Provider Department Center   7/20/2020  9:10 AM COVID TESTING, [facility] URGENT CARE [facility]   7/20/2020 11:20 AM Lucy Negron MD [facility] IM [facility]   7/31/2020 10:00 AM Kirt Gray DPM [facility]

## 2020-07-17 NOTE — H&P (VIEW-ONLY)
Subjective:       Patient ID: Kay Galarza is a 65 y.o. female.    Chief Complaint: Wound Check (rates pain 7/10 soft cast w/wrapping diabetic pt pcp Dr. Negron.)    HPI: Kay Galarza presents to the clinic today for follow-up evaluation concerning left trimalleolar ankle fracture.  Patient is preop for open reduction and internal fixation of the left ankle fracture in this coming week.  She did have recent CT scan evaluation of the foot/ankle earlier this week for possible reconstructive surgery of the left Charcot foot.  This was not my original intention, but patient did bring this up on last week, stating that she would like to have the foot and ankle done at the same time.  Patient does not smoke cigarettes.  She is adequately controlled in terms of blood sugar.  He does have peripheral neuropathy.  She presents this afternoon with her son.  Patient's pains or 7/10.  She requests a refill for pain medications.  She does take NSAIDs as needed.  No oral anticoagulation is in use.  Patient does continue oral antibiotic at this time.  States RICE therapy seldom.      Hemoglobin A1C   Date Value Ref Range Status   12/10/2019 7.5 (H) 4.0 - 5.6 % Final     Comment:     ADA Screening Guidelines:  5.7-6.4%  Consistent with prediabetes  >or=6.5%  Consistent with diabetes  High levels of fetal hemoglobin interfere with the HbA1C  assay. Heterozygous hemoglobin variants (HbS, HgC, etc)do  not significantly interfere with this assay.   However, presence of multiple variants may affect accuracy.     08/22/2019 8.3 (H) 4.0 - 5.6 % Final     Comment:     ADA Screening Guidelines:  5.7-6.4%  Consistent with prediabetes  >or=6.5%  Consistent with diabetes  High levels of fetal hemoglobin interfere with the HbA1C  assay. Heterozygous hemoglobin variants (HbS, HgC, etc)do  not significantly interfere with this assay.   However, presence of multiple variants may affect accuracy.     05/07/2019  10.5 (H) 4.0 - 5.6 % Final     Comment:     ADA Screening Guidelines:  5.7-6.4%  Consistent with prediabetes  >or=6.5%  Consistent with diabetes  High levels of fetal hemoglobin interfere with the HbA1C  assay. Heterozygous hemoglobin variants (HbS, HgC, etc)do  not significantly interfere with this assay.   However, presence of multiple variants may affect accuracy.           Review of patient's allergies indicates:   Allergen Reactions    Pollen extracts        Past Medical History:   Diagnosis Date    Arthritis     Asthma     Cataract     Diabetes mellitus     Diabetes mellitus, type 2     Diabetic retinopathy     Hyperlipidemia     Hypertension        Family History   Problem Relation Age of Onset    Cancer Father         Prostate Ca    Hypertension Father     Stroke Father     Diabetes Sister     Glaucoma Mother     Hypertension Mother     Glaucoma Maternal Grandmother     Blindness Neg Hx     Macular degeneration Neg Hx     Retinal detachment Neg Hx     Strabismus Neg Hx        Social History     Socioeconomic History    Marital status:      Spouse name: Not on file    Number of children: Not on file    Years of education: Not on file    Highest education level: Not on file   Occupational History    Not on file   Social Needs    Financial resource strain: Not on file    Food insecurity     Worry: Not on file     Inability: Not on file    Transportation needs     Medical: Not on file     Non-medical: Not on file   Tobacco Use    Smoking status: Never Smoker    Smokeless tobacco: Never Used   Substance and Sexual Activity    Alcohol use: No    Drug use: No    Sexual activity: Yes   Lifestyle    Physical activity     Days per week: Not on file     Minutes per session: Not on file    Stress: Not on file   Relationships    Social connections     Talks on phone: Not on file     Gets together: Not on file     Attends Spiritism service: Not on file     Active member of club  "or organization: Not on file     Attends meetings of clubs or organizations: Not on file     Relationship status: Not on file   Other Topics Concern    Not on file   Social History Narrative    Not on file       Past Surgical History:   Procedure Laterality Date    CATARACT EXTRACTION W/  INTRAOCULAR LENS IMPLANT Right 07/18/2018    CATARACT EXTRACTION W/  INTRAOCULAR LENS IMPLANT Left 03/13/2019    COLONOSCOPY N/A 12/22/2018    Procedure: COLONOSCOPY;  Surgeon: Zak Dover MD;  Location: [facility];  Service: Endoscopy;  Laterality: N/A;       Review of Systems   Constitutional: Negative for chills, fatigue and fever.   HENT: Negative for hearing loss.    Eyes: Negative for photophobia and visual disturbance.   Respiratory: Negative for cough, chest tightness, shortness of breath and wheezing.    Cardiovascular: Positive for leg swelling. Negative for chest pain and palpitations.   Gastrointestinal: Negative for constipation, diarrhea, nausea and vomiting.   Endocrine: Negative for cold intolerance and heat intolerance.   Genitourinary: Negative for flank pain.   Musculoskeletal: Positive for arthralgias and gait problem. Negative for neck pain and neck stiffness.   Skin: Negative for wound.   Neurological: Positive for numbness. Negative for light-headedness and headaches.   Psychiatric/Behavioral: Negative for sleep disturbance.         Objective:   /67   Pulse 93   Ht 5' 4" (1.626 m)   Wt 105 kg (231 lb 7.7 oz)   BMI 39.73 kg/m²         Physical Exam    LOWER EXTREMITY PHYSICAL EXAMINATION  VASCULAR: On the left foot, the dorsalis pedis pulse is 2/4 and the posterior tibial pulse is 2/4. Capillary refill time is less than 3 seconds. Hair growth is present on the dorsum of the foot and at the digits. No rubor is present. Proximal to distal temperature is warm to warm.  No calf pains or tenderness is noted with palpation.    DERMATOLOGY: The fracture blisters at the lateral and medial aspects and " left ankle are improved as compared to prior. Granulation tissues are noted  No local Pseudomonal colonization is noted. No malodor is noted.     NEUROLOGY: Protective sensation is not intact to the left and right plantar surfaces of the foot and digits, as the patient has no sensation/detection at greater than 4 distinct points of contact with 5.07 [place] Arnie monofilament. Sensation to light touch is intact on the left and right foot. Proprioception is intact, bilateral. Sensation to pin prick is reduced to absent. Vibratory sensation is diminished.    ORTHOPEDIC: Unstable ankle fracture when not supported. Improved edema is noted, mild this time. Discomfort to palpation to the ankle joint, circumferentially. Stable Charcot foot type, left lower extremity with pes planus foot type, with palpable 1st TMTJ and midfoot. Dorsal and plantar spurring is noted at the 1st through 2nd TMTJ.     Assessment:     1. Closed trimalleolar fracture of left ankle with routine healing, subsequent encounter    2. Pain and swelling of left ankle    3. Severe obesity (BMI 35.0-39.9) with comorbidity    4. Type II diabetes mellitus with neurological manifestations    5. Blister of left ankle, subsequent encounter    6. 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  Dispense: 28 tablet; Refill: 0    Thorough discussion is had with the patient today, concerning the diagnosis, its etiology, and the treatment algorithm at present.  CT Scan is reviewed in detail with the patient. All questions and concerns regarding findings and its/their implications are outlined and discussed.    Patient does not like the LLE short leg AO splint and requests  walking boot.  Following local wound care to the blisters, the foot is splinted with Ace wrapping and web roll.  Thereafter a large Aircast boot is applied.  Patient will remain nonweightbearing leave the Aircast walking boot on at all times, except for when cleaning the blisters.  Patient will clean the blisters with the assistance of her son who is a nurse or her daughter.  When taking of the boot, please make sure the foot/ankle stays inverted to neutral due to the fracture patterns.    RICE therapy ATC.  Pain control.  Continue oral Ciprofloxacin. We will plan for open reduction and internal fixation on next week.    Patient will need preop medical clearance for her primary care provider, Lucy Negron MD. OOVID19 testing on next Monday prior to PCP appt.       GENERAL anesthesia will be used for this procedure.      HOLD all NSAIDs.       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  XRAYS are reviewed in detail with the patient. All questions and concerns regarding findings and its/their implications are outlined and discussed.  CT Scan is reviewed in detail with the patient. All questions and concerns regarding findings and its/their implications are outlined and discussed.    The procedure of (Charcot foot reconstruction via medial column fusion and 2/3 tarsometatarsal joint arthrodesis) was thoroughly explained to the patient. Its necessity and/or purpose and the implications therein were outlined, including any pertinent advantages and/or disadvantages, and possible complications, if any. Possible complications include recurrence of pathology and/or deformity, infection (cellulitis, drainage, purulence, malodor, etc...), pain, numbness, neuritis, edema, burning, loss of function, need for further surgery, possible need for removal of  any implanted hardware, soft tissue contracture and/or scarring, etc... No guarantees were given and/or implied. Post-operative expectations and weightbearing protocol is thoroughly explained the patient, who acknowledges understanding.     GENERAL anesthesia will be used for this procedure.    HOLD all NSAIDs.       Severe obesity (BMI 35.0-39.9) with comorbidity  Patient is counseled and reminded concerning the importance of good nutrition and healthy eating habits, which may include blood sugar control, to prevent and/or help podiatric foot and ankle complications.    Type II diabetes mellitus with neurological manifestations  Patient advised to follow up with Primary Care Physician for management of comorbid states.    Blister of left ankle, subsequent encounter          Future Appointments   Date Time Provider Department Center   7/20/2020  9:10 AM COVID TESTING, [facility] URGENT CARE [facility] URGCARE [facility]   7/20/2020 11:20 AM Lucy Negron MD [facility] IM [facility]   7/31/2020 10:00 AM Kirt Gray DPM [facility]

## 2020-07-20 ENCOUNTER — TELEPHONE (OUTPATIENT)
Dept: PODIATRY | Facility: CLINIC | Age: 65
End: 2020-07-20

## 2020-07-20 ENCOUNTER — OFFICE VISIT (OUTPATIENT)
Dept: FAMILY MEDICINE | Facility: CLINIC | Age: 65
End: 2020-07-20
Attending: FAMILY MEDICINE
Payer: MEDICARE

## 2020-07-20 ENCOUNTER — LAB VISIT (OUTPATIENT)
Dept: LAB | Facility: HOSPITAL | Age: 65
End: 2020-07-20
Attending: FAMILY MEDICINE
Payer: MEDICARE

## 2020-07-20 VITALS
DIASTOLIC BLOOD PRESSURE: 78 MMHG | TEMPERATURE: 100 F | HEIGHT: 64 IN | SYSTOLIC BLOOD PRESSURE: 128 MMHG | HEART RATE: 88 BPM | BODY MASS INDEX: 39.73 KG/M2 | OXYGEN SATURATION: 97 %

## 2020-07-20 DIAGNOSIS — J45.909 MILD ASTHMA WITHOUT COMPLICATION, UNSPECIFIED WHETHER PERSISTENT: ICD-10-CM

## 2020-07-20 DIAGNOSIS — E66.01 SEVERE OBESITY (BMI 35.0-39.9) WITH COMORBIDITY: ICD-10-CM

## 2020-07-20 DIAGNOSIS — L21.9 SEBORRHEIC DERMATITIS: ICD-10-CM

## 2020-07-20 DIAGNOSIS — I10 HYPERTENSION, UNSPECIFIED TYPE: ICD-10-CM

## 2020-07-20 DIAGNOSIS — Z01.818 PRE-OPERATIVE EXAM: Primary | ICD-10-CM

## 2020-07-20 PROCEDURE — 99214 PR OFFICE/OUTPT VISIT, EST, LEVL IV, 30-39 MIN: ICD-10-PCS | Mod: S$GLB,,, | Performed by: FAMILY MEDICINE

## 2020-07-20 PROCEDURE — 1101F PT FALLS ASSESS-DOCD LE1/YR: CPT | Mod: CPTII,S$GLB,, | Performed by: FAMILY MEDICINE

## 2020-07-20 PROCEDURE — 3051F HG A1C>EQUAL 7.0%<8.0%: CPT | Mod: CPTII,S$GLB,, | Performed by: FAMILY MEDICINE

## 2020-07-20 PROCEDURE — 99999 PR PBB SHADOW E&M-EST. PATIENT-LVL IV: ICD-10-PCS | Mod: PBBFAC,,, | Performed by: FAMILY MEDICINE

## 2020-07-20 PROCEDURE — 3074F PR MOST RECENT SYSTOLIC BLOOD PRESSURE < 130 MM HG: ICD-10-PCS | Mod: CPTII,S$GLB,, | Performed by: FAMILY MEDICINE

## 2020-07-20 PROCEDURE — 93010 EKG 12-LEAD: ICD-10-PCS | Mod: S$GLB,,, | Performed by: INTERNAL MEDICINE

## 2020-07-20 PROCEDURE — 93005 ELECTROCARDIOGRAM TRACING: CPT | Mod: S$GLB,,, | Performed by: FAMILY MEDICINE

## 2020-07-20 PROCEDURE — 3051F PR MOST RECENT HEMOGLOBIN A1C LEVEL 7.0 - < 8.0%: ICD-10-PCS | Mod: CPTII,S$GLB,, | Performed by: FAMILY MEDICINE

## 2020-07-20 PROCEDURE — 3074F SYST BP LT 130 MM HG: CPT | Mod: CPTII,S$GLB,, | Performed by: FAMILY MEDICINE

## 2020-07-20 PROCEDURE — 93005 EKG 12-LEAD: ICD-10-PCS | Mod: S$GLB,,, | Performed by: FAMILY MEDICINE

## 2020-07-20 PROCEDURE — 1101F PR PT FALLS ASSESS DOC 0-1 FALLS W/OUT INJ PAST YR: ICD-10-PCS | Mod: CPTII,S$GLB,, | Performed by: FAMILY MEDICINE

## 2020-07-20 PROCEDURE — 1159F PR MEDICATION LIST DOCUMENTED IN MEDICAL RECORD: ICD-10-PCS | Mod: S$GLB,,, | Performed by: FAMILY MEDICINE

## 2020-07-20 PROCEDURE — 1125F PR PAIN SEVERITY QUANTIFIED, PAIN PRESENT: ICD-10-PCS | Mod: S$GLB,,, | Performed by: FAMILY MEDICINE

## 2020-07-20 PROCEDURE — 99999 PR PBB SHADOW E&M-EST. PATIENT-LVL IV: CPT | Mod: PBBFAC,,, | Performed by: FAMILY MEDICINE

## 2020-07-20 PROCEDURE — 93010 ELECTROCARDIOGRAM REPORT: CPT | Mod: S$GLB,,, | Performed by: INTERNAL MEDICINE

## 2020-07-20 PROCEDURE — 99214 OFFICE O/P EST MOD 30 MIN: CPT | Mod: S$GLB,,, | Performed by: FAMILY MEDICINE

## 2020-07-20 PROCEDURE — 3078F DIAST BP <80 MM HG: CPT | Mod: CPTII,S$GLB,, | Performed by: FAMILY MEDICINE

## 2020-07-20 PROCEDURE — 3008F PR BODY MASS INDEX (BMI) DOCUMENTED: ICD-10-PCS | Mod: CPTII,S$GLB,, | Performed by: FAMILY MEDICINE

## 2020-07-20 PROCEDURE — 3078F PR MOST RECENT DIASTOLIC BLOOD PRESSURE < 80 MM HG: ICD-10-PCS | Mod: CPTII,S$GLB,, | Performed by: FAMILY MEDICINE

## 2020-07-20 PROCEDURE — 1125F AMNT PAIN NOTED PAIN PRSNT: CPT | Mod: S$GLB,,, | Performed by: FAMILY MEDICINE

## 2020-07-20 PROCEDURE — 1159F MED LIST DOCD IN RCRD: CPT | Mod: S$GLB,,, | Performed by: FAMILY MEDICINE

## 2020-07-20 PROCEDURE — 83036 HEMOGLOBIN GLYCOSYLATED A1C: CPT

## 2020-07-20 PROCEDURE — 3008F BODY MASS INDEX DOCD: CPT | Mod: CPTII,S$GLB,, | Performed by: FAMILY MEDICINE

## 2020-07-20 PROCEDURE — 36415 COLL VENOUS BLD VENIPUNCTURE: CPT | Mod: PO

## 2020-07-20 RX ORDER — ATORVASTATIN CALCIUM 40 MG/1
40 TABLET, FILM COATED ORAL DAILY
Qty: 90 TABLET | Refills: 0 | Status: SHIPPED | OUTPATIENT
Start: 2020-07-20 | End: 2020-10-20 | Stop reason: SDUPTHER

## 2020-07-20 RX ORDER — LEVOCETIRIZINE DIHYDROCHLORIDE 5 MG/1
5 TABLET, FILM COATED ORAL NIGHTLY
Qty: 30 TABLET | Refills: 11 | Status: SHIPPED | OUTPATIENT
Start: 2020-07-20 | End: 2020-10-26 | Stop reason: SDUPTHER

## 2020-07-20 RX ORDER — DICLOFENAC SODIUM 10 MG/G
GEL TOPICAL 2 TIMES DAILY
Qty: 100 G | Refills: 0 | Status: SHIPPED | OUTPATIENT
Start: 2020-07-20 | End: 2022-02-01

## 2020-07-20 RX ORDER — INSULIN GLARGINE 300 U/ML
50 INJECTION, SOLUTION SUBCUTANEOUS 2 TIMES DAILY
Qty: 10 ML | Refills: 3 | Status: SHIPPED | OUTPATIENT
Start: 2020-07-20 | End: 2020-10-26 | Stop reason: SDUPTHER

## 2020-07-20 RX ORDER — GLIMEPIRIDE 4 MG/1
4 TABLET ORAL
Qty: 90 TABLET | Refills: 4 | Status: SHIPPED | OUTPATIENT
Start: 2020-07-20 | End: 2021-01-25 | Stop reason: SDUPTHER

## 2020-07-20 RX ORDER — LISINOPRIL AND HYDROCHLOROTHIAZIDE 20; 25 MG/1; MG/1
1 TABLET ORAL DAILY
Qty: 90 TABLET | Refills: 0 | Status: SHIPPED | OUTPATIENT
Start: 2020-07-20 | End: 2020-10-20 | Stop reason: SDUPTHER

## 2020-07-20 RX ORDER — GABAPENTIN 100 MG/1
100 CAPSULE ORAL 3 TIMES DAILY
Qty: 90 CAPSULE | Refills: 11 | Status: SHIPPED | OUTPATIENT
Start: 2020-07-20 | End: 2020-10-26 | Stop reason: SDUPTHER

## 2020-07-20 RX ORDER — TRAZODONE HYDROCHLORIDE 150 MG/1
150 TABLET ORAL NIGHTLY PRN
Qty: 90 TABLET | Refills: 0 | Status: SHIPPED | OUTPATIENT
Start: 2020-07-20 | End: 2020-10-20 | Stop reason: SDUPTHER

## 2020-07-20 RX ORDER — DULAGLUTIDE 1.5 MG/.5ML
INJECTION, SOLUTION SUBCUTANEOUS
Qty: 12 ML | Refills: 0 | Status: SHIPPED | OUTPATIENT
Start: 2020-07-20 | End: 2020-10-26 | Stop reason: SDUPTHER

## 2020-07-20 RX ORDER — KETOCONAZOLE 20 MG/ML
SHAMPOO, SUSPENSION TOPICAL
Qty: 120 ML | Refills: 3 | Status: SHIPPED | OUTPATIENT
Start: 2020-07-20 | End: 2020-10-26 | Stop reason: SDUPTHER

## 2020-07-20 RX ORDER — FLUTICASONE PROPIONATE 50 MCG
2 SPRAY, SUSPENSION (ML) NASAL DAILY
Qty: 16 G | Refills: 0 | Status: SHIPPED | OUTPATIENT
Start: 2020-07-20 | End: 2021-01-25 | Stop reason: SDUPTHER

## 2020-07-20 RX ORDER — ALBUTEROL SULFATE 90 UG/1
2 AEROSOL, METERED RESPIRATORY (INHALATION) EVERY 6 HOURS PRN
Qty: 18 G | Refills: 0 | Status: SHIPPED | OUTPATIENT
Start: 2020-07-20 | End: 2020-10-26 | Stop reason: SDUPTHER

## 2020-07-20 NOTE — PROGRESS NOTES
Subjective:       Patient ID: Kay Galarza is a 65 y.o. female.    Chief Complaint: Pre-op Exam    64y old  Female with  With t2 DM , htn ,mild int asthma , depression  dlp and obesity here for pre op clearance for L ankle ORIF on 7/20 by Dr Chava Medina bs are  200 and  2 hrs pp  :200 . Most recent surgery was lens  implant . Denies any  Adverse  reaction to anesthesia. She has  Not used  rescue inhaler for months     Review of Systems   Constitutional: Negative.    HENT: Negative.    Eyes: Negative.    Respiratory: Negative.    Cardiovascular: Negative.    Gastrointestinal: Negative.    Genitourinary: Negative.    Musculoskeletal: Positive for arthralgias.   Skin: Negative.    Hematological: Negative.        Objective:      Physical Exam  Constitutional:       General: She is not in acute distress.     Appearance: She is well-developed. She is obese. She is not diaphoretic.   HENT:      Head: Normocephalic and atraumatic.      Right Ear: External ear normal.      Left Ear: External ear normal.      Mouth/Throat:      Pharynx: No oropharyngeal exudate.   Eyes:      General: No scleral icterus.        Right eye: No discharge.         Left eye: No discharge.      Conjunctiva/sclera: Conjunctivae normal.      Pupils: Pupils are equal, round, and reactive to light.   Neck:      Musculoskeletal: Normal range of motion and neck supple.      Thyroid: No thyromegaly.      Vascular: No JVD.      Trachea: No tracheal deviation.   Cardiovascular:      Rate and Rhythm: Normal rate and regular rhythm.      Heart sounds: Normal heart sounds. No murmur. No friction rub. No gallop.    Pulmonary:      Effort: Pulmonary effort is normal. No respiratory distress.      Breath sounds: Normal breath sounds. No stridor. No wheezing or rales.   Chest:      Chest wall: No tenderness.   Abdominal:      General: Bowel sounds are normal. There is no distension.      Palpations: Abdomen is soft.      Tenderness: There is no  abdominal tenderness. There is no guarding or rebound.   Musculoskeletal: Normal range of motion.   Lymphadenopathy:      Cervical: No cervical adenopathy.   Skin:     General: Skin is warm and dry.   Neurological:      Mental Status: She is alert and oriented to person, place, and time.   Psychiatric:         Behavior: Behavior normal.         Thought Content: Thought content normal.         Judgment: Judgment normal.         Assessment:     Kay was seen today for pre-op exam.    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 Stop Victoza.  Take Trulicity 0.75 mg weekly. Split Toujeo 50 units twice a day. Start Xigduo 10 - 1,000 mg  Orders:  -     dulaglutide (TRULICITY) 1.5 mg/0.5 mL pen injector; INJECT 1.5 MG INTO THE SKIN EVERY 7 DAYS  -     insulin glargine, TOUJEO, (TOUJEO SOLOSTAR U-300 INSULIN) 300 unit/mL (1.5 mL) InPn pen; Inject 50 Units into the skin 2 (two) times daily.  -     Hemoglobin A1C; Future    Seborrheic dermatitis  -     ketoconazole (NIZORAL) 2 % shampoo; Apply topically 3 (three) times a week.  -     fluocinolone (SYNALAR) 0.01 % Sham; Apply no more than 1 ounce to scalp once daily; work into lather and allow to remain on scalp for ~5 minutes. Remove from hair and scalp by rinsing  thoroughly with water.    Hypertension, unspecified type    Severe obesity (BMI 35.0-39.9) with comorbidity    Mild asthma without complication, unspecified whether persistent    Other orders  -     albuterol (PROVENTIL/VENTOLIN HFA) 90 mcg/actuation inhaler; Inhale 2 puffs into the lungs every 6 (six) hours as needed for Wheezing. Rescue  -     atorvastatin (LIPITOR) 40 MG tablet; Take 1 tablet (40 mg total) by mouth once daily.  -     lisinopriL-hydrochlorothiazide (PRINZIDE,ZESTORETIC) 20-25 mg Tab; Take 1 tablet by mouth once daily.  -     gabapentin (NEURONTIN) 100 MG capsule; Take 1 capsule (100 mg total) by mouth 3 (three) times daily.  -     traZODone (DESYREL) 150 MG tablet; Take 1 tablet (150 mg total) by mouth nightly as needed for Insomnia.  -     fluticasone propionate (FLONASE) 50 mcg/actuation nasal spray; 2 sprays (100 mcg total) by Each Nostril route once daily.  -     glimepiride (AMARYL) 4 MG tablet; Take 1 tablet (4 mg total) by mouth daily with breakfast.  -     diclofenac sodium (VOLTAREN) 1 % Gel; Apply topically 2 (two) times daily.  -     levocetirizine (XYZAL) 5 MG tablet; Take 1 tablet (5 mg total) by mouth every evening.      Plan:

## 2020-07-20 NOTE — TELEPHONE ENCOUNTER
Spoke with pt daughter she wanted to inform the Dr that the pt had took her naproxen I informed her that I spoke with the Dr and he said not to take any more before her surgery.          Lynn Bazzi MA  Podiatry Surgical Department  [facility]                ----- Message from Charline Lacey sent at 7/20/2020 11:33 AM CDT -----  Contact: Cass-daughter  Cass requesting a call back regarding pt. She states that the pt would be ok to have surgery on Wednesday even though her last dose of Naproxen was taken on yesterday. Please call Cass back at [phone]

## 2020-07-21 LAB
ESTIMATED AVG GLUCOSE: 232 MG/DL (ref 68–131)
HBA1C MFR BLD HPLC: 9.7 % (ref 4–5.6)

## 2020-07-22 RX ORDER — LANOLIN ALCOHOL/MO/W.PET/CERES
500 CREAM (GRAM) TOPICAL NIGHTLY
COMMUNITY
End: 2021-04-09 | Stop reason: SDUPTHER

## 2020-07-23 ENCOUNTER — ANESTHESIA EVENT (OUTPATIENT)
Dept: SURGERY | Facility: HOSPITAL | Age: 65
End: 2020-07-23
Payer: MEDICARE

## 2020-07-23 ENCOUNTER — HOSPITAL ENCOUNTER (OUTPATIENT)
Facility: HOSPITAL | Age: 65
Discharge: HOME HEALTH | End: 2020-07-24
Attending: PODIATRIST | Admitting: PODIATRIST
Payer: MEDICARE

## 2020-07-23 ENCOUNTER — ANESTHESIA (OUTPATIENT)
Dept: SURGERY | Facility: HOSPITAL | Age: 65
End: 2020-07-23
Payer: MEDICARE

## 2020-07-23 DIAGNOSIS — E11.610 DIABETIC CHARCOT'S FOOT: ICD-10-CM

## 2020-07-23 DIAGNOSIS — S93.325S LISFRANC DISLOCATION, LEFT, SEQUELA: Primary | ICD-10-CM

## 2020-07-23 PROBLEM — E78.5 HLD (HYPERLIPIDEMIA): Status: ACTIVE | Noted: 2020-07-23

## 2020-07-23 LAB
POCT GLUCOSE: 132 MG/DL (ref 70–110)
POCT GLUCOSE: 198 MG/DL (ref 70–110)

## 2020-07-23 PROCEDURE — 27822 TREATMENT OF ANKLE FRACTURE: CPT | Mod: LT,,, | Performed by: PODIATRIST

## 2020-07-23 PROCEDURE — 27822 PR OPEN TX TRIMALLEOLAR ANKLE FX W/O FIX PST LIP: ICD-10-PCS | Mod: LT,,, | Performed by: PODIATRIST

## 2020-07-23 PROCEDURE — C1769 GUIDE WIRE: HCPCS | Performed by: PODIATRIST

## 2020-07-23 PROCEDURE — 36000709 HC OR TIME LEV III EA ADD 15 MIN: Performed by: PODIATRIST

## 2020-07-23 PROCEDURE — 25000003 PHARM REV CODE 250: Performed by: NURSE ANESTHETIST, CERTIFIED REGISTERED

## 2020-07-23 PROCEDURE — 99900035 HC TECH TIME PER 15 MIN (STAT)

## 2020-07-23 PROCEDURE — S0020 INJECTION, BUPIVICAINE HYDRO: HCPCS | Performed by: PODIATRIST

## 2020-07-23 PROCEDURE — 01480 ANES OPEN PX LOWER L/A/F NOS: CPT | Performed by: PODIATRIST

## 2020-07-23 PROCEDURE — 71000033 HC RECOVERY, INTIAL HOUR: Performed by: PODIATRIST

## 2020-07-23 PROCEDURE — 27201423 OPTIME MED/SURG SUP & DEVICES STERILE SUPPLY: Performed by: PODIATRIST

## 2020-07-23 PROCEDURE — 25000003 PHARM REV CODE 250: Performed by: NURSE PRACTITIONER

## 2020-07-23 PROCEDURE — 28730 PR FUSION FOOT BONES,MIDTARSAL,MULTI: ICD-10-PCS | Mod: 51,LT,, | Performed by: PODIATRIST

## 2020-07-23 PROCEDURE — 63600175 PHARM REV CODE 636 W HCPCS: Performed by: ANESTHESIOLOGY

## 2020-07-23 PROCEDURE — 28730 FUSION OF FOOT BONES: CPT | Mod: 51,LT,, | Performed by: PODIATRIST

## 2020-07-23 PROCEDURE — 36000708 HC OR TIME LEV III 1ST 15 MIN: Performed by: PODIATRIST

## 2020-07-23 PROCEDURE — 25000003 PHARM REV CODE 250: Performed by: INTERNAL MEDICINE

## 2020-07-23 PROCEDURE — C1713 ANCHOR/SCREW BN/BN,TIS/BN: HCPCS | Performed by: PODIATRIST

## 2020-07-23 PROCEDURE — 27800903 OPTIME MED/SURG SUP & DEVICES OTHER IMPLANTS: Performed by: PODIATRIST

## 2020-07-23 PROCEDURE — 27829 PR OPEN TX DISTAL TIBIOFIBULAR JOINT DISRUPTION: ICD-10-PCS | Mod: 51,LT,, | Performed by: PODIATRIST

## 2020-07-23 PROCEDURE — 71000039 HC RECOVERY, EACH ADD'L HOUR: Performed by: PODIATRIST

## 2020-07-23 PROCEDURE — 63600175 PHARM REV CODE 636 W HCPCS: Performed by: NURSE ANESTHETIST, CERTIFIED REGISTERED

## 2020-07-23 PROCEDURE — 25000003 PHARM REV CODE 250: Performed by: PODIATRIST

## 2020-07-23 PROCEDURE — 27829 TREAT LOWER LEG JOINT: CPT | Mod: 51,LT,, | Performed by: PODIATRIST

## 2020-07-23 PROCEDURE — 37000008 HC ANESTHESIA 1ST 15 MINUTES: Performed by: PODIATRIST

## 2020-07-23 PROCEDURE — 37000009 HC ANESTHESIA EA ADD 15 MINS: Performed by: PODIATRIST

## 2020-07-23 DEVICE — IMPLANTABLE DEVICE
Type: IMPLANTABLE DEVICE | Site: ANKLE | Status: NON-FUNCTIONAL
Removed: 2023-05-26

## 2020-07-23 DEVICE — BONE CHIP CANC 1.7-10MM 15ML: Type: IMPLANTABLE DEVICE | Site: FOOT | Status: FUNCTIONAL

## 2020-07-23 RX ORDER — GLYCOPYRROLATE 0.2 MG/ML
INJECTION INTRAMUSCULAR; INTRAVENOUS
Status: DISCONTINUED | OUTPATIENT
Start: 2020-07-23 | End: 2020-07-23

## 2020-07-23 RX ORDER — PROPOFOL 10 MG/ML
VIAL (ML) INTRAVENOUS
Status: DISCONTINUED | OUTPATIENT
Start: 2020-07-23 | End: 2020-07-23

## 2020-07-23 RX ORDER — MEPERIDINE HYDROCHLORIDE 25 MG/ML
12.5 INJECTION INTRAMUSCULAR; INTRAVENOUS; SUBCUTANEOUS ONCE AS NEEDED
Status: DISCONTINUED | OUTPATIENT
Start: 2020-07-23 | End: 2020-07-23 | Stop reason: HOSPADM

## 2020-07-23 RX ORDER — SODIUM CHLORIDE, SODIUM LACTATE, POTASSIUM CHLORIDE, CALCIUM CHLORIDE 600; 310; 30; 20 MG/100ML; MG/100ML; MG/100ML; MG/100ML
INJECTION, SOLUTION INTRAVENOUS CONTINUOUS PRN
Status: DISCONTINUED | OUTPATIENT
Start: 2020-07-23 | End: 2020-07-23

## 2020-07-23 RX ORDER — LISINOPRIL AND HYDROCHLOROTHIAZIDE 20; 25 MG/1; MG/1
1 TABLET ORAL DAILY
Status: DISCONTINUED | OUTPATIENT
Start: 2020-07-24 | End: 2020-07-23

## 2020-07-23 RX ORDER — INSULIN ASPART 100 [IU]/ML
0-5 INJECTION, SOLUTION INTRAVENOUS; SUBCUTANEOUS
Status: DISCONTINUED | OUTPATIENT
Start: 2020-07-23 | End: 2020-07-24 | Stop reason: HOSPADM

## 2020-07-23 RX ORDER — METOCLOPRAMIDE HYDROCHLORIDE 5 MG/ML
10 INJECTION INTRAMUSCULAR; INTRAVENOUS EVERY 10 MIN PRN
Status: DISCONTINUED | OUTPATIENT
Start: 2020-07-23 | End: 2020-07-23 | Stop reason: HOSPADM

## 2020-07-23 RX ORDER — IBUPROFEN 200 MG
16 TABLET ORAL
Status: DISCONTINUED | OUTPATIENT
Start: 2020-07-23 | End: 2020-07-24 | Stop reason: HOSPADM

## 2020-07-23 RX ORDER — CLINDAMYCIN PHOSPHATE 900 MG/50ML
900 INJECTION, SOLUTION INTRAVENOUS
Status: COMPLETED | OUTPATIENT
Start: 2020-07-23 | End: 2020-07-23

## 2020-07-23 RX ORDER — DOCUSATE SODIUM 100 MG/1
100 CAPSULE, LIQUID FILLED ORAL 2 TIMES DAILY
Status: DISCONTINUED | OUTPATIENT
Start: 2020-07-23 | End: 2020-07-24 | Stop reason: HOSPADM

## 2020-07-23 RX ORDER — LIDOCAINE HYDROCHLORIDE 20 MG/ML
INJECTION, SOLUTION EPIDURAL; INFILTRATION; INTRACAUDAL; PERINEURAL
Status: DISCONTINUED | OUTPATIENT
Start: 2020-07-23 | End: 2020-07-23

## 2020-07-23 RX ORDER — PHENYLEPHRINE HYDROCHLORIDE 10 MG/ML
INJECTION INTRAVENOUS
Status: DISCONTINUED | OUTPATIENT
Start: 2020-07-23 | End: 2020-07-23

## 2020-07-23 RX ORDER — CLINDAMYCIN PHOSPHATE 900 MG/50ML
900 INJECTION, SOLUTION INTRAVENOUS
Status: COMPLETED | OUTPATIENT
Start: 2020-07-23 | End: 2020-07-24

## 2020-07-23 RX ORDER — LIDOCAINE HYDROCHLORIDE AND EPINEPHRINE 10; 10 MG/ML; UG/ML
INJECTION, SOLUTION INFILTRATION; PERINEURAL
Status: DISCONTINUED | OUTPATIENT
Start: 2020-07-23 | End: 2020-07-23 | Stop reason: HOSPADM

## 2020-07-23 RX ORDER — ATORVASTATIN CALCIUM 40 MG/1
40 TABLET, FILM COATED ORAL DAILY
Status: DISCONTINUED | OUTPATIENT
Start: 2020-07-24 | End: 2020-07-24 | Stop reason: HOSPADM

## 2020-07-23 RX ORDER — ONDANSETRON 2 MG/ML
INJECTION INTRAMUSCULAR; INTRAVENOUS
Status: DISCONTINUED | OUTPATIENT
Start: 2020-07-23 | End: 2020-07-23

## 2020-07-23 RX ORDER — BUPIVACAINE HYDROCHLORIDE 5 MG/ML
INJECTION, SOLUTION EPIDURAL; INTRACAUDAL
Status: DISCONTINUED | OUTPATIENT
Start: 2020-07-23 | End: 2020-07-23 | Stop reason: HOSPADM

## 2020-07-23 RX ORDER — BUPIVACAINE HYDROCHLORIDE AND EPINEPHRINE 5; 5 MG/ML; UG/ML
INJECTION, SOLUTION EPIDURAL; INTRACAUDAL; PERINEURAL
Status: DISCONTINUED | OUTPATIENT
Start: 2020-07-23 | End: 2020-07-23 | Stop reason: HOSPADM

## 2020-07-23 RX ORDER — CLINDAMYCIN HYDROCHLORIDE 300 MG/1
300 CAPSULE ORAL EVERY 8 HOURS
Qty: 15 CAPSULE | Refills: 0 | Status: SHIPPED | OUTPATIENT
Start: 2020-07-23 | End: 2020-07-28

## 2020-07-23 RX ORDER — ROCURONIUM BROMIDE 10 MG/ML
INJECTION, SOLUTION INTRAVENOUS
Status: DISCONTINUED | OUTPATIENT
Start: 2020-07-23 | End: 2020-07-23

## 2020-07-23 RX ORDER — OXYCODONE AND ACETAMINOPHEN 10; 325 MG/1; MG/1
1 TABLET ORAL EVERY 6 HOURS PRN
Status: DISCONTINUED | OUTPATIENT
Start: 2020-07-23 | End: 2020-07-24 | Stop reason: HOSPADM

## 2020-07-23 RX ORDER — ACETAMINOPHEN 325 MG/1
650 TABLET ORAL EVERY 6 HOURS PRN
Status: DISCONTINUED | OUTPATIENT
Start: 2020-07-23 | End: 2020-07-24 | Stop reason: HOSPADM

## 2020-07-23 RX ORDER — SODIUM CHLORIDE 0.9 % (FLUSH) 0.9 %
3 SYRINGE (ML) INJECTION EVERY 8 HOURS
Status: DISCONTINUED | OUTPATIENT
Start: 2020-07-23 | End: 2020-07-23 | Stop reason: HOSPADM

## 2020-07-23 RX ORDER — GABAPENTIN 100 MG/1
100 CAPSULE ORAL 3 TIMES DAILY
Status: DISCONTINUED | OUTPATIENT
Start: 2020-07-23 | End: 2020-07-24 | Stop reason: HOSPADM

## 2020-07-23 RX ORDER — HYDROMORPHONE HYDROCHLORIDE 2 MG/ML
0.2 INJECTION, SOLUTION INTRAMUSCULAR; INTRAVENOUS; SUBCUTANEOUS EVERY 5 MIN PRN
Status: DISCONTINUED | OUTPATIENT
Start: 2020-07-23 | End: 2020-07-23 | Stop reason: HOSPADM

## 2020-07-23 RX ORDER — LISINOPRIL 20 MG/1
20 TABLET ORAL DAILY
Status: DISCONTINUED | OUTPATIENT
Start: 2020-07-24 | End: 2020-07-24 | Stop reason: HOSPADM

## 2020-07-23 RX ORDER — ALBUTEROL SULFATE 90 UG/1
2 AEROSOL, METERED RESPIRATORY (INHALATION) EVERY 6 HOURS PRN
Status: DISCONTINUED | OUTPATIENT
Start: 2020-07-23 | End: 2020-07-23 | Stop reason: CLARIF

## 2020-07-23 RX ORDER — MORPHINE SULFATE 4 MG/ML
4 INJECTION, SOLUTION INTRAMUSCULAR; INTRAVENOUS EVERY 4 HOURS PRN
Status: DISCONTINUED | OUTPATIENT
Start: 2020-07-23 | End: 2020-07-24 | Stop reason: HOSPADM

## 2020-07-23 RX ORDER — CIPROFLOXACIN 750 MG/1
750 TABLET, FILM COATED ORAL EVERY 12 HOURS
Qty: 14 TABLET | Refills: 0 | Status: SHIPPED | OUTPATIENT
Start: 2020-07-23 | End: 2020-07-30

## 2020-07-23 RX ORDER — ONDANSETRON 2 MG/ML
4 INJECTION INTRAMUSCULAR; INTRAVENOUS EVERY 6 HOURS PRN
Status: DISCONTINUED | OUTPATIENT
Start: 2020-07-23 | End: 2020-07-24 | Stop reason: HOSPADM

## 2020-07-23 RX ORDER — IBUPROFEN 200 MG
24 TABLET ORAL
Status: DISCONTINUED | OUTPATIENT
Start: 2020-07-23 | End: 2020-07-24 | Stop reason: HOSPADM

## 2020-07-23 RX ORDER — FENTANYL CITRATE 50 UG/ML
INJECTION, SOLUTION INTRAMUSCULAR; INTRAVENOUS
Status: DISCONTINUED | OUTPATIENT
Start: 2020-07-23 | End: 2020-07-23

## 2020-07-23 RX ORDER — DIPHENHYDRAMINE HYDROCHLORIDE 50 MG/ML
25 INJECTION INTRAMUSCULAR; INTRAVENOUS EVERY 6 HOURS PRN
Status: DISCONTINUED | OUTPATIENT
Start: 2020-07-23 | End: 2020-07-23 | Stop reason: HOSPADM

## 2020-07-23 RX ORDER — ALBUTEROL SULFATE 0.83 MG/ML
2.5 SOLUTION RESPIRATORY (INHALATION) EVERY 6 HOURS PRN
Status: DISCONTINUED | OUTPATIENT
Start: 2020-07-23 | End: 2020-07-24 | Stop reason: HOSPADM

## 2020-07-23 RX ORDER — ACETAMINOPHEN 10 MG/ML
INJECTION, SOLUTION INTRAVENOUS
Status: DISCONTINUED | OUTPATIENT
Start: 2020-07-23 | End: 2020-07-23

## 2020-07-23 RX ORDER — NEOSTIGMINE METHYLSULFATE 1 MG/ML
INJECTION, SOLUTION INTRAVENOUS
Status: DISCONTINUED | OUTPATIENT
Start: 2020-07-23 | End: 2020-07-23

## 2020-07-23 RX ORDER — GLUCAGON 1 MG
1 KIT INJECTION
Status: DISCONTINUED | OUTPATIENT
Start: 2020-07-23 | End: 2020-07-24 | Stop reason: HOSPADM

## 2020-07-23 RX ORDER — SUCCINYLCHOLINE CHLORIDE 20 MG/ML
INJECTION INTRAMUSCULAR; INTRAVENOUS
Status: DISCONTINUED | OUTPATIENT
Start: 2020-07-23 | End: 2020-07-23

## 2020-07-23 RX ORDER — HYDROMORPHONE HYDROCHLORIDE 2 MG/ML
0.2 INJECTION, SOLUTION INTRAMUSCULAR; INTRAVENOUS; SUBCUTANEOUS EVERY 5 MIN PRN
Status: COMPLETED | OUTPATIENT
Start: 2020-07-23 | End: 2020-07-23

## 2020-07-23 RX ORDER — HYDROCHLOROTHIAZIDE 25 MG/1
25 TABLET ORAL DAILY
Status: DISCONTINUED | OUTPATIENT
Start: 2020-07-24 | End: 2020-07-24 | Stop reason: HOSPADM

## 2020-07-23 RX ADMIN — HYDROMORPHONE HYDROCHLORIDE 0.2 MG: 2 INJECTION INTRAMUSCULAR; INTRAVENOUS; SUBCUTANEOUS at 05:07

## 2020-07-23 RX ADMIN — SODIUM CHLORIDE, SODIUM LACTATE, POTASSIUM CHLORIDE, AND CALCIUM CHLORIDE: .6; .31; .03; .02 INJECTION, SOLUTION INTRAVENOUS at 11:07

## 2020-07-23 RX ADMIN — FENTANYL CITRATE 50 MCG: 50 INJECTION, SOLUTION INTRAMUSCULAR; INTRAVENOUS at 04:07

## 2020-07-23 RX ADMIN — CLINDAMYCIN IN 5 PERCENT DEXTROSE 900 MG: 18 INJECTION, SOLUTION INTRAVENOUS at 08:07

## 2020-07-23 RX ADMIN — PHENYLEPHRINE HYDROCHLORIDE 100 MCG: 10 INJECTION INTRAVENOUS at 12:07

## 2020-07-23 RX ADMIN — ROCURONIUM BROMIDE 25 MG: 10 INJECTION, SOLUTION INTRAVENOUS at 12:07

## 2020-07-23 RX ADMIN — PHENYLEPHRINE HYDROCHLORIDE 200 MCG: 10 INJECTION INTRAVENOUS at 11:07

## 2020-07-23 RX ADMIN — CLINDAMYCIN PHOSPHATE 900 MG: 18 INJECTION, SOLUTION INTRAVENOUS at 11:07

## 2020-07-23 RX ADMIN — FENTANYL CITRATE 50 MCG: 50 INJECTION, SOLUTION INTRAMUSCULAR; INTRAVENOUS at 11:07

## 2020-07-23 RX ADMIN — GABAPENTIN 100 MG: 100 CAPSULE ORAL at 08:07

## 2020-07-23 RX ADMIN — DOCUSATE SODIUM 100 MG: 100 CAPSULE, LIQUID FILLED ORAL at 08:07

## 2020-07-23 RX ADMIN — NEOSTIGMINE METHYLSULFATE 5 MG: 1 INJECTION INTRAVENOUS at 04:07

## 2020-07-23 RX ADMIN — ROCURONIUM BROMIDE 5 MG: 10 INJECTION, SOLUTION INTRAVENOUS at 11:07

## 2020-07-23 RX ADMIN — ROBINUL 0.8 MG: 0.2 INJECTION INTRAMUSCULAR; INTRAVENOUS at 04:07

## 2020-07-23 RX ADMIN — PROPOFOL 150 MG: 10 INJECTION, EMULSION INTRAVENOUS at 11:07

## 2020-07-23 RX ADMIN — SUCCINYLCHOLINE CHLORIDE 200 MG: 20 INJECTION, SOLUTION INTRAMUSCULAR; INTRAVENOUS at 11:07

## 2020-07-23 RX ADMIN — LIDOCAINE HYDROCHLORIDE 100 MG: 20 INJECTION, SOLUTION EPIDURAL; INFILTRATION; INTRACAUDAL; PERINEURAL at 11:07

## 2020-07-23 RX ADMIN — ROCURONIUM BROMIDE 10 MG: 10 INJECTION, SOLUTION INTRAVENOUS at 02:07

## 2020-07-23 RX ADMIN — ONDANSETRON 4 MG: 2 INJECTION, SOLUTION INTRAMUSCULAR; INTRAVENOUS at 03:07

## 2020-07-23 RX ADMIN — ROCURONIUM BROMIDE 10 MG: 10 INJECTION, SOLUTION INTRAVENOUS at 12:07

## 2020-07-23 RX ADMIN — OXYCODONE HYDROCHLORIDE AND ACETAMINOPHEN 1 TABLET: 10; 325 TABLET ORAL at 08:07

## 2020-07-23 RX ADMIN — ACETAMINOPHEN 1000 MG: 10 INJECTION, SOLUTION INTRAVENOUS at 02:07

## 2020-07-23 RX ADMIN — ONDANSETRON 4 MG: 2 INJECTION, SOLUTION INTRAMUSCULAR; INTRAVENOUS at 11:07

## 2020-07-23 NOTE — ANESTHESIA POSTPROCEDURE EVALUATION
Anesthesia Post Evaluation    Patient: Kay Galarza    Procedure(s) Performed: Procedure(s) (LRB):  ORIF, ANKLE (Left)  FIXATION, SYNDESMOSIS, ANKLE (Left)  FUSION, JOINT, MIDFOOT (Left)    Final Anesthesia Type: general    Patient location during evaluation: PACU  Patient participation: Yes- Able to Participate  Level of consciousness: awake and alert  Post-procedure vital signs: reviewed and stable  Pain management: adequate  Airway patency: patent  JOHN mitigation strategies: Extubation while patient is awake  PONV status at discharge: No PONV  Anesthetic complications: no      Cardiovascular status: hemodynamically stable  Respiratory status: spontaneous ventilation  Hydration status: euvolemic  Follow-up not needed.          Vitals Value Taken Time   /60 07/23/20 1800   Temp 36.3 °C (97.4 °F) 07/23/20 1625   Pulse 71 07/23/20 1805   Resp 10 07/23/20 1805   SpO2 99 % 07/23/20 1805   Vitals shown include unvalidated device data.      No case tracking events are documented in the log.      Pain/Pool Score: Pain Rating Prior to Med Admin: 6 (7/23/2020  5:53 PM)  Pool Score: 8 (7/23/2020  5:30 PM)

## 2020-07-23 NOTE — OP NOTE
[facility]  Brief Operative Note    Surgery Date: 7/23/2020     Surgeon(s) and Role:     * Kirt Gray DPM - Primary    Assisting Surgeon: None    Pre-op Diagnosis:  Closed trimalleolar fracture of left ankle, initial encounter [S82.852A]  Pain and swelling of left ankle [M25.572, M25.472]    Post-op Diagnosis:  Post-Op Diagnosis Codes:     * Closed trimalleolar fracture of left ankle, initial encounter [S82.852A]     * Pain and swelling of left ankle [M25.572, M25.472]    Procedure(s) (LRB):  ORIF, ANKLE (Left)  FIXATION, SYNDESMOSIS, ANKLE (Left)  FUSION, JOINT, MIDFOOT (Left)    Anesthesia: General    Description of the findings of the procedure(s):    1. ORIF of tri-malleolar ankle fracture, LLE.   2. Charcot foot reconstruction, LLE.    Estimated Blood Loss: * No values recorded between 7/23/2020 11:54 AM and 7/23/2020  4:24 PM *         Specimens:   Specimen (12h ago, onward)    None            Discharge Note    OUTCOME: Patient tolerated treatment/procedure well without complication and is now ready for discharge.    DISPOSITION: Admitted as an Inpatient    FINAL DIAGNOSIS:  <principal problem not specified>    FOLLOWUP: In clinic    DISCHARGE INSTRUCTIONS:  No discharge procedures on file.

## 2020-07-23 NOTE — H&P
[facility] Hospital Medicine  History & Physical    Patient Name: Kay Galarza  MRN: [id number]  Admission Date: 7/23/2020  Attending Physician: Radha Falcon MD   Primary Care Provider: Lucy Negron MD         Patient information was obtained from patient, relative(s) and ER records.     Subjective:     Principal Problem:Lisfranc dislocation, left, sequela    Chief Complaint:   Chief Complaint   Patient presents with    Foot Injury        HPI: Ms Rob is a 65 year old female with PMHx of DM, HTN and HLD who underwent ORIF of tri-malleolar ankle fracture and charcot foot reconstruction by Dr Gray today. Tolerated procedure well, vital signs stable. Denies associated symptoms of chest pain, shortness of breath, fever, chills, nausea, vomiting or diaphoresis. Patient speak Estonian, daughter at bedside and assist with translation. She is a full code, daughter, Cass, is surrogate decision maker. Patient is placed in post op extended recovery under the care of Hospital Medicine.     Past Medical History:   Diagnosis Date    Arthritis     DERIAN KNEES    Asthma     Cataract     Diabetes mellitus     Diabetes mellitus, type 2     Diabetic retinopathy     Hyperlipidemia     Hypertension        Past Surgical History:   Procedure Laterality Date    CATARACT EXTRACTION W/  INTRAOCULAR LENS IMPLANT Right 07/18/2018    CATARACT EXTRACTION W/  INTRAOCULAR LENS IMPLANT Left 03/13/2019    COLONOSCOPY N/A 12/22/2018    Procedure: COLONOSCOPY;  Surgeon: Zak Dover MD;  Location: [facility];  Service: Endoscopy;  Laterality: N/A;    ENDOSCOPIC VEIN LASER TREATMENT Bilateral 1990's       Review of patient's allergies indicates:   Allergen Reactions    Pollen extracts        No current facility-administered medications on file prior to encounter.      Current Outpatient Medications on File Prior to Encounter   Medication Sig    clobetasol (TEMOVATE) 0.05 % external  "solution Apply topically 2 (two) times daily. To scalp only    emollient combination no.69 (EUCERIN SKIN CALMING) Crea Apply BID    magnesium oxide (MAG-OX) 400 mg (241.3 mg magnesium) tablet Take 500 mg by mouth every evening.    ammonium lactate (AMLACTIN) 12 % lotion Use daily.  Apply to damp skin after bathing.    pen needle, diabetic (BD ULTRA-FINE SHORT PEN NEEDLE) 31 gauge x 5/16" Ndle USE 1  ONCE DAILY    triamcinolone acetonide 0.025% (KENALOG) 0.025 % cream AAA bid as needed for flares.  Mild steroid.     Family History     Problem Relation (Age of Onset)    Cancer Father    Diabetes Sister    Glaucoma Mother, Maternal Grandmother    Hypertension Father, Mother    Stroke Father        Tobacco Use    Smoking status: Never Smoker    Smokeless tobacco: Never Used   Substance and Sexual Activity    Alcohol use: No    Drug use: No    Sexual activity: Yes     Review of Systems   Constitutional: Negative for chills and fever.   HENT: Negative for congestion, rhinorrhea and sinus pressure.    Respiratory: Negative for apnea, cough, choking, chest tightness, shortness of breath, wheezing and stridor.    Cardiovascular: Negative for chest pain, palpitations and leg swelling.   Gastrointestinal: Negative for abdominal distention, abdominal pain, diarrhea, nausea and vomiting.   Endocrine: Negative for cold intolerance and heat intolerance.   Genitourinary: Negative for difficulty urinating and hematuria.   Musculoskeletal: Negative for arthralgias and joint swelling.   Skin: Negative for color change, pallor and rash.   Neurological: Negative for dizziness, seizures, weakness, numbness and headaches.   Psychiatric/Behavioral: Negative for agitation. The patient is not nervous/anxious.      Objective:     Vital Signs (Most Recent):  Temp: 97.4 °F (36.3 °C) (07/23/20 1625)  Pulse: 74 (07/23/20 1815)  Resp: 20 (07/23/20 1815)  BP: (!) 142/66 (07/23/20 1815)  SpO2: 98 % (07/23/20 1815) Vital Signs (24h " Range):  Temp:  [97.4 °F (36.3 °C)-97.9 °F (36.6 °C)] 97.4 °F (36.3 °C)  Pulse:  [70-96] 74  Resp:  [9-21] 20  SpO2:  [90 %-100 %] 98 %  BP: (122-164)/(57-91) 142/66     Weight: 95.4 kg (210 lb 5.1 oz)  Body mass index is 31.06 kg/m².    Physical Exam  Vitals signs and nursing note reviewed.   HENT:      Head: Normocephalic and atraumatic.      Right Ear: There is no impacted cerumen.      Left Ear: There is no impacted cerumen.      Nose: No congestion or rhinorrhea.      Mouth/Throat:      Pharynx: No oropharyngeal exudate or posterior oropharyngeal erythema.   Eyes:      General:         Right eye: No discharge.         Left eye: No discharge.   Neck:      Musculoskeletal: No neck rigidity or muscular tenderness.      Vascular: No carotid bruit.   Cardiovascular:      Heart sounds: No murmur. No friction rub. No gallop.    Pulmonary:      Effort: No respiratory distress.      Breath sounds: No stridor. No wheezing, rhonchi or rales.   Chest:      Chest wall: No tenderness.   Abdominal:      General: There is no distension.      Palpations: There is no mass.      Tenderness: There is no abdominal tenderness. There is no right CVA tenderness, guarding or rebound.      Hernia: No hernia is present.   Musculoskeletal:      Comments: Left lower leg Splint intact    Lymphadenopathy:      Cervical: No cervical adenopathy.   Skin:     General: Skin is warm and dry.      Capillary Refill: Capillary refill takes less than 2 seconds.   Neurological:      General: No focal deficit present.      Mental Status: She is alert and oriented to person, place, and time.   Psychiatric:         Mood and Affect: Mood normal.                        Assessment/Plan:     * Lisfranc dislocation, left, sequela  Place in Post op Extended Recover   S/P ORIF of tri-malleolar ankle fracture and charcot foot reconstruction   Podiatry managing   PT/OT   Pain control   Case management for discharge planning to Rehab   No weight bearing left lower  ext  Elevation and Ice to Left lower ext      Diabetic Charcot's foot  As above       HLD (hyperlipidemia)  Continue Statin       Type II diabetes mellitus with neurological manifestations  Diabetic diet   Accu checks ACHS with SSI       Essential hypertension  Continue home BP medications      VTE Risk Mitigation (From admission, onward)    None             Adrien Stephenson NP  Department of Hospital Medicine   [facility]

## 2020-07-23 NOTE — TRANSFER OF CARE
"Anesthesia Transfer of Care Note    Patient: Kay Galarza    Procedure(s) Performed: Procedure(s) (LRB):  ORIF, ANKLE (Left)  FIXATION, SYNDESMOSIS, ANKLE (Left)  FUSION, JOINT, MIDFOOT (Left)    Patient location: PACU    Anesthesia Type: general    Transport from OR: Transported from OR on room air with adequate spontaneous ventilation    Post pain: adequate analgesia    Post assessment: no apparent anesthetic complications    Post vital signs: stable    Level of consciousness: awake    Nausea/Vomiting: no nausea/vomiting    Complications: none    Transfer of care protocol was followed      Last vitals:   Visit Vitals  BP (!) 164/91 (BP Location: Right arm, Patient Position: Sitting)   Pulse 96   Temp 36.6 °C (97.9 °F) (Temporal)   Resp 18   Ht 5' 9" (1.753 m)   Wt 95.4 kg (210 lb 5.1 oz)   SpO2 97%   Breastfeeding No   BMI 31.06 kg/m²     " "Health  Wellness Visit Note    Name: Paris Burris  Clinic Number: [id number]  Physician: Cee Woodall, *  Diagnosis: No diagnosis found.  Past Medical History:   Diagnosis Date    Allergy     Anemia     Anxiety     Cancer 2002    L breast s/p mastectomy    Decreased hearing     Depression     Diabetes mellitus     Diabetes with neurologic complications     Gastric ulcer 9/10/13    EGD    Hiatal hernia     Hyperlipidemia     Migraine headache     Migraine headache 3/20/2015    Multiple gastric ulcers     Parathyroid disorder     Pre-diabetes     Renal manifestation of secondary diabetes mellitus     Sleep apnea     no CPAP    Type 2 diabetes mellitus, controlled, with renal complications 6/14/2017    Wrist fracture      Visit Number: C40/ L48  Precautions: SEE PMH,  anxiety, cancer, depression, DM, wrist fx, neuropathy in L foot.   Time In:  9:35 AM  Time Out: 10:29 AM  Total Treatment Time: 54 minutes    Subjective:   Patient reports that her lower back and neck are feeling "wonderful" today; states that she does not have any lower back/neck pain, stiffness, or soreness presently; mentioned that she had performed her stretches and iced this morning prior to her wellness session; praised patient for her efforts in keeping up with her home exercise program/icing routine; says that she has been sleeping a little better-her sleep has not been disturbed as frequently since she has stopped drinking water a few hours before bed; patient appears to be well rested and in high spirits today; will try to exercise at [facility] Wednesday.    Objective:   Paris completed therapeutic stretches (EIS, EIL, RICK, Neck Retractions, scapular retractions) and the following MedX exercise machines: core cervical, core lumbar, torso rotation l/r, leg extension, leg curl, upright row, chest press, biceps curl, triceps extension, leg press    Please see exercise log in patient folder for rate of exertion and " repetitions completed.     Assessment:   Patient tolerated all exercises on the MedX equipment without complaint or increase in symptoms.  Skipped ice today.  Weight will increase on the Leg extension and Chest press on her next visit.      Plan:    Continue with established plan of care towards wellness goals. Continue with Plan A. May transition to [facility] in January. Check in with thoracic pain.

## 2020-07-23 NOTE — ASSESSMENT & PLAN NOTE
Place in Post op Extended Recover   S/P ORIF of tri-malleolar ankle fracture and charcot foot reconstruction   Podiatry managing   PT/OT   Pain control   Case management for discharge planning to Rehab   No weight bearing left lower ext  Elevation and Ice to Left lower ext

## 2020-07-23 NOTE — PLAN OF CARE
Patient is s/p 7/23/2020 LLE ORIF tri-malleolar ankle fracture, with LLE Charcot foot reconstruction.    Strict NWB to the LLE for now.     Copious RICE therapy ATC w/ ice pack behind the knee due to DMII and multiple incisions.     Case Management consultation for D/C Rehab placement? and DME.     PT/OT s/p.     DVT PPx upon D/C with Xarelto.     DVT PPx. and s/p ABx will be sent to out-patient pharmacy.     Strict DMII control due to 9.7% HbA1c in the setting of multiple incision and arthrodesis sites.     Any issues or concerns, please contact me or daughter (Cass Morrowt: [phone]).

## 2020-07-23 NOTE — PLAN OF CARE
Pt resting on stretcher, no s&s of pain/discomfort. VSS. Will cont to monitor. Neurovascular checks intact.

## 2020-07-23 NOTE — PLAN OF CARE
Daughter at bedside, patient eating dinner, NP with hospital medicine is at bedside speaking with patient and daughter. Patient is awake and alert with minimal pain.

## 2020-07-23 NOTE — HPI
Ms Rob is a 65 year old female with PMHx of DM, HTN and HLD who underwent ORIF of tri-malleolar ankle fracture and charcot foot reconstruction by Dr Gray today. Tolerated procedure well, vital signs stable. Denies associated symptoms of chest pain, shortness of breath, fever, chills, nausea, vomiting or diaphoresis. Patient speak Uzbek, daughter at bedside and assist with translation. She is a full code, daughter, Cass, is surrogate decision maker. Patient is placed in post op extended recovery under the care of Hospital Medicine.

## 2020-07-23 NOTE — SUBJECTIVE & OBJECTIVE
"Past Medical History:   Diagnosis Date    Arthritis     DERIAN KNEES    Asthma     Cataract     Diabetes mellitus     Diabetes mellitus, type 2     Diabetic retinopathy     Hyperlipidemia     Hypertension        Past Surgical History:   Procedure Laterality Date    CATARACT EXTRACTION W/  INTRAOCULAR LENS IMPLANT Right 07/18/2018    CATARACT EXTRACTION W/  INTRAOCULAR LENS IMPLANT Left 03/13/2019    COLONOSCOPY N/A 12/22/2018    Procedure: COLONOSCOPY;  Surgeon: Zak Dover MD;  Location[facility];  Service: Endoscopy;  Laterality: N/A;    ENDOSCOPIC VEIN LASER TREATMENT Bilateral 1990's       Review of patient's allergies indicates:   Allergen Reactions    Pollen extracts        No current facility-administered medications on file prior to encounter.      Current Outpatient Medications on File Prior to Encounter   Medication Sig    clobetasol (TEMOVATE) 0.05 % external solution Apply topically 2 (two) times daily. To scalp only    emollient combination no.69 (EUCERIN SKIN CALMING) Crea Apply BID    magnesium oxide (MAG-OX) 400 mg (241.3 mg magnesium) tablet Take 500 mg by mouth every evening.    ammonium lactate (AMLACTIN) 12 % lotion Use daily.  Apply to damp skin after bathing.    pen needle, diabetic (BD ULTRA-FINE SHORT PEN NEEDLE) 31 gauge x 5/16" Ndle USE 1  ONCE DAILY    triamcinolone acetonide 0.025% (KENALOG) 0.025 % cream AAA bid as needed for flares.  Mild steroid.     Family History     Problem Relation (Age of Onset)    Cancer Father    Diabetes Sister    Glaucoma Mother, Maternal Grandmother    Hypertension Father, Mother    Stroke Father        Tobacco Use    Smoking status: Never Smoker    Smokeless tobacco: Never Used   Substance and Sexual Activity    Alcohol use: No    Drug use: No    Sexual activity: Yes     Review of Systems   Constitutional: Negative for chills and fever.   HENT: Negative for congestion, rhinorrhea and sinus pressure.    Respiratory: Negative for " apnea, cough, choking, chest tightness, shortness of breath, wheezing and stridor.    Cardiovascular: Negative for chest pain, palpitations and leg swelling.   Gastrointestinal: Negative for abdominal distention, abdominal pain, diarrhea, nausea and vomiting.   Endocrine: Negative for cold intolerance and heat intolerance.   Genitourinary: Negative for difficulty urinating and hematuria.   Musculoskeletal: Negative for arthralgias and joint swelling.   Skin: Negative for color change, pallor and rash.   Neurological: Negative for dizziness, seizures, weakness, numbness and headaches.   Psychiatric/Behavioral: Negative for agitation. The patient is not nervous/anxious.      Objective:     Vital Signs (Most Recent):  Temp: 97.4 °F (36.3 °C) (07/23/20 1625)  Pulse: 74 (07/23/20 1815)  Resp: 20 (07/23/20 1815)  BP: (!) 142/66 (07/23/20 1815)  SpO2: 98 % (07/23/20 1815) Vital Signs (24h Range):  Temp:  [97.4 °F (36.3 °C)-97.9 °F (36.6 °C)] 97.4 °F (36.3 °C)  Pulse:  [70-96] 74  Resp:  [9-21] 20  SpO2:  [90 %-100 %] 98 %  BP: (122-164)/(57-91) 142/66     Weight: 95.4 kg (210 lb 5.1 oz)  Body mass index is 31.06 kg/m².    Physical Exam  Vitals signs and nursing note reviewed.   HENT:      Head: Normocephalic and atraumatic.      Right Ear: There is no impacted cerumen.      Left Ear: There is no impacted cerumen.      Nose: No congestion or rhinorrhea.      Mouth/Throat:      Pharynx: No oropharyngeal exudate or posterior oropharyngeal erythema.   Eyes:      General:         Right eye: No discharge.         Left eye: No discharge.   Neck:      Musculoskeletal: No neck rigidity or muscular tenderness.      Vascular: No carotid bruit.   Cardiovascular:      Heart sounds: No murmur. No friction rub. No gallop.    Pulmonary:      Effort: No respiratory distress.      Breath sounds: No stridor. No wheezing, rhonchi or rales.   Chest:      Chest wall: No tenderness.   Abdominal:      General: There is no distension.       Palpations: There is no mass.      Tenderness: There is no abdominal tenderness. There is no right CVA tenderness, guarding or rebound.      Hernia: No hernia is present.   Musculoskeletal:      Comments: Left lower leg Splint intact    Lymphadenopathy:      Cervical: No cervical adenopathy.   Skin:     General: Skin is warm and dry.      Capillary Refill: Capillary refill takes less than 2 seconds.   Neurological:      General: No focal deficit present.      Mental Status: She is alert and oriented to person, place, and time.   Psychiatric:         Mood and Affect: Mood normal.

## 2020-07-23 NOTE — ANESTHESIA PREPROCEDURE EVALUATION
"                                                                                                             07/23/2020  Kay Galarza is a 65 y.o., female.  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, with long-term current use of insulin    Essential hypertension    Mild intermittent asthma    Severe obesity (BMI 35.0-39.9) with comorbidity    Cataract, nuclear sclerotic senile, bilateral    Open angle with borderline findings, low risk, bilateral    Special screening for malignant neoplasms, colon    Benign neoplasm of cecum    Benign neoplasm of ascending colon    Benign neoplasm of transverse colon    Benign neoplasm of descending colon    Benign neoplasm of rectosigmoid junction    Charcot's neuro arthropathy with dislocation of Lisfranc Joint of left foot    Type II diabetes mellitus with neurological manifestations    Noncompliance    Lisfranc dislocation, left, sequela     No current facility-administered medications on file prior to encounter.      Current Outpatient Medications on File Prior to Encounter   Medication Sig Dispense Refill    clobetasol (TEMOVATE) 0.05 % external solution Apply topically 2 (two) times daily. To scalp only 50 mL 2    emollient combination no.69 (EUCERIN SKIN CALMING) Crea Apply  g 0    magnesium oxide (MAG-OX) 400 mg (241.3 mg magnesium) tablet Take 500 mg by mouth every evening.      ammonium lactate (AMLACTIN) 12 % lotion Use daily.  Apply to damp skin after bathing. 225 g 11    pen needle, diabetic (BD ULTRA-FINE SHORT PEN NEEDLE) 31 gauge x 5/16" Ndle USE 1  ONCE DAILY 100 each 0    triamcinolone acetonide 0.025% (KENALOG) 0.025 % cream AAA bid as needed for flares.  Mild steroid. 80 g 1     Past Surgical History:   Procedure Laterality Date    CATARACT EXTRACTION W/  INTRAOCULAR LENS IMPLANT Right 07/18/2018 "    CATARACT EXTRACTION W/  INTRAOCULAR LENS IMPLANT Left 03/13/2019    COLONOSCOPY N/A 12/22/2018    Procedure: COLONOSCOPY;  Surgeon: Zak Dover MD;  Location: [facility];  Service: Endoscopy;  Laterality: N/A;    ENDOSCOPIC VEIN LASER TREATMENT Bilateral 1990's       Anesthesia Evaluation    I have reviewed the Patient Summary Reports.    I have reviewed the Nursing Notes.    I have reviewed the Medications.     Review of Systems  Anesthesia Hx:  No problems with previous Anesthesia  History of prior surgery of interest to airway management or planning: Previous anesthesia: General  Denies Personal Hx of Anesthesia complications.   Social:  Non-Smoker    Hematology/Oncology:  Hematology Normal   Oncology Normal     EENT/Dental:EENT/Dental Normal   Cardiovascular:   Hypertension ECG has been reviewed. Echo 12/019  CONCLUSIONS     1 - Concentric hypertrophy.     2 - No wall motion abnormalities.     3 - Normal left ventricular systolic function (EF 55-60%).     4 - Normal left ventricular diastolic function.     5 - Normal right ventricular systolic function .     6 - The estimated PA systolic pressure is 20 mmHg.     7 - Mild aortic regurgitation.    Pulmonary:   Asthma mild    Renal/:  Renal/ Normal     Hepatic/GI:  Hepatic/GI Normal    Musculoskeletal:  Musculoskeletal Normal    Neurological:  Neurology Normal    Endocrine:   Diabetes, type 2    Dermatological:  Skin Normal    Psych:  Psychiatric Normal           Physical Exam  General:  Well nourished, Obesity    Airway/Jaw/Neck:  Airway Findings: Mouth Opening: Normal Tongue: Normal  Mallampati: II      Dental:  Dental Findings: In tact   Chest/Lungs:  Chest/Lungs Clear    Heart/Vascular:  Heart Findings: Normal Heart murmur: negative       Mental Status:  Mental Status Findings:  Cooperative, Alert and Oriented         Chemistry        Component Value Date/Time     07/08/2020 1006     07/08/2020 1006    K 4.5 07/08/2020 1006    K 4.5  07/08/2020 1006     07/08/2020 1006     07/08/2020 1006    CO2 28 07/08/2020 1006    CO2 28 07/08/2020 1006    BUN 19 07/08/2020 1006    BUN 19 07/08/2020 1006    CREATININE 0.9 07/08/2020 1006    CREATININE 0.9 07/08/2020 1006     (H) 07/08/2020 1006     (H) 07/08/2020 1006        Component Value Date/Time    CALCIUM 9.9 07/08/2020 1006    CALCIUM 9.9 07/08/2020 1006    ALKPHOS 103 07/08/2020 1006    AST 14 07/08/2020 1006    ALT 9 (L) 07/08/2020 1006    BILITOT 0.6 07/08/2020 1006    ESTGFRAFRICA >60.0 07/08/2020 1006    ESTGFRAFRICA >60.0 07/08/2020 1006    EGFRNONAA >60.0 07/08/2020 1006    EGFRNONAA >60.0 07/08/2020 1006        Lab Results   Component Value Date    WBC 8.40 07/08/2020    HGB 13.1 07/08/2020    HCT 38.6 07/08/2020    MCV 90 07/08/2020     07/08/2020           Anesthesia Plan  Type of Anesthesia, risks & benefits discussed:  Anesthesia Type:  general  Patient's Preference:   Intra-op Monitoring Plan: standard ASA monitors  Intra-op Monitoring Plan Comments:   Post Op Pain Control Plan: multimodal analgesia  Post Op Pain Control Plan Comments:   Induction:   IV  Beta Blocker:  Patient is not currently on a Beta-Blocker (No further documentation required).       Informed Consent: Patient understands risks and agrees with Anesthesia plan.  Questions answered. Anesthesia consent signed with patient.  ASA Score: 3     Day of Surgery Review of History & Physical: I have interviewed and examined the patient. I have reviewed the patient's H&P dated:    H&P update referred to the surgeon.         Ready For Surgery From Anesthesia Perspective.

## 2020-07-23 NOTE — BRIEF OP NOTE
[facility]  Brief Operative Note    Surgery Date: 7/23/2020     Surgeon(s) and Role:     * Kirt Gray DPM - Primary    Assisting Surgeon: None    Pre-op Diagnosis:  Closed trimalleolar fracture of left ankle, initial encounter [S82.852A]  Pain and swelling of left ankle [M25.572, M25.472]    Post-op Diagnosis:  Post-Op Diagnosis Codes:     * Closed trimalleolar fracture of left ankle, initial encounter [S82.852A]     * Pain and swelling of left ankle [M25.572, M25.472]    Procedure(s) (LRB):  ORIF, ANKLE (Left)  FIXATION, SYNDESMOSIS, ANKLE (Left)  FUSION, JOINT, MIDFOOT (Left)    Anesthesia: General    Description of the findings of the procedure(s):    1. ORIF of tri-malleolar ankle fracture, LLE.              2. Charcot foot reconstruction, LLE.       Estimated Blood Loss: * No values recorded between 7/23/2020 11:54 AM and 7/23/2020  4:24 PM *         Specimens:   Specimen (12h ago, onward)    None            Discharge Note    OUTCOME: Patient tolerated treatment/procedure well without complication and is now ready for discharge.    DISPOSITION: Home or Self Care    FINAL DIAGNOSIS:  <principal problem not specified>    FOLLOWUP: In clinic    DISCHARGE INSTRUCTIONS:  No discharge procedures on file.

## 2020-07-24 VITALS
SYSTOLIC BLOOD PRESSURE: 128 MMHG | HEIGHT: 69 IN | WEIGHT: 210.31 LBS | BODY MASS INDEX: 31.15 KG/M2 | OXYGEN SATURATION: 96 % | DIASTOLIC BLOOD PRESSURE: 59 MMHG | HEART RATE: 90 BPM | RESPIRATION RATE: 17 BRPM | TEMPERATURE: 99 F

## 2020-07-24 LAB
POCT GLUCOSE: 318 MG/DL (ref 70–110)
POCT GLUCOSE: 324 MG/DL (ref 70–110)

## 2020-07-24 PROCEDURE — 63600175 PHARM REV CODE 636 W HCPCS: Performed by: INTERNAL MEDICINE

## 2020-07-24 PROCEDURE — 96372 THER/PROPH/DIAG INJ SC/IM: CPT

## 2020-07-24 PROCEDURE — 97166 OT EVAL MOD COMPLEX 45 MIN: CPT | Performed by: PHYSICAL THERAPIST

## 2020-07-24 PROCEDURE — 99900035 HC TECH TIME PER 15 MIN (STAT)

## 2020-07-24 PROCEDURE — 25000003 PHARM REV CODE 250: Performed by: NURSE PRACTITIONER

## 2020-07-24 PROCEDURE — 97162 PT EVAL MOD COMPLEX 30 MIN: CPT | Performed by: PHYSICAL THERAPIST

## 2020-07-24 PROCEDURE — 25000003 PHARM REV CODE 250: Performed by: INTERNAL MEDICINE

## 2020-07-24 PROCEDURE — 94761 N-INVAS EAR/PLS OXIMETRY MLT: CPT

## 2020-07-24 PROCEDURE — 27000221 HC OXYGEN, UP TO 24 HOURS

## 2020-07-24 RX ADMIN — INSULIN ASPART 4 UNITS: 100 INJECTION, SOLUTION INTRAVENOUS; SUBCUTANEOUS at 05:07

## 2020-07-24 RX ADMIN — GABAPENTIN 100 MG: 100 CAPSULE ORAL at 02:07

## 2020-07-24 RX ADMIN — CLINDAMYCIN IN 5 PERCENT DEXTROSE 900 MG: 18 INJECTION, SOLUTION INTRAVENOUS at 01:07

## 2020-07-24 RX ADMIN — OXYCODONE HYDROCHLORIDE AND ACETAMINOPHEN 1 TABLET: 10; 325 TABLET ORAL at 02:07

## 2020-07-24 RX ADMIN — INSULIN ASPART 4 UNITS: 100 INJECTION, SOLUTION INTRAVENOUS; SUBCUTANEOUS at 11:07

## 2020-07-24 RX ADMIN — ATORVASTATIN CALCIUM 40 MG: 40 TABLET, FILM COATED ORAL at 09:07

## 2020-07-24 RX ADMIN — DOCUSATE SODIUM 100 MG: 100 CAPSULE, LIQUID FILLED ORAL at 09:07

## 2020-07-24 RX ADMIN — CLINDAMYCIN IN 5 PERCENT DEXTROSE 900 MG: 18 INJECTION, SOLUTION INTRAVENOUS at 09:07

## 2020-07-24 RX ADMIN — GABAPENTIN 100 MG: 100 CAPSULE ORAL at 09:07

## 2020-07-24 RX ADMIN — LISINOPRIL 20 MG: 20 TABLET ORAL at 09:07

## 2020-07-24 RX ADMIN — MORPHINE SULFATE 4 MG: 4 INJECTION INTRAVENOUS at 04:07

## 2020-07-24 RX ADMIN — HYDROCHLOROTHIAZIDE 25 MG: 25 TABLET ORAL at 09:07

## 2020-07-24 NOTE — PT/OT/SLP EVAL
Occupational Therapy   Evaluation    Name: Kay Galarza  MRN: [id number]  Admitting Diagnosis:  Lisfranc dislocation, left, sequela 1 Day Post-Op    Recommendations:     Discharge Recommendations: home with home health  Discharge Equipment Recommendations:  walker, rolling  Barriers to discharge:  None    Assessment:     Kay Galarza is a 65 y.o. female with a medical diagnosis of Lisfranc dislocation, left, sequela.  She presents with impaired functional mobility and ADLs. Performance deficits affecting function: weakness, impaired endurance, impaired self care skills, impaired functional mobilty, gait instability, impaired balance, decreased lower extremity function, decreased safety awareness, pain, edema.      Rehab Prognosis: Fair; patient would benefit from acute skilled OT services to address these deficits and reach maximum level of function.       Plan:     Patient to be seen 3 x/week to address the above listed problems via self-care/home management, therapeutic activities, therapeutic exercises  · Plan of Care Expires: 07/31/20  · Plan of Care Reviewed with: patient, daughter    Subjective     Chief Complaint: (L) LE pain  Patient/Family Comments/goals: to go home    Occupational Profile:  Living Environment: lives with daughter in 1 story house with no steps  Previous level of function: Mod I with ADLs, (A) with t/fs and ambulation  Roles and Routines: does not drive or work  Equipment Used at Home:  wheelchair, bedside commode, shower chair  Assistance upon Discharge: daughter    Pain/Comfort:  · Pain Rating 1: 8/10  · Location - Side 1: Left  · Location - Orientation 1: lower  · Location 1: leg  · Pain Addressed 1: Pre-medicate for activity, Reposition, Distraction, Cessation of Activity  · Pain Rating Post-Intervention 1: 8/10    Patients cultural, spiritual, Buddhist conflicts given the current situation:      Objective:     Communicated with: Nurse Thompson and epic chart  review prior to session.  Patient found supine with bed alarm, peripheral IV upon OT entry to room.    General Precautions: Standard, fall   Orthopedic Precautions:LLE non weight bearing   Braces: N/A     Occupational Performance:    Bed Mobility:    · Patient completed Rolling/Turning to Left with  minimum assistance  · Patient completed Rolling/Turning to Right with minimum assistance  · Patient completed Scooting/Bridging with minimum assistance  · Patient completed Supine to Sit with moderate assistance    Functional Mobility/Transfers:  · Patient completed Sit <> Stand Transfer with maximal assistance  with  hand-held assist   · Patient completed Bed <> Chair Transfer using Stand Pivot technique with maximal assistance with hand-held assist  · Functional Mobility: Max A    Activities of Daily Living:  · Upper Body Dressing: minimum assistance .  · Lower Body Dressing: maximal assistance .  · Toileting: stand by assistance .    Cognitive/Visual Perceptual:  Cognitive/Psychosocial Skills:     -       Oriented to: Person, Place, Time and Situation   -       Follows Commands/attention:Follows two-step commands  -       Communication: Setswana SPEAKING ONLY  -       Memory: No Deficits noted  -       Safety awareness/insight to disability: impaired   Visual/Perceptual:      -Intact .    Physical Exam:  Dominant hand:    -       right  Upper Extremity Range of Motion:     -       Right Upper Extremity: WFL  -       Left Upper Extremity: WFL  Upper Extremity Strength:    -       Right Upper Extremity: WFL  -       Left Upper Extremity: WFL   Strength:    -       Right Upper Extremity: WFL  -       Left Upper Extremity: WFL    AMPAC 6 Click ADL:  AMPAC Total Score: 17    Treatment & Education:  OT eval completed as listed above. Pt educated in role of OT and POC.   Education:    Patient left up in chair with all lines intact, call button in reach, chair alarm on, Nurse Donna notified, daughter present and (L) OLENA  elevated on sofa cushion    GOALS:   Multidisciplinary Problems     Occupational Therapy Goals        Problem: Occupational Therapy Goal    Goal Priority Disciplines Outcome Interventions   Occupational Therapy Goal     OT, PT/OT     Description: LTGs to be met by 7/31/2020   1. Pt will perform LE dressing with Mod A  2. Pt will perform UE dressing with Supervision  3. Pt will perform toilet t/fs with Min A   4. Pt will perform (B) UE therapeutic exercises 1 x 20 reps                   History:     Past Medical History:   Diagnosis Date    Arthritis     DERIAN KNEES    Asthma     Cataract     Diabetes mellitus     Diabetes mellitus, type 2     Diabetic retinopathy     Hyperlipidemia     Hypertension        Past Surgical History:   Procedure Laterality Date    CATARACT EXTRACTION W/  INTRAOCULAR LENS IMPLANT Right 07/18/2018    CATARACT EXTRACTION W/  INTRAOCULAR LENS IMPLANT Left 03/13/2019    COLONOSCOPY N/A 12/22/2018    Procedure: COLONOSCOPY;  Surgeon: Zak Dover MD;  Location: [facility];  Service: Endoscopy;  Laterality: N/A;    ENDOSCOPIC VEIN LASER TREATMENT Bilateral 1990's    FIXATION OF SYNDESMOSIS OF ANKLE Left 7/23/2020    Procedure: FIXATION, SYNDESMOSIS, ANKLE;  Surgeon: Kirt Gray DPM;  Location: [facility] OR;  Service: Podiatry;  Laterality: Left;    MANIPULATION WITH ANESTHESIA Left 7/23/2020    Procedure: MANIPULATION, WITH ANESTHESIA;  Surgeon: Kirt Gray DPM;  Location: [facility] OR;  Service: Podiatry;  Laterality: Left;    MIDFOOT ARTHRODESIS Left 7/23/2020    Procedure: FUSION, JOINT, MIDFOOT;  Surgeon: Kirt Gray DPM;  Location: [facility] OR;  Service: Podiatry;  Laterality: Left;  2nd tarsometatarsal joint dislocation via fusion    OPEN REDUCTION AND INTERNAL FIXATION (ORIF) OF INJURY OF ANKLE Left 7/23/2020    Procedure: ORIF, ANKLE;  Surgeon: Kirt Gray DPM;  Location: [facility] OR;  Service: Podiatry;  Laterality: Left;       Time Tracking:     OT Date of Treatment:  07/24/20  OT Start Time: 0910  OT Stop Time: 0925  OT Total Time (min): 15 min    Billable Minutes:Evaluation 15 min    Ju Rivero, PT/OT  7/24/2020

## 2020-07-24 NOTE — OP NOTE
[facility]  Podiatric Medicine & Surgery  Operative Report    SUMMARY     Date of Procedure: 7/23/2020    Procedure: Procedure(s):  ORIF, ANKLE  FIXATION, SYNDESMOSIS, ANKLE  FUSION, JOINT, MIDFOOT    Surgeon(s) and Role: Surgeon(s) and Role:     * Kirt Gray DPM - Primary    Pre-Operative Diagnosis: Pre-Op Diagnosis Codes:     * Closed trimalleolar fracture of left ankle, initial encounter [S82.852A]     * Pain and swelling of left ankle [M25.572, M25.472]    Post-Operative Diagnosis: Post-Op Diagnosis Codes:     * Closed trimalleolar fracture of left ankle, initial encounter [S82.852A]     * Pain and swelling of left ankle [M25.572, M25.472]    Anesthesia: General    Technical Procedures Used:   1. ORIF of tri-malleolar ankle fracture, left.   2. Manipulation of the ankle under general anesthesia with application of traction (CPT: 29061).   3. Open treatment of Ankle Syndesmosis, left.   4. Open treatment of 2nd tarsometatarsal joint dislocation via fusion.   5. Medial column fusion, left foot.      Description of the Findings of the Procedure: The patient was seen in the Holding Room. The risks, benefits, complications, treatment options, and expected outcomes were discussed with the patient. The risks and potential complications of their problem and purposed treatment include but are not limited to infection, nerve injury, vascular injury, nonunion/malunion/delayed union of the surgical site, persistent pain, potential skin necrosis, deep vein thrombosis, possible pulmonary embolus, complications of the anesthetics and failure of the implant.  The patient concurred with the proposed plan, giving informed consent. The patient is aware that the procedure may be a part of a staged collection of procedures for definitive cure and/or alleviation of symptoms. The site of surgery properly noted/marked. Preoperative intravenous antibiotics are hanging at bedside, and are currently being  administered via the heparin lock. The patient was taken to Operating Suite.    Once in the operative suite, the patient is transferred onto the operative table in the supine position. A TIME-OUT is taken as per protocol to identify the proper patient, procedure to be performed, and laterality.  The patient is properly positioned on the operating room table for ease of dissection and for any ancillary imaging.  A well-padded tourniquet was applied to the left mid-thigh.  Nursing and ancillary OR staff prepared the patient for the procedure. The patient is adequately sedated by the Attending Anesthesiologist and/or covering CRNA.  Following this, a preoperative regional/local block was given to the proximal aspect of the ankle joint with 30cc of 1% Lidocaine with Epinephrine.  Next, the operative limb was rendered sterile using chlorhexidine paint and scrub.  Sterile sheets and drapes were applied thereafter. Another TIME-OUT is taken as per protocol to identify the proper patient, procedure to be performed, and laterality.      The tourniquet is elevated to 350mmHg after the limb is exsanguinated for approximately 2 min with an Esmarch bandage.     Following this, sharp skin incision atop the distal fibula.  Cautious deep dissection with a tenotomy scissor being mindful to visualize the sural nerve.  The sural nerve is visualized.  Retraction of all vital soft tissue structures. The soft tissues/deep fascia atop the distal fibula are incised with a scalpel blade. Of note, there is copious hematoma formation at the fracture site. This hematoma formation is debrided in toto with the use of a bone curette as well as a dental pick.  Copious lavage of the area with saline solution.  After adequate debridement of the fracture site, traction is applied to the distal fibula and the capital fragment is reduced into anatomic alignment.  Temporary pinning in an anatomical position with the use of fluoroscopy as well as a bone  clamp.    Following this, a medial longitudinal incision is made atop the medial malleolus. The saphenous vein visualized.  All vital neurovascular structures are retracted. The deep fascia atop the distal aspect the medial malleolus is stripped.  The deltoid ligament is also slightly stripped.  The distal avulsion fracture of the medial malleolus is visualized.  Of note, there is copious hematoma formation.  Hematoma formation is debrided with a dental pick.  Copious lavage with sterile saline solution.  Anatomic reduction is held with a bone hook. Following this, the posterior malleolar fracture is indirectly reduced with manipulation and dorsiflexion of the ankle joint.    After adequate anatomic reduction, the fibula fracture site is fixated using a neutralization/buttress plate, with the assistance of fluoroscopy. All internal hardware is provided by [employer].  Following fixation of the fibular fracture, my attention is turned to fixation of the medial malleolar fracture.  The medial malleolar fractures fixated using a [id number] hook plate.     At this point time, the ankle joint is stressed under live fluoroscopy to assess for the integrity of the syndesmosis. Of note, with stressing of the syndesmosis, there is an increase in the medial and/or lateral clear space widening with stressing of the joint.  At this point in time, it is determined that syndesmotic fixation would be needed.  The syndesmosis fixated using three [id number] screws. Copious lavage with sterile saline solution.    The tourniquet is deflated and hemostasis is achieved via electrocautery.  Deep closure using 0 Vicryl suture.  The skin is closed using 3-0 Nylon.    After approximately 20 minutes has elapsed, the tourniquet is again inflated. Following this, sharp skin incision at the medial border of the longitudinal arch of the foot.  Deep dissection with a tenotomy scissor.  Fascial stripping with a tenotomy scissor at the respective  joints across the medial longitudinal arch. The joints are then entered using a scalpel blade.  All the joints are prepared in standard fashion from the 1st metatarsal to the talar head.  The cartilage is stripped using combination of osteotomes and curettes. Subchondral drilling of all areas using a 2.0mm drill bit.  The intermediate cuneiform joint is also pared standard fashion.    Following this, sharp skin incision at the 2nd webspace for access to the 2nd and 3rd tarsometatarsal articulation. The 2nd tarsometatarsal articulations entered using a bone rongeur and osteotome. The remaining cartilage about the area stripped using osteotomes and curettes.  Back filling of the area using DBM bone putty and cancellous chips.  The joints are similarly fenestrated with a 2.0 m drill bit. The joint dislocation is reduced and maintained with temporary clamp.  Fixation across the joint using a Sims 28 TMTJ plate. Copious lavage with sterile saline solution.    Following this, my attention is turned to the 1st metatarsophalangeal joint, where the capsule was entered dorsally for preparation of the intramedullary beam/bolt.  All the respective joints across the medial longitudinal arch are back-filled using cancellous bone chips as well as DBM bone putty. Copious lavage with sterile saline solution.      With the assistance of fluoroscopy, after anatomic reduction is maintained, the medial longitudinal arch is fixated using a Qfsgjiw056.2mm x 140mm beam/bolt.  Fluoroscopy confirmed anatomic alignment and successful compression across all joints.  Further back filling using cancellous bone chips and DBM bone putty.    The tourniquet is deflated and hemostasis is achieved via electrocautery. Deep closure using 0-Vicryl suture.  The skin is closed using 3-0 Nylon.  Postoperative block consisting of 40 cc of 0.5% Marcaine plain, mixed with Marcaine with Epinephrine is given proximal to all areas of incision.    All incisions  are dressed with Xeroform/Adaptic nonadherent dressings followed by sterile 4 x 4 gauze, abdominal pad, Sheron/Kerlix and light ACE. Xie Compression is applied w/ a posterior splint.     The anesthesia is weaned. There were no complications to this procedure. Any final necessary imaging to be performed in the PACU if it was not performed here in the OR suite.  The patient is transferred to the [facility] bed/stretcher, \[facility] [place] [facility]\"". The patient is transferred to the PACU.    Significant Surgical Tasks Conducted by the Assistant(s), if Applicable: N/A    Complications: * No complications entered in OR log *    Estimated Blood Loss (EBL): less than 100 mL    Drains: N/A    Implants:   Implant Name Type Inv. Item Serial No.  Lot No. LRB No. Used Action   [id number] Bone  [id number] [employer] N/A Left 1 Implanted   13 HOLE ANATOMIC PLATE LEFT    [employer]  Left 1 Implanted   3.5 x 12 LOCKING    [employer]  Left 1 Implanted   3.5 x 14 LOCKING    [employer]  Left 2 Implanted   2.5 x 14 NONLOCKING    [employer]  Left 1 Implanted   MEDIAL HOOK PLATE    [employer]  Left 1 Implanted   3.5 x 26 NONLOCKING    [employer]  Left 1 Implanted   3.5 x 26 LOCKING    [employer]  Left 1 Implanted   3.8 x 45 RELEASE SCREW    [employer]  Left 1 Implanted   3.8 x 48 RELEASE SCREW    [employer]  Left 2 Implanted   [id number]  BONE CHIP CANC 1.7-10MM 15ML [id number] MUSCULOSKELETAL TRANSPLANT FND  Left 1 Implanted   3.5 x 12 locking screw    [employer]  Left 1 Implanted   3.5 x 26mm nonlocking screw    [employer]  Left 1 Implanted   3.5 x 16mm locking screw    [employer]  Left 1 Implanted   [id number] Bone  [id number] [employer]  Left 1 Implanted   2.0 GUIDEWIRES      Left 3 Implanted and Explanted   2.3 GUIDEWIRES    [employer]  Left 2 Implanted and Explanted   7.2 X 140MM JOUST BEAM FULL THREAD    [employer]  Left 1 Implanted   2.3 X  300 GUIDEWIRE    [employer]  Left 1 Implanted and Explanted   LISFRANC PLATE    [employer]  Left 1 Implanted       Specimens: * No specimens in log *    Condition: stable    Disposition: PACU - hemodynamically stable.    Attestation: I performed the procedure.

## 2020-07-24 NOTE — PT/OT/SLP EVAL
Physical Therapy Evaluation    Patient Name:  Kay Galarza   MRN:  [id number]    Recommendations:     Discharge Recommendations:  home with home health   Discharge Equipment Recommendations: walker, rolling   Barriers to discharge: None    Assessment:     Kay Galarza is a 65 y.o. female admitted with a medical diagnosis of Lisfranc dislocation, left, sequela.  She presents with the following impairments/functional limitations:  weakness, impaired endurance, impaired self care skills, gait instability, impaired functional mobilty, impaired balance, decreased lower extremity function, decreased safety awareness, pain, edema.    Rehab Prognosis: Fair; patient would benefit from acute skilled PT services to address these deficits and reach maximum level of function.    Recent Surgery: Procedure(s) (LRB):  ORIF, ANKLE (Left)  FIXATION, SYNDESMOSIS, ANKLE (Left)  FUSION, JOINT, MIDFOOT (Left)  MANIPULATION, WITH ANESTHESIA (Left) 1 Day Post-Op    Plan:     During this hospitalization, patient to be seen 5 x/week to address the identified rehab impairments via gait training, therapeutic exercises, therapeutic activities and progress toward the following goals:    · Plan of Care Expires:  07/31/20    Subjective     Chief Complaint: (L) LE pain  Patient/Family Comments/goals: to get stronger  Pain/Comfort:  · Pain Rating 1: 8/10  · Location - Side 1: Left  · Location - Orientation 1: lower  · Location 1: leg  · Pain Addressed 1: Pre-medicate for activity, Reposition, Distraction, Cessation of Activity  · Pain Rating Post-Intervention 1: 8/10    Patients cultural, spiritual, Gnosticism conflicts given the current situation:      Living Environment:  Lives with daughter in 1 story house with no steps.  Prior to admission, patients level of function was Mod I with ADLs and (A) with /tfs and walking.  Equipment used at home: wheelchair, bedside commode, shower chair.  DME owned (not currently used):  none.  Upon discharge, patient will have assistance from family.    Objective:     Communicated with Nurse Thompson and epic chart review prior to session.  Patient found supine with bed alarm, peripheral IV  upon PT entry to room.    General Precautions: Standard, fall   Orthopedic Precautions:LLE non weight bearing   Braces: N/A     Exams:  · Cognitive Exam:  Patient is oriented to Person, Place, Time and Situation  · RLE ROM: WFL  · RLE Strength: WFL  · LLE ROM: decreased at ankle  · LLE Strength: unable to assess    Functional Mobility:  · Bed Mobility:     · Rolling Left:  minimum assistance  · Rolling Right: minimum assistance  · Scooting: minimum assistance  · Supine to Sit: moderate assistance  · Transfers:     · Sit to Stand:  maximal assistance with hand-held assist  · Bed to Chair: maximal assistance with  hand-held assist  using  Stand Pivot  · Gait: unable to take steps  · Balance: good sitting/ poor standing    Therapeutic Activities and Exercises:   PT eval completed as listed above. Pt educated in role of PT and POC.     AM-PAC 6 CLICK MOBILITY  Total Score:10     Patient left up in chair with all lines intact, call button in reach, chair alarm on, Nurse Thompson  notified, daughter present and (L) LE elevated on sofa cushion.    GOALS:   Multidisciplinary Problems     Physical Therapy Goals        Problem: Physical Therapy Goal    Goal Priority Disciplines Outcome Goal Variances Interventions   Physical Therapy Goal     PT, PT/OT                      History:     Past Medical History:   Diagnosis Date    Arthritis     DERIAN KNEES    Asthma     Cataract     Diabetes mellitus     Diabetes mellitus, type 2     Diabetic retinopathy     Hyperlipidemia     Hypertension        Past Surgical History:   Procedure Laterality Date    CATARACT EXTRACTION W/  INTRAOCULAR LENS IMPLANT Right 07/18/2018    CATARACT EXTRACTION W/  INTRAOCULAR LENS IMPLANT Left 03/13/2019    COLONOSCOPY N/A 12/22/2018     Procedure: COLONOSCOPY;  Surgeon: Zak Dover MD;  Location: [facility];  Service: Endoscopy;  Laterality: N/A;    ENDOSCOPIC VEIN LASER TREATMENT Bilateral 1990's    FIXATION OF SYNDESMOSIS OF ANKLE Left 7/23/2020    Procedure: FIXATION, SYNDESMOSIS, ANKLE;  Surgeon: Kirt Gray DPM;  Location: [facility] OR;  Service: Podiatry;  Laterality: Left;    MANIPULATION WITH ANESTHESIA Left 7/23/2020    Procedure: MANIPULATION, WITH ANESTHESIA;  Surgeon: Kirt Gray DPM;  Location: [facility] OR;  Service: Podiatry;  Laterality: Left;    MIDFOOT ARTHRODESIS Left 7/23/2020    Procedure: FUSION, JOINT, MIDFOOT;  Surgeon: Kirt Gray DPM;  Location: [facility];  Service: Podiatry;  Laterality: Left;  2nd tarsometatarsal joint dislocation via fusion    OPEN REDUCTION AND INTERNAL FIXATION (ORIF) OF INJURY OF ANKLE Left 7/23/2020    Procedure: ORIF, ANKLE;  Surgeon: Kirt Gray DPM;  Location: [facility] OR;  Service: Podiatry;  Laterality: Left;       Time Tracking:     PT Received On: 07/24/20  PT Start Time: 0855     PT Stop Time: 0910  PT Total Time (min): 15 min     Billable Minutes: Evaluation 15 min      Ju Rivero PT/OT  07/24/2020

## 2020-07-24 NOTE — PLAN OF CARE
07/24/20 1212   Discharge Assessment   Assessment Type Discharge Planning Assessment   Confirmed/corrected address and phone number on facesheet? Yes   Assessment information obtained from? Caregiver;Patient;Medical Record   Prior to hospitilization cognitive status: Alert/Oriented   Prior to hospitalization functional status: Independent;Assistive Equipment   Current cognitive status: Alert/Oriented   Current Functional Status: Assistive Equipment;Needs Assistance   Lives With child([place]), adult;spouse   Able to Return to Prior Arrangements yes   Is patient able to care for self after discharge? No   Who are your caregiver(s) and their phone number(s)? Cass Verduzco (daughter) [phone]   Patient's perception of discharge disposition home health   Readmission Within the Last 30 Days planned readmission   Patient currently being followed by outpatient case management? No   Patient currently receives any other outside agency services? No   Equipment Currently Used at Home wheelchair;walker, rolling;glucometer   Do you have any problems affording any of your prescribed medications? No   Is the patient taking medications as prescribed? yes   Does the patient have transportation home? Yes   Transportation Anticipated family or friend will provide;health plan transportation   Does the patient receive services at the [facility]? No   Discharge Plan A Home Health   Discharge Plan B Home Health   Patient/Family in Agreement with Plan yes     Met with pt and her daughter Cass at bedside for DC assessment. Pt speaks primarily Icelandic bu her daughter was able to translate and provide information as well. Initial consult was for rehab v SNF however pt declined and requested HH. Obtained signed preference form for [facility] and sent referral. SWer provided a transitional care folder, information on advanced directives, information on pharmacy bedside delivery, and discharge planning begins on admission with contact information  for any needs/questions. Pt opted for bedside medication delivery. Her typical pharmacy is [facility] on [address]      [facility] [place] - [address]  [address] [facility] [address]  [facility], [place] [address]  Phone: [phone] Fax: [phone]    PCP: MD Stewart Mir LMSW 7/24/2020 12:19 PM

## 2020-07-24 NOTE — PROGRESS NOTES
[facility] -   Podiatry  Progress Note    Patient Name: Kay Galarza  MRN: [id number]  Admission Date: 7/23/2020  Hospital Length of Stay: 0 days  Attending Physician: Radha Falcon MD  Primary Care Provider: Lucy Negron MD     Subjective:     Interval History: POD #1, s/p LLE ankle ORIF and Charcot foot reconstruction. Daughter is present at bedside. States her mother has experienced severe pains since s/p. No PT/OT at present. RICE therapy ATC is stated. No SOB or cough. Voiding. States no recent OOBtoC.     Follow-up For: Procedure(s) (LRB):  ORIF, ANKLE (Left)  FIXATION, SYNDESMOSIS, ANKLE (Left)  FUSION, JOINT, MIDFOOT (Left)  MANIPULATION, WITH ANESTHESIA (Left)    Post-Operative Day: 1 Day Post-Op    Scheduled Meds:   atorvastatin  40 mg Oral Daily    docusate sodium  100 mg Oral BID    gabapentin  100 mg Oral TID    hydroCHLOROthiazide  25 mg Oral Daily    lisinopriL  20 mg Oral Daily    nozaseptin   Each Nostril BID     Continuous Infusions:  PRN Meds:acetaminophen, albuterol sulfate, dextrose 50%, dextrose 50%, glucagon (human recombinant), glucose, glucose, insulin aspart U-100, morphine, ondansetron, oxyCODONE-acetaminophen    Review of Systems   Constitutional: Negative for chills, fatigue and fever.   HENT: Negative for hearing loss.    Eyes: Negative for photophobia and visual disturbance.   Respiratory: Negative for cough, chest tightness, shortness of breath and wheezing.    Cardiovascular: Positive for leg swelling. Negative for chest pain and palpitations.   Gastrointestinal: Negative for constipation, diarrhea, nausea and vomiting.   Endocrine: Negative for cold intolerance and heat intolerance.   Genitourinary: Negative for flank pain.   Musculoskeletal: Positive for gait problem. Negative for neck pain and neck stiffness.   Neurological: Positive for numbness. Negative for light-headedness and headaches.   Psychiatric/Behavioral: Negative for  sleep disturbance.     Objective:     Vital Signs (Most Recent):  Temp: 99.8 °F (37.7 °C) (07/24/20 1104)  Pulse: 92 (07/24/20 1104)  Resp: 17 (07/24/20 1104)  BP: 133/60 (07/24/20 1104)  SpO2: 97 % (07/24/20 1104) Vital Signs (24h Range):  Temp:  [97.4 °F (36.3 °C)-99.8 °F (37.7 °C)] 99.8 °F (37.7 °C)  Pulse:  [70-93] 92  Resp:  [9-21] 17  SpO2:  [90 %-100 %] 97 %  BP: (122-171)/(57-82) 133/60     Weight: 95.4 kg (210 lb 5.1 oz)  Body mass index is 31.06 kg/m².    Foot Exam    Laboratory:  All pertinent labs reviewed within the last 24 hours.    Diagnostic Results:  I have reviewed all pertinent imaging results/findings within the past 24 hours.    Clinical Findings:  Splint is C/D/I w/ minimal sanguinous strike-through. Sensation is noted.     Assessment/Plan:     Active Diagnoses:    Diagnosis Date Noted POA    PRINCIPAL PROBLEM:  Lisfranc dislocation, left, sequela [S93.325S] 01/08/2020 Not Applicable    Diabetic Charcot's foot [E11.610] 07/23/2020 Yes    HLD (hyperlipidemia) [E78.5] 07/23/2020 Yes    Type II diabetes mellitus with neurological manifestations [E11.49] 12/19/2019 Yes    Essential hypertension [I10] 11/07/2017 Yes      Problems Resolved During this Admission:       Doing well s/p. RICE therapy ATC. NWB to the LLE. Pain control. D/C planning to SNF or Home w/ Home Health. DME devices upon D/C (she does have wheelchair at home)--Bedside Commode and Shower Bench. Xarelto for DVT PPx upon D/C (sent to her personal pharmacy on yesterday). s/p  XRAYS are reviewed in detail with the patient and her daughter this AM. All questions and concerns regarding findings and its/their implications are outlined and discussed. Upon D/C to SNF, QOD dressings changes with Xeroform and DSD with replacement of posterior splint.       Kirt P Akunne, DPM  Podiatry  [facility] -

## 2020-07-24 NOTE — PLAN OF CARE
Remains injury free. Pain managed with oral medication. Left foot elevated with pillows. Stable for discharge.

## 2020-07-24 NOTE — NURSING TRANSFER
Nursing Transfer Note      7/23/2020     Transfer From: PACU    Transfer via bed    Transfer with  to O2    Transported by MAE Her    Chart send with patient: Yes    Notified: daughter    Patient reassessed at: 7/23/2020 1934    Upon arrival to floor: patient oriented to room, call bell in reach and bed in lowest position

## 2020-07-25 NOTE — DISCHARGE SUMMARY
[facility] Hospital Medicine  Discharge Summary      Patient Name: Kay Galarza  MRN: [id number]  Admission Date: 7/23/2020  Hospital Length of Stay: 0 days  Discharge Date and Time: 7/24/2020  5:32 PM  Attending Physician: Radha Falcon MD  Discharging Provider: Adrien Stephenson NP  Primary Care Provider: Lucy Negron MD      HPI:   Ms Rob is a 65 year old female with PMHx of DM, HTN and HLD who underwent ORIF of tri-malleolar ankle fracture and charcot foot reconstruction by Dr Gray today. Tolerated procedure well, vital signs stable. Denies associated symptoms of chest pain, shortness of breath, fever, chills, nausea, vomiting or diaphoresis. Patient speak [place]ish, daughter at bedside and assist with translation. She is a full code, daughter, Cass, is surrogate decision maker. Patient is placed in post op extended recovery under the care of Hospital Medicine.     Procedure(s) (LRB):  ORIF, ANKLE (Left)  FIXATION, SYNDESMOSIS, ANKLE (Left)  FUSION, JOINT, MIDFOOT (Left)  MANIPULATION, WITH ANESTHESIA (Left)      Hospital Course:   Ms Rob is a 65 year old female who underwent ORIF of tri-malleolar ankle fracture and charcot foot reconstruction by Dr Gray on yesterday. Tolerated procedure well. Today, vital signs and labs are stable, pain well controled. Referral for Rehab placement was ordered, however patient and daughter refused. PT/OT evaluated and treated, recommend home health PT/OT.  consulted to establish patient with Home Health. She will follow up with Dr Gray in clinic as outpatient. Patient was seen, examined and deemed suitable for discharge.      Consults:     No new Assessment & Plan notes have been filed under this hospital service since the last note was generated.  Service: Hospital Medicine    Final Active Diagnoses:    Diagnosis Date Noted POA    PRINCIPAL PROBLEM:  Lisfranc dislocation, left, sequela [S93.325S] 01/08/2020  "Not Applicable    Diabetic Charcot's foot [E11.610] 07/23/2020 Yes    HLD (hyperlipidemia) [E78.5] 07/23/2020 Yes    Type II diabetes mellitus with neurological manifestations [E11.49] 12/19/2019 Yes    Essential hypertension [I10] 11/07/2017 Yes      Problems Resolved During this Admission:       Discharged Condition: stable    Disposition: Home-Health Care [facility]    Follow Up:  Follow-up Information     Kirt Gray DPM On 7/31/2020.    Specialty: Podiatry  Why: post-op follow up  Contact information:  [address] [place] [address] Dr Leisa CASANOVA [id number] [phone]                 Patient Instructions:      WALKER FOR HOME USE     Order Specific Question Answer Comments   Type of Walker: Adult (5'4"-6'6")    With wheels? Yes    Height: 5' 9" (1.753 m)    Weight: 95.4 kg (210 lb 5.1 oz)    Length of need (1-99 months): 99    Does patient have medical equipment at home? wheelchair    Does patient have medical equipment at home? bedside commode    Does patient have medical equipment at home? shower chair    Please check all that apply: Patient's condition impairs ambulation.      Diet diabetic           Pending Diagnostic Studies:     None         Medications:  Reconciled Home Medications:      Medication List      START taking these medications    ciprofloxacin HCl 750 MG tablet  Commonly known as: CIPRO  Take 1 tablet (750 mg total) by mouth every 12 (twelve) hours. for 7 days     clindamycin 300 MG capsule  Commonly known as: CLEOCIN  Take 1 capsule (300 mg total) by mouth every 8 (eight) hours. for 5 days     rivaroxaban 10 mg Tab  Commonly known as: XARELTO  Take 1 tablet (10 mg total) by mouth daily with dinner or evening meal.        CONTINUE taking these medications    albuterol 90 mcg/actuation inhaler  Commonly known as: PROVENTIL/VENTOLIN HFA  Inhale 2 puffs into the lungs every 6 (six) hours as needed for Wheezing. Rescue     ammonium lactate 12 % lotion  Commonly known as: AMLACTIN  Use daily.  Apply to " "damp skin after bathing.     atorvastatin 40 MG tablet  Commonly known as: LIPITOR  Take 1 tablet (40 mg total) by mouth once daily.     clobetasoL 0.05 % external solution  Commonly known as: TEMOVATE  Apply topically 2 (two) times daily. To scalp only     diclofenac sodium 1 % Gel  Commonly known as: VOLTAREN  Apply topically 2 (two) times daily.     emollient combination no.69 Crea  Commonly known as: EUCERIN SKIN CALMING  Apply BID     fluocinolone 0.01 % Sham  Commonly known as: SYNALAR  Apply no more than 1 ounce to scalp once daily; work into lather and allow to remain on scalp for ~5 minutes. Remove from hair and scalp by rinsing thoroughly with water.     fluticasone propionate 50 mcg/actuation nasal spray  Commonly known as: FLONASE  2 sprays (100 mcg total) by Each Nostril route once daily.     gabapentin 100 MG capsule  Commonly known as: NEURONTIN  Take 1 capsule (100 mg total) by mouth 3 (three) times daily.     glimepiride 4 MG tablet  Commonly known as: AMARYL  Take 1 tablet (4 mg total) by mouth daily with breakfast.     ketoconazole 2 % shampoo  Commonly known as: NIZORAL  Apply topically 3 (three) times a week.     levocetirizine 5 MG tablet  Commonly known as: XYZAL  Take 1 tablet (5 mg total) by mouth every evening.     lisinopriL-hydrochlorothiazide 20-25 mg Tab  Commonly known as: PRINZIDE,ZESTORETIC  Take 1 tablet by mouth once daily.     magnesium oxide 400 mg (241.3 mg magnesium) tablet  Commonly known as: MAG-OX  Take 500 mg by mouth every evening.     mupirocin 2 % ointment  Commonly known as: BACTROBAN  APPLY  OINTMENT TOPICALLY ONCE DAILY     oxyCODONE-acetaminophen 5-325 mg per tablet  Commonly known as: PERCOCET  Take 1 tablet by mouth every 6 (six) hours as needed for Pain.     pen needle, diabetic 31 gauge x 5/16" Ndle  Commonly known as: BD ULTRA-FINE SHORT PEN NEEDLE  USE 1  ONCE DAILY     TOUJEO SOLOSTAR U-300 INSULIN 300 unit/mL (1.5 mL) Inpn pen  Generic drug: insulin glargine " (TOUJEO)  Inject 50 Units into the skin 2 (two) times daily.     traZODone 150 MG tablet  Commonly known as: DESYREL  Take 1 tablet (150 mg total) by mouth nightly as needed for Insomnia.     triamcinolone acetonide 0.025% 0.025 % cream  Commonly known as: KENALOG  AAA bid as needed for flares.  Mild steroid.     TRULICITY 1.5 mg/0.5 mL pen injector  Generic drug: dulaglutide  INJECT 1.5 MG INTO THE SKIN EVERY 7 DAYS            Indwelling Lines/Drains at time of discharge:   Lines/Drains/Airways     None                 Time spent on the discharge of patient: 45 minutes  Patient was seen and examined on the date of discharge and determined to be suitable for discharge.         Adrien Stephenson NP  Department of Hospital Medicine  [facility] -

## 2020-07-25 NOTE — HOSPITAL COURSE
Ms Rob is a 65 year old female who underwent ORIF of tri-malleolar ankle fracture and charcot foot reconstruction by Dr Gray on yesterday. Tolerated procedure well. Today, vital signs and labs are stable, pain well controled. Referral for Rehab placement was ordered, however patient and daughter refused. PT/OT evaluated and treated, recommend home health PT/OT.  consulted to establish patient with Home Health. She will follow up with Dr Gray in clinic as outpatient. Patient was seen, examined and deemed suitable for discharge.

## 2020-07-27 ENCOUNTER — OFFICE VISIT (OUTPATIENT)
Dept: PODIATRY | Facility: CLINIC | Age: 65
End: 2020-07-27
Payer: MEDICARE

## 2020-07-27 VITALS — BODY MASS INDEX: 31.19 KG/M2 | WEIGHT: 210.56 LBS | RESPIRATION RATE: 17 BRPM | HEIGHT: 69 IN

## 2020-07-27 DIAGNOSIS — M14.672 CHARCOT'S JOINT OF LEFT FOOT: ICD-10-CM

## 2020-07-27 DIAGNOSIS — M25.572 PAIN AND SWELLING OF LEFT ANKLE: ICD-10-CM

## 2020-07-27 DIAGNOSIS — Z09 POSTOP CHECK: Primary | ICD-10-CM

## 2020-07-27 DIAGNOSIS — M25.472 PAIN AND SWELLING OF LEFT ANKLE: ICD-10-CM

## 2020-07-27 DIAGNOSIS — E11.49 TYPE II DIABETES MELLITUS WITH NEUROLOGICAL MANIFESTATIONS: ICD-10-CM

## 2020-07-27 DIAGNOSIS — S82.852D CLOSED TRIMALLEOLAR FRACTURE OF LEFT ANKLE WITH ROUTINE HEALING, SUBSEQUENT ENCOUNTER: ICD-10-CM

## 2020-07-27 DIAGNOSIS — E66.01 SEVERE OBESITY (BMI 35.0-39.9) WITH COMORBIDITY: ICD-10-CM

## 2020-07-27 PROCEDURE — 99999 PR PBB SHADOW E&M-EST. PATIENT-LVL IV: ICD-10-PCS | Mod: PBBFAC,,, | Performed by: PODIATRIST

## 2020-07-27 PROCEDURE — 99024 POSTOP FOLLOW-UP VISIT: CPT | Mod: S$GLB,,, | Performed by: PODIATRIST

## 2020-07-27 PROCEDURE — 29515 PR APPLY LOWER LEG SPLINT: ICD-10-PCS | Mod: LT,S$GLB,, | Performed by: PODIATRIST

## 2020-07-27 PROCEDURE — 29515 APPLICATION SHORT LEG SPLINT: CPT | Mod: LT,S$GLB,, | Performed by: PODIATRIST

## 2020-07-27 PROCEDURE — 99024 PR POST-OP FOLLOW-UP VISIT: ICD-10-PCS | Mod: S$GLB,,, | Performed by: PODIATRIST

## 2020-07-27 PROCEDURE — 99999 PR PBB SHADOW E&M-EST. PATIENT-LVL IV: CPT | Mod: PBBFAC,,, | Performed by: PODIATRIST

## 2020-07-27 NOTE — PROGRESS NOTES
Subjective:       Patient ID: Kay Galarza is a 65 y.o. female.    Chief Complaint: Post-op Evaluation (DOS 07/23/2020, reports no pain, wear soft cast )      HPI:  Kay Galarza presents to the office today, s/p 07/23/2020 LLE ankle ORIF with LLE Charcot Foot reconstruction.  Patient presents this afternoon with her daughter.  Patient's daughter who is translating for her mother, states copious RICE therapy.  Patient's pains are controlled with Gabapentin and occasional Tylenol.  Patient's daughter states seldomly given her mother NSAIDs or opioids.  She does have [facility].  Patient denies cough or dyspnea.  She does present nonweightbearing posterior splint and wheelchair.  Does continue Xarelto for DVT prophylaxis.  Patient's blood sugar on yesterday and this morning were in the 90s mg/dL    Hemoglobin A1C   Date Value Ref Range Status   07/20/2020 9.7 (H) 4.0 - 5.6 % Final     Comment:     ADA Screening Guidelines:  5.7-6.4%  Consistent with prediabetes  >or=6.5%  Consistent with diabetes  High levels of fetal hemoglobin interfere with the HbA1C  assay. Heterozygous hemoglobin variants (HbS, HgC, etc)do  not significantly interfere with this assay.   However, presence of multiple variants may affect accuracy.     12/10/2019 7.5 (H) 4.0 - 5.6 % Final     Comment:     ADA Screening Guidelines:  5.7-6.4%  Consistent with prediabetes  >or=6.5%  Consistent with diabetes  High levels of fetal hemoglobin interfere with the HbA1C  assay. Heterozygous hemoglobin variants (HbS, HgC, etc)do  not significantly interfere with this assay.   However, presence of multiple variants may affect accuracy.     08/22/2019 8.3 (H) 4.0 - 5.6 % Final     Comment:     ADA Screening Guidelines:  5.7-6.4%  Consistent with prediabetes  >or=6.5%  Consistent with diabetes  High levels of fetal hemoglobin interfere with the HbA1C  assay. Heterozygous hemoglobin variants (HbS, HgC, etc)do  not significantly  interfere with this assay.   However, presence of multiple variants may affect accuracy.         Review of patient's allergies indicates:   Allergen Reactions    Pollen extracts        Past Medical History:   Diagnosis Date    Arthritis     DERIAN KNEES    Asthma     Cataract     Diabetes mellitus     Diabetes mellitus, type 2     Diabetic retinopathy     Hyperlipidemia     Hypertension        Family History   Problem Relation Age of Onset    Cancer Father         Prostate Ca    Hypertension Father     Stroke Father     Diabetes Sister     Glaucoma Mother     Hypertension Mother     Glaucoma Maternal Grandmother     Blindness Neg Hx     Macular degeneration Neg Hx     Retinal detachment Neg Hx     Strabismus Neg Hx        Social History     Socioeconomic History    Marital status:      Spouse name: Not on file    Number of children: Not on file    Years of education: Not on file    Highest education level: Not on file   Occupational History    Not on file   Social Needs    Financial resource strain: Not on file    Food insecurity     Worry: Not on file     Inability: Not on file    Transportation needs     Medical: Not on file     Non-medical: Not on file   Tobacco Use    Smoking status: Never Smoker    Smokeless tobacco: Never Used   Substance and Sexual Activity    Alcohol use: No    Drug use: No    Sexual activity: Yes   Lifestyle    Physical activity     Days per week: Not on file     Minutes per session: Not on file    Stress: Not on file   Relationships    Social connections     Talks on phone: Not on file     Gets together: Not on file     Attends Rastafarian service: Not on file     Active member of club or organization: Not on file     Attends meetings of clubs or organizations: Not on file     Relationship status: Not on file   Other Topics Concern    Not on file   Social History Narrative    Not on file       Past Surgical History:   Procedure Laterality Date     CATARACT EXTRACTION W/  INTRAOCULAR LENS IMPLANT Right 07/18/2018    CATARACT EXTRACTION W/  INTRAOCULAR LENS IMPLANT Left 03/13/2019    COLONOSCOPY N/A 12/22/2018    Procedure: COLONOSCOPY;  Surgeon: Zak Dover MD;  Location: [facility];  Service: Endoscopy;  Laterality: N/A;    ENDOSCOPIC VEIN LASER TREATMENT Bilateral 1990's    FIXATION OF SYNDESMOSIS OF ANKLE Left 7/23/2020    Procedure: FIXATION, SYNDESMOSIS, ANKLE;  Surgeon: Kirt Gray DPM;  Location[facility];  Service: Podiatry;  Laterality: Left;    MANIPULATION WITH ANESTHESIA Left 7/23/2020    Procedure: MANIPULATION, WITH ANESTHESIA;  Surgeon: Kirt Gray DPM;  Location: [facility] OR;  Service: Podiatry;  Laterality: Left;    MIDFOOT ARTHRODESIS Left 7/23/2020    Procedure: FUSION, JOINT, MIDFOOT;  Surgeon: Kirt Gray DPM;  Location: [facility] OR;  Service: Podiatry;  Laterality: Left;  2nd tarsometatarsal joint dislocation via fusion    OPEN REDUCTION AND INTERNAL FIXATION (ORIF) OF INJURY OF ANKLE Left 7/23/2020    Procedure: ORIF, ANKLE;  Surgeon: Kirt Gray DPM;  Location: [facility] OR;  Service: Podiatry;  Laterality: Left;       Review of Systems   Constitutional: Negative for chills, fatigue and fever.   HENT: Negative for hearing loss.    Eyes: Negative for photophobia and visual disturbance.   Respiratory: Negative for cough, chest tightness, shortness of breath and wheezing.    Cardiovascular: Positive for leg swelling. Negative for chest pain and palpitations.   Gastrointestinal: Negative for constipation, diarrhea, nausea and vomiting.   Endocrine: Negative for cold intolerance and heat intolerance.   Genitourinary: Negative for flank pain.   Musculoskeletal: Positive for gait problem. Negative for neck pain and neck stiffness.   Skin: Positive for color change and wound.   Neurological: Positive for numbness. Negative for light-headedness and headaches.   Psychiatric/Behavioral: Negative for sleep disturbance.         "  Objective:   Resp 17   Ht 5' 9" (1.753 m)   Wt 95.5 kg (210 lb 8.6 oz)   BMI 31.09 kg/m²          Physical Exam  LOWER EXTREMITY PHYSICAL EXAMINATION    VASCULAR: No ipsilateral calf pain or tenderness is noted with palpation and compression. No palpable cords are noted.    DERMATOLOGY:  No evidence of wound healing complications at this time.  Mild erythema is noted to the lower extremity.    ORTHOPEDIC:  Moderate edema is noted to the ankle and hindfoot and midfoot.  Discomfort to palpation to the areas of incision.      Assessment:     1. Postop check    2. Closed trimalleolar fracture of left ankle with routine healing, subsequent encounter    3. 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 Pain and swelling of left ankle    5. Severe obesity (BMI 35.0-39.9) with comorbidity    6. Type II diabetes mellitus with neurological manifestations          Plan:     Postop check    Closed trimalleolar fracture of left ankle with routine healing, subsequent encounter    Charcot's neuro arthropathy with dislocation of Lisfranc Joint of left foot    Pain and swelling of left ankle    Severe obesity (BMI 35.0-39.9) with comorbidity    Type II diabetes mellitus with neurological manifestations      Thorough discussion is had with the patient today, concerning the diagnosis, its etiology, and the treatment algorithm at present. Postop XRAYS are reviewed in detail with the patient. All questions and concerns regarding findings and its/their implications are outlined and discussed. RICE therapy ATC. Continue NWB. Xarelto QDaily. +/- NSAIDs + Tylenol + Opioid and Gabapentin.  Patient is high risk for complications due to severely uncontrolled diabetes, thus we will evaluate her weekly. Strict DMII control. Continue [facility]. Reapplication of posterior splint to the RLE with ACE bandage.             Future Appointments   Date Time Provider Department Center   8/3/2020  2:30 PM " Kirt Gray DPM [facility]   8/10/2020  3:00 PM Kirt Gray DPM [facility]   8/17/2020  2:30 PM Kirt Gray DPM [facility]

## 2020-07-28 ENCOUNTER — TELEPHONE (OUTPATIENT)
Dept: FAMILY MEDICINE | Facility: CLINIC | Age: 65
End: 2020-07-28

## 2020-07-28 DIAGNOSIS — M14.60 CHARCOT'S ARTHROPATHY: Primary | ICD-10-CM

## 2020-07-28 NOTE — TELEPHONE ENCOUNTER
----- Message from Judy Gonzalez LPN sent at 7/28/2020  3:45 PM CDT -----  Regarding: FW: Ms. Inga Mcneal Therapist @ [facility] Cell # [phone], fax# [phone]    ----- Message -----  From: Eri Lino  Sent: 7/28/2020   2:56 PM CDT  To: Cortney Ayala Staff  Subject: Ms. Inga Mcneal Therapist @ [facility]    Ms. Xiong is calling in regards to her wanting to put in an order for a wheel chair for the patient, she would like for the wheel chair to have a cushion with a swing away and elevating leg and arm rest please.      You can fax over the order to ATTN: Julieth @ [facility] or send the order through [facility] please.

## 2020-07-29 DIAGNOSIS — S82.852D CLOSED TRIMALLEOLAR FRACTURE OF LEFT ANKLE WITH ROUTINE HEALING, SUBSEQUENT ENCOUNTER: Primary | ICD-10-CM

## 2020-07-29 DIAGNOSIS — M25.472 PAIN AND SWELLING OF LEFT ANKLE: ICD-10-CM

## 2020-07-29 DIAGNOSIS — M14.672 CHARCOT'S JOINT OF LEFT FOOT: ICD-10-CM

## 2020-07-29 DIAGNOSIS — M25.572 PAIN AND SWELLING OF LEFT ANKLE: ICD-10-CM

## 2020-07-29 RX ORDER — OXYCODONE AND ACETAMINOPHEN 5; 325 MG/1; MG/1
1 TABLET ORAL EVERY 6 HOURS PRN
Qty: 28 TABLET | Refills: 0 | Status: SHIPPED | OUTPATIENT
Start: 2020-07-29 | End: 2020-08-05

## 2020-07-31 ENCOUNTER — EXTERNAL HOME HEALTH (OUTPATIENT)
Dept: HOME HEALTH SERVICES | Facility: HOSPITAL | Age: 65
End: 2020-07-31
Payer: MEDICARE

## 2020-08-03 ENCOUNTER — OFFICE VISIT (OUTPATIENT)
Dept: PODIATRY | Facility: CLINIC | Age: 65
End: 2020-08-03
Payer: MEDICARE

## 2020-08-03 VITALS — RESPIRATION RATE: 17 BRPM | WEIGHT: 210.56 LBS | HEIGHT: 69 IN | BODY MASS INDEX: 31.19 KG/M2

## 2020-08-03 DIAGNOSIS — E66.01 SEVERE OBESITY (BMI 35.0-39.9) WITH COMORBIDITY: ICD-10-CM

## 2020-08-03 DIAGNOSIS — S82.852D CLOSED TRIMALLEOLAR FRACTURE OF LEFT ANKLE WITH ROUTINE HEALING, SUBSEQUENT ENCOUNTER: ICD-10-CM

## 2020-08-03 DIAGNOSIS — Z09 POSTOP CHECK: Primary | ICD-10-CM

## 2020-08-03 DIAGNOSIS — M25.572 PAIN AND SWELLING OF LEFT ANKLE: ICD-10-CM

## 2020-08-03 DIAGNOSIS — M14.672 CHARCOT'S JOINT OF LEFT FOOT: ICD-10-CM

## 2020-08-03 DIAGNOSIS — M25.472 PAIN AND SWELLING OF LEFT ANKLE: ICD-10-CM

## 2020-08-03 DIAGNOSIS — E11.49 TYPE II DIABETES MELLITUS WITH NEUROLOGICAL MANIFESTATIONS: ICD-10-CM

## 2020-08-03 PROCEDURE — 99999 PR PBB SHADOW E&M-EST. PATIENT-LVL IV: ICD-10-PCS | Mod: PBBFAC,,, | Performed by: PODIATRIST

## 2020-08-03 PROCEDURE — 29515 APPLICATION SHORT LEG SPLINT: CPT | Mod: 58,LT,S$GLB, | Performed by: PODIATRIST

## 2020-08-03 PROCEDURE — 99024 POSTOP FOLLOW-UP VISIT: CPT | Mod: S$GLB,,, | Performed by: PODIATRIST

## 2020-08-03 PROCEDURE — 29515 PR APPLY LOWER LEG SPLINT: ICD-10-PCS | Mod: 58,LT,S$GLB, | Performed by: PODIATRIST

## 2020-08-03 PROCEDURE — 99999 PR PBB SHADOW E&M-EST. PATIENT-LVL IV: CPT | Mod: PBBFAC,,, | Performed by: PODIATRIST

## 2020-08-03 PROCEDURE — 99024 PR POST-OP FOLLOW-UP VISIT: ICD-10-PCS | Mod: S$GLB,,, | Performed by: PODIATRIST

## 2020-08-03 NOTE — PROGRESS NOTES
Subjective:       Patient ID: Kay Galarza is a 65 y.o. female.    Chief Complaint: Post-op Evaluation (DOS 07/23/2020 Lisfranc dislocation, left,Charcot's foot,  reports no pain, wear soft cast )      HPI:  Kay Galarza presents to the office today, s/p 07/23/2020 LLE ankle ORIF with LLE Charcot Foot reconstruction. Patient presents this afternoon with her son. States copious RICE therapy. Patient's pains are controlled with Gabapentin and occasional opioids. She does have [facility] for physical therapy occupational therapy as well as the visiting nurse.  Patient denies cough or dyspnea. She does present nonweightbearing with a posterior splint and wheelchair. Does continue Xarelto for DVT prophylaxis.  Patient's blood sugars are typically between 80s mg/dL - 225 mg/dL.    Hemoglobin A1C   Date Value Ref Range Status   07/20/2020 9.7 (H) 4.0 - 5.6 % Final     Comment:     ADA Screening Guidelines:  5.7-6.4%  Consistent with prediabetes  >or=6.5%  Consistent with diabetes  High levels of fetal hemoglobin interfere with the HbA1C  assay. Heterozygous hemoglobin variants (HbS, HgC, etc)do  not significantly interfere with this assay.   However, presence of multiple variants may affect accuracy.     12/10/2019 7.5 (H) 4.0 - 5.6 % Final     Comment:     ADA Screening Guidelines:  5.7-6.4%  Consistent with prediabetes  >or=6.5%  Consistent with diabetes  High levels of fetal hemoglobin interfere with the HbA1C  assay. Heterozygous hemoglobin variants (HbS, HgC, etc)do  not significantly interfere with this assay.   However, presence of multiple variants may affect accuracy.     08/22/2019 8.3 (H) 4.0 - 5.6 % Final     Comment:     ADA Screening Guidelines:  5.7-6.4%  Consistent with prediabetes  >or=6.5%  Consistent with diabetes  High levels of fetal hemoglobin interfere with the HbA1C  assay. Heterozygous hemoglobin variants (HbS, HgC, etc)do  not significantly interfere with this  assay.   However, presence of multiple variants may affect accuracy.         Review of patient's allergies indicates:   Allergen Reactions    Pollen extracts        Past Medical History:   Diagnosis Date    Arthritis     DERIAN KNEES    Asthma     Cataract     Diabetes mellitus     Diabetes mellitus, type 2     Diabetic retinopathy     Hyperlipidemia     Hypertension        Family History   Problem Relation Age of Onset    Cancer Father         Prostate Ca    Hypertension Father     Stroke Father     Diabetes Sister     Glaucoma Mother     Hypertension Mother     Glaucoma Maternal Grandmother     Blindness Neg Hx     Macular degeneration Neg Hx     Retinal detachment Neg Hx     Strabismus Neg Hx        Social History     Socioeconomic History    Marital status:      Spouse name: Not on file    Number of children: Not on file    Years of education: Not on file    Highest education level: Not on file   Occupational History    Not on file   Social Needs    Financial resource strain: Not on file    Food insecurity     Worry: Not on file     Inability: Not on file    Transportation needs     Medical: Not on file     Non-medical: Not on file   Tobacco Use    Smoking status: Never Smoker    Smokeless tobacco: Never Used   Substance and Sexual Activity    Alcohol use: No    Drug use: No    Sexual activity: Yes   Lifestyle    Physical activity     Days per week: Not on file     Minutes per session: Not on file    Stress: Not on file   Relationships    Social connections     Talks on phone: Not on file     Gets together: Not on file     Attends Gnosticist service: Not on file     Active member of club or organization: Not on file     Attends meetings of clubs or organizations: Not on file     Relationship status: Not on file   Other Topics Concern    Not on file   Social History Narrative    Not on file       Past Surgical History:   Procedure Laterality Date    CATARACT EXTRACTION W/   INTRAOCULAR LENS IMPLANT Right 07/18/2018    CATARACT EXTRACTION W/  INTRAOCULAR LENS IMPLANT Left 03/13/2019    COLONOSCOPY N/A 12/22/2018    Procedure: COLONOSCOPY;  Surgeon: Zak Dover MD;  [facility]: [facility];  Service: Endoscopy;  Laterality: N/A;    ENDOSCOPIC VEIN LASER TREATMENT Bilateral 1990's    FIXATION OF SYNDESMOSIS OF ANKLE Left 7/23/2020    Procedure: FIXATION, SYNDESMOSIS, ANKLE;  Surgeon: Kirt Gray DPM;  Location: [facility] OR;  Service: Podiatry;  Laterality: Left;    MANIPULATION WITH ANESTHESIA Left 7/23/2020    Procedure: MANIPULATION, WITH ANESTHESIA;  Surgeon: Kirt Gray DPM;  Location: [facility] OR;  Service: Podiatry;  Laterality: Left;    MIDFOOT ARTHRODESIS Left 7/23/2020    Procedure: FUSION, JOINT, MIDFOOT;  Surgeon: Kirt Gray DPM;  Location: [facility] OR;  Service: Podiatry;  Laterality: Left;  2nd tarsometatarsal joint dislocation via fusion    OPEN REDUCTION AND INTERNAL FIXATION (ORIF) OF INJURY OF ANKLE Left 7/23/2020    Procedure: ORIF, ANKLE;  Surgeon: Kirt Gray DPM;  Location: [facility] OR;  Service: Podiatry;  Laterality: Left;       Review of Systems   Constitutional: Negative for chills, fatigue and fever.   HENT: Negative for hearing loss.    Eyes: Negative for photophobia and visual disturbance.   Respiratory: Negative for cough, chest tightness, shortness of breath and wheezing.    Cardiovascular: Positive for leg swelling. Negative for chest pain and palpitations.   Gastrointestinal: Negative for constipation, diarrhea, nausea and vomiting.   Endocrine: Negative for cold intolerance and heat intolerance.   Genitourinary: Negative for flank pain.   Musculoskeletal: Positive for gait problem. Negative for neck pain and neck stiffness.   Skin: Positive for color change and wound.   Neurological: Positive for numbness. Negative for light-headedness and headaches.   Psychiatric/Behavioral: Negative for sleep disturbance.          Objective:   Resp 17   Ht  "5' 9" (1.753 m)   Wt 95.5 kg (210 lb 8.6 oz)   BMI 31.09 kg/m²          Physical Exam  LOWER EXTREMITY PHYSICAL EXAMINATION    DERMATOLOGY:  There is no erythema is noted to the lower extremity.  No evidence of wound dehiscence is noted.    VASCULAR: No ipsilateral calf pain or tenderness is noted with palpation and compression. No palpable cords are noted.    ORTHOPEDIC:  Mild pedal edema is noted.  Mild ankle edema is noted.    Assessment:     1. Postop check    2. Closed trimalleolar fracture of left ankle with routine healing, subsequent encounter    3. 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 Pain and swelling of left ankle    5. Severe obesity (BMI 35.0-39.9) with comorbidity    6. Type II diabetes mellitus with neurological manifestations          Plan:     Postop check    Closed trimalleolar fracture of left ankle with routine healing, subsequent encounter    Charcot's neuro arthropathy with dislocation of Lisfranc Joint of left foot    Pain and swelling of left ankle    Severe obesity (BMI 35.0-39.9) with comorbidity    Type II diabetes mellitus with neurological manifestations        Thorough discussion is had with the patient today, concerning the diagnosis, its etiology, and the treatment algorithm at present.  LLE short leg posterior splint application in standard fashion using 5" OrthoGlass fiberglass splinting material, approx. 24" with web-roll/cast padding and ACE bandage. RICE therapy ATC. Continue NWB. Continue [facility] for PT/OT and VNS. Strict DMII control.         Future Appointments   Date Time Provider Department Center   8/10/2020  3:00 PM Kirt Gray DPM [facility]   8/17/2020  2:30 PM Kirt Gray DPM [facility]         "

## 2020-08-10 ENCOUNTER — HOSPITAL ENCOUNTER (OUTPATIENT)
Dept: RADIOLOGY | Facility: HOSPITAL | Age: 65
Discharge: HOME | End: 2020-08-10
Attending: PODIATRIST
Payer: MEDICARE

## 2020-08-10 ENCOUNTER — OFFICE VISIT (OUTPATIENT)
Dept: PODIATRY | Facility: CLINIC | Age: 65
End: 2020-08-10
Payer: MEDICARE

## 2020-08-10 VITALS
HEIGHT: 69 IN | SYSTOLIC BLOOD PRESSURE: 91 MMHG | HEART RATE: 98 BPM | DIASTOLIC BLOOD PRESSURE: 64 MMHG | BODY MASS INDEX: 31.19 KG/M2 | WEIGHT: 210.56 LBS

## 2020-08-10 DIAGNOSIS — Z09 POSTOP CHECK: Primary | ICD-10-CM

## 2020-08-10 DIAGNOSIS — M25.572 PAIN AND SWELLING OF LEFT ANKLE: ICD-10-CM

## 2020-08-10 DIAGNOSIS — Z09 POSTOP CHECK: ICD-10-CM

## 2020-08-10 DIAGNOSIS — S82.852D CLOSED TRIMALLEOLAR FRACTURE OF LEFT ANKLE WITH ROUTINE HEALING, SUBSEQUENT ENCOUNTER: ICD-10-CM

## 2020-08-10 DIAGNOSIS — T81.31XA SURGICAL WOUND DEHISCENCE, INITIAL ENCOUNTER: Primary | ICD-10-CM

## 2020-08-10 DIAGNOSIS — M25.472 PAIN AND SWELLING OF LEFT ANKLE: ICD-10-CM

## 2020-08-10 DIAGNOSIS — M14.672 CHARCOT'S JOINT OF LEFT FOOT: ICD-10-CM

## 2020-08-10 DIAGNOSIS — E11.49 TYPE II DIABETES MELLITUS WITH NEUROLOGICAL MANIFESTATIONS: ICD-10-CM

## 2020-08-10 PROCEDURE — 73630 X-RAY EXAM OF FOOT: CPT | Mod: 26,LT,, | Performed by: RADIOLOGY

## 2020-08-10 PROCEDURE — 29515 PR APPLY LOWER LEG SPLINT: ICD-10-PCS | Mod: 58,LT,S$GLB, | Performed by: PODIATRIST

## 2020-08-10 PROCEDURE — 73610 X-RAY EXAM OF ANKLE: CPT | Mod: TC,LT

## 2020-08-10 PROCEDURE — 99024 POSTOP FOLLOW-UP VISIT: CPT | Mod: S$GLB,,, | Performed by: PODIATRIST

## 2020-08-10 PROCEDURE — 73630 X-RAY EXAM OF FOOT: CPT | Mod: TC,LT

## 2020-08-10 PROCEDURE — 87070 CULTURE OTHR SPECIMN AEROBIC: CPT

## 2020-08-10 PROCEDURE — 73610 XR ANKLE COMPLETE 3 VIEW LEFT: ICD-10-PCS | Mod: 26,LT,, | Performed by: RADIOLOGY

## 2020-08-10 PROCEDURE — 99999 PR PBB SHADOW E&M-EST. PATIENT-LVL IV: ICD-10-PCS | Mod: PBBFAC,,, | Performed by: PODIATRIST

## 2020-08-10 PROCEDURE — 73630 XR FOOT COMPLETE 3 VIEW LEFT: ICD-10-PCS | Mod: 26,LT,, | Performed by: RADIOLOGY

## 2020-08-10 PROCEDURE — 99999 PR PBB SHADOW E&M-EST. PATIENT-LVL IV: CPT | Mod: PBBFAC,,, | Performed by: PODIATRIST

## 2020-08-10 PROCEDURE — 87077 CULTURE AEROBIC IDENTIFY: CPT | Mod: 59

## 2020-08-10 PROCEDURE — 99024 PR POST-OP FOLLOW-UP VISIT: ICD-10-PCS | Mod: S$GLB,,, | Performed by: PODIATRIST

## 2020-08-10 PROCEDURE — 29515 APPLICATION SHORT LEG SPLINT: CPT | Mod: 58,LT,S$GLB, | Performed by: PODIATRIST

## 2020-08-10 PROCEDURE — 87186 SC STD MICRODIL/AGAR DIL: CPT | Mod: 59

## 2020-08-10 PROCEDURE — 73610 X-RAY EXAM OF ANKLE: CPT | Mod: 26,LT,, | Performed by: RADIOLOGY

## 2020-08-11 NOTE — PROGRESS NOTES
Subjective:       Patient ID: Kay Galarza is a 65 y.o. female.    Chief Complaint: Post-op Evaluation (DOS07/23/2020 ORIF L. Ankle rates pain 9/10 soft cast w/wraaping diabetic pt pcp Dr. Stanton.)      HPI:  Kay Galarza presents to the office today, s/p 07/23/2020 LLE ankle ORIF with LLE Charcot Foot reconstruction. Patient presents this afternoon with her son and daughter. States copious RICE therapy. Patient's pains not are controlled with Gabapentin and occasional opioids.  At this time, patient states her pains are 9/10, mostly to the medial foot. She does have [facility] for physical therapy occupational therapy as well as the visiting nurse.  Patient denies cough or dyspnea. She does present nonweightbearing with a posterior splint and wheelchair. Does continue Xarelto for DVT prophylaxis.  Patient's blood sugars are typically between 90s mg/dL - 230 mg/dL.    Hemoglobin A1C   Date Value Ref Range Status   07/20/2020 9.7 (H) 4.0 - 5.6 % Final     Comment:     ADA Screening Guidelines:  5.7-6.4%  Consistent with prediabetes  >or=6.5%  Consistent with diabetes  High levels of fetal hemoglobin interfere with the HbA1C  assay. Heterozygous hemoglobin variants (HbS, HgC, etc)do  not significantly interfere with this assay.   However, presence of multiple variants may affect accuracy.     12/10/2019 7.5 (H) 4.0 - 5.6 % Final     Comment:     ADA Screening Guidelines:  5.7-6.4%  Consistent with prediabetes  >or=6.5%  Consistent with diabetes  High levels of fetal hemoglobin interfere with the HbA1C  assay. Heterozygous hemoglobin variants (HbS, HgC, etc)do  not significantly interfere with this assay.   However, presence of multiple variants may affect accuracy.     08/22/2019 8.3 (H) 4.0 - 5.6 % Final     Comment:     ADA Screening Guidelines:  5.7-6.4%  Consistent with prediabetes  >or=6.5%  Consistent with diabetes  High levels of fetal hemoglobin interfere with the  HbA1C  assay. Heterozygous hemoglobin variants (HbS, HgC, etc)do  not significantly interfere with this assay.   However, presence of multiple variants may affect accuracy.         Review of patient's allergies indicates:   Allergen Reactions    Pollen extracts        Past Medical History:   Diagnosis Date    Arthritis     DERIAN KNEES    Asthma     Cataract     Diabetes mellitus     Diabetes mellitus, type 2     Diabetic retinopathy     Hyperlipidemia     Hypertension        Family History   Problem Relation Age of Onset    Cancer Father         Prostate Ca    Hypertension Father     Stroke Father     Diabetes Sister     Glaucoma Mother     Hypertension Mother     Glaucoma Maternal Grandmother     Blindness Neg Hx     Macular degeneration Neg Hx     Retinal detachment Neg Hx     Strabismus Neg Hx        Social History     Socioeconomic History    Marital status:      Spouse name: Not on file    Number of children: Not on file    Years of education: Not on file    Highest education level: Not on file   Occupational History    Not on file   Social Needs    Financial resource strain: Not on file    Food insecurity     Worry: Not on file     Inability: Not on file    Transportation needs     Medical: Not on file     Non-medical: Not on file   Tobacco Use    Smoking status: Never Smoker    Smokeless tobacco: Never Used   Substance and Sexual Activity    Alcohol use: No    Drug use: No    Sexual activity: Yes   Lifestyle    Physical activity     Days per week: Not on file     Minutes per session: Not on file    Stress: Not on file   Relationships    Social connections     Talks on phone: Not on file     Gets together: Not on file     Attends Christianity service: Not on file     Active member of club or organization: Not on file     Attends meetings of clubs or organizations: Not on file     Relationship status: Not on file   Other Topics Concern    Not on file   Social History  Narrative    Not on file       Past Surgical History:   Procedure Laterality Date    CATARACT EXTRACTION W/  INTRAOCULAR LENS IMPLANT Right 07/18/2018    CATARACT EXTRACTION W/  INTRAOCULAR LENS IMPLANT Left 03/13/2019    COLONOSCOPY N/A 12/22/2018    Procedure: COLONOSCOPY;  Surgeon: Zak Dover MD;  Location: [facility];  Service: Endoscopy;  Laterality: N/A;    ENDOSCOPIC VEIN LASER TREATMENT Bilateral 1990's    FIXATION OF SYNDESMOSIS OF ANKLE Left 7/23/2020    Procedure: FIXATION, SYNDESMOSIS, ANKLE;  Surgeon: Kirt Gray DPM;  Location: [facility] OR;  Service: Podiatry;  Laterality: Left;    MANIPULATION WITH ANESTHESIA Left 7/23/2020    Procedure: MANIPULATION, WITH ANESTHESIA;  Surgeon: Kirt Gray DPM;  Location: [facility] OR;  Service: Podiatry;  Laterality: Left;    MIDFOOT ARTHRODESIS Left 7/23/2020    Procedure: FUSION, JOINT, MIDFOOT;  Surgeon: Kirt Gray DPM;  Location: [facility] OR;  Service: Podiatry;  Laterality: Left;  2nd tarsometatarsal joint dislocation via fusion    OPEN REDUCTION AND INTERNAL FIXATION (ORIF) OF INJURY OF ANKLE Left 7/23/2020    Procedure: ORIF, ANKLE;  Surgeon: Kirt Gray DPM;  Location: [facility] OR;  Service: Podiatry;  Laterality: Left;       Review of Systems   Constitutional: Negative for chills, fatigue and fever.   HENT: Negative for hearing loss.    Eyes: Negative for photophobia and visual disturbance.   Respiratory: Negative for cough, chest tightness, shortness of breath and wheezing.    Cardiovascular: Positive for leg swelling. Negative for chest pain and palpitations.   Gastrointestinal: Negative for constipation, diarrhea, nausea and vomiting.   Endocrine: Negative for cold intolerance and heat intolerance.   Genitourinary: Negative for flank pain.   Musculoskeletal: Positive for gait problem. Negative for neck pain and neck stiffness.   Skin: Positive for color change and wound.   Neurological: Positive for numbness. Negative for light-headedness  "and headaches.   Psychiatric/Behavioral: Negative for sleep disturbance.          Objective:   BP 91/64   Pulse 98   Ht 5' 9" (1.753 m)   Wt 95.5 kg (210 lb 8.6 oz)   BMI 31.09 kg/m²          Physical Exam  LOWER EXTREMITY PHYSICAL EXAMINATION    DERMATOLOGY:  No erythema is noted to the left foot or ankle.  There is wound dehiscence to most/each incision site of foot/ankle.  Most of the wound dehiscence is partial-thickness, save for the wound dehiscence atop the medial cuneiform area which does appear to be of more substantial depth.  There is no probe to bone or osseous exposure this time.  No drainage.  No purulence.  No tendon is exposed.  No plate fixation or screw fixation is exposed, medially, or to any of the incision sites.  No malodor is noted.    VASCULAR: No ipsilateral calf pain or tenderness is noted with palpation and compression. No palpable cords are noted.  The left lower extremity DP and posterior tibial pulse are palpable.  CFT is within normal limits.  Hair growth sparse absent.    ORTHOPEDIC:  Mild to moderate pedal and ankle edema is noted.  Prominent midfoot, left lower extremity area of the medial cuneiform bone.  Mild to moderate discomfort to palpation along the incision lines. Pes planus foot type is noted.  Ankle range of motion is noted.    NEUROLOGY:  Clinical neuropathy.    Assessment:     1. Surgical wound dehiscence, initial encounter    2. Postop check    3. Closed trimalleolar fracture of left ankle with routine healing, subsequent encounter    4. Charcot's neuro arthropathy with dislocation of Lisfranc Joint of left foot    5. Pain and swelling of left ankle    6. Type II diabetes mellitus with neurological manifestations          Plan:     Surgical wound dehiscence, initial encounter  -     Aerobic culture (Specify Source)    Postop check    Closed trimalleolar fracture of left ankle with routine healing, subsequent encounter    Charcot's neuro arthropathy with dislocation " "of Lisfranc Joint of left foot    Pain and swelling of left ankle    Type II diabetes mellitus with neurological manifestations        Thorough discussion is had with the patient today, concerning the diagnosis, its etiology, and the treatment algorithm at present.      The wound was surgically debrided after adequate prep with alcohol and/or betadine paint. Excisional wound debridement was performed using sharp #10/#15 blade/rounded scalpel and tissue nipper, with removal of all non-viable skin and soft tissues; necrotic skin/tissue formation. The woundbase/wound bed was also debrided to encourage bleeding as to promote/stimulate healing. Debridement was excisional and included epidermal, dermal and subcutaneous tissues. Post debridement measurements are as above. Hemostasis was achieved. Patient tolerated procedure well and without complications. Local woundcare with collagen dressings and bandage thereafter. Patient will change the collagen dressings Q3 - Q4 days in standard fashion.    LLE short leg posterior splint application in standard fashion using 5" OrthoGlass fiberglass splinting material, approx. 24" with web-roll/cast padding and ACE bandage. RICE therapy ATC. Continue NWB. Continue [facility] for PT/OT and VNS. Strict DMII control.     On plain film radiography this afternoon, the does appear to be loosening of hardware/bone fusion site at the medial column, particularly at the 1st tarsometatarsal joint and the navicular cuneiform joint.  The medial cuneiform appears to be compressing medially.  There does appear to be Charcot changes at the 1st webspace.  No obvious osteomyelitis noted.    Patient may well need a revisional procedure over the coming days if the overlying wound at the medial cuneiform area does not improve given the aforementioned wound care.    The procedure of (possible removal of hardware, with application external fixation versus internal fixation (mesh plate)) was " thoroughly explained to the patient. Its necessity and/or purpose and the implications therein were outlined, including any pertinent advantages and/or disadvantages, and possible complications, if any. Possible complications include recurrence of pathology and/or deformity, infection (cellulitis, drainage, purulence, malodor, etc...), pain, numbness, neuritis, edema, burning, loss of function, need for further surgery, possible need for removal of any implanted hardware, soft tissue contracture and/or scarring, etc... No guarantees were given and/or implied. Post-operative expectations and weightbearing protocol is thoroughly explained the patient, who acknowledges understanding.           Future Appointments   Date Time Provider Department Center   8/14/2020 10:30 AM Kirt Gray DPM [facility] P[facility]  [facility]   8/17/2020  2:30 PM Kirt Gray DPM ON P[facility]  [facility]

## 2020-08-12 ENCOUNTER — DOCUMENT SCAN (OUTPATIENT)
Dept: HOME HEALTH SERVICES | Facility: HOSPITAL | Age: 65
End: 2020-08-12
Payer: MEDICARE

## 2020-08-12 DIAGNOSIS — Z01.818 PREOP TESTING: ICD-10-CM

## 2020-08-12 DIAGNOSIS — M14.672 CHARCOT'S JOINT OF LEFT FOOT: Primary | ICD-10-CM

## 2020-08-12 DIAGNOSIS — T81.89XS NON-HEALING SURGICAL WOUND, SEQUELA: ICD-10-CM

## 2020-08-12 DIAGNOSIS — T84.84XA PAINFUL ORTHOPAEDIC HARDWARE: ICD-10-CM

## 2020-08-12 NOTE — PRE ADMISSION SCREENING
Pre op instructions reviewed with patient's daughter/Cass garcia, per phone:    To confirm, Your surgeon has instructed you:  Surgery is scheduled 08/18/20at 0700.      Please report to [facility] 1st floor main lobby by 0530.   Pre admit office to call afternoon prior to surgery with final arrival time    Covid 19 testing is scheduled for 08/15/20 at 1030  @ [facility]  Please self quarantine after Covid testing, prior to surgery      INSTRUCTIONS IMPORTANT!!!  ¨ Do not eat, drink, or smoke after 12 midnight prior to surgery, including water. OK to brush teeth, no gum, candy or mints!    ¨ Take only these medicines with a small swallow of water-morning of surgery.  N/A          ____   Due to COVID 19 concerns, 1 visitor will be allowed in the pre operative area, and must adhere to social distancing guidelines.  One visitor/family member is currently allowed to visit in-patient rooms from 08:00 a.m - 6:00 p.m    ____   Family/caregivers will be updated re pt status via text/cell phone      ____  Do not wear makeup, including mascara.  ____  No powder, lotions or creams to surgical area.  ____  Please remove all jewelry, including piercings and leave at home.  ____  No money or valuables needed. Please leave at home.  ____  Please bring identification and insurance information to hospital.  ____  If going home the same day, arrange for a ride home. You will not be able to   drive if Anesthesia was used.  ____  Children, under 12 years old, must remain in the waiting room with an adult.  They are not allowed in patient areas.  ____  Wear loose fitting clothing. Allow for dressings, bandages.  ____  Stop Aspirin, Ibuprofen, Motrin and Aleve at least 5-7 days before surgery, unless otherwise instructed by your doctor, or the nurse.   You MAY use Tylenol/acetaminophen until day of surgery.  ____  If you take diabetic medication, do not take am of surgery unless instructed by   Doctor.  ____ Stop  taking any Fish Oil supplement or any Vitamins that contain Vitamin E at least 5 days prior to surgery.          Bathing Instructions-- The night before surgery and the morning prior to coming to the hospital:   -Do not shave the surgical area.   -Shower and wash your hair and body as usual with anti-bacterial  soap and shampoo.   -Rinse your hair and body completely.   -Use one packet of hibiclens to wash the surgical site (using your hand) gently for 5 minutes.  Do not scrub you skin too hard.   -Do not use hibiclens on your head, face, or genitals.   -Do not wash with anti-bacterial soap after you use the hibiclens.   -Rinse your body thoroughly.   -Dry with clean, soft towel.  Do not use lotion, cream, deodorant, or powders on   the surgical site.    Use antibacterial soap in place of hibiclens if your surgery is on the head, face or genitals.         Surgical Site Infection    Prevention of surgical site infections:     -Keep incisions clean and dry.   -Do not soak/submerge incisions in water until completely healed.   -Do not apply lotions, powders, creams, or deodorants to site.   -Always make sure hands are cleaned with antibacterial soap/ alcohol-based   prior to touching the surgical site.  (This includes doctors, nurses, staff, and yourself.)    Signs and symptoms:   -Redness and pain around the area where you had surgery   -Drainage of cloudy fluid from your surgical wound   -Fever over 100.4  I have read or had read and explained to me, and understand the above information.

## 2020-08-14 ENCOUNTER — OFFICE VISIT (OUTPATIENT)
Dept: PODIATRY | Facility: CLINIC | Age: 65
End: 2020-08-14
Payer: MEDICARE

## 2020-08-14 VITALS
BODY MASS INDEX: 31.19 KG/M2 | DIASTOLIC BLOOD PRESSURE: 68 MMHG | SYSTOLIC BLOOD PRESSURE: 94 MMHG | WEIGHT: 210.56 LBS | HEIGHT: 69 IN | HEART RATE: 96 BPM

## 2020-08-14 DIAGNOSIS — M25.572 PAIN IN LEFT ANKLE AND JOINTS OF LEFT FOOT: ICD-10-CM

## 2020-08-14 DIAGNOSIS — Z79.01 ON CONTINUOUS ORAL ANTICOAGULATION: ICD-10-CM

## 2020-08-14 DIAGNOSIS — T81.89XS NON-HEALING SURGICAL WOUND, SEQUELA: ICD-10-CM

## 2020-08-14 DIAGNOSIS — M25.572 PAIN AND SWELLING OF LEFT ANKLE: ICD-10-CM

## 2020-08-14 DIAGNOSIS — M79.89 SWELLING OF LEFT FOOT: ICD-10-CM

## 2020-08-14 DIAGNOSIS — E11.49 TYPE II DIABETES MELLITUS WITH NEUROLOGICAL MANIFESTATIONS: ICD-10-CM

## 2020-08-14 DIAGNOSIS — M14.672 CHARCOT'S JOINT OF LEFT FOOT: Primary | ICD-10-CM

## 2020-08-14 DIAGNOSIS — T84.84XA PAINFUL ORTHOPAEDIC HARDWARE: ICD-10-CM

## 2020-08-14 DIAGNOSIS — M25.472 PAIN AND SWELLING OF LEFT ANKLE: ICD-10-CM

## 2020-08-14 PROCEDURE — 29515 APPLICATION SHORT LEG SPLINT: CPT | Mod: 58,LT,S$GLB, | Performed by: PODIATRIST

## 2020-08-14 PROCEDURE — 99999 PR PBB SHADOW E&M-EST. PATIENT-LVL IV: CPT | Mod: PBBFAC,,, | Performed by: PODIATRIST

## 2020-08-14 PROCEDURE — 99999 PR PBB SHADOW E&M-EST. PATIENT-LVL IV: ICD-10-PCS | Mod: PBBFAC,,, | Performed by: PODIATRIST

## 2020-08-14 PROCEDURE — 99024 PR POST-OP FOLLOW-UP VISIT: ICD-10-PCS | Mod: S$GLB,,, | Performed by: PODIATRIST

## 2020-08-14 PROCEDURE — 99024 POSTOP FOLLOW-UP VISIT: CPT | Mod: S$GLB,,, | Performed by: PODIATRIST

## 2020-08-14 PROCEDURE — 29515 PR APPLY LOWER LEG SPLINT: ICD-10-PCS | Mod: 58,LT,S$GLB, | Performed by: PODIATRIST

## 2020-08-14 RX ORDER — CIPROFLOXACIN 750 MG/1
750 TABLET, FILM COATED ORAL EVERY 12 HOURS
Qty: 14 TABLET | Refills: 0 | Status: SHIPPED | OUTPATIENT
Start: 2020-08-14 | End: 2020-08-21

## 2020-08-14 RX ORDER — AMPICILLIN 500 MG/1
500 CAPSULE ORAL 4 TIMES DAILY
Qty: 28 CAPSULE | Refills: 0 | Status: SHIPPED | OUTPATIENT
Start: 2020-08-14 | End: 2020-08-14

## 2020-08-14 RX ORDER — AMOXICILLIN AND CLAVULANATE POTASSIUM 875; 125 MG/1; MG/1
1 TABLET, FILM COATED ORAL EVERY 12 HOURS
Qty: 14 TABLET | Refills: 0 | Status: SHIPPED | OUTPATIENT
Start: 2020-08-14 | End: 2020-08-21

## 2020-08-14 NOTE — H&P (VIEW-ONLY)
Subjective:       Patient ID: Kay Galarza is a 65 y.o. female.    Chief Complaint: Post-op Evaluation (DOS07/23/2020 ORIF L. Ankle rates pain 8/10 soft cast w/wrapping diabetic pt pcp Dr Rhoades.)      HPI:  Kay Galarza presents to the office today, s/p 07/23/2020 LLE ankle ORIF with LLE Charcot Foot reconstruction. Patient presents this afternoon with her son. States copious RICE therapy.  Patient states her pains are 8/10.  She does have [facility] for physical therapy occupational therapy as well as the visiting nurse.  Patient denies cough or dyspnea. She does present nonweightbearing with a posterior splint and wheelchair. Does not continue Xarelto for DVT prophylaxis due to pending revisional surgery.  Patient's blood sugars are typically between 90s mg/dL - 230 mg/dL.  Patient's postoperative course has been complicated by multiple wound dehiscences/delayed healing of surgical sites.  Most recent x-ray did show medial extravasation of the cuneiform bone (medial).  Patient will need surgery in the coming days for revision of medial column fusion.  No oral antibiotic at present.     Hemoglobin A1C   Date Value Ref Range Status   07/20/2020 9.7 (H) 4.0 - 5.6 % Final     Comment:     ADA Screening Guidelines:  5.7-6.4%  Consistent with prediabetes  >or=6.5%  Consistent with diabetes  High levels of fetal hemoglobin interfere with the HbA1C  assay. Heterozygous hemoglobin variants (HbS, HgC, etc)do  not significantly interfere with this assay.   However, presence of multiple variants may affect accuracy.     12/10/2019 7.5 (H) 4.0 - 5.6 % Final     Comment:     ADA Screening Guidelines:  5.7-6.4%  Consistent with prediabetes  >or=6.5%  Consistent with diabetes  High levels of fetal hemoglobin interfere with the HbA1C  assay. Heterozygous hemoglobin variants (HbS, HgC, etc)do  not significantly interfere with this assay.   However, presence of multiple variants may affect  accuracy.     08/22/2019 8.3 (H) 4.0 - 5.6 % Final     Comment:     ADA Screening Guidelines:  5.7-6.4%  Consistent with prediabetes  >or=6.5%  Consistent with diabetes  High levels of fetal hemoglobin interfere with the HbA1C  assay. Heterozygous hemoglobin variants (HbS, HgC, etc)do  not significantly interfere with this assay.   However, presence of multiple variants may affect accuracy.         Review of patient's allergies indicates:   Allergen Reactions    Pollen extracts        Past Medical History:   Diagnosis Date    Arthritis     DERIAN KNEES    Asthma     SEASONAL    Cataract     Diabetes mellitus     Diabetes mellitus, type 2     Diabetic retinopathy     Hyperlipidemia     Hypertension        Family History   Problem Relation Age of Onset    Cancer Father         Prostate Ca    Hypertension Father     Stroke Father     Diabetes Sister     Glaucoma Mother     Hypertension Mother     Glaucoma Maternal Grandmother     Blindness Neg Hx     Macular degeneration Neg Hx     Retinal detachment Neg Hx     Strabismus Neg Hx        Social History     Socioeconomic History    Marital status:      Spouse name: Not on file    Number of children: Not on file    Years of education: Not on file    Highest education level: Not on file   Occupational History    Not on file   Social Needs    Financial resource strain: Not on file    Food insecurity     Worry: Not on file     Inability: Not on file    Transportation needs     Medical: Not on file     Non-medical: Not on file   Tobacco Use    Smoking status: Never Smoker    Smokeless tobacco: Never Used   Substance and Sexual Activity    Alcohol use: Never     Frequency: Never    Drug use: No    Sexual activity: Yes   Lifestyle    Physical activity     Days per week: Not on file     Minutes per session: Not on file    Stress: Not on file   Relationships    Social connections     Talks on phone: Not on file     Gets together: Not on file      Attends Jainism service: Not on file     Active member of club or organization: Not on file     Attends meetings of clubs or organizations: Not on file     Relationship status: Not on file   Other Topics Concern    Not on file   Social History Narrative    Not on file       Past Surgical History:   Procedure Laterality Date    CATARACT EXTRACTION W/  INTRAOCULAR LENS IMPLANT Right 07/18/2018    CATARACT EXTRACTION W/  INTRAOCULAR LENS IMPLANT Left 03/13/2019    COLONOSCOPY N/A 12/22/2018    Procedure: COLONOSCOPY;  Surgeon: Zak Dover MD;  Location: [facility];  Service: Endoscopy;  Laterality: N/A;    ENDOSCOPIC VEIN LASER TREATMENT Bilateral 1990's    FIXATION OF SYNDESMOSIS OF ANKLE Left 7/23/2020    Procedure: FIXATION, SYNDESMOSIS, ANKLE;  Surgeon: Kirt Gray DPM;  Location: [facility] OR;  Service: Podiatry;  Laterality: Left;    MANIPULATION WITH ANESTHESIA Left 7/23/2020    Procedure: MANIPULATION, WITH ANESTHESIA;  Surgeon: Kirt Gray DPM;  Location: [facility] OR;  Service: Podiatry;  Laterality: Left;    MIDFOOT ARTHRODESIS Left 7/23/2020    Procedure: FUSION, JOINT, MIDFOOT;  Surgeon: Kirt Gray DPM;  Location: [facility] OR;  Service: Podiatry;  Laterality: Left;  2nd tarsometatarsal joint dislocation via fusion    OPEN REDUCTION AND INTERNAL FIXATION (ORIF) OF INJURY OF ANKLE Left 7/23/2020    Procedure: ORIF, ANKLE;  Surgeon: Kirt Gray DPM;  Location: [facility] OR;  Service: Podiatry;  Laterality: Left;       Review of Systems   Constitutional: Negative for chills, fatigue and fever.   HENT: Negative for hearing loss.    Eyes: Negative for photophobia and visual disturbance.   Respiratory: Negative for cough, chest tightness, shortness of breath and wheezing.    Cardiovascular: Positive for leg swelling. Negative for chest pain and palpitations.   Gastrointestinal: Negative for constipation, diarrhea, nausea and vomiting.   Endocrine: Negative for cold intolerance and heat  "intolerance.   Genitourinary: Negative for flank pain.   Musculoskeletal: Positive for gait problem. Negative for neck pain and neck stiffness.   Skin: Positive for color change and wound.   Neurological: Positive for numbness. Negative for light-headedness and headaches.   Psychiatric/Behavioral: Negative for sleep disturbance.          Objective:   BP 94/68   Pulse 96   Ht 5' 9" (1.753 m)   Wt 95.5 kg (210 lb 8.6 oz)   BMI 31.09 kg/m²      Aerobic culture (Specify Source)  Order: [id number]  Status:  Preliminary result   Visible to patient:  No (not released) Next appt:  08/15/2020 at 10:20 AM in Otolaryngology (COVID TESTING, [facility]) Dx:  Surgical wound dehiscence, initial en...  Specimen Information: Foot, Left; Wound        Component 4d ago   Aerobic Bacterial Culture Abnormal   ACINETOBACTER BAUMANNII/HAEMOLYTICUS   Moderate   Susceptibility pending   P      Aerobic Bacterial Culture Abnormal   ENTEROCOCCUS FAECALIS   Moderate   P      Resulting Agency [facility]   Susceptibility     Acinetobacter baumannii/haemolyticus     CULTURE, AEROBIC  (SPECIFY SOURCE) (Preliminary)     Amikacin <=16 mcg/mL Sensitive     Amp/Sulbactam <=8/4 mcg/mL Sensitive     Cefepime <=2 mcg/mL Sensitive     Ceftriaxone 8 mcg/mL Sensitive     Ciprofloxacin <=1 mcg/mL Sensitive     Gentamicin <=4 mcg/mL Sensitive     Levofloxacin <=2 mcg/mL Sensitive     Meropenem <=1 mcg/mL Sensitive     Tetracycline <=4 mcg/mL Sensitive     Tobramycin <=4 mcg/mL Sensitive     Trimeth/Sulfa <=2/38 mcg/mL Sensitive            Linear View   Susceptibility     Enterococcus faecalis     CULTURE, AEROBIC  (SPECIFY SOURCE) (Preliminary)     Ampicillin <=2 mcg/mL Sensitive     Gentamicin Synergy Screen <=500 mcg/mL Sensitive     Tetracycline >8 mcg/mL Resistant     Vancomycin 2 mcg/mL Sensitive            Linear View         Specimen Collected: 08/10/20 16:00 Last Resulted: 08/14/20 10:11 Lab Flowsheet Order Details View Encounter Lab and Collection Details " Routing Result History      P=Value has a preliminary status             LOWER EXTREMITY PHYSICAL EXAMINATION    DERMATOLOGY: Several persistent areas of wound dehiscence/delayed wound healing are noted, namely the fibula incision, and the medial midfoot incision.  Eschar formation is noted with sloughing of tissues.  No periwound cellulitis noted.  Serosanguineous drainage noted.  No malodor.  No purulence.  Total area medial midfoot incision dehiscence is 10 cm x 1.20 cm x 0.4 cm.    VASCULAR: The left lower extremity DP and posterior tibial pulse are palpable. CFT is within normal limits. Hair growth sparse absent. No ipsilateral calf pain or tenderness is noted with palpation and compression. No palpable cords are noted.      ORTHOPEDIC: Discomfort to palpation, medial midfoot, left foot. Ankle ROM is noted.     Assessment:     1. Charcot's neuro arthropathy with dislocation of Lisfranc Joint of left foot    2. Painful orthopaedic hardware    3. Non-healing surgical wound, sequela    4. Type II diabetes mellitus with neurological manifestations    5. Pain and swelling of left ankle    6. Pain in left ankle and joints of left foot    7. Swelling of left foot    8. On continuous oral anticoagulation          Plan:     Charcot's neuro arthropathy with dislocation of Lisfranc Joint of left foot  Painful orthopaedic hardware  Pain and swelling of left ankle  Pain in left ankle and joints of left foot  Swelling of left foot  Non-healing surgical wound, sequela  -     Discontinue: ampicillin (PRINCIPEN) 500 MG capsule; Take 1 capsule (500 mg total) by mouth 4 (four) times daily. for 7 days  Dispense: 28 capsule; Refill: 0  -     ciprofloxacin HCl (CIPRO) 750 MG tablet; Take 1 tablet (750 mg total) by mouth every 12 (twelve) hours. for 7 days  Dispense: 14 tablet; Refill: 0        -     amoxicillin-clavulanate 875-125mg (AUGMENTIN) 875-125 mg per tablet; Take 1 tablet by mouth every 12 (twelve) hours. for 7 days   "Dispense: 14 tablet; Refill: 0    Thorough discussion is had with the patient today, concerning the diagnosis, its etiology, and the treatment algorithm at present.      Local woundcare with Dakin's solution and DSD. Patient may have her family members change the dressing q.o.d..    LLE short leg posterior splint application in standard fashion using 5" OrthoGlass fiberglass splinting material, approx. 24" with web-roll/cast padding and ACE bandage. RICE therapy ATC. Continue NWB.     Strict diabetes control.    Patient will need surgical revision due to medial extravasation of the medial cuneiform bone. The procedure of (possible removal of hardware, with placement of internal fixation (mesh plate)) was thoroughly explained to the patient. Its necessity and/or purpose and the implications therein were outlined, including any pertinent advantages and/or disadvantages, and possible complications, if any. Possible complications include recurrence of pathology and/or deformity, infection (cellulitis, drainage, purulence, malodor, etc...), pain, numbness, neuritis, edema, burning, loss of function, need for further surgery, possible need for removal of any implanted hardware, soft tissue contracture and/or scarring, etc... No guarantees were given and/or implied. Post-operative expectations and weightbearing protocol is thoroughly explained the patient, who acknowledges understanding. Prior pre-op medical clearance is sufficient.    Type II diabetes mellitus with neurological manifestations  Patient advised to follow up with Primary Care Physician for management of comorbid states.    On continuous oral anticoagulation  Patient on Xarelto, status post.  Her last dose of Xarelto will be on Saturday evening.          Future Appointments   Date Time Provider Department [place]   8/15/2020 10:20 AM COVID TESTING, [facility]   8/17/2020  2:30 PM Kirt Gray DPM [facility]           "

## 2020-08-14 NOTE — PROGRESS NOTES
Subjective:       Patient ID: Kay Galarza is a 65 y.o. female.    Chief Complaint: Post-op Evaluation (DOS07/23/2020 ORIF L. Ankle rates pain 8/10 soft cast w/wrapping diabetic pt pcp Dr Rhoades.)      HPI:  Kay Galarza presents to the office today, s/p 07/23/2020 LLE ankle ORIF with LLE Charcot Foot reconstruction. Patient presents this afternoon with her son. States copious RICE therapy.  Patient states her pains are 8/10.  She does have [facility] for physical therapy occupational therapy as well as the visiting nurse.  Patient denies cough or dyspnea. She does present nonweightbearing with a posterior splint and wheelchair. Does not continue Xarelto for DVT prophylaxis due to pending revisional surgery.  Patient's blood sugars are typically between 90s mg/dL - 230 mg/dL.  Patient's postoperative course has been complicated by multiple wound dehiscences/delayed healing of surgical sites.  Most recent x-ray did show medial extravasation of the cuneiform bone (medial).  Patient will need surgery in the coming days for revision of medial column fusion.  No oral antibiotic at present.     Hemoglobin A1C   Date Value Ref Range Status   07/20/2020 9.7 (H) 4.0 - 5.6 % Final     Comment:     ADA Screening Guidelines:  5.7-6.4%  Consistent with prediabetes  >or=6.5%  Consistent with diabetes  High levels of fetal hemoglobin interfere with the HbA1C  assay. Heterozygous hemoglobin variants (HbS, HgC, etc)do  not significantly interfere with this assay.   However, presence of multiple variants may affect accuracy.     12/10/2019 7.5 (H) 4.0 - 5.6 % Final     Comment:     ADA Screening Guidelines:  5.7-6.4%  Consistent with prediabetes  >or=6.5%  Consistent with diabetes  High levels of fetal hemoglobin interfere with the HbA1C  assay. Heterozygous hemoglobin variants (HbS, HgC, etc)do  not significantly interfere with this assay.   However, presence of multiple variants may affect  accuracy.     08/22/2019 8.3 (H) 4.0 - 5.6 % Final     Comment:     ADA Screening Guidelines:  5.7-6.4%  Consistent with prediabetes  >or=6.5%  Consistent with diabetes  High levels of fetal hemoglobin interfere with the HbA1C  assay. Heterozygous hemoglobin variants (HbS, HgC, etc)do  not significantly interfere with this assay.   However, presence of multiple variants may affect accuracy.         Review of patient's allergies indicates:   Allergen Reactions    Pollen extracts        Past Medical History:   Diagnosis Date    Arthritis     DERIAN KNEES    Asthma     SEASONAL    Cataract     Diabetes mellitus     Diabetes mellitus, type 2     Diabetic retinopathy     Hyperlipidemia     Hypertension        Family History   Problem Relation Age of Onset    Cancer Father         Prostate Ca    Hypertension Father     Stroke Father     Diabetes Sister     Glaucoma Mother     Hypertension Mother     Glaucoma Maternal Grandmother     Blindness Neg Hx     Macular degeneration Neg Hx     Retinal detachment Neg Hx     Strabismus Neg Hx        Social History     Socioeconomic History    Marital status:      Spouse name: Not on file    Number of children: Not on file    Years of education: Not on file    Highest education level: Not on file   Occupational History    Not on file   Social Needs    Financial resource strain: Not on file    Food insecurity     Worry: Not on file     Inability: Not on file    Transportation needs     Medical: Not on file     Non-medical: Not on file   Tobacco Use    Smoking status: Never Smoker    Smokeless tobacco: Never Used   Substance and Sexual Activity    Alcohol use: Never     Frequency: Never    Drug use: No    Sexual activity: Yes   Lifestyle    Physical activity     Days per week: Not on file     Minutes per session: Not on file    Stress: Not on file   Relationships    Social connections     Talks on phone: Not on file     Gets together: Not on file      Attends Buddhist service: Not on file     Active member of club or organization: Not on file     Attends meetings of clubs or organizations: Not on file     Relationship status: Not on file   Other Topics Concern    Not on file   Social History Narrative    Not on file       Past Surgical History:   Procedure Laterality Date    CATARACT EXTRACTION W/  INTRAOCULAR LENS IMPLANT Right 07/18/2018    CATARACT EXTRACTION W/  INTRAOCULAR LENS IMPLANT Left 03/13/2019    COLONOSCOPY N/A 12/22/2018    Procedure: COLONOSCOPY;  Surgeon: Zak Dover MD;  Location: [facility];  Service: Endoscopy;  Laterality: N/A;    ENDOSCOPIC VEIN LASER TREATMENT Bilateral 1990's    FIXATION OF SYNDESMOSIS OF ANKLE Left 7/23/2020    Procedure: FIXATION, SYNDESMOSIS, ANKLE;  Surgeon: Kirt Gray DPM;  Location: [facility] OR;  Service: Podiatry;  Laterality: Left;    MANIPULATION WITH ANESTHESIA Left 7/23/2020    Procedure: MANIPULATION, WITH ANESTHESIA;  Surgeon: Kirt Gray DPM;  Location: [facility] OR;  Service: Podiatry;  Laterality: Left;    MIDFOOT ARTHRODESIS Left 7/23/2020    Procedure: FUSION, JOINT, MIDFOOT;  Surgeon: Kirt Gray DPM;  Location: [facility] OR;  Service: Podiatry;  Laterality: Left;  2nd tarsometatarsal joint dislocation via fusion    OPEN REDUCTION AND INTERNAL FIXATION (ORIF) OF INJURY OF ANKLE Left 7/23/2020    Procedure: ORIF, ANKLE;  Surgeon: Kirt Gray DPM;  Location: [facility] OR;  Service: Podiatry;  Laterality: Left;       Review of Systems   Constitutional: Negative for chills, fatigue and fever.   HENT: Negative for hearing loss.    Eyes: Negative for photophobia and visual disturbance.   Respiratory: Negative for cough, chest tightness, shortness of breath and wheezing.    Cardiovascular: Positive for leg swelling. Negative for chest pain and palpitations.   Gastrointestinal: Negative for constipation, diarrhea, nausea and vomiting.   Endocrine: Negative for cold intolerance and heat  "intolerance.   Genitourinary: Negative for flank pain.   Musculoskeletal: Positive for gait problem. Negative for neck pain and neck stiffness.   Skin: Positive for color change and wound.   Neurological: Positive for numbness. Negative for light-headedness and headaches.   Psychiatric/Behavioral: Negative for sleep disturbance.          Objective:   BP 94/68   Pulse 96   Ht 5' 9" (1.753 m)   Wt 95.5 kg (210 lb 8.6 oz)   BMI 31.09 kg/m²      Aerobic culture (Specify Source)  Order: [id number]  Status:  Preliminary result   Visible to patient:  No (not released) Next appt:  08/15/2020 at 10:20 AM in Otolaryngology (COVID TESTING, [facility] ENT) Dx:  Surgical wound dehiscence, initial en...  Specimen Information: Foot, Left; Wound        Component 4d ago   Aerobic Bacterial Culture Abnormal   ACINETOBACTER BAUMANNII/HAEMOLYTICUS   Moderate   Susceptibility pending   P      Aerobic Bacterial Culture Abnormal   ENTEROCOCCUS FAECALIS   Moderate   P      Resulting Agency [facility]   Susceptibility     Acinetobacter baumannii/haemolyticus     CULTURE, AEROBIC  (SPECIFY SOURCE) (Preliminary)     Amikacin <=16 mcg/mL Sensitive     Amp/Sulbactam <=8/4 mcg/mL Sensitive     Cefepime <=2 mcg/mL Sensitive     Ceftriaxone 8 mcg/mL Sensitive     Ciprofloxacin <=1 mcg/mL Sensitive     Gentamicin <=4 mcg/mL Sensitive     Levofloxacin <=2 mcg/mL Sensitive     Meropenem <=1 mcg/mL Sensitive     Tetracycline <=4 mcg/mL Sensitive     Tobramycin <=4 mcg/mL Sensitive     Trimeth/Sulfa <=2/38 mcg/mL Sensitive            Linear View   Susceptibility     Enterococcus faecalis     CULTURE, AEROBIC  (SPECIFY SOURCE) (Preliminary)     Ampicillin <=2 mcg/mL Sensitive     Gentamicin Synergy Screen <=500 mcg/mL Sensitive     Tetracycline >8 mcg/mL Resistant     Vancomycin 2 mcg/mL Sensitive            Linear View         Specimen Collected: 08/10/20 16:00 Last Resulted: 08/14/20 10:11 Lab Flowsheet Order Details View Encounter Lab and Collection Details " Routing Result History      P=Value has a preliminary status             LOWER EXTREMITY PHYSICAL EXAMINATION    DERMATOLOGY: Several persistent areas of wound dehiscence/delayed wound healing are noted, namely the fibula incision, and the medial midfoot incision.  Eschar formation is noted with sloughing of tissues.  No periwound cellulitis noted.  Serosanguineous drainage noted.  No malodor.  No purulence.  Total area medial midfoot incision dehiscence is 10 cm x 1.20 cm x 0.4 cm.    VASCULAR: The left lower extremity DP and posterior tibial pulse are palpable. CFT is within normal limits. Hair growth sparse absent. No ipsilateral calf pain or tenderness is noted with palpation and compression. No palpable cords are noted.      ORTHOPEDIC: Discomfort to palpation, medial midfoot, left foot. Ankle ROM is noted.     Assessment:     1. Charcot's neuro arthropathy with dislocation of Lisfranc Joint of left foot    2. Painful orthopaedic hardware    3. Non-healing surgical wound, sequela    4. Type II diabetes mellitus with neurological manifestations    5. Pain and swelling of left ankle    6. Pain in left ankle and joints of left foot    7. Swelling of left foot    8. On continuous oral anticoagulation          Plan:     Charcot's neuro arthropathy with dislocation of Lisfranc Joint of left foot  Painful orthopaedic hardware  Pain and swelling of left ankle  Pain in left ankle and joints of left foot  Swelling of left foot  Non-healing surgical wound, sequela  -     Discontinue: ampicillin (PRINCIPEN) 500 MG capsule; Take 1 capsule (500 mg total) by mouth 4 (four) times daily. for 7 days  Dispense: 28 capsule; Refill: 0  -     ciprofloxacin HCl (CIPRO) 750 MG tablet; Take 1 tablet (750 mg total) by mouth every 12 (twelve) hours. for 7 days  Dispense: 14 tablet; Refill: 0        -     amoxicillin-clavulanate 875-125mg (AUGMENTIN) 875-125 mg per tablet; Take 1 tablet by mouth every 12 (twelve) hours. for 7 days   "Dispense: 14 tablet; Refill: 0    Thorough discussion is had with the patient today, concerning the diagnosis, its etiology, and the treatment algorithm at present.      Local woundcare with Dakin's solution and DSD. Patient may have her family members change the dressing q.o.d..    LLE short leg posterior splint application in standard fashion using 5" OrthoGlass fiberglass splinting material, approx. 24" with web-roll/cast padding and ACE bandage. RICE therapy ATC. Continue NWB.     Strict diabetes control.    Patient will need surgical revision due to medial extravasation of the medial cuneiform bone. The procedure of (possible removal of hardware, with placement of internal fixation (mesh plate)) was thoroughly explained to the patient. Its necessity and/or purpose and the implications therein were outlined, including any pertinent advantages and/or disadvantages, and possible complications, if any. Possible complications include recurrence of pathology and/or deformity, infection (cellulitis, drainage, purulence, malodor, etc...), pain, numbness, neuritis, edema, burning, loss of function, need for further surgery, possible need for removal of any implanted hardware, soft tissue contracture and/or scarring, etc... No guarantees were given and/or implied. Post-operative expectations and weightbearing protocol is thoroughly explained the patient, who acknowledges understanding. Prior pre-op medical clearance is sufficient.    Type II diabetes mellitus with neurological manifestations  Patient advised to follow up with Primary Care Physician for management of comorbid states.    On continuous oral anticoagulation  Patient on Xarelto, status post.  Her last dose of Xarelto will be on Saturday evening.          Future Appointments   Date Time Provider Department [place]   8/15/2020 10:20 AM COVID TESTING, [facility]   8/17/2020  2:30 PM Kirt Gray DPM [facility]           "

## 2020-08-15 ENCOUNTER — LAB VISIT (OUTPATIENT)
Dept: OTOLARYNGOLOGY | Facility: CLINIC | Age: 65
End: 2020-08-15
Payer: MEDICARE

## 2020-08-15 DIAGNOSIS — Z01.818 PREOP TESTING: ICD-10-CM

## 2020-08-15 LAB
BACTERIA SPEC AEROBE CULT: ABNORMAL
BACTERIA SPEC AEROBE CULT: ABNORMAL

## 2020-08-15 PROCEDURE — U0003 INFECTIOUS AGENT DETECTION BY NUCLEIC ACID (DNA OR RNA); SEVERE ACUTE RESPIRATORY SYNDROME CORONAVIRUS 2 (SARS-COV-2) (CORONAVIRUS DISEASE [COVID-19]), AMPLIFIED PROBE TECHNIQUE, MAKING USE OF HIGH THROUGHPUT TECHNOLOGIES AS DESCRIBED BY CMS-2020-01-R: HCPCS

## 2020-08-16 ENCOUNTER — ANESTHESIA EVENT (OUTPATIENT)
Dept: SURGERY | Facility: HOSPITAL | Age: 65
End: 2020-08-16
Payer: MEDICARE

## 2020-08-16 LAB — SARS-COV-2 RNA RESP QL NAA+PROBE: NOT DETECTED

## 2020-08-16 NOTE — DISCHARGE INSTRUCTIONS
Weightbearing Status and Wound care:  1. Do not intentionally place weight upon or walk on the operative extremity.  2. When ambulating, use either crutches, a Knee-Scooter (Roll-A-Bout), rolling walker, standard walker or wheelchair.  3. During daytime/awake hours, if a CAST or POSTERIOR SPLINT is in place, ice the posterior aspect of the leg, behind the knee, 20 minutes on (w/ ice) and followed by 20 minutes off (w/o ice) until follow up; repeat accordingly until follow up. IF no CAST or POSTERIOR SPLINT is in place, ice the anterior aspect of the ankle, in a similar manner. During night time/sleep hours, simply elevating the limb above the level of the heart is sufficient.   4. Elevate the extremity above the level of the heart at all times when sitting/sleeping.  5. Take the pain mediations as prescribed for the initial 3 or so days, then only as needed.  6. Restart Xarelto in the PM of tomorrow.   7. For the initial 3-4 days, when pains are at their worst, taking Motrin/Advil/Ibuprofen or Aleve can help with pain relief, in addition to the prescribed opioid/narcotic medication.   8. Do not change the dressings.  9. Do not get the dressings wet.

## 2020-08-16 NOTE — ANESTHESIA PREPROCEDURE EVALUATION
08/16/2020  Kay Galarza is a 65 y.o., female.  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, with long-term current use of insulin    Essential hypertension    Mild intermittent asthma    Severe obesity (BMI 35.0-39.9) with comorbidity    Cataract, nuclear sclerotic senile, bilateral    Open angle with borderline findings, low risk, bilateral    Special screening for malignant neoplasms, colon    Benign neoplasm of cecum    Benign neoplasm of ascending colon    Benign neoplasm of transverse colon    Benign neoplasm of descending colon    Benign neoplasm of rectosigmoid junction    Charcot's neuro arthropathy with dislocation of Lisfranc Joint of left foot    Type II diabetes mellitus with neurological manifestations    Noncompliance    Lisfranc dislocation, left, sequela    Diabetic Charcot's foot    HLD (hyperlipidemia)     Current Outpatient Medications on File Prior to Visit   Medication Sig Dispense Refill    albuterol (PROVENTIL/VENTOLIN HFA) 90 mcg/actuation inhaler Inhale 2 puffs into the lungs every 6 (six) hours as needed for Wheezing. Rescue 18 g 0    ammonium lactate (AMLACTIN) 12 % lotion Use daily.  Apply to damp skin after bathing. 225 g 11    amoxicillin-clavulanate 875-125mg (AUGMENTIN) 875-125 mg per tablet Take 1 tablet by mouth every 12 (twelve) hours. for 7 days 14 tablet 0    atorvastatin (LIPITOR) 40 MG tablet Take 1 tablet (40 mg total) by mouth once daily. (Patient taking differently: Take 40 mg by mouth once daily. ) 90 tablet 0    ciprofloxacin HCl (CIPRO) 750 MG tablet Take 1 tablet (750 mg total) by mouth every 12 (twelve) hours. for 7 days 14 tablet 0    clobetasol (TEMOVATE) 0.05 % external solution Apply topically 2 (two)  "times daily. To scalp only 50 mL 2    diclofenac sodium (VOLTAREN) 1 % Gel Apply topically 2 (two) times daily. 100 g 0    dulaglutide (TRULICITY) 1.5 mg/0.5 mL pen injector INJECT 1.5 MG INTO THE SKIN EVERY 7 DAYS 12 mL 0    emollient combination no.69 (EUCERIN SKIN CALMING) Crea Apply  g 0    fluocinolone (SYNALAR) 0.01 % Sham Apply no more than 1 ounce to scalp once daily; work into lather and allow to remain on scalp for ~5 minutes. Remove from hair and scalp by rinsing thoroughly with water. 120 mL 2    fluticasone propionate (FLONASE) 50 mcg/actuation nasal spray 2 sprays (100 mcg total) by Each Nostril route once daily. 16 g 0    gabapentin (NEURONTIN) 100 MG capsule Take 1 capsule (100 mg total) by mouth 3 (three) times daily. (Patient taking differently: Take 100 mg by mouth 3 (three) times daily. ) 90 capsule 11    glimepiride (AMARYL) 4 MG tablet Take 1 tablet (4 mg total) by mouth daily with breakfast. 90 tablet 4    insulin glargine, TOUJEO, (TOUJEO SOLOSTAR U-300 INSULIN) 300 unit/mL (1.5 mL) InPn pen Inject 50 Units into the skin 2 (two) times daily. 10 mL 3    ketoconazole (NIZORAL) 2 % shampoo Apply topically 3 (three) times a week. 120 mL 3    levocetirizine (XYZAL) 5 MG tablet Take 1 tablet (5 mg total) by mouth every evening. 30 tablet 11    lisinopriL-hydrochlorothiazide (PRINZIDE,ZESTORETIC) 20-25 mg Tab Take 1 tablet by mouth once daily. (Patient taking differently: Take 1 tablet by mouth daily as needed. ) 90 tablet 0    magnesium oxide (MAG-OX) 400 mg (241.3 mg magnesium) tablet Take 500 mg by mouth every evening.      mupirocin (BACTROBAN) 2 % ointment APPLY  OINTMENT TOPICALLY ONCE DAILY 22 g 0    pen needle, diabetic (BD ULTRA-FINE SHORT PEN NEEDLE) 31 gauge x 5/16" Ndle USE 1  ONCE DAILY 100 each 0    rivaroxaban (XARELTO) 10 mg Tab Take 1 tablet (10 mg total) by mouth daily with dinner or evening meal. 60 tablet 0    traZODone (DESYREL) 150 MG tablet Take 1 tablet " (150 mg total) by mouth nightly as needed for Insomnia. 90 tablet 0    triamcinolone acetonide 0.025% (KENALOG) 0.025 % cream AAA bid as needed for flares.  Mild steroid. 80 g 1     Current Facility-Administered Medications on File Prior to Visit   Medication Dose Route Frequency Provider Last Rate Last Dose    mupirocin 2 % ointment   Topical (Top) BID Martha Abebe DPM         Past Surgical History:   Procedure Laterality Date    CATARACT EXTRACTION W/  INTRAOCULAR LENS IMPLANT Right 07/18/2018    CATARACT EXTRACTION W/  INTRAOCULAR LENS IMPLANT Left 03/13/2019    COLONOSCOPY N/A 12/22/2018    Procedure: COLONOSCOPY;  Surgeon: Zak Dover MD;  Location: [facility];  Service: Endoscopy;  Laterality: N/A;    ENDOSCOPIC VEIN LASER TREATMENT Bilateral 1990's    FIXATION OF SYNDESMOSIS OF ANKLE Left 7/23/2020    Procedure: FIXATION, SYNDESMOSIS, ANKLE;  Surgeon: Kirt Gray DPM;  Location: [facility] OR;  Service: Podiatry;  Laterality: Left;    MANIPULATION WITH ANESTHESIA Left 7/23/2020    Procedure: MANIPULATION, WITH ANESTHESIA;  Surgeon: Kirt Gray DPM;  Location: [facility] OR;  Service: Podiatry;  Laterality: Left;    MIDFOOT ARTHRODESIS Left 7/23/2020    Procedure: FUSION, JOINT, MIDFOOT;  Surgeon: Kirt Gray DPM;  Location: [facility] OR;  Service: Podiatry;  Laterality: Left;  2nd tarsometatarsal joint dislocation via fusion    OPEN REDUCTION AND INTERNAL FIXATION (ORIF) OF INJURY OF ANKLE Left 7/23/2020    Procedure: ORIF, ANKLE;  Surgeon: Kirt Gray DPM;  Location: [facility] OR;  Service: Podiatry;  Laterality: Left;       Anesthesia Evaluation    I have reviewed the Patient Summary Reports.    I have reviewed the Nursing Notes.    I have reviewed the Medications.     Review of Systems  Anesthesia Hx:  No problems with previous Anesthesia  History of prior surgery of interest to airway management or planning: Previous anesthesia: General  Denies Personal Hx of Anesthesia complications.    Social:  Non-Smoker    Hematology/Oncology:  Hematology Normal   Oncology Normal     EENT/Dental:EENT/Dental Normal   Cardiovascular:   Hypertension ECG has been reviewed. Echo 12/019  CONCLUSIONS     1 - Concentric hypertrophy.     2 - No wall motion abnormalities.     3 - Normal left ventricular systolic function (EF 55-60%).     4 - Normal left ventricular diastolic function.     5 - Normal right ventricular systolic function .     6 - The estimated PA systolic pressure is 20 mmHg.     7 - Mild aortic regurgitation.    Pulmonary:   Asthma mild    Renal/:  Renal/ Normal     Hepatic/GI:  Hepatic/GI Normal    Musculoskeletal:  Musculoskeletal Normal    Neurological:  Neurology Normal    Endocrine:   Diabetes, type 2    Dermatological:  Skin Normal    Psych:  Psychiatric Normal           Physical Exam  General:  Well nourished, Obesity    Airway/Jaw/Neck:  Airway Findings: Mouth Opening: Normal Tongue: Normal  Mallampati: II      Dental:  Dental Findings: In tact   Chest/Lungs:  Chest/Lungs Clear    Heart/Vascular:  Heart Findings: Normal Heart murmur: negative       Mental Status:  Mental Status Findings:  Cooperative, Alert and Oriented         Chemistry        Component Value Date/Time     07/08/2020 1006     07/08/2020 1006    K 4.5 07/08/2020 1006    K 4.5 07/08/2020 1006     07/08/2020 1006     07/08/2020 1006    CO2 28 07/08/2020 1006    CO2 28 07/08/2020 1006    BUN 19 07/08/2020 1006    BUN 19 07/08/2020 1006    CREATININE 0.9 07/08/2020 1006    CREATININE 0.9 07/08/2020 1006     (H) 07/08/2020 1006     (H) 07/08/2020 1006        Component Value Date/Time    CALCIUM 9.9 07/08/2020 1006    CALCIUM 9.9 07/08/2020 1006    ALKPHOS 103 07/08/2020 1006    AST 14 07/08/2020 1006    ALT 9 (L) 07/08/2020 1006    BILITOT 0.6 07/08/2020 1006    ESTGFRAFRICA >60.0 07/08/2020 1006    ESTGFRAFRICA >60.0 07/08/2020 1006    EGFRNONAA >60.0 07/08/2020 1006    EGFRNONAA >60.0  07/08/2020 1006        Lab Results   Component Value Date    WBC 8.40 07/08/2020    HGB 13.1 07/08/2020    HCT 38.6 07/08/2020    MCV 90 07/08/2020     07/08/2020           Anesthesia Plan  Type of Anesthesia, risks & benefits discussed:  Anesthesia Type:  general  Patient's Preference:   Intra-op Monitoring Plan: standard ASA monitors  Intra-op Monitoring Plan Comments:   Post Op Pain Control Plan: multimodal analgesia  Post Op Pain Control Plan Comments:   Induction:   IV  Beta Blocker:  Patient is not currently on a Beta-Blocker (No further documentation required).       Informed Consent: Patient understands risks and agrees with Anesthesia plan.  Questions answered. Anesthesia consent signed with patient.  ASA Score: 3     Day of Surgery Review of History & Physical: I have interviewed and examined the patient. I have reviewed the patient's H&P dated:    H&P update referred to the surgeon.         Ready For Surgery From Anesthesia Perspective.

## 2020-08-18 ENCOUNTER — HOSPITAL ENCOUNTER (OUTPATIENT)
Facility: HOSPITAL | Age: 65
Discharge: HOME HEALTH | End: 2020-08-20
Attending: PODIATRIST | Admitting: PODIATRIST
Payer: MEDICARE

## 2020-08-18 ENCOUNTER — ANESTHESIA (OUTPATIENT)
Dept: SURGERY | Facility: HOSPITAL | Age: 65
End: 2020-08-18
Payer: MEDICARE

## 2020-08-18 DIAGNOSIS — M14.672 CHARCOT'S JOINT OF FOOT, LEFT: Primary | ICD-10-CM

## 2020-08-18 PROBLEM — E11.9 TYPE 2 DIABETES MELLITUS: Status: ACTIVE | Noted: 2017-11-07

## 2020-08-18 LAB
ALBUMIN SERPL BCP-MCNC: 2.9 G/DL (ref 3.5–5.2)
ALP SERPL-CCNC: 109 U/L (ref 55–135)
ALT SERPL W/O P-5'-P-CCNC: 8 U/L (ref 10–44)
ANION GAP SERPL CALC-SCNC: 9 MMOL/L (ref 8–16)
AST SERPL-CCNC: 14 U/L (ref 10–40)
BASOPHILS # BLD AUTO: 0.02 K/UL (ref 0–0.2)
BASOPHILS NFR BLD: 0.2 % (ref 0–1.9)
BILIRUB SERPL-MCNC: 0.3 MG/DL (ref 0.1–1)
BUN SERPL-MCNC: 29 MG/DL (ref 8–23)
CALCIUM SERPL-MCNC: 9.2 MG/DL (ref 8.7–10.5)
CHLORIDE SERPL-SCNC: 101 MMOL/L (ref 95–110)
CO2 SERPL-SCNC: 25 MMOL/L (ref 23–29)
CREAT SERPL-MCNC: 1.3 MG/DL (ref 0.5–1.4)
DIFFERENTIAL METHOD: ABNORMAL
EOSINOPHIL # BLD AUTO: 0 K/UL (ref 0–0.5)
EOSINOPHIL NFR BLD: 0.1 % (ref 0–8)
ERYTHROCYTE [DISTWIDTH] IN BLOOD BY AUTOMATED COUNT: 12.6 % (ref 11.5–14.5)
EST. GFR  (AFRICAN AMERICAN): 49 ML/MIN/1.73 M^2
EST. GFR  (NON AFRICAN AMERICAN): 43 ML/MIN/1.73 M^2
GLUCOSE SERPL-MCNC: 190 MG/DL (ref 70–110)
HCT VFR BLD AUTO: 30.3 % (ref 37–48.5)
HGB BLD-MCNC: 9.4 G/DL (ref 12–16)
IMM GRANULOCYTES # BLD AUTO: 0.06 K/UL (ref 0–0.04)
IMM GRANULOCYTES NFR BLD AUTO: 0.7 % (ref 0–0.5)
LYMPHOCYTES # BLD AUTO: 0.6 K/UL (ref 1–4.8)
LYMPHOCYTES NFR BLD: 6.4 % (ref 18–48)
MCH RBC QN AUTO: 28.2 PG (ref 27–31)
MCHC RBC AUTO-ENTMCNC: 31 G/DL (ref 32–36)
MCV RBC AUTO: 91 FL (ref 82–98)
MONOCYTES # BLD AUTO: 0.2 K/UL (ref 0.3–1)
MONOCYTES NFR BLD: 1.9 % (ref 4–15)
NEUTROPHILS # BLD AUTO: 7.9 K/UL (ref 1.8–7.7)
NEUTROPHILS NFR BLD: 90.7 % (ref 38–73)
NRBC BLD-RTO: 0 /100 WBC
PLATELET # BLD AUTO: 289 K/UL (ref 150–350)
PMV BLD AUTO: 9.6 FL (ref 9.2–12.9)
POCT GLUCOSE: 114 MG/DL (ref 70–110)
POCT GLUCOSE: 118 MG/DL (ref 70–110)
POCT GLUCOSE: 212 MG/DL (ref 70–110)
POCT GLUCOSE: 232 MG/DL (ref 70–110)
POTASSIUM SERPL-SCNC: 5.1 MMOL/L (ref 3.5–5.1)
PROT SERPL-MCNC: 6.7 G/DL (ref 6–8.4)
RBC # BLD AUTO: 3.33 M/UL (ref 4–5.4)
SARS-COV-2 RDRP RESP QL NAA+PROBE: NEGATIVE
SODIUM SERPL-SCNC: 135 MMOL/L (ref 136–145)
WBC # BLD AUTO: 8.73 K/UL (ref 3.9–12.7)

## 2020-08-18 PROCEDURE — 36415 COLL VENOUS BLD VENIPUNCTURE: CPT

## 2020-08-18 PROCEDURE — 94761 N-INVAS EAR/PLS OXIMETRY MLT: CPT

## 2020-08-18 PROCEDURE — 97162 PT EVAL MOD COMPLEX 30 MIN: CPT

## 2020-08-18 PROCEDURE — 97530 THERAPEUTIC ACTIVITIES: CPT

## 2020-08-18 PROCEDURE — C1769 GUIDE WIRE: HCPCS | Performed by: PODIATRIST

## 2020-08-18 PROCEDURE — 15275 SKIN SUB GRAFT FACE/NK/HF/G: CPT | Mod: 58,51,, | Performed by: PODIATRIST

## 2020-08-18 PROCEDURE — 71000033 HC RECOVERY, INTIAL HOUR: Performed by: PODIATRIST

## 2020-08-18 PROCEDURE — 36000709 HC OR TIME LEV III EA ADD 15 MIN: Performed by: PODIATRIST

## 2020-08-18 PROCEDURE — 71000039 HC RECOVERY, EACH ADD'L HOUR: Performed by: PODIATRIST

## 2020-08-18 PROCEDURE — 27201423 OPTIME MED/SURG SUP & DEVICES STERILE SUPPLY: Performed by: PODIATRIST

## 2020-08-18 PROCEDURE — 28730 PR FUSION FOOT BONES,MIDTARSAL,MULTI: ICD-10-PCS | Mod: 58,LT,, | Performed by: PODIATRIST

## 2020-08-18 PROCEDURE — U0002 COVID-19 LAB TEST NON-CDC: HCPCS

## 2020-08-18 PROCEDURE — 97165 OT EVAL LOW COMPLEX 30 MIN: CPT

## 2020-08-18 PROCEDURE — 27800903 OPTIME MED/SURG SUP & DEVICES OTHER IMPLANTS: Performed by: PODIATRIST

## 2020-08-18 PROCEDURE — 80053 COMPREHEN METABOLIC PANEL: CPT

## 2020-08-18 PROCEDURE — 63600175 PHARM REV CODE 636 W HCPCS: Performed by: NURSE ANESTHETIST, CERTIFIED REGISTERED

## 2020-08-18 PROCEDURE — 25000003 PHARM REV CODE 250: Performed by: NURSE PRACTITIONER

## 2020-08-18 PROCEDURE — 15275 PR SKIN SUB GRAFT FACE/NK/HF/G UP TO 100 SQCM: ICD-10-PCS | Mod: 58,51,, | Performed by: PODIATRIST

## 2020-08-18 PROCEDURE — 25000003 PHARM REV CODE 250: Performed by: PODIATRIST

## 2020-08-18 PROCEDURE — 37000009 HC ANESTHESIA EA ADD 15 MINS: Performed by: PODIATRIST

## 2020-08-18 PROCEDURE — S0020 INJECTION, BUPIVICAINE HYDRO: HCPCS | Performed by: PODIATRIST

## 2020-08-18 PROCEDURE — A4216 STERILE WATER/SALINE, 10 ML: HCPCS | Performed by: ANESTHESIOLOGY

## 2020-08-18 PROCEDURE — 20680 PR REMOVAL DEEP IMPLANT: ICD-10-PCS | Mod: 58,51,LT, | Performed by: PODIATRIST

## 2020-08-18 PROCEDURE — 15271 PR SKIN SUB GRAFT TRNK/ARM/LEG: ICD-10-PCS | Mod: 58,51,, | Performed by: PODIATRIST

## 2020-08-18 PROCEDURE — V2790 AMNIOTIC MEMBRANE: HCPCS | Performed by: PODIATRIST

## 2020-08-18 PROCEDURE — 85025 COMPLETE CBC W/AUTO DIFF WBC: CPT

## 2020-08-18 PROCEDURE — C1713 ANCHOR/SCREW BN/BN,TIS/BN: HCPCS | Performed by: PODIATRIST

## 2020-08-18 PROCEDURE — 63600175 PHARM REV CODE 636 W HCPCS: Performed by: PODIATRIST

## 2020-08-18 PROCEDURE — 37000008 HC ANESTHESIA 1ST 15 MINUTES: Performed by: PODIATRIST

## 2020-08-18 PROCEDURE — 25000003 PHARM REV CODE 250: Performed by: ANESTHESIOLOGY

## 2020-08-18 PROCEDURE — 20680 REMOVAL OF IMPLANT DEEP: CPT | Mod: 58,51,LT, | Performed by: PODIATRIST

## 2020-08-18 PROCEDURE — 25000003 PHARM REV CODE 250: Performed by: NURSE ANESTHETIST, CERTIFIED REGISTERED

## 2020-08-18 PROCEDURE — 15271 SKIN SUB GRAFT TRNK/ARM/LEG: CPT | Mod: 58,51,, | Performed by: PODIATRIST

## 2020-08-18 PROCEDURE — 63600175 PHARM REV CODE 636 W HCPCS: Performed by: NURSE PRACTITIONER

## 2020-08-18 PROCEDURE — 36000708 HC OR TIME LEV III 1ST 15 MIN: Performed by: PODIATRIST

## 2020-08-18 PROCEDURE — 28730 FUSION OF FOOT BONES: CPT | Mod: 58,LT,, | Performed by: PODIATRIST

## 2020-08-18 DEVICE — IMPLANTABLE DEVICE: Type: IMPLANTABLE DEVICE | Site: FOOT | Status: FUNCTIONAL

## 2020-08-18 RX ORDER — HYDROCODONE BITARTRATE AND ACETAMINOPHEN 10; 325 MG/1; MG/1
1 TABLET ORAL EVERY 6 HOURS PRN
Status: DISCONTINUED | OUTPATIENT
Start: 2020-08-18 | End: 2020-08-20 | Stop reason: HOSPADM

## 2020-08-18 RX ORDER — IBUPROFEN 200 MG
16 TABLET ORAL
Status: DISCONTINUED | OUTPATIENT
Start: 2020-08-18 | End: 2020-08-20 | Stop reason: HOSPADM

## 2020-08-18 RX ORDER — HYDROMORPHONE HYDROCHLORIDE 2 MG/ML
0.2 INJECTION, SOLUTION INTRAMUSCULAR; INTRAVENOUS; SUBCUTANEOUS EVERY 5 MIN PRN
Status: DISCONTINUED | OUTPATIENT
Start: 2020-08-18 | End: 2020-08-18

## 2020-08-18 RX ORDER — ONDANSETRON 2 MG/ML
4 INJECTION INTRAMUSCULAR; INTRAVENOUS EVERY 6 HOURS PRN
Status: DISCONTINUED | OUTPATIENT
Start: 2020-08-18 | End: 2020-08-20 | Stop reason: HOSPADM

## 2020-08-18 RX ORDER — BUPIVACAINE HYDROCHLORIDE 5 MG/ML
INJECTION, SOLUTION EPIDURAL; INTRACAUDAL
Status: DISCONTINUED | OUTPATIENT
Start: 2020-08-18 | End: 2020-08-18 | Stop reason: HOSPADM

## 2020-08-18 RX ORDER — LIDOCAINE HYDROCHLORIDE 10 MG/ML
INJECTION, SOLUTION EPIDURAL; INFILTRATION; INTRACAUDAL; PERINEURAL
Status: DISCONTINUED | OUTPATIENT
Start: 2020-08-18 | End: 2020-08-18

## 2020-08-18 RX ORDER — FENTANYL CITRATE 50 UG/ML
INJECTION, SOLUTION INTRAMUSCULAR; INTRAVENOUS
Status: DISCONTINUED | OUTPATIENT
Start: 2020-08-18 | End: 2020-08-18

## 2020-08-18 RX ORDER — LISINOPRIL 20 MG/1
20 TABLET ORAL DAILY
Status: DISCONTINUED | OUTPATIENT
Start: 2020-08-19 | End: 2020-08-20 | Stop reason: HOSPADM

## 2020-08-18 RX ORDER — ACETAMINOPHEN 325 MG/1
650 TABLET ORAL EVERY 6 HOURS PRN
Status: DISCONTINUED | OUTPATIENT
Start: 2020-08-18 | End: 2020-08-20 | Stop reason: HOSPADM

## 2020-08-18 RX ORDER — SODIUM CHLORIDE, SODIUM LACTATE, POTASSIUM CHLORIDE, CALCIUM CHLORIDE 600; 310; 30; 20 MG/100ML; MG/100ML; MG/100ML; MG/100ML
INJECTION, SOLUTION INTRAVENOUS CONTINUOUS PRN
Status: DISCONTINUED | OUTPATIENT
Start: 2020-08-18 | End: 2020-08-18

## 2020-08-18 RX ORDER — MORPHINE SULFATE 4 MG/ML
4 INJECTION, SOLUTION INTRAMUSCULAR; INTRAVENOUS EVERY 4 HOURS PRN
Status: DISCONTINUED | OUTPATIENT
Start: 2020-08-18 | End: 2020-08-20 | Stop reason: HOSPADM

## 2020-08-18 RX ORDER — ROCURONIUM BROMIDE 10 MG/ML
INJECTION, SOLUTION INTRAVENOUS
Status: DISCONTINUED | OUTPATIENT
Start: 2020-08-18 | End: 2020-08-18

## 2020-08-18 RX ORDER — GLUCAGON 1 MG
1 KIT INJECTION
Status: DISCONTINUED | OUTPATIENT
Start: 2020-08-18 | End: 2020-08-20 | Stop reason: HOSPADM

## 2020-08-18 RX ORDER — PHENYLEPHRINE HYDROCHLORIDE 10 MG/ML
INJECTION INTRAVENOUS
Status: DISCONTINUED | OUTPATIENT
Start: 2020-08-18 | End: 2020-08-18

## 2020-08-18 RX ORDER — INSULIN ASPART 100 [IU]/ML
0-5 INJECTION, SOLUTION INTRAVENOUS; SUBCUTANEOUS
Status: DISCONTINUED | OUTPATIENT
Start: 2020-08-18 | End: 2020-08-20 | Stop reason: HOSPADM

## 2020-08-18 RX ORDER — METOCLOPRAMIDE HYDROCHLORIDE 5 MG/ML
10 INJECTION INTRAMUSCULAR; INTRAVENOUS EVERY 10 MIN PRN
Status: DISCONTINUED | OUTPATIENT
Start: 2020-08-18 | End: 2020-08-18

## 2020-08-18 RX ORDER — MIDAZOLAM HYDROCHLORIDE 1 MG/ML
INJECTION, SOLUTION INTRAMUSCULAR; INTRAVENOUS
Status: DISCONTINUED | OUTPATIENT
Start: 2020-08-18 | End: 2020-08-18

## 2020-08-18 RX ORDER — CLINDAMYCIN PHOSPHATE 900 MG/50ML
900 INJECTION, SOLUTION INTRAVENOUS
Status: COMPLETED | OUTPATIENT
Start: 2020-08-18 | End: 2020-08-19

## 2020-08-18 RX ORDER — PROPOFOL 10 MG/ML
VIAL (ML) INTRAVENOUS
Status: DISCONTINUED | OUTPATIENT
Start: 2020-08-18 | End: 2020-08-18

## 2020-08-18 RX ORDER — SODIUM CHLORIDE 0.9 % (FLUSH) 0.9 %
3 SYRINGE (ML) INJECTION EVERY 8 HOURS
Status: DISCONTINUED | OUTPATIENT
Start: 2020-08-18 | End: 2020-08-18

## 2020-08-18 RX ORDER — DEXAMETHASONE SODIUM PHOSPHATE 4 MG/ML
INJECTION, SOLUTION INTRA-ARTICULAR; INTRALESIONAL; INTRAMUSCULAR; INTRAVENOUS; SOFT TISSUE
Status: DISCONTINUED | OUTPATIENT
Start: 2020-08-18 | End: 2020-08-18

## 2020-08-18 RX ORDER — ATORVASTATIN CALCIUM 40 MG/1
40 TABLET, FILM COATED ORAL NIGHTLY
Status: DISCONTINUED | OUTPATIENT
Start: 2020-08-18 | End: 2020-08-20 | Stop reason: HOSPADM

## 2020-08-18 RX ORDER — MEPERIDINE HYDROCHLORIDE 25 MG/ML
12.5 INJECTION INTRAMUSCULAR; INTRAVENOUS; SUBCUTANEOUS ONCE AS NEEDED
Status: DISCONTINUED | OUTPATIENT
Start: 2020-08-18 | End: 2020-08-18

## 2020-08-18 RX ORDER — SUCCINYLCHOLINE CHLORIDE 20 MG/ML
INJECTION INTRAMUSCULAR; INTRAVENOUS
Status: DISCONTINUED | OUTPATIENT
Start: 2020-08-18 | End: 2020-08-18

## 2020-08-18 RX ORDER — ONDANSETRON 2 MG/ML
INJECTION INTRAMUSCULAR; INTRAVENOUS
Status: DISCONTINUED | OUTPATIENT
Start: 2020-08-18 | End: 2020-08-18

## 2020-08-18 RX ORDER — FAMOTIDINE 20 MG/1
20 TABLET, FILM COATED ORAL DAILY
Status: DISCONTINUED | OUTPATIENT
Start: 2020-08-18 | End: 2020-08-20 | Stop reason: DRUGHIGH

## 2020-08-18 RX ORDER — NEOSTIGMINE METHYLSULFATE 1 MG/ML
INJECTION, SOLUTION INTRAVENOUS
Status: DISCONTINUED | OUTPATIENT
Start: 2020-08-18 | End: 2020-08-18

## 2020-08-18 RX ORDER — HYDROCODONE BITARTRATE AND ACETAMINOPHEN 10; 325 MG/1; MG/1
1 TABLET ORAL EVERY 6 HOURS PRN
Qty: 28 TABLET | Refills: 0 | Status: SHIPPED | OUTPATIENT
Start: 2020-08-18 | End: 2020-08-25

## 2020-08-18 RX ORDER — CLINDAMYCIN PHOSPHATE 900 MG/50ML
900 INJECTION, SOLUTION INTRAVENOUS
Status: COMPLETED | OUTPATIENT
Start: 2020-08-18 | End: 2020-08-18

## 2020-08-18 RX ORDER — DIPHENHYDRAMINE HYDROCHLORIDE 50 MG/ML
25 INJECTION INTRAMUSCULAR; INTRAVENOUS EVERY 6 HOURS PRN
Status: ACTIVE | OUTPATIENT
Start: 2020-08-18

## 2020-08-18 RX ORDER — IPRATROPIUM BROMIDE AND ALBUTEROL SULFATE 2.5; .5 MG/3ML; MG/3ML
3 SOLUTION RESPIRATORY (INHALATION) EVERY 4 HOURS PRN
Status: DISCONTINUED | OUTPATIENT
Start: 2020-08-18 | End: 2020-08-20 | Stop reason: HOSPADM

## 2020-08-18 RX ORDER — IBUPROFEN 200 MG
24 TABLET ORAL
Status: DISCONTINUED | OUTPATIENT
Start: 2020-08-18 | End: 2020-08-20 | Stop reason: HOSPADM

## 2020-08-18 RX ORDER — KETOROLAC TROMETHAMINE 30 MG/ML
INJECTION, SOLUTION INTRAMUSCULAR; INTRAVENOUS
Status: DISCONTINUED | OUTPATIENT
Start: 2020-08-18 | End: 2020-08-18

## 2020-08-18 RX ORDER — GLYCOPYRROLATE 0.2 MG/ML
INJECTION INTRAMUSCULAR; INTRAVENOUS
Status: DISCONTINUED | OUTPATIENT
Start: 2020-08-18 | End: 2020-08-18

## 2020-08-18 RX ADMIN — LIDOCAINE HYDROCHLORIDE 50 MG: 10 INJECTION, SOLUTION EPIDURAL; INFILTRATION; INTRACAUDAL; PERINEURAL at 07:08

## 2020-08-18 RX ADMIN — SODIUM CHLORIDE, SODIUM LACTATE, POTASSIUM CHLORIDE, AND CALCIUM CHLORIDE: .6; .31; .03; .02 INJECTION, SOLUTION INTRAVENOUS at 08:08

## 2020-08-18 RX ADMIN — PHENYLEPHRINE HYDROCHLORIDE 200 MCG: 10 INJECTION INTRAVENOUS at 07:08

## 2020-08-18 RX ADMIN — NEOSTIGMINE METHYLSULFATE 4 MG: 1 INJECTION INTRAVENOUS at 09:08

## 2020-08-18 RX ADMIN — HYDROCODONE BITARTRATE AND ACETAMINOPHEN 1 TABLET: 10; 325 TABLET ORAL at 11:08

## 2020-08-18 RX ADMIN — FENTANYL CITRATE 50 MCG: 50 INJECTION, SOLUTION INTRAMUSCULAR; INTRAVENOUS at 09:08

## 2020-08-18 RX ADMIN — MORPHINE SULFATE 4 MG: 4 INJECTION, SOLUTION INTRAMUSCULAR; INTRAVENOUS at 01:08

## 2020-08-18 RX ADMIN — SUCCINYLCHOLINE CHLORIDE 140 MG: 20 INJECTION, SOLUTION INTRAMUSCULAR; INTRAVENOUS at 07:08

## 2020-08-18 RX ADMIN — ROBINUL 0.8 MG: 0.2 INJECTION INTRAMUSCULAR; INTRAVENOUS at 09:08

## 2020-08-18 RX ADMIN — DEXAMETHASONE SODIUM PHOSPHATE 4 MG: 4 INJECTION, SOLUTION INTRA-ARTICULAR; INTRALESIONAL; INTRAMUSCULAR; INTRAVENOUS; SOFT TISSUE at 07:08

## 2020-08-18 RX ADMIN — Medication 3 ML: at 01:08

## 2020-08-18 RX ADMIN — HYDROCODONE BITARTRATE AND ACETAMINOPHEN 1 TABLET: 10; 325 TABLET ORAL at 07:08

## 2020-08-18 RX ADMIN — ATORVASTATIN CALCIUM 40 MG: 40 TABLET, FILM COATED ORAL at 09:08

## 2020-08-18 RX ADMIN — PROPOFOL 50 MG: 10 INJECTION, EMULSION INTRAVENOUS at 09:08

## 2020-08-18 RX ADMIN — ROCURONIUM BROMIDE 15 MG: 10 INJECTION, SOLUTION INTRAVENOUS at 07:08

## 2020-08-18 RX ADMIN — MIDAZOLAM 2 MG: 1 INJECTION INTRAMUSCULAR; INTRAVENOUS at 07:08

## 2020-08-18 RX ADMIN — INSULIN ASPART 4 UNITS: 100 INJECTION, SOLUTION INTRAVENOUS; SUBCUTANEOUS at 04:08

## 2020-08-18 RX ADMIN — CLINDAMYCIN IN 5 PERCENT DEXTROSE 900 MG: 18 INJECTION, SOLUTION INTRAVENOUS at 09:08

## 2020-08-18 RX ADMIN — CLINDAMYCIN IN 5 PERCENT DEXTROSE 900 MG: 18 INJECTION, SOLUTION INTRAVENOUS at 01:08

## 2020-08-18 RX ADMIN — CLINDAMYCIN PHOSPHATE 900 MG: 18 INJECTION, SOLUTION INTRAVENOUS at 07:08

## 2020-08-18 RX ADMIN — SODIUM CHLORIDE, SODIUM LACTATE, POTASSIUM CHLORIDE, AND CALCIUM CHLORIDE: .6; .31; .03; .02 INJECTION, SOLUTION INTRAVENOUS at 07:08

## 2020-08-18 RX ADMIN — INSULIN ASPART 1 UNITS: 100 INJECTION, SOLUTION INTRAVENOUS; SUBCUTANEOUS at 09:08

## 2020-08-18 RX ADMIN — KETOROLAC TROMETHAMINE 30 MG: 30 INJECTION, SOLUTION INTRAMUSCULAR; INTRAVENOUS at 09:08

## 2020-08-18 RX ADMIN — PHENYLEPHRINE HYDROCHLORIDE 100 MCG: 10 INJECTION INTRAVENOUS at 07:08

## 2020-08-18 RX ADMIN — PHENYLEPHRINE HYDROCHLORIDE 100 MCG: 10 INJECTION INTRAVENOUS at 08:08

## 2020-08-18 RX ADMIN — FAMOTIDINE 20 MG: 20 TABLET ORAL at 03:08

## 2020-08-18 RX ADMIN — ROCURONIUM BROMIDE 10 MG: 10 INJECTION, SOLUTION INTRAVENOUS at 07:08

## 2020-08-18 RX ADMIN — FENTANYL CITRATE 50 MCG: 50 INJECTION, SOLUTION INTRAMUSCULAR; INTRAVENOUS at 07:08

## 2020-08-18 RX ADMIN — PROPOFOL 150 MG: 10 INJECTION, EMULSION INTRAVENOUS at 07:08

## 2020-08-18 RX ADMIN — ONDANSETRON 4 MG: 2 INJECTION, SOLUTION INTRAMUSCULAR; INTRAVENOUS at 07:08

## 2020-08-18 NOTE — HPI
Kay Galarza is a 66 yo female with PMHx of DM, HTN, HLD, Asthma, Arthritis, and Arthritis who was admitted s/p Charcot foot reconstruction with revision  of medial column fusion performed by Dr. Gray (Podiatry) today.  Pt underwent LLE ankle ORIF with LLE Charcot Foot reconstruction on 7/23/2020.  Pt reports associated symptoms include: LLE/foot pain and gait instability.  Pt denies HA, dizziness, fatigue, trauma, CP, SOB, nausea, vomiting, abdominal pain, and all additional complaints.  Pt reports compliance with prescribed medications and dietary restrictions.  Pt speaks Kazakh and Cuca used for translation.  Pt has history of Asthma with rescue inhaler used a few days ago but denies previous intubation.  Pt denies smoking and use of ETOH.  Pt is a full code and daughter is the surrogate decision maker.  Pt was taking Xarelto with last dose on 8/15/2020.  Hospital Medicine contacted for admission post procedure with patient placed on Medical Surgical Unit for continued evaluation.

## 2020-08-18 NOTE — ANESTHESIA POSTPROCEDURE EVALUATION
Anesthesia Post Evaluation    Patient: Kay Galarza    Procedure(s) Performed: Procedure(s) (LRB):  RECONSTRUCTION, CHARCOT FOOT, WITH FUSION (Left)  APPLICATION, WOUND VAC (Left)  APPLICATION, GRAFT (Left)    Final Anesthesia Type: general    Patient location during evaluation: PACU  Patient participation: No - Unable to Participate, Other Reason (see comments)  Level of consciousness: awake and alert  Post-procedure vital signs: reviewed and stable  Pain management: adequate  Airway patency: patent  JOHN mitigation strategies: Verification of full reversal of neuromuscular block, Extubation while patient is awake and Multimodal analgesia  PONV status at discharge: No PONV  Anesthetic complications: no      Cardiovascular status: hemodynamically stable  Respiratory status: unassisted  Hydration status: euvolemic  Follow-up not needed.          Vitals Value Taken Time   /58 08/18/20 1000   Temp  08/18/20 1002   Pulse 69 08/18/20 1001   Resp 14 08/18/20 1001   SpO2 100 % 08/18/20 1001   Vitals shown include unvalidated device data.      No case tracking events are documented in the log.      Pain/Pool Score: No data recorded

## 2020-08-18 NOTE — INTERVAL H&P NOTE
The patient has been examined and the H&P has been reviewed:    I concur with the findings and no changes have occurred since H&P was written.    Surgery risks, benefits and alternative options discussed and understood by patient/family.          Active Hospital Problems    Diagnosis  POA    Charcot's joint of foot, left [M14.672]  Yes      Resolved Hospital Problems   No resolved problems to display.

## 2020-08-18 NOTE — OP NOTE
[facility]  Podiatric Medicine & Surgery  Operative Report    SUMMARY     Date of Procedure: 8/18/2020    Procedure: Procedure(s):  RECONSTRUCTION, CHARCOT FOOT, WITH FUSION  APPLICATION, WOUND VAC  APPLICATION, GRAFT    Surgeon(s) and Role: Surgeon(s) and Role:     * Kirt Gray DPM - Primary    Pre-Operative Diagnosis: Pre-Op Diagnosis Codes:     * Charcot's joint of left foot [M14.672]     * Painful orthopaedic hardware [T84.84XA]     * Non-healing surgical wound, sequela [T81.89XS]     * Failure of orthopedic hardware.     Post-Operative Diagnosis: Post-Op Diagnosis Codes:     * Charcot's joint of left foot [M14.672]     * Painful orthopaedic hardware [T84.84XA]     * Non-healing surgical wound, sequela [T81.89XS]     * Failure of orthopedic hardware.     Anesthesia: General    Technical Procedures Used:   1. Revision of Charcot deformity via multiple midfoot joint fusions.   2. Application of amniotic membrane.   3. Excisional wound debridement, left ankle.   4. Excisional wound debridement, left foot.    5. Application of WoundVac, left foot.   6. Application of WoundVac, left ankle.      Description of the Findings of the Procedure: The patient was seen in the Holding Room. The risks, benefits, complications, treatment options, and expected outcomes were discussed with the patient. The risks and potential complications of their problem and purposed treatment include but are not limited to infection, nerve injury, vascular injury, nonunion/malunion/delayed union of the surgical site, persistent pain, potential skin necrosis, deep vein thrombosis, possible pulmonary embolus, complications of the anesthetics and failure of the implant.  The patient concurred with the proposed plan, giving informed consent. The patient is aware that the procedure may be a part of a staged collection of procedures for definitive cure and/or alleviation of symptoms. Patient is aware of  potential/likely WoundVac application due to non-healing surgical wounds in the setting of uncontrolled DMII. Patient has a lateral ankle wound dehiscence, and a medial midfoot wound dehiscence and the WoundVac is absolutely necessary in order to avoid further surgical s/p complications in the form of significant infection that may lead to further surgery or limb loss in the setting of diabetes mellitus with peripheral neuropathy. The site of surgery properly noted/marked. Preoperative intravenous antibiotics are hanging at bedside, and are currently being administered via the heparin lock. The patient was taken to Operating Suite.    Once in the operative suite, the patient is transferred onto the operative table in the supine position. A TIME-OUT is taken as per protocol to identify the proper patient, procedure to be performed, and laterality.  The patient is properly positioned on the operating room table for ease of dissection and for any ancillary imaging.  A well-padded tourniquet was applied to the left mid-thigh.  Nursing and ancillary OR staff prepared the patient for the procedure. The patient is adequately sedated by the Attending Anesthesiologist and/or covering CRNA. Next, the operative limb was rendered sterile using chlorhexidine paint and scrub.  Sterile sheets and drapes were applied thereafter. Another TIME-OUT is taken as per protocol to identify the proper patient, procedure to be performed, and laterality.      The tourniquet is elevated to 350mmHg after the limb is exsanguinated for approximately 2 min with an Esmarch bandage. On this, sharp skin incision at the medial border of the foot in the area prior incision.  Deep dissection with tenotomy scissor.  Electrocautery as necessitated.  All necrotic and nonviable tissues are removed in toto with a bone rongeur.  Following this, the talonavicular joint is pinned into place with several 0.062 K-wires.  At this time, the large intramedullary  bolt at the medial column is removed to the level of the 1st metatarsal diaphysis.    After its removal, further joint prep of the navicular cuneiform articulation as was the 1st metatarsal tarsal articulation.  Further joint prep of the inter-cuneiform joints and other midfoot /central column joints. Of note, the medial cuneiform bone is split at the junction of the 75%/25% local.  This area is copiously debrided with a sharp curved osteotome.  The 1st cuneiform interspace is also debrided.  At this time, plate fixation at the medial talus with locking screws.  Plate is from [id number]. The plate is then used to buttress in the medially translated medial cuneiform bone.  All cavities are backfilled with a combination of cancellous chips and DBM putty.  After adequate buttressing, the medial cuneiform is pinned into place with the assistance of a large bone clamp to the 4th metatarsal.  At this point time, the large intramedullary beam/screw/bolt is reinserted back to the posterior talus.  This maneuver is complete with the assistance of fluoroscopy.     At this time, several of the holes, dorsally and plantarly are field with [id number] locking and nonlocking screws.  At the midfoot, the locking and nonlocking screws are run laterally to about the 2/3 tarsometatarsal joint.  Copious lavage with sterile saline solution.  At this time, live fluoroscopy is used to confirm anatomic alignment all structures and reduction of the 1st webspace.  All bone cavities are back filled using a combination cancellous chips as well as DBM putty.    All of the prior surgical wounds which are not completely healed are surgically debrided. Excisional wound debridement was performed using sharp #10/#15 blade/rounded scalpel and tissue nipper, with removal of all non-viable skin and soft tissues; necrotic skin/tissue formation. The woundbase/wound bed was also debrided to encourage bleeding as to promote/stimulate healing. Debridement was  excisional and included epidermal, dermal and subcutaneous tissues as well in some instances, muscle/fascia.  An area totaling approximately 2cm x 19mcm x 0.50cm is debrided in toto.  Hemostasis was achieved.  The tourniquet is deflated and hemostasis achieved.    Following this, application of amniotic cord graft atop the plate fixation at the medial column, as well as at the lateral aspect the ankle joint area of delayed wound healing.    Thereafter, application of WoundVac in standard fashion (white foam, black foam, with settings at 125mmHg, Continuous and Medium Intensity) follow by DSD.       All incisions are dressed with Xeroform/Adaptic nonadherent dressings followed by sterile 4 x 4 gauze, abdominal pad, Sheron/Kerlix and light ACE. Xie Compression is applied w/ a posterior splint.    The anesthesia is weaned. There were no complications to this procedure. Any final necessary imaging to be performed in the PACU if it was not performed here in the OR suite.  The patient is transferred to the [facility] bed/stretcher, [facility]. The patient is transferred to the PACU.    Significant Surgical Tasks Conducted by the Assistant(s), if Applicable: N/A    Complications: * No complications entered in OR log *    Estimated Blood Loss (EBL): less than 50 mL    Drains: N/A    Implants:   Implant Name Type Inv. Item Serial No.  Lot No. LRB No. Used Action   WIRE C TROCAR TIP .062 - [id number]  WIRE C TROCAR TIP .062  [id number] Left 3 Implanted and Explanted   WIRE C TROCAR TIP .062 - [id number]  WIRE C TROCAR TIP .062  [id number] Left 2 Implanted and Explanted   [id number] Bone  [id number] [employer]  Left 1 Implanted   [id number] Amniotic  [id number] [employer]  Left 1 Implanted   Medial Straddle Plate Left X-large    [employer]  Left 1 Implanted   4.2X34 NONLOCKING      Left 2 Implanted   4.2X32 LOCKING    [employer]  Left 1 Implanted    4.2X30 LOCKING    [employer]  Left 2 Implanted   4.2X24 LOCKING    [employer]  Left 1 Implanted   4.2X34 LOCKING    [employer]  Left 1 Implanted   3.5X20 LOCKING    [employer]  Left 3 Implanted   OLIVE WIRE 1.4 SMOOTH    [employer]  Left 2 Implanted and Explanted       Specimens: * No specimens in log *    Condition: stable    Disposition: PACU - hemodynamically stable.    Attestation: I performed the procedure.

## 2020-08-18 NOTE — ASSESSMENT & PLAN NOTE
-last rescue episode occurred a few days ago per pt   -Duonebs as needed   -supplemental oxygen as needed

## 2020-08-18 NOTE — HISTORY AND PHYSICAL ADDENDUM
Patient information was obtained from patient, relative(s) and ER records.            Subjective:            Principal Problem:Lisfranc dislocation, left, sequela         Chief Complaint:           Chief Complaint       Patient presents with            Foot Injury                 HPI: Ms Rob is a 65 year old female with PMHx of DM, HTN and HLD who underwent ORIF of tri-malleolar ankle fracture and charcot foot reconstruction by Dr Gray today. Tolerated procedure well, vital signs stable. Denies associated symptoms of chest pain, shortness of breath, fever, chills, nausea, vomiting or diaphoresis. Patient speak Maori, daughter at bedside and assist with translation. She is a full code, daughter, Cass, is surrogate decision maker. Patient is placed in post op extended recovery under the care of Hospital Medicine.                 Past Medical History:       Diagnosis     Date            Arthritis                   DERIAN KNEES            Asthma                  Cataract                  Diabetes mellitus                  Diabetes mellitus, type 2                  Diabetic retinopathy                  Hyperlipidemia                  Hypertension                               Past Surgical History:       Procedure     Laterality     Date            CATARACT EXTRACTION W/  INTRAOCULAR LENS IMPLANT     Right     07/18/2018            CATARACT EXTRACTION W/  INTRAOCULAR LENS IMPLANT     Left     03/13/2019            COLONOSCOPY     N/A     12/22/2018             Procedure: COLONOSCOPY;  Surgeon: Zak Dover MD;  Location[facility];  Service: Endoscopy;  Laterality: N/A;            ENDOSCOPIC VEIN LASER TREATMENT     Bilateral     1990's                        Review of patient's allergies indicates:       Allergen     Reactions            Pollen extracts                         No current facility-administered medications on file prior to encounter.                    Current Outpatient  "Medications on File Prior to Encounter       Medication     Sig            clobetasol (TEMOVATE) 0.05 % external solution     Apply topically 2 (two) times daily. To scalp only            emollient combination no.69 (EUCERIN SKIN CALMING) Crea     Apply BID            magnesium oxide (MAG-OX) 400 mg (241.3 mg magnesium) tablet     Take 500 mg by mouth every evening.            ammonium lactate (AMLACTIN) 12 % lotion     Use daily.  Apply to damp skin after bathing.            pen needle, diabetic (BD ULTRA-FINE SHORT PEN NEEDLE) 31 gauge x 5/16" Ndle     USE 1  ONCE DAILY            triamcinolone acetonide 0.025% (KENALOG) 0.025 % cream     AAA bid as needed for flares.  Mild steroid.                   Family History                    Problem       Relation (Age of Onset)               Cancer     Father             Diabetes     Sister             Glaucoma     Mother, Maternal Grandmother             Hypertension     Father, Mother             Stroke     Father                               Tobacco Use            Smoking status:     Never Smoker            Smokeless tobacco:     Never Used       Substance and Sexual Activity            Alcohol use:     No            Drug use:     No            Sexual activity:     Yes            Review of Systems     Constitutional: Negative for chills and fever.     HENT: Negative for congestion, rhinorrhea and sinus pressure.      Respiratory: Negative for apnea, cough, choking, chest tightness, shortness of breath, wheezing and stridor.      Cardiovascular: Negative for chest pain, palpitations and leg swelling.     Gastrointestinal: Negative for abdominal distention, abdominal pain, diarrhea, nausea and vomiting.     Endocrine: Negative for cold intolerance and heat intolerance.     Genitourinary: Negative for difficulty urinating and hematuria.     Musculoskeletal: Negative for arthralgias and joint swelling.     Skin: Negative for color change, pallor and rash. "     Neurological: Negative for dizziness, seizures, weakness, numbness and headaches.     Psychiatric/Behavioral: Negative for agitation. The patient is not nervous/anxious.             Objective:              Vital Signs (Most Recent):    Temp: 97.4 °F (36.3 °C) (07/23/20 1625)    Pulse: 74 (07/23/20 1815)    Resp: 20 (07/23/20 1815)    BP: (!) 142/66 (07/23/20 1815)    SpO2: 98 % (07/23/20 1815)     Vital Signs (24h Range):    Temp:  [97.4 °F (36.3 °C)-97.9 °F (36.6 °C)] 97.4 °F (36.3 °C)    Pulse:  [70-96] 74    Resp:  [9-21] 20    SpO2:  [90 %-100 %] 98 %    BP: (122-164)/(57-91) 142/66            Weight: 95.4 kg (210 lb 5.1 oz)    Body mass index is 31.06 kg/m².         Physical Exam    Vitals signs and nursing note reviewed.   HENT:        Head: Normocephalic and atraumatic.      Right Ear: There is no impacted cerumen.      Left Ear: There is no impacted cerumen.      Nose: No congestion or rhinorrhea.      Mouth/Throat:      Pharynx: No oropharyngeal exudate or posterior oropharyngeal erythema.   Eyes:        General:         Right eye: No discharge.         Left eye: No discharge.   Neck:        Musculoskeletal: No neck rigidity or muscular tenderness.      Vascular: No carotid bruit.   Cardiovascular:        Heart sounds: No murmur. No friction rub. No gallop.    Pulmonary:        Effort: No respiratory distress.      Breath sounds: No stridor. No wheezing, rhonchi or rales.   Chest:        Chest wall: No tenderness.   Abdominal:      General: There is no distension.      Palpations: There is no mass.      Tenderness: There is no abdominal tenderness. There is no right CVA tenderness, guarding or rebound.      Hernia: No hernia is present.     Musculoskeletal:      Comments: Left lower leg Splint intact      Lymphadenopathy:        Cervical: No cervical adenopathy.   Skin:       General: Skin is warm and dry.      Capillary Refill: Capillary refill takes less than 2 seconds.   Neurological:        General:  No focal deficit present.      Mental Status: She is alert and oriented to person, place, and time.   Psychiatric:         Mood and Affect: Mood normal.                                                   Assessment/Plan:            * Lisfranc dislocation, left, sequela    Place in Post op Extended Recover     S/P ORIF of tri-malleolar ankle fracture and charcot foot reconstruction     Podiatry managing     PT/OT     Pain control     Case management for discharge planning to Rehab     No weight bearing left lower ext    Elevation and Ice to Left lower ext              Diabetic Charcot's foot    As above               HLD (hyperlipidemia)    Continue Statin               Type II diabetes mellitus with neurological manifestations    Diabetic diet     Accu checks ACHS with SSI               Essential hypertension    Continue home BP medications                   VTE Risk Mitigation (From admission, onward)               None                               Adrien Stephenson NP    Department of Hospital Medicine     [facility] -

## 2020-08-18 NOTE — ASSESSMENT & PLAN NOTE
-Pt admitted to Medical Surgical Unit post procedure   -LLE ankle ORIF with LLE Charcot Foot reconstruction on 7/23/20  -revision  of medial column fusion performed today per Dr. Gray (Podiatry)  -managed per Podiatry   -IV antibiotics   -analgesia as needed   -antiemetics as needed   -Nozin   -pt wa taking Xarelto with last dose on 8/15/20  -neurovascular checks and affected limb elevated   -PT/OT evaluation prior to discharge   -Social Work consulted to assist with wound vac for home use and discharge planning

## 2020-08-18 NOTE — PLAN OF CARE
Bed mobility min A; sit to/from stand with RW min a nwb l le; unable to to hop or transfer to chair today; rec hhpt

## 2020-08-18 NOTE — SUBJECTIVE & OBJECTIVE
Past Medical History:   Diagnosis Date    Arthritis     DERIAN KNEES    Asthma     SEASONAL    Cataract     Diabetes mellitus     Diabetes mellitus, type 2     Diabetic retinopathy     Hyperlipidemia     Hypertension        Past Surgical History:   Procedure Laterality Date    CATARACT EXTRACTION W/  INTRAOCULAR LENS IMPLANT Right 07/18/2018    CATARACT EXTRACTION W/  INTRAOCULAR LENS IMPLANT Left 03/13/2019    COLONOSCOPY N/A 12/22/2018    Procedure: COLONOSCOPY;  Surgeon: Zak Dover MD;  Location: [facility];  Service: Endoscopy;  Laterality: N/A;    ENDOSCOPIC VEIN LASER TREATMENT Bilateral 1990's    FIXATION OF SYNDESMOSIS OF ANKLE Left 7/23/2020    Procedure: FIXATION, SYNDESMOSIS, ANKLE;  Surgeon: Kirt Gray DPM;  Location: [facility] OR;  Service: Podiatry;  Laterality: Left;    MANIPULATION WITH ANESTHESIA Left 7/23/2020    Procedure: MANIPULATION, WITH ANESTHESIA;  Surgeon: Kirt Gray DPM;  Location: [facility] OR;  Service: Podiatry;  Laterality: Left;    MIDFOOT ARTHRODESIS Left 7/23/2020    Procedure: FUSION, JOINT, MIDFOOT;  Surgeon: Kirt Gray DPM;  Location: [facility] OR;  Service: Podiatry;  Laterality: Left;  2nd tarsometatarsal joint dislocation via fusion    OPEN REDUCTION AND INTERNAL FIXATION (ORIF) OF INJURY OF ANKLE Left 7/23/2020    Procedure: ORIF, ANKLE;  Surgeon: Kirt Gray DPM;  Location: [facility] OR;  Service: Podiatry;  Laterality: Left;       Review of patient's allergies indicates:   Allergen Reactions    Pollen extracts        No current facility-administered medications on file prior to encounter.      Current Outpatient Medications on File Prior to Encounter   Medication Sig    clobetasol (TEMOVATE) 0.05 % external solution Apply topically 2 (two) times daily. To scalp only    diclofenac sodium (VOLTAREN) 1 % Gel Apply topically 2 (two) times daily.    fluocinolone (SYNALAR) 0.01 % Sham Apply no more than 1 ounce to scalp once daily; work into lather and  "allow to remain on scalp for ~5 minutes. Remove from hair and scalp by rinsing thoroughly with water.    glimepiride (AMARYL) 4 MG tablet Take 1 tablet (4 mg total) by mouth daily with breakfast.    lisinopriL-hydrochlorothiazide (PRINZIDE,ZESTORETIC) 20-25 mg Tab Take 1 tablet by mouth once daily. (Patient taking differently: Take 1 tablet by mouth daily as needed. )    rivaroxaban (XARELTO) 10 mg Tab Take 1 tablet (10 mg total) by mouth daily with dinner or evening meal.    albuterol (PROVENTIL/VENTOLIN HFA) 90 mcg/actuation inhaler Inhale 2 puffs into the lungs every 6 (six) hours as needed for Wheezing. Rescue    ammonium lactate (AMLACTIN) 12 % lotion Use daily.  Apply to damp skin after bathing.    atorvastatin (LIPITOR) 40 MG tablet Take 1 tablet (40 mg total) by mouth once daily. (Patient taking differently: Take 40 mg by mouth once daily. )    dulaglutide (TRULICITY) 1.5 mg/0.5 mL pen injector INJECT 1.5 MG INTO THE SKIN EVERY 7 DAYS    emollient combination no.69 (EUCERIN SKIN CALMING) Crea Apply BID    fluticasone propionate (FLONASE) 50 mcg/actuation nasal spray 2 sprays (100 mcg total) by Each Nostril route once daily.    gabapentin (NEURONTIN) 100 MG capsule Take 1 capsule (100 mg total) by mouth 3 (three) times daily. (Patient taking differently: Take 100 mg by mouth 3 (three) times daily. )    insulin glargine, TOUJEO, (TOUJEO SOLOSTAR U-300 INSULIN) 300 unit/mL (1.5 mL) InPn pen Inject 50 Units into the skin 2 (two) times daily.    ketoconazole (NIZORAL) 2 % shampoo Apply topically 3 (three) times a week.    levocetirizine (XYZAL) 5 MG tablet Take 1 tablet (5 mg total) by mouth every evening.    magnesium oxide (MAG-OX) 400 mg (241.3 mg magnesium) tablet Take 500 mg by mouth every evening.    mupirocin (BACTROBAN) 2 % ointment APPLY  OINTMENT TOPICALLY ONCE DAILY    pen needle, diabetic (BD ULTRA-FINE SHORT PEN NEEDLE) 31 gauge x 5/16" Ndle USE 1  ONCE DAILY    traZODone (DESYREL) " 150 MG tablet Take 1 tablet (150 mg total) by mouth nightly as needed for Insomnia.    triamcinolone acetonide 0.025% (KENALOG) 0.025 % cream AAA bid as needed for flares.  Mild steroid.     Family History     Problem Relation (Age of Onset)    Cancer Father    Diabetes Sister    Glaucoma Mother, Maternal Grandmother    Hypertension Father, Mother    Stroke Father        Tobacco Use    Smoking status: Never Smoker    Smokeless tobacco: Never Used   Substance and Sexual Activity    Alcohol use: Never     Frequency: Never    Drug use: No    Sexual activity: Yes     Review of Systems   Constitutional: Positive for activity change. Negative for appetite change, chills, fatigue and fever.   HENT: Negative for congestion, postnasal drip, sinus pressure, sore throat and trouble swallowing.    Eyes: Negative.    Respiratory: Negative for cough, shortness of breath and wheezing.    Cardiovascular: Negative for chest pain, palpitations and leg swelling.   Gastrointestinal: Negative for abdominal distention, abdominal pain, diarrhea, nausea and vomiting.   Endocrine: Negative for cold intolerance and heat intolerance.   Genitourinary: Negative for dysuria, flank pain, frequency and urgency.   Musculoskeletal: Positive for arthralgias, gait problem and joint swelling.   Skin: Positive for wound. Negative for color change.   Allergic/Immunologic: Negative for environmental allergies.   Neurological: Negative for dizziness, weakness and headaches.   Hematological: Does not bruise/bleed easily.   Psychiatric/Behavioral: Negative for agitation, confusion and sleep disturbance. The patient is not nervous/anxious.      Objective:     Vital Signs (Most Recent):  Temp: 98 °F (36.7 °C) (08/18/20 1325)  Pulse: 86 (08/18/20 1325)  Resp: 19 (08/18/20 1355)  BP: 130/88 (08/18/20 1325)  SpO2: 97 % (08/18/20 1325) Vital Signs (24h Range):  Temp:  [97.9 °F (36.6 °C)-98.1 °F (36.7 °C)] 98 °F (36.7 °C)  Pulse:  [68-93] 86  Resp:  [13-20]  19  SpO2:  [94 %-100 %] 97 %  BP: (101-133)/(55-88) 130/88     Weight: 95.5 kg (210 lb 8.6 oz)  Body mass index is 31.09 kg/m².    Physical Exam  Constitutional:       Appearance: Normal appearance.   HENT:      Nose: Nose normal.      Mouth/Throat:      Pharynx: Oropharynx is clear.   Eyes:      Conjunctiva/sclera: Conjunctivae normal.   Neck:      Musculoskeletal: Normal range of motion and neck supple.   Cardiovascular:      Rate and Rhythm: Normal rate and regular rhythm.      Pulses: Normal pulses.      Heart sounds: Normal heart sounds.   Pulmonary:      Effort: Pulmonary effort is normal.      Breath sounds: Normal breath sounds.   Abdominal:      General: Bowel sounds are normal.      Palpations: Abdomen is soft.   Genitourinary:     Comments: Deferred   Musculoskeletal:      Left lower leg: Edema present.      Comments: Limited ROM to LLE post procedure    Skin:     General: Skin is warm and dry.      Comments: Surgical dressing/cast to LLE. Toes warm and pink with brisk capillary refill- wound vac in place with bloody drainage    Neurological:      General: No focal deficit present.      Mental Status: She is alert and oriented to person, place, and time.   Psychiatric:         Mood and Affect: Mood normal.             Significant Labs: in progress   Significant Imaging:

## 2020-08-18 NOTE — PT/OT/SLP EVAL
Occupational Therapy   Evaluation and Discharge Note    Name: Kay Galarza  MRN: [id number]  Admitting Diagnosis:  <principal problem not specified> Day of Surgery    Recommendations:     Discharge Recommendations: home health OT(for safety)  Discharge Equipment Recommendations:     Barriers to discharge:       Assessment:     Kay Galarza is a 65 y.o. female with a medical diagnosis of <principal problem not specified>. At this time, patient is functioning at their prior level of function and does not require further acute OT services.     Plan:     During this hospitalization, patient does not require further acute OT services.  Please re-consult if situation changes.    · Plan of Care Reviewed with: patient    Subjective     Chief Complaint:   Patient/Family Comments/goals:     Occupational Profile:  Living Environment: lives with  Son in 1 story house with no steps  Previous level of function: (I) with adl's and mod (I) with functional mobility  Roles and Routines: occupational therapy  Equipment Used at home:     Assistance upon Discharge:     Pain/Comfort:  · Pain Rating 1: 8/10  · Location - Side 1: Left  · Location 1: foot    Patients cultural, spiritual, Muslim conflicts given the current situation:      Objective:     Communicated with: nurse and epic chart review prior to session.  Patient found HOB elevated with   upon OT entry to room.    General Precautions: Standard, fall   Orthopedic Precautions:N/A   Braces: N/A     Occupational Performance:    Bed Mobility:    · Patient completed Rolling/Turning to Left with  stand by assistance  · Patient completed Rolling/Turning to Right with stand by assistance  · Patient completed Scooting/Bridging with stand by assistance  · Patient completed Supine to Sit with stand by assistance  · Patient completed Sit to Supine with stand by assistance    Functional Mobility/Transfers:  · Patient completed Sit <> Stand Transfer with minimum  assistance  with  rolling walker   · Functional Mobility: na  ·     Activities of Daily Living:  · Upper Body Dressing: stand by assistance .  · Lower Body Dressing: supervision .    Cognitive/Visual Perceptual:  Cognitive/Psychosocial Skills:     -       Oriented to: Person, Place, Time and Situation   -       Follows Commands/attention:Follows multistep  commands  -       Communication: Greek speaking  -       Memory: unable to assess  -       Safety awareness/insight to disability: impaired     Physical Exam:  Upper Extremity Range of Motion:     -       Right Upper Extremity: WFL  -       Left Upper Extremity: WFL  Upper Extremity Strength:    -       Right Upper Extremity: WFL  -       Left Upper Extremity: WFL   Strength:    -       Right Upper Extremity: WFL  -       Left Upper Extremity: WFL    AMPAC 6 Click ADL:  AMPAC Total Score: 24    Treatment & Education:  Pt req cga/ min a with functional mobility and pt unable to side step. Pt discharged from skilled O.T. and referred to P.T. for gait training. Recommend [facility] OT for safety and tranfers in home  Education:    Patient left HOB elevated with all lines intact, call button in reach, nuirse esteban notified and daughter present    GOALS:   Multidisciplinary Problems     Occupational Therapy Goals     Not on file                History:     Past Medical History:   Diagnosis Date    Arthritis     DERIAN KNEES    Asthma     SEASONAL    Cataract     Diabetes mellitus     Diabetes mellitus, type 2     Diabetic retinopathy     Hyperlipidemia     Hypertension        Past Surgical History:   Procedure Laterality Date    CATARACT EXTRACTION W/  INTRAOCULAR LENS IMPLANT Right 07/18/2018    CATARACT EXTRACTION W/  INTRAOCULAR LENS IMPLANT Left 03/13/2019    COLONOSCOPY N/A 12/22/2018    Procedure: COLONOSCOPY;  Surgeon: Zak Dover MD;  Location: [facility];  Service: Endoscopy;  Laterality: N/A;    ENDOSCOPIC VEIN LASER TREATMENT Bilateral  1990's    FIXATION OF SYNDESMOSIS OF ANKLE Left 7/23/2020    Procedure: FIXATION, SYNDESMOSIS, ANKLE;  Surgeon: Kirt Gray DPM;  Location: [facility] OR;  Service: Podiatry;  Laterality: Left;    MANIPULATION WITH ANESTHESIA Left 7/23/2020    Procedure: MANIPULATION, WITH ANESTHESIA;  Surgeon: Kirt Gray DPM;  Location: [facility] OR;  Service: Podiatry;  Laterality: Left;    MIDFOOT ARTHRODESIS Left 7/23/2020    Procedure: FUSION, JOINT, MIDFOOT;  Surgeon: Kirt Gray DPM;  Location: [facility] OR;  Service: Podiatry;  Laterality: Left;  2nd tarsometatarsal joint dislocation via fusion    OPEN REDUCTION AND INTERNAL FIXATION (ORIF) OF INJURY OF ANKLE Left 7/23/2020    Procedure: ORIF, ANKLE;  Surgeon: Kirt Gray DPM;  Location: [facility] OR;  Service: Podiatry;  Laterality: Left;       Time Tracking:     OT Date of Treatment: 08/18/20  OT Start Time: 1330  OT Stop Time: 1400  OT Total Time (min): 30 min    Billable Minutes:Evaluation 10 minutes  Therapeutic Activity 20 minutes    Shwetha Ward OT  8/18/2020

## 2020-08-18 NOTE — ASSESSMENT & PLAN NOTE
-Accuchecks and SSI   -diabetic/cardiac diet resumed   -HbA1c 9.7 in 7/2020  -Levemir initiated

## 2020-08-18 NOTE — TRANSFER OF CARE
"Anesthesia Transfer of Care Note    Patient: Kay Galarza    Procedure(s) Performed: Procedure(s) (LRB):  RECONSTRUCTION, CHARCOT FOOT, WITH FUSION (Left)  APPLICATION, WOUND VAC (Left)  APPLICATION, GRAFT (Left)    Patient location: PACU    Anesthesia Type: general    Transport from OR: Transported from OR on room air with adequate spontaneous ventilation    Post pain: adequate analgesia    Post assessment: no apparent anesthetic complications    Post vital signs: stable    Level of consciousness: awake    Complications: none    Transfer of care protocol was followed      Last vitals:   Visit Vitals  /61 (Patient Position: Sitting)   Pulse 93   Temp 36.6 °C (97.9 °F) (Skin)   Resp 18   Ht 5' 9" (1.753 m)   Wt 95.5 kg (210 lb 8.6 oz)   SpO2 98%   Breastfeeding No   BMI 31.09 kg/m²     "

## 2020-08-18 NOTE — PLAN OF CARE
Patient s/p revision of 7/23/2020 Charcot Foot Reconstruction (Ankle ORIF was also performed at that time, but did not have complications) via fusion with application of WoundVac.    NWB to the LLE at all times. [place] therapy ATC.    [facility] for WoundVac management once or twice weekly.    She does already have [facility] as out-patient.?    She does have all DME.    Continue DVT PPx with Xarelto starting tomorrow night.    S/P opioid sent to local pharmacy at this time.     Stable for D/C on tomorrow if all is well.

## 2020-08-18 NOTE — H&P
[facility] Hospital Medicine  History & Physical    Patient Name: Kay Galarza  MRN: [id number]  Admission Date: 8/18/2020  Attending Physician: Dr. Everton Lyon    Primary Care Provider: Lucy Negron MD         Patient information was obtained from patient, past medical records and ER records.     Subjective:     Principal Problem:Charcot foot reconstruction     Chief Complaint: Left ankle/foot pain      HPI: Kay Galarza is a 64 yo female with PMHx of DM, HTN, HLD, Asthma, Arthritis, and Arthritis who was admitted s/p Charcot foot reconstruction with revision  of medial column fusion performed by Dr. Gray (Podiatry) today.  Pt underwent LLE ankle ORIF with LLE Charcot Foot reconstruction on 7/23/2020.  Pt reports associated symptoms include: LLE/foot pain and gait instability.  Pt denies HA, dizziness, fatigue, trauma, CP, SOB, nausea, vomiting, abdominal pain, and all additional complaints.  Pt reports compliance with prescribed medications and dietary restrictions.  Pt speaks English and Cuca used for translation.  Pt has history of Asthma with rescue inhaler used a few days ago but denies previous intubation.  Pt denies smoking and use of ETOH.  Pt is a full code and daughter is the surrogate decision maker.  Pt was taking Xarelto with last dose on 8/15/2020.  Hospital Medicine contacted for admission post procedure with patient placed on Medical Surgical Unit for continued evaluation.      Past Medical History:   Diagnosis Date    Arthritis     DERIAN KNEES    Asthma     SEASONAL    Cataract     Diabetes mellitus     Diabetes mellitus, type 2     Diabetic retinopathy     Hyperlipidemia     Hypertension        Past Surgical History:   Procedure Laterality Date    CATARACT EXTRACTION W/  INTRAOCULAR LENS IMPLANT Right 07/18/2018    CATARACT EXTRACTION W/  INTRAOCULAR LENS IMPLANT Left 03/13/2019    COLONOSCOPY N/A 12/22/2018    Procedure:  COLONOSCOPY;  Surgeon: Zak Dover MD;  Location: [facility];  Service: Endoscopy;  Laterality: N/A;    ENDOSCOPIC VEIN LASER TREATMENT Bilateral 1990's    FIXATION OF SYNDESMOSIS OF ANKLE Left 7/23/2020    Procedure: FIXATION, SYNDESMOSIS, ANKLE;  Surgeon: Kirt Gray DPM;  Location: [facility] OR;  Service: Podiatry;  Laterality: Left;    MANIPULATION WITH ANESTHESIA Left 7/23/2020    Procedure: MANIPULATION, WITH ANESTHESIA;  Surgeon: Kirt Gray DPM;  Location: [facility] OR;  Service: Podiatry;  Laterality: Left;    MIDFOOT ARTHRODESIS Left 7/23/2020    Procedure: FUSION, JOINT, MIDFOOT;  Surgeon: Kirt Gray DPM;  Location: [facility] OR;  Service: Podiatry;  Laterality: Left;  2nd tarsometatarsal joint dislocation via fusion    OPEN REDUCTION AND INTERNAL FIXATION (ORIF) OF INJURY OF ANKLE Left 7/23/2020    Procedure: ORIF, ANKLE;  Surgeon: Kirt Gray DPM;  Location: [facility] OR;  Service: Podiatry;  Laterality: Left;       Review of patient's allergies indicates:   Allergen Reactions    Pollen extracts        No current facility-administered medications on file prior to encounter.      Current Outpatient Medications on File Prior to Encounter   Medication Sig    clobetasol (TEMOVATE) 0.05 % external solution Apply topically 2 (two) times daily. To scalp only    diclofenac sodium (VOLTAREN) 1 % Gel Apply topically 2 (two) times daily.    fluocinolone (SYNALAR) 0.01 % Sham Apply no more than 1 ounce to scalp once daily; work into lather and allow to remain on scalp for ~5 minutes. Remove from hair and scalp by rinsing thoroughly with water.    glimepiride (AMARYL) 4 MG tablet Take 1 tablet (4 mg total) by mouth daily with breakfast.    lisinopriL-hydrochlorothiazide (PRINZIDE,ZESTORETIC) 20-25 mg Tab Take 1 tablet by mouth once daily. (Patient taking differently: Take 1 tablet by mouth daily as needed. )    rivaroxaban (XARELTO) 10 mg Tab Take 1 tablet (10 mg total) by mouth daily with dinner or  "evening meal.    albuterol (PROVENTIL/VENTOLIN HFA) 90 mcg/actuation inhaler Inhale 2 puffs into the lungs every 6 (six) hours as needed for Wheezing. Rescue    ammonium lactate (AMLACTIN) 12 % lotion Use daily.  Apply to damp skin after bathing.    atorvastatin (LIPITOR) 40 MG tablet Take 1 tablet (40 mg total) by mouth once daily. (Patient taking differently: Take 40 mg by mouth once daily. )    dulaglutide (TRULICITY) 1.5 mg/0.5 mL pen injector INJECT 1.5 MG INTO THE SKIN EVERY 7 DAYS    emollient combination no.69 (EUCERIN SKIN CALMING) Crea Apply BID    fluticasone propionate (FLONASE) 50 mcg/actuation nasal spray 2 sprays (100 mcg total) by Each Nostril route once daily.    gabapentin (NEURONTIN) 100 MG capsule Take 1 capsule (100 mg total) by mouth 3 (three) times daily. (Patient taking differently: Take 100 mg by mouth 3 (three) times daily. )    insulin glargine, TOUJEO, (TOUJEO SOLOSTAR U-300 INSULIN) 300 unit/mL (1.5 mL) InPn pen Inject 50 Units into the skin 2 (two) times daily.    ketoconazole (NIZORAL) 2 % shampoo Apply topically 3 (three) times a week.    levocetirizine (XYZAL) 5 MG tablet Take 1 tablet (5 mg total) by mouth every evening.    magnesium oxide (MAG-OX) 400 mg (241.3 mg magnesium) tablet Take 500 mg by mouth every evening.    mupirocin (BACTROBAN) 2 % ointment APPLY  OINTMENT TOPICALLY ONCE DAILY    pen needle, diabetic (BD ULTRA-FINE SHORT PEN NEEDLE) 31 gauge x 5/16" Ndle USE 1  ONCE DAILY    traZODone (DESYREL) 150 MG tablet Take 1 tablet (150 mg total) by mouth nightly as needed for Insomnia.    triamcinolone acetonide 0.025% (KENALOG) 0.025 % cream AAA bid as needed for flares.  Mild steroid.     Family History     Problem Relation (Age of Onset)    Cancer Father    Diabetes Sister    Glaucoma Mother, Maternal Grandmother    Hypertension Father, Mother    Stroke Father        Tobacco Use    Smoking status: Never Smoker    Smokeless tobacco: Never Used   Substance " and Sexual Activity    Alcohol use: Never     Frequency: Never    Drug use: No    Sexual activity: Yes     Review of Systems   Constitutional: Positive for activity change. Negative for appetite change, chills, fatigue and fever.   HENT: Negative for congestion, postnasal drip, sinus pressure, sore throat and trouble swallowing.    Eyes: Negative.    Respiratory: Negative for cough, shortness of breath and wheezing.    Cardiovascular: Negative for chest pain, palpitations and leg swelling.   Gastrointestinal: Negative for abdominal distention, abdominal pain, diarrhea, nausea and vomiting.   Endocrine: Negative for cold intolerance and heat intolerance.   Genitourinary: Negative for dysuria, flank pain, frequency and urgency.   Musculoskeletal: Positive for arthralgias, gait problem and joint swelling.   Skin: Positive for wound. Negative for color change.   Allergic/Immunologic: Negative for environmental allergies.   Neurological: Negative for dizziness, weakness and headaches.   Hematological: Does not bruise/bleed easily.   Psychiatric/Behavioral: Negative for agitation, confusion and sleep disturbance. The patient is not nervous/anxious.      Objective:     Vital Signs (Most Recent):  Temp: 98 °F (36.7 °C) (08/18/20 1325)  Pulse: 86 (08/18/20 1325)  Resp: 19 (08/18/20 1355)  BP: 130/88 (08/18/20 1325)  SpO2: 97 % (08/18/20 1325) Vital Signs (24h Range):  Temp:  [97.9 °F (36.6 °C)-98.1 °F (36.7 °C)] 98 °F (36.7 °C)  Pulse:  [68-93] 86  Resp:  [13-20] 19  SpO2:  [94 %-100 %] 97 %  BP: (101-133)/(55-88) 130/88     Weight: 95.5 kg (210 lb 8.6 oz)  Body mass index is 31.09 kg/m².    Physical Exam  Constitutional:       Appearance: Normal appearance.   HENT:      Nose: Nose normal.      Mouth/Throat:      Pharynx: Oropharynx is clear.   Eyes:      Conjunctiva/sclera: Conjunctivae normal.   Neck:      Musculoskeletal: Normal range of motion and neck supple.   Cardiovascular:      Rate and Rhythm: Normal rate and  regular rhythm.      Pulses: Normal pulses.      Heart sounds: Normal heart sounds.   Pulmonary:      Effort: Pulmonary effort is normal.      Breath sounds: Normal breath sounds.   Abdominal:      General: Bowel sounds are normal.      Palpations: Abdomen is soft.   Genitourinary:     Comments: Deferred   Musculoskeletal:      Left lower leg: Edema present.      Comments: Limited ROM to LLE post procedure    Skin:     General: Skin is warm and dry.      Comments: Surgical dressing/cast to LLE. Toes warm and pink with brisk capillary refill- wound vac in place with bloody drainage    Neurological:      General: No focal deficit present.      Mental Status: She is alert and oriented to person, place, and time.   Psychiatric:         Mood and Affect: Mood normal.             Significant Labs: in progress   Significant Imaging:         Assessment/Plan:     Charcot's joint of foot, left  -Pt admitted to Medical Surgical Unit post procedure   -LLE ankle ORIF with LLE Charcot Foot reconstruction on 7/23/20  -revision  of medial column fusion performed today per Dr. Gray (Podiatry)  -managed per Podiatry   -IV antibiotics   -analgesia as needed   -antiemetics as needed   -Nozin   -pt wa taking Xarelto with last dose on 8/15/20-resume when appropriate   -neurovascular checks and affected limb elevated   -PT/OT evaluation prior to discharge   -Social Work consulted to assist with wound vac for home use and discharge planning         HLD (hyperlipidemia)  -Statin       Mild intermittent asthma  -last rescue episode occurred a few days ago per pt   -Duonebs as needed   -supplemental oxygen as needed       Essential hypertension  -stable  -home medications resumed   -HCTZ held       Type 2 diabetes mellitus  -Accuchecks and SSI   -diabetic/cardiac diet resumed   -HbA1c 9.7 in 7/2020  -Levemir initiated         VTE Risk Mitigation (From admission, onward)    None             Kristen Wyatt NP  Department of Hospital Medicine    [facility]

## 2020-08-18 NOTE — BRIEF OP NOTE
[facility]  Brief Operative Note    Surgery Date: 8/18/2020     Surgeon(s) and Role:     * Kirt Gray DPM - Primary    Assisting Surgeon: None    Pre-op Diagnosis:  Charcot's joint of left foot [M14.672]  Painful orthopaedic hardware [T84.84XA]  Non-healing surgical wound, sequela [T81.89XS]    Post-op Diagnosis:  Post-Op Diagnosis Codes:     * Charcot's joint of left foot [M14.672]     * Painful orthopaedic hardware [T84.84XA]     * Non-healing surgical wound, sequela [T81.89XS]    Procedure(s) (LRB):  RECONSTRUCTION, CHARCOT FOOT, WITH FUSION (Left)  APPLICATION, WOUND VAC (Left)  APPLICATION, GRAFT (Left)    Anesthesia: General    Description of the findings of the procedure(s):    1. Revision of Charcot deformity via multiple midfoot joint fusions.   2. Application of amniotic membrane.   3. Excisional wound debridement, left ankle.   4. Excisional wound debridement, left foot.    5. Application of WoundVac, left foot.   6. Application of WoundVac, left ankle.      Estimated Blood Loss: 25 mL         Specimens:   Specimen (12h ago, onward)    None            Discharge Note    OUTCOME: Patient tolerated treatment/procedure well without complication and is now ready for discharge.    DISPOSITION: Admitted as an Inpatient    FINAL DIAGNOSIS:  <principal problem not specified>    FOLLOWUP: In clinic    DISCHARGE INSTRUCTIONS:  No discharge procedures on file.

## 2020-08-18 NOTE — PLAN OF CARE
Discussed poc with pt, pt verbalized understanding    modified visitor policy per CDC guidelines  allowing one visitor from 8a-6pm    Purposeful rounding every 2hours    VS wnl    Blood glucose monitoring not requiring any interventions  Fall precautions in place, remains injury free  Pt denies c/o nausea  Pain under control with PRN meds    Accurate I&Os  Abx given as prescribed  Bed locked at lowest position  Call light within reach    Chart check complete  Will cont to monitor     Wound vac to lle, nonweight bearing status

## 2020-08-19 LAB
ALBUMIN SERPL BCP-MCNC: 2.8 G/DL (ref 3.5–5.2)
ALP SERPL-CCNC: 111 U/L (ref 55–135)
ALT SERPL W/O P-5'-P-CCNC: 7 U/L (ref 10–44)
ANION GAP SERPL CALC-SCNC: 10 MMOL/L (ref 8–16)
AST SERPL-CCNC: 12 U/L (ref 10–40)
BASOPHILS # BLD AUTO: 0.03 K/UL (ref 0–0.2)
BASOPHILS NFR BLD: 0.3 % (ref 0–1.9)
BILIRUB SERPL-MCNC: 0.4 MG/DL (ref 0.1–1)
BUN SERPL-MCNC: 32 MG/DL (ref 8–23)
CALCIUM SERPL-MCNC: 9.4 MG/DL (ref 8.7–10.5)
CHLORIDE SERPL-SCNC: 102 MMOL/L (ref 95–110)
CO2 SERPL-SCNC: 27 MMOL/L (ref 23–29)
CREAT SERPL-MCNC: 1.1 MG/DL (ref 0.5–1.4)
DIFFERENTIAL METHOD: ABNORMAL
EOSINOPHIL # BLD AUTO: 0.2 K/UL (ref 0–0.5)
EOSINOPHIL NFR BLD: 1.9 % (ref 0–8)
ERYTHROCYTE [DISTWIDTH] IN BLOOD BY AUTOMATED COUNT: 12.6 % (ref 11.5–14.5)
EST. GFR  (AFRICAN AMERICAN): >60 ML/MIN/1.73 M^2
EST. GFR  (NON AFRICAN AMERICAN): 52 ML/MIN/1.73 M^2
GLUCOSE SERPL-MCNC: 160 MG/DL (ref 70–110)
HCT VFR BLD AUTO: 28.2 % (ref 37–48.5)
HGB BLD-MCNC: 9 G/DL (ref 12–16)
IMM GRANULOCYTES # BLD AUTO: 0.08 K/UL (ref 0–0.04)
IMM GRANULOCYTES NFR BLD AUTO: 0.8 % (ref 0–0.5)
LYMPHOCYTES # BLD AUTO: 1.2 K/UL (ref 1–4.8)
LYMPHOCYTES NFR BLD: 11.6 % (ref 18–48)
MCH RBC QN AUTO: 28.9 PG (ref 27–31)
MCHC RBC AUTO-ENTMCNC: 31.9 G/DL (ref 32–36)
MCV RBC AUTO: 91 FL (ref 82–98)
MONOCYTES # BLD AUTO: 0.8 K/UL (ref 0.3–1)
MONOCYTES NFR BLD: 7.8 % (ref 4–15)
NEUTROPHILS # BLD AUTO: 8.2 K/UL (ref 1.8–7.7)
NEUTROPHILS NFR BLD: 77.6 % (ref 38–73)
NRBC BLD-RTO: 0 /100 WBC
PLATELET # BLD AUTO: 308 K/UL (ref 150–350)
PMV BLD AUTO: 9.9 FL (ref 9.2–12.9)
POCT GLUCOSE: 135 MG/DL (ref 70–110)
POCT GLUCOSE: 191 MG/DL (ref 70–110)
POCT GLUCOSE: 194 MG/DL (ref 70–110)
POTASSIUM SERPL-SCNC: 4.8 MMOL/L (ref 3.5–5.1)
PROT SERPL-MCNC: 6.6 G/DL (ref 6–8.4)
RBC # BLD AUTO: 3.11 M/UL (ref 4–5.4)
SODIUM SERPL-SCNC: 139 MMOL/L (ref 136–145)
WBC # BLD AUTO: 10.55 K/UL (ref 3.9–12.7)

## 2020-08-19 PROCEDURE — 25000003 PHARM REV CODE 250: Performed by: NURSE PRACTITIONER

## 2020-08-19 PROCEDURE — 97530 THERAPEUTIC ACTIVITIES: CPT

## 2020-08-19 PROCEDURE — 80053 COMPREHEN METABOLIC PANEL: CPT

## 2020-08-19 PROCEDURE — 94761 N-INVAS EAR/PLS OXIMETRY MLT: CPT

## 2020-08-19 PROCEDURE — 36415 COLL VENOUS BLD VENIPUNCTURE: CPT

## 2020-08-19 PROCEDURE — 97110 THERAPEUTIC EXERCISES: CPT

## 2020-08-19 PROCEDURE — 85025 COMPLETE CBC W/AUTO DIFF WBC: CPT

## 2020-08-19 PROCEDURE — 25000003 PHARM REV CODE 250: Performed by: PODIATRIST

## 2020-08-19 PROCEDURE — 25000003 PHARM REV CODE 250: Performed by: EMERGENCY MEDICINE

## 2020-08-19 RX ORDER — BISACODYL 5 MG
10 TABLET, DELAYED RELEASE (ENTERIC COATED) ORAL ONCE
Status: COMPLETED | OUTPATIENT
Start: 2020-08-19 | End: 2020-08-19

## 2020-08-19 RX ORDER — AMOXICILLIN 250 MG
2 CAPSULE ORAL DAILY
Status: DISCONTINUED | OUTPATIENT
Start: 2020-08-19 | End: 2020-08-20 | Stop reason: HOSPADM

## 2020-08-19 RX ORDER — POLYETHYLENE GLYCOL 3350 17 G/17G
17 POWDER, FOR SOLUTION ORAL DAILY
Status: DISCONTINUED | OUTPATIENT
Start: 2020-08-19 | End: 2020-08-20 | Stop reason: HOSPADM

## 2020-08-19 RX ADMIN — LISINOPRIL 20 MG: 20 TABLET ORAL at 08:08

## 2020-08-19 RX ADMIN — STANDARDIZED SENNA CONCENTRATE AND DOCUSATE SODIUM 2 TABLET: 8.6; 5 TABLET ORAL at 11:08

## 2020-08-19 RX ADMIN — RIVAROXABAN 10 MG: 10 TABLET, FILM COATED ORAL at 05:08

## 2020-08-19 RX ADMIN — FAMOTIDINE 20 MG: 20 TABLET ORAL at 08:08

## 2020-08-19 RX ADMIN — CLINDAMYCIN IN 5 PERCENT DEXTROSE 900 MG: 18 INJECTION, SOLUTION INTRAVENOUS at 05:08

## 2020-08-19 RX ADMIN — POLYETHYLENE GLYCOL 3350 17 G: 17 POWDER, FOR SOLUTION ORAL at 11:08

## 2020-08-19 RX ADMIN — BISACODYL 10 MG: 5 TABLET, COATED ORAL at 11:08

## 2020-08-19 RX ADMIN — ATORVASTATIN CALCIUM 40 MG: 40 TABLET, FILM COATED ORAL at 09:08

## 2020-08-19 RX ADMIN — HYDROCODONE BITARTRATE AND ACETAMINOPHEN 1 TABLET: 10; 325 TABLET ORAL at 03:08

## 2020-08-19 NOTE — HOSPITAL COURSE
Ms Rob is a 65 year old female who underwent Carcot foot reconstructions with revision of medial column fusion. Patient evaluated by Dr Gray, vital signs and labs stable, awaiting wound vac placement. Today, patient feeling well, vital signs and labs stable. PT/OT evaluated and treated. Wound care changed wound vac dressing and applied home wound vac. Pain well controled. She was established with [facility] for PT/OT and wound vac dressing change. Patient will follow up with Podiatry in clinic. She was seen, examined and deemed suitable for discharge.

## 2020-08-19 NOTE — ASSESSMENT & PLAN NOTE
-Pt admitted to Medical Surgical Unit post procedure   -LLE ankle ORIF with LLE Charcot Foot reconstruction on 7/23/20  -revision  of medial column fusion performed today per Dr. Gray (Podiatry)  -managed per Podiatry   -IV antibiotics   -analgesia as needed   -antiemetics as needed   -Nozin   -pt wa taking Xarelto with last dose on 8/15/20  -neurovascular checks and affected limb elevated   -PT/OT evaluation prior to discharge   -Social Work consulted to assist with wound vac for home use and discharge planning       8/19  Vital signs lab stable   Awaiting wound Vac placement

## 2020-08-19 NOTE — NURSING
Reported by night nurse Augie COX RN that patient was trying to get out of bed to use restroom. Nurse helped assist patient to restroom, but patient and patient's daughter stated they did not want people to come in patient's room to help with assisting patient to bathroom. Educated patient and daughter on importance to call us when patient would like to get up to help reduce chance of falling since patient has ace wrap and cast padding on LLE along with wound vac. Patient refuses to have bed alarm due to daughter at bedside. Will continue to reinforce education on importance of getting out of bed with help from primary nurse or PCT. Will continue to monitor.

## 2020-08-19 NOTE — PLAN OF CARE
Met with patient and daughter, Cass. Patient is Armenian speaking only.  Questions were answered by daughter.  Patient is independent with adls with assistive devices. She ambulates with a rolling walker since her ankle surgery 3 weeks ago.  Patient is current with [facility] and wishes to resume at discharge.  She will also need a wound vac for home use.  She lives with her son and daughter-in-law who will be her help at home.  Cass manages her healthcare.    Updated white board with 's name and number. Transitional Care Folder, Discharge Planning Begins on Admission pamphlet, [facility] Bedside Delivery pamphlet, Advance Directive information given to patient along with the contact information given.Instructed patient or family to call with any questions or concerns.     D/c plan: Home Health  D/c transportation: daughter  Preferred Pharmacy:  [facility], [place], [place]  Bedside Pharmacy Delivery:  yes  [facility]: Portal Active  PCP:  Lucy Diego       08/19/20 1000   Discharge Assessment   Assessment Type Discharge Planning Assessment   Confirmed/corrected address and phone number on facesheet? Yes   Assessment information obtained from? Patient;Caregiver   Prior to hospitilization cognitive status: Alert/Oriented   Prior to hospitalization functional status: Independent;Assistive Equipment   Current cognitive status: Alert/Oriented   Current Functional Status: Assistive Equipment;Independent   Facility Arrived From: Home   Lives With spouse;child(denver), adult   Able to Return to Prior Arrangements yes   Who are your caregiver(s) and their phone number(s)? Cass Verduzco, daughter [phone]   Patient's perception of discharge disposition home health   Readmission Within the Last 30 Days previous discharge plan unsuccessful   If yes, most recent facility name: [facility]   Patient currently being followed by outpatient case management? No    Patient currently receives any other outside agency services? Yes   Name and contact number of agency or person providing outside services [facility]   Is it the patient/care giver preference to resume care with the current outside agency? Yes   Equipment Currently Used at Home walker, rolling;wheelchair;bedside commode;shower chair   Do you have any problems affording any of your prescribed medications? No   Is the patient taking medications as prescribed? yes   Does the patient have transportation home? Yes   Transportation Anticipated family or friend will provide   Does the patient receive services at the [facility]? No   Discharge Plan A Home Health   DME Needed Upon Discharge  negative pressure wound therapy device   Patient/Family in Agreement with Plan yes   Readmission Questionnaire   At the time of your discharge, did someone talk to you about what your health problems were? Yes   At the time of discharge, did someone talk to you about what to watch out for regarding worsening of your health problem? Yes   At the time of discharge, did someone talk to you about what to do if you experienced worsening of your health problem? Yes   At the time of discharge, did someone talk to you about which medication to take when you left the hospital and which ones to stop taking? Yes   At the time of discharge, did someone talk to you about when and where to follow up with a doctor after you left the hospital? Yes   How often do you need to have someone help you when you read instructions, pamphlets, or other written material from your doctor or pharmacy? Always   Do you have problems taking your medications as prescribed? No   Do you have any problems affording any of  your prescribed medications? No   Do you have problems obtaining/receiving your medications? No   Does the patient have transportation to healthcare appointments? Yes

## 2020-08-19 NOTE — PT/OT/SLP PROGRESS
Physical Therapy  Treatment    Kay Galarza   MRN: [id number]   Admitting Diagnosis: Charcot's joint of foot, left    PT Received On: 08/19/20  PT Start Time: 0720     PT Stop Time: 0745    PT Total Time (min): 25 min       Billable Minutes:  Therapeutic Activity 15 and Therapeutic Exercise 10    Treatment Type: Treatment  PT/PTA: PT     PTA Visit Number: 0       General Precautions: Standard, fall  Orthopedic Precautions: LLE non weight bearing   Braces: N/A         Subjective:  Communicated with NURSE RAMAN AND Epic CHART REVIEW prior to session.   PT AGREED TO TX     Pain/Comfort  Pain Rating 1: 5/10  Location - Side 1: Left  Location 1: foot    Objective:   Patient found with: wound vac, peripheral IV    Functional Mobility:  PT MET IN  AND SPEAKS Lithuanian . PT DAUGHTER PRESENT TO TRANSLATE. PT SUP.SIT EOB WITH S. PT SCOOTED TO EOB AND EDUCATED ON NWB OF L LE. PT STOOD X 2 TRIALS WITH MIN A. PT T/F TO CHAIR WITH RW AND NWB L LE WITH MIN A. PT SCOOTED BACK IN CHAIR AND COMPLETED TKE, MIP AND AP OF R LE. PT LEFT SEATED WITH L LE ELEVATED AND ALL NEEDS MET     AM-PAC 6 CLICK MOBILITY  How much help from another person does this patient currently need?   1 = Unable, Total/Dependent Assistance  2 = A lot, Maximum/Moderate Assistance  3 = A little, Minimum/Contact Guard/Supervision  4 = None, Modified [place]/Independent    Turning over in bed (including adjusting bedclothes, sheets and blankets)?: 4  Sitting down on and standing up from a chair with arms (e.g., wheelchair, bedside commode, etc.): 3  Moving from lying on back to sitting on the side of the bed?: 4  Moving to and from a bed to a chair (including a wheelchair)?: 3  Need to walk in hospital room?: 1  Climbing 3-5 steps with a railing?: 1  Basic Mobility Total Score: 16    AM-PAC Raw Score CMS G-Code Modifier Level of Impairment Assistance   6 % Total / Unable   7 - 9 CM 80 - 100% Maximal Assist   10 - 14 CL 60 - 80% Moderate Assist    15 - 19 CK 40 - 60% Moderate Assist   20 - 22 CJ 20 - 40% Minimal Assist   23 CI 1-20% SBA / CGA   24 CH 0% Independent/ Mod I     Patient left up in chair with call button in reach and DAUGHTER present.    Assessment:  PT PROGRESSING WITH T/F TRAINING TO CHAIR     Rehab identified problem list/impairments: Rehab identified problem list/impairments: weakness, impaired endurance, impaired self care skills, decreased lower extremity function, decreased ROM, impaired balance, gait instability, pain, impaired functional mobilty, orthopedic precautions    Rehab potential is good.    Activity tolerance: Fair    Discharge recommendations: Discharge Facility/Level of Care Needs: home health PT     Barriers to discharge:      Equipment recommendations:       GOALS:   Multidisciplinary Problems     Physical Therapy Goals        Problem: Physical Therapy Goal    Goal Priority Disciplines Outcome Goal Variances Interventions   Physical Therapy Goal     PT, PT/OT      Description: 1. Patient will perform supine to/from sit spv  2. Patient will perform sit/from stand RW spv nwb L LE  3. Patient will perform bed to/from chair with/sin RW sba                   PLAN:    Patient to be seen 5 x/week  to address the above listed problems via gait training, therapeutic activities, therapeutic exercises  Plan of Care expires: 08/25/20  Plan of Care reviewed with: patient, daughter         Leena Freire, PT  08/19/2020

## 2020-08-19 NOTE — PROGRESS NOTES
[facility]  Podiatry  Progress Note    Patient Name: Kay Galarza  MRN: [id number]  Admission Date: 8/18/2020  Hospital Length of Stay: 0 days  Attending Physician: Everton Lyon MD  Primary Care Provider: Lucy Negron MD     Subjective:     Interval History:  No new events since status post on yesterday.  No ice is posterior to the knee at this time.  Daughter is present at bedside.  Patient states pain control.  Wound VAC is in place and functioning as per normal.  Dressings are clean dry and intact.  Pending authorization of Wound VAC for discharge.    Follow-up For: Procedure(s) (LRB):  RECONSTRUCTION, CHARCOT FOOT, WITH FUSION (Left)  APPLICATION, WOUND VAC (Left)  APPLICATION, GRAFT (Left)    Post-Operative Day: 1 Day Post-Op    Scheduled Meds:   atorvastatin  40 mg Oral QHS    bisacodyL  10 mg Oral Once    famotidine  20 mg Oral Daily    lisinopriL  20 mg Oral Daily    nozaseptin   Each Nostril BID    polyethylene glycol  17 g Oral Daily    senna-docusate 8.6-50 mg  2 tablet Oral Daily     Continuous Infusions:  PRN Meds:acetaminophen, albuterol-ipratropium, dextrose 50%, dextrose 50%, diphenhydrAMINE, glucagon (human recombinant), glucose, glucose, HYDROcodone-acetaminophen, insulin aspart U-100, morphine, ondansetron    Review of Systems   Constitutional: Negative for chills, fatigue and fever.   HENT: Negative for hearing loss.    Eyes: Negative for photophobia and visual disturbance.   Respiratory: Negative for cough, chest tightness, shortness of breath and wheezing.    Cardiovascular: Negative for chest pain and palpitations.   Gastrointestinal: Negative for constipation, diarrhea, nausea and vomiting.   Endocrine: Negative for cold intolerance and heat intolerance.   Genitourinary: Negative for flank pain.   Musculoskeletal: Positive for gait problem. Negative for neck pain and neck stiffness.   Skin: Positive for wound.   Neurological: Positive for  numbness. Negative for light-headedness and headaches.   Psychiatric/Behavioral: Negative for sleep disturbance.     Objective:     Vital Signs (Most Recent):  Temp: 97.4 °F (36.3 °C) (08/19/20 0742)  Pulse: 79 (08/19/20 0742)  Resp: 16 (08/19/20 0742)  BP: (!) 108/59 (08/19/20 0742)  SpO2: 98 % (08/19/20 0742) Vital Signs (24h Range):  Temp:  [97.4 °F (36.3 °C)-98.7 °F (37.1 °C)] 97.4 °F (36.3 °C)  Pulse:  [77-93] 79  Resp:  [15-20] 16  SpO2:  [94 %-98 %] 98 %  BP: (108-163)/(55-88) 108/59     Weight: 95.3 kg (210 lb)  Body mass index is 31.01 kg/m².    Foot Exam    Laboratory:  All pertinent labs reviewed within the last 24 hours.    Diagnostic Results:  I have reviewed all pertinent imaging results/findings within the past 24 hours.    Clinical Findings:  Dressings are clean, dry intact left lower extremity.  Wound VAC is functional as per normal.    Assessment/Plan:     Active Diagnoses:    Diagnosis Date Noted POA    Charcot's joint of foot, left [M14.672] 08/18/2020 Yes    HLD (hyperlipidemia) [E78.5] 07/23/2020 Yes    Essential hypertension [I10] 11/07/2017 Yes    Mild intermittent asthma [J45.20] 11/07/2017 Yes    Type 2 diabetes mellitus [E11.9] 11/07/2017 Unknown      Problems Resolved During this Admission:       Stable at present.  Please have RN/LPN/CNA apply ice pack to the posterior left knee. Xarelto ordered for QHS starting tonight. Pending authorization for WoundVac with insurer. ANISHAB to the [facility]. PT/OT. D/C to home with [facility] for WoundVac changes.       Kirt Gray DPM  Podiatry  [facility]

## 2020-08-19 NOTE — PROGRESS NOTES
[facility] Hospital Medicine  Progress Note    Patient Name: Kay Galarza  MRN: [id number]  Patient Class: OP- Outpatient Recovery   Admission Date: 8/18/2020  Length of Stay: 0 days  Attending Physician: Everton Lyon MD  Primary Care Provider: Lucy Negron MD        Subjective:     Principal Problem:Charcot's joint of foot, left        HPI:  Kay Galarza is a 66 yo female with PMHx of DM, HTN, HLD, Asthma, Arthritis, and Arthritis who was admitted s/p Charcot foot reconstruction with revision  of medial column fusion performed by Dr. Gray (Podiatry) today.  Pt underwent LLE ankle ORIF with LLE Charcot Foot reconstruction on 7/23/2020.  Pt reports associated symptoms include: LLE/foot pain and gait instability.  Pt denies HA, dizziness, fatigue, trauma, CP, SOB, nausea, vomiting, abdominal pain, and all additional complaints.  Pt reports compliance with prescribed medications and dietary restrictions.  Pt speaks Irish and Cuca used for translation.  Pt has history of Asthma with rescue inhaler used a few days ago but denies previous intubation.  Pt denies smoking and use of ETOH.  Pt is a full code and daughter is the surrogate decision maker.  Pt was taking Xarelto with last dose on 8/15/2020.  Hospital Medicine contacted for admission post procedure with patient placed on Medical Surgical Unit for continued evaluation.      Overview/Hospital Course:  Ms Rob is a 65 year old female who underwent Carcot foot reconstructions with revision of medial column fusion on yesterday. Vital signs and labs stable, awaiting wound vac.      Interval History: Pt seen and examined, vital signs and labs stable, awaiting home wound vac.     Review of Systems   Constitutional: Positive for activity change. Negative for appetite change, chills, fatigue and fever.   HENT: Negative for congestion, postnasal drip, sinus pressure, sore throat and trouble swallowing.     Eyes: Negative.    Respiratory: Negative for cough, shortness of breath and wheezing.    Cardiovascular: Negative for chest pain, palpitations and leg swelling.   Gastrointestinal: Negative for abdominal distention, abdominal pain, diarrhea, nausea and vomiting.   Endocrine: Negative for cold intolerance and heat intolerance.   Genitourinary: Negative for dysuria, flank pain, frequency and urgency.   Musculoskeletal: Positive for arthralgias and gait problem. Negative for joint swelling.   Skin: Positive for wound. Negative for color change.   Allergic/Immunologic: Negative for environmental allergies.   Neurological: Negative for dizziness, weakness and headaches.   Hematological: Does not bruise/bleed easily.   Psychiatric/Behavioral: Negative for agitation, confusion and sleep disturbance. The patient is not nervous/anxious.      Objective:     Vital Signs (Most Recent):  Temp: 97.4 °F (36.3 °C) (08/19/20 1528)  Pulse: 83 (08/19/20 1528)  Resp: 18 (08/19/20 1528)  BP: 122/80 (08/19/20 1528)  SpO2: 98 % (08/19/20 1528) Vital Signs (24h Range):  Temp:  [97.4 °F (36.3 °C)-98.7 °F (37.1 °C)] 97.4 °F (36.3 °C)  Pulse:  [74-89] 83  Resp:  [16-18] 18  SpO2:  [95 %-98 %] 98 %  BP: (108-163)/(58-80) 122/80     Weight: 95.3 kg (210 lb)  Body mass index is 31.01 kg/m².    Intake/Output Summary (Last 24 hours) at 8/19/2020 1715  Last data filed at 8/19/2020 1510  Gross per 24 hour   Intake 1438 ml   Output 200 ml   Net 1238 ml      Physical Exam  Constitutional:       Appearance: Normal appearance.   HENT:      Nose: Nose normal.      Mouth/Throat:      Pharynx: Oropharynx is clear.   Eyes:      Conjunctiva/sclera: Conjunctivae normal.   Neck:      Musculoskeletal: Normal range of motion and neck supple.   Cardiovascular:      Rate and Rhythm: Normal rate and regular rhythm.      Pulses: Normal pulses.      Heart sounds: Normal heart sounds.   Pulmonary:      Effort: Pulmonary effort is normal.      Breath sounds: Normal  breath sounds.   Abdominal:      General: Bowel sounds are normal.      Palpations: Abdomen is soft.   Genitourinary:     Comments: Deferred   Musculoskeletal:      Left lower leg: No edema.      Comments: Limited ROM to LLE post procedure    Skin:     General: Skin is warm and dry.      Comments: Surgical splint intact to Left Lower Ext. Toes warm and pink with brisk capillary refill- wound vac in place with bloody drainage    Neurological:      General: No focal deficit present.      Mental Status: She is alert and oriented to person, place, and time.   Psychiatric:         Mood and Affect: Mood normal.         Significant Labs:   CBC:   Recent Labs   Lab 08/18/20  1409 08/19/20  0723   WBC 8.73 10.55   HGB 9.4* 9.0*   HCT 30.3* 28.2*    308     CMP:   Recent Labs   Lab 08/18/20  1409 08/19/20  0723   * 139   K 5.1 4.8    102   CO2 25 27   * 160*   BUN 29* 32*   CREATININE 1.3 1.1   CALCIUM 9.2 9.4   PROT 6.7 6.6   ALBUMIN 2.9* 2.8*   BILITOT 0.3 0.4   ALKPHOS 109 111   AST 14 12   ALT 8* 7*   ANIONGAP 9 10   EGFRNONAA 43* 52*       Significant Imaging:             Assessment/Plan:      * Charcot's joint of foot, left  -Pt admitted to Medical Surgical Unit post procedure   -LLE ankle ORIF with LLE Charcot Foot reconstruction on 7/23/20  -revision  of medial column fusion performed today per Dr. Gray (Podiatry)  -managed per Podiatry   -IV antibiotics   -analgesia as needed   -antiemetics as needed   -Nozin   -pt wa taking Xarelto with last dose on 8/15/20  -neurovascular checks and affected limb elevated   -PT/OT evaluation prior to discharge   -Social Work consulted to assist with wound vac for home use and discharge planning       8/19  Vital signs lab stable   Awaiting wound Vac placement           HLD (hyperlipidemia)  -Statin       Mild intermittent asthma  -last rescue episode occurred a few days ago per pt   -Duonebs as needed   -supplemental oxygen as needed       Essential  hypertension  -stable  -home medications resumed   -HCTZ held       Type 2 diabetes mellitus  -Accuchecks and SSI   -diabetic/cardiac diet resumed   -HbA1c 9.7 in 7/2020  -Levemir initiated         VTE Risk Mitigation (From admission, onward)         Ordered     rivaroxaban tablet 10 mg  With dinner      08/19/20 1037                Discharge Planning   TING:      Code Status: Prior   Is the patient medically ready for discharge?:     Reason for patient still in hospital (select all that apply): Patient trending condition and Treatment                     Adrien Stephenson NP  Department of Hospital Medicine   [facility]

## 2020-08-19 NOTE — PLAN OF CARE
Problem: Adult Inpatient Plan of Care  Goal: Plan of Care Review  Outcome: Ongoing, Progressing  Flowsheets (Taken 8/19/2020 0052)  Plan of Care Reviewed With:   patient   daughter  Pt had no adverse events during shift. Pt free from falls. Call light in reach. SR's x2. Pain well controlled w/ PRN meds. Pt refusing to turn during shift. IVF/abx administered as ordered. VTE prophylaxis - SCD pump in use. Surgical site CDI, monitoring output via wound vac to LLE, pt tolerating well. BG monitoring, insulin given as needed. Non-weight bearing status to LLE, pt tolerating well. VSS. Chart reviewed. Will continue to monitor.

## 2020-08-19 NOTE — PLAN OF CARE
PRN Norco given x1, relief noted. Up in chair for part of the day. Non-weight bearing maintained. NADN.

## 2020-08-19 NOTE — PT/OT/SLP EVAL
Physical Therapy Evaluation    Patient Name:  Kay Galarza   MRN:  [id number]    Recommendations:     Discharge Recommendations:  home, home health PT(elaina support)   Discharge Equipment Recommendations: none   Barriers to discharge: None    Assessment:     Kay Galarza is a 65 y.o. female admitted with a medical diagnosis of <principal problem not specified>.  She presents with the following impairments/functional limitations:  weakness, impaired endurance, pain, impaired balance, gait instability, decreased safety awareness, decreased lower extremity function, impaired functional mobilty, impaired self care skills, decreased coordination.    Rehab Prognosis: Good; patient would benefit from acute skilled PT services to address these deficits and reach maximum level of function.    Recent Surgery: Procedure(s) (LRB):  RECONSTRUCTION, CHARCOT FOOT, WITH FUSION (Left)  APPLICATION, WOUND VAC (Left)  APPLICATION, GRAFT (Left) 1 Day Post-Op    Plan:     During this hospitalization, patient to be seen 5 x/week to address the identified rehab impairments via gait training, therapeutic activities, therapeutic exercises and progress toward the following goals:    · Plan of Care Expires:  08/25/20    Subjective     Chief Complaint: pain  Patient/Family Comments/goals: to feel better/less pain and move better/go home  Pain/Comfort:  ·      Patients cultural, spiritual, Oriental orthodox conflicts given the current situation:      Living Environment:  Lives with son/good family support; no steps ot enter one story home  Prior to admission, patients level of function was mod indep mobility/A from family for adl's.  Equipment used at home: wheelchair, walker, rolling, bedside commode, shower chair, hospital bed.  DME owned (not currently used): none.  Upon discharge, patient will have assistance from family/home health.    Objective:     Communicated with RN prior to session.  Patient found HOB elevated  with wound vac  upon PT entry to room.    General Precautions: Standard, fall   Orthopedic Precautions:LLE non weight bearing   Braces:       Exams:  · R LE wfl rom and strength; L LE 3/5 & rom wfl knee/hip but unable to test L ankle/foot     Functional Mobility:  · Bed Mobility supine to/from sit min A with cues for efficient technique  · Transfers - sit to/from stand with RW min A nwb L LE - cues for safe hand placement  · Gt - unable to hop or slide R foot    Therapeutic Activities and Exercises:   PT educated patient/dtr on POC, pain/edema management techs with elevation of L LE, safety/fall/nwb L LE precautions with mobility using RW & wound vac AND LE ROM to prep mobility    AM-PAC 6 CLICK MOBILITY  Total Score:      Patient left HOB elevated with all lines intact, call button in reach, RN notified and dtr present.    GOALS:   Multidisciplinary Problems     Physical Therapy Goals        Problem: Physical Therapy Goal    Goal Priority Disciplines Outcome Goal Variances Interventions   Physical Therapy Goal     PT, PT/OT      Description: 1. Patient will perform supine to/from sit spv  2. Patient will perform sit/from stand RW spv nwb L LE  3. Patient will perform bed to/from chair with/sin RW sba                   History:     Past Medical History:   Diagnosis Date    Arthritis     DERIAN KNEES    Asthma     SEASONAL    Cataract     Diabetes mellitus     Diabetes mellitus, type 2     Diabetic retinopathy     Hyperlipidemia     Hypertension        Past Surgical History:   Procedure Laterality Date    CATARACT EXTRACTION W/  INTRAOCULAR LENS IMPLANT Right 07/18/2018    CATARACT EXTRACTION W/  INTRAOCULAR LENS IMPLANT Left 03/13/2019    COLONOSCOPY N/A 12/22/2018    Procedure: COLONOSCOPY;  Surgeon: Zak Dover MD;  Location: [facility];  Service: Endoscopy;  Laterality: N/A;    ENDOSCOPIC VEIN LASER TREATMENT Bilateral 1990's    FIXATION OF SYNDESMOSIS OF ANKLE Left 7/23/2020    Procedure: FIXATION,  SYNDESMOSIS, ANKLE;  Surgeon: Kirt Gray DPM;  Location[facility] OR;  Service: Podiatry;  Laterality: Left;    MANIPULATION WITH ANESTHESIA Left 7/23/2020    Procedure: MANIPULATION, WITH ANESTHESIA;  Surgeon: Kirt Gray DPM;  Location: [facility] OR;  Service: Podiatry;  Laterality: Left;    MIDFOOT ARTHRODESIS Left 7/23/2020    Procedure: FUSION, JOINT, MIDFOOT;  Surgeon: Kirt Grya DPM;  Location: [facility] OR;  Service: Podiatry;  Laterality: Left;  2nd tarsometatarsal joint dislocation via fusion    OPEN REDUCTION AND INTERNAL FIXATION (ORIF) OF INJURY OF ANKLE Left 7/23/2020    Procedure: ORIF, ANKLE;  Surgeon: Kirt Gray DPM;  Location: [facility];  Service: Podiatry;  Laterality: Left;       Time Tracking:     PT Received On: 08/18/20  PT Start Time: 1330     PT Stop Time: 1355  PT Total Time (min): 25 min     Billable Minutes: Evaluation 15 and Therapeutic Activity 10      Yoni Barragan, PT  08/19/2020

## 2020-08-19 NOTE — SUBJECTIVE & OBJECTIVE
Interval History: Pt seen and examined, vital signs and labs stable, awaiting home wound vac.     Review of Systems   Constitutional: Positive for activity change. Negative for appetite change, chills, fatigue and fever.   HENT: Negative for congestion, postnasal drip, sinus pressure, sore throat and trouble swallowing.    Eyes: Negative.    Respiratory: Negative for cough, shortness of breath and wheezing.    Cardiovascular: Negative for chest pain, palpitations and leg swelling.   Gastrointestinal: Negative for abdominal distention, abdominal pain, diarrhea, nausea and vomiting.   Endocrine: Negative for cold intolerance and heat intolerance.   Genitourinary: Negative for dysuria, flank pain, frequency and urgency.   Musculoskeletal: Positive for arthralgias and gait problem. Negative for joint swelling.   Skin: Positive for wound. Negative for color change.   Allergic/Immunologic: Negative for environmental allergies.   Neurological: Negative for dizziness, weakness and headaches.   Hematological: Does not bruise/bleed easily.   Psychiatric/Behavioral: Negative for agitation, confusion and sleep disturbance. The patient is not nervous/anxious.      Objective:     Vital Signs (Most Recent):  Temp: 97.4 °F (36.3 °C) (08/19/20 1528)  Pulse: 83 (08/19/20 1528)  Resp: 18 (08/19/20 1528)  BP: 122/80 (08/19/20 1528)  SpO2: 98 % (08/19/20 1528) Vital Signs (24h Range):  Temp:  [97.4 °F (36.3 °C)-98.7 °F (37.1 °C)] 97.4 °F (36.3 °C)  Pulse:  [74-89] 83  Resp:  [16-18] 18  SpO2:  [95 %-98 %] 98 %  BP: (108-163)/(58-80) 122/80     Weight: 95.3 kg (210 lb)  Body mass index is 31.01 kg/m².    Intake/Output Summary (Last 24 hours) at 8/19/2020 1715  Last data filed at 8/19/2020 1510  Gross per 24 hour   Intake 1438 ml   Output 200 ml   Net 1238 ml      Physical Exam  Constitutional:       Appearance: Normal appearance.   HENT:      Nose: Nose normal.      Mouth/Throat:      Pharynx: Oropharynx is clear.   Eyes:       Conjunctiva/sclera: Conjunctivae normal.   Neck:      Musculoskeletal: Normal range of motion and neck supple.   Cardiovascular:      Rate and Rhythm: Normal rate and regular rhythm.      Pulses: Normal pulses.      Heart sounds: Normal heart sounds.   Pulmonary:      Effort: Pulmonary effort is normal.      Breath sounds: Normal breath sounds.   Abdominal:      General: Bowel sounds are normal.      Palpations: Abdomen is soft.   Genitourinary:     Comments: Deferred   Musculoskeletal:      Left lower leg: No edema.      Comments: Limited ROM to LLE post procedure    Skin:     General: Skin is warm and dry.      Comments: Surgical splint intact to Left Lower Ext. Toes warm and pink with brisk capillary refill- wound vac in place with bloody drainage    Neurological:      General: No focal deficit present.      Mental Status: She is alert and oriented to person, place, and time.   Psychiatric:         Mood and Affect: Mood normal.         Significant Labs:   CBC:   Recent Labs   Lab 08/18/20  1409 08/19/20  0723   WBC 8.73 10.55   HGB 9.4* 9.0*   HCT 30.3* 28.2*    308     CMP:   Recent Labs   Lab 08/18/20  1409 08/19/20  0723   * 139   K 5.1 4.8    102   CO2 25 27   * 160*   BUN 29* 32*   CREATININE 1.3 1.1   CALCIUM 9.2 9.4   PROT 6.7 6.6   ALBUMIN 2.9* 2.8*   BILITOT 0.3 0.4   ALKPHOS 109 111   AST 14 12   ALT 8* 7*   ANIONGAP 9 10   EGFRNONAA 43* 52*       Significant Imaging:

## 2020-08-20 VITALS
DIASTOLIC BLOOD PRESSURE: 65 MMHG | TEMPERATURE: 99 F | HEIGHT: 69 IN | HEART RATE: 92 BPM | BODY MASS INDEX: 31.1 KG/M2 | RESPIRATION RATE: 18 BRPM | WEIGHT: 210 LBS | SYSTOLIC BLOOD PRESSURE: 138 MMHG | OXYGEN SATURATION: 97 %

## 2020-08-20 LAB
ALBUMIN SERPL BCP-MCNC: 2.7 G/DL (ref 3.5–5.2)
ALP SERPL-CCNC: 111 U/L (ref 55–135)
ALT SERPL W/O P-5'-P-CCNC: 6 U/L (ref 10–44)
ANION GAP SERPL CALC-SCNC: 8 MMOL/L (ref 8–16)
AST SERPL-CCNC: 11 U/L (ref 10–40)
BASOPHILS # BLD AUTO: 0.05 K/UL (ref 0–0.2)
BASOPHILS NFR BLD: 0.5 % (ref 0–1.9)
BILIRUB SERPL-MCNC: 0.2 MG/DL (ref 0.1–1)
BUN SERPL-MCNC: 35 MG/DL (ref 8–23)
CALCIUM SERPL-MCNC: 9.2 MG/DL (ref 8.7–10.5)
CHLORIDE SERPL-SCNC: 105 MMOL/L (ref 95–110)
CO2 SERPL-SCNC: 26 MMOL/L (ref 23–29)
CREAT SERPL-MCNC: 1.1 MG/DL (ref 0.5–1.4)
DIFFERENTIAL METHOD: ABNORMAL
EOSINOPHIL # BLD AUTO: 0.5 K/UL (ref 0–0.5)
EOSINOPHIL NFR BLD: 4.7 % (ref 0–8)
ERYTHROCYTE [DISTWIDTH] IN BLOOD BY AUTOMATED COUNT: 12.6 % (ref 11.5–14.5)
EST. GFR  (AFRICAN AMERICAN): >60 ML/MIN/1.73 M^2
EST. GFR  (NON AFRICAN AMERICAN): 52 ML/MIN/1.73 M^2
GLUCOSE SERPL-MCNC: 162 MG/DL (ref 70–110)
HCT VFR BLD AUTO: 28 % (ref 37–48.5)
HGB BLD-MCNC: 8.9 G/DL (ref 12–16)
IMM GRANULOCYTES # BLD AUTO: 0.05 K/UL (ref 0–0.04)
IMM GRANULOCYTES NFR BLD AUTO: 0.5 % (ref 0–0.5)
LYMPHOCYTES # BLD AUTO: 1.2 K/UL (ref 1–4.8)
LYMPHOCYTES NFR BLD: 12.5 % (ref 18–48)
MCH RBC QN AUTO: 28.8 PG (ref 27–31)
MCHC RBC AUTO-ENTMCNC: 31.8 G/DL (ref 32–36)
MCV RBC AUTO: 91 FL (ref 82–98)
MONOCYTES # BLD AUTO: 0.7 K/UL (ref 0.3–1)
MONOCYTES NFR BLD: 7.3 % (ref 4–15)
NEUTROPHILS # BLD AUTO: 7.3 K/UL (ref 1.8–7.7)
NEUTROPHILS NFR BLD: 74.5 % (ref 38–73)
NRBC BLD-RTO: 0 /100 WBC
PLATELET # BLD AUTO: 302 K/UL (ref 150–350)
PMV BLD AUTO: 9.8 FL (ref 9.2–12.9)
POCT GLUCOSE: 174 MG/DL (ref 70–110)
POTASSIUM SERPL-SCNC: 4.8 MMOL/L (ref 3.5–5.1)
PROT SERPL-MCNC: 6.3 G/DL (ref 6–8.4)
RBC # BLD AUTO: 3.09 M/UL (ref 4–5.4)
SODIUM SERPL-SCNC: 139 MMOL/L (ref 136–145)
WBC # BLD AUTO: 9.75 K/UL (ref 3.9–12.7)

## 2020-08-20 PROCEDURE — 85025 COMPLETE CBC W/AUTO DIFF WBC: CPT

## 2020-08-20 PROCEDURE — 25000003 PHARM REV CODE 250: Performed by: PODIATRIST

## 2020-08-20 PROCEDURE — 29515 APPLICATION SHORT LEG SPLINT: CPT | Mod: 58,LT,, | Performed by: PODIATRIST

## 2020-08-20 PROCEDURE — 94761 N-INVAS EAR/PLS OXIMETRY MLT: CPT

## 2020-08-20 PROCEDURE — 25000003 PHARM REV CODE 250: Performed by: NURSE PRACTITIONER

## 2020-08-20 PROCEDURE — 36415 COLL VENOUS BLD VENIPUNCTURE: CPT

## 2020-08-20 PROCEDURE — 29515 PR APPLY LOWER LEG SPLINT: ICD-10-PCS | Mod: 58,LT,, | Performed by: PODIATRIST

## 2020-08-20 PROCEDURE — 63600175 PHARM REV CODE 636 W HCPCS: Performed by: PODIATRIST

## 2020-08-20 PROCEDURE — 80053 COMPREHEN METABOLIC PANEL: CPT

## 2020-08-20 PROCEDURE — 97110 THERAPEUTIC EXERCISES: CPT

## 2020-08-20 PROCEDURE — 97530 THERAPEUTIC ACTIVITIES: CPT

## 2020-08-20 PROCEDURE — 25000003 PHARM REV CODE 250: Performed by: EMERGENCY MEDICINE

## 2020-08-20 RX ORDER — FAMOTIDINE 20 MG/1
20 TABLET, FILM COATED ORAL 2 TIMES DAILY
Status: DISCONTINUED | OUTPATIENT
Start: 2020-08-20 | End: 2020-08-20 | Stop reason: HOSPADM

## 2020-08-20 RX ADMIN — STANDARDIZED SENNA CONCENTRATE AND DOCUSATE SODIUM 2 TABLET: 8.6; 5 TABLET ORAL at 08:08

## 2020-08-20 RX ADMIN — POLYETHYLENE GLYCOL 3350 17 G: 17 POWDER, FOR SOLUTION ORAL at 08:08

## 2020-08-20 RX ADMIN — LISINOPRIL 20 MG: 20 TABLET ORAL at 08:08

## 2020-08-20 RX ADMIN — HYDROCODONE BITARTRATE AND ACETAMINOPHEN 1 TABLET: 10; 325 TABLET ORAL at 11:08

## 2020-08-20 RX ADMIN — ONDANSETRON 4 MG: 2 INJECTION INTRAMUSCULAR; INTRAVENOUS at 09:08

## 2020-08-20 RX ADMIN — FAMOTIDINE 20 MG: 20 TABLET ORAL at 08:08

## 2020-08-20 NOTE — PT/OT/SLP PROGRESS
Physical Therapy  Treatment    Kay Galarza   MRN: [id number]   Admitting Diagnosis: Charcot's joint of foot, left    PT Received On: 08/20/20  PT Start Time: 0835     PT Stop Time: 0900    PT Total Time (min): 25 min       Billable Minutes:  Therapeutic Activity 15 and Therapeutic Exercise 10    Treatment Type: Treatment  PT/PTA: PT     PTA Visit Number: 0       General Precautions: Standard, fall  Orthopedic Precautions: LLE non weight bearing   Braces: N/A         Subjective:  Communicated with NURSE RAMAN AND Epic CHART REVIEW  prior to session.   PT AGREED TO TX     Pain/Comfort  Pain Rating 1: 4/10  Location - Side 1: Left  Location 1: leg  Pain Rating Post-Intervention 1: 4/10    Objective:   Patient found with: peripheral IV    Functional Mobility:  PT MET IN RM SUP>SIT EOB WITH SBA. PT SCOOTED TO EOB. PT STOOD WITH RW AND NWB L LE. PT T/F TO CHAIR WITH RW AND MIN A. PT SEATED FOR B LE TE X 10 REPS AND CUES TO SLOW PACE. PT LEFT SEATED WITH L LE ELEVATED AND ALL NEEDS MET     AM-PAC 6 CLICK MOBILITY  How much help from another person does this patient currently need?   1 = Unable, Total/Dependent Assistance  2 = A lot, Maximum/Moderate Assistance  3 = A little, Minimum/Contact Guard/Supervision  4 = None, Modified [place]/Independent    Turning over in bed (including adjusting bedclothes, sheets and blankets)?: 4  Sitting down on and standing up from a chair with arms (e.g., wheelchair, bedside commode, etc.): 3  Moving from lying on back to sitting on the side of the bed?: 4  Moving to and from a bed to a chair (including a wheelchair)?: 3  Need to walk in hospital room?: 1  Climbing 3-5 steps with a railing?: 1  Basic Mobility Total Score: 16    AM-PAC Raw Score CMS G-Code Modifier Level of Impairment Assistance   6 % Total / Unable   7 - 9 CM 80 - 100% Maximal Assist   10 - 14 CL 60 - 80% Moderate Assist   15 - 19 CK 40 - 60% Moderate Assist   20 - 22 CJ 20 - 40% Minimal Assist   23  CI 1-20% SBA / CGA   24 CH 0% Independent/ Mod I     Patient left up in chair with call button in reach and FAMILY present.    Assessment:  PT TOMMY TX WITH INC CUES FOR NWB L LE.     Rehab identified problem list/impairments: Rehab identified problem list/impairments: weakness, impaired endurance, impaired self care skills, impaired balance, impaired functional mobilty, gait instability, decreased upper extremity function, decreased lower extremity function, decreased ROM, pain, orthopedic precautions    Rehab potential is good.    Activity tolerance: Fair    Discharge recommendations: Discharge Facility/Level of Care Needs: home health PT     Barriers to discharge:      Equipment recommendations: Equipment Needed After Discharge: none     GOALS:   Multidisciplinary Problems     Physical Therapy Goals        Problem: Physical Therapy Goal    Goal Priority Disciplines Outcome Goal Variances Interventions   Physical Therapy Goal     PT, PT/OT Ongoing, Progressing     Description: 1. Patient will perform supine to/from sit spv  2. Patient will perform sit/from stand RW spv nwb L LE  3. Patient will perform bed to/from chair with/sin RW sba                   PLAN:    Patient to be seen 5 x/week  to address the above listed problems via therapeutic activities, therapeutic exercises  Plan of Care expires: 08/25/20  Plan of Care reviewed with: patient, nadege Freire, PT  08/20/2020

## 2020-08-20 NOTE — NURSING
Patient discharged to home. AVS reviewed and explained to pt and son, both verbalized understanding. Medication delivered to bedside. F/u appt with Dr. Gray established. Home wound vac placed to E by [facility] nurses. Dr. Gray replaced split to [facility]. IV removed. All belongings accompanied pt.

## 2020-08-20 NOTE — PROGRESS NOTES
[facility]  Podiatry  Progress Note    Patient Name: Kay Galarza  MRN: [id number]  Admission Date: 8/18/2020  Hospital Length of Stay: 0 days  Attending Physician: Everton Lyon MD  Primary Care Provider: Lucy Negron MD     Subjective:     Interval History: POD 2 revision of left Charcot foot.  Patient getting ready to discharge to home.  Wound VAC in place the left lower extremity.  Patient does have [facility] in place.    Follow-up For: Procedure(s) (LRB):  RECONSTRUCTION, CHARCOT FOOT, WITH FUSION (Left)  APPLICATION, WOUND VAC (Left)  APPLICATION, GRAFT (Left)  DEBRIDEMENT, WOUND (Left)    Post-Operative Day: 2 Days Post-Op    Scheduled Meds:   atorvastatin  40 mg Oral QHS    famotidine  20 mg Oral BID    lisinopriL  20 mg Oral Daily    nozaseptin   Each Nostril BID    polyethylene glycol  17 g Oral Daily    rivaroxaban  10 mg Oral Daily with dinner    senna-docusate 8.6-50 mg  2 tablet Oral Daily     Continuous Infusions:  PRN Meds:acetaminophen, albuterol-ipratropium, dextrose 50%, dextrose 50%, diphenhydrAMINE, glucagon (human recombinant), glucose, glucose, HYDROcodone-acetaminophen, insulin aspart U-100, morphine, ondansetron    Review of Systems   Constitutional: Negative for chills, fatigue and fever.   HENT: Negative for hearing loss.    Eyes: Negative for photophobia and visual disturbance.   Respiratory: Negative for cough, chest tightness, shortness of breath and wheezing.    Cardiovascular: Negative for chest pain and palpitations.   Gastrointestinal: Negative for constipation, diarrhea, nausea and vomiting.   Endocrine: Negative for cold intolerance and heat intolerance.   Genitourinary: Negative for flank pain.   Musculoskeletal: Negative for neck pain and neck stiffness.   Neurological: Negative for light-headedness and headaches.   Psychiatric/Behavioral: Negative for sleep disturbance.     Objective:     Vital Signs (Most  "Recent):  Temp: 98.6 °F (37 °C) (08/20/20 0714)  Pulse: 92 (08/20/20 0736)  Resp: 18 (08/20/20 1102)  BP: 138/65 (08/20/20 0714)  SpO2: 97 % (08/20/20 0736) Vital Signs (24h Range):  Temp:  [97.4 °F (36.3 °C)-98.6 °F (37 °C)] 98.6 °F (37 °C)  Pulse:  [82-94] 92  Resp:  [16-18] 18  SpO2:  [96 %-98 %] 97 %  BP: (111-150)/(65-80) 138/65     Weight: 95.3 kg (210 lb)  Body mass index is 31.01 kg/m².    Foot Exam    Laboratory:  All pertinent labs reviewed within the last 24 hours.    Diagnostic Results:  None    Clinical Findings:  Wound VAC application to the left lower extremity.  No signs of localized infection.    Assessment/Plan:     Active Diagnoses:    Diagnosis Date Noted POA    PRINCIPAL PROBLEM:  Charcot's joint of foot, left [M14.672] 08/18/2020 Yes    HLD (hyperlipidemia) [E78.5] 07/23/2020 Yes    Essential hypertension [I10] 11/07/2017 Yes    Mild intermittent asthma [J45.20] 11/07/2017 Yes    Type 2 diabetes mellitus [E11.9] 11/07/2017 Yes      Problems Resolved During this Admission:       LLE short leg posterior splint application in standard fashion using 5" OrthoGlass fiberglass splinting material, approx. 24" with web-roll/cast padding and ACE bandage.  Patient will see me on Monday, August 31, 2020.  Continue Monday, Wednesday, and Friday Wound VAC changes with [facility]. PO ABx to completion. RICE therapy ATC. DVT PPx with Xarelto.     Kirt Gray DPM  Podiatry  [facility]  "

## 2020-08-20 NOTE — PLAN OF CARE
Patient is in stable condition, no acute distress, no pain reported, blood glucose checks performed, incision CDI, monitoring drainage from wound vac, son remained at bedside, and will continue to monitor. 24 hr chart check performed.

## 2020-08-20 NOTE — CONSULTS
Consulted to this 65 year old female patient for wound vac application. PMHx of DM, HTN, HLD, Asthma, Arthritis, and Arthritis who was admitted s/p Charcot foot reconstruction with revision  of medial column fusion. She is awake and alert, her son is present at the bedside. Patient is awake and alert, family member at the bedside. [facility] wound vac ulta paused, clamped, and removed. Multiple wounds to left foot noted. Medial midfoot incision measuring 16x3x0.5cm with moist red wound bed, periwound slightly macerated. There is a wound proximal to this measuring about 1x1x0.4cm with moice red wound bed. 2x0.3x0.2 wound noted to dorsal foot with moist pink wound bed. Lastly, there is a wound to the left lateral leg near the malleolus measuring 8x2x0.4 cm, moist red and yellow wound bed with amniotic graft in place. periwound is slightly macerated. Gently cleansed all of the above with saline and gauze and patted dry. Painted all periwounds with cavilon then windowpaned with drape. Adaptic applied to cover all wound beds. Each wound filled with 1 piece of black foam (4 pieces) and secured with drape, then bridged together with extra black foam and drape. Trac pad and tubing applied and attached to Renasys at continuous -120 mmHg low intensity suction with good seal noted. Patient tolerated well. Dr. Gray to reapply splint.

## 2020-08-21 NOTE — DISCHARGE SUMMARY
[facility] Hospital Medicine  Discharge Summary      Patient Name: Kay Galarza  MRN: [id number]  Admission Date: 8/18/2020  Hospital Length of Stay: 0 days  Discharge Date and Time: 8/20/2020  1:08 PM  Attending Physician: Everton Lyon MD   Discharging Provider: Adrien Stephenson NP  Primary Care Provider: Lucy Negron MD      HPI:   Kay Galarza is a 64 yo female with PMHx of DM, HTN, HLD, Asthma, Arthritis, and Arthritis who was admitted s/p Charcot foot reconstruction with revision  of medial column fusion performed by Dr. Gray (Podiatry) today.  Pt underwent LLE ankle ORIF with LLE Charcot Foot reconstruction on 7/23/2020.  Pt reports associated symptoms include: LLE/foot pain and gait instability.  Pt denies HA, dizziness, fatigue, trauma, CP, SOB, nausea, vomiting, abdominal pain, and all additional complaints.  Pt reports compliance with prescribed medications and dietary restrictions.  Pt speaks Hungarian and Cuca used for translation.  Pt has history of Asthma with rescue inhaler used a few days ago but denies previous intubation.  Pt denies smoking and use of ETOH.  Pt is a full code and daughter is the surrogate decision maker.  Pt was taking Xarelto with last dose on 8/15/2020.  Hospital Medicine contacted for admission post procedure with patient placed on Medical Surgical Unit for continued evaluation.      Procedure(s) (LRB):  RECONSTRUCTION, CHARCOT FOOT, WITH FUSION (Left)  APPLICATION, WOUND VAC (Left)  APPLICATION, GRAFT (Left)  DEBRIDEMENT, WOUND (Left)      Hospital Course:   Ms Rob is a 65 year old female who underwent Carcot foot reconstructions with revision of medial column fusion. Patient evaluated by Dr Gray, vital signs and labs stable, awaiting wound vac placement. Today, patient feeling well, vital signs and labs stable. PT/OT evaluated and treated. Wound care changed wound vac dressing and applied home wound vac. Pain  well controled. She was established with Home Health for PT/OT and wound vac dressing change. Patient will follow up with Podiatry in clinic. Family member at bedside and discharge plan explained.  She was seen, examined and deemed suitable for discharge.       Consults:   Consults (From admission, onward)        Status Ordering Provider     Inpatient consult to Social Work  Once     Provider:  (Not yet assigned)    Completed KYLE SMART     Inpatient consult to Social Work/Case Management  Once     Provider:  (Not yet assigned)    Completed KIRT GRAY          No new Assessment & Plan notes have been filed under this hospital service since the last note was generated.  Service: Hospital Medicine    Final Active Diagnoses:    Diagnosis Date Noted POA    PRINCIPAL PROBLEM:  Charcot's joint of foot, left [M14.672] 08/18/2020 Yes    HLD (hyperlipidemia) [E78.5] 07/23/2020 Yes    Essential hypertension [I10] 11/07/2017 Yes    Mild intermittent asthma [J45.20] 11/07/2017 Yes    Type 2 diabetes mellitus [E11.9] 11/07/2017 Yes      Problems Resolved During this Admission:       Discharged Condition: stable    Disposition: Home-Health Care [facility]    Follow Up:  Follow-up Information     Kirt Gray DPM. Go on 9/1/2020.    Specialty: Podiatry  Why: hospital post-op follow up   Contact information:  [address] [place] [address] Dr Leisa CASANOVA [address] [phone]                 Patient Instructions:      Diet diabetic     Notify your health care provider if you experience any of the following:  severe uncontrolled pain     Activity as tolerated     Weight bearing restrictions (specify):   Scheduling Instructions: No weight bearing, left lower extremely       Significant Diagnostic Studies: Labs:   CMP   Recent Labs   Lab 08/19/20  0723 08/20/20  0644    139   K 4.8 4.8    105   CO2 27 26   * 162*   BUN 32* 35*   CREATININE 1.1 1.1   CALCIUM 9.4 9.2   PROT 6.6 6.3   ALBUMIN 2.8* 2.7*   BILITOT  0.4 0.2   ALKPHOS 111 111   AST 12 11   ALT 7* 6*   ANIONGAP 10 8   ESTGFRAFRICA >60 >60   EGFRNONAA 52* 52*    and CBC   Recent Labs   Lab 08/19/20  0723 08/20/20  0644   WBC 10.55 9.75   HGB 9.0* 8.9*   HCT 28.2* 28.0*    302       Pending Diagnostic Studies:     None         Medications:  Reconciled Home Medications:      Medication List      START taking these medications    HYDROcodone-acetaminophen  mg per tablet  Commonly known as: NORCO  [place] paris tableta por vía oral cada 6 horas según sea necesario para el dolor.  (Take 1 tablet by mouth every 6 (six) hours as needed for Pain.)        CHANGE how you take these medications    lisinopriL-hydrochlorothiazide 20-25 mg Tab  Commonly known as: PRINZIDE,ZESTORETIC  Take 1 tablet by mouth once daily.  What changed:   · when to take this  · reasons to take this        CONTINUE taking these medications    albuterol 90 mcg/actuation inhaler  Commonly known as: PROVENTIL/VENTOLIN HFA  Inhale 2 puffs into the lungs every 6 (six) hours as needed for Wheezing. Rescue     ammonium lactate 12 % lotion  Commonly known as: AMLACTIN  Use daily.  Apply to damp skin after bathing.     amoxicillin-clavulanate 875-125mg 875-125 mg per tablet  Commonly known as: AUGMENTIN  Take 1 tablet by mouth every 12 (twelve) hours. for 7 days     atorvastatin 40 MG tablet  Commonly known as: LIPITOR  Take 1 tablet (40 mg total) by mouth once daily.     ciprofloxacin HCl 750 MG tablet  Commonly known as: CIPRO  Take 1 tablet (750 mg total) by mouth every 12 (twelve) hours. for 7 days     clobetasoL 0.05 % external solution  Commonly known as: TEMOVATE  Apply topically 2 (two) times daily. To scalp only     diclofenac sodium 1 % Gel  Commonly known as: VOLTAREN  Apply topically 2 (two) times daily.     emollient combination no.69 Crea  Commonly known as: EUCERIN SKIN CALMING  Apply BID     fluocinolone 0.01 % Sham  Commonly known as: SYNALAR  Apply no more than 1 ounce to scalp once  "daily; work into lather and allow to remain on scalp for ~5 minutes. Remove from hair and scalp by rinsing thoroughly with water.     fluticasone propionate 50 mcg/actuation nasal spray  Commonly known as: FLONASE  2 sprays (100 mcg total) by Each Nostril route once daily.     gabapentin 100 MG capsule  Commonly known as: NEURONTIN  Take 1 capsule (100 mg total) by mouth 3 (three) times daily.     glimepiride 4 MG tablet  Commonly known as: AMARYL  Take 1 tablet (4 mg total) by mouth daily with breakfast.     ketoconazole 2 % shampoo  Commonly known as: NIZORAL  Apply topically 3 (three) times a week.     levocetirizine 5 MG tablet  Commonly known as: XYZAL  Take 1 tablet (5 mg total) by mouth every evening.     magnesium oxide 400 mg (241.3 mg magnesium) tablet  Commonly known as: MAG-OX  Take 500 mg by mouth every evening.     mupirocin 2 % ointment  Commonly known as: BACTROBAN  APPLY  OINTMENT TOPICALLY ONCE DAILY     pen needle, diabetic 31 gauge x 5/16" Ndle  Commonly known as: BD ULTRA-FINE SHORT PEN NEEDLE  USE 1  ONCE DAILY     rivaroxaban 10 mg Tab  Commonly known as: XARELTO  Take 1 tablet (10 mg total) by mouth daily with dinner or evening meal.     TOUJEO SOLOSTAR U-300 INSULIN 300 unit/mL (1.5 mL) Inpn pen  Generic drug: insulin glargine (TOUJEO)  Inject 50 Units into the skin 2 (two) times daily.     traZODone 150 MG tablet  Commonly known as: DESYREL  Take 1 tablet (150 mg total) by mouth nightly as needed for Insomnia.     triamcinolone acetonide 0.025% 0.025 % cream  Commonly known as: KENALOG  AAA bid as needed for flares.  Mild steroid.     TRULICITY 1.5 mg/0.5 mL pen injector  Generic drug: dulaglutide  INJECT 1.5 MG INTO THE SKIN EVERY 7 DAYS            Indwelling Lines/Drains at time of discharge:   Lines/Drains/Airways     None                 Time spent on the discharge of patient: 45 minutes  Patient was seen and examined on the date of discharge and determined to be suitable for " discharge.         Adrien Stephenson NP  Department of Hospital Medicine  [facility]

## 2020-08-23 NOTE — PLAN OF CARE
08/23/20 1549   Final Note   Assessment Type Final Discharge Note   Anticipated Discharge Disposition Home-Health   Right Care Referral Info   Post Acute Recommendation Home-care   Facility Name [facility]

## 2020-08-24 ENCOUNTER — DOCUMENT SCAN (OUTPATIENT)
Dept: HOME HEALTH SERVICES | Facility: HOSPITAL | Age: 65
End: 2020-08-24
Payer: MEDICARE

## 2020-08-25 ENCOUNTER — DOCUMENT SCAN (OUTPATIENT)
Dept: HOME HEALTH SERVICES | Facility: HOSPITAL | Age: 65
End: 2020-08-25
Payer: MEDICARE

## 2020-08-28 DIAGNOSIS — M25.472 PAIN AND SWELLING OF LEFT ANKLE: ICD-10-CM

## 2020-08-28 DIAGNOSIS — T81.89XS NON-HEALING SURGICAL WOUND, SEQUELA: ICD-10-CM

## 2020-08-28 DIAGNOSIS — M25.572 PAIN IN LEFT ANKLE AND JOINTS OF LEFT FOOT: ICD-10-CM

## 2020-08-28 DIAGNOSIS — M25.572 PAIN AND SWELLING OF LEFT ANKLE: ICD-10-CM

## 2020-08-28 DIAGNOSIS — E11.49 TYPE II DIABETES MELLITUS WITH NEUROLOGICAL MANIFESTATIONS: ICD-10-CM

## 2020-08-28 DIAGNOSIS — M14.672 CHARCOT'S JOINT OF LEFT FOOT: Primary | ICD-10-CM

## 2020-08-28 DIAGNOSIS — T84.84XA PAINFUL ORTHOPAEDIC HARDWARE: ICD-10-CM

## 2020-08-31 ENCOUNTER — OFFICE VISIT (OUTPATIENT)
Dept: PODIATRY | Facility: CLINIC | Age: 65
End: 2020-08-31
Payer: MEDICARE

## 2020-08-31 ENCOUNTER — HOSPITAL ENCOUNTER (OUTPATIENT)
Dept: RADIOLOGY | Facility: HOSPITAL | Age: 65
Discharge: HOME | End: 2020-08-31
Attending: PODIATRIST
Payer: MEDICARE

## 2020-08-31 VITALS
WEIGHT: 210.13 LBS | HEIGHT: 69 IN | DIASTOLIC BLOOD PRESSURE: 66 MMHG | HEART RATE: 93 BPM | SYSTOLIC BLOOD PRESSURE: 98 MMHG | BODY MASS INDEX: 31.12 KG/M2

## 2020-08-31 DIAGNOSIS — M14.672 CHARCOT'S JOINT OF LEFT FOOT: ICD-10-CM

## 2020-08-31 DIAGNOSIS — Z09 POSTOP CHECK: Primary | ICD-10-CM

## 2020-08-31 DIAGNOSIS — M79.672 PAIN IN LEFT FOOT: ICD-10-CM

## 2020-08-31 DIAGNOSIS — Z09 POSTOP CHECK: ICD-10-CM

## 2020-08-31 DIAGNOSIS — M25.572 PAIN AND SWELLING OF LEFT ANKLE: ICD-10-CM

## 2020-08-31 DIAGNOSIS — E11.49 TYPE II DIABETES MELLITUS WITH NEUROLOGICAL MANIFESTATIONS: ICD-10-CM

## 2020-08-31 DIAGNOSIS — M25.472 PAIN AND SWELLING OF LEFT ANKLE: ICD-10-CM

## 2020-08-31 DIAGNOSIS — M79.89 SWELLING OF LEFT FOOT: ICD-10-CM

## 2020-08-31 DIAGNOSIS — T81.89XS NON-HEALING SURGICAL WOUND, SEQUELA: ICD-10-CM

## 2020-08-31 PROCEDURE — 29515 PR APPLY LOWER LEG SPLINT: ICD-10-PCS | Mod: 58,LT,S$GLB, | Performed by: PODIATRIST

## 2020-08-31 PROCEDURE — 99024 POSTOP FOLLOW-UP VISIT: CPT | Mod: S$GLB,,, | Performed by: PODIATRIST

## 2020-08-31 PROCEDURE — 99999 PR PBB SHADOW E&M-EST. PATIENT-LVL V: ICD-10-PCS | Mod: PBBFAC,,, | Performed by: PODIATRIST

## 2020-08-31 PROCEDURE — 73610 X-RAY EXAM OF ANKLE: CPT | Mod: 26,LT,, | Performed by: RADIOLOGY

## 2020-08-31 PROCEDURE — 87070 CULTURE OTHR SPECIMN AEROBIC: CPT

## 2020-08-31 PROCEDURE — 87186 SC STD MICRODIL/AGAR DIL: CPT

## 2020-08-31 PROCEDURE — 73610 X-RAY EXAM OF ANKLE: CPT | Mod: TC,LT

## 2020-08-31 PROCEDURE — 73610 XR ANKLE COMPLETE 3 VIEW LEFT: ICD-10-PCS | Mod: 26,LT,, | Performed by: RADIOLOGY

## 2020-08-31 PROCEDURE — 99999 PR PBB SHADOW E&M-EST. PATIENT-LVL V: CPT | Mod: PBBFAC,,, | Performed by: PODIATRIST

## 2020-08-31 PROCEDURE — 99024 PR POST-OP FOLLOW-UP VISIT: ICD-10-PCS | Mod: S$GLB,,, | Performed by: PODIATRIST

## 2020-08-31 PROCEDURE — 29515 APPLICATION SHORT LEG SPLINT: CPT | Mod: 58,LT,S$GLB, | Performed by: PODIATRIST

## 2020-08-31 PROCEDURE — 87077 CULTURE AEROBIC IDENTIFY: CPT

## 2020-08-31 NOTE — PROGRESS NOTES
Subjective:       Patient ID: Kay Galarza is a 65 y.o. female.    Chief Complaint: Post-op Evaluation (DOS08/18/2020 Reconstruction Charcot Foot w/Fusion rates pain 9/10 soft cast w/wrapping diabetic pt pcp Dr. Stanton.)      HPI:  Kay Galarza presents to the office today, s/p 07/23/2020 LLE ankle ORIF with LLE Charcot Foot reconstruction. This surgery was revised on 8/18 due to extrusion of the medial cuneiform bone.    She is also s/p 8/18/2020    1. Revision of Charcot deformity via multiple midfoot joint fusions.              2. Application of amniotic membrane.              3. Excisional wound debridement, left ankle.              4. Excisional wound debridement, left foot.               5. Application of WoundVac, left foot.              6. Application of WoundVac, left ankle.      Patient does continue to have [facility] for WoundVac changes. No PO ABx at present.  Presents this morning with her son.  No recent x-ray evaluation.  Discontinue Xarelto for DVT prophylaxis.  States blood sugars are most the time less than 200mg/dL. States RICE therapy and minimal to no opioids.    Hemoglobin A1C   Date Value Ref Range Status   07/20/2020 9.7 (H) 4.0 - 5.6 % Final     Comment:     ADA Screening Guidelines:  5.7-6.4%  Consistent with prediabetes  >or=6.5%  Consistent with diabetes  High levels of fetal hemoglobin interfere with the HbA1C  assay. Heterozygous hemoglobin variants (HbS, HgC, etc)do  not significantly interfere with this assay.   However, presence of multiple variants may affect accuracy.     12/10/2019 7.5 (H) 4.0 - 5.6 % Final     Comment:     ADA Screening Guidelines:  5.7-6.4%  Consistent with prediabetes  >or=6.5%  Consistent with diabetes  High levels of fetal hemoglobin interfere with the HbA1C  assay. Heterozygous hemoglobin variants (HbS, HgC, etc)do  not significantly interfere with this assay.   However, presence of multiple variants may affect  accuracy.     08/22/2019 8.3 (H) 4.0 - 5.6 % Final     Comment:     ADA Screening Guidelines:  5.7-6.4%  Consistent with prediabetes  >or=6.5%  Consistent with diabetes  High levels of fetal hemoglobin interfere with the HbA1C  assay. Heterozygous hemoglobin variants (HbS, HgC, etc)do  not significantly interfere with this assay.   However, presence of multiple variants may affect accuracy.         Review of patient's allergies indicates:   Allergen Reactions    Pollen extracts        Past Medical History:   Diagnosis Date    Arthritis     DERIAN KNEES    Asthma     SEASONAL    Cataract     Diabetes mellitus     Diabetes mellitus, type 2     Diabetic retinopathy     Hyperlipidemia     Hypertension        Family History   Problem Relation Age of Onset    Cancer Father         Prostate Ca    Hypertension Father     Stroke Father     Diabetes Sister     Glaucoma Mother     Hypertension Mother     Glaucoma Maternal Grandmother     Blindness Neg Hx     Macular degeneration Neg Hx     Retinal detachment Neg Hx     Strabismus Neg Hx        Social History     Socioeconomic History    Marital status:      Spouse name: Not on file    Number of children: Not on file    Years of education: Not on file    Highest education level: Not on file   Occupational History    Not on file   Social Needs    Financial resource strain: Not on file    Food insecurity     Worry: Not on file     Inability: Not on file    Transportation needs     Medical: Not on file     Non-medical: Not on file   Tobacco Use    Smoking status: Never Smoker    Smokeless tobacco: Never Used   Substance and Sexual Activity    Alcohol use: Never     Frequency: Never    Drug use: No    Sexual activity: Yes   Lifestyle    Physical activity     Days per week: Not on file     Minutes per session: Not on file    Stress: Not on file   Relationships    Social connections     Talks on phone: Not on file     Gets together: Not on file      Attends Confucianism service: Not on file     Active member of club or organization: Not on file     Attends meetings of clubs or organizations: Not on file     Relationship status: Not on file   Other Topics Concern    Not on file   Social History Narrative    Not on file       Past Surgical History:   Procedure Laterality Date    APPLICATION OF WOUND VACUUM-ASSISTED CLOSURE DEVICE Left 8/18/2020    Procedure: APPLICATION, WOUND VAC;  Surgeon: Kirt Gray DPM;  Location: [facility];  Service: Podiatry;  Laterality: Left;    CATARACT EXTRACTION W/  INTRAOCULAR LENS IMPLANT Right 07/18/2018    CATARACT EXTRACTION W/  INTRAOCULAR LENS IMPLANT Left 03/13/2019    COLONOSCOPY N/A 12/22/2018    Procedure: COLONOSCOPY;  Surgeon: Zak Dover MD;  Location: [facility] ENDO;  Service: Endoscopy;  Laterality: N/A;    ENDOSCOPIC VEIN LASER TREATMENT Bilateral 1990's    FIXATION OF SYNDESMOSIS OF ANKLE Left 7/23/2020    Procedure: FIXATION, SYNDESMOSIS, ANKLE;  Surgeon: Kirt Gray DPM;  Location: [facility];  Service: Podiatry;  Laterality: Left;    MANIPULATION WITH ANESTHESIA Left 7/23/2020    Procedure: MANIPULATION, WITH ANESTHESIA;  Surgeon: Kirt Gray DPM;  Location: [facility];  Service: Podiatry;  Laterality: Left;    MIDFOOT ARTHRODESIS Left 7/23/2020    Procedure: FUSION, JOINT, MIDFOOT;  Surgeon: Kirt Gray DPM;  Location: [facility];  Service: Podiatry;  Laterality: Left;  2nd tarsometatarsal joint dislocation via fusion    OPEN REDUCTION AND INTERNAL FIXATION (ORIF) OF INJURY OF ANKLE Left 7/23/2020    Procedure: ORIF, ANKLE;  Surgeon: Kirt Gray DPM;  Location: [facility];  Service: Podiatry;  Laterality: Left;    RECONSTRUCTION WITH FUSION OF CHARCOT FOOT Left 8/18/2020    Procedure: RECONSTRUCTION, CHARCOT FOOT, WITH FUSION;  Surgeon: Kirt Gray DPM;  Location: [facility];  Service: Podiatry;  Laterality: Left;    WOUND DEBRIDEMENT Left 8/18/2020    Procedure: DEBRIDEMENT, WOUND;   "Surgeon: Kirt Gray DPM;  [facility];  Service: Podiatry;  Laterality: Left;       Review of Systems   Constitutional: Negative for chills, fatigue and fever.   HENT: Negative for hearing loss.    Eyes: Negative for photophobia and visual disturbance.   Respiratory: Negative for cough, chest tightness, shortness of breath and wheezing.    Cardiovascular: Positive for leg swelling. Negative for chest pain and palpitations.   Gastrointestinal: Negative for constipation, diarrhea, nausea and vomiting.   Endocrine: Negative for cold intolerance and heat intolerance.   Genitourinary: Negative for flank pain.   Musculoskeletal: Positive for gait problem. Negative for neck pain and neck stiffness.   Skin: Positive for color change and wound.   Neurological: Positive for numbness. Negative for light-headedness and headaches.   Psychiatric/Behavioral: Negative for sleep disturbance.          Objective:   BP 98/66   Pulse 93   Ht 5' 9" (1.753 m)   Wt 95.3 kg (210 lb 1.6 oz)   BMI 31.03 kg/m²     LOWER EXTREMITY PHYSICAL EXAMINATION    DERMATOLOGY: The area of wound dehiscence at the lateral malleolar area is improved as compared to prior.  Granulation tissues are noted.  No probe to bone or osseous exposure.  No drainage.  No malodor.  The wound at the medial border of the midfoot is improved as well does has one minor area which is deeper than 3 mm at approximately 4 mm.  Granulation tissues are noted.  No probe to bone or osseous exposure is noted.  No malodor or drainage is noted.  No calor is noted.  No erythema.    VASCULAR: No ipsilateral calf pain or tenderness is noted with palpation and compression. No palpable cords are noted.      ORTHOPEDIC: Discomfort to palpation, medial midfoot, left foot.  Slight discomfort to palpation at the ankle joint.  Edema is noted, foot and ankle. Ankle ROM is noted.     Assessment:     1. Postop check    2. Non-healing surgical wound, sequela    3. Charcot's neuro " "arthropathy with dislocation of Lisfranc Joint of left foot    4. Type II diabetes mellitus with neurological manifestations    5. Pain and swelling of left ankle    6. Swelling of left foot    7. Pain in left foot          Plan:     Non-healing surgical wound, sequela  Pain and swelling of left ankle  Swelling of left foot  Pain in left foot  Charcot's neuro arthropathy with dislocation of Lisfranc Joint of left foot  Postop check  -     X-Ray Foot Complete Left; Future; Expected date: 08/31/2020  -     X-Ray Ankle Complete Left; Future; Expected date: 08/31/2020  -     Aerobic culture (Specify Source)    Thorough discussion is had with the patient today, concerning the diagnosis, its etiology, and the treatment algorithm at present. Please obtain x-ray evaluation of foot and ankle due to edema. No calor or erythema is noted however.    Continue nonweightbearing. Continue DVT PPx. Continue [facility] for WoundVac changes. No WoundVac on the lateral ankle incision; strictly collagen. LLE short leg posterior splint application in standard fashion using 5" OrthoGlass fiberglass splinting material, approx. 24" with web-roll/cast padding and ACE bandage.      Please obtain Wound C&S.  I do anticipate D/C of WoundVac at her next evaluation.     Type II diabetes mellitus with neurological manifestations  Patient advised to follow up with Primary Care Physician for management of comorbid states.    On continuous oral anticoagulation  Patient on Xarelto, status post.  Her last dose of Xarelto will be on Saturday evening.          Future Appointments   Date Time Provider Department Center   9/11/2020 10:45 AM Kirt Gray DPM [facility]             "

## 2020-09-01 ENCOUNTER — DOCUMENT SCAN (OUTPATIENT)
Dept: HOME HEALTH SERVICES | Facility: HOSPITAL | Age: 65
End: 2020-09-01
Payer: MEDICARE

## 2020-09-02 ENCOUNTER — PATIENT OUTREACH (OUTPATIENT)
Dept: ADMINISTRATIVE | Facility: HOSPITAL | Age: 65
End: 2020-09-02

## 2020-09-02 DIAGNOSIS — T81.89XS NON-HEALING SURGICAL WOUND, SEQUELA: Primary | ICD-10-CM

## 2020-09-02 RX ORDER — CIPROFLOXACIN 750 MG/1
750 TABLET, FILM COATED ORAL EVERY 12 HOURS
Qty: 14 TABLET | Refills: 0 | Status: SHIPPED | OUTPATIENT
Start: 2020-09-02 | End: 2020-09-09

## 2020-09-03 LAB — BACTERIA SPEC AEROBE CULT: ABNORMAL

## 2020-09-09 ENCOUNTER — PATIENT OUTREACH (OUTPATIENT)
Dept: ADMINISTRATIVE | Facility: HOSPITAL | Age: 65
End: 2020-09-09

## 2020-09-09 NOTE — PROGRESS NOTES
I have contacted patient to schedule over due diabetic eye exam. Eye exam scheduled 9-29-20 with Dr Marbin Galicia.

## 2020-09-10 ENCOUNTER — HOSPITAL ENCOUNTER (OUTPATIENT)
Dept: RADIOLOGY | Facility: HOSPITAL | Age: 65
Discharge: HOME | End: 2020-09-10
Attending: PODIATRIST
Payer: MEDICARE

## 2020-09-10 ENCOUNTER — OFFICE VISIT (OUTPATIENT)
Dept: PODIATRY | Facility: CLINIC | Age: 65
End: 2020-09-10
Payer: MEDICARE

## 2020-09-10 VITALS — HEIGHT: 69 IN | WEIGHT: 210.13 LBS | BODY MASS INDEX: 31.12 KG/M2

## 2020-09-10 DIAGNOSIS — E11.49 TYPE II DIABETES MELLITUS WITH NEUROLOGICAL MANIFESTATIONS: ICD-10-CM

## 2020-09-10 DIAGNOSIS — Z09 POSTOP CHECK: Primary | ICD-10-CM

## 2020-09-10 DIAGNOSIS — T84.84XA PAINFUL ORTHOPAEDIC HARDWARE: ICD-10-CM

## 2020-09-10 DIAGNOSIS — Z79.01 ON CONTINUOUS ORAL ANTICOAGULATION: ICD-10-CM

## 2020-09-10 DIAGNOSIS — M25.572 PAIN IN LEFT ANKLE AND JOINTS OF LEFT FOOT: ICD-10-CM

## 2020-09-10 DIAGNOSIS — Z09 POSTOP CHECK: ICD-10-CM

## 2020-09-10 DIAGNOSIS — M14.672 CHARCOT'S JOINT OF LEFT FOOT: ICD-10-CM

## 2020-09-10 DIAGNOSIS — T81.89XS NON-HEALING SURGICAL WOUND, SEQUELA: ICD-10-CM

## 2020-09-10 DIAGNOSIS — S82.852D CLOSED TRIMALLEOLAR FRACTURE OF LEFT ANKLE WITH ROUTINE HEALING, SUBSEQUENT ENCOUNTER: ICD-10-CM

## 2020-09-10 PROCEDURE — 73630 X-RAY EXAM OF FOOT: CPT | Mod: 26,LT,, | Performed by: RADIOLOGY

## 2020-09-10 PROCEDURE — 73630 X-RAY EXAM OF FOOT: CPT | Mod: TC,LT

## 2020-09-10 PROCEDURE — 99024 POSTOP FOLLOW-UP VISIT: CPT | Mod: S$GLB,,, | Performed by: PODIATRIST

## 2020-09-10 PROCEDURE — 73630 XR FOOT COMPLETE 3 VIEW LEFT: ICD-10-PCS | Mod: 26,LT,, | Performed by: RADIOLOGY

## 2020-09-10 PROCEDURE — 99024 PR POST-OP FOLLOW-UP VISIT: ICD-10-PCS | Mod: S$GLB,,, | Performed by: PODIATRIST

## 2020-09-10 PROCEDURE — 99999 PR PBB SHADOW E&M-EST. PATIENT-LVL IV: CPT | Mod: PBBFAC,,, | Performed by: PODIATRIST

## 2020-09-10 PROCEDURE — 99999 PR PBB SHADOW E&M-EST. PATIENT-LVL IV: ICD-10-PCS | Mod: PBBFAC,,, | Performed by: PODIATRIST

## 2020-09-11 ENCOUNTER — DOCUMENT SCAN (OUTPATIENT)
Dept: HOME HEALTH SERVICES | Facility: HOSPITAL | Age: 65
End: 2020-09-11
Payer: MEDICARE

## 2020-09-11 NOTE — PROGRESS NOTES
Subjective:       Patient ID: Kay Galarza is a 65 y.o. female.    Chief Complaint: Post-op Evaluation (DOS08/18/2020 Reconstruction Charcot L. foot w/ Fusion 0/10 soaft cast w/wrapping diabetic pt pcp dr. Stanton.)      HPI:  Kay Galarza presents to the office today, s/p 07/23/2020 LLE ankle ORIF with LLE Charcot Foot reconstruction. This surgery was revised on 8/18 due to extrusion of the medial cuneiform bone.     The 8/18/2020 procedure:    1. Revision of Charcot deformity via multiple midfoot joint fusions.              2. Application of amniotic membrane.              3. Excisional wound debridement, left ankle.              4. Excisional wound debridement, left foot.               5. Application of WoundVac, left foot.              6. Application of WoundVac, left ankle.      Patient does continue to have [facility] for WoundVac changes.  Recently completed a course of oral Cipro. Presents this afternoon with her son. Foot XRAY was performed today. Discontinue Xarelto for DVT prophylaxis.  States blood sugars are most of the times, less than 200mg/dL. States RICE therapy and minimal to no opioids.    Hemoglobin A1C   Date Value Ref Range Status   07/20/2020 9.7 (H) 4.0 - 5.6 % Final     Comment:     ADA Screening Guidelines:  5.7-6.4%  Consistent with prediabetes  >or=6.5%  Consistent with diabetes  High levels of fetal hemoglobin interfere with the HbA1C  assay. Heterozygous hemoglobin variants (HbS, HgC, etc)do  not significantly interfere with this assay.   However, presence of multiple variants may affect accuracy.     12/10/2019 7.5 (H) 4.0 - 5.6 % Final     Comment:     ADA Screening Guidelines:  5.7-6.4%  Consistent with prediabetes  >or=6.5%  Consistent with diabetes  High levels of fetal hemoglobin interfere with the HbA1C  assay. Heterozygous hemoglobin variants (HbS, HgC, etc)do  not significantly interfere with this assay.   However, presence of multiple  variants may affect accuracy.     08/22/2019 8.3 (H) 4.0 - 5.6 % Final     Comment:     ADA Screening Guidelines:  5.7-6.4%  Consistent with prediabetes  >or=6.5%  Consistent with diabetes  High levels of fetal hemoglobin interfere with the HbA1C  assay. Heterozygous hemoglobin variants (HbS, HgC, etc)do  not significantly interfere with this assay.   However, presence of multiple variants may affect accuracy.         Review of patient's allergies indicates:   Allergen Reactions    Pollen extracts        Past Medical History:   Diagnosis Date    Arthritis     DERIAN KNEES    Asthma     SEASONAL    Cataract     Diabetes mellitus     Diabetes mellitus, type 2     Diabetic retinopathy     Hyperlipidemia     Hypertension        Family History   Problem Relation Age of Onset    Cancer Father         Prostate Ca    Hypertension Father     Stroke Father     Diabetes Sister     Glaucoma Mother     Hypertension Mother     Glaucoma Maternal Grandmother     Blindness Neg Hx     Macular degeneration Neg Hx     Retinal detachment Neg Hx     Strabismus Neg Hx        Social History     Socioeconomic History    Marital status:      Spouse name: Not on file    Number of children: Not on file    Years of education: Not on file    Highest education level: Not on file   Occupational History    Not on file   Social Needs    Financial resource strain: Not on file    Food insecurity     Worry: Not on file     Inability: Not on file    Transportation needs     Medical: Not on file     Non-medical: Not on file   Tobacco Use    Smoking status: Never Smoker    Smokeless tobacco: Never Used   Substance and Sexual Activity    Alcohol use: Never     Frequency: Never    Drug use: No    Sexual activity: Yes   Lifestyle    Physical activity     Days per week: Not on file     Minutes per session: Not on file    Stress: Not on file   Relationships    Social connections     Talks on phone: Not on file     Gets  together: Not on file     Attends Jainism service: Not on file     Active member of club or organization: Not on file     Attends meetings of clubs or organizations: Not on file     Relationship status: Not on file   Other Topics Concern    Not on file   Social History Narrative    Not on file       Past Surgical History:   Procedure Laterality Date    APPLICATION OF WOUND VACUUM-ASSISTED CLOSURE DEVICE Left 8/18/2020    Procedure: APPLICATION, WOUND VAC;  Surgeon: Kirt Gray DPM;  Location: [facility] OR;  Service: Podiatry;  Laterality: Left;    CATARACT EXTRACTION W/  INTRAOCULAR LENS IMPLANT Right 07/18/2018    CATARACT EXTRACTION W/  INTRAOCULAR LENS IMPLANT Left 03/13/2019    COLONOSCOPY N/A 12/22/2018    Procedure: COLONOSCOPY;  Surgeon: Zak Dover MD;  Location: [facility] ENDO;  Service: Endoscopy;  Laterality: N/A;    ENDOSCOPIC VEIN LASER TREATMENT Bilateral 1990's    FIXATION OF SYNDESMOSIS OF ANKLE Left 7/23/2020    Procedure: FIXATION, SYNDESMOSIS, ANKLE;  Surgeon: Kirt Gray DPM;  Location: [facility] OR;  Service: Podiatry;  Laterality: Left;    MANIPULATION WITH ANESTHESIA Left 7/23/2020    Procedure: MANIPULATION, WITH ANESTHESIA;  Surgeon: Kirt Gray DPM;  Location: [facility] OR;  Service: Podiatry;  Laterality: Left;    MIDFOOT ARTHRODESIS Left 7/23/2020    Procedure: FUSION, JOINT, MIDFOOT;  Surgeon: Kirt Gray DPM;  Location: [facility] OR;  Service: Podiatry;  Laterality: Left;  2nd tarsometatarsal joint dislocation via fusion    OPEN REDUCTION AND INTERNAL FIXATION (ORIF) OF INJURY OF ANKLE Left 7/23/2020    Procedure: ORIF, ANKLE;  Surgeon: Kirt Gray DPM;  Location: [facility] OR;  Service: Podiatry;  Laterality: Left;    RECONSTRUCTION WITH FUSION OF CHARCOT FOOT Left 8/18/2020    Procedure: RECONSTRUCTION, CHARCOT FOOT, WITH FUSION;  Surgeon: Kirt Gray DPM;  Location: [facility] OR;  Service: Podiatry;  Laterality: Left;    WOUND DEBRIDEMENT Left 8/18/2020    Procedure:  "DEBRIDEMENT, WOUND;  Surgeon: Kirt Gray DPM;  Location: [facility];  Service: Podiatry;  Laterality: Left;       Review of Systems   Constitutional: Negative for chills, fatigue and fever.   HENT: Negative for hearing loss.    Eyes: Negative for photophobia and visual disturbance.   Respiratory: Negative for cough, chest tightness, shortness of breath and wheezing.    Cardiovascular: Positive for leg swelling. Negative for chest pain and palpitations.   Gastrointestinal: Negative for constipation, diarrhea, nausea and vomiting.   Endocrine: Negative for cold intolerance and heat intolerance.   Genitourinary: Negative for flank pain.   Musculoskeletal: Positive for gait problem. Negative for neck pain and neck stiffness.   Skin: Positive for color change and wound.   Neurological: Positive for numbness. Negative for light-headedness and headaches.   Psychiatric/Behavioral: Negative for sleep disturbance.          Objective:   Ht 5' 9" (1.753 m)   Wt 95.3 kg (210 lb 1.6 oz)   BMI 31.03 kg/m²                               LOWER EXTREMITY PHYSICAL EXAMINATION  VASCULAR: No ipsilateral calf pain or tenderness is noted with palpation and compression. No palpable cords are noted.      ORTHOPEDIC:  Slight discomfort to palpation to the area of incisions, particularly the medial midfoot.  No appreciable edema is noted.  Normal ankle range of motion is noted.    DERMATOLOGY:  The wound at the lateral malleolus is nearly healed and resolved. At this time, the area measures 2.50 cm x 0.80 cm without appreciable depth.  Granulation tissues are noted.  No probe to bone or osseous exposure.  The wound at the medial midfoot persists, but is improved as compared to prior. In totality, the wound measures approximately 8.50 cm in length with a side-to-side dimension of approximately 1.10 cm with a deepest depth of 0.30 cm.  Granulation tissues are noted. No probe to bone or osseous exposure. No calor is noted.  No evidence " "Charcot foot changes.  No cellulitis.  No erythema.    Assessment:     1. Postop check    2. Non-healing surgical wound, sequela    3. 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 Closed trimalleolar fracture of left ankle with routine healing, subsequent encounter    5. Painful orthopaedic hardware    6. Pain in left ankle and joints of left foot    7. Type II diabetes mellitus with neurological manifestations    8. On continuous oral anticoagulation          Plan:     Postop check  Non-healing surgical wound, sequela  Charcot's neuro arthropathy with dislocation of Lisfranc Joint of left foot  Closed trimalleolar fracture of left ankle with routine healing, subsequent encounter  Painful orthopaedic hardware  Pain in left ankle and joints of left foot  Thorough discussion is had with the patient today, concerning the diagnosis, its etiology, and the treatment algorithm at present. XRAYS are reviewed in detail with the patient. All questions and concerns regarding findings and its/their implications are outlined and discussed.    LLE short leg posterior splint application in standard fashion using 5" OrthoGlass fiberglass splinting material, approx. 24" with web-roll/cast padding and ACE bandage.    Continue nonweightbearing.  Continue DVT prophylaxis.  RICE therapy posterior to the knee ATC.  Evidence of Charcot foot changes. Continue [facility] with collagen dressings. Hold WoundVac for now. No infective signs are noted. Patient completed PO Cipro recently.     Type II diabetes mellitus with neurological manifestations  Strict DMII control in the setting of osseous healing and delayed healing of surgical wounds.    On continuous oral anticoagulation  Continue oral anticoagulant.             Future Appointments   Date Time Provider Department Center   9/17/2020  8:30 AM Kirt Gray DPM [facility]   9/29/2020  9:30 AM Marbin Galicia OD [facility]

## 2020-09-17 ENCOUNTER — DOCUMENT SCAN (OUTPATIENT)
Dept: HOME HEALTH SERVICES | Facility: HOSPITAL | Age: 65
End: 2020-09-17
Payer: MEDICARE

## 2020-09-17 ENCOUNTER — OFFICE VISIT (OUTPATIENT)
Dept: PODIATRY | Facility: CLINIC | Age: 65
End: 2020-09-17
Payer: MEDICARE

## 2020-09-17 VITALS
HEIGHT: 69 IN | BODY MASS INDEX: 31.12 KG/M2 | SYSTOLIC BLOOD PRESSURE: 112 MMHG | WEIGHT: 210.13 LBS | DIASTOLIC BLOOD PRESSURE: 77 MMHG | HEART RATE: 100 BPM

## 2020-09-17 DIAGNOSIS — M14.672 CHARCOT'S JOINT OF LEFT FOOT: ICD-10-CM

## 2020-09-17 DIAGNOSIS — E11.49 TYPE II DIABETES MELLITUS WITH NEUROLOGICAL MANIFESTATIONS: ICD-10-CM

## 2020-09-17 DIAGNOSIS — Z09 POSTOP CHECK: Primary | ICD-10-CM

## 2020-09-17 DIAGNOSIS — T81.89XS NON-HEALING SURGICAL WOUND, SEQUELA: ICD-10-CM

## 2020-09-17 DIAGNOSIS — S82.852D CLOSED TRIMALLEOLAR FRACTURE OF LEFT ANKLE WITH ROUTINE HEALING, SUBSEQUENT ENCOUNTER: ICD-10-CM

## 2020-09-17 DIAGNOSIS — M25.572 PAIN IN LEFT ANKLE AND JOINTS OF LEFT FOOT: ICD-10-CM

## 2020-09-17 DIAGNOSIS — T84.84XA PAINFUL ORTHOPAEDIC HARDWARE: ICD-10-CM

## 2020-09-17 PROCEDURE — 99024 POSTOP FOLLOW-UP VISIT: CPT | Mod: S$GLB,,, | Performed by: PODIATRIST

## 2020-09-17 PROCEDURE — 99999 PR PBB SHADOW E&M-EST. PATIENT-LVL V: CPT | Mod: PBBFAC,,, | Performed by: PODIATRIST

## 2020-09-17 PROCEDURE — 29515 PR APPLY LOWER LEG SPLINT: ICD-10-PCS | Mod: 58,LT,S$GLB, | Performed by: PODIATRIST

## 2020-09-17 PROCEDURE — 99024 PR POST-OP FOLLOW-UP VISIT: ICD-10-PCS | Mod: S$GLB,,, | Performed by: PODIATRIST

## 2020-09-17 PROCEDURE — 99999 PR PBB SHADOW E&M-EST. PATIENT-LVL V: ICD-10-PCS | Mod: PBBFAC,,, | Performed by: PODIATRIST

## 2020-09-17 PROCEDURE — 29515 APPLICATION SHORT LEG SPLINT: CPT | Mod: 58,LT,S$GLB, | Performed by: PODIATRIST

## 2020-09-17 RX ORDER — HYDROCODONE BITARTRATE AND ACETAMINOPHEN 5; 325 MG/1; MG/1
1 TABLET ORAL EVERY 8 HOURS PRN
Qty: 21 TABLET | Refills: 0 | Status: SHIPPED | OUTPATIENT
Start: 2020-09-17 | End: 2020-09-24

## 2020-09-17 NOTE — PROGRESS NOTES
Subjective:       Patient ID: Kay Galarza is a 65 y.o. female.    Chief Complaint: Post-op Evaluation (DOS08/18/2020 Reconstruction Charcot Foot With Fusion L. Foot. 0/10 soft w/wrapping diabetic pt pcp Maximino.)      HPI:  Kay Galarza presents to the office today, s/p 07/23/2020 LLE ankle ORIF with LLE Charcot Foot reconstruction. This surgery was revised on 8/18 due to extrusion of the medial cuneiform bone.     The 8/18/2020 procedure:    1. Revision of Charcot deformity via multiple midfoot joint fusions.              2. Application of amniotic membrane.              3. Excisional wound debridement, left ankle.              4. Excisional wound debridement, left foot.               5. Application of WoundVac, left foot.              6. Application of WoundVac, left ankle.      Patient does continue to have [facility] for collagen dressings. Presents this morning with her son. Discontinue Xarelto for DVT prophylaxis.  States blood sugars are most of the times, less than 200mg/dL. States RICE therapy. Needs refill for opioid.     Hemoglobin A1C   Date Value Ref Range Status   07/20/2020 9.7 (H) 4.0 - 5.6 % Final     Comment:     ADA Screening Guidelines:  5.7-6.4%  Consistent with prediabetes  >or=6.5%  Consistent with diabetes  High levels of fetal hemoglobin interfere with the HbA1C  assay. Heterozygous hemoglobin variants (HbS, HgC, etc)do  not significantly interfere with this assay.   However, presence of multiple variants may affect accuracy.     12/10/2019 7.5 (H) 4.0 - 5.6 % Final     Comment:     ADA Screening Guidelines:  5.7-6.4%  Consistent with prediabetes  >or=6.5%  Consistent with diabetes  High levels of fetal hemoglobin interfere with the HbA1C  assay. Heterozygous hemoglobin variants (HbS, HgC, etc)do  not significantly interfere with this assay.   However, presence of multiple variants may affect accuracy.     08/22/2019 8.3 (H) 4.0 - 5.6 % Final      Comment:     ADA Screening Guidelines:  5.7-6.4%  Consistent with prediabetes  >or=6.5%  Consistent with diabetes  High levels of fetal hemoglobin interfere with the HbA1C  assay. Heterozygous hemoglobin variants (HbS, HgC, etc)do  not significantly interfere with this assay.   However, presence of multiple variants may affect accuracy.         Review of patient's allergies indicates:   Allergen Reactions    Pollen extracts        Past Medical History:   Diagnosis Date    Arthritis     DERIAN KNEES    Asthma     SEASONAL    Cataract     Diabetes mellitus     Diabetes mellitus, type 2     Diabetic retinopathy     Hyperlipidemia     Hypertension        Family History   Problem Relation Age of Onset    Cancer Father         Prostate Ca    Hypertension Father     Stroke Father     Diabetes Sister     Glaucoma Mother     Hypertension Mother     Glaucoma Maternal Grandmother     Blindness Neg Hx     Macular degeneration Neg Hx     Retinal detachment Neg Hx     Strabismus Neg Hx        Social History     Socioeconomic History    Marital status:      Spouse name: Not on file    Number of children: Not on file    Years of education: Not on file    Highest education level: Not on file   Occupational History    Not on file   Social Needs    Financial resource strain: Not on file    Food insecurity     Worry: Not on file     Inability: Not on file    Transportation needs     Medical: Not on file     Non-medical: Not on file   Tobacco Use    Smoking status: Never Smoker    Smokeless tobacco: Never Used   Substance and Sexual Activity    Alcohol use: Never     Frequency: Never    Drug use: No    Sexual activity: Yes   Lifestyle    Physical activity     Days per week: Not on file     Minutes per session: Not on file    Stress: Not on file   Relationships    Social connections     Talks on phone: Not on file     Gets together: Not on file     Attends Restorationism service: Not on file     Active  member of club or organization: Not on file     Attends meetings of clubs or organizations: Not on file     Relationship status: Not on file   Other Topics Concern    Not on file   Social History Narrative    Not on file       Past Surgical History:   Procedure Laterality Date    APPLICATION OF WOUND VACUUM-ASSISTED CLOSURE DEVICE Left 8/18/2020    Procedure: APPLICATION, WOUND VAC;  Surgeon: Kirt Gray DPM;  Location: [facility] OR;  Service: Podiatry;  Laterality: Left;    CATARACT EXTRACTION W/  INTRAOCULAR LENS IMPLANT Right 07/18/2018    CATARACT EXTRACTION W/  INTRAOCULAR LENS IMPLANT Left 03/13/2019    COLONOSCOPY N/A 12/22/2018    Procedure: COLONOSCOPY;  Surgeon: Zak Dover MD;  Location: [facility] ENDO;  Service: Endoscopy;  Laterality: N/A;    ENDOSCOPIC VEIN LASER TREATMENT Bilateral 1990's    FIXATION OF SYNDESMOSIS OF ANKLE Left 7/23/2020    Procedure: FIXATION, SYNDESMOSIS, ANKLE;  Surgeon: Kirt Gray DPM;  Location: [facility] OR;  Service: Podiatry;  Laterality: Left;    MANIPULATION WITH ANESTHESIA Left 7/23/2020    Procedure: MANIPULATION, WITH ANESTHESIA;  Surgeon: Kirt Gray DPM;  Location: [facility] OR;  Service: Podiatry;  Laterality: Left;    MIDFOOT ARTHRODESIS Left 7/23/2020    Procedure: FUSION, JOINT, MIDFOOT;  Surgeon: Kirt Gray DPM;  Location: [facility] OR;  Service: Podiatry;  Laterality: Left;  2nd tarsometatarsal joint dislocation via fusion    OPEN REDUCTION AND INTERNAL FIXATION (ORIF) OF INJURY OF ANKLE Left 7/23/2020    Procedure: ORIF, ANKLE;  Surgeon: Kirt Gray DPM;  Location: [facility] OR;  Service: Podiatry;  Laterality: Left;    RECONSTRUCTION WITH FUSION OF CHARCOT FOOT Left 8/18/2020    Procedure: RECONSTRUCTION, CHARCOT FOOT, WITH FUSION;  Surgeon: Kirt Gray DPM;  Location: [facility] OR;  Service: Podiatry;  Laterality: Left;    WOUND DEBRIDEMENT Left 8/18/2020    Procedure: DEBRIDEMENT, WOUND;  Surgeon: Kirt Gray DPM;  Location: [facility] OR;   "Service: Podiatry;  Laterality: Left;       Review of Systems   Constitutional: Negative for chills, fatigue and fever.   HENT: Negative for hearing loss.    Eyes: Negative for photophobia and visual disturbance.   Respiratory: Negative for cough, chest tightness, shortness of breath and wheezing.    Cardiovascular: Negative for chest pain, palpitations and leg swelling.   Gastrointestinal: Negative for constipation, diarrhea, nausea and vomiting.   Endocrine: Negative for cold intolerance and heat intolerance.   Genitourinary: Negative for flank pain.   Musculoskeletal: Positive for gait problem. Negative for neck pain and neck stiffness.   Skin: Positive for wound. Negative for color change.   Neurological: Positive for numbness. Negative for light-headedness and headaches.   Psychiatric/Behavioral: Negative for sleep disturbance.       Objective:   /77 (BP Location: Left arm, Patient Position: Sitting, BP Method: Medium (Automatic))   Pulse 100   Ht 5' 9" (1.753 m)   Wt 95.3 kg (210 lb 1.6 oz) Comment: Patient unable to stand due to surgery.  BMI 31.03 kg/m²                       LOWER EXTREMITY PHYSICAL EXAMINATION  VASCULAR: No ipsilateral calf pain or tenderness is noted with palpation and compression. No palpable cords are noted.      ORTHOPEDIC:  Slight discomfort to palpation to the area of incisions, particularly the medial midfoot.  No appreciable edema is noted.  Normal ankle range of motion is noted.    DERMATOLOGY:  The wound at the lateral malleolus is nearly healed and resolved.  It is hypergranular at this time. At this time, the area measures 2.40 cm x 0.60 cm without appreciable depth. No probe to bone or osseous exposure. The wound at the medial midfoot persists, but is improved as compared to prior. In totality, the wound measures approximately 8.20 cm in length with a side-to-side dimension of approximately 1.0 cm with a deepest depth of 0.30 cm.  Granulation tissues are noted. No " probe to bone or osseous exposure. No calor is noted.  No evidence Charcot foot changes.  No cellulitis.  No erythema.    Assessment:     1. Postop check    2. Non-healing surgical wound, sequela    3. 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 Closed trimalleolar fracture of left ankle with routine healing, subsequent encounter    5. Painful orthopaedic hardware    6. Pain in left ankle and joints of left foot    7. Type II diabetes mellitus with neurological manifestations          Plan:     Postop check  Non-healing surgical wound, sequela  Charcot's neuro arthropathy with dislocation of Lisfranc Joint of left foot  Closed trimalleolar fracture of left ankle with routine healing, subsequent encounter  Painful orthopaedic hardware  Pain in left ankle and joints of left foot  -     HYDROcodone-acetaminophen (NORCO) 5-325 mg per tablet; Take 1 tablet by mouth every 8 (eight) hours as needed.  Dispense: 21 tablet; Refill: 0  -     rivaroxaban (XARELTO) 10 mg Tab; Take 1 tablet (10 mg total) by mouth daily with dinner or evening meal.  Dispense: 60 tablet; Refill: 0  -     X-Ray Ankle Complete Left; Future; Expected date: 09/17/2020  -     X-Ray Foot Complete Left; Future; Expected date: 09/17/2020    Thorough discussion is had with the patient today, concerning the diagnosis, its etiology, and the treatment algorithm at present.     The wound was surgically debrided after adequate prep with alcohol and/or betadine paint. Excisional wound debridement was performed using sharp #10/#15 blade/rounded scalpel and tissue nipper, with removal of all non-viable skin and soft tissues; necrotic skin/tissue formation. The woundbase/wound bed was also debrided to encourage bleeding as to promote/stimulate healing. Debridement was excisional and included epidermal, dermal and subcutaneous tissues. Post debridement measurements are as above. Hemostasis was achieved. Patient tolerated  "procedure well and without complications. Local woundcare with wet to dry dressings and bandage thereafter.     LLE short leg posterior splint application in standard fashion using 5" OrthoGlass fiberglass splinting material, approx. 24" with web-roll/cast padding and ACE bandage.    Continue nonweightbearing.  Continue DVT prophylaxis.  RICE therapy posterior to the posterior knee ATC.  There is no of Charcot foot changes. Continue [facility] with collagen dressings with collagen and WoundVac. No infective signs are noted.     Type II diabetes mellitus with neurological manifestations  Strict DMII control in the setting of osseous healing and delayed healing of surgical wounds. At present, DMII is uncontrolled.     On continuous oral anticoagulation  Continue oral anticoagulant.             Future Appointments   Date Time Provider Department Center   9/29/2020  8:15 AM Kirt Gray DPM [facility]   9/29/2020  9:30 AM Marbin Galicia OD [facility]                 "

## 2020-09-18 ENCOUNTER — DOCUMENT SCAN (OUTPATIENT)
Dept: HOME HEALTH SERVICES | Facility: HOSPITAL | Age: 65
End: 2020-09-18
Payer: MEDICARE

## 2020-09-23 PROCEDURE — G0179 MD RECERTIFICATION HHA PT: HCPCS | Mod: ,,, | Performed by: FAMILY MEDICINE

## 2020-09-23 PROCEDURE — G0179 PR HOME HEALTH MD RECERTIFICATION: ICD-10-PCS | Mod: ,,, | Performed by: FAMILY MEDICINE

## 2020-09-28 ENCOUNTER — PATIENT OUTREACH (OUTPATIENT)
Dept: ADMINISTRATIVE | Facility: OTHER | Age: 65
End: 2020-09-28

## 2020-09-28 NOTE — PROGRESS NOTES
Health Maintenance Due   Topic Date Due    Hepatitis C Screening  1955    Sign Pain Contract  05/01/1971    Complete Opioid Risk Tool  05/01/1971    Shingles Vaccine (2 of 3) 01/02/2018    Mammogram  02/21/2020    Influenza Vaccine (1) 08/01/2020    Lipid Panel  08/22/2020    Foot Exam  10/21/2020     Updates were requested from care everywhere.  Chart was reviewed for overdue Proactive [facility] ([facility]) topics (CRS, Breast Cancer Screening, Eye exam)  Health Maintenance has been updated.  LINKS immunization registry triggered.  Immunizations were reconciled.

## 2020-09-29 ENCOUNTER — HOSPITAL ENCOUNTER (OUTPATIENT)
Dept: RADIOLOGY | Facility: HOSPITAL | Age: 65
Discharge: HOME | End: 2020-09-29
Attending: PODIATRIST
Payer: MEDICARE

## 2020-09-29 ENCOUNTER — OFFICE VISIT (OUTPATIENT)
Dept: PODIATRY | Facility: CLINIC | Age: 65
End: 2020-09-29
Payer: MEDICARE

## 2020-09-29 ENCOUNTER — PATIENT OUTREACH (OUTPATIENT)
Dept: ADMINISTRATIVE | Facility: OTHER | Age: 65
End: 2020-09-29

## 2020-09-29 ENCOUNTER — OFFICE VISIT (OUTPATIENT)
Dept: OPHTHALMOLOGY | Facility: CLINIC | Age: 65
End: 2020-09-29
Payer: MEDICARE

## 2020-09-29 VITALS — BODY MASS INDEX: 31.12 KG/M2 | HEIGHT: 69 IN | WEIGHT: 210.13 LBS

## 2020-09-29 DIAGNOSIS — Z09 POSTOP CHECK: Primary | ICD-10-CM

## 2020-09-29 DIAGNOSIS — Z09 POSTOP CHECK: ICD-10-CM

## 2020-09-29 DIAGNOSIS — M14.672 CHARCOT'S JOINT OF LEFT FOOT: ICD-10-CM

## 2020-09-29 DIAGNOSIS — Z79.01 ON CONTINUOUS ORAL ANTICOAGULATION: ICD-10-CM

## 2020-09-29 DIAGNOSIS — H40.013 OPEN ANGLE WITH BORDERLINE FINDINGS, LOW RISK, BILATERAL: ICD-10-CM

## 2020-09-29 DIAGNOSIS — S82.852D CLOSED TRIMALLEOLAR FRACTURE OF LEFT ANKLE WITH ROUTINE HEALING, SUBSEQUENT ENCOUNTER: ICD-10-CM

## 2020-09-29 DIAGNOSIS — T81.89XS NON-HEALING SURGICAL WOUND, SEQUELA: ICD-10-CM

## 2020-09-29 DIAGNOSIS — Z96.1 PSEUDOPHAKIA OF BOTH EYES: ICD-10-CM

## 2020-09-29 DIAGNOSIS — I10 ESSENTIAL HYPERTENSION: ICD-10-CM

## 2020-09-29 DIAGNOSIS — E11.49 TYPE II DIABETES MELLITUS WITH NEUROLOGICAL MANIFESTATIONS: ICD-10-CM

## 2020-09-29 DIAGNOSIS — H52.4 BILATERAL PRESBYOPIA: ICD-10-CM

## 2020-09-29 DIAGNOSIS — M25.572 PAIN IN LEFT ANKLE AND JOINTS OF LEFT FOOT: ICD-10-CM

## 2020-09-29 DIAGNOSIS — E11.3513 PROLIFERATIVE DIABETIC RETINOPATHY OF BOTH EYES WITH MACULAR EDEMA ASSOCIATED WITH TYPE 2 DIABETES MELLITUS: Primary | ICD-10-CM

## 2020-09-29 PROBLEM — Z91.199 NONCOMPLIANCE: Status: RESOLVED | Noted: 2019-12-19 | Resolved: 2020-09-29

## 2020-09-29 PROBLEM — H25.13 CATARACT, NUCLEAR SCLEROTIC SENILE, BILATERAL: Status: RESOLVED | Noted: 2018-06-04 | Resolved: 2020-09-29

## 2020-09-29 PROCEDURE — 92014 PR EYE EXAM, EST PATIENT,COMPREHESV: ICD-10-PCS | Mod: S$GLB,,, | Performed by: OPTOMETRIST

## 2020-09-29 PROCEDURE — 99024 PR POST-OP FOLLOW-UP VISIT: ICD-10-PCS | Mod: S$GLB,,, | Performed by: PODIATRIST

## 2020-09-29 PROCEDURE — 99999 PR PBB SHADOW E&M-EST. PATIENT-LVL III: ICD-10-PCS | Mod: PBBFAC,,, | Performed by: OPTOMETRIST

## 2020-09-29 PROCEDURE — 73610 X-RAY EXAM OF ANKLE: CPT | Mod: 26,LT,, | Performed by: RADIOLOGY

## 2020-09-29 PROCEDURE — 73630 X-RAY EXAM OF FOOT: CPT | Mod: TC,LT

## 2020-09-29 PROCEDURE — 92014 COMPRE OPH EXAM EST PT 1/>: CPT | Mod: S$GLB,,, | Performed by: OPTOMETRIST

## 2020-09-29 PROCEDURE — 99024 POSTOP FOLLOW-UP VISIT: CPT | Mod: S$GLB,,, | Performed by: PODIATRIST

## 2020-09-29 PROCEDURE — 92015 DETERMINE REFRACTIVE STATE: CPT | Mod: S$GLB,,, | Performed by: OPTOMETRIST

## 2020-09-29 PROCEDURE — 73630 X-RAY EXAM OF FOOT: CPT | Mod: 26,LT,, | Performed by: RADIOLOGY

## 2020-09-29 PROCEDURE — 73610 XR ANKLE COMPLETE 3 VIEW LEFT: ICD-10-PCS | Mod: 26,LT,, | Performed by: RADIOLOGY

## 2020-09-29 PROCEDURE — 73630 XR FOOT COMPLETE 3 VIEW LEFT: ICD-10-PCS | Mod: 26,LT,, | Performed by: RADIOLOGY

## 2020-09-29 PROCEDURE — 99999 PR PBB SHADOW E&M-EST. PATIENT-LVL III: CPT | Mod: PBBFAC,,, | Performed by: OPTOMETRIST

## 2020-09-29 PROCEDURE — 99999 PR PBB SHADOW E&M-EST. PATIENT-LVL III: ICD-10-PCS | Mod: PBBFAC,,, | Performed by: PODIATRIST

## 2020-09-29 PROCEDURE — 92015 PR REFRACTION: ICD-10-PCS | Mod: S$GLB,,, | Performed by: OPTOMETRIST

## 2020-09-29 PROCEDURE — 73610 X-RAY EXAM OF ANKLE: CPT | Mod: TC,LT

## 2020-09-29 PROCEDURE — 99999 PR PBB SHADOW E&M-EST. PATIENT-LVL III: CPT | Mod: PBBFAC,,, | Performed by: PODIATRIST

## 2020-09-29 NOTE — PROGRESS NOTES
HPI     NIDDM exam  Decrease distance visual acuity.  Last eye exam 04/26/2019 [facility].  New patient to [facility].  Lab Results       Component                Value               Date                       HGBA1C                   9.7 (H)             07/20/2020              Last edited by Marbin Galicia, OD on 9/29/2020  9:16 AM. (History)            Assessment /Plan     For exam results, see Encounter Report.    Proliferative diabetic retinopathy of both eyes with macular edema associated with type 2 diabetes mellitus    Essential hypertension    Open angle with borderline findings, low risk, bilateral    Pseudophakia of both eyes    Bilateral presbyopia      PDR causing reduced vision  Consult [facility] PDR eval    Stable IOP    Stable IOL OU.    No Rx for glasses.  RTC [facility] PDR eval  Discussed above and answered questions.

## 2020-09-29 NOTE — PROGRESS NOTES
Subjective:       Patient ID: Kay Galarza is a 65 y.o. female.    Chief Complaint: Post-op Evaluation (DOS08/18/2020 Reconstruction Charot Foot with Fusion wound vac change 5/10 wrapping diabetic pt pcp Dr. Stanton.)      HPI:  Kay Galarza presents to the office today, s/p 07/23/2020 LLE ankle ORIF with LLE Charcot Foot reconstruction. This surgery was revised on 8/18 due to extrusion of the medial cuneiform bone.     The 8/18/2020 procedure:    1. Revision of Charcot deformity via multiple midfoot joint fusions.              2. Application of amniotic membrane.              3. Excisional wound debridement, left ankle.              4. Excisional wound debridement, left foot.               5. Application of WoundVac, left foot.              6. Application of WoundVac, left ankle.      Patient does continue to have [facility] for collagen dressings. Presents this morning with her son. Does continue Xarelto for DVT prophylaxis. States blood sugars are most of the times, less than 180mg/dL. States RICE therapy.      Hemoglobin A1C   Date Value Ref Range Status   07/20/2020 9.7 (H) 4.0 - 5.6 % Final     Comment:     ADA Screening Guidelines:  5.7-6.4%  Consistent with prediabetes  >or=6.5%  Consistent with diabetes  High levels of fetal hemoglobin interfere with the HbA1C  assay. Heterozygous hemoglobin variants (HbS, HgC, etc)do  not significantly interfere with this assay.   However, presence of multiple variants may affect accuracy.     12/10/2019 7.5 (H) 4.0 - 5.6 % Final     Comment:     ADA Screening Guidelines:  5.7-6.4%  Consistent with prediabetes  >or=6.5%  Consistent with diabetes  High levels of fetal hemoglobin interfere with the HbA1C  assay. Heterozygous hemoglobin variants (HbS, HgC, etc)do  not significantly interfere with this assay.   However, presence of multiple variants may affect accuracy.     08/22/2019 8.3 (H) 4.0 - 5.6 % Final     Comment:     ADA  Screening Guidelines:  5.7-6.4%  Consistent with prediabetes  >or=6.5%  Consistent with diabetes  High levels of fetal hemoglobin interfere with the HbA1C  assay. Heterozygous hemoglobin variants (HbS, HgC, etc)do  not significantly interfere with this assay.   However, presence of multiple variants may affect accuracy.         Review of patient's allergies indicates:   Allergen Reactions    Pollen extracts        Past Medical History:   Diagnosis Date    Arthritis     DERIAN KNEES    Asthma     SEASONAL    Cataract     Diabetes mellitus     Diabetes mellitus, type 2     Diabetic retinopathy     DM (diabetes mellitus) 2007    BS 97 09/29/2020    Hyperlipidemia     Hypertension        Family History   Problem Relation Age of Onset    Cancer Father         Prostate Ca    Hypertension Father     Stroke Father     Diabetes Sister     Glaucoma Mother     Hypertension Mother     Glaucoma Maternal Grandmother     Blindness Neg Hx     Macular degeneration Neg Hx     Retinal detachment Neg Hx     Strabismus Neg Hx        Social History     Socioeconomic History    Marital status:      Spouse name: Not on file    Number of children: Not on file    Years of education: Not on file    Highest education level: Not on file   Occupational History    Not on file   Social Needs    Financial resource strain: Not on file    Food insecurity     Worry: Not on file     Inability: Not on file    Transportation needs     Medical: Not on file     Non-medical: Not on file   Tobacco Use    Smoking status: Never Smoker    Smokeless tobacco: Never Used   Substance and Sexual Activity    Alcohol use: Never     Frequency: Never    Drug use: No    Sexual activity: Yes   Lifestyle    Physical activity     Days per week: Not on file     Minutes per session: Not on file    Stress: Not on file   Relationships    Social connections     Talks on phone: Not on file     Gets together: Not on file     Attends  Yarsanism service: Not on file     Active member of club or organization: Not on file     Attends meetings of clubs or organizations: Not on file     Relationship status: Not on file   Other Topics Concern    Not on file   Social History Narrative    Not on file       Past Surgical History:   Procedure Laterality Date    APPLICATION OF WOUND VACUUM-ASSISTED CLOSURE DEVICE Left 8/18/2020    Procedure: APPLICATION, WOUND VAC;  Surgeon: Kirt Gray DPM;  Location: [facility] OR;  Service: Podiatry;  Laterality: Left;    CATARACT EXTRACTION W/  INTRAOCULAR LENS IMPLANT Right 07/18/2018    CATARACT EXTRACTION W/  INTRAOCULAR LENS IMPLANT Left 03/13/2019    COLONOSCOPY N/A 12/22/2018    Procedure: COLONOSCOPY;  Surgeon: Zak Dover MD;  Location: [facility] ENDO;  Service: Endoscopy;  Laterality: N/A;    ENDOSCOPIC VEIN LASER TREATMENT Bilateral 1990's    FIXATION OF SYNDESMOSIS OF ANKLE Left 7/23/2020    Procedure: FIXATION, SYNDESMOSIS, ANKLE;  Surgeon: Kirt Gray DPM;  Location: [facility] OR;  Service: Podiatry;  Laterality: Left;    MANIPULATION WITH ANESTHESIA Left 7/23/2020    Procedure: MANIPULATION, WITH ANESTHESIA;  Surgeon: Kirt Gray DPM;  Location: [facility] OR;  Service: Podiatry;  Laterality: Left;    MIDFOOT ARTHRODESIS Left 7/23/2020    Procedure: FUSION, JOINT, MIDFOOT;  Surgeon: Kirt Gray DPM;  Location: [facility] OR;  Service: Podiatry;  Laterality: Left;  2nd tarsometatarsal joint dislocation via fusion    OPEN REDUCTION AND INTERNAL FIXATION (ORIF) OF INJURY OF ANKLE Left 7/23/2020    Procedure: ORIF, ANKLE;  Surgeon: Kirt Gray DPM;  Location: [facility] OR;  Service: Podiatry;  Laterality: Left;    RECONSTRUCTION WITH FUSION OF CHARCOT FOOT Left 8/18/2020    Procedure: RECONSTRUCTION, CHARCOT FOOT, WITH FUSION;  Surgeon: Kirt Gray DPM;  Location: [facility] OR;  Service: Podiatry;  Laterality: Left;    WOUND DEBRIDEMENT Left 8/18/2020    Procedure: DEBRIDEMENT, WOUND;  Surgeon: Kirt  "PRATIK Gray DPM;  Location: [facility];  Service: Podiatry;  Laterality: Left;       Review of Systems   Constitutional: Negative for chills, fatigue and fever.   HENT: Negative for hearing loss.    Eyes: Negative for photophobia and visual disturbance.   Respiratory: Negative for cough, chest tightness, shortness of breath and wheezing.    Cardiovascular: Negative for chest pain, palpitations and leg swelling.   Gastrointestinal: Negative for constipation, diarrhea, nausea and vomiting.   Endocrine: Negative for cold intolerance and heat intolerance.   Genitourinary: Negative for flank pain.   Musculoskeletal: Positive for gait problem. Negative for neck pain and neck stiffness.   Skin: Positive for wound. Negative for color change.   Neurological: Positive for numbness. Negative for light-headedness and headaches.   Psychiatric/Behavioral: Negative for sleep disturbance.       Objective:   Ht 5' 9" (1.753 m)   Wt 95.3 kg (210 lb 1.6 oz)   BMI 31.03 kg/m²       LOWER EXTREMITY PHYSICAL EXAMINATION  ORTHOPEDIC:  Slight discomfort to palpation to the area of incisions, particularly the medial midfoot. No appreciable edema is noted.  Normal ankle range of motion is noted.  Anatomic alignment is noted.    VASCULAR: No ipsilateral calf pain or tenderness is noted with palpation and compression. No palpable cords are noted.      DERMATOLOGY:  The wound at the lateral malleolus is healed and resolved. The wound at the medial midfoot is healing progressively and is nearly resolved.     Assessment:     1. Postop check    2. Non-healing surgical wound, sequela    3. 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 Closed trimalleolar fracture of left ankle with routine healing, subsequent encounter    5. Pain in left ankle and joints of left foot    6. Type II diabetes mellitus with neurological manifestations    7. On continuous oral anticoagulation          Plan:     Postop check  Non-healing " "surgical wound, sequela  Charcot's neuro arthropathy with dislocation of Lisfranc Joint of left foot  Closed trimalleolar fracture of left ankle with routine healing, subsequent encounter  Pain in left ankle and joints of left foot  Thorough discussion is had with the patient today, concerning the diagnosis, its etiology, and the treatment algorithm at present.  XRAYS are reviewed in detail with the patient. All questions and concerns regarding findings and its/their implications are outlined and discussed.    LLE short leg posterior splint application in standard fashion using 5" OrthoGlass fiberglass splinting material, approx. 24" with web-roll/cast padding and ACE bandage.     Continue NWB for now. Continue [facility] with collagen dressings. Continue DVT PPx. Anticipate another 4 weeks of NWB.     The wound was surgically debrided after adequate prep with alcohol and/or betadine paint. Excisional wound debridement was performed using sharp #10/#15 blade/rounded scalpel and tissue nipper, with removal of all non-viable skin and soft tissues; necrotic skin/tissue formation. The woundbase/wound bed was also debrided to encourage bleeding as to promote/stimulate healing. Debridement was excisional and included epidermal, dermal and subcutaneous tissues. Post debridement measurements are as above. Hemostasis was achieved. Patient tolerated procedure well and without complications. Local woundcare with collagen dressings and bandage thereafter. Patient will change the collagen dressings Q3 - Q4 days in standard fashion.    Type II diabetes mellitus with neurological manifestations  Strict DMII control in the setting of osseous healing and delayed healing of surgical wounds. At present, DMII is uncontrolled.     On continuous oral anticoagulation  Continue oral anticoagulant.             Future Appointments   Date Time Provider Department Center   10/13/2020 10:30 AM Kirt Gray DPM [facility] "

## 2020-09-30 ENCOUNTER — DOCUMENT SCAN (OUTPATIENT)
Dept: HOME HEALTH SERVICES | Facility: HOSPITAL | Age: 65
End: 2020-09-30
Payer: MEDICARE

## 2020-10-01 ENCOUNTER — EXTERNAL HOME HEALTH (OUTPATIENT)
Dept: HOME HEALTH SERVICES | Facility: HOSPITAL | Age: 65
End: 2020-10-01
Payer: MEDICARE

## 2020-10-03 NOTE — PROGRESS NOTES
THIS IS THE SECOND ATTEMPT TO CONTACT PATIENT, LEFT PATIENT MESSAGE IN [place]ian TO RETURN CALL FOR APPOINTMENT.

## 2020-10-09 DIAGNOSIS — E11.9 TYPE 2 DIABETES MELLITUS WITHOUT COMPLICATION: ICD-10-CM

## 2020-10-13 ENCOUNTER — OFFICE VISIT (OUTPATIENT)
Dept: PODIATRY | Facility: CLINIC | Age: 65
End: 2020-10-13
Payer: MEDICARE

## 2020-10-13 DIAGNOSIS — S82.852D CLOSED TRIMALLEOLAR FRACTURE OF LEFT ANKLE WITH ROUTINE HEALING, SUBSEQUENT ENCOUNTER: ICD-10-CM

## 2020-10-13 DIAGNOSIS — E11.49 TYPE II DIABETES MELLITUS WITH NEUROLOGICAL MANIFESTATIONS: ICD-10-CM

## 2020-10-13 DIAGNOSIS — M14.672 CHARCOT'S JOINT OF LEFT FOOT: ICD-10-CM

## 2020-10-13 DIAGNOSIS — Z09 POSTOP CHECK: Primary | ICD-10-CM

## 2020-10-13 DIAGNOSIS — M25.572 PAIN IN LEFT ANKLE AND JOINTS OF LEFT FOOT: ICD-10-CM

## 2020-10-13 DIAGNOSIS — Z79.01 ON CONTINUOUS ORAL ANTICOAGULATION: ICD-10-CM

## 2020-10-13 DIAGNOSIS — T81.89XS NON-HEALING SURGICAL WOUND, SEQUELA: ICD-10-CM

## 2020-10-13 PROCEDURE — 99999 PR PBB SHADOW E&M-EST. PATIENT-LVL IV: CPT | Mod: PBBFAC,,, | Performed by: PODIATRIST

## 2020-10-13 PROCEDURE — 99024 PR POST-OP FOLLOW-UP VISIT: ICD-10-PCS | Mod: S$GLB,,, | Performed by: PODIATRIST

## 2020-10-13 PROCEDURE — 99999 PR PBB SHADOW E&M-EST. PATIENT-LVL IV: ICD-10-PCS | Mod: PBBFAC,,, | Performed by: PODIATRIST

## 2020-10-13 PROCEDURE — 99024 POSTOP FOLLOW-UP VISIT: CPT | Mod: S$GLB,,, | Performed by: PODIATRIST

## 2020-10-13 NOTE — PROGRESS NOTES
Subjective:       Patient ID: Kay Galarza is a 65 y.o. female.    Chief Complaint: Post-op Evaluation (8/18/2020 post op. Splint intact to left foot. Denies pain at present. )      HPI:  Kya Galarza presents to the office today, s/p 07/23/2020 LLE ankle ORIF with LLE Charcot Foot reconstruction. This surgery was revised on 8/18 due to extrusion of the medial cuneiform bone.     The 8/18/2020 procedure:    1. Revision of Charcot deformity via multiple midfoot joint fusions.              2. Application of amniotic membrane.              3. Excisional wound debridement, left ankle.              4. Excisional wound debridement, left foot.               5. Application of WoundVac, left foot.              6. Application of WoundVac, left ankle.      Patient does continue to have [facility] for collagen dressings. Presents this morning with her son. Does continue Xarelto for DVT prophylaxis. States blood sugars are most of the times, less than <200mg/dL. States RICE therapy.      Hemoglobin A1C   Date Value Ref Range Status   07/20/2020 9.7 (H) 4.0 - 5.6 % Final     Comment:     ADA Screening Guidelines:  5.7-6.4%  Consistent with prediabetes  >or=6.5%  Consistent with diabetes  High levels of fetal hemoglobin interfere with the HbA1C  assay. Heterozygous hemoglobin variants (HbS, HgC, etc)do  not significantly interfere with this assay.   However, presence of multiple variants may affect accuracy.     12/10/2019 7.5 (H) 4.0 - 5.6 % Final     Comment:     ADA Screening Guidelines:  5.7-6.4%  Consistent with prediabetes  >or=6.5%  Consistent with diabetes  High levels of fetal hemoglobin interfere with the HbA1C  assay. Heterozygous hemoglobin variants (HbS, HgC, etc)do  not significantly interfere with this assay.   However, presence of multiple variants may affect accuracy.     08/22/2019 8.3 (H) 4.0 - 5.6 % Final     Comment:     ADA Screening Guidelines:  5.7-6.4%  Consistent  with prediabetes  >or=6.5%  Consistent with diabetes  High levels of fetal hemoglobin interfere with the HbA1C  assay. Heterozygous hemoglobin variants (HbS, HgC, etc)do  not significantly interfere with this assay.   However, presence of multiple variants may affect accuracy.         Review of patient's allergies indicates:   Allergen Reactions    Pollen extracts        Past Medical History:   Diagnosis Date    Arthritis     DERIAN KNEES    Asthma     SEASONAL    Cataract     Diabetes mellitus     Diabetes mellitus, type 2     Diabetic retinopathy     DM (diabetes mellitus) 2007    BS 97 09/29/2020    Hyperlipidemia     Hypertension        Family History   Problem Relation Age of Onset    Cancer Father         Prostate Ca    Hypertension Father     Stroke Father     Diabetes Sister     Glaucoma Mother     Hypertension Mother     Glaucoma Maternal Grandmother     Blindness Neg Hx     Macular degeneration Neg Hx     Retinal detachment Neg Hx     Strabismus Neg Hx        Social History     Socioeconomic History    Marital status:      Spouse name: Not on file    Number of children: Not on file    Years of education: Not on file    Highest education level: Not on file   Occupational History    Not on file   Social Needs    Financial resource strain: Not on file    Food insecurity     Worry: Not on file     Inability: Not on file    Transportation needs     Medical: Not on file     Non-medical: Not on file   Tobacco Use    Smoking status: Never Smoker    Smokeless tobacco: Never Used   Substance and Sexual Activity    Alcohol use: Never     Frequency: Never    Drug use: No    Sexual activity: Yes   Lifestyle    Physical activity     Days per week: Not on file     Minutes per session: Not on file    Stress: Not on file   Relationships    Social connections     Talks on phone: Not on file     Gets together: Not on file     Attends Rastafari service: Not on file     Active member of  club or organization: Not on file     Attends meetings of clubs or organizations: Not on file     Relationship status: Not on file   Other Topics Concern    Not on file   Social History Narrative    Not on file       Past Surgical History:   Procedure Laterality Date    APPLICATION OF WOUND VACUUM-ASSISTED CLOSURE DEVICE Left 8/18/2020    Procedure: APPLICATION, WOUND VAC;  Surgeon: Kirt Gray DPM;  Location: [facility] [place] OR;  Service: Podiatry;  Laterality: Left;    CATARACT EXTRACTION W/  INTRAOCULAR LENS IMPLANT Right 07/18/2018    CATARACT EXTRACTION W/  INTRAOCULAR LENS IMPLANT Left 03/13/2019    COLONOSCOPY N/A 12/22/2018    Procedure: COLONOSCOPY;  Surgeon: Zak Dover MD;  Location: [facility] [place] ENDO;  Service: Endoscopy;  Laterality: N/A;    ENDOSCOPIC VEIN LASER TREATMENT Bilateral 1990's    FIXATION OF SYNDESMOSIS OF ANKLE Left 7/23/2020    Procedure: FIXATION, SYNDESMOSIS, ANKLE;  Surgeon: Kirt Gray DPM;  Location: [facility] [place] OR;  Service: Podiatry;  Laterality: Left;    MANIPULATION WITH ANESTHESIA Left 7/23/2020    Procedure: MANIPULATION, WITH ANESTHESIA;  Surgeon: Kirt Gray DPM;  Location: [facility] [place] OR;  Service: Podiatry;  Laterality: Left;    MIDFOOT ARTHRODESIS Left 7/23/2020    Procedure: FUSION, JOINT, MIDFOOT;  Surgeon: Kirt Gray DPM;  Location: [facility] [place] OR;  Service: Podiatry;  Laterality: Left;  2nd tarsometatarsal joint dislocation via fusion    OPEN REDUCTION AND INTERNAL FIXATION (ORIF) OF INJURY OF ANKLE Left 7/23/2020    Procedure: ORIF, ANKLE;  Surgeon: Kirt Gray DPM;  Location: [facility] [place] OR;  Service: Podiatry;  Laterality: Left;    RECONSTRUCTION WITH FUSION OF CHARCOT FOOT Left 8/18/2020    Procedure: RECONSTRUCTION, CHARCOT FOOT, WITH FUSION;  Surgeon: Kirt Gray DPM;  Location: [facility] [place] OR;  Service: Podiatry;  Laterality: Left;    WOUND DEBRIDEMENT Left 8/18/2020    Procedure: DEBRIDEMENT, WOUND;  Surgeon: Kirt Gray DPM;  Location: [facility] [place] OR;  Service:  Podiatry;  Laterality: Left;       Review of Systems   Constitutional: Negative for chills, fatigue and fever.   HENT: Negative for hearing loss.    Eyes: Negative for photophobia and visual disturbance.   Respiratory: Negative for cough, chest tightness, shortness of breath and wheezing.    Cardiovascular: Negative for chest pain, palpitations and leg swelling.   Gastrointestinal: Negative for constipation, diarrhea, nausea and vomiting.   Endocrine: Negative for cold intolerance and heat intolerance.   Genitourinary: Negative for flank pain.   Musculoskeletal: Positive for gait problem. Negative for neck pain and neck stiffness.   Skin: Positive for wound. Negative for color change.   Neurological: Positive for numbness. Negative for light-headedness and headaches.   Psychiatric/Behavioral: Negative for sleep disturbance.       Objective:   There were no vitals taken for this visit.                  LOWER EXTREMITY PHYSICAL EXAMINATION  ORTHOPEDIC: Slight discomfort to palpation to the area of the medial midfoot incision. No appreciable edema is noted. Normal ankle range of motion is noted. Anatomic alignment is noted. No pains upon compression of the ankle or foot.     VASCULAR: No ipsilateral calf pain or tenderness is noted with palpation and compression. No palpable cords are noted.      DERMATOLOGY: The wound at the medial midfoot is healing progressively. The area measures 0.80cm x 11cm x 0.20cm. Granular tissues are noted.      Assessment:     1. Postop check    2. Non-healing surgical wound, sequela    3. 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 Closed trimalleolar fracture of left ankle with routine healing, subsequent encounter    5. Pain in left ankle and joints of left foot    6. Type II diabetes mellitus with neurological manifestations    7. On continuous oral anticoagulation          Plan:     Postop check  Non-healing surgical wound, sequela  Charcot's neuro  arthropathy with dislocation of Lisfranc Joint of left foot  Closed trimalleolar fracture of left ankle with routine healing, subsequent encounter  Pain in left ankle and joints of left foot  -     X-Ray Ankle Complete Left; Future; Expected date: 10/13/2020  -     X-Ray Foot Complete Left; Future; Expected date: 10/13/2020    D/C posterior splint. Transition to CAM Walker (at home already) with compression. WB with 10-20% weight on left heel with CAM Walker and walker. Start this process on 10/18.     The wound was surgically debrided after adequate prep with alcohol and/or betadine paint. Excisional wound debridement was performed using sharp #10/#15 blade/rounded scalpel and tissue nipper, with removal of all non-viable skin and soft tissues; necrotic skin/tissue formation. The woundbase/wound bed was also debrided to encourage bleeding as to promote/stimulate healing. Debridement was excisional and included epidermal, dermal and subcutaneous tissues. Post debridement measurements are as above. Hemostasis was achieved. Patient tolerated procedure well and without complications. Local woundcare with collagen dressings and bandage thereafter. Patient will change the collagen dressings Q3 - Q4 days in standard fashion with [facility].     Start x2 days weekly in home PT with [facility] with 10-20 weight on heel bone with CAM Walker ATC. Start on 10/18/20.     RICE therapy with compression. Strict DMII control.     Type II diabetes mellitus with neurological manifestations  Strict DMII control in the setting of osseous healing and delayed healing of surgical wounds. At present, DMII is uncontrolled.     On continuous oral anticoagulation  Continue oral anticoagulant.             Future Appointments   Date Time Provider Department Center   10/28/2020  9:15 AM [facility] XR1- [facility]   10/28/2020  9:30 AM [facility] X[facility]- [facility]   10/28/2020 10:00 AM Kirt Gray DPM [facility] POD  Medical  C

## 2020-10-14 ENCOUNTER — DOCUMENT SCAN (OUTPATIENT)
Dept: HOME HEALTH SERVICES | Facility: HOSPITAL | Age: 65
End: 2020-10-14
Payer: MEDICARE

## 2020-10-20 ENCOUNTER — DOCUMENT SCAN (OUTPATIENT)
Dept: HOME HEALTH SERVICES | Facility: HOSPITAL | Age: 65
End: 2020-10-20
Payer: MEDICARE

## 2020-10-20 RX ORDER — TRAZODONE HYDROCHLORIDE 150 MG/1
150 TABLET ORAL NIGHTLY PRN
Qty: 90 TABLET | Refills: 0 | Status: SHIPPED | OUTPATIENT
Start: 2020-10-20 | End: 2021-01-25 | Stop reason: SDUPTHER

## 2020-10-20 RX ORDER — LISINOPRIL AND HYDROCHLOROTHIAZIDE 20; 25 MG/1; MG/1
1 TABLET ORAL DAILY
Qty: 90 TABLET | Refills: 0 | Status: SHIPPED | OUTPATIENT
Start: 2020-10-20 | End: 2021-01-25 | Stop reason: SDUPTHER

## 2020-10-20 RX ORDER — ATORVASTATIN CALCIUM 40 MG/1
40 TABLET, FILM COATED ORAL DAILY
Qty: 90 TABLET | Refills: 0 | Status: SHIPPED | OUTPATIENT
Start: 2020-10-20 | End: 2021-01-25 | Stop reason: SDUPTHER

## 2020-10-26 DIAGNOSIS — L85.3 XEROSIS CUTIS: ICD-10-CM

## 2020-10-26 DIAGNOSIS — L21.9 SEBORRHEIC DERMATITIS: ICD-10-CM

## 2020-10-26 RX ORDER — KETOCONAZOLE 20 MG/ML
SHAMPOO, SUSPENSION TOPICAL
Qty: 120 ML | Refills: 3 | Status: SHIPPED | OUTPATIENT
Start: 2020-10-26 | End: 2021-01-25 | Stop reason: SDUPTHER

## 2020-10-26 RX ORDER — ALBUTEROL SULFATE 90 UG/1
2 AEROSOL, METERED RESPIRATORY (INHALATION) EVERY 6 HOURS PRN
Qty: 18 G | Refills: 0 | Status: SHIPPED | OUTPATIENT
Start: 2020-10-26 | End: 2020-12-08 | Stop reason: SDUPTHER

## 2020-10-26 RX ORDER — TRIAMCINOLONE ACETONIDE 0.25 MG/G
CREAM TOPICAL
Qty: 80 G | Refills: 1 | Status: SHIPPED | OUTPATIENT
Start: 2020-10-26 | End: 2022-02-01

## 2020-10-26 RX ORDER — INSULIN GLARGINE 300 U/ML
50 INJECTION, SOLUTION SUBCUTANEOUS 2 TIMES DAILY
Qty: 10 ML | Refills: 3 | Status: SHIPPED | OUTPATIENT
Start: 2020-10-26 | End: 2021-01-25 | Stop reason: SDUPTHER

## 2020-10-26 RX ORDER — AMMONIUM LACTATE 12 G/100G
LOTION TOPICAL
Qty: 225 G | Refills: 11 | Status: SHIPPED | OUTPATIENT
Start: 2020-10-26 | End: 2021-01-25 | Stop reason: SDUPTHER

## 2020-10-26 RX ORDER — CLOBETASOL PROPIONATE 0.46 MG/ML
SOLUTION TOPICAL 2 TIMES DAILY
Qty: 50 ML | Refills: 2 | Status: SHIPPED | OUTPATIENT
Start: 2020-10-26 | End: 2021-01-25 | Stop reason: SDUPTHER

## 2020-10-26 RX ORDER — GABAPENTIN 100 MG/1
100 CAPSULE ORAL 3 TIMES DAILY
Qty: 90 CAPSULE | Refills: 11 | Status: SHIPPED | OUTPATIENT
Start: 2020-10-26 | End: 2021-01-25 | Stop reason: SDUPTHER

## 2020-10-26 RX ORDER — DULAGLUTIDE 1.5 MG/.5ML
INJECTION, SOLUTION SUBCUTANEOUS
Qty: 12 ML | Refills: 0 | Status: SHIPPED | OUTPATIENT
Start: 2020-10-26 | End: 2021-01-25 | Stop reason: SDUPTHER

## 2020-10-26 RX ORDER — LEVOCETIRIZINE DIHYDROCHLORIDE 5 MG/1
5 TABLET, FILM COATED ORAL NIGHTLY
Qty: 30 TABLET | Refills: 11 | Status: SHIPPED | OUTPATIENT
Start: 2020-10-26 | End: 2021-01-18 | Stop reason: SDUPTHER

## 2020-10-27 ENCOUNTER — OFFICE VISIT (OUTPATIENT)
Dept: FAMILY MEDICINE | Facility: CLINIC | Age: 65
End: 2020-10-27
Attending: FAMILY MEDICINE
Payer: MEDICARE

## 2020-10-27 ENCOUNTER — LAB VISIT (OUTPATIENT)
Dept: LAB | Facility: HOSPITAL | Age: 65
End: 2020-10-27
Attending: FAMILY MEDICINE
Payer: MEDICARE

## 2020-10-27 VITALS
HEART RATE: 93 BPM | DIASTOLIC BLOOD PRESSURE: 82 MMHG | SYSTOLIC BLOOD PRESSURE: 128 MMHG | OXYGEN SATURATION: 97 % | TEMPERATURE: 98 F | BODY MASS INDEX: 31.03 KG/M2 | HEIGHT: 69 IN

## 2020-10-27 DIAGNOSIS — E78.5 DM TYPE 2 WITH DIABETIC DYSLIPIDEMIA: ICD-10-CM

## 2020-10-27 DIAGNOSIS — R19.7 DIARRHEA, UNSPECIFIED TYPE: ICD-10-CM

## 2020-10-27 DIAGNOSIS — E11.69 DM TYPE 2 WITH DIABETIC DYSLIPIDEMIA: Primary | ICD-10-CM

## 2020-10-27 DIAGNOSIS — R10.13 EPIGASTRIC PAIN: ICD-10-CM

## 2020-10-27 DIAGNOSIS — E11.69 DM TYPE 2 WITH DIABETIC DYSLIPIDEMIA: ICD-10-CM

## 2020-10-27 DIAGNOSIS — E78.5 DM TYPE 2 WITH DIABETIC DYSLIPIDEMIA: Primary | ICD-10-CM

## 2020-10-27 DIAGNOSIS — I10 HYPERTENSION, UNSPECIFIED TYPE: ICD-10-CM

## 2020-10-27 LAB
ALBUMIN SERPL BCP-MCNC: 3.7 G/DL (ref 3.5–5.2)
ALP SERPL-CCNC: 118 U/L (ref 55–135)
ALT SERPL W/O P-5'-P-CCNC: 11 U/L (ref 10–44)
ANION GAP SERPL CALC-SCNC: 10 MMOL/L (ref 8–16)
AST SERPL-CCNC: 17 U/L (ref 10–40)
BASOPHILS # BLD AUTO: 0.05 K/UL (ref 0–0.2)
BASOPHILS NFR BLD: 0.6 % (ref 0–1.9)
BILIRUB SERPL-MCNC: 0.3 MG/DL (ref 0.1–1)
BUN SERPL-MCNC: 19 MG/DL (ref 8–23)
CALCIUM SERPL-MCNC: 9.9 MG/DL (ref 8.7–10.5)
CHLORIDE SERPL-SCNC: 104 MMOL/L (ref 95–110)
CHOLEST SERPL-MCNC: 134 MG/DL (ref 120–199)
CHOLEST/HDLC SERPL: 5.2 {RATIO} (ref 2–5)
CO2 SERPL-SCNC: 28 MMOL/L (ref 23–29)
CREAT SERPL-MCNC: 0.8 MG/DL (ref 0.5–1.4)
DIFFERENTIAL METHOD: ABNORMAL
EOSINOPHIL # BLD AUTO: 0.3 K/UL (ref 0–0.5)
EOSINOPHIL NFR BLD: 3.5 % (ref 0–8)
ERYTHROCYTE [DISTWIDTH] IN BLOOD BY AUTOMATED COUNT: 14.1 % (ref 11.5–14.5)
EST. GFR  (AFRICAN AMERICAN): >60 ML/MIN/1.73 M^2
EST. GFR  (NON AFRICAN AMERICAN): >60 ML/MIN/1.73 M^2
GLUCOSE SERPL-MCNC: 101 MG/DL (ref 70–110)
HCT VFR BLD AUTO: 36.8 % (ref 37–48.5)
HDLC SERPL-MCNC: 26 MG/DL (ref 40–75)
HDLC SERPL: 19.4 % (ref 20–50)
HGB BLD-MCNC: 11.6 G/DL (ref 12–16)
IMM GRANULOCYTES # BLD AUTO: 0.03 K/UL (ref 0–0.04)
IMM GRANULOCYTES NFR BLD AUTO: 0.4 % (ref 0–0.5)
LDLC SERPL CALC-MCNC: 84.2 MG/DL (ref 63–159)
LYMPHOCYTES # BLD AUTO: 1.4 K/UL (ref 1–4.8)
LYMPHOCYTES NFR BLD: 16.2 % (ref 18–48)
MCH RBC QN AUTO: 28.6 PG (ref 27–31)
MCHC RBC AUTO-ENTMCNC: 31.5 G/DL (ref 32–36)
MCV RBC AUTO: 91 FL (ref 82–98)
MONOCYTES # BLD AUTO: 0.4 K/UL (ref 0.3–1)
MONOCYTES NFR BLD: 4.4 % (ref 4–15)
NEUTROPHILS # BLD AUTO: 6.3 K/UL (ref 1.8–7.7)
NEUTROPHILS NFR BLD: 74.9 % (ref 38–73)
NONHDLC SERPL-MCNC: 108 MG/DL
NRBC BLD-RTO: 0 /100 WBC
PLATELET # BLD AUTO: 270 K/UL (ref 150–350)
PMV BLD AUTO: 10.8 FL (ref 9.2–12.9)
POTASSIUM SERPL-SCNC: 4.6 MMOL/L (ref 3.5–5.1)
PROT SERPL-MCNC: 7.3 G/DL (ref 6–8.4)
RBC # BLD AUTO: 4.06 M/UL (ref 4–5.4)
SODIUM SERPL-SCNC: 142 MMOL/L (ref 136–145)
TRIGL SERPL-MCNC: 119 MG/DL (ref 30–150)
WBC # BLD AUTO: 8.39 K/UL (ref 3.9–12.7)

## 2020-10-27 PROCEDURE — 99214 PR OFFICE/OUTPT VISIT, EST, LEVL IV, 30-39 MIN: ICD-10-PCS | Mod: 25,S$GLB,, | Performed by: FAMILY MEDICINE

## 2020-10-27 PROCEDURE — 3074F SYST BP LT 130 MM HG: CPT | Mod: CPTII,S$GLB,, | Performed by: FAMILY MEDICINE

## 2020-10-27 PROCEDURE — 99999 PR PBB SHADOW E&M-EST. PATIENT-LVL V: ICD-10-PCS | Mod: PBBFAC,,, | Performed by: FAMILY MEDICINE

## 2020-10-27 PROCEDURE — 1159F PR MEDICATION LIST DOCUMENTED IN MEDICAL RECORD: ICD-10-PCS | Mod: S$GLB,,, | Performed by: FAMILY MEDICINE

## 2020-10-27 PROCEDURE — G0008 ADMIN INFLUENZA VIRUS VAC: HCPCS | Mod: S$GLB,,, | Performed by: FAMILY MEDICINE

## 2020-10-27 PROCEDURE — 80053 COMPREHEN METABOLIC PANEL: CPT

## 2020-10-27 PROCEDURE — 1101F PT FALLS ASSESS-DOCD LE1/YR: CPT | Mod: CPTII,S$GLB,, | Performed by: FAMILY MEDICINE

## 2020-10-27 PROCEDURE — 86677 HELICOBACTER PYLORI ANTIBODY: CPT

## 2020-10-27 PROCEDURE — 3074F PR MOST RECENT SYSTOLIC BLOOD PRESSURE < 130 MM HG: ICD-10-PCS | Mod: CPTII,S$GLB,, | Performed by: FAMILY MEDICINE

## 2020-10-27 PROCEDURE — 80061 LIPID PANEL: CPT

## 2020-10-27 PROCEDURE — 1159F MED LIST DOCD IN RCRD: CPT | Mod: S$GLB,,, | Performed by: FAMILY MEDICINE

## 2020-10-27 PROCEDURE — 90694 VACC AIIV4 NO PRSRV 0.5ML IM: CPT | Mod: S$GLB,,, | Performed by: FAMILY MEDICINE

## 2020-10-27 PROCEDURE — 3079F DIAST BP 80-89 MM HG: CPT | Mod: CPTII,S$GLB,, | Performed by: FAMILY MEDICINE

## 2020-10-27 PROCEDURE — 3046F PR MOST RECENT HEMOGLOBIN A1C LEVEL > 9.0%: ICD-10-PCS | Mod: CPTII,S$GLB,, | Performed by: FAMILY MEDICINE

## 2020-10-27 PROCEDURE — 3079F PR MOST RECENT DIASTOLIC BLOOD PRESSURE 80-89 MM HG: ICD-10-PCS | Mod: CPTII,S$GLB,, | Performed by: FAMILY MEDICINE

## 2020-10-27 PROCEDURE — 99999 PR PBB SHADOW E&M-EST. PATIENT-LVL V: CPT | Mod: PBBFAC,,, | Performed by: FAMILY MEDICINE

## 2020-10-27 PROCEDURE — 83036 HEMOGLOBIN GLYCOSYLATED A1C: CPT

## 2020-10-27 PROCEDURE — 1126F AMNT PAIN NOTED NONE PRSNT: CPT | Mod: S$GLB,,, | Performed by: FAMILY MEDICINE

## 2020-10-27 PROCEDURE — 90694 FLU VACCINE - QUADRIVALENT - ADJUVANTED: ICD-10-PCS | Mod: S$GLB,,, | Performed by: FAMILY MEDICINE

## 2020-10-27 PROCEDURE — 99214 OFFICE O/P EST MOD 30 MIN: CPT | Mod: 25,S$GLB,, | Performed by: FAMILY MEDICINE

## 2020-10-27 PROCEDURE — 1126F PR PAIN SEVERITY QUANTIFIED, NO PAIN PRESENT: ICD-10-PCS | Mod: S$GLB,,, | Performed by: FAMILY MEDICINE

## 2020-10-27 PROCEDURE — 36415 COLL VENOUS BLD VENIPUNCTURE: CPT | Mod: PO

## 2020-10-27 PROCEDURE — 1101F PR PT FALLS ASSESS DOC 0-1 FALLS W/OUT INJ PAST YR: ICD-10-PCS | Mod: CPTII,S$GLB,, | Performed by: FAMILY MEDICINE

## 2020-10-27 PROCEDURE — 3046F HEMOGLOBIN A1C LEVEL >9.0%: CPT | Mod: CPTII,S$GLB,, | Performed by: FAMILY MEDICINE

## 2020-10-27 PROCEDURE — 3008F PR BODY MASS INDEX (BMI) DOCUMENTED: ICD-10-PCS | Mod: CPTII,S$GLB,, | Performed by: FAMILY MEDICINE

## 2020-10-27 PROCEDURE — G0008 FLU VACCINE - QUADRIVALENT - ADJUVANTED: ICD-10-PCS | Mod: S$GLB,,, | Performed by: FAMILY MEDICINE

## 2020-10-27 PROCEDURE — 3008F BODY MASS INDEX DOCD: CPT | Mod: CPTII,S$GLB,, | Performed by: FAMILY MEDICINE

## 2020-10-27 PROCEDURE — 85025 COMPLETE CBC W/AUTO DIFF WBC: CPT

## 2020-10-27 NOTE — PROGRESS NOTES
Subjective:       Patient ID: Kay Galarza is a 65 y.o. female.    Chief Complaint: Follow-up and Abdominal Pain    65 y old female with t2 dm , htn , dlp here for f.u . Doing fair . Recovering from L ankle/ foot  Surgery  Since 8/2020.  FBS: 110 . Keeping a healthful diet . Also with loose stools for 2 m . She has about 3 looses stool daily . Mild epigastric pain . No blood in  stools, no weight loss.     Review of Systems   Constitutional: Negative.  Negative for activity change and unexpected weight change.   HENT: Negative.  Negative for hearing loss, rhinorrhea and trouble swallowing.    Eyes: Negative.  Negative for discharge.   Respiratory: Negative.  Negative for chest tightness and wheezing.    Cardiovascular: Negative.  Negative for chest pain and palpitations.   Gastrointestinal: Negative.  Negative for blood in stool, constipation, diarrhea and vomiting.   Endocrine: Negative for polydipsia and polyuria.   Genitourinary: Negative.  Negative for difficulty urinating, dysuria, hematuria and menstrual problem.   Musculoskeletal: Negative.  Negative for neck pain.   Skin: Negative.    Neurological: Negative for headaches.   Hematological: Negative.    Psychiatric/Behavioral: Negative for dysphoric mood.       Objective:      Physical Exam  Vitals signs and nursing note reviewed.   Constitutional:       General: She is not in acute distress.     Appearance: She is well-developed. She is not diaphoretic.   HENT:      Head: Normocephalic and atraumatic.      Right Ear: External ear normal.      Left Ear: External ear normal.      Nose: Nose normal.   Eyes:      General: No scleral icterus.        Left eye: No discharge.      Conjunctiva/sclera: Conjunctivae normal.      Pupils: Pupils are equal, round, and reactive to light.   Neck:      Musculoskeletal: Normal range of motion and neck supple.      Thyroid: No thyromegaly.      Vascular: No JVD.      Trachea: No tracheal deviation.    Cardiovascular:      Rate and Rhythm: Normal rate and regular rhythm.      Heart sounds: Normal heart sounds. No murmur. No friction rub. No gallop.    Pulmonary:      Effort: Pulmonary effort is normal. No respiratory distress.      Breath sounds: Normal breath sounds. No wheezing or rales.   Chest:      Chest wall: No tenderness.   Abdominal:      General: Bowel sounds are normal. There is no distension.      Palpations: Abdomen is soft. There is no mass.      Tenderness: There is no abdominal tenderness. There is no guarding.   Lymphadenopathy:      Cervical: No cervical adenopathy.         Assessment:       Kay was seen today for follow-up and abdominal pain.    Diagnoses and all orders for this visit:    DM type 2 with diabetic dyslipidemia  -     CBC auto differential; Future  -     Comprehensive Metabolic Panel; Future  -     Hemoglobin A1C; Future  -     Lipid Panel; Future    Epigastric pain  -     H. pylori Antibody, IgG; Future    Diarrhea, unspecified type  -     Occult blood x 1, stool; Future  -     WBC, Stool; Future  -     Stool culture; Future  -     Giardia / Cryptosporidum, EIA; Future  -     Stool Exam-Ova,Cysts,Parasites; Future  -     Rotavirus antigen, stool; Future  -     Clostridium difficile EIA; Future  -     Special contact c diff isolation status    Hypertension, unspecified type      Plan:   Diet and exercise   Labs   Stool analysis   Controlled. Cont med

## 2020-10-28 ENCOUNTER — TELEPHONE (OUTPATIENT)
Dept: FAMILY MEDICINE | Facility: CLINIC | Age: 65
End: 2020-10-28

## 2020-10-28 LAB
ESTIMATED AVG GLUCOSE: 111 MG/DL (ref 68–131)
HBA1C MFR BLD HPLC: 5.5 % (ref 4–5.6)

## 2020-10-29 ENCOUNTER — PATIENT MESSAGE (OUTPATIENT)
Dept: FAMILY MEDICINE | Facility: CLINIC | Age: 65
End: 2020-10-29

## 2020-10-29 ENCOUNTER — TELEPHONE (OUTPATIENT)
Dept: FAMILY MEDICINE | Facility: CLINIC | Age: 65
End: 2020-10-29

## 2020-10-29 DIAGNOSIS — K29.70 HELICOBACTER POSITIVE GASTRITIS: Primary | ICD-10-CM

## 2020-10-29 DIAGNOSIS — B96.81 HELICOBACTER POSITIVE GASTRITIS: Primary | ICD-10-CM

## 2020-10-29 LAB — H PYLORI IGG SERPL QL IA: POSITIVE

## 2020-10-29 RX ORDER — TETRACYCLINE HYDROCHLORIDE 500 MG/1
500 CAPSULE ORAL EVERY 6 HOURS
Qty: 56 CAPSULE | Refills: 0 | Status: SHIPPED | OUTPATIENT
Start: 2020-10-29 | End: 2020-10-30

## 2020-10-29 RX ORDER — BISMUTH SUBSALICYLATE 262 MG/1
TABLET ORAL
Qty: 42 TABLET | Refills: 0 | Status: SHIPPED | OUTPATIENT
Start: 2020-10-29 | End: 2020-10-30

## 2020-10-29 RX ORDER — OMEPRAZOLE 20 MG/1
CAPSULE, DELAYED RELEASE ORAL
Qty: 60 CAPSULE | Refills: 1 | Status: SHIPPED | OUTPATIENT
Start: 2020-10-29 | End: 2021-01-18 | Stop reason: SDUPTHER

## 2020-10-29 RX ORDER — METRONIDAZOLE 500 MG/1
500 TABLET ORAL EVERY 8 HOURS
Qty: 42 TABLET | Refills: 0 | Status: SHIPPED | OUTPATIENT
Start: 2020-10-29 | End: 2020-10-30

## 2020-10-30 ENCOUNTER — PATIENT MESSAGE (OUTPATIENT)
Dept: FAMILY MEDICINE | Facility: CLINIC | Age: 65
End: 2020-10-30

## 2020-10-30 ENCOUNTER — TELEPHONE (OUTPATIENT)
Dept: FAMILY MEDICINE | Facility: CLINIC | Age: 65
End: 2020-10-30

## 2020-10-30 RX ORDER — AMOXICILLIN 500 MG/1
TABLET, FILM COATED ORAL
Qty: 56 TABLET | Refills: 0 | Status: SHIPPED | OUTPATIENT
Start: 2020-10-30 | End: 2021-01-21

## 2020-10-30 RX ORDER — CLARITHROMYCIN 500 MG/1
500 TABLET, FILM COATED ORAL 2 TIMES DAILY
Qty: 28 TABLET | Refills: 0 | Status: SHIPPED | OUTPATIENT
Start: 2020-10-30 | End: 2020-11-13

## 2020-10-30 NOTE — TELEPHONE ENCOUNTER
Tetracycline is not on pt's formulary per pharmacy.   Please advise if you want to send in another medication.

## 2020-11-02 ENCOUNTER — HOSPITAL ENCOUNTER (OUTPATIENT)
Dept: RADIOLOGY | Facility: HOSPITAL | Age: 65
Discharge: HOME | End: 2020-11-02
Attending: PODIATRIST
Payer: MEDICARE

## 2020-11-02 ENCOUNTER — OFFICE VISIT (OUTPATIENT)
Dept: PODIATRY | Facility: CLINIC | Age: 65
End: 2020-11-02
Payer: MEDICARE

## 2020-11-02 VITALS
DIASTOLIC BLOOD PRESSURE: 70 MMHG | WEIGHT: 210.13 LBS | HEART RATE: 96 BPM | HEIGHT: 69 IN | BODY MASS INDEX: 31.12 KG/M2 | SYSTOLIC BLOOD PRESSURE: 129 MMHG

## 2020-11-02 DIAGNOSIS — Z09 POSTOP CHECK: ICD-10-CM

## 2020-11-02 DIAGNOSIS — S82.852D CLOSED TRIMALLEOLAR FRACTURE OF LEFT ANKLE WITH ROUTINE HEALING, SUBSEQUENT ENCOUNTER: ICD-10-CM

## 2020-11-02 DIAGNOSIS — Z09 POSTOP CHECK: Primary | ICD-10-CM

## 2020-11-02 DIAGNOSIS — E11.49 TYPE II DIABETES MELLITUS WITH NEUROLOGICAL MANIFESTATIONS: ICD-10-CM

## 2020-11-02 DIAGNOSIS — M96.0 PSEUDARTHROSIS AFTER FUSION OR ARTHRODESIS: ICD-10-CM

## 2020-11-02 DIAGNOSIS — M14.672 CHARCOT'S JOINT OF ANKLE, LEFT: ICD-10-CM

## 2020-11-02 DIAGNOSIS — M14.672 CHARCOT'S JOINT OF LEFT FOOT: ICD-10-CM

## 2020-11-02 DIAGNOSIS — E55.9 VITAMIN D INSUFFICIENCY: ICD-10-CM

## 2020-11-02 DIAGNOSIS — M25.572 PAIN IN LEFT ANKLE AND JOINTS OF LEFT FOOT: ICD-10-CM

## 2020-11-02 PROBLEM — E11.610 DIABETIC CHARCOT'S FOOT: Status: RESOLVED | Noted: 2020-07-23 | Resolved: 2020-11-02

## 2020-11-02 PROCEDURE — 73630 XR FOOT COMPLETE 3 VIEW LEFT: ICD-10-PCS | Mod: 26,LT,, | Performed by: RADIOLOGY

## 2020-11-02 PROCEDURE — 73610 X-RAY EXAM OF ANKLE: CPT | Mod: 26,LT,, | Performed by: RADIOLOGY

## 2020-11-02 PROCEDURE — 73610 X-RAY EXAM OF ANKLE: CPT | Mod: TC,LT

## 2020-11-02 PROCEDURE — 99024 POSTOP FOLLOW-UP VISIT: CPT | Mod: S$GLB,,, | Performed by: PODIATRIST

## 2020-11-02 PROCEDURE — 99999 PR PBB SHADOW E&M-EST. PATIENT-LVL V: ICD-10-PCS | Mod: PBBFAC,,, | Performed by: PODIATRIST

## 2020-11-02 PROCEDURE — 99024 PR POST-OP FOLLOW-UP VISIT: ICD-10-PCS | Mod: S$GLB,,, | Performed by: PODIATRIST

## 2020-11-02 PROCEDURE — 73630 X-RAY EXAM OF FOOT: CPT | Mod: 26,LT,, | Performed by: RADIOLOGY

## 2020-11-02 PROCEDURE — 99999 PR PBB SHADOW E&M-EST. PATIENT-LVL V: CPT | Mod: PBBFAC,,, | Performed by: PODIATRIST

## 2020-11-02 PROCEDURE — 73610 XR ANKLE COMPLETE 3 VIEW LEFT: ICD-10-PCS | Mod: 26,LT,, | Performed by: RADIOLOGY

## 2020-11-02 PROCEDURE — 73630 X-RAY EXAM OF FOOT: CPT | Mod: TC,LT

## 2020-11-02 RX ORDER — ERGOCALCIFEROL 1.25 MG/1
50000 CAPSULE ORAL
Qty: 12 CAPSULE | Refills: 0 | Status: SHIPPED | OUTPATIENT
Start: 2020-11-02 | End: 2021-01-19

## 2020-11-02 NOTE — PROGRESS NOTES
Subjective:       Patient ID: Kay Galarza is a 65 y.o. female.    Chief Complaint: Wound Check (l. foot 0/10 wrapping diabetic pt pcp Dr. Stanton.)      HPI:  Kay Galarza presents to the office today, s/p 07/23/2020 LLE ankle ORIF with LLE Charcot Foot reconstruction. This surgery was revised on 8/18 due to extrusion of the medial cuneiform bone.     The 8/18/2020 procedure:    1. Revision of Charcot deformity via multiple midfoot joint fusions.              2. Application of amniotic membrane.              3. Excisional wound debridement, left ankle.              4. Excisional wound debridement, left foot.               5. Application of WoundVac, left foot.              6. Application of WoundVac, left ankle.      Patient does continue to have [facility] for PT/OT as well as wound care.  Most local wound care modality with collagen dressings. Presents this afternoon with her son.  She presents weight-bearing with a Aircast boot and a wheelchair. Does continue Xarelto for DVT prophylaxis. States blood sugars are most of the times, less than <200mg/dL. States RICE therapy.      Hemoglobin A1C   Date Value Ref Range Status   10/27/2020 5.5 4.0 - 5.6 % Final     Comment:     ADA Screening Guidelines:  5.7-6.4%  Consistent with prediabetes  >or=6.5%  Consistent with diabetes  High levels of fetal hemoglobin interfere with the HbA1C  assay. Heterozygous hemoglobin variants (HbS, HgC, etc)do  not significantly interfere with this assay.   However, presence of multiple variants may affect accuracy.     07/20/2020 9.7 (H) 4.0 - 5.6 % Final     Comment:     ADA Screening Guidelines:  5.7-6.4%  Consistent with prediabetes  >or=6.5%  Consistent with diabetes  High levels of fetal hemoglobin interfere with the HbA1C  assay. Heterozygous hemoglobin variants (HbS, HgC, etc)do  not significantly interfere with this assay.   However, presence of multiple variants may affect accuracy.      12/10/2019 7.5 (H) 4.0 - 5.6 % Final     Comment:     ADA Screening Guidelines:  5.7-6.4%  Consistent with prediabetes  >or=6.5%  Consistent with diabetes  High levels of fetal hemoglobin interfere with the HbA1C  assay. Heterozygous hemoglobin variants (HbS, HgC, etc)do  not significantly interfere with this assay.   However, presence of multiple variants may affect accuracy.         Review of patient's allergies indicates:   Allergen Reactions    Pollen extracts        Past Medical History:   Diagnosis Date    Arthritis     DERIAN KNEES    Asthma     SEASONAL    Cataract     Diabetes mellitus     Diabetes mellitus, type 2     Diabetic retinopathy     DM (diabetes mellitus) 2007    BS 97 09/29/2020    Hyperlipidemia     Hypertension        Family History   Problem Relation Age of Onset    Cancer Father         Prostate Ca    Hypertension Father     Stroke Father     Diabetes Sister     Glaucoma Mother     Hypertension Mother     Glaucoma Maternal Grandmother     Blindness Neg Hx     Macular degeneration Neg Hx     Retinal detachment Neg Hx     Strabismus Neg Hx        Social History     Socioeconomic History    Marital status:      Spouse name: Not on file    Number of children: Not on file    Years of education: Not on file    Highest education level: Not on file   Occupational History    Not on file   Social Needs    Financial resource strain: Not on file    Food insecurity     Worry: Not on file     Inability: Not on file    Transportation needs     Medical: Not on file     Non-medical: Not on file   Tobacco Use    Smoking status: Never Smoker    Smokeless tobacco: Never Used   Substance and Sexual Activity    Alcohol use: Never     Frequency: Never    Drug use: No    Sexual activity: Yes   Lifestyle    Physical activity     Days per week: 0 days     Minutes per session: Not on file    Stress: Not on file   Relationships    Social connections     Talks on phone: Not on  file     Gets together: Not on file     Attends Confucianist service: Not on file     Active member of club or organization: Not on file     Attends meetings of clubs or organizations: Not on file     Relationship status: Not on file   Other Topics Concern    Not on file   Social History Narrative    Not on file       Past Surgical History:   Procedure Laterality Date    APPLICATION OF WOUND VACUUM-ASSISTED CLOSURE DEVICE Left 8/18/2020    Procedure: APPLICATION, WOUND VAC;  Surgeon: Kirt Gray DPM;  Location: [facility] OR;  Service: Podiatry;  Laterality: Left;    CATARACT EXTRACTION W/  INTRAOCULAR LENS IMPLANT Right 07/18/2018    CATARACT EXTRACTION W/  INTRAOCULAR LENS IMPLANT Left 03/13/2019    COLONOSCOPY N/A 12/22/2018    Procedure: COLONOSCOPY;  Surgeon: Zak Dover MD;  Location: [facility] ENDO;  Service: Endoscopy;  Laterality: N/A;    ENDOSCOPIC VEIN LASER TREATMENT Bilateral 1990's    FIXATION OF SYNDESMOSIS OF ANKLE Left 7/23/2020    Procedure: FIXATION, SYNDESMOSIS, ANKLE;  Surgeon: Kirt Gray DPM;  Location: [facility] OR;  Service: Podiatry;  Laterality: Left;    MANIPULATION WITH ANESTHESIA Left 7/23/2020    Procedure: MANIPULATION, WITH ANESTHESIA;  Surgeon: Kirt Gray DPM;  Location: [facility] OR;  Service: Podiatry;  Laterality: Left;    MIDFOOT ARTHRODESIS Left 7/23/2020    Procedure: FUSION, JOINT, MIDFOOT;  Surgeon: Kirt Gray DPM;  Location: [facility] OR;  Service: Podiatry;  Laterality: Left;  2nd tarsometatarsal joint dislocation via fusion    OPEN REDUCTION AND INTERNAL FIXATION (ORIF) OF INJURY OF ANKLE Left 7/23/2020    Procedure: ORIF, ANKLE;  Surgeon: Kirt Gray DPM;  Location: [facility] OR;  Service: Podiatry;  Laterality: Left;    RECONSTRUCTION WITH FUSION OF CHARCOT FOOT Left 8/18/2020    Procedure: RECONSTRUCTION, CHARCOT FOOT, WITH FUSION;  Surgeon: Kirt Gray DPM;  Location: [facility] OR;  Service: Podiatry;  Laterality: Left;    WOUND DEBRIDEMENT Left 8/18/2020     "Procedure: DEBRIDEMENT, WOUND;  Surgeon: Kirt Gray DPM;  Location: [facility];  Service: Podiatry;  Laterality: Left;       Review of Systems   Constitutional: Negative for chills, fatigue and fever.   HENT: Negative for hearing loss.    Eyes: Negative for photophobia and visual disturbance.   Respiratory: Negative for cough, chest tightness, shortness of breath and wheezing.    Cardiovascular: Negative for chest pain, palpitations and leg swelling.   Gastrointestinal: Negative for constipation, diarrhea, nausea and vomiting.   Endocrine: Negative for cold intolerance and heat intolerance.   Genitourinary: Negative for flank pain.   Musculoskeletal: Positive for gait problem. Negative for neck pain and neck stiffness.   Skin: Positive for wound. Negative for color change.   Neurological: Positive for numbness. Negative for light-headedness and headaches.   Psychiatric/Behavioral: Negative for sleep disturbance.       Objective:   /70   Pulse 96   Ht 5' 9" (1.753 m)   Wt 95.3 kg (210 lb 1.6 oz)   BMI 31.03 kg/m²                     X-Ray Foot Complete Left  Narrative: EXAMINATION:  XR FOOT COMPLETE 3 VIEW LEFT    CLINICAL HISTORY:  .  Encounter for follow-up examination after completed treatment for conditions other than malignant neoplasm    TECHNIQUE:  AP, lateral and oblique views of the left foot were performed.    COMPARISON:  09/29/2020    FINDINGS:  Extensive hardware within medial foot remains with alignment stable.  No acute osseous abnormality or detrimental change.  Soft tissue edema persists although improved.  Impression: As above    Electronically signed by: Emanuel Pulido MD  Date:    11/02/2020  Time:    13:07  X-Ray Ankle Complete Left  Narrative: EXAMINATION:  XR ANKLE COMPLETE 3 VIEW LEFT    CLINICAL HISTORY:  Encounter for follow-up examination after completed treatment for conditions other than malignant neoplasm    TECHNIQUE:  AP, lateral and oblique views of the left ankle were " performed.    COMPARISON:  09/29/2020    FINDINGS:  Extensive hardware remains within the ankle foot.  Fracture line persists within the distal fibula with increased bony demineralization.  Medial malleolar fracture persist with demineralization as well.  No significant interval healing callus formation.  Ankle alignment remains stable.  Diffuse soft tissue edema persists..  Impression: As above    Electronically signed by: Emanuel Pulido MD  Date:    11/02/2020  Time:    13:04        LOWER EXTREMITY PHYSICAL EXAMINATION  ORTHOPEDIC:  Mild discomfort to palpation, medial and lateral ankle joint.  Mild discomfort to palpation, dorsal and plantar midfoot.  No edema is noted.  Rectus and anatomic alignment is noted.  No crepitus, ankle and foot joints.    Assessment:     1. Postop check    2. Charcot's neuro arthropathy with dislocation of Lisfranc Joint of left foot    3. Charcot's joint of ankle, left    4. Closed trimalleolar fracture of left ankle with routine healing, subsequent encounter    5. Pain in left ankle and joints of left foot    6. Type II diabetes mellitus with neurological manifestations    7. Vitamin D insufficiency    8. Pseudarthrosis after fusion or arthrodesis          Plan:     Postop check    Charcot's neuro arthropathy with dislocation of Lisfranc Joint of left foot    Charcot's joint of ankle, left  -     HME - OTHER    Closed trimalleolar fracture of left ankle with routine healing, subsequent encounter  -     HME - OTHER    Pain in left ankle and joints of left foot  -     X-Ray Foot Complete Left; Future; Expected date: 11/16/2020  -     X-Ray Ankle Complete Left; Future; Expected date: 11/16/2020    Type II diabetes mellitus with neurological manifestations    Vitamin D insufficiency  -     ergocalciferol (ERGOCALCIFEROL) 50,000 unit Cap; Take 1 capsule (50,000 Units total) by mouth every 7 days. for 12 doses  Dispense: 12 capsule; Refill: 0    Pseudarthrosis after fusion or  arthrodesis  -     HME - OTHER      Thorough discussion is had with the patient today, concerning the diagnosis, its etiology, and the treatment algorithm at present.  XRAYS are reviewed in detail with the patient. All questions and concerns regarding findings and its/their implications are outlined and discussed.  Patient may continue to ambulate with a Aircast walking boot with approximately 10% to 20% weight placed on the foot with the assistance of a walker.  Continue in-home physical therapy and occupational therapy.  Discontinue in-home wound care.  Follow up in approximately 1 month.  Discontinue all DVT prophylaxis. F/U in 1 months. Strict DMII control.  Postop ankle film shows pseudoarthrosis of the fibula repair and the medial malleolar repair, status post 07/23/2020 ORIF.  As such, please obtain bone stimulator.  Fracture gap are less than 1 cm.            Future Appointments   Date Time Provider Department Center   12/2/2020 10:15 AM [facility] XR1-DR AYNY Morales   12/2/2020 10:30 AM [facility] XR1-DR YANY TRIPPAY [facility]   12/2/2020 10:45 AM Kirt Gray DPM [facility]

## 2020-11-05 ENCOUNTER — DOCUMENT SCAN (OUTPATIENT)
Dept: HOME HEALTH SERVICES | Facility: HOSPITAL | Age: 65
End: 2020-11-05
Payer: MEDICARE

## 2020-11-10 ENCOUNTER — DOCUMENT SCAN (OUTPATIENT)
Dept: HOME HEALTH SERVICES | Facility: HOSPITAL | Age: 65
End: 2020-11-10
Payer: MEDICARE

## 2020-11-12 ENCOUNTER — PATIENT MESSAGE (OUTPATIENT)
Dept: PODIATRY | Facility: CLINIC | Age: 65
End: 2020-11-12

## 2020-12-02 ENCOUNTER — HOSPITAL ENCOUNTER (OUTPATIENT)
Dept: RADIOLOGY | Facility: HOSPITAL | Age: 65
Discharge: HOME | End: 2020-12-02
Attending: PODIATRIST
Payer: MEDICARE

## 2020-12-02 ENCOUNTER — OFFICE VISIT (OUTPATIENT)
Dept: PODIATRY | Facility: CLINIC | Age: 65
End: 2020-12-02
Payer: MEDICARE

## 2020-12-02 ENCOUNTER — PATIENT MESSAGE (OUTPATIENT)
Dept: PODIATRY | Facility: CLINIC | Age: 65
End: 2020-12-02

## 2020-12-02 VITALS
HEIGHT: 69 IN | HEART RATE: 87 BPM | WEIGHT: 210.13 LBS | DIASTOLIC BLOOD PRESSURE: 73 MMHG | SYSTOLIC BLOOD PRESSURE: 138 MMHG | BODY MASS INDEX: 31.12 KG/M2

## 2020-12-02 DIAGNOSIS — M25.572 PAIN IN LEFT ANKLE AND JOINTS OF LEFT FOOT: ICD-10-CM

## 2020-12-02 DIAGNOSIS — M14.672 CHARCOT'S JOINT OF LEFT FOOT: ICD-10-CM

## 2020-12-02 DIAGNOSIS — M21.42 ACQUIRED PES PLANUS, LEFT: ICD-10-CM

## 2020-12-02 DIAGNOSIS — E11.49 TYPE II DIABETES MELLITUS WITH NEUROLOGICAL MANIFESTATIONS: ICD-10-CM

## 2020-12-02 DIAGNOSIS — M96.0 PSEUDARTHROSIS AFTER FUSION OR ARTHRODESIS: ICD-10-CM

## 2020-12-02 DIAGNOSIS — Z98.890 HISTORY OF FOOT SURGERY: Primary | ICD-10-CM

## 2020-12-02 DIAGNOSIS — Z98.890 HISTORY OF ANKLE SURGERY: ICD-10-CM

## 2020-12-02 DIAGNOSIS — B35.1 ONYCHOMYCOSIS: ICD-10-CM

## 2020-12-02 DIAGNOSIS — M14.672 CHARCOT'S JOINT OF ANKLE, LEFT: ICD-10-CM

## 2020-12-02 PROCEDURE — 1159F PR MEDICATION LIST DOCUMENTED IN MEDICAL RECORD: ICD-10-PCS | Mod: S$GLB,,, | Performed by: PODIATRIST

## 2020-12-02 PROCEDURE — 3078F PR MOST RECENT DIASTOLIC BLOOD PRESSURE < 80 MM HG: ICD-10-PCS | Mod: CPTII,S$GLB,, | Performed by: PODIATRIST

## 2020-12-02 PROCEDURE — 73610 X-RAY EXAM OF ANKLE: CPT | Mod: 26,LT,, | Performed by: RADIOLOGY

## 2020-12-02 PROCEDURE — 3075F PR MOST RECENT SYSTOLIC BLOOD PRESS GE 130-139MM HG: ICD-10-PCS | Mod: CPTII,S$GLB,, | Performed by: PODIATRIST

## 2020-12-02 PROCEDURE — 3075F SYST BP GE 130 - 139MM HG: CPT | Mod: CPTII,S$GLB,, | Performed by: PODIATRIST

## 2020-12-02 PROCEDURE — 3044F PR MOST RECENT HEMOGLOBIN A1C LEVEL <7.0%: ICD-10-PCS | Mod: CPTII,S$GLB,, | Performed by: PODIATRIST

## 2020-12-02 PROCEDURE — 3078F DIAST BP <80 MM HG: CPT | Mod: CPTII,S$GLB,, | Performed by: PODIATRIST

## 2020-12-02 PROCEDURE — 99214 PR OFFICE/OUTPT VISIT, EST, LEVL IV, 30-39 MIN: ICD-10-PCS | Mod: S$GLB,,, | Performed by: PODIATRIST

## 2020-12-02 PROCEDURE — 1159F MED LIST DOCD IN RCRD: CPT | Mod: S$GLB,,, | Performed by: PODIATRIST

## 2020-12-02 PROCEDURE — 3288F FALL RISK ASSESSMENT DOCD: CPT | Mod: CPTII,S$GLB,, | Performed by: PODIATRIST

## 2020-12-02 PROCEDURE — 99999 PR PBB SHADOW E&M-EST. PATIENT-LVL IV: ICD-10-PCS | Mod: PBBFAC,,, | Performed by: PODIATRIST

## 2020-12-02 PROCEDURE — 1126F PR PAIN SEVERITY QUANTIFIED, NO PAIN PRESENT: ICD-10-PCS | Mod: S$GLB,,, | Performed by: PODIATRIST

## 2020-12-02 PROCEDURE — 3288F PR FALLS RISK ASSESSMENT DOCUMENTED: ICD-10-PCS | Mod: CPTII,S$GLB,, | Performed by: PODIATRIST

## 2020-12-02 PROCEDURE — 99999 PR PBB SHADOW E&M-EST. PATIENT-LVL IV: CPT | Mod: PBBFAC,,, | Performed by: PODIATRIST

## 2020-12-02 PROCEDURE — 3008F BODY MASS INDEX DOCD: CPT | Mod: CPTII,S$GLB,, | Performed by: PODIATRIST

## 2020-12-02 PROCEDURE — 73630 XR FOOT COMPLETE 3 VIEW LEFT: ICD-10-PCS | Mod: 26,LT,, | Performed by: RADIOLOGY

## 2020-12-02 PROCEDURE — 1126F AMNT PAIN NOTED NONE PRSNT: CPT | Mod: S$GLB,,, | Performed by: PODIATRIST

## 2020-12-02 PROCEDURE — 3044F HG A1C LEVEL LT 7.0%: CPT | Mod: CPTII,S$GLB,, | Performed by: PODIATRIST

## 2020-12-02 PROCEDURE — 1101F PT FALLS ASSESS-DOCD LE1/YR: CPT | Mod: CPTII,S$GLB,, | Performed by: PODIATRIST

## 2020-12-02 PROCEDURE — 73610 X-RAY EXAM OF ANKLE: CPT | Mod: TC,LT

## 2020-12-02 PROCEDURE — 73630 X-RAY EXAM OF FOOT: CPT | Mod: TC,LT

## 2020-12-02 PROCEDURE — 73630 X-RAY EXAM OF FOOT: CPT | Mod: 26,LT,, | Performed by: RADIOLOGY

## 2020-12-02 PROCEDURE — 99214 OFFICE O/P EST MOD 30 MIN: CPT | Mod: S$GLB,,, | Performed by: PODIATRIST

## 2020-12-02 PROCEDURE — 1101F PR PT FALLS ASSESS DOC 0-1 FALLS W/OUT INJ PAST YR: ICD-10-PCS | Mod: CPTII,S$GLB,, | Performed by: PODIATRIST

## 2020-12-02 PROCEDURE — 73610 XR ANKLE COMPLETE 3 VIEW LEFT: ICD-10-PCS | Mod: 26,LT,, | Performed by: RADIOLOGY

## 2020-12-02 PROCEDURE — 3008F PR BODY MASS INDEX (BMI) DOCUMENTED: ICD-10-PCS | Mod: CPTII,S$GLB,, | Performed by: PODIATRIST

## 2020-12-02 RX ORDER — KETOCONAZOLE 20 MG/G
CREAM TOPICAL 2 TIMES DAILY
Qty: 30 G | Refills: 2 | Status: SHIPPED | OUTPATIENT
Start: 2020-12-02 | End: 2021-01-01

## 2020-12-02 NOTE — PROGRESS NOTES
Subjective:       Patient ID: Kay Galarza is a 65 y.o. female.    Chief Complaint: Wound Check (L.foot, walking boot, diabetic pt, pcp Dr. Stanton.)      HPI:  Kay Galarza presents to the office today, s/p 07/23/2020 LLE ankle ORIF with LLE Charcot Foot reconstruction. This surgery was revised on 8/18 due to extrusion of the medial cuneiform bone.     The 8/18/2020 procedure:    1. Revision of Charcot deformity via multiple midfoot joint fusions.              2. Application of amniotic membrane.              3. Excisional wound debridement, left ankle.              4. Excisional wound debridement, left foot.               5. Application of WoundVac, left foot.              6. Application of WoundVac, left ankle.      Patient does continue to have [facility] for PT/OT. Patient presents ambulatory with a Aircast walking boot at this time. States no pains about the left foot, but does states some occasional neuritis.  Does take Neurontin twice a day.  Patient states no swelling.  Does complain of discoloration to the bilateral great toe and 2nd toe nail plates.  Patient states controlled blood sugars are present.  Patient ambulatory with a compression sock on left lower extremity.  Patient has been using bone stimulator for the past several weeks.    Hemoglobin A1C   Date Value Ref Range Status   10/27/2020 5.5 4.0 - 5.6 % Final     Comment:     ADA Screening Guidelines:  5.7-6.4%  Consistent with prediabetes  >or=6.5%  Consistent with diabetes  High levels of fetal hemoglobin interfere with the HbA1C  assay. Heterozygous hemoglobin variants (HbS, HgC, etc)do  not significantly interfere with this assay.   However, presence of multiple variants may affect accuracy.     07/20/2020 9.7 (H) 4.0 - 5.6 % Final     Comment:     ADA Screening Guidelines:  5.7-6.4%  Consistent with prediabetes  >or=6.5%  Consistent with diabetes  High levels of fetal hemoglobin interfere with the  HbA1C  assay. Heterozygous hemoglobin variants (HbS, HgC, etc)do  not significantly interfere with this assay.   However, presence of multiple variants may affect accuracy.     12/10/2019 7.5 (H) 4.0 - 5.6 % Final     Comment:     ADA Screening Guidelines:  5.7-6.4%  Consistent with prediabetes  >or=6.5%  Consistent with diabetes  High levels of fetal hemoglobin interfere with the HbA1C  assay. Heterozygous hemoglobin variants (HbS, HgC, etc)do  not significantly interfere with this assay.   However, presence of multiple variants may affect accuracy.         Review of patient's allergies indicates:   Allergen Reactions    Pollen extracts        Past Medical History:   Diagnosis Date    Arthritis     DERIAN KNEES    Asthma     SEASONAL    Cataract     Diabetes mellitus     Diabetes mellitus, type 2     Diabetic retinopathy     DM (diabetes mellitus) 2007    BS 97 09/29/2020    Hyperlipidemia     Hypertension        Family History   Problem Relation Age of Onset    Cancer Father         Prostate Ca    Hypertension Father     Stroke Father     Diabetes Sister     Glaucoma Mother     Hypertension Mother     Glaucoma Maternal Grandmother     Blindness Neg Hx     Macular degeneration Neg Hx     Retinal detachment Neg Hx     Strabismus Neg Hx        Social History     Socioeconomic History    Marital status:      Spouse name: Not on file    Number of children: Not on file    Years of education: Not on file    Highest education level: Not on file   Occupational History    Not on file   Social Needs    Financial resource strain: Not on file    Food insecurity     Worry: Not on file     Inability: Not on file    Transportation needs     Medical: Not on file     Non-medical: Not on file   Tobacco Use    Smoking status: Never Smoker    Smokeless tobacco: Never Used   Substance and Sexual Activity    Alcohol use: Never     Frequency: Never    Drug use: No    Sexual activity: Yes   Lifestyle     Physical activity     Days per week: 0 days     Minutes per session: Not on file    Stress: Not on file   Relationships    Social connections     Talks on phone: Not on file     Gets together: Not on file     Attends Mosque service: Not on file     Active member of club or organization: Not on file     Attends meetings of clubs or organizations: Not on file     Relationship status: Not on file   Other Topics Concern    Not on file   Social History Narrative    Not on file       Past Surgical History:   Procedure Laterality Date    APPLICATION OF WOUND VACUUM-ASSISTED CLOSURE DEVICE Left 8/18/2020    Procedure: APPLICATION, WOUND VAC;  Surgeon: Kirt Gray DPM;  Location: [facility] OR;  Service: Podiatry;  Laterality: Left;    CATARACT EXTRACTION W/  INTRAOCULAR LENS IMPLANT Right 07/18/2018    CATARACT EXTRACTION W/  INTRAOCULAR LENS IMPLANT Left 03/13/2019    COLONOSCOPY N/A 12/22/2018    Procedure: COLONOSCOPY;  Surgeon: Zak Dover MD;  Location: [facility] ENDO;  Service: Endoscopy;  Laterality: N/A;    ENDOSCOPIC VEIN LASER TREATMENT Bilateral 1990's    FIXATION OF SYNDESMOSIS OF ANKLE Left 7/23/2020    Procedure: FIXATION, SYNDESMOSIS, ANKLE;  Surgeon: Kirt Gray DPM;  Location: [facility] OR;  Service: Podiatry;  Laterality: Left;    MANIPULATION WITH ANESTHESIA Left 7/23/2020    Procedure: MANIPULATION, WITH ANESTHESIA;  Surgeon: Kirt Gray DPM;  Location: [facility] OR;  Service: Podiatry;  Laterality: Left;    MIDFOOT ARTHRODESIS Left 7/23/2020    Procedure: FUSION, JOINT, MIDFOOT;  Surgeon: Kirt Gray DPM;  Location: [facility] OR;  Service: Podiatry;  Laterality: Left;  2nd tarsometatarsal joint dislocation via fusion    OPEN REDUCTION AND INTERNAL FIXATION (ORIF) OF INJURY OF ANKLE Left 7/23/2020    Procedure: ORIF, ANKLE;  Surgeon: Kirt Gray DPM;  Location: [facility] OR;  Service: Podiatry;  Laterality: Left;    RECONSTRUCTION WITH FUSION OF CHARCOT FOOT Left 8/18/2020    Procedure:  "RECONSTRUCTION, CHARCOT FOOT, WITH FUSION;  Surgeon: Kirt Gray DPM;  Location: [facility] OR;  Service: Podiatry;  Laterality: Left;    WOUND DEBRIDEMENT Left 8/18/2020    Procedure: DEBRIDEMENT, WOUND;  Surgeon: Kirt Gray DPM;  Location: [facility] OR;  Service: Podiatry;  Laterality: Left;       Review of Systems   Constitutional: Negative for chills, fatigue and fever.   HENT: Negative for hearing loss.    Eyes: Negative for photophobia and visual disturbance.   Respiratory: Negative for cough, chest tightness, shortness of breath and wheezing.    Cardiovascular: Negative for chest pain, palpitations and leg swelling.   Gastrointestinal: Negative for constipation, diarrhea, nausea and vomiting.   Endocrine: Negative for cold intolerance and heat intolerance.   Genitourinary: Negative for flank pain.   Musculoskeletal: Positive for gait problem. Negative for neck pain and neck stiffness.   Skin: Negative for color change and wound.   Neurological: Positive for numbness. Negative for light-headedness and headaches.   Psychiatric/Behavioral: Negative for sleep disturbance.       Objective:   /73   Pulse 87   Ht 5' 9" (1.753 m)   Wt 95.3 kg (210 lb 1.6 oz)   BMI 31.03 kg/m²       LOWER EXTREMITY PHYSICAL EXAMINATION  ORTHOPEDIC:  Mild discomfort to palpation, medial and lateral ankle joint.  Mild discomfort to palpation, dorsal and plantar midfoot.  No edema is noted.  Rectus and anatomic alignment is noted.  No crepitus, ankle and foot joints.    NEUROLOGY: Sensation to light touch is intact. Proprioception is intact, bilateral. Sensation to pin prick is reduced to absent. Vibratory sensation is diminished to the left and right lower extremity. Examination with 5.07 [place] Arnie monofilament reveals that protective sensation is not intact to the left and right plantar surfaces of the foot and digits, as the patient has no sensation/detection at greater than 4 distinct points of contact.    VASCULAR: " On the left and right foot, the dorsalis pedis pulse is 2/4 and the posterior tibial pulse is 2/4. Capillary refill time is less than 3 seconds. Hair growth is present on the dorsum of the foot and at the digits. No rubor is present. Proximal to distal temperature is warm to warm.  No edema, left lower leg, or ankle.    DERMATOLOGY: Skin is supple, dry and intact. No ecchymosis is noted. No hypertrophic skin formation. No erythema or cellulitis is noted. Onychomycosis, bilateral 1st and 2nd digit nail plates.       Assessment:     1. History of foot surgery    2. History of ankle surgery    3. 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 Charcot's joint of ankle, left    5. Type II diabetes mellitus with neurological manifestations    6. Acquired pes planus, left    7. Onychomycosis    8. Pseudarthrosis after fusion or arthrodesis          Plan:     History of foot surgery  History of ankle surgery  Charcot's neuro arthropathy with dislocation of Lisfranc Joint of left foot  Charcot's joint of ankle, left  Acquired pes planus, left  Pseudoarthrosis after fusion or arthrodesis  -     DIABETIC SHOES FOR HOME USE    Thorough discussion is had with the patient today, concerning the diagnosis, its etiology, and the treatment algorithm at present.    XRAYS are reviewed in detail with the patient. All questions and concerns regarding findings and its/their implications are outlined and discussed.    Patient may continue Aircast walking boot for now. Transition to custom diabetic shoe gear thereafter for ambulation. For now continue walking boot with walker. 65% weight on the lower extremity at present. Continue compression stockings at present. Use a walker at all times.     Type II diabetes mellitus with neurological manifestations  Patient advised to follow up with Primary Care Physician for management of comorbid states.    Onychomycosis  -     ketoconazole (NIZORAL) 2 % cream; Apply  topically 2 (two) times daily.  Dispense: 30 g; Refill: 2    Discussed the various treatment options concerning onychomycosis, these being over-the-counter topicals (efficacy is low in regards to cure), prescription strength topicals (better efficacy as compared to OTC variants, but only slightly so, and potentially costly), laser therapy (which is not covered by insurance companies), oral medications (patient is advised that there is a potential, though less than 5%, for the potential of adverse health and side effects; namely liver pathology) and doing nothing (frequent debridements) as onychomycosis is a cosmetic ailment, and has no potential for long-term deleterious effects on the health.            No future appointments.

## 2020-12-08 RX ORDER — ALBUTEROL SULFATE 90 UG/1
2 AEROSOL, METERED RESPIRATORY (INHALATION) EVERY 6 HOURS PRN
Qty: 18 G | Refills: 0 | Status: SHIPPED | OUTPATIENT
Start: 2020-12-08 | End: 2021-01-25 | Stop reason: SDUPTHER

## 2021-01-12 ENCOUNTER — PATIENT MESSAGE (OUTPATIENT)
Dept: FAMILY MEDICINE | Facility: CLINIC | Age: 66
End: 2021-01-12

## 2021-01-12 DIAGNOSIS — M14.60 CHARCOT'S ARTHROPATHY: Primary | ICD-10-CM

## 2021-01-19 ENCOUNTER — OFFICE VISIT (OUTPATIENT)
Dept: PODIATRY | Facility: CLINIC | Age: 66
End: 2021-01-19
Payer: MEDICARE

## 2021-01-19 ENCOUNTER — HOSPITAL ENCOUNTER (OUTPATIENT)
Dept: CARDIOLOGY | Facility: HOSPITAL | Age: 66
Discharge: HOME | End: 2021-01-19
Attending: PODIATRIST
Payer: MEDICARE

## 2021-01-19 ENCOUNTER — HOSPITAL ENCOUNTER (OUTPATIENT)
Dept: RADIOLOGY | Facility: HOSPITAL | Age: 66
Discharge: HOME | End: 2021-01-19
Attending: PODIATRIST
Payer: MEDICARE

## 2021-01-19 VITALS
HEART RATE: 93 BPM | SYSTOLIC BLOOD PRESSURE: 143 MMHG | BODY MASS INDEX: 31.12 KG/M2 | WEIGHT: 210.13 LBS | DIASTOLIC BLOOD PRESSURE: 88 MMHG | HEIGHT: 69 IN

## 2021-01-19 DIAGNOSIS — L97.522 ULCER OF LEFT FOOT WITH FAT LAYER EXPOSED: ICD-10-CM

## 2021-01-19 DIAGNOSIS — Z01.818 PREOP TESTING: ICD-10-CM

## 2021-01-19 DIAGNOSIS — E11.49 TYPE II DIABETES MELLITUS WITH NEUROLOGICAL MANIFESTATIONS: ICD-10-CM

## 2021-01-19 DIAGNOSIS — M79.672 PAIN IN LEFT FOOT: ICD-10-CM

## 2021-01-19 DIAGNOSIS — Z98.890 HISTORY OF FOOT SURGERY: ICD-10-CM

## 2021-01-19 DIAGNOSIS — T84.84XA PAINFUL ORTHOPAEDIC HARDWARE: Primary | ICD-10-CM

## 2021-01-19 PROCEDURE — 1125F AMNT PAIN NOTED PAIN PRSNT: CPT | Mod: S$GLB,ICN,, | Performed by: PODIATRIST

## 2021-01-19 PROCEDURE — 3008F BODY MASS INDEX DOCD: CPT | Mod: CPTII,S$GLB,ICN, | Performed by: PODIATRIST

## 2021-01-19 PROCEDURE — 99214 PR OFFICE/OUTPT VISIT, EST, LEVL IV, 30-39 MIN: ICD-10-PCS | Mod: 25,S$GLB,ICN, | Performed by: PODIATRIST

## 2021-01-19 PROCEDURE — 73610 X-RAY EXAM OF ANKLE: CPT | Mod: TC,LT

## 2021-01-19 PROCEDURE — 99999 PR PBB SHADOW E&M-EST. PATIENT-LVL V: ICD-10-PCS | Mod: PBBFAC,,, | Performed by: PODIATRIST

## 2021-01-19 PROCEDURE — 93010 EKG 12-LEAD: ICD-10-PCS | Mod: ,,, | Performed by: INTERNAL MEDICINE

## 2021-01-19 PROCEDURE — 73610 XR ANKLE COMPLETE 3 VIEW LEFT: ICD-10-PCS | Mod: 26,LT,, | Performed by: RADIOLOGY

## 2021-01-19 PROCEDURE — 1101F PR PT FALLS ASSESS DOC 0-1 FALLS W/OUT INJ PAST YR: ICD-10-PCS | Mod: CPTII,S$GLB,ICN, | Performed by: PODIATRIST

## 2021-01-19 PROCEDURE — 99999 PR PBB SHADOW E&M-EST. PATIENT-LVL V: CPT | Mod: PBBFAC,,, | Performed by: PODIATRIST

## 2021-01-19 PROCEDURE — 11042 PR DEBRIDEMENT, SKIN, SUB-Q TISSUE,=<20 SQ CM: ICD-10-PCS | Mod: S$GLB,ICN,, | Performed by: PODIATRIST

## 2021-01-19 PROCEDURE — 3288F PR FALLS RISK ASSESSMENT DOCUMENTED: ICD-10-PCS | Mod: CPTII,S$GLB,ICN, | Performed by: PODIATRIST

## 2021-01-19 PROCEDURE — 93010 ELECTROCARDIOGRAM REPORT: CPT | Mod: ,,, | Performed by: INTERNAL MEDICINE

## 2021-01-19 PROCEDURE — 73630 XR FOOT COMPLETE 3 VIEW LEFT: ICD-10-PCS | Mod: 26,LT,, | Performed by: RADIOLOGY

## 2021-01-19 PROCEDURE — 3077F PR MOST RECENT SYSTOLIC BLOOD PRESSURE >= 140 MM HG: ICD-10-PCS | Mod: CPTII,S$GLB,ICN, | Performed by: PODIATRIST

## 2021-01-19 PROCEDURE — 3077F SYST BP >= 140 MM HG: CPT | Mod: CPTII,S$GLB,ICN, | Performed by: PODIATRIST

## 2021-01-19 PROCEDURE — 99214 OFFICE O/P EST MOD 30 MIN: CPT | Mod: 25,S$GLB,ICN, | Performed by: PODIATRIST

## 2021-01-19 PROCEDURE — 73630 X-RAY EXAM OF FOOT: CPT | Mod: 26,LT,, | Performed by: RADIOLOGY

## 2021-01-19 PROCEDURE — 3288F FALL RISK ASSESSMENT DOCD: CPT | Mod: CPTII,S$GLB,ICN, | Performed by: PODIATRIST

## 2021-01-19 PROCEDURE — 73630 X-RAY EXAM OF FOOT: CPT | Mod: TC,LT

## 2021-01-19 PROCEDURE — 1125F PR PAIN SEVERITY QUANTIFIED, PAIN PRESENT: ICD-10-PCS | Mod: S$GLB,ICN,, | Performed by: PODIATRIST

## 2021-01-19 PROCEDURE — 11042 DBRDMT SUBQ TIS 1ST 20SQCM/<: CPT | Mod: S$GLB,ICN,, | Performed by: PODIATRIST

## 2021-01-19 PROCEDURE — 1159F PR MEDICATION LIST DOCUMENTED IN MEDICAL RECORD: ICD-10-PCS | Mod: S$GLB,ICN,, | Performed by: PODIATRIST

## 2021-01-19 PROCEDURE — 3079F DIAST BP 80-89 MM HG: CPT | Mod: CPTII,S$GLB,ICN, | Performed by: PODIATRIST

## 2021-01-19 PROCEDURE — 93005 ELECTROCARDIOGRAM TRACING: CPT

## 2021-01-19 PROCEDURE — 73610 X-RAY EXAM OF ANKLE: CPT | Mod: 26,LT,, | Performed by: RADIOLOGY

## 2021-01-19 PROCEDURE — 3079F PR MOST RECENT DIASTOLIC BLOOD PRESSURE 80-89 MM HG: ICD-10-PCS | Mod: CPTII,S$GLB,ICN, | Performed by: PODIATRIST

## 2021-01-19 PROCEDURE — 3008F PR BODY MASS INDEX (BMI) DOCUMENTED: ICD-10-PCS | Mod: CPTII,S$GLB,ICN, | Performed by: PODIATRIST

## 2021-01-19 PROCEDURE — 1159F MED LIST DOCD IN RCRD: CPT | Mod: S$GLB,ICN,, | Performed by: PODIATRIST

## 2021-01-19 PROCEDURE — 1101F PT FALLS ASSESS-DOCD LE1/YR: CPT | Mod: CPTII,S$GLB,ICN, | Performed by: PODIATRIST

## 2021-01-19 PROCEDURE — 3044F PR MOST RECENT HEMOGLOBIN A1C LEVEL <7.0%: ICD-10-PCS | Mod: CPTII,S$GLB,ICN, | Performed by: PODIATRIST

## 2021-01-19 PROCEDURE — 3044F HG A1C LEVEL LT 7.0%: CPT | Mod: CPTII,S$GLB,ICN, | Performed by: PODIATRIST

## 2021-01-19 RX ORDER — LEVOCETIRIZINE DIHYDROCHLORIDE 5 MG/1
5 TABLET, FILM COATED ORAL NIGHTLY
Qty: 30 TABLET | Refills: 11 | Status: SHIPPED | OUTPATIENT
Start: 2021-01-19 | End: 2021-01-25 | Stop reason: SDUPTHER

## 2021-01-19 RX ORDER — MUPIROCIN 20 MG/G
OINTMENT TOPICAL 2 TIMES DAILY
Qty: 30 G | Refills: 1 | Status: ON HOLD | OUTPATIENT
Start: 2021-01-19 | End: 2021-01-27 | Stop reason: HOSPADM

## 2021-01-19 RX ORDER — DOXYCYCLINE 100 MG/1
100 CAPSULE ORAL EVERY 12 HOURS
Qty: 14 CAPSULE | Refills: 0 | Status: SHIPPED | OUTPATIENT
Start: 2021-01-19 | End: 2021-01-21

## 2021-01-19 RX ORDER — OMEPRAZOLE 20 MG/1
CAPSULE, DELAYED RELEASE ORAL
Qty: 60 CAPSULE | Refills: 1 | Status: SHIPPED | OUTPATIENT
Start: 2021-01-19 | End: 2021-01-25 | Stop reason: SDUPTHER

## 2021-01-21 ENCOUNTER — HOSPITAL ENCOUNTER (OUTPATIENT)
Dept: RADIOLOGY | Facility: HOSPITAL | Age: 66
Discharge: HOME | End: 2021-01-21
Attending: PODIATRIST
Payer: MEDICARE

## 2021-01-21 DIAGNOSIS — Z01.818 PREOP TESTING: ICD-10-CM

## 2021-01-21 PROCEDURE — 71046 X-RAY EXAM CHEST 2 VIEWS: CPT | Mod: 26,,, | Performed by: RADIOLOGY

## 2021-01-21 PROCEDURE — 71046 XR CHEST PA AND LATERAL: ICD-10-PCS | Mod: 26,,, | Performed by: RADIOLOGY

## 2021-01-21 PROCEDURE — 71046 X-RAY EXAM CHEST 2 VIEWS: CPT | Mod: TC

## 2021-01-21 RX ORDER — ACETAMINOPHEN 500 MG
500 TABLET ORAL EVERY 6 HOURS PRN
COMMUNITY

## 2021-01-24 ENCOUNTER — LAB VISIT (OUTPATIENT)
Dept: URGENT CARE | Facility: CLINIC | Age: 66
End: 2021-01-24
Payer: MEDICARE

## 2021-01-24 DIAGNOSIS — Z01.818 PREOP TESTING: ICD-10-CM

## 2021-01-24 PROCEDURE — U0003 INFECTIOUS AGENT DETECTION BY NUCLEIC ACID (DNA OR RNA); SEVERE ACUTE RESPIRATORY SYNDROME CORONAVIRUS 2 (SARS-COV-2) (CORONAVIRUS DISEASE [COVID-19]), AMPLIFIED PROBE TECHNIQUE, MAKING USE OF HIGH THROUGHPUT TECHNOLOGIES AS DESCRIBED BY CMS-2020-01-R: HCPCS

## 2021-01-25 ENCOUNTER — OFFICE VISIT (OUTPATIENT)
Dept: CARDIOLOGY | Facility: CLINIC | Age: 66
End: 2021-01-25
Attending: FAMILY MEDICINE
Payer: MEDICARE

## 2021-01-25 ENCOUNTER — OFFICE VISIT (OUTPATIENT)
Dept: FAMILY MEDICINE | Facility: CLINIC | Age: 66
End: 2021-01-25
Attending: FAMILY MEDICINE
Payer: MEDICARE

## 2021-01-25 VITALS — OXYGEN SATURATION: 97 % | SYSTOLIC BLOOD PRESSURE: 130 MMHG | DIASTOLIC BLOOD PRESSURE: 80 MMHG | TEMPERATURE: 98 F

## 2021-01-25 VITALS
HEART RATE: 66 BPM | BODY MASS INDEX: 31.94 KG/M2 | SYSTOLIC BLOOD PRESSURE: 124 MMHG | DIASTOLIC BLOOD PRESSURE: 78 MMHG | WEIGHT: 216.25 LBS | OXYGEN SATURATION: 95 %

## 2021-01-25 DIAGNOSIS — E66.01 SEVERE OBESITY (BMI 35.0-39.9) WITH COMORBIDITY: ICD-10-CM

## 2021-01-25 DIAGNOSIS — R10.13 DYSPEPSIA: ICD-10-CM

## 2021-01-25 DIAGNOSIS — E78.5 DM TYPE 2 WITH DIABETIC DYSLIPIDEMIA: ICD-10-CM

## 2021-01-25 DIAGNOSIS — Z01.810 PRE-OPERATIVE CARDIOVASCULAR EXAMINATION: ICD-10-CM

## 2021-01-25 DIAGNOSIS — R93.89 ABNORMAL CXR: ICD-10-CM

## 2021-01-25 DIAGNOSIS — E11.49 TYPE II DIABETES MELLITUS WITH NEUROLOGICAL MANIFESTATIONS: ICD-10-CM

## 2021-01-25 DIAGNOSIS — I10 HYPERTENSION, UNSPECIFIED TYPE: ICD-10-CM

## 2021-01-25 DIAGNOSIS — E78.5 HYPERLIPIDEMIA, UNSPECIFIED HYPERLIPIDEMIA TYPE: ICD-10-CM

## 2021-01-25 DIAGNOSIS — L21.9 SEBORRHEIC DERMATITIS: ICD-10-CM

## 2021-01-25 DIAGNOSIS — Z01.818 PREOP TESTING: ICD-10-CM

## 2021-01-25 DIAGNOSIS — J45.20 MILD INTERMITTENT ASTHMA, UNSPECIFIED WHETHER COMPLICATED: ICD-10-CM

## 2021-01-25 DIAGNOSIS — I10 ESSENTIAL HYPERTENSION: ICD-10-CM

## 2021-01-25 DIAGNOSIS — J45.909 MILD ASTHMA WITHOUT COMPLICATION, UNSPECIFIED WHETHER PERSISTENT: ICD-10-CM

## 2021-01-25 DIAGNOSIS — E11.69 DM TYPE 2 WITH DIABETIC DYSLIPIDEMIA: ICD-10-CM

## 2021-01-25 DIAGNOSIS — R94.31 NONSPECIFIC ABNORMAL ELECTROCARDIOGRAM (ECG) (EKG): Primary | ICD-10-CM

## 2021-01-25 DIAGNOSIS — I35.1 AORTIC VALVE INSUFFICIENCY, ETIOLOGY OF CARDIAC VALVE DISEASE UNSPECIFIED: ICD-10-CM

## 2021-01-25 DIAGNOSIS — I51.7 CARDIOMEGALY: ICD-10-CM

## 2021-01-25 DIAGNOSIS — F41.1 PRE-OPERATIVE ANXIETY: Primary | ICD-10-CM

## 2021-01-25 DIAGNOSIS — L85.3 XEROSIS CUTIS: ICD-10-CM

## 2021-01-25 LAB — SARS-COV-2 RNA RESP QL NAA+PROBE: NOT DETECTED

## 2021-01-25 PROCEDURE — 3078F PR MOST RECENT DIASTOLIC BLOOD PRESSURE < 80 MM HG: ICD-10-PCS | Mod: CPTII,S$GLB,, | Performed by: INTERNAL MEDICINE

## 2021-01-25 PROCEDURE — 99999 PR PBB SHADOW E&M-EST. PATIENT-LVL V: CPT | Mod: PBBFAC,,, | Performed by: INTERNAL MEDICINE

## 2021-01-25 PROCEDURE — 1159F PR MEDICATION LIST DOCUMENTED IN MEDICAL RECORD: ICD-10-PCS | Mod: S$GLB,,, | Performed by: INTERNAL MEDICINE

## 2021-01-25 PROCEDURE — 3075F PR MOST RECENT SYSTOLIC BLOOD PRESS GE 130-139MM HG: ICD-10-PCS | Mod: CPTII,S$GLB,, | Performed by: FAMILY MEDICINE

## 2021-01-25 PROCEDURE — 99999 PR PBB SHADOW E&M-EST. PATIENT-LVL IV: ICD-10-PCS | Mod: PBBFAC,,, | Performed by: FAMILY MEDICINE

## 2021-01-25 PROCEDURE — 1159F MED LIST DOCD IN RCRD: CPT | Mod: S$GLB,,, | Performed by: INTERNAL MEDICINE

## 2021-01-25 PROCEDURE — 3074F PR MOST RECENT SYSTOLIC BLOOD PRESSURE < 130 MM HG: ICD-10-PCS | Mod: CPTII,S$GLB,, | Performed by: INTERNAL MEDICINE

## 2021-01-25 PROCEDURE — 3044F PR MOST RECENT HEMOGLOBIN A1C LEVEL <7.0%: ICD-10-PCS | Mod: CPTII,S$GLB,, | Performed by: FAMILY MEDICINE

## 2021-01-25 PROCEDURE — 1126F AMNT PAIN NOTED NONE PRSNT: CPT | Mod: S$GLB,,, | Performed by: FAMILY MEDICINE

## 2021-01-25 PROCEDURE — 3079F DIAST BP 80-89 MM HG: CPT | Mod: CPTII,S$GLB,, | Performed by: FAMILY MEDICINE

## 2021-01-25 PROCEDURE — 3075F SYST BP GE 130 - 139MM HG: CPT | Mod: CPTII,S$GLB,, | Performed by: FAMILY MEDICINE

## 2021-01-25 PROCEDURE — 99205 OFFICE O/P NEW HI 60 MIN: CPT | Mod: 25,S$GLB,, | Performed by: INTERNAL MEDICINE

## 2021-01-25 PROCEDURE — 99999 PR PBB SHADOW E&M-EST. PATIENT-LVL IV: CPT | Mod: PBBFAC,,, | Performed by: FAMILY MEDICINE

## 2021-01-25 PROCEDURE — 93005 ELECTROCARDIOGRAM TRACING: CPT

## 2021-01-25 PROCEDURE — 1126F PR PAIN SEVERITY QUANTIFIED, NO PAIN PRESENT: ICD-10-PCS | Mod: S$GLB,,, | Performed by: FAMILY MEDICINE

## 2021-01-25 PROCEDURE — 93010 EKG 12-LEAD: ICD-10-PCS | Mod: S$GLB,,, | Performed by: INTERNAL MEDICINE

## 2021-01-25 PROCEDURE — 3074F SYST BP LT 130 MM HG: CPT | Mod: CPTII,S$GLB,, | Performed by: INTERNAL MEDICINE

## 2021-01-25 PROCEDURE — 3044F HG A1C LEVEL LT 7.0%: CPT | Mod: CPTII,S$GLB,, | Performed by: INTERNAL MEDICINE

## 2021-01-25 PROCEDURE — 99214 OFFICE O/P EST MOD 30 MIN: CPT | Mod: S$GLB,,, | Performed by: FAMILY MEDICINE

## 2021-01-25 PROCEDURE — 1159F MED LIST DOCD IN RCRD: CPT | Mod: S$GLB,,, | Performed by: FAMILY MEDICINE

## 2021-01-25 PROCEDURE — 3008F PR BODY MASS INDEX (BMI) DOCUMENTED: ICD-10-PCS | Mod: CPTII,S$GLB,, | Performed by: INTERNAL MEDICINE

## 2021-01-25 PROCEDURE — 99205 PR OFFICE/OUTPT VISIT, NEW, LEVL V, 60-74 MIN: ICD-10-PCS | Mod: 25,S$GLB,, | Performed by: INTERNAL MEDICINE

## 2021-01-25 PROCEDURE — 93010 ELECTROCARDIOGRAM REPORT: CPT | Mod: S$GLB,,, | Performed by: INTERNAL MEDICINE

## 2021-01-25 PROCEDURE — 3044F PR MOST RECENT HEMOGLOBIN A1C LEVEL <7.0%: ICD-10-PCS | Mod: CPTII,S$GLB,, | Performed by: INTERNAL MEDICINE

## 2021-01-25 PROCEDURE — 1159F PR MEDICATION LIST DOCUMENTED IN MEDICAL RECORD: ICD-10-PCS | Mod: S$GLB,,, | Performed by: FAMILY MEDICINE

## 2021-01-25 PROCEDURE — 3044F HG A1C LEVEL LT 7.0%: CPT | Mod: CPTII,S$GLB,, | Performed by: FAMILY MEDICINE

## 2021-01-25 PROCEDURE — 3079F PR MOST RECENT DIASTOLIC BLOOD PRESSURE 80-89 MM HG: ICD-10-PCS | Mod: CPTII,S$GLB,, | Performed by: FAMILY MEDICINE

## 2021-01-25 PROCEDURE — 3078F DIAST BP <80 MM HG: CPT | Mod: CPTII,S$GLB,, | Performed by: INTERNAL MEDICINE

## 2021-01-25 PROCEDURE — 99214 PR OFFICE/OUTPT VISIT, EST, LEVL IV, 30-39 MIN: ICD-10-PCS | Mod: S$GLB,,, | Performed by: FAMILY MEDICINE

## 2021-01-25 PROCEDURE — 3008F BODY MASS INDEX DOCD: CPT | Mod: CPTII,S$GLB,, | Performed by: INTERNAL MEDICINE

## 2021-01-25 PROCEDURE — 99999 PR PBB SHADOW E&M-EST. PATIENT-LVL V: ICD-10-PCS | Mod: PBBFAC,,, | Performed by: INTERNAL MEDICINE

## 2021-01-25 RX ORDER — OMEPRAZOLE 20 MG/1
CAPSULE, DELAYED RELEASE ORAL
Qty: 60 CAPSULE | Refills: 1 | Status: CANCELLED | OUTPATIENT
Start: 2021-01-25

## 2021-01-25 RX ORDER — ATORVASTATIN CALCIUM 40 MG/1
40 TABLET, FILM COATED ORAL DAILY
Qty: 90 TABLET | Refills: 0 | Status: SHIPPED | OUTPATIENT
Start: 2021-01-25 | End: 2021-04-09 | Stop reason: SDUPTHER

## 2021-01-25 RX ORDER — LISINOPRIL AND HYDROCHLOROTHIAZIDE 20; 25 MG/1; MG/1
1 TABLET ORAL DAILY
Qty: 90 TABLET | Refills: 0 | Status: CANCELLED | OUTPATIENT
Start: 2021-01-25

## 2021-01-25 RX ORDER — GLIMEPIRIDE 4 MG/1
4 TABLET ORAL
Qty: 90 TABLET | Refills: 4 | Status: SHIPPED | OUTPATIENT
Start: 2021-01-25 | End: 2021-04-09 | Stop reason: SDUPTHER

## 2021-01-25 RX ORDER — INSULIN GLARGINE 300 U/ML
50 INJECTION, SOLUTION SUBCUTANEOUS 2 TIMES DAILY
Qty: 10 ML | Refills: 3 | Status: SHIPPED | OUTPATIENT
Start: 2021-01-25 | End: 2021-04-09 | Stop reason: SDUPTHER

## 2021-01-25 RX ORDER — LISINOPRIL AND HYDROCHLOROTHIAZIDE 20; 25 MG/1; MG/1
1 TABLET ORAL DAILY
Qty: 90 TABLET | Refills: 0 | Status: SHIPPED | OUTPATIENT
Start: 2021-01-25 | End: 2021-04-09 | Stop reason: SDUPTHER

## 2021-01-25 RX ORDER — FLUTICASONE PROPIONATE 50 MCG
2 SPRAY, SUSPENSION (ML) NASAL DAILY
Qty: 16 G | Refills: 0 | Status: SHIPPED | OUTPATIENT
Start: 2021-01-25

## 2021-01-25 RX ORDER — ALBUTEROL SULFATE 90 UG/1
2 AEROSOL, METERED RESPIRATORY (INHALATION) EVERY 6 HOURS PRN
Qty: 18 G | Refills: 0 | Status: CANCELLED | OUTPATIENT
Start: 2021-01-25

## 2021-01-25 RX ORDER — OMEPRAZOLE 20 MG/1
CAPSULE, DELAYED RELEASE ORAL
Qty: 60 CAPSULE | Refills: 1 | Status: SHIPPED | OUTPATIENT
Start: 2021-01-25 | End: 2021-06-28

## 2021-01-25 RX ORDER — LEVOCETIRIZINE DIHYDROCHLORIDE 5 MG/1
5 TABLET, FILM COATED ORAL NIGHTLY
Qty: 30 TABLET | Refills: 11 | Status: SHIPPED | OUTPATIENT
Start: 2021-01-25 | End: 2022-02-22

## 2021-01-25 RX ORDER — TRAZODONE HYDROCHLORIDE 150 MG/1
150 TABLET ORAL NIGHTLY PRN
Qty: 90 TABLET | Refills: 0 | Status: SHIPPED | OUTPATIENT
Start: 2021-01-25 | End: 2021-04-09 | Stop reason: SDUPTHER

## 2021-01-25 RX ORDER — KETOCONAZOLE 20 MG/ML
SHAMPOO, SUSPENSION TOPICAL
Qty: 120 ML | Refills: 3 | Status: SHIPPED | OUTPATIENT
Start: 2021-01-25 | End: 2022-01-19 | Stop reason: SDUPTHER

## 2021-01-25 RX ORDER — ALBUTEROL SULFATE 90 UG/1
2 AEROSOL, METERED RESPIRATORY (INHALATION) EVERY 6 HOURS PRN
Qty: 18 G | Refills: 0 | Status: SHIPPED | OUTPATIENT
Start: 2021-01-25 | End: 2021-03-02 | Stop reason: SDUPTHER

## 2021-01-25 RX ORDER — ATORVASTATIN CALCIUM 40 MG/1
40 TABLET, FILM COATED ORAL DAILY
Qty: 90 TABLET | Refills: 0 | Status: CANCELLED | OUTPATIENT
Start: 2021-01-25

## 2021-01-25 RX ORDER — TRAZODONE HYDROCHLORIDE 150 MG/1
150 TABLET ORAL NIGHTLY PRN
Qty: 90 TABLET | Refills: 0 | Status: CANCELLED | OUTPATIENT
Start: 2021-01-25

## 2021-01-25 RX ORDER — CLOBETASOL PROPIONATE 0.46 MG/ML
SOLUTION TOPICAL 2 TIMES DAILY
Qty: 50 ML | Refills: 2 | Status: SHIPPED | OUTPATIENT
Start: 2021-01-25 | End: 2021-06-28

## 2021-01-25 RX ORDER — GABAPENTIN 100 MG/1
100 CAPSULE ORAL 3 TIMES DAILY
Qty: 90 CAPSULE | Refills: 11 | Status: SHIPPED | OUTPATIENT
Start: 2021-01-25 | End: 2021-04-09 | Stop reason: SDUPTHER

## 2021-01-25 RX ORDER — AMMONIUM LACTATE 12 G/100G
LOTION TOPICAL
Qty: 225 G | Refills: 11 | Status: SHIPPED | OUTPATIENT
Start: 2021-01-25 | End: 2022-02-01

## 2021-01-25 RX ORDER — DULAGLUTIDE 1.5 MG/.5ML
INJECTION, SOLUTION SUBCUTANEOUS
Qty: 12 ML | Refills: 0 | Status: SHIPPED | OUTPATIENT
Start: 2021-01-25 | End: 2021-04-09 | Stop reason: SDUPTHER

## 2021-01-25 RX ORDER — PEN NEEDLE, DIABETIC 30 GX3/16"
NEEDLE, DISPOSABLE MISCELLANEOUS
Qty: 100 EACH | Refills: 0 | Status: SHIPPED | OUTPATIENT
Start: 2021-01-25 | End: 2022-05-09 | Stop reason: SDUPTHER

## 2021-01-26 ENCOUNTER — OFFICE VISIT (OUTPATIENT)
Dept: PULMONOLOGY | Facility: CLINIC | Age: 66
End: 2021-01-26
Attending: FAMILY MEDICINE
Payer: MEDICARE

## 2021-01-26 ENCOUNTER — LAB VISIT (OUTPATIENT)
Dept: LAB | Facility: HOSPITAL | Age: 66
End: 2021-01-26
Attending: NURSE PRACTITIONER
Payer: MEDICARE

## 2021-01-26 VITALS
HEIGHT: 69 IN | BODY MASS INDEX: 31.9 KG/M2 | HEART RATE: 91 BPM | SYSTOLIC BLOOD PRESSURE: 140 MMHG | RESPIRATION RATE: 18 BRPM | OXYGEN SATURATION: 97 % | WEIGHT: 215.38 LBS | DIASTOLIC BLOOD PRESSURE: 90 MMHG

## 2021-01-26 DIAGNOSIS — I51.7 CARDIOMEGALY: ICD-10-CM

## 2021-01-26 DIAGNOSIS — M14.672 CHARCOT'S JOINT OF LEFT FOOT: ICD-10-CM

## 2021-01-26 DIAGNOSIS — R93.89 ABNORMAL CXR: ICD-10-CM

## 2021-01-26 DIAGNOSIS — J84.9 ILD (INTERSTITIAL LUNG DISEASE): Primary | ICD-10-CM

## 2021-01-26 DIAGNOSIS — J84.9 ILD (INTERSTITIAL LUNG DISEASE): ICD-10-CM

## 2021-01-26 DIAGNOSIS — J45.20 MILD INTERMITTENT ASTHMA WITHOUT COMPLICATION: ICD-10-CM

## 2021-01-26 DIAGNOSIS — Z79.4 TYPE 2 DIABETES MELLITUS WITHOUT COMPLICATION, WITH LONG-TERM CURRENT USE OF INSULIN: ICD-10-CM

## 2021-01-26 DIAGNOSIS — E11.9 TYPE 2 DIABETES MELLITUS WITHOUT COMPLICATION, WITH LONG-TERM CURRENT USE OF INSULIN: ICD-10-CM

## 2021-01-26 DIAGNOSIS — I10 ESSENTIAL HYPERTENSION: ICD-10-CM

## 2021-01-26 LAB
CRP SERPL-MCNC: 27.1 MG/L (ref 0–8.2)
RHEUMATOID FACT SERPL-ACNC: <10 IU/ML (ref 0–15)

## 2021-01-26 PROCEDURE — 3008F BODY MASS INDEX DOCD: CPT | Mod: CPTII,S$GLB,, | Performed by: NURSE PRACTITIONER

## 2021-01-26 PROCEDURE — 86431 RHEUMATOID FACTOR QUANT: CPT

## 2021-01-26 PROCEDURE — 86255 FLUORESCENT ANTIBODY SCREEN: CPT

## 2021-01-26 PROCEDURE — 1101F PT FALLS ASSESS-DOCD LE1/YR: CPT | Mod: CPTII,S$GLB,, | Performed by: NURSE PRACTITIONER

## 2021-01-26 PROCEDURE — 3077F PR MOST RECENT SYSTOLIC BLOOD PRESSURE >= 140 MM HG: ICD-10-PCS | Mod: CPTII,S$GLB,, | Performed by: NURSE PRACTITIONER

## 2021-01-26 PROCEDURE — 1159F PR MEDICATION LIST DOCUMENTED IN MEDICAL RECORD: ICD-10-PCS | Mod: S$GLB,,, | Performed by: NURSE PRACTITIONER

## 2021-01-26 PROCEDURE — 3080F PR MOST RECENT DIASTOLIC BLOOD PRESSURE >= 90 MM HG: ICD-10-PCS | Mod: CPTII,S$GLB,, | Performed by: NURSE PRACTITIONER

## 2021-01-26 PROCEDURE — 99215 PR OFFICE/OUTPT VISIT, EST, LEVL V, 40-54 MIN: ICD-10-PCS | Mod: S$GLB,,, | Performed by: NURSE PRACTITIONER

## 2021-01-26 PROCEDURE — 1159F MED LIST DOCD IN RCRD: CPT | Mod: S$GLB,,, | Performed by: NURSE PRACTITIONER

## 2021-01-26 PROCEDURE — 3288F FALL RISK ASSESSMENT DOCD: CPT | Mod: CPTII,S$GLB,, | Performed by: NURSE PRACTITIONER

## 2021-01-26 PROCEDURE — 82164 ANGIOTENSIN I ENZYME TEST: CPT

## 2021-01-26 PROCEDURE — 3077F SYST BP >= 140 MM HG: CPT | Mod: CPTII,S$GLB,, | Performed by: NURSE PRACTITIONER

## 2021-01-26 PROCEDURE — 3044F HG A1C LEVEL LT 7.0%: CPT | Mod: CPTII,S$GLB,, | Performed by: NURSE PRACTITIONER

## 2021-01-26 PROCEDURE — 3044F PR MOST RECENT HEMOGLOBIN A1C LEVEL <7.0%: ICD-10-PCS | Mod: CPTII,S$GLB,, | Performed by: NURSE PRACTITIONER

## 2021-01-26 PROCEDURE — 1101F PR PT FALLS ASSESS DOC 0-1 FALLS W/OUT INJ PAST YR: ICD-10-PCS | Mod: CPTII,S$GLB,, | Performed by: NURSE PRACTITIONER

## 2021-01-26 PROCEDURE — 86140 C-REACTIVE PROTEIN: CPT

## 2021-01-26 PROCEDURE — 3008F PR BODY MASS INDEX (BMI) DOCUMENTED: ICD-10-PCS | Mod: CPTII,S$GLB,, | Performed by: NURSE PRACTITIONER

## 2021-01-26 PROCEDURE — 99999 PR PBB SHADOW E&M-EST. PATIENT-LVL V: ICD-10-PCS | Mod: PBBFAC,,, | Performed by: NURSE PRACTITIONER

## 2021-01-26 PROCEDURE — 99215 OFFICE O/P EST HI 40 MIN: CPT | Mod: S$GLB,,, | Performed by: NURSE PRACTITIONER

## 2021-01-26 PROCEDURE — 99999 PR PBB SHADOW E&M-EST. PATIENT-LVL V: CPT | Mod: PBBFAC,,, | Performed by: NURSE PRACTITIONER

## 2021-01-26 PROCEDURE — 86331 IMMUNODIFFUSION OUCHTERLONY: CPT

## 2021-01-26 PROCEDURE — 3080F DIAST BP >= 90 MM HG: CPT | Mod: CPTII,S$GLB,, | Performed by: NURSE PRACTITIONER

## 2021-01-26 PROCEDURE — 3288F PR FALLS RISK ASSESSMENT DOCUMENTED: ICD-10-PCS | Mod: CPTII,S$GLB,, | Performed by: NURSE PRACTITIONER

## 2021-01-27 ENCOUNTER — ANESTHESIA EVENT (OUTPATIENT)
Dept: SURGERY | Facility: HOSPITAL | Age: 66
End: 2021-01-27
Payer: MEDICARE

## 2021-01-27 ENCOUNTER — HOSPITAL ENCOUNTER (OUTPATIENT)
Facility: HOSPITAL | Age: 66
Discharge: HOME | End: 2021-01-27
Attending: PODIATRIST | Admitting: PODIATRIST
Payer: MEDICARE

## 2021-01-27 ENCOUNTER — ANESTHESIA (OUTPATIENT)
Dept: SURGERY | Facility: HOSPITAL | Age: 66
End: 2021-01-27
Payer: MEDICARE

## 2021-01-27 DIAGNOSIS — T84.84XA PAINFUL ORTHOPAEDIC HARDWARE: ICD-10-CM

## 2021-01-27 LAB
ACE SERPL-CCNC: <5 U/L (ref 16–85)
POCT GLUCOSE: 95 MG/DL (ref 70–110)

## 2021-01-27 PROCEDURE — 71000033 HC RECOVERY, INTIAL HOUR: Performed by: PODIATRIST

## 2021-01-27 PROCEDURE — 36000707: Performed by: PODIATRIST

## 2021-01-27 PROCEDURE — 20680 REMOVAL OF IMPLANT DEEP: CPT | Mod: LT,,, | Performed by: PODIATRIST

## 2021-01-27 PROCEDURE — 25000003 PHARM REV CODE 250: Performed by: PODIATRIST

## 2021-01-27 PROCEDURE — 36000706: Performed by: PODIATRIST

## 2021-01-27 PROCEDURE — 27800903 OPTIME MED/SURG SUP & DEVICES OTHER IMPLANTS: Performed by: PODIATRIST

## 2021-01-27 PROCEDURE — 37000008 HC ANESTHESIA 1ST 15 MINUTES: Performed by: PODIATRIST

## 2021-01-27 PROCEDURE — 71000015 HC POSTOP RECOV 1ST HR: Performed by: PODIATRIST

## 2021-01-27 PROCEDURE — 37000009 HC ANESTHESIA EA ADD 15 MINS: Performed by: PODIATRIST

## 2021-01-27 PROCEDURE — 20680 PR REMOVAL DEEP IMPLANT: ICD-10-PCS | Mod: LT,,, | Performed by: PODIATRIST

## 2021-01-27 PROCEDURE — 63600175 PHARM REV CODE 636 W HCPCS: Performed by: NURSE ANESTHETIST, CERTIFIED REGISTERED

## 2021-01-27 PROCEDURE — 25000003 PHARM REV CODE 250: Performed by: NURSE ANESTHETIST, CERTIFIED REGISTERED

## 2021-01-27 DEVICE — IMPLANTABLE DEVICE
Type: IMPLANTABLE DEVICE | Site: FOOT | Status: NON-FUNCTIONAL
Removed: 2023-05-26

## 2021-01-27 RX ORDER — LIDOCAINE HYDROCHLORIDE AND EPINEPHRINE 20; 10 MG/ML; UG/ML
INJECTION, SOLUTION INFILTRATION; PERINEURAL
Status: DISCONTINUED | OUTPATIENT
Start: 2021-01-27 | End: 2021-01-27 | Stop reason: HOSPADM

## 2021-01-27 RX ORDER — LIDOCAINE HYDROCHLORIDE 20 MG/ML
INJECTION INTRAVENOUS
Status: DISCONTINUED | OUTPATIENT
Start: 2021-01-27 | End: 2021-01-27

## 2021-01-27 RX ORDER — CLINDAMYCIN PHOSPHATE 900 MG/50ML
900 INJECTION, SOLUTION INTRAVENOUS
Status: COMPLETED | OUTPATIENT
Start: 2021-01-27 | End: 2021-01-27

## 2021-01-27 RX ORDER — PHENYLEPHRINE HYDROCHLORIDE 10 MG/ML
INJECTION INTRAVENOUS
Status: DISCONTINUED | OUTPATIENT
Start: 2021-01-27 | End: 2021-01-27

## 2021-01-27 RX ORDER — PROPOFOL 10 MG/ML
VIAL (ML) INTRAVENOUS CONTINUOUS PRN
Status: DISCONTINUED | OUTPATIENT
Start: 2021-01-27 | End: 2021-01-27

## 2021-01-27 RX ORDER — DOXYCYCLINE HYCLATE 100 MG
100 TABLET ORAL EVERY 12 HOURS
Qty: 14 TABLET | Refills: 0 | Status: SHIPPED | OUTPATIENT
Start: 2021-01-27 | End: 2021-02-03

## 2021-01-27 RX ORDER — ERGOCALCIFEROL 1.25 MG/1
50000 CAPSULE ORAL WEEKLY
Qty: 4 CAPSULE | Refills: 2 | Status: SHIPPED | OUTPATIENT
Start: 2021-01-27 | End: 2021-06-28

## 2021-01-27 RX ORDER — OXYCODONE AND ACETAMINOPHEN 5; 325 MG/1; MG/1
1 TABLET ORAL EVERY 6 HOURS PRN
Qty: 28 TABLET | Refills: 0 | Status: SHIPPED | OUTPATIENT
Start: 2021-01-27 | End: 2021-02-03

## 2021-01-27 RX ORDER — PROPOFOL 10 MG/ML
VIAL (ML) INTRAVENOUS
Status: DISCONTINUED | OUTPATIENT
Start: 2021-01-27 | End: 2021-01-27

## 2021-01-27 RX ORDER — ONDANSETRON 2 MG/ML
4 INJECTION INTRAMUSCULAR; INTRAVENOUS DAILY PRN
Status: DISCONTINUED | OUTPATIENT
Start: 2021-01-27 | End: 2021-01-27 | Stop reason: HOSPADM

## 2021-01-27 RX ORDER — FENTANYL CITRATE 50 UG/ML
INJECTION, SOLUTION INTRAMUSCULAR; INTRAVENOUS
Status: DISCONTINUED | OUTPATIENT
Start: 2021-01-27 | End: 2021-01-27

## 2021-01-27 RX ORDER — ONDANSETRON 2 MG/ML
INJECTION INTRAMUSCULAR; INTRAVENOUS
Status: DISCONTINUED | OUTPATIENT
Start: 2021-01-27 | End: 2021-01-27

## 2021-01-27 RX ORDER — MIDAZOLAM HYDROCHLORIDE 1 MG/ML
INJECTION, SOLUTION INTRAMUSCULAR; INTRAVENOUS
Status: DISCONTINUED | OUTPATIENT
Start: 2021-01-27 | End: 2021-01-27

## 2021-01-27 RX ORDER — HYDROMORPHONE HYDROCHLORIDE 2 MG/ML
0.2 INJECTION, SOLUTION INTRAMUSCULAR; INTRAVENOUS; SUBCUTANEOUS EVERY 5 MIN PRN
Status: DISCONTINUED | OUTPATIENT
Start: 2021-01-27 | End: 2021-01-27 | Stop reason: HOSPADM

## 2021-01-27 RX ADMIN — LIDOCAINE HYDROCHLORIDE 100 MG: 20 INJECTION, SOLUTION INTRAVENOUS at 12:01

## 2021-01-27 RX ADMIN — FENTANYL CITRATE 25 MCG: 50 INJECTION, SOLUTION INTRAMUSCULAR; INTRAVENOUS at 12:01

## 2021-01-27 RX ADMIN — PHENYLEPHRINE HYDROCHLORIDE 100 MCG: 10 INJECTION INTRAVENOUS at 01:01

## 2021-01-27 RX ADMIN — MIDAZOLAM HYDROCHLORIDE 2 MG: 1 INJECTION, SOLUTION INTRAMUSCULAR; INTRAVENOUS at 12:01

## 2021-01-27 RX ADMIN — ONDANSETRON 4 MG: 2 INJECTION, SOLUTION INTRAMUSCULAR; INTRAVENOUS at 12:01

## 2021-01-27 RX ADMIN — PROPOFOL 50 MCG/KG/MIN: 10 INJECTION, EMULSION INTRAVENOUS at 12:01

## 2021-01-27 RX ADMIN — CLINDAMYCIN PHOSPHATE 900 MG: 900 INJECTION, SOLUTION INTRAVENOUS at 12:01

## 2021-01-27 RX ADMIN — PROPOFOL 50 MG: 10 INJECTION, EMULSION INTRAVENOUS at 12:01

## 2021-01-27 RX ADMIN — FENTANYL CITRATE 25 MCG: 50 INJECTION, SOLUTION INTRAMUSCULAR; INTRAVENOUS at 01:01

## 2021-01-28 LAB
ANCA AB TITR SER IF: NORMAL TITER
P-ANCA TITR SER IF: NORMAL TITER

## 2021-02-01 VITALS
RESPIRATION RATE: 17 BRPM | SYSTOLIC BLOOD PRESSURE: 122 MMHG | BODY MASS INDEX: 29.36 KG/M2 | TEMPERATURE: 99 F | HEIGHT: 69 IN | WEIGHT: 198.19 LBS | OXYGEN SATURATION: 96 % | HEART RATE: 85 BPM | DIASTOLIC BLOOD PRESSURE: 60 MMHG

## 2021-02-01 LAB
A FUMIGATUS1 AB SER QL ID: NORMAL
A FUMIGATUS6 AB SER QL ID: NORMAL
A PULLULANS AB SER QL ID: NORMAL
PIGEON SERUM AB QL ID: NORMAL
S RECTIVIRGULA AB SER QL ID: NORMAL
T VULGARIS1 AB SER QL ID: NORMAL

## 2021-02-04 ENCOUNTER — OFFICE VISIT (OUTPATIENT)
Dept: PODIATRY | Facility: CLINIC | Age: 66
End: 2021-02-04
Payer: MEDICARE

## 2021-02-04 VITALS — WEIGHT: 198.19 LBS | BODY MASS INDEX: 29.36 KG/M2 | HEIGHT: 69 IN

## 2021-02-04 DIAGNOSIS — E11.621 DIABETIC ULCER OF TOE OF LEFT FOOT ASSOCIATED WITH TYPE 2 DIABETES MELLITUS, WITH FAT LAYER EXPOSED: ICD-10-CM

## 2021-02-04 DIAGNOSIS — L97.522 DIABETIC ULCER OF TOE OF LEFT FOOT ASSOCIATED WITH TYPE 2 DIABETES MELLITUS, WITH FAT LAYER EXPOSED: ICD-10-CM

## 2021-02-04 DIAGNOSIS — E11.49 TYPE II DIABETES MELLITUS WITH NEUROLOGICAL MANIFESTATIONS: ICD-10-CM

## 2021-02-04 DIAGNOSIS — Z98.890 HISTORY OF ANKLE SURGERY: ICD-10-CM

## 2021-02-04 DIAGNOSIS — Z09 POSTOP CHECK: Primary | ICD-10-CM

## 2021-02-04 DIAGNOSIS — Z98.890 HISTORY OF FOOT SURGERY: ICD-10-CM

## 2021-02-04 DIAGNOSIS — M79.672 PAIN IN LEFT FOOT: ICD-10-CM

## 2021-02-04 DIAGNOSIS — T84.84XA PAINFUL ORTHOPAEDIC HARDWARE: ICD-10-CM

## 2021-02-04 PROCEDURE — 3288F PR FALLS RISK ASSESSMENT DOCUMENTED: ICD-10-PCS | Mod: CPTII,S$GLB,, | Performed by: PODIATRIST

## 2021-02-04 PROCEDURE — 1125F AMNT PAIN NOTED PAIN PRSNT: CPT | Mod: S$GLB,,, | Performed by: PODIATRIST

## 2021-02-04 PROCEDURE — 99999 PR PBB SHADOW E&M-EST. PATIENT-LVL IV: CPT | Mod: PBBFAC,,, | Performed by: PODIATRIST

## 2021-02-04 PROCEDURE — 1125F PR PAIN SEVERITY QUANTIFIED, PAIN PRESENT: ICD-10-PCS | Mod: S$GLB,,, | Performed by: PODIATRIST

## 2021-02-04 PROCEDURE — 99024 PR POST-OP FOLLOW-UP VISIT: ICD-10-PCS | Mod: S$GLB,,, | Performed by: PODIATRIST

## 2021-02-04 PROCEDURE — 99024 POSTOP FOLLOW-UP VISIT: CPT | Mod: S$GLB,,, | Performed by: PODIATRIST

## 2021-02-04 PROCEDURE — 3008F BODY MASS INDEX DOCD: CPT | Mod: CPTII,S$GLB,, | Performed by: PODIATRIST

## 2021-02-04 PROCEDURE — 99999 PR PBB SHADOW E&M-EST. PATIENT-LVL IV: ICD-10-PCS | Mod: PBBFAC,,, | Performed by: PODIATRIST

## 2021-02-04 PROCEDURE — 3288F FALL RISK ASSESSMENT DOCD: CPT | Mod: CPTII,S$GLB,, | Performed by: PODIATRIST

## 2021-02-04 PROCEDURE — 1101F PR PT FALLS ASSESS DOC 0-1 FALLS W/OUT INJ PAST YR: ICD-10-PCS | Mod: CPTII,S$GLB,, | Performed by: PODIATRIST

## 2021-02-04 PROCEDURE — 3008F PR BODY MASS INDEX (BMI) DOCUMENTED: ICD-10-PCS | Mod: CPTII,S$GLB,, | Performed by: PODIATRIST

## 2021-02-04 PROCEDURE — 1101F PT FALLS ASSESS-DOCD LE1/YR: CPT | Mod: CPTII,S$GLB,, | Performed by: PODIATRIST

## 2021-02-04 RX ORDER — CIPROFLOXACIN 500 MG/1
500 TABLET ORAL EVERY 12 HOURS
Qty: 10 TABLET | Refills: 0 | Status: SHIPPED | OUTPATIENT
Start: 2021-02-04 | End: 2021-02-09

## 2021-02-08 ENCOUNTER — PATIENT MESSAGE (OUTPATIENT)
Dept: GASTROENTEROLOGY | Facility: CLINIC | Age: 66
End: 2021-02-08

## 2021-02-18 ENCOUNTER — OFFICE VISIT (OUTPATIENT)
Dept: PODIATRY | Facility: CLINIC | Age: 66
End: 2021-02-18
Payer: MEDICARE

## 2021-02-18 ENCOUNTER — HOSPITAL ENCOUNTER (OUTPATIENT)
Dept: RADIOLOGY | Facility: HOSPITAL | Age: 66
Discharge: HOME | End: 2021-02-18
Attending: PODIATRIST
Payer: MEDICARE

## 2021-02-18 VITALS — HEIGHT: 69 IN | BODY MASS INDEX: 29.36 KG/M2 | WEIGHT: 198.19 LBS

## 2021-02-18 DIAGNOSIS — L03.116 CELLULITIS OF LEFT FOOT: ICD-10-CM

## 2021-02-18 DIAGNOSIS — Z09 POSTOP CHECK: ICD-10-CM

## 2021-02-18 DIAGNOSIS — Z98.890 HISTORY OF ANKLE SURGERY: ICD-10-CM

## 2021-02-18 DIAGNOSIS — E11.621 DIABETIC ULCER OF TOE OF LEFT FOOT ASSOCIATED WITH TYPE 2 DIABETES MELLITUS, WITH FAT LAYER EXPOSED: ICD-10-CM

## 2021-02-18 DIAGNOSIS — M79.672 PAIN IN LEFT FOOT: ICD-10-CM

## 2021-02-18 DIAGNOSIS — L97.522 DIABETIC ULCER OF TOE OF LEFT FOOT ASSOCIATED WITH TYPE 2 DIABETES MELLITUS, WITH FAT LAYER EXPOSED: ICD-10-CM

## 2021-02-18 DIAGNOSIS — T84.84XA PAINFUL ORTHOPAEDIC HARDWARE: ICD-10-CM

## 2021-02-18 DIAGNOSIS — Z09 POSTOP CHECK: Primary | ICD-10-CM

## 2021-02-18 DIAGNOSIS — Z98.890 HISTORY OF FOOT SURGERY: ICD-10-CM

## 2021-02-18 DIAGNOSIS — E11.49 TYPE II DIABETES MELLITUS WITH NEUROLOGICAL MANIFESTATIONS: ICD-10-CM

## 2021-02-18 PROCEDURE — 3008F BODY MASS INDEX DOCD: CPT | Mod: CPTII,S$GLB,, | Performed by: PODIATRIST

## 2021-02-18 PROCEDURE — 99024 POSTOP FOLLOW-UP VISIT: CPT | Mod: S$GLB,,, | Performed by: PODIATRIST

## 2021-02-18 PROCEDURE — 87077 CULTURE AEROBIC IDENTIFY: CPT

## 2021-02-18 PROCEDURE — 3288F PR FALLS RISK ASSESSMENT DOCUMENTED: ICD-10-PCS | Mod: CPTII,S$GLB,, | Performed by: PODIATRIST

## 2021-02-18 PROCEDURE — 99999 PR PBB SHADOW E&M-EST. PATIENT-LVL V: CPT | Mod: PBBFAC,,, | Performed by: PODIATRIST

## 2021-02-18 PROCEDURE — 73630 XR FOOT COMPLETE 3 VIEW LEFT: ICD-10-PCS | Mod: 26,LT,, | Performed by: RADIOLOGY

## 2021-02-18 PROCEDURE — 87186 SC STD MICRODIL/AGAR DIL: CPT

## 2021-02-18 PROCEDURE — 3288F FALL RISK ASSESSMENT DOCD: CPT | Mod: CPTII,S$GLB,, | Performed by: PODIATRIST

## 2021-02-18 PROCEDURE — 3008F PR BODY MASS INDEX (BMI) DOCUMENTED: ICD-10-PCS | Mod: CPTII,S$GLB,, | Performed by: PODIATRIST

## 2021-02-18 PROCEDURE — 73630 X-RAY EXAM OF FOOT: CPT | Mod: TC,LT

## 2021-02-18 PROCEDURE — 73630 X-RAY EXAM OF FOOT: CPT | Mod: 26,LT,, | Performed by: RADIOLOGY

## 2021-02-18 PROCEDURE — 1101F PR PT FALLS ASSESS DOC 0-1 FALLS W/OUT INJ PAST YR: ICD-10-PCS | Mod: CPTII,S$GLB,, | Performed by: PODIATRIST

## 2021-02-18 PROCEDURE — 99024 PR POST-OP FOLLOW-UP VISIT: ICD-10-PCS | Mod: S$GLB,,, | Performed by: PODIATRIST

## 2021-02-18 PROCEDURE — 99999 PR PBB SHADOW E&M-EST. PATIENT-LVL V: ICD-10-PCS | Mod: PBBFAC,,, | Performed by: PODIATRIST

## 2021-02-18 PROCEDURE — 1125F AMNT PAIN NOTED PAIN PRSNT: CPT | Mod: S$GLB,,, | Performed by: PODIATRIST

## 2021-02-18 PROCEDURE — 87070 CULTURE OTHR SPECIMN AEROBIC: CPT

## 2021-02-18 PROCEDURE — 1125F PR PAIN SEVERITY QUANTIFIED, PAIN PRESENT: ICD-10-PCS | Mod: S$GLB,,, | Performed by: PODIATRIST

## 2021-02-18 PROCEDURE — 1101F PT FALLS ASSESS-DOCD LE1/YR: CPT | Mod: CPTII,S$GLB,, | Performed by: PODIATRIST

## 2021-02-18 RX ORDER — DOXYCYCLINE 100 MG/1
100 CAPSULE ORAL EVERY 12 HOURS
Qty: 14 CAPSULE | Refills: 0 | Status: SHIPPED | OUTPATIENT
Start: 2021-02-18 | End: 2021-02-25

## 2021-02-22 DIAGNOSIS — M79.672 PAIN IN LEFT FOOT: Primary | ICD-10-CM

## 2021-02-22 LAB — BACTERIA SPEC AEROBE CULT: ABNORMAL

## 2021-02-22 RX ORDER — AMOXICILLIN AND CLAVULANATE POTASSIUM 875; 125 MG/1; MG/1
1 TABLET, FILM COATED ORAL EVERY 12 HOURS
Qty: 14 TABLET | Refills: 0 | Status: SHIPPED | OUTPATIENT
Start: 2021-02-22 | End: 2021-03-01

## 2021-02-23 PROCEDURE — G0180 PR HOME HEALTH MD CERTIFICATION: ICD-10-PCS | Mod: ,,, | Performed by: PODIATRIST

## 2021-02-23 PROCEDURE — G0180 MD CERTIFICATION HHA PATIENT: HCPCS | Mod: ,,, | Performed by: PODIATRIST

## 2021-02-24 ENCOUNTER — PATIENT MESSAGE (OUTPATIENT)
Dept: PODIATRY | Facility: CLINIC | Age: 66
End: 2021-02-24

## 2021-03-02 ENCOUNTER — TELEPHONE (OUTPATIENT)
Dept: PODIATRY | Facility: CLINIC | Age: 66
End: 2021-03-02

## 2021-03-02 RX ORDER — ALBUTEROL SULFATE 90 UG/1
2 AEROSOL, METERED RESPIRATORY (INHALATION) EVERY 6 HOURS PRN
Qty: 18 G | Refills: 2 | Status: SHIPPED | OUTPATIENT
Start: 2021-03-02 | End: 2021-06-28

## 2021-03-03 ENCOUNTER — EXTERNAL HOME HEALTH (OUTPATIENT)
Dept: HOME HEALTH SERVICES | Facility: HOSPITAL | Age: 66
End: 2021-03-03
Payer: MEDICARE

## 2021-03-04 ENCOUNTER — OFFICE VISIT (OUTPATIENT)
Dept: PODIATRY | Facility: CLINIC | Age: 66
End: 2021-03-04
Payer: MEDICARE

## 2021-03-04 VITALS — HEIGHT: 69 IN | BODY MASS INDEX: 29.27 KG/M2

## 2021-03-04 DIAGNOSIS — E11.621 DIABETIC ULCER OF TOE OF LEFT FOOT ASSOCIATED WITH TYPE 2 DIABETES MELLITUS, WITH FAT LAYER EXPOSED: ICD-10-CM

## 2021-03-04 DIAGNOSIS — Z98.890 HISTORY OF FOOT SURGERY: ICD-10-CM

## 2021-03-04 DIAGNOSIS — L97.522 DIABETIC ULCER OF TOE OF LEFT FOOT ASSOCIATED WITH TYPE 2 DIABETES MELLITUS, WITH FAT LAYER EXPOSED: ICD-10-CM

## 2021-03-04 DIAGNOSIS — M79.672 PAIN IN LEFT FOOT: ICD-10-CM

## 2021-03-04 DIAGNOSIS — Z09 POSTOP CHECK: Primary | ICD-10-CM

## 2021-03-04 DIAGNOSIS — T84.84XA PAINFUL ORTHOPAEDIC HARDWARE: ICD-10-CM

## 2021-03-04 DIAGNOSIS — Z98.890 HISTORY OF ANKLE SURGERY: ICD-10-CM

## 2021-03-04 PROCEDURE — 99999 PR PBB SHADOW E&M-EST. PATIENT-LVL IV: ICD-10-PCS | Mod: PBBFAC,,, | Performed by: PODIATRIST

## 2021-03-04 PROCEDURE — 1101F PR PT FALLS ASSESS DOC 0-1 FALLS W/OUT INJ PAST YR: ICD-10-PCS | Mod: CPTII,S$GLB,, | Performed by: PODIATRIST

## 2021-03-04 PROCEDURE — 99024 POSTOP FOLLOW-UP VISIT: CPT | Mod: S$GLB,,, | Performed by: PODIATRIST

## 2021-03-04 PROCEDURE — 3288F FALL RISK ASSESSMENT DOCD: CPT | Mod: CPTII,S$GLB,, | Performed by: PODIATRIST

## 2021-03-04 PROCEDURE — 1101F PT FALLS ASSESS-DOCD LE1/YR: CPT | Mod: CPTII,S$GLB,, | Performed by: PODIATRIST

## 2021-03-04 PROCEDURE — 99024 PR POST-OP FOLLOW-UP VISIT: ICD-10-PCS | Mod: S$GLB,,, | Performed by: PODIATRIST

## 2021-03-04 PROCEDURE — 99999 PR PBB SHADOW E&M-EST. PATIENT-LVL IV: CPT | Mod: PBBFAC,,, | Performed by: PODIATRIST

## 2021-03-04 PROCEDURE — 3288F PR FALLS RISK ASSESSMENT DOCUMENTED: ICD-10-PCS | Mod: CPTII,S$GLB,, | Performed by: PODIATRIST

## 2021-03-04 PROCEDURE — 3008F PR BODY MASS INDEX (BMI) DOCUMENTED: ICD-10-PCS | Mod: CPTII,S$GLB,, | Performed by: PODIATRIST

## 2021-03-04 PROCEDURE — 1126F AMNT PAIN NOTED NONE PRSNT: CPT | Mod: S$GLB,,, | Performed by: PODIATRIST

## 2021-03-04 PROCEDURE — 1126F PR PAIN SEVERITY QUANTIFIED, NO PAIN PRESENT: ICD-10-PCS | Mod: S$GLB,,, | Performed by: PODIATRIST

## 2021-03-04 PROCEDURE — 3008F BODY MASS INDEX DOCD: CPT | Mod: CPTII,S$GLB,, | Performed by: PODIATRIST

## 2021-03-04 RX ORDER — HYDROCODONE BITARTRATE AND ACETAMINOPHEN 5; 325 MG/1; MG/1
1 TABLET ORAL EVERY 6 HOURS PRN
Qty: 28 TABLET | Refills: 0 | Status: SHIPPED | OUTPATIENT
Start: 2021-03-04 | End: 2021-03-11

## 2021-03-08 ENCOUNTER — DOCUMENT SCAN (OUTPATIENT)
Dept: HOME HEALTH SERVICES | Facility: HOSPITAL | Age: 66
End: 2021-03-08
Payer: MEDICARE

## 2021-03-18 ENCOUNTER — OFFICE VISIT (OUTPATIENT)
Dept: PODIATRY | Facility: CLINIC | Age: 66
End: 2021-03-18
Payer: MEDICARE

## 2021-03-18 VITALS — BODY MASS INDEX: 29.36 KG/M2 | WEIGHT: 198.19 LBS | HEIGHT: 69 IN

## 2021-03-18 DIAGNOSIS — E11.49 TYPE II DIABETES MELLITUS WITH NEUROLOGICAL MANIFESTATIONS: ICD-10-CM

## 2021-03-18 DIAGNOSIS — S82.54XK CLOSED NONDISPLACED FRACTURE OF MEDIAL MALLEOLUS OF RIGHT TIBIA WITH NONUNION, SUBSEQUENT ENCOUNTER: ICD-10-CM

## 2021-03-18 DIAGNOSIS — M14.672 CHARCOT ANKLE, LEFT: ICD-10-CM

## 2021-03-18 DIAGNOSIS — Z98.890 HISTORY OF FOOT SURGERY: ICD-10-CM

## 2021-03-18 DIAGNOSIS — Z98.890 HISTORY OF ANKLE SURGERY: ICD-10-CM

## 2021-03-18 DIAGNOSIS — L97.522 DIABETIC ULCER OF TOE OF LEFT FOOT ASSOCIATED WITH TYPE 2 DIABETES MELLITUS, WITH FAT LAYER EXPOSED: ICD-10-CM

## 2021-03-18 DIAGNOSIS — M14.672 CHARCOT'S JOINT OF ANKLE, LEFT: ICD-10-CM

## 2021-03-18 DIAGNOSIS — Z09 POSTOP CHECK: Primary | ICD-10-CM

## 2021-03-18 DIAGNOSIS — T84.84XA PAINFUL ORTHOPAEDIC HARDWARE: ICD-10-CM

## 2021-03-18 DIAGNOSIS — E11.621 DIABETIC ULCER OF TOE OF LEFT FOOT ASSOCIATED WITH TYPE 2 DIABETES MELLITUS, WITH FAT LAYER EXPOSED: ICD-10-CM

## 2021-03-18 PROCEDURE — 1125F AMNT PAIN NOTED PAIN PRSNT: CPT | Mod: S$GLB,,, | Performed by: PODIATRIST

## 2021-03-18 PROCEDURE — 3008F BODY MASS INDEX DOCD: CPT | Mod: CPTII,S$GLB,, | Performed by: PODIATRIST

## 2021-03-18 PROCEDURE — 99999 PR PBB SHADOW E&M-EST. PATIENT-LVL III: CPT | Mod: PBBFAC,,, | Performed by: PODIATRIST

## 2021-03-18 PROCEDURE — 3044F PR MOST RECENT HEMOGLOBIN A1C LEVEL <7.0%: ICD-10-PCS | Mod: CPTII,S$GLB,, | Performed by: PODIATRIST

## 2021-03-18 PROCEDURE — 3288F PR FALLS RISK ASSESSMENT DOCUMENTED: ICD-10-PCS | Mod: CPTII,S$GLB,, | Performed by: PODIATRIST

## 2021-03-18 PROCEDURE — 1101F PT FALLS ASSESS-DOCD LE1/YR: CPT | Mod: CPTII,S$GLB,, | Performed by: PODIATRIST

## 2021-03-18 PROCEDURE — 3044F HG A1C LEVEL LT 7.0%: CPT | Mod: CPTII,S$GLB,, | Performed by: PODIATRIST

## 2021-03-18 PROCEDURE — 1159F PR MEDICATION LIST DOCUMENTED IN MEDICAL RECORD: ICD-10-PCS | Mod: S$GLB,,, | Performed by: PODIATRIST

## 2021-03-18 PROCEDURE — 3288F FALL RISK ASSESSMENT DOCD: CPT | Mod: CPTII,S$GLB,, | Performed by: PODIATRIST

## 2021-03-18 PROCEDURE — 3008F PR BODY MASS INDEX (BMI) DOCUMENTED: ICD-10-PCS | Mod: CPTII,S$GLB,, | Performed by: PODIATRIST

## 2021-03-18 PROCEDURE — 1125F PR PAIN SEVERITY QUANTIFIED, PAIN PRESENT: ICD-10-PCS | Mod: S$GLB,,, | Performed by: PODIATRIST

## 2021-03-18 PROCEDURE — 1101F PR PT FALLS ASSESS DOC 0-1 FALLS W/OUT INJ PAST YR: ICD-10-PCS | Mod: CPTII,S$GLB,, | Performed by: PODIATRIST

## 2021-03-18 PROCEDURE — 1159F MED LIST DOCD IN RCRD: CPT | Mod: S$GLB,,, | Performed by: PODIATRIST

## 2021-03-18 PROCEDURE — 99214 OFFICE O/P EST MOD 30 MIN: CPT | Mod: 24,S$GLB,, | Performed by: PODIATRIST

## 2021-03-18 PROCEDURE — 99999 PR PBB SHADOW E&M-EST. PATIENT-LVL III: ICD-10-PCS | Mod: PBBFAC,,, | Performed by: PODIATRIST

## 2021-03-18 PROCEDURE — 99214 PR OFFICE/OUTPT VISIT, EST, LEVL IV, 30-39 MIN: ICD-10-PCS | Mod: 24,S$GLB,, | Performed by: PODIATRIST

## 2021-03-22 ENCOUNTER — TELEPHONE (OUTPATIENT)
Dept: PULMONOLOGY | Facility: CLINIC | Age: 66
End: 2021-03-22

## 2021-03-25 ENCOUNTER — PATIENT OUTREACH (OUTPATIENT)
Dept: ADMINISTRATIVE | Facility: OTHER | Age: 66
End: 2021-03-25

## 2021-03-25 DIAGNOSIS — Z12.31 ENCOUNTER FOR SCREENING MAMMOGRAM FOR MALIGNANT NEOPLASM OF BREAST: Primary | ICD-10-CM

## 2021-03-31 ENCOUNTER — PATIENT MESSAGE (OUTPATIENT)
Dept: PODIATRY | Facility: CLINIC | Age: 66
End: 2021-03-31

## 2021-04-08 ENCOUNTER — HOSPITAL ENCOUNTER (OUTPATIENT)
Dept: RADIOLOGY | Facility: HOSPITAL | Age: 66
Discharge: HOME | End: 2021-04-08
Attending: PODIATRIST
Payer: MEDICARE

## 2021-04-08 ENCOUNTER — OFFICE VISIT (OUTPATIENT)
Dept: PODIATRY | Facility: CLINIC | Age: 66
End: 2021-04-08
Payer: MEDICARE

## 2021-04-08 VITALS — HEIGHT: 69 IN | WEIGHT: 198.19 LBS | BODY MASS INDEX: 29.36 KG/M2

## 2021-04-08 DIAGNOSIS — M14.672 CHARCOT'S JOINT OF ANKLE, LEFT: ICD-10-CM

## 2021-04-08 DIAGNOSIS — M14.672 CHARCOT'S JOINT OF FOOT, LEFT: ICD-10-CM

## 2021-04-08 DIAGNOSIS — E11.49 TYPE II DIABETES MELLITUS WITH NEUROLOGICAL MANIFESTATIONS: ICD-10-CM

## 2021-04-08 DIAGNOSIS — T84.84XA PAINFUL ORTHOPAEDIC HARDWARE: Primary | ICD-10-CM

## 2021-04-08 PROCEDURE — 3288F PR FALLS RISK ASSESSMENT DOCUMENTED: ICD-10-PCS | Mod: CPTII,S$GLB,, | Performed by: PODIATRIST

## 2021-04-08 PROCEDURE — 99999 PR PBB SHADOW E&M-EST. PATIENT-LVL IV: CPT | Mod: PBBFAC,,, | Performed by: PODIATRIST

## 2021-04-08 PROCEDURE — 1125F AMNT PAIN NOTED PAIN PRSNT: CPT | Mod: S$GLB,,, | Performed by: PODIATRIST

## 2021-04-08 PROCEDURE — 1101F PT FALLS ASSESS-DOCD LE1/YR: CPT | Mod: CPTII,S$GLB,, | Performed by: PODIATRIST

## 2021-04-08 PROCEDURE — 73610 XR ANKLE COMPLETE 3 VIEW LEFT: ICD-10-PCS | Mod: 26,LT,, | Performed by: RADIOLOGY

## 2021-04-08 PROCEDURE — 99024 PR POST-OP FOLLOW-UP VISIT: ICD-10-PCS | Mod: S$GLB,,, | Performed by: PODIATRIST

## 2021-04-08 PROCEDURE — 73610 X-RAY EXAM OF ANKLE: CPT | Mod: 26,LT,, | Performed by: RADIOLOGY

## 2021-04-08 PROCEDURE — 99999 PR PBB SHADOW E&M-EST. PATIENT-LVL IV: ICD-10-PCS | Mod: PBBFAC,,, | Performed by: PODIATRIST

## 2021-04-08 PROCEDURE — 3008F PR BODY MASS INDEX (BMI) DOCUMENTED: ICD-10-PCS | Mod: CPTII,S$GLB,, | Performed by: PODIATRIST

## 2021-04-08 PROCEDURE — 73610 X-RAY EXAM OF ANKLE: CPT | Mod: TC,LT

## 2021-04-08 PROCEDURE — 3288F FALL RISK ASSESSMENT DOCD: CPT | Mod: CPTII,S$GLB,, | Performed by: PODIATRIST

## 2021-04-08 PROCEDURE — 99024 POSTOP FOLLOW-UP VISIT: CPT | Mod: S$GLB,,, | Performed by: PODIATRIST

## 2021-04-08 PROCEDURE — 1125F PR PAIN SEVERITY QUANTIFIED, PAIN PRESENT: ICD-10-PCS | Mod: S$GLB,,, | Performed by: PODIATRIST

## 2021-04-08 PROCEDURE — 1159F PR MEDICATION LIST DOCUMENTED IN MEDICAL RECORD: ICD-10-PCS | Mod: S$GLB,,, | Performed by: PODIATRIST

## 2021-04-08 PROCEDURE — 3008F BODY MASS INDEX DOCD: CPT | Mod: CPTII,S$GLB,, | Performed by: PODIATRIST

## 2021-04-08 PROCEDURE — 1101F PR PT FALLS ASSESS DOC 0-1 FALLS W/OUT INJ PAST YR: ICD-10-PCS | Mod: CPTII,S$GLB,, | Performed by: PODIATRIST

## 2021-04-08 PROCEDURE — 1159F MED LIST DOCD IN RCRD: CPT | Mod: S$GLB,,, | Performed by: PODIATRIST

## 2021-04-08 RX ORDER — HYDROCODONE BITARTRATE AND ACETAMINOPHEN 5; 325 MG/1; MG/1
1 TABLET ORAL EVERY 6 HOURS PRN
Qty: 28 TABLET | Refills: 0 | Status: SHIPPED | OUTPATIENT
Start: 2021-04-08 | End: 2021-04-15

## 2021-04-09 ENCOUNTER — HOSPITAL ENCOUNTER (OUTPATIENT)
Dept: RADIOLOGY | Facility: HOSPITAL | Age: 66
Discharge: HOME | End: 2021-04-09
Attending: PODIATRIST
Payer: MEDICARE

## 2021-04-09 DIAGNOSIS — M14.672 CHARCOT'S JOINT OF ANKLE, LEFT: ICD-10-CM

## 2021-04-09 PROCEDURE — 73630 X-RAY EXAM OF FOOT: CPT | Mod: 26,LT,, | Performed by: RADIOLOGY

## 2021-04-09 PROCEDURE — 73630 X-RAY EXAM OF FOOT: CPT | Mod: TC,PO,LT

## 2021-04-09 PROCEDURE — 73630 XR FOOT COMPLETE 3 VIEW LEFT: ICD-10-PCS | Mod: 26,LT,, | Performed by: RADIOLOGY

## 2021-04-09 RX ORDER — GLIMEPIRIDE 4 MG/1
4 TABLET ORAL
Qty: 90 TABLET | Refills: 4 | Status: SHIPPED | OUTPATIENT
Start: 2021-04-09 | End: 2022-05-03

## 2021-04-09 RX ORDER — LISINOPRIL AND HYDROCHLOROTHIAZIDE 20; 25 MG/1; MG/1
1 TABLET ORAL DAILY
Qty: 90 TABLET | Refills: 0 | Status: SHIPPED | OUTPATIENT
Start: 2021-04-09 | End: 2021-06-28

## 2021-04-09 RX ORDER — GABAPENTIN 100 MG/1
100 CAPSULE ORAL 3 TIMES DAILY
Qty: 90 CAPSULE | Refills: 11 | Status: SHIPPED | OUTPATIENT
Start: 2021-04-09 | End: 2022-05-03

## 2021-04-09 RX ORDER — DULAGLUTIDE 1.5 MG/.5ML
INJECTION, SOLUTION SUBCUTANEOUS
Qty: 12 ML | Refills: 0 | Status: SHIPPED | OUTPATIENT
Start: 2021-04-09 | End: 2021-06-28

## 2021-04-09 RX ORDER — TRAZODONE HYDROCHLORIDE 150 MG/1
150 TABLET ORAL NIGHTLY PRN
Qty: 90 TABLET | Refills: 0 | Status: SHIPPED | OUTPATIENT
Start: 2021-04-09 | End: 2021-06-28

## 2021-04-09 RX ORDER — INSULIN GLARGINE 300 U/ML
50 INJECTION, SOLUTION SUBCUTANEOUS 2 TIMES DAILY
Qty: 10 ML | Refills: 3 | Status: SHIPPED | OUTPATIENT
Start: 2021-04-09 | End: 2022-05-03

## 2021-04-09 RX ORDER — LANOLIN ALCOHOL/MO/W.PET/CERES
500 CREAM (GRAM) TOPICAL NIGHTLY
Qty: 30 TABLET | Refills: 1 | Status: SHIPPED | OUTPATIENT
Start: 2021-04-09 | End: 2021-08-02

## 2021-04-09 RX ORDER — ATORVASTATIN CALCIUM 40 MG/1
40 TABLET, FILM COATED ORAL DAILY
Qty: 90 TABLET | Refills: 0 | Status: SHIPPED | OUTPATIENT
Start: 2021-04-09 | End: 2021-06-28

## 2021-04-12 ENCOUNTER — PATIENT MESSAGE (OUTPATIENT)
Dept: PODIATRY | Facility: CLINIC | Age: 66
End: 2021-04-12

## 2021-04-12 DIAGNOSIS — Z01.818 PREOP EXAMINATION: Primary | ICD-10-CM

## 2021-04-12 DIAGNOSIS — Z98.1 H/O SURGICAL FUSION JOINT: ICD-10-CM

## 2021-04-12 DIAGNOSIS — M14.672 CHARCOT'S JOINT OF ANKLE, LEFT: ICD-10-CM

## 2021-04-12 DIAGNOSIS — M14.672 CHARCOT'S JOINT OF FOOT, LEFT: ICD-10-CM

## 2021-04-15 ENCOUNTER — OFFICE VISIT (OUTPATIENT)
Dept: OPHTHALMOLOGY | Facility: CLINIC | Age: 66
End: 2021-04-15
Payer: MEDICARE

## 2021-04-15 DIAGNOSIS — E11.3513 PROLIFERATIVE DIABETIC RETINOPATHY OF BOTH EYES WITH MACULAR EDEMA ASSOCIATED WITH TYPE 2 DIABETES MELLITUS: Primary | ICD-10-CM

## 2021-04-15 DIAGNOSIS — Z96.1 PSEUDOPHAKIA OF BOTH EYES: ICD-10-CM

## 2021-04-15 PROCEDURE — 3044F PR MOST RECENT HEMOGLOBIN A1C LEVEL <7.0%: ICD-10-PCS | Mod: CPTII,S$GLB,, | Performed by: OPHTHALMOLOGY

## 2021-04-15 PROCEDURE — 92134 POSTERIOR SEGMENT OCT RETINA (OCULAR COHERENCE TOMOGRAPHY)-BOTH EYES: ICD-10-PCS | Mod: S$GLB,,, | Performed by: OPHTHALMOLOGY

## 2021-04-15 PROCEDURE — 99212 OFFICE O/P EST SF 10 MIN: CPT | Mod: S$GLB,,, | Performed by: OPHTHALMOLOGY

## 2021-04-15 PROCEDURE — 99212 PR OFFICE/OUTPT VISIT, EST, LEVL II, 10-19 MIN: ICD-10-PCS | Mod: S$GLB,,, | Performed by: OPHTHALMOLOGY

## 2021-04-15 PROCEDURE — 1159F MED LIST DOCD IN RCRD: CPT | Mod: S$GLB,,, | Performed by: OPHTHALMOLOGY

## 2021-04-15 PROCEDURE — 1159F PR MEDICATION LIST DOCUMENTED IN MEDICAL RECORD: ICD-10-PCS | Mod: S$GLB,,, | Performed by: OPHTHALMOLOGY

## 2021-04-15 PROCEDURE — 99999 PR PBB SHADOW E&M-EST. PATIENT-LVL III: CPT | Mod: PBBFAC,,, | Performed by: OPHTHALMOLOGY

## 2021-04-15 PROCEDURE — 99999 PR PBB SHADOW E&M-EST. PATIENT-LVL III: ICD-10-PCS | Mod: PBBFAC,,, | Performed by: OPHTHALMOLOGY

## 2021-04-15 PROCEDURE — 3044F HG A1C LEVEL LT 7.0%: CPT | Mod: CPTII,S$GLB,, | Performed by: OPHTHALMOLOGY

## 2021-04-15 PROCEDURE — 92134 CPTRZ OPH DX IMG PST SGM RTA: CPT | Mod: S$GLB,,, | Performed by: OPHTHALMOLOGY

## 2021-04-21 ENCOUNTER — HOSPITAL ENCOUNTER (OUTPATIENT)
Dept: RADIOLOGY | Facility: HOSPITAL | Age: 66
Discharge: HOME | End: 2021-04-21
Attending: PODIATRIST
Payer: MEDICARE

## 2021-04-21 DIAGNOSIS — Z98.1 H/O SURGICAL FUSION JOINT: ICD-10-CM

## 2021-04-21 DIAGNOSIS — M14.672 CHARCOT'S JOINT OF ANKLE, LEFT: ICD-10-CM

## 2021-04-21 DIAGNOSIS — M14.672 CHARCOT'S JOINT OF FOOT, LEFT: ICD-10-CM

## 2021-04-21 PROCEDURE — 73700 CT LOWER EXTREMITY W/O DYE: CPT | Mod: TC,LT

## 2021-04-23 ENCOUNTER — OFFICE VISIT (OUTPATIENT)
Dept: OPHTHALMOLOGY | Facility: CLINIC | Age: 66
End: 2021-04-23
Payer: MEDICARE

## 2021-04-23 DIAGNOSIS — I10 ESSENTIAL HYPERTENSION: ICD-10-CM

## 2021-04-23 DIAGNOSIS — H40.013 OPEN ANGLE WITH BORDERLINE FINDINGS, LOW RISK, BILATERAL: ICD-10-CM

## 2021-04-23 DIAGNOSIS — E11.3513 PROLIFERATIVE DIABETIC RETINOPATHY OF BOTH EYES WITH MACULAR EDEMA ASSOCIATED WITH TYPE 2 DIABETES MELLITUS: Primary | ICD-10-CM

## 2021-04-23 DIAGNOSIS — Z96.1 PSEUDOPHAKIA OF BOTH EYES: ICD-10-CM

## 2021-04-23 PROCEDURE — 3044F HG A1C LEVEL LT 7.0%: CPT | Mod: CPTII,S$GLB,, | Performed by: OPHTHALMOLOGY

## 2021-04-23 PROCEDURE — 1126F AMNT PAIN NOTED NONE PRSNT: CPT | Mod: S$GLB,,, | Performed by: OPHTHALMOLOGY

## 2021-04-23 PROCEDURE — 1126F PR PAIN SEVERITY QUANTIFIED, NO PAIN PRESENT: ICD-10-PCS | Mod: S$GLB,,, | Performed by: OPHTHALMOLOGY

## 2021-04-23 PROCEDURE — 99999 PR PBB SHADOW E&M-EST. PATIENT-LVL IV: ICD-10-PCS | Mod: PBBFAC,,, | Performed by: OPHTHALMOLOGY

## 2021-04-23 PROCEDURE — 92235 FLUORESCEIN ANGRPH MLTIFRAME: CPT | Mod: S$GLB,,, | Performed by: OPHTHALMOLOGY

## 2021-04-23 PROCEDURE — 99999 PR PBB SHADOW E&M-EST. PATIENT-LVL IV: CPT | Mod: PBBFAC,,, | Performed by: OPHTHALMOLOGY

## 2021-04-23 PROCEDURE — 3044F PR MOST RECENT HEMOGLOBIN A1C LEVEL <7.0%: ICD-10-PCS | Mod: CPTII,S$GLB,, | Performed by: OPHTHALMOLOGY

## 2021-04-23 PROCEDURE — 92012 PR EYE EXAM, EST PATIENT,INTERMED: ICD-10-PCS | Mod: S$GLB,,, | Performed by: OPHTHALMOLOGY

## 2021-04-23 PROCEDURE — 92012 INTRM OPH EXAM EST PATIENT: CPT | Mod: S$GLB,,, | Performed by: OPHTHALMOLOGY

## 2021-04-23 PROCEDURE — 92235 FLUORESCEIN ANGIOGRAPHY - OU - BOTH EYES: ICD-10-PCS | Mod: S$GLB,,, | Performed by: OPHTHALMOLOGY

## 2021-04-25 ENCOUNTER — PATIENT MESSAGE (OUTPATIENT)
Dept: PODIATRY | Facility: CLINIC | Age: 66
End: 2021-04-25

## 2021-04-27 ENCOUNTER — PATIENT MESSAGE (OUTPATIENT)
Dept: PODIATRY | Facility: CLINIC | Age: 66
End: 2021-04-27

## 2021-04-30 ENCOUNTER — PROCEDURE VISIT (OUTPATIENT)
Dept: OPHTHALMOLOGY | Facility: CLINIC | Age: 66
End: 2021-04-30
Payer: MEDICARE

## 2021-04-30 DIAGNOSIS — E11.3513 PROLIFERATIVE DIABETIC RETINOPATHY OF BOTH EYES WITH MACULAR EDEMA ASSOCIATED WITH TYPE 2 DIABETES MELLITUS: Primary | ICD-10-CM

## 2021-04-30 DIAGNOSIS — E11.9 TYPE 2 DIABETES MELLITUS WITHOUT COMPLICATION: ICD-10-CM

## 2021-04-30 PROCEDURE — 99499 NO LOS: ICD-10-PCS | Mod: S$GLB,,, | Performed by: OPHTHALMOLOGY

## 2021-04-30 PROCEDURE — 67028 PR INJECT INTRAVITREAL PHARMCOLOGIC: ICD-10-PCS | Mod: 50,S$GLB,, | Performed by: OPHTHALMOLOGY

## 2021-04-30 PROCEDURE — 67028 INJECTION EYE DRUG: CPT | Mod: 50,S$GLB,, | Performed by: OPHTHALMOLOGY

## 2021-04-30 PROCEDURE — 99499 UNLISTED E&M SERVICE: CPT | Mod: S$GLB,,, | Performed by: OPHTHALMOLOGY

## 2021-04-30 RX ORDER — FLUOCINOLONE ACETONIDE 0.11 MG/ML
OIL TOPICAL
COMMUNITY
Start: 2021-03-14

## 2021-04-30 RX ADMIN — Medication 1.25 MG: at 08:04

## 2021-05-10 ENCOUNTER — PATIENT MESSAGE (OUTPATIENT)
Dept: PODIATRY | Facility: CLINIC | Age: 66
End: 2021-05-10

## 2021-05-20 ENCOUNTER — PATIENT OUTREACH (OUTPATIENT)
Dept: ADMINISTRATIVE | Facility: OTHER | Age: 66
End: 2021-05-20

## 2021-05-20 ENCOUNTER — PATIENT MESSAGE (OUTPATIENT)
Dept: ADMINISTRATIVE | Facility: OTHER | Age: 66
End: 2021-05-20

## 2021-05-21 DIAGNOSIS — T84.84XA PAINFUL ORTHOPAEDIC HARDWARE: ICD-10-CM

## 2021-05-21 DIAGNOSIS — Z01.818 PREOP EXAMINATION: Primary | ICD-10-CM

## 2021-05-21 DIAGNOSIS — M14.672 CHARCOT'S JOINT OF ANKLE, LEFT: ICD-10-CM

## 2021-05-21 DIAGNOSIS — M14.672 CHARCOT'S JOINT OF FOOT, LEFT: ICD-10-CM

## 2021-05-21 DIAGNOSIS — Z98.1 H/O SURGICAL FUSION JOINT: ICD-10-CM

## 2021-05-21 DIAGNOSIS — M96.0 PSEUDARTHROSIS AFTER FUSION OR ARTHRODESIS: ICD-10-CM

## 2021-05-21 RX ORDER — HYDROCODONE BITARTRATE AND ACETAMINOPHEN 5; 325 MG/1; MG/1
1 TABLET ORAL EVERY 6 HOURS PRN
Qty: 28 TABLET | Refills: 0 | Status: SHIPPED | OUTPATIENT
Start: 2021-05-21 | End: 2021-05-28

## 2021-06-02 ENCOUNTER — PROCEDURE VISIT (OUTPATIENT)
Dept: OPHTHALMOLOGY | Facility: CLINIC | Age: 66
End: 2021-06-02
Payer: MEDICARE

## 2021-06-02 DIAGNOSIS — E11.3513 PROLIFERATIVE DIABETIC RETINOPATHY OF BOTH EYES WITH MACULAR EDEMA ASSOCIATED WITH TYPE 2 DIABETES MELLITUS: Primary | ICD-10-CM

## 2021-06-02 PROCEDURE — 67028 INJECTION EYE DRUG: CPT | Mod: 50,S$GLB,, | Performed by: OPHTHALMOLOGY

## 2021-06-02 PROCEDURE — 67028 PR INJECT INTRAVITREAL PHARMCOLOGIC: ICD-10-PCS | Mod: 50,S$GLB,, | Performed by: OPHTHALMOLOGY

## 2021-06-02 PROCEDURE — 99499 UNLISTED E&M SERVICE: CPT | Mod: S$GLB,,, | Performed by: OPHTHALMOLOGY

## 2021-06-02 PROCEDURE — 99499 RISK ADDL DX/OHS AUDIT: ICD-10-PCS | Mod: S$GLB,,, | Performed by: OPHTHALMOLOGY

## 2021-06-02 PROCEDURE — 92134 CPTRZ OPH DX IMG PST SGM RTA: CPT | Mod: S$GLB,,, | Performed by: OPHTHALMOLOGY

## 2021-06-02 PROCEDURE — 92134 POSTERIOR SEGMENT OCT RETINA (OCULAR COHERENCE TOMOGRAPHY)-BOTH EYES: ICD-10-PCS | Mod: S$GLB,,, | Performed by: OPHTHALMOLOGY

## 2021-06-02 RX ADMIN — Medication 1.25 MG: at 08:06

## 2021-06-08 ENCOUNTER — PATIENT MESSAGE (OUTPATIENT)
Dept: PODIATRY | Facility: CLINIC | Age: 66
End: 2021-06-08

## 2021-06-08 DIAGNOSIS — M96.0 PSEUDARTHROSIS AFTER FUSION OR ARTHRODESIS: Primary | ICD-10-CM

## 2021-06-08 DIAGNOSIS — E11.49 TYPE II DIABETES MELLITUS WITH NEUROLOGICAL MANIFESTATIONS: ICD-10-CM

## 2021-06-08 DIAGNOSIS — Z98.1 H/O SURGICAL FUSION JOINT: ICD-10-CM

## 2021-06-08 DIAGNOSIS — M14.672 CHARCOT'S JOINT OF FOOT, LEFT: ICD-10-CM

## 2021-06-08 DIAGNOSIS — T84.84XA PAINFUL ORTHOPAEDIC HARDWARE: ICD-10-CM

## 2021-06-08 DIAGNOSIS — M14.672 CHARCOT'S JOINT OF ANKLE, LEFT: ICD-10-CM

## 2021-06-27 DIAGNOSIS — L21.9 SEBORRHEIC DERMATITIS: ICD-10-CM

## 2021-06-28 RX ORDER — CLOBETASOL PROPIONATE 0.46 MG/ML
SOLUTION TOPICAL 2 TIMES DAILY
Qty: 50 ML | Status: SHIPPED | OUTPATIENT
Start: 2021-06-28 | End: 2022-02-01

## 2021-06-28 RX ORDER — DULAGLUTIDE 1.5 MG/.5ML
INJECTION, SOLUTION SUBCUTANEOUS
Qty: 1 PEN | Status: SHIPPED | OUTPATIENT
Start: 2021-06-28 | End: 2022-07-30

## 2021-06-28 RX ORDER — OMEPRAZOLE 20 MG/1
CAPSULE, DELAYED RELEASE ORAL
Qty: 60 CAPSULE | Status: SHIPPED | OUTPATIENT
Start: 2021-06-28 | End: 2022-01-19 | Stop reason: SDUPTHER

## 2021-06-28 RX ORDER — TRAZODONE HYDROCHLORIDE 150 MG/1
TABLET ORAL
Qty: 90 TABLET | Status: SHIPPED | OUTPATIENT
Start: 2021-06-28 | End: 2022-08-01

## 2021-06-28 RX ORDER — LISINOPRIL AND HYDROCHLOROTHIAZIDE 20; 25 MG/1; MG/1
1 TABLET ORAL DAILY
Qty: 90 TABLET | Status: SHIPPED | OUTPATIENT
Start: 2021-06-28 | End: 2022-08-01

## 2021-06-28 RX ORDER — ATORVASTATIN CALCIUM 40 MG/1
TABLET, FILM COATED ORAL
Qty: 90 TABLET | Status: SHIPPED | OUTPATIENT
Start: 2021-06-28 | End: 2022-07-30

## 2021-06-28 RX ORDER — ALBUTEROL SULFATE 90 UG/1
2 AEROSOL, METERED RESPIRATORY (INHALATION) EVERY 6 HOURS PRN
Qty: 18 G | Status: SHIPPED | OUTPATIENT
Start: 2021-06-28 | End: 2022-07-26

## 2021-07-07 ENCOUNTER — TELEPHONE (OUTPATIENT)
Dept: ADMINISTRATIVE | Facility: HOSPITAL | Age: 66
End: 2021-07-07

## 2021-08-04 ENCOUNTER — PATIENT MESSAGE (OUTPATIENT)
Dept: ADMINISTRATIVE | Facility: HOSPITAL | Age: 66
End: 2021-08-04

## 2021-11-03 ENCOUNTER — PATIENT MESSAGE (OUTPATIENT)
Dept: ADMINISTRATIVE | Facility: HOSPITAL | Age: 66
End: 2021-11-03
Payer: MEDICARE

## 2021-12-08 DIAGNOSIS — E11.9 TYPE 2 DIABETES MELLITUS WITHOUT COMPLICATION: ICD-10-CM

## 2022-01-12 ENCOUNTER — PATIENT OUTREACH (OUTPATIENT)
Dept: ADMINISTRATIVE | Facility: OTHER | Age: 67
End: 2022-01-12
Payer: MEDICARE

## 2022-01-13 NOTE — PROGRESS NOTES
Health Maintenance Due   Topic Date Due    Hepatitis C Screening  Never done    Shingles Vaccine (2 of 3) 01/02/2018    Mammogram  02/21/2020    Foot Exam  10/21/2020    Diabetes Urine Screening  12/10/2020    Hemoglobin A1c  04/21/2021    Influenza Vaccine (1) 09/01/2021    Lipid Panel  10/27/2021    Colorectal Cancer Screening  12/22/2021    COVID-19 Vaccine (3 - Booster for Pfizer series) 01/19/2022     Updates were requested from care everywhere.  Chart was reviewed for overdue Proactive [facility] Encounters ([facility]) topics (CRS, Breast Cancer Screening, Eye exam)  Health Maintenance has been updated.  LINKS immunization registry triggered.  Immunizations were reconciled.

## 2022-01-14 ENCOUNTER — OFFICE VISIT (OUTPATIENT)
Dept: PODIATRY | Facility: CLINIC | Age: 67
End: 2022-01-14
Payer: MEDICARE

## 2022-01-14 ENCOUNTER — HOSPITAL ENCOUNTER (OUTPATIENT)
Dept: RADIOLOGY | Facility: HOSPITAL | Age: 67
Discharge: HOME | End: 2022-01-14
Attending: PODIATRIST
Payer: MEDICARE

## 2022-01-14 VITALS — BODY MASS INDEX: 29.33 KG/M2 | WEIGHT: 198 LBS | HEIGHT: 69 IN

## 2022-01-14 DIAGNOSIS — M19.072 OSTEOARTHRITIS OF LEFT ANKLE OR FOOT: ICD-10-CM

## 2022-01-14 DIAGNOSIS — E11.49 TYPE II DIABETES MELLITUS WITH NEUROLOGICAL MANIFESTATIONS: ICD-10-CM

## 2022-01-14 DIAGNOSIS — T84.84XA PAINFUL ORTHOPAEDIC HARDWARE: ICD-10-CM

## 2022-01-14 DIAGNOSIS — M14.672 CHARCOT'S JOINT OF ANKLE, LEFT: ICD-10-CM

## 2022-01-14 DIAGNOSIS — M14.672 CHARCOT'S JOINT OF FOOT, LEFT: ICD-10-CM

## 2022-01-14 DIAGNOSIS — Z98.1 HISTORY OF SURGICAL FUSION JOINT: Primary | ICD-10-CM

## 2022-01-14 DIAGNOSIS — Z98.890 HISTORY OF FOOT SURGERY: ICD-10-CM

## 2022-01-14 DIAGNOSIS — Z98.890 HISTORY OF ANKLE SURGERY: ICD-10-CM

## 2022-01-14 DIAGNOSIS — M25.572 PAIN IN JOINT INVOLVING LEFT ANKLE AND FOOT: ICD-10-CM

## 2022-01-14 PROCEDURE — 99214 PR OFFICE/OUTPT VISIT, EST, LEVL IV, 30-39 MIN: ICD-10-PCS | Mod: 25,HCNC,S$GLB, | Performed by: PODIATRIST

## 2022-01-14 PROCEDURE — 99499 RISK ADDL DX/OHS AUDIT: ICD-10-PCS | Mod: S$GLB,,, | Performed by: PODIATRIST

## 2022-01-14 PROCEDURE — 73610 X-RAY EXAM OF ANKLE: CPT | Mod: TC,HCNC,LT

## 2022-01-14 PROCEDURE — 99214 OFFICE O/P EST MOD 30 MIN: CPT | Mod: 25,HCNC,S$GLB, | Performed by: PODIATRIST

## 2022-01-14 PROCEDURE — 73610 X-RAY EXAM OF ANKLE: CPT | Mod: 26,HCNC,LT, | Performed by: RADIOLOGY

## 2022-01-14 PROCEDURE — 3288F FALL RISK ASSESSMENT DOCD: CPT | Mod: HCNC,CPTII,S$GLB, | Performed by: PODIATRIST

## 2022-01-14 PROCEDURE — 1101F PR PT FALLS ASSESS DOC 0-1 FALLS W/OUT INJ PAST YR: ICD-10-PCS | Mod: HCNC,CPTII,S$GLB, | Performed by: PODIATRIST

## 2022-01-14 PROCEDURE — 73630 X-RAY EXAM OF FOOT: CPT | Mod: 26,HCNC,LT, | Performed by: RADIOLOGY

## 2022-01-14 PROCEDURE — 1160F RVW MEDS BY RX/DR IN RCRD: CPT | Mod: HCNC,CPTII,S$GLB, | Performed by: PODIATRIST

## 2022-01-14 PROCEDURE — 1159F MED LIST DOCD IN RCRD: CPT | Mod: HCNC,CPTII,S$GLB, | Performed by: PODIATRIST

## 2022-01-14 PROCEDURE — 3008F BODY MASS INDEX DOCD: CPT | Mod: HCNC,CPTII,S$GLB, | Performed by: PODIATRIST

## 2022-01-14 PROCEDURE — 1159F PR MEDICATION LIST DOCUMENTED IN MEDICAL RECORD: ICD-10-PCS | Mod: HCNC,CPTII,S$GLB, | Performed by: PODIATRIST

## 2022-01-14 PROCEDURE — 96372 THER/PROPH/DIAG INJ SC/IM: CPT | Mod: HCNC,S$GLB,, | Performed by: PODIATRIST

## 2022-01-14 PROCEDURE — 1125F AMNT PAIN NOTED PAIN PRSNT: CPT | Mod: HCNC,CPTII,S$GLB, | Performed by: PODIATRIST

## 2022-01-14 PROCEDURE — 1125F PR PAIN SEVERITY QUANTIFIED, PAIN PRESENT: ICD-10-PCS | Mod: HCNC,CPTII,S$GLB, | Performed by: PODIATRIST

## 2022-01-14 PROCEDURE — 1160F PR REVIEW ALL MEDS BY PRESCRIBER/CLIN PHARMACIST DOCUMENTED: ICD-10-PCS | Mod: HCNC,CPTII,S$GLB, | Performed by: PODIATRIST

## 2022-01-14 PROCEDURE — 99499 UNLISTED E&M SERVICE: CPT | Mod: S$GLB,,, | Performed by: PODIATRIST

## 2022-01-14 PROCEDURE — 99999 PR PBB SHADOW E&M-EST. PATIENT-LVL V: ICD-10-PCS | Mod: PBBFAC,HCNC,, | Performed by: PODIATRIST

## 2022-01-14 PROCEDURE — 99999 PR PBB SHADOW E&M-EST. PATIENT-LVL V: CPT | Mod: PBBFAC,HCNC,, | Performed by: PODIATRIST

## 2022-01-14 PROCEDURE — 3008F PR BODY MASS INDEX (BMI) DOCUMENTED: ICD-10-PCS | Mod: HCNC,CPTII,S$GLB, | Performed by: PODIATRIST

## 2022-01-14 PROCEDURE — 73610 XR ANKLE COMPLETE 3 VIEW LEFT: ICD-10-PCS | Mod: 26,HCNC,LT, | Performed by: RADIOLOGY

## 2022-01-14 PROCEDURE — 73630 X-RAY EXAM OF FOOT: CPT | Mod: TC,HCNC,LT

## 2022-01-14 PROCEDURE — 73630 XR FOOT COMPLETE 3 VIEW LEFT: ICD-10-PCS | Mod: 26,HCNC,LT, | Performed by: RADIOLOGY

## 2022-01-14 PROCEDURE — 1101F PT FALLS ASSESS-DOCD LE1/YR: CPT | Mod: HCNC,CPTII,S$GLB, | Performed by: PODIATRIST

## 2022-01-14 PROCEDURE — 3288F PR FALLS RISK ASSESSMENT DOCUMENTED: ICD-10-PCS | Mod: HCNC,CPTII,S$GLB, | Performed by: PODIATRIST

## 2022-01-14 PROCEDURE — 96372 PR INJECTION,THERAP/PROPH/DIAG2ST, IM OR SUBCUT: ICD-10-PCS | Mod: HCNC,S$GLB,, | Performed by: PODIATRIST

## 2022-01-14 RX ORDER — METHYLPREDNISOLONE ACETATE 40 MG/ML
40 INJECTION, SUSPENSION INTRA-ARTICULAR; INTRALESIONAL; INTRAMUSCULAR; SOFT TISSUE
Status: COMPLETED | OUTPATIENT
Start: 2022-01-14 | End: 2022-01-14

## 2022-01-14 RX ORDER — DEXAMETHASONE SODIUM PHOSPHATE 4 MG/ML
4 INJECTION, SOLUTION INTRA-ARTICULAR; INTRALESIONAL; INTRAMUSCULAR; INTRAVENOUS; SOFT TISSUE ONCE
Status: COMPLETED | OUTPATIENT
Start: 2022-01-14 | End: 2022-01-14

## 2022-01-14 RX ADMIN — METHYLPREDNISOLONE ACETATE 40 MG: 40 INJECTION, SUSPENSION INTRA-ARTICULAR; INTRALESIONAL; INTRAMUSCULAR; SOFT TISSUE at 09:01

## 2022-01-14 RX ADMIN — DEXAMETHASONE SODIUM PHOSPHATE 4 MG: 4 INJECTION, SOLUTION INTRA-ARTICULAR; INTRALESIONAL; INTRAMUSCULAR; INTRAVENOUS; SOFT TISSUE at 09:01

## 2022-01-14 NOTE — PROGRESS NOTES
Subjective:       Patient ID: Kay Galarza is a 66 y.o. female.    Chief Complaint: Follow-up (F/u for painful orthopaedic hardware on the left foot, pt states that the pain is at the lateral and medial aspect of the left foot, thick callus skin, possible ulcer sub 1st met head, rates pain 8/10, diabetic, wears surgical shoe, last seen PCP 01/25/21)      HPI:  Kay Galarza presents to the office today, stating persistent pains at the medial aspect of the ankle joint/hindfoot and lateral ankle and STJ area. She did see me here on 4/8/2021 and I did recommend [employer]. She states that she seldom wears the brace due to causing her pains and blisters at some times. Daughter is present this afternoon. States that she feels clicking and knocking of the lateral hindfoot. States antalgia gait pattern. Also states a new callus/ulceration about the plantar 1st MTPJ. Is severely displeased with the shape of her left foot. Ambulatory with surgical shoe.     -s/p 1/27/2021 removal of hardware (medial column bolt) from the LLE.    -s/p 8/18/20 revision of 7/23/20 surgery due to extrusion of the medial cuneiform bone.     -s/p 07/23/2020 LLE ankle ORIF with LLE Charcot Foot reconstruction.         Hemoglobin A1C   Date Value Ref Range Status   01/21/2021 6.4 (H) 4.0 - 5.6 % Final     Comment:     ADA Screening Guidelines:  5.7-6.4%  Consistent with prediabetes  >or=6.5%  Consistent with diabetes  High levels of fetal hemoglobin interfere with the HbA1C  assay. Heterozygous hemoglobin variants (HbS, HgC, etc)do  not significantly interfere with this assay.   However, presence of multiple variants may affect accuracy.     10/27/2020 5.5 4.0 - 5.6 % Final     Comment:     ADA Screening Guidelines:  5.7-6.4%  Consistent with prediabetes  >or=6.5%  Consistent with diabetes  High levels of fetal hemoglobin interfere with the HbA1C  assay. Heterozygous hemoglobin variants (HbS, HgC, etc)do  not  significantly interfere with this assay.   However, presence of multiple variants may affect accuracy.     07/20/2020 9.7 (H) 4.0 - 5.6 % Final     Comment:     ADA Screening Guidelines:  5.7-6.4%  Consistent with prediabetes  >or=6.5%  Consistent with diabetes  High levels of fetal hemoglobin interfere with the HbA1C  assay. Heterozygous hemoglobin variants (HbS, HgC, etc)do  not significantly interfere with this assay.   However, presence of multiple variants may affect accuracy.         Review of patient's allergies indicates:   Allergen Reactions    Pollen extracts      Seasonal allergies, sneezing       Past Medical History:   Diagnosis Date    Arthritis     DERIAN KNEES    Asthma     SEASONAL    Cataract     Diabetes mellitus     Diabetes mellitus, type 2     Diabetic retinopathy     DM (diabetes mellitus) 2007    BS 97 09/29/2020    Hyperlipidemia     Hypertension        Family History   Problem Relation Age of Onset    Cancer Father         Prostate Ca    Hypertension Father     Stroke Father     Diabetes Sister     Glaucoma Mother     Hypertension Mother     Glaucoma Maternal Grandmother     Blindness Neg Hx     Macular degeneration Neg Hx     Retinal detachment Neg Hx     Strabismus Neg Hx        Social History     Socioeconomic History    Marital status:    Tobacco Use    Smoking status: Never Smoker    Smokeless tobacco: Never Used   Substance and Sexual Activity    Alcohol use: Never    Drug use: No    Sexual activity: Yes       Past Surgical History:   Procedure Laterality Date    APPLICATION OF WOUND VACUUM-ASSISTED CLOSURE DEVICE Left 8/18/2020    Procedure: APPLICATION, WOUND VAC;  Surgeon: Kirt Gray DPM;  Location: [facility];  Service: Podiatry;  Laterality: Left;    CATARACT EXTRACTION W/  INTRAOCULAR LENS IMPLANT Right 07/18/2018    CATARACT EXTRACTION W/  INTRAOCULAR LENS IMPLANT Left 03/13/2019    COLONOSCOPY N/A 12/22/2018    Procedure: COLONOSCOPY;   Surgeon: Zak Dover MD;  Location[facility];  Service: Endoscopy;  Laterality: N/A;    ENDOSCOPIC VEIN LASER TREATMENT Bilateral 1990's    FIXATION OF SYNDESMOSIS OF ANKLE Left 7/23/2020    Procedure: FIXATION, SYNDESMOSIS, ANKLE;  Surgeon: Kirt Gray DPM;  Location: [facility] OR;  Service: Podiatry;  Laterality: Left;    MANIPULATION WITH ANESTHESIA Left 7/23/2020    Procedure: MANIPULATION, WITH ANESTHESIA;  Surgeon: Kirt Gray DPM;  Location: [facility] OR;  Service: Podiatry;  Laterality: Left;    MIDFOOT ARTHRODESIS Left 7/23/2020    Procedure: FUSION, JOINT, MIDFOOT;  Surgeon: Kirt Gray DPM;  Location: [facility] OR;  Service: Podiatry;  Laterality: Left;  2nd tarsometatarsal joint dislocation via fusion    OPEN REDUCTION AND INTERNAL FIXATION (ORIF) OF INJURY OF ANKLE Left 7/23/2020    Procedure: ORIF, ANKLE;  Surgeon: Kirt Gray DPM;  Location: [facility] OR;  Service: Podiatry;  Laterality: Left;    RECONSTRUCTION WITH FUSION OF CHARCOT FOOT Left 8/18/2020    Procedure: RECONSTRUCTION, CHARCOT FOOT, WITH FUSION;  Surgeon: Kirt Gray DPM;  Location: [facility] OR;  Service: Podiatry;  Laterality: Left;    REMOVAL OF HARDWARE FROM LOWER EXTREMITY Left 1/27/2021    Procedure: REMOVAL, HARDWARE, LOWER EXTREMITY;  Surgeon: Kirt Gray DPM;  Location: [facility] OR;  Service: Podiatry;  Laterality: Left;    WOUND DEBRIDEMENT Left 8/18/2020    Procedure: DEBRIDEMENT, WOUND;  Surgeon: Kirt Gray DPM;  Location: [facility] OR;  Service: Podiatry;  Laterality: Left;     Review of Systems   Constitutional: Negative for chills, fatigue and fever.   HENT: Negative for hearing loss.    Eyes: Negative for photophobia and visual disturbance.   Respiratory: Negative for cough, chest tightness, shortness of breath and wheezing.    Cardiovascular: Negative for chest pain and palpitations.   Gastrointestinal: Negative for constipation, diarrhea, nausea and vomiting.   Endocrine: Negative for cold intolerance and  "heat intolerance.   Genitourinary: Negative for flank pain.   Musculoskeletal: Positive for arthralgias, gait problem, joint swelling and myalgias. Negative for neck pain and neck stiffness.   Skin: Positive for color change. Negative for wound.   Neurological: Positive for numbness. Negative for light-headedness and headaches.   Psychiatric/Behavioral: Negative for sleep disturbance.             Objective:   Ht 5' 9" (1.753 m)   Wt 89.8 kg (198 lb)   BMI 29.24 kg/m²       LOWER EXTREMITY PHYSICAL EXAMINATION  ORTHOPEDIC: No overt ankle edema is noted. Mild pedal edema is noted. Moderate to severe STJ pains are noted. No appreciable STJ ROM is noted. Ankle ROM is guarded. No ankle crepitus is noted. Mild pains to the AL and AM ankle gutter. Mild medial and lateral ankle plate fixation. Pains to the syndesmosis at the TT joint. Pains to the medial plate fixation. No overt pains to the TN joint. Mild 1st MTPJ pains are noted. Gait is antalgic.    VASCULAR: On the left foot, the dorsalis pedis pulse is 2/4 and the posterior tibial pulse is 2/4. Capillary refill time is less than 3 seconds. Hair growth is sparse, but present on the dorsum of the foot and at the digits. No rubor is present. Proximal to distal temperature is warm to warm.    DERMATOLOGY:  Skin is supple, dry and intact. Callus formation, small ulceration, plantar LLE 1st MTPJ. The area measures 8mm x 4mm w/o depth.    NEUROLOGY: Protective sensation is not intact to the left plantar surfaces of the foot and digits, as the patient has no sensation/detection at greater than 4 distinct points of contact with 5.07 [place] monofilament. Sensation to light touch is intact on the right foot. Proprioception is intact. Sensation to pin prick is reduced to absent. Vibratory sensation is diminished.        Assessment:     1. History of surgical fusion joint    2. History of ankle surgery    3. History of foot surgery    4. Charcot's joint of ankle, left  "   5. Charcot's joint of foot, left    6. Pain in joint involving left ankle and foot    7. Osteoarthritis of left ankle or foot    8. Painful orthopaedic hardware    9. Type II diabetes mellitus with neurological manifestations          Plan:     History of surgical fusion joint    History of ankle surgery    History of foot surgery    Charcot's joint of ankle, left  -     Cancel: X-Ray Ankle 2 View Left; Future; Expected date: 01/14/2022  -     CT Ankle (Including Hindfoot) Without Contrast Left; Future; Expected date: 01/14/2022    Charcot's joint of foot, left  -     X-Ray Foot Complete Left; Future; Expected date: 01/14/2022  -     CT Ankle (Including Hindfoot) Without Contrast Left; Future; Expected date: 01/14/2022    Pain in joint involving left ankle and foot  -     methylPREDNISolone acetate injection 40 mg  -     dexamethasone injection 4 mg  -     CT Ankle (Including Hindfoot) Without Contrast Left; Future; Expected date: 01/14/2022    Osteoarthritis of left ankle or foot    Painful orthopaedic hardware    Type II diabetes mellitus with neurological manifestations      Thorough discussion is had with the patient today, concerning the diagnosis, its etiology, and the treatment algorithm at present.     XRAYS are reviewed in detail with the patient. All questions and concerns regarding findings and its/their implications are outlined and discussed.    Today's XR as compared to 4/21 show progressive arthrodesis of the TN joint despite hardware complications. Mild DJD is noted at the STJ. Ankle fractures are healed. There is synostosis at the ankle joint. No overt ankle OCD is noted. Broken screws to medial plate fixation are noted.     Please update CT scan for pre-op to removal medial plate fixation and possible STJ fusion.    Diagnostic/Therapeutic block to the STJ.    Following sterile preparation with alcohol swab and/or betadine paint, injection was given into and around the area of the LLE STJ, using  25-gauge needle. Injection consisted of approximately 0.5cc of Dexamethasone Sodium Phosphate, 0.5cc of Depo-Medrol (40mg/dL) and 0.5cc of 1% Lidocaine w/ or w/o Epinephrine or 0.25% or 0.50% Marcaine plain. Bandage application thereafter. Patient tolerated procedure well, and without complications or complaints. Patient educated that the area of pathology, might actually be slightly more painful, due to the injection, over the course of the next one to two days.       Recommend [facility].    For the plantar foot ulceration, QD topical ABx ointment. No infection is noted at present.    The wound was surgically debrided after adequate prep with alcohol and/or betadine paint. Excisional wound debridement was performed using sharp #10/#15 blade/rounded scalpel and tissue nipper, with removal of all non-viable skin and soft tissues; necrotic skin/tissue formation. The woundbase/wound bed was also debrided to encourage bleeding as to promote/stimulate healing. Debridement was excisional and included epidermal, dermal and subcutaneous tissues. Post debridement measurements are as above. Hemostasis was achieved. Patient tolerated procedure well and without complications. Local woundcare with topical ABx ointment dressings and bandage thereafter.                 Future Appointments   Date Time Provider Department Center   1/19/2022  2:40 PM Lucy Negron MD [facility]   1/25/2022  4:30 PM [facility] CT1 LIMIT 500 LBS [facility] CT SCAN [place]

## 2022-01-18 ENCOUNTER — PATIENT MESSAGE (OUTPATIENT)
Dept: PODIATRY | Facility: CLINIC | Age: 67
End: 2022-01-18
Payer: MEDICARE

## 2022-01-19 ENCOUNTER — LAB VISIT (OUTPATIENT)
Dept: LAB | Facility: HOSPITAL | Age: 67
End: 2022-01-19
Attending: FAMILY MEDICINE
Payer: MEDICARE

## 2022-01-19 ENCOUNTER — OFFICE VISIT (OUTPATIENT)
Dept: FAMILY MEDICINE | Facility: CLINIC | Age: 67
End: 2022-01-19
Attending: FAMILY MEDICINE
Payer: MEDICARE

## 2022-01-19 VITALS
DIASTOLIC BLOOD PRESSURE: 80 MMHG | WEIGHT: 216.19 LBS | BODY MASS INDEX: 32.02 KG/M2 | OXYGEN SATURATION: 97 % | HEIGHT: 69 IN | SYSTOLIC BLOOD PRESSURE: 132 MMHG | TEMPERATURE: 97 F | HEART RATE: 73 BPM

## 2022-01-19 DIAGNOSIS — E78.5 DM TYPE 2 WITH DIABETIC DYSLIPIDEMIA: ICD-10-CM

## 2022-01-19 DIAGNOSIS — Z12.39 ENCOUNTER FOR SCREENING FOR MALIGNANT NEOPLASM OF BREAST, UNSPECIFIED SCREENING MODALITY: ICD-10-CM

## 2022-01-19 DIAGNOSIS — Z12.31 ENCOUNTER FOR SCREENING MAMMOGRAM FOR MALIGNANT NEOPLASM OF BREAST: ICD-10-CM

## 2022-01-19 DIAGNOSIS — E11.69 DM TYPE 2 WITH DIABETIC DYSLIPIDEMIA: ICD-10-CM

## 2022-01-19 DIAGNOSIS — E11.3599 PROLIFERATIVE DIABETIC RETINOPATHY ASSOCIATED WITH TYPE 2 DIABETES MELLITUS, UNSPECIFIED LATERALITY, UNSPECIFIED PROLIFERATIVE RETINOPATHY TYPE: ICD-10-CM

## 2022-01-19 DIAGNOSIS — J84.9 ILD (INTERSTITIAL LUNG DISEASE): ICD-10-CM

## 2022-01-19 DIAGNOSIS — E66.01 SEVERE OBESITY: ICD-10-CM

## 2022-01-19 DIAGNOSIS — L60.9 NAIL DISORDER: Primary | ICD-10-CM

## 2022-01-19 DIAGNOSIS — K63.5 POLYP OF COLON, UNSPECIFIED PART OF COLON, UNSPECIFIED TYPE: ICD-10-CM

## 2022-01-19 DIAGNOSIS — L21.9 SEBORRHEIC DERMATITIS: ICD-10-CM

## 2022-01-19 DIAGNOSIS — R55 SYNCOPE, UNSPECIFIED SYNCOPE TYPE: ICD-10-CM

## 2022-01-19 LAB
ALBUMIN SERPL BCP-MCNC: 3.8 G/DL (ref 3.5–5.2)
ALP SERPL-CCNC: 153 U/L (ref 55–135)
ALT SERPL W/O P-5'-P-CCNC: 11 U/L (ref 10–44)
ANION GAP SERPL CALC-SCNC: 12 MMOL/L (ref 8–16)
AST SERPL-CCNC: 9 U/L (ref 10–40)
BASOPHILS # BLD AUTO: 0.06 K/UL (ref 0–0.2)
BASOPHILS NFR BLD: 0.4 % (ref 0–1.9)
BILIRUB SERPL-MCNC: 0.5 MG/DL (ref 0.1–1)
BUN SERPL-MCNC: 35 MG/DL (ref 8–23)
CALCIUM SERPL-MCNC: 9.9 MG/DL (ref 8.7–10.5)
CHLORIDE SERPL-SCNC: 95 MMOL/L (ref 95–110)
CHOLEST SERPL-MCNC: 131 MG/DL (ref 120–199)
CHOLEST/HDLC SERPL: 4.1 {RATIO} (ref 2–5)
CO2 SERPL-SCNC: 26 MMOL/L (ref 23–29)
CREAT SERPL-MCNC: 1.3 MG/DL (ref 0.5–1.4)
DIFFERENTIAL METHOD: ABNORMAL
EOSINOPHIL # BLD AUTO: 0.4 K/UL (ref 0–0.5)
EOSINOPHIL NFR BLD: 2.7 % (ref 0–8)
ERYTHROCYTE [DISTWIDTH] IN BLOOD BY AUTOMATED COUNT: 12.6 % (ref 11.5–14.5)
EST. GFR  (AFRICAN AMERICAN): 48.7 ML/MIN/1.73 M^2
EST. GFR  (NON AFRICAN AMERICAN): 42.3 ML/MIN/1.73 M^2
GLUCOSE SERPL-MCNC: 312 MG/DL (ref 70–110)
HCT VFR BLD AUTO: 44.4 % (ref 37–48.5)
HDLC SERPL-MCNC: 32 MG/DL (ref 40–75)
HDLC SERPL: 24.4 % (ref 20–50)
HGB BLD-MCNC: 14.5 G/DL (ref 12–16)
IMM GRANULOCYTES # BLD AUTO: 0.09 K/UL (ref 0–0.04)
IMM GRANULOCYTES NFR BLD AUTO: 0.6 % (ref 0–0.5)
LDLC SERPL CALC-MCNC: 70.2 MG/DL (ref 63–159)
LYMPHOCYTES # BLD AUTO: 1.9 K/UL (ref 1–4.8)
LYMPHOCYTES NFR BLD: 13.2 % (ref 18–48)
MCH RBC QN AUTO: 28.9 PG (ref 27–31)
MCHC RBC AUTO-ENTMCNC: 32.7 G/DL (ref 32–36)
MCV RBC AUTO: 89 FL (ref 82–98)
MONOCYTES # BLD AUTO: 0.8 K/UL (ref 0.3–1)
MONOCYTES NFR BLD: 5.1 % (ref 4–15)
NEUTROPHILS # BLD AUTO: 11.4 K/UL (ref 1.8–7.7)
NEUTROPHILS NFR BLD: 78 % (ref 38–73)
NONHDLC SERPL-MCNC: 99 MG/DL
NRBC BLD-RTO: 0 /100 WBC
PLATELET # BLD AUTO: 372 K/UL (ref 150–450)
PMV BLD AUTO: 10.9 FL (ref 9.2–12.9)
POTASSIUM SERPL-SCNC: 4.1 MMOL/L (ref 3.5–5.1)
PROT SERPL-MCNC: 6.6 G/DL (ref 6–8.4)
RBC # BLD AUTO: 5.01 M/UL (ref 4–5.4)
SODIUM SERPL-SCNC: 133 MMOL/L (ref 136–145)
TRIGL SERPL-MCNC: 144 MG/DL (ref 30–150)
WBC # BLD AUTO: 14.59 K/UL (ref 3.9–12.7)

## 2022-01-19 PROCEDURE — 80061 LIPID PANEL: CPT | Mod: HCNC | Performed by: FAMILY MEDICINE

## 2022-01-19 PROCEDURE — 1126F PR PAIN SEVERITY QUANTIFIED, NO PAIN PRESENT: ICD-10-PCS | Mod: HCNC,CPTII,S$GLB, | Performed by: FAMILY MEDICINE

## 2022-01-19 PROCEDURE — 3288F PR FALLS RISK ASSESSMENT DOCUMENTED: ICD-10-PCS | Mod: HCNC,CPTII,S$GLB, | Performed by: FAMILY MEDICINE

## 2022-01-19 PROCEDURE — 3079F PR MOST RECENT DIASTOLIC BLOOD PRESSURE 80-89 MM HG: ICD-10-PCS | Mod: HCNC,CPTII,S$GLB, | Performed by: FAMILY MEDICINE

## 2022-01-19 PROCEDURE — 99999 PR PBB SHADOW E&M-EST. PATIENT-LVL V: CPT | Mod: PBBFAC,HCNC,, | Performed by: FAMILY MEDICINE

## 2022-01-19 PROCEDURE — 93010 ELECTROCARDIOGRAM REPORT: CPT | Mod: HCNC,S$GLB,, | Performed by: INTERNAL MEDICINE

## 2022-01-19 PROCEDURE — 1101F PT FALLS ASSESS-DOCD LE1/YR: CPT | Mod: HCNC,CPTII,S$GLB, | Performed by: FAMILY MEDICINE

## 2022-01-19 PROCEDURE — 1101F PR PT FALLS ASSESS DOC 0-1 FALLS W/OUT INJ PAST YR: ICD-10-PCS | Mod: HCNC,CPTII,S$GLB, | Performed by: FAMILY MEDICINE

## 2022-01-19 PROCEDURE — 83036 HEMOGLOBIN GLYCOSYLATED A1C: CPT | Mod: HCNC | Performed by: FAMILY MEDICINE

## 2022-01-19 PROCEDURE — 99999 PR PBB SHADOW E&M-EST. PATIENT-LVL V: ICD-10-PCS | Mod: PBBFAC,HCNC,, | Performed by: FAMILY MEDICINE

## 2022-01-19 PROCEDURE — 99214 OFFICE O/P EST MOD 30 MIN: CPT | Mod: HCNC,S$GLB,, | Performed by: FAMILY MEDICINE

## 2022-01-19 PROCEDURE — 1160F PR REVIEW ALL MEDS BY PRESCRIBER/CLIN PHARMACIST DOCUMENTED: ICD-10-PCS | Mod: HCNC,CPTII,S$GLB, | Performed by: FAMILY MEDICINE

## 2022-01-19 PROCEDURE — 36415 COLL VENOUS BLD VENIPUNCTURE: CPT | Mod: HCNC,PO | Performed by: FAMILY MEDICINE

## 2022-01-19 PROCEDURE — 3075F PR MOST RECENT SYSTOLIC BLOOD PRESS GE 130-139MM HG: ICD-10-PCS | Mod: HCNC,CPTII,S$GLB, | Performed by: FAMILY MEDICINE

## 2022-01-19 PROCEDURE — 1159F PR MEDICATION LIST DOCUMENTED IN MEDICAL RECORD: ICD-10-PCS | Mod: HCNC,CPTII,S$GLB, | Performed by: FAMILY MEDICINE

## 2022-01-19 PROCEDURE — 93005 ELECTROCARDIOGRAM TRACING: CPT | Mod: HCNC,S$GLB,, | Performed by: FAMILY MEDICINE

## 2022-01-19 PROCEDURE — 3079F DIAST BP 80-89 MM HG: CPT | Mod: HCNC,CPTII,S$GLB, | Performed by: FAMILY MEDICINE

## 2022-01-19 PROCEDURE — 93005 EKG 12-LEAD: ICD-10-PCS | Mod: HCNC,S$GLB,, | Performed by: FAMILY MEDICINE

## 2022-01-19 PROCEDURE — 3288F FALL RISK ASSESSMENT DOCD: CPT | Mod: HCNC,CPTII,S$GLB, | Performed by: FAMILY MEDICINE

## 2022-01-19 PROCEDURE — 99214 PR OFFICE/OUTPT VISIT, EST, LEVL IV, 30-39 MIN: ICD-10-PCS | Mod: HCNC,S$GLB,, | Performed by: FAMILY MEDICINE

## 2022-01-19 PROCEDURE — 85025 COMPLETE CBC W/AUTO DIFF WBC: CPT | Mod: HCNC | Performed by: FAMILY MEDICINE

## 2022-01-19 PROCEDURE — 3075F SYST BP GE 130 - 139MM HG: CPT | Mod: HCNC,CPTII,S$GLB, | Performed by: FAMILY MEDICINE

## 2022-01-19 PROCEDURE — 80053 COMPREHEN METABOLIC PANEL: CPT | Mod: HCNC | Performed by: FAMILY MEDICINE

## 2022-01-19 PROCEDURE — 3008F BODY MASS INDEX DOCD: CPT | Mod: HCNC,CPTII,S$GLB, | Performed by: FAMILY MEDICINE

## 2022-01-19 PROCEDURE — 1159F MED LIST DOCD IN RCRD: CPT | Mod: HCNC,CPTII,S$GLB, | Performed by: FAMILY MEDICINE

## 2022-01-19 PROCEDURE — 1126F AMNT PAIN NOTED NONE PRSNT: CPT | Mod: HCNC,CPTII,S$GLB, | Performed by: FAMILY MEDICINE

## 2022-01-19 PROCEDURE — 93010 EKG 12-LEAD: ICD-10-PCS | Mod: HCNC,S$GLB,, | Performed by: INTERNAL MEDICINE

## 2022-01-19 PROCEDURE — 1160F RVW MEDS BY RX/DR IN RCRD: CPT | Mod: HCNC,CPTII,S$GLB, | Performed by: FAMILY MEDICINE

## 2022-01-19 PROCEDURE — 3008F PR BODY MASS INDEX (BMI) DOCUMENTED: ICD-10-PCS | Mod: HCNC,CPTII,S$GLB, | Performed by: FAMILY MEDICINE

## 2022-01-19 RX ORDER — KETOCONAZOLE 20 MG/ML
SHAMPOO, SUSPENSION TOPICAL
Qty: 120 ML | Refills: 3 | Status: SHIPPED | OUTPATIENT
Start: 2022-01-19 | End: 2022-09-06

## 2022-01-19 RX ORDER — OMEPRAZOLE 20 MG/1
CAPSULE, DELAYED RELEASE ORAL
Qty: 60 CAPSULE | Refills: 2 | Status: SHIPPED | OUTPATIENT
Start: 2022-01-19 | End: 2022-08-04

## 2022-01-19 NOTE — PROGRESS NOTES
Subjective:       Patient ID: Kay Galarza is a 66 y.o. female.    Chief Complaint: Annual Exam    66 y old female here for f.u . Not doing well. She has been having dyspepsia for 2 d after a fall. She did not stumble , she just felt dizzy and landed on buttocks . Glucose qhs : 200, FBS: 140 . Upset about upcoming L foot surgery .     Review of Systems   Constitutional: Negative.    HENT: Negative.    Eyes: Negative.    Respiratory: Negative.    Cardiovascular: Negative.    Gastrointestinal: Positive for abdominal pain.   Genitourinary: Negative.    Musculoskeletal: Negative.    Skin: Negative.    Hematological: Negative.        Objective:      Physical Exam  Vitals and nursing note reviewed.   Constitutional:       General: She is not in acute distress.     Appearance: She is well-developed and well-nourished. She is not diaphoretic.   HENT:      Head: Normocephalic and atraumatic.      Right Ear: External ear normal.      Left Ear: External ear normal.      Nose: Nose normal.      Mouth/Throat:      Mouth: Oropharynx is clear and moist.   Eyes:      General: No scleral icterus.        Left eye: No discharge.      Extraocular Movements: EOM normal.      Conjunctiva/sclera: Conjunctivae normal.      Pupils: Pupils are equal, round, and reactive to light.   Neck:      Thyroid: No thyromegaly.      Vascular: No JVD.      Trachea: No tracheal deviation.   Cardiovascular:      Rate and Rhythm: Normal rate. Rhythm irregularly irregular.      Pulses: Intact distal pulses.      Heart sounds: Normal heart sounds. No murmur heard.  No friction rub. No gallop.    Pulmonary:      Effort: Pulmonary effort is normal. No respiratory distress.      Breath sounds: Normal breath sounds. No wheezing or rales.   Chest:      Chest wall: No tenderness.   Abdominal:      General: Bowel sounds are normal. There is no distension.      Palpations: Abdomen is soft. There is no mass.      Tenderness: There is no abdominal  tenderness. There is no guarding.   Musculoskeletal:      Cervical back: Normal range of motion and neck supple.   Lymphadenopathy:      Cervical: No cervical adenopathy.         Assessment:     Kay was seen today for annual exam.    Diagnoses and all orders for this visit:    Nail disorder  -     Ambulatory referral/consult to Dermatology; Future    Seborrheic dermatitis  -     ketoconazole (NIZORAL) 2 % shampoo; Apply topically 3 (three) times a week.    DM type 2 with diabetic dyslipidemia  -     CBC Auto Differential; Future  -     Comprehensive Metabolic Panel; Future  -     Hemoglobin A1C; Future  -     Lipid Panel; Future  -     Microalbumin/Creatinine Ratio, Urine    Encounter for screening for malignant neoplasm of breast, unspecified screening modality  -     Mammo Digital Screening Bilat; Future    Encounter for screening mammogram for malignant neoplasm of breast   -     Mammo Digital Screening Bilat; Future    Syncope, unspecified syncope type  -     IN OFFICE EKG 12-LEAD (to Muse)    Proliferative diabetic retinopathy associated with type 2 diabetes mellitus, unspecified laterality, unspecified proliferative retinopathy type    ILD (interstitial lung disease)    Severe obesity    Polyp of colon, unspecified part of colon, unspecified type  -     Case Request Endoscopy: COLONOSCOPY    Other orders  -     omeprazole (PRILOSEC) 20 MG capsule; TAKE 1 CAPSULE BY MOUTH TWICE A DAY.          Plan:     Kay was seen today for annual exam.    Diagnoses and all orders for this visit:    Nail disorder  -     Ambulatory referral/consult to Dermatology; Future    Seborrheic dermatitis  -     ketoconazole (NIZORAL) 2 % shampoo; Apply topically 3 (three) times a week.    Other orders  -     omeprazole (PRILOSEC) 20 MG capsule; TAKE 1 CAPSULE BY MOUTH TWICE A DAY.     Derm  Ketoconazole  Labs   Opth  F,u with pulm   Diet and exercise

## 2022-01-20 ENCOUNTER — PATIENT MESSAGE (OUTPATIENT)
Dept: FAMILY MEDICINE | Facility: CLINIC | Age: 67
End: 2022-01-20
Payer: MEDICARE

## 2022-01-20 ENCOUNTER — TELEPHONE (OUTPATIENT)
Dept: FAMILY MEDICINE | Facility: CLINIC | Age: 67
End: 2022-01-20
Payer: MEDICARE

## 2022-01-20 DIAGNOSIS — D72.829 LEUKOCYTOSIS, UNSPECIFIED TYPE: Primary | ICD-10-CM

## 2022-01-20 DIAGNOSIS — R74.8 ELEVATED ALKALINE PHOSPHATASE LEVEL: ICD-10-CM

## 2022-01-20 LAB
ESTIMATED AVG GLUCOSE: ABNORMAL MG/DL (ref 68–131)
HBA1C MFR BLD: >14 % (ref 4–5.6)

## 2022-01-24 ENCOUNTER — OFFICE VISIT (OUTPATIENT)
Dept: CARDIOLOGY | Facility: CLINIC | Age: 67
End: 2022-01-24
Payer: MEDICARE

## 2022-01-24 VITALS
WEIGHT: 218.5 LBS | SYSTOLIC BLOOD PRESSURE: 118 MMHG | BODY MASS INDEX: 32.26 KG/M2 | HEART RATE: 95 BPM | DIASTOLIC BLOOD PRESSURE: 78 MMHG | OXYGEN SATURATION: 97 %

## 2022-01-24 DIAGNOSIS — I10 ESSENTIAL HYPERTENSION: ICD-10-CM

## 2022-01-24 DIAGNOSIS — Z01.818 PREOP TESTING: ICD-10-CM

## 2022-01-24 DIAGNOSIS — Z01.810 PRE-OPERATIVE CARDIOVASCULAR EXAMINATION: ICD-10-CM

## 2022-01-24 DIAGNOSIS — R94.31 NONSPECIFIC ABNORMAL ELECTROCARDIOGRAM (ECG) (EKG): ICD-10-CM

## 2022-01-24 DIAGNOSIS — E66.01 SEVERE OBESITY (BMI 35.0-39.9) WITH COMORBIDITY: ICD-10-CM

## 2022-01-24 DIAGNOSIS — I51.7 CARDIOMEGALY: ICD-10-CM

## 2022-01-24 DIAGNOSIS — E78.5 HYPERLIPIDEMIA, UNSPECIFIED HYPERLIPIDEMIA TYPE: ICD-10-CM

## 2022-01-24 DIAGNOSIS — E11.49 TYPE II DIABETES MELLITUS WITH NEUROLOGICAL MANIFESTATIONS: ICD-10-CM

## 2022-01-24 DIAGNOSIS — I35.1 AORTIC VALVE INSUFFICIENCY, ETIOLOGY OF CARDIAC VALVE DISEASE UNSPECIFIED: ICD-10-CM

## 2022-01-24 DIAGNOSIS — J45.20 MILD INTERMITTENT ASTHMA, UNSPECIFIED WHETHER COMPLICATED: ICD-10-CM

## 2022-01-24 DIAGNOSIS — R07.9 CHEST PAIN, UNSPECIFIED TYPE: Primary | ICD-10-CM

## 2022-01-24 PROCEDURE — 3046F PR MOST RECENT HEMOGLOBIN A1C LEVEL > 9.0%: ICD-10-PCS | Mod: HCNC,CPTII,S$GLB, | Performed by: INTERNAL MEDICINE

## 2022-01-24 PROCEDURE — 1126F PR PAIN SEVERITY QUANTIFIED, NO PAIN PRESENT: ICD-10-PCS | Mod: HCNC,CPTII,S$GLB, | Performed by: INTERNAL MEDICINE

## 2022-01-24 PROCEDURE — 99214 OFFICE O/P EST MOD 30 MIN: CPT | Mod: HCNC,S$GLB,, | Performed by: INTERNAL MEDICINE

## 2022-01-24 PROCEDURE — 1159F PR MEDICATION LIST DOCUMENTED IN MEDICAL RECORD: ICD-10-PCS | Mod: HCNC,CPTII,S$GLB, | Performed by: INTERNAL MEDICINE

## 2022-01-24 PROCEDURE — 99999 PR PBB SHADOW E&M-EST. PATIENT-LVL IV: CPT | Mod: PBBFAC,HCNC,, | Performed by: INTERNAL MEDICINE

## 2022-01-24 PROCEDURE — 3078F DIAST BP <80 MM HG: CPT | Mod: HCNC,CPTII,S$GLB, | Performed by: INTERNAL MEDICINE

## 2022-01-24 PROCEDURE — 99214 PR OFFICE/OUTPT VISIT, EST, LEVL IV, 30-39 MIN: ICD-10-PCS | Mod: HCNC,S$GLB,, | Performed by: INTERNAL MEDICINE

## 2022-01-24 PROCEDURE — 1126F AMNT PAIN NOTED NONE PRSNT: CPT | Mod: HCNC,CPTII,S$GLB, | Performed by: INTERNAL MEDICINE

## 2022-01-24 PROCEDURE — 3008F PR BODY MASS INDEX (BMI) DOCUMENTED: ICD-10-PCS | Mod: HCNC,CPTII,S$GLB, | Performed by: INTERNAL MEDICINE

## 2022-01-24 PROCEDURE — 1159F MED LIST DOCD IN RCRD: CPT | Mod: HCNC,CPTII,S$GLB, | Performed by: INTERNAL MEDICINE

## 2022-01-24 PROCEDURE — 99999 PR PBB SHADOW E&M-EST. PATIENT-LVL IV: ICD-10-PCS | Mod: PBBFAC,HCNC,, | Performed by: INTERNAL MEDICINE

## 2022-01-24 PROCEDURE — 3078F PR MOST RECENT DIASTOLIC BLOOD PRESSURE < 80 MM HG: ICD-10-PCS | Mod: HCNC,CPTII,S$GLB, | Performed by: INTERNAL MEDICINE

## 2022-01-24 PROCEDURE — 3074F SYST BP LT 130 MM HG: CPT | Mod: HCNC,CPTII,S$GLB, | Performed by: INTERNAL MEDICINE

## 2022-01-24 PROCEDURE — 3074F PR MOST RECENT SYSTOLIC BLOOD PRESSURE < 130 MM HG: ICD-10-PCS | Mod: HCNC,CPTII,S$GLB, | Performed by: INTERNAL MEDICINE

## 2022-01-24 PROCEDURE — 3008F BODY MASS INDEX DOCD: CPT | Mod: HCNC,CPTII,S$GLB, | Performed by: INTERNAL MEDICINE

## 2022-01-24 PROCEDURE — 3046F HEMOGLOBIN A1C LEVEL >9.0%: CPT | Mod: HCNC,CPTII,S$GLB, | Performed by: INTERNAL MEDICINE

## 2022-01-24 NOTE — PROGRESS NOTES
Subjective:   Patient ID:  Kay Galarza is a 66 y.o. female who presents for follow-up of No chief complaint on file.  This is a pleasant 65-year-old female who is here for preop evaluation.  The patient has a history of diabetes, hypertension, and asthma history.  Patient has had no exertional symptoms.  Patient denies chest pain, angina or symptoms associated with anginal equivalent.  Prior EKG done on January 19, 2021 revealed slight prolongation of the QT interval.  Repeat EKG will be done today.  Patient has had no symptoms lightheadedness dizziness no syncope or near syncopal episodes.  This pleasant patient here for preop evaluation for foot surgery.  Patient has significant ankle and foot pain.  No chest discomfort has been noted.    Patient is here post podiatry surgery.  Doing well this time no recurrence of chest pain shortness breath there is evidence aortic insufficiency from echo 2 years ago will repeat another echo this spring.  Otherwise clinically stable this time.  Follow-up evaluation after echos performed.  No evidence of heart failure or edema today.  Presents in the office with episodic chest discomfort as well for this reason will go ahead with a nuclear stress test.  No acute EKG change with exception of several PVCs which is patient states that occasionally she feels but no lightheadedness or dizziness.  No edema today.  Of note the patient blood sugars have been quite elevated this needs to be addressed.      Review of Systems   Constitutional: Negative for chills, diaphoresis, night sweats, weight gain and weight loss.   HENT: Negative for congestion, hoarse voice, sore throat and stridor.    Eyes: Negative for double vision and pain.   Cardiovascular: Negative for chest pain, claudication, cyanosis, dyspnea on exertion, irregular heartbeat, leg swelling, near-syncope, orthopnea, palpitations, paroxysmal nocturnal dyspnea and syncope.   Respiratory: Negative for cough,  hemoptysis, shortness of breath, sleep disturbances due to breathing, snoring, sputum production and wheezing.    Endocrine: Negative for cold intolerance, heat intolerance and polydipsia.   Hematologic/Lymphatic: Negative for bleeding problem. Does not bruise/bleed easily.   Skin: Negative for color change, dry skin and rash.   Musculoskeletal: Negative for joint swelling and muscle cramps.   Gastrointestinal: Negative for bloating, abdominal pain, constipation, diarrhea, dysphagia, melena, nausea and vomiting.   Genitourinary: Negative for flank pain and urgency.   Neurological: Negative for dizziness, focal weakness, headaches, light-headedness, loss of balance, seizures and weakness.   Psychiatric/Behavioral: Negative for altered mental status and memory loss. The patient is not nervous/anxious.      Family History   Problem Relation Age of Onset    Cancer Father         Prostate Ca    Hypertension Father     Stroke Father     Diabetes Sister     Glaucoma Mother     Hypertension Mother     Glaucoma Maternal Grandmother     Blindness Neg Hx     Macular degeneration Neg Hx     Retinal detachment Neg Hx     Strabismus Neg Hx      Past Medical History:   Diagnosis Date    Arthritis     DERIAN KNEES    Asthma     SEASONAL    Cataract     Diabetes mellitus     Diabetes mellitus, type 2     Diabetic retinopathy     DM (diabetes mellitus) 2007    BS 97 09/29/2020    Hyperlipidemia     Hypertension      Social History     Socioeconomic History    Marital status:    Tobacco Use    Smoking status: Never Smoker    Smokeless tobacco: Never Used   Substance and Sexual Activity    Alcohol use: Never    Drug use: No    Sexual activity: Yes     Current Outpatient Medications on File Prior to Visit   Medication Sig Dispense Refill    acetaminophen (TYLENOL) 500 MG tablet Take 500 mg by mouth every 6 (six) hours as needed for Pain.      albuterol (PROVENTIL/VENTOLIN HFA) 90 mcg/actuation inhaler  INHALE 2 PUFFS INTO THE LUNGS EVERY 6 (SIX) HOURS AS NEEDED FOR WHEEZING. RESCUE 18 g PRN    ammonium lactate (AMLACTIN) 12 % lotion Use daily.  Apply to damp skin after bathing. (Patient not taking: Reported on 1/19/2022) 225 g 11    atorvastatin (LIPITOR) 40 MG tablet TAKE 1 TABLET BY MOUTH ONCE DAILY. 90 tablet PRN    B infantis/B ani/B koby/B bifid (PROBIOTIC 4X ORAL) Take by mouth Daily.      clobetasoL (TEMOVATE) 0.05 % external solution APPLY TOPICALLY 2 (TWO) TIMES DAILY. TO SCALP ONLY (Patient not taking: Reported on 1/19/2022) 50 mL PRN    diclofenac sodium (VOLTAREN) 1 % Gel Apply topically 2 (two) times daily. (Patient not taking: Reported on 1/19/2022) 100 g 0    emollient combination no.69 (EUCERIN SKIN CALMING) Crea Apply BID (Patient not taking: Reported on 1/19/2022) 226 g 0    ergocalciferol (ERGOCALCIFEROL) 50,000 unit Cap TAKE 1 CAPSULE  BY MOUTH ONCE A WEEK FOR 4 DOSES 4 capsule PRN    fluocinolone (SYNALAR) 0.01 % Sham Apply no more than 1 ounce to scalp once daily; work into lather and allow to remain on scalp for ~5 minutes. Remove from hair and scalp by rinsing thoroughly with water. (Patient not taking: Reported on 1/19/2022) 120 mL 2    fluocinolone and shower cap 0.01 % Oil       fluticasone propionate (FLONASE) 50 mcg/actuation nasal spray 2 sprays (100 mcg total) by Each Nostril route once daily. 16 g 0    gabapentin (NEURONTIN) 100 MG capsule Take 1 capsule (100 mg total) by mouth 3 (three) times daily. 90 capsule 11    glimepiride (AMARYL) 4 MG tablet Take 1 tablet (4 mg total) by mouth daily with breakfast. 90 tablet 4    insulin glargine, TOUJEO, (TOUJEO SOLOSTAR U-300 INSULIN) 300 unit/mL (1.5 mL) InPn pen Inject 50 Units into the skin 2 (two) times daily. 10 mL 3    ketoconazole (NIZORAL) 2 % shampoo Apply topically 3 (three) times a week. 120 mL 3    levocetirizine (XYZAL) 5 MG tablet Take 1 tablet (5 mg total) by mouth every evening. 30 tablet 11     "lisinopriL-hydrochlorothiazide (PRINZIDE,ZESTORETIC) 20-25 mg Tab TAKE 1 TABLET BY MOUTH ONCE DAILY. 90 tablet PRN    magnesium oxide 200 mg magnesium Tab TAKE 3 TABLETS  BY MOUTH EVERY EVENING. 120 tablet PRN    omeprazole (PRILOSEC) 20 MG capsule TAKE 1 CAPSULE BY MOUTH TWICE A DAY. 60 capsule 2    pen needle, diabetic (BD ULTRA-FINE SHORT PEN NEEDLE) 31 gauge x 5/16" Ndle USE 1  ONCE DAILY 100 each 0    traZODone (DESYREL) 150 MG tablet TAKE 1 TABLET  BY MOUTH NIGHTLY AS NEEDED FOR INSOMNIA. 90 tablet PRN    triamcinolone acetonide 0.025% (KENALOG) 0.025 % cream AAA bid as needed for flares.  Mild steroid. (Patient not taking: Reported on 1/19/2022) 80 g 1    TRULICITY 1.5 mg/0.5 mL pen injector INJECT 1.5 MG INTO THE SKIN EVERY 7 DAYS 1 pen PRN     Current Facility-Administered Medications on File Prior to Visit   Medication Dose Route Frequency Provider Last Rate Last Admin    diphenhydrAMINE injection 25 mg  25 mg Intravenous Q6H PRN Dharmesh Macias MD         Review of patient's allergies indicates:   Allergen Reactions    Pollen extracts      Seasonal allergies, sneezing       Objective:     Physical Exam  Eyes:      Pupils: Pupils are equal, round, and reactive to light.   Neck:      Trachea: No tracheal deviation.   Cardiovascular:      Rate and Rhythm: Normal rate and regular rhythm.      Pulses: Intact distal pulses.           Carotid pulses are 2+ on the right side and 2+ on the left side.       Radial pulses are 2+ on the right side and 2+ on the left side.        Femoral pulses are 2+ on the right side and 2+ on the left side.       Popliteal pulses are 2+ on the right side and 2+ on the left side.        Dorsalis pedis pulses are 2+ on the right side and 2+ on the left side.        Posterior tibial pulses are 2+ on the right side and 2+ on the left side.      Heart sounds: Normal heart sounds. No murmur heard.  No friction rub. No gallop.    Pulmonary:      Effort: Pulmonary effort is " normal. No respiratory distress.      Breath sounds: Normal breath sounds. No stridor. No wheezing or rales.   Chest:      Chest wall: No tenderness.   Abdominal:      General: There is no distension.      Tenderness: There is no abdominal tenderness. There is no rebound.   Musculoskeletal:         General: No tenderness or edema.      Cervical back: Normal range of motion.   Skin:     General: Skin is warm and dry.   Neurological:      Mental Status: She is alert and oriented to person, place, and time.     EKG today on 01/20/2022 showed normal rhythm with occasional PVCs.  No acute ST T wave changes.  Nonspecific flattening in lateral leads T-waves..    Assessment:     1. Pre-operative cardiovascular examination    2. Hyperlipidemia, unspecified hyperlipidemia type    3. Preop testing    4. Severe obesity (BMI 35.0-39.9) with comorbidity    5. Cardiomegaly    6. Type II diabetes mellitus with neurological manifestations    7. Mild intermittent asthma, unspecified whether complicated    8. Aortic valve insufficiency, etiology of cardiac valve disease unspecified    9. Essential hypertension    10. Nonspecific abnormal electrocardiogram (ECG) (EKG)        Plan:     Pre-operative cardiovascular examination    Hyperlipidemia, unspecified hyperlipidemia type    Preop testing    Severe obesity (BMI 35.0-39.9) with comorbidity    Cardiomegaly    Type II diabetes mellitus with neurological manifestations    Mild intermittent asthma, unspecified whether complicated    Aortic valve insufficiency, etiology of cardiac valve disease unspecified    Essential hypertension    Nonspecific abnormal electrocardiogram (ECG) (EKG)    Impression 1. Aortic insufficiency stable   2. Hypertension stable  3  nonspecific EKG changes occasional PVCs:  The patient has intermittent chest discomfort this reason will go ahead with a nuclear stress test before allowing surgery a occur on the foot.  4. Preop evaluation;  patient will be held for  evaluation for preop recommendations until after nuclear stress test is performed.  In the interim will try to work on blood sugar elevation and improvement in overall diet

## 2022-01-25 ENCOUNTER — TELEPHONE (OUTPATIENT)
Dept: CARDIOLOGY | Facility: HOSPITAL | Age: 67
End: 2022-01-25
Payer: MEDICARE

## 2022-01-25 ENCOUNTER — PATIENT MESSAGE (OUTPATIENT)
Dept: CARDIOLOGY | Facility: CLINIC | Age: 67
End: 2022-01-25
Payer: MEDICARE

## 2022-01-25 ENCOUNTER — HOSPITAL ENCOUNTER (OUTPATIENT)
Dept: RADIOLOGY | Facility: HOSPITAL | Age: 67
Discharge: HOME | End: 2022-01-25
Attending: PODIATRIST
Payer: MEDICARE

## 2022-01-25 DIAGNOSIS — J45.20 MILD INTERMITTENT ASTHMA, UNSPECIFIED WHETHER COMPLICATED: ICD-10-CM

## 2022-01-25 DIAGNOSIS — M14.672 CHARCOT'S JOINT OF FOOT, LEFT: ICD-10-CM

## 2022-01-25 DIAGNOSIS — M14.672 CHARCOT'S JOINT OF ANKLE, LEFT: ICD-10-CM

## 2022-01-25 DIAGNOSIS — I51.7 CARDIOMEGALY: ICD-10-CM

## 2022-01-25 DIAGNOSIS — R07.9 CHEST PAIN, UNSPECIFIED TYPE: Primary | ICD-10-CM

## 2022-01-25 DIAGNOSIS — M25.572 PAIN IN JOINT INVOLVING LEFT ANKLE AND FOOT: ICD-10-CM

## 2022-01-25 PROCEDURE — 73700 CT LOWER EXTREMITY W/O DYE: CPT | Mod: TC,HCNC,LT

## 2022-01-26 ENCOUNTER — LAB VISIT (OUTPATIENT)
Dept: LAB | Facility: HOSPITAL | Age: 67
End: 2022-01-26
Attending: FAMILY MEDICINE
Payer: MEDICARE

## 2022-01-26 ENCOUNTER — PATIENT MESSAGE (OUTPATIENT)
Dept: FAMILY MEDICINE | Facility: CLINIC | Age: 67
End: 2022-01-26
Payer: MEDICARE

## 2022-01-26 DIAGNOSIS — R74.8 ELEVATED ALKALINE PHOSPHATASE LEVEL: ICD-10-CM

## 2022-01-26 DIAGNOSIS — D72.829 LEUKOCYTOSIS, UNSPECIFIED TYPE: ICD-10-CM

## 2022-01-26 DIAGNOSIS — E11.9 TYPE 2 DIABETES MELLITUS WITHOUT COMPLICATION: ICD-10-CM

## 2022-01-26 LAB
ALP SERPL-CCNC: 135 U/L (ref 55–135)
BASOPHILS # BLD AUTO: 0.05 K/UL (ref 0–0.2)
BASOPHILS NFR BLD: 0.5 % (ref 0–1.9)
CHOLEST SERPL-MCNC: 133 MG/DL (ref 120–199)
CHOLEST/HDLC SERPL: 3.9 {RATIO} (ref 2–5)
DIFFERENTIAL METHOD: ABNORMAL
EOSINOPHIL # BLD AUTO: 0.2 K/UL (ref 0–0.5)
EOSINOPHIL NFR BLD: 2 % (ref 0–8)
ERYTHROCYTE [DISTWIDTH] IN BLOOD BY AUTOMATED COUNT: 12.6 % (ref 11.5–14.5)
ESTIMATED AVG GLUCOSE: ABNORMAL MG/DL (ref 68–131)
GGT SERPL-CCNC: 20 U/L (ref 8–55)
HBA1C MFR BLD: >14 % (ref 4–5.6)
HCT VFR BLD AUTO: 40.1 % (ref 37–48.5)
HDLC SERPL-MCNC: 34 MG/DL (ref 40–75)
HDLC SERPL: 25.6 % (ref 20–50)
HGB BLD-MCNC: 13 G/DL (ref 12–16)
IMM GRANULOCYTES # BLD AUTO: 0.03 K/UL (ref 0–0.04)
IMM GRANULOCYTES NFR BLD AUTO: 0.3 % (ref 0–0.5)
LDLC SERPL CALC-MCNC: 80 MG/DL (ref 63–159)
LYMPHOCYTES # BLD AUTO: 1.4 K/UL (ref 1–4.8)
LYMPHOCYTES NFR BLD: 15.2 % (ref 18–48)
MCH RBC QN AUTO: 29.5 PG (ref 27–31)
MCHC RBC AUTO-ENTMCNC: 32.4 G/DL (ref 32–36)
MCV RBC AUTO: 91 FL (ref 82–98)
MONOCYTES # BLD AUTO: 0.6 K/UL (ref 0.3–1)
MONOCYTES NFR BLD: 5.9 % (ref 4–15)
NEUTROPHILS # BLD AUTO: 7.1 K/UL (ref 1.8–7.7)
NEUTROPHILS NFR BLD: 76.1 % (ref 38–73)
NONHDLC SERPL-MCNC: 99 MG/DL
NRBC BLD-RTO: 0 /100 WBC
PLATELET # BLD AUTO: 245 K/UL (ref 150–450)
PMV BLD AUTO: 11.2 FL (ref 9.2–12.9)
RBC # BLD AUTO: 4.41 M/UL (ref 4–5.4)
TRIGL SERPL-MCNC: 95 MG/DL (ref 30–150)
WBC # BLD AUTO: 9.39 K/UL (ref 3.9–12.7)

## 2022-01-26 PROCEDURE — 84075 ASSAY ALKALINE PHOSPHATASE: CPT | Mod: HCNC | Performed by: FAMILY MEDICINE

## 2022-01-26 PROCEDURE — 80061 LIPID PANEL: CPT | Mod: HCNC | Performed by: FAMILY MEDICINE

## 2022-01-26 PROCEDURE — 83036 HEMOGLOBIN GLYCOSYLATED A1C: CPT | Mod: HCNC | Performed by: FAMILY MEDICINE

## 2022-01-26 PROCEDURE — 85025 COMPLETE CBC W/AUTO DIFF WBC: CPT | Mod: HCNC | Performed by: FAMILY MEDICINE

## 2022-01-26 PROCEDURE — 36415 COLL VENOUS BLD VENIPUNCTURE: CPT | Mod: HCNC,PO | Performed by: FAMILY MEDICINE

## 2022-01-26 PROCEDURE — 82977 ASSAY OF GGT: CPT | Mod: HCNC | Performed by: FAMILY MEDICINE

## 2022-01-27 ENCOUNTER — PATIENT MESSAGE (OUTPATIENT)
Dept: FAMILY MEDICINE | Facility: CLINIC | Age: 67
End: 2022-01-27
Payer: MEDICARE

## 2022-01-27 DIAGNOSIS — N18.30 STAGE 3 CHRONIC KIDNEY DISEASE, UNSPECIFIED WHETHER STAGE 3A OR 3B CKD: ICD-10-CM

## 2022-01-27 DIAGNOSIS — E78.5 DM TYPE 2 WITH DIABETIC DYSLIPIDEMIA: Primary | ICD-10-CM

## 2022-01-27 DIAGNOSIS — E11.69 DM TYPE 2 WITH DIABETIC DYSLIPIDEMIA: Primary | ICD-10-CM

## 2022-01-31 ENCOUNTER — TELEPHONE (OUTPATIENT)
Dept: FAMILY MEDICINE | Facility: CLINIC | Age: 67
End: 2022-01-31
Payer: MEDICARE

## 2022-02-01 ENCOUNTER — PATIENT MESSAGE (OUTPATIENT)
Dept: FAMILY MEDICINE | Facility: CLINIC | Age: 67
End: 2022-02-01
Payer: MEDICARE

## 2022-02-01 ENCOUNTER — OFFICE VISIT (OUTPATIENT)
Dept: NEPHROLOGY | Facility: CLINIC | Age: 67
End: 2022-02-01
Payer: MEDICARE

## 2022-02-01 VITALS
SYSTOLIC BLOOD PRESSURE: 110 MMHG | DIASTOLIC BLOOD PRESSURE: 60 MMHG | BODY MASS INDEX: 32.88 KG/M2 | HEIGHT: 69 IN | WEIGHT: 222 LBS | HEART RATE: 84 BPM

## 2022-02-01 DIAGNOSIS — R80.9 MICROALBUMINURIA: ICD-10-CM

## 2022-02-01 DIAGNOSIS — I12.9 PARENCHYMAL RENAL HYPERTENSION, STAGE 1 THROUGH STAGE 4 OR UNSPECIFIED CHRONIC KIDNEY DISEASE: ICD-10-CM

## 2022-02-01 DIAGNOSIS — N18.2 CKD (CHRONIC KIDNEY DISEASE) STAGE 2, GFR 60-89 ML/MIN: Primary | ICD-10-CM

## 2022-02-01 PROCEDURE — 3062F POS MACROALBUMINURIA REV: CPT | Mod: CPTII,S$GLB,, | Performed by: INTERNAL MEDICINE

## 2022-02-01 PROCEDURE — 3074F PR MOST RECENT SYSTOLIC BLOOD PRESSURE < 130 MM HG: ICD-10-PCS | Mod: CPTII,S$GLB,, | Performed by: INTERNAL MEDICINE

## 2022-02-01 PROCEDURE — 3066F NEPHROPATHY DOC TX: CPT | Mod: CPTII,S$GLB,, | Performed by: INTERNAL MEDICINE

## 2022-02-01 PROCEDURE — 3066F PR DOCUMENTATION OF TREATMENT FOR NEPHROPATHY: ICD-10-PCS | Mod: CPTII,S$GLB,, | Performed by: INTERNAL MEDICINE

## 2022-02-01 PROCEDURE — 3062F PR POS MACROALBUMINURIA RESULT DOCUMENTED/REVIEW: ICD-10-PCS | Mod: CPTII,S$GLB,, | Performed by: INTERNAL MEDICINE

## 2022-02-01 PROCEDURE — 99999 PR PBB SHADOW E&M-EST. PATIENT-LVL IV: CPT | Mod: PBBFAC,,, | Performed by: INTERNAL MEDICINE

## 2022-02-01 PROCEDURE — 3288F PR FALLS RISK ASSESSMENT DOCUMENTED: ICD-10-PCS | Mod: CPTII,S$GLB,, | Performed by: INTERNAL MEDICINE

## 2022-02-01 PROCEDURE — 3046F HEMOGLOBIN A1C LEVEL >9.0%: CPT | Mod: CPTII,S$GLB,, | Performed by: INTERNAL MEDICINE

## 2022-02-01 PROCEDURE — 3008F PR BODY MASS INDEX (BMI) DOCUMENTED: ICD-10-PCS | Mod: CPTII,S$GLB,, | Performed by: INTERNAL MEDICINE

## 2022-02-01 PROCEDURE — 3288F FALL RISK ASSESSMENT DOCD: CPT | Mod: CPTII,S$GLB,, | Performed by: INTERNAL MEDICINE

## 2022-02-01 PROCEDURE — 1160F RVW MEDS BY RX/DR IN RCRD: CPT | Mod: CPTII,,, | Performed by: INTERNAL MEDICINE

## 2022-02-01 PROCEDURE — 1101F PR PT FALLS ASSESS DOC 0-1 FALLS W/OUT INJ PAST YR: ICD-10-PCS | Mod: CPTII,S$GLB,, | Performed by: INTERNAL MEDICINE

## 2022-02-01 PROCEDURE — 1101F PT FALLS ASSESS-DOCD LE1/YR: CPT | Mod: CPTII,S$GLB,, | Performed by: INTERNAL MEDICINE

## 2022-02-01 PROCEDURE — 3008F BODY MASS INDEX DOCD: CPT | Mod: CPTII,S$GLB,, | Performed by: INTERNAL MEDICINE

## 2022-02-01 PROCEDURE — 3078F DIAST BP <80 MM HG: CPT | Mod: CPTII,S$GLB,, | Performed by: INTERNAL MEDICINE

## 2022-02-01 PROCEDURE — 1159F MED LIST DOCD IN RCRD: CPT | Mod: CPTII,,, | Performed by: INTERNAL MEDICINE

## 2022-02-01 PROCEDURE — 99204 PR OFFICE/OUTPT VISIT, NEW, LEVL IV, 45-59 MIN: ICD-10-PCS | Mod: S$GLB,,, | Performed by: INTERNAL MEDICINE

## 2022-02-01 PROCEDURE — 1160F PR REVIEW ALL MEDS BY PRESCRIBER/CLIN PHARMACIST DOCUMENTED: ICD-10-PCS | Mod: CPTII,,, | Performed by: INTERNAL MEDICINE

## 2022-02-01 PROCEDURE — 99214 OFFICE O/P EST MOD 30 MIN: CPT | Mod: PBBFAC | Performed by: INTERNAL MEDICINE

## 2022-02-01 PROCEDURE — 1125F AMNT PAIN NOTED PAIN PRSNT: CPT | Mod: CPTII,S$GLB,, | Performed by: INTERNAL MEDICINE

## 2022-02-01 PROCEDURE — 3074F SYST BP LT 130 MM HG: CPT | Mod: CPTII,S$GLB,, | Performed by: INTERNAL MEDICINE

## 2022-02-01 PROCEDURE — 1159F PR MEDICATION LIST DOCUMENTED IN MEDICAL RECORD: ICD-10-PCS | Mod: CPTII,,, | Performed by: INTERNAL MEDICINE

## 2022-02-01 PROCEDURE — 99999 PR PBB SHADOW E&M-EST. PATIENT-LVL IV: ICD-10-PCS | Mod: PBBFAC,,, | Performed by: INTERNAL MEDICINE

## 2022-02-01 PROCEDURE — 3078F PR MOST RECENT DIASTOLIC BLOOD PRESSURE < 80 MM HG: ICD-10-PCS | Mod: CPTII,S$GLB,, | Performed by: INTERNAL MEDICINE

## 2022-02-01 PROCEDURE — 3046F PR MOST RECENT HEMOGLOBIN A1C LEVEL > 9.0%: ICD-10-PCS | Mod: CPTII,S$GLB,, | Performed by: INTERNAL MEDICINE

## 2022-02-01 PROCEDURE — 99204 OFFICE O/P NEW MOD 45 MIN: CPT | Mod: S$GLB,,, | Performed by: INTERNAL MEDICINE

## 2022-02-01 PROCEDURE — 1125F PR PAIN SEVERITY QUANTIFIED, PAIN PRESENT: ICD-10-PCS | Mod: CPTII,S$GLB,, | Performed by: INTERNAL MEDICINE

## 2022-02-01 NOTE — PROGRESS NOTES
Kay Galarza is a 66 y.o. female     HPI:    She was noted to have microalbuminuria on routine laboratory studies.  Nephrology has been consulted for evaluation.  In the clinic today she is accompanied by her daughter.  Her laboratory studies medications were reviewed.  All Nephrology related questions were answered to their satisfaction.  She has had diabetes mellitus for greater than 10 years.  Additionally she has had hypertension for a long period of time.  On review of her lab she has had microalbuminuria for at least 4 years.  Her creatinine has shown some intrinsic fluctuation which appears to be hemodynamic in nature secondary to severe hyperglycemia.    PAST MEDICAL HISTORY:  She  has a past medical history of Arthritis, Asthma, Cataract, Diabetes mellitus, Diabetes mellitus, type 2, Diabetic retinopathy, DM (diabetes mellitus) (2007), Hyperlipidemia, and Hypertension.    PAST SURGICAL HISTORY:  She  has a past surgical history that includes Colonoscopy (N/A, 12/22/2018); Cataract extraction w/  intraocular lens implant (Right, 07/18/2018); Cataract extraction w/  intraocular lens implant (Left, 03/13/2019); Endoscopic vein laser treatment (Bilateral, 1990's); Open reduction and internal fixation (ORIF) of injury of ankle (Left, 7/23/2020); Fixation of syndesmosis of ankle (Left, 7/23/2020); Midfoot arthrodesis (Left, 7/23/2020); Manipulation with anesthesia (Left, 7/23/2020); Reconstruction with fusion of Charcot foot (Left, 8/18/2020); Application of wound vacuum-assisted closure device (Left, 8/18/2020); Wound debridement (Left, 8/18/2020); and Removal of hardware from lower extremity (Left, 1/27/2021).    SOCIAL HISTORY:  She  reports that she has never smoked. She has never used smokeless tobacco. She reports that she does not drink alcohol and does not use drugs.      FAMILY MEDICAL HISTORY:  Her family history includes Cancer in her father; Diabetes in her sister; Glaucoma in her  maternal grandmother and mother; Hypertension in her father and mother; Stroke in her father.    Review of patient's allergies indicates:   Allergen Reactions    Pollen extracts      Seasonal allergies, sneezing           Prior to Admission medications    Medication Sig Start Date End Date Taking? Authorizing Provider   acetaminophen (TYLENOL) 500 MG tablet Take 500 mg by mouth every 6 (six) hours as needed for Pain.   Yes Historical Provider   albuterol (PROVENTIL/VENTOLIN HFA) 90 mcg/actuation inhaler INHALE 2 PUFFS INTO THE LUNGS EVERY 6 (SIX) HOURS AS NEEDED FOR WHEEZING. RESCUE 6/28/21  Yes Lucy Negron MD   atorvastatin (LIPITOR) 40 MG tablet TAKE 1 TABLET BY MOUTH ONCE DAILY. 6/28/21  Yes Lucy Negron MD   B infantis/B ani/B koby/B bifid (PROBIOTIC 4X ORAL) Take by mouth Daily.   Yes Historical Provider   ergocalciferol (ERGOCALCIFEROL) 50,000 unit Cap TAKE 1 CAPSULE  BY MOUTH ONCE A WEEK FOR 4 DOSES 6/28/21  Yes Kirt Gray DPM   fluocinolone and shower cap 0.01 % Oil  3/14/21  Yes Historical Provider   fluticasone propionate (FLONASE) 50 mcg/actuation nasal spray 2 sprays (100 mcg total) by Each Nostril route once daily. 1/25/21  Yes Lucy Negron MD   gabapentin (NEURONTIN) 100 MG capsule Take 1 capsule (100 mg total) by mouth 3 (three) times daily. 4/9/21 4/9/22 Yes Lucy Negron MD   glimepiride (AMARYL) 4 MG tablet Take 1 tablet (4 mg total) by mouth daily with breakfast. 4/9/21  Yes Lucy Negron MD   insulin glargine, TOUJEO, (TOUJEO SOLOSTAR U-300 INSULIN) 300 unit/mL (1.5 mL) InPn pen Inject 50 Units into the skin 2 (two) times daily. 4/9/21  Yes Lucy Negron MD   ketoconazole (NIZORAL) 2 % shampoo Apply topically 3 (three) times a week. 1/19/22  Yes Lucy Negron MD   lisinopriL-hydrochlorothiazide (PRINZIDE,ZESTORETIC) 20-25 mg Tab TAKE 1 TABLET BY MOUTH ONCE DAILY. 6/28/21  Yes Lucy CHRISTIE  "MD Jamil   magnesium oxide 200 mg magnesium Tab TAKE 3 TABLETS  BY MOUTH EVERY EVENING. 8/2/21  Yes Lucy Negron MD   omeprazole (PRILOSEC) 20 MG capsule TAKE 1 CAPSULE BY MOUTH TWICE A DAY. 1/19/22  Yes Lucy Negron MD   pen needle, diabetic (BD ULTRA-FINE SHORT PEN NEEDLE) 31 gauge x 5/16" Ndle USE 1  ONCE DAILY 1/25/21  Yes Lucy Negron MD   traZODone (DESYREL) 150 MG tablet TAKE 1 TABLET  BY MOUTH NIGHTLY AS NEEDED FOR INSOMNIA. 6/28/21  Yes Lucy Negron MD   TRULICITY 1.5 mg/0.5 mL pen injector INJECT 1.5 MG INTO THE SKIN EVERY 7 DAYS 6/28/21  Yes Lucy Negron MD   levocetirizine (XYZAL) 5 MG tablet Take 1 tablet (5 mg total) by mouth every evening. 1/25/21 1/25/22  Lucy Negron MD   ammonium lactate (AMLACTIN) 12 % lotion Use daily.  Apply to damp skin after bathing.  Patient not taking: No sig reported 1/25/21 2/1/22  Lucy Negron MD   clobetasoL (TEMOVATE) 0.05 % external solution APPLY TOPICALLY 2 (TWO) TIMES DAILY. TO SCALP ONLY  Patient not taking: No sig reported 6/28/21 2/1/22  Lucy Negron MD   diclofenac sodium (VOLTAREN) 1 % Gel Apply topically 2 (two) times daily.  Patient not taking: No sig reported 7/20/20 2/1/22  Lucy Negron MD   emollient combination no.69 (EUCERIN SKIN CALMING) Crea Apply BID  Patient not taking: No sig reported 1/29/19 2/1/22  Lucy Negron MD   fluocinolone (SYNALAR) 0.01 % Sham Apply no more than 1 ounce to scalp once daily; work into lather and allow to remain on scalp for ~5 minutes. Remove from hair and scalp by rinsing thoroughly with water.  Patient not taking: No sig reported 1/25/21 2/1/22  Lucy Negron MD   triamcinolone acetonide 0.025% (KENALOG) 0.025 % cream AAA bid as needed for flares.  Mild steroid.  Patient not taking: No sig reported 10/26/20 2/1/22  Lucy Negron MD    "   Sodium   Date Value Ref Range Status   01/19/2022 133 (L) 136 - 145 mmol/L Final   01/21/2021 141 136 - 145 mmol/L Final   10/27/2020 142 136 - 145 mmol/L Final     Potassium   Date Value Ref Range Status   01/19/2022 4.1 3.5 - 5.1 mmol/L Final   01/21/2021 4.8 3.5 - 5.1 mmol/L Final   10/27/2020 4.6 3.5 - 5.1 mmol/L Final     Chloride   Date Value Ref Range Status   01/19/2022 95 95 - 110 mmol/L Final   01/21/2021 104 95 - 110 mmol/L Final   10/27/2020 104 95 - 110 mmol/L Final     CO2   Date Value Ref Range Status   01/19/2022 26 23 - 29 mmol/L Final   01/21/2021 27 23 - 29 mmol/L Final   10/27/2020 28 23 - 29 mmol/L Final     Glucose   Date Value Ref Range Status   01/19/2022 312 (H) 70 - 110 mg/dL Final   01/21/2021 206 (H) 70 - 110 mg/dL Final   10/27/2020 101 70 - 110 mg/dL Final     BUN   Date Value Ref Range Status   01/19/2022 35 (H) 8 - 23 mg/dL Final   01/21/2021 32 (H) 8 - 23 mg/dL Final   10/27/2020 19 8 - 23 mg/dL Final     Creatinine   Date Value Ref Range Status   01/19/2022 1.3 0.5 - 1.4 mg/dL Final   01/21/2021 0.9 0.5 - 1.4 mg/dL Final   10/27/2020 0.8 0.5 - 1.4 mg/dL Final     Calcium   Date Value Ref Range Status   01/19/2022 9.9 8.7 - 10.5 mg/dL Final   01/21/2021 9.9 8.7 - 10.5 mg/dL Final   10/27/2020 9.9 8.7 - 10.5 mg/dL Final     Total Protein   Date Value Ref Range Status   01/19/2022 6.6 6.0 - 8.4 g/dL Final   10/27/2020 7.3 6.0 - 8.4 g/dL Final   08/20/2020 6.3 6.0 - 8.4 g/dL Final     Albumin   Date Value Ref Range Status   01/19/2022 3.8 3.5 - 5.2 g/dL Final   10/27/2020 3.7 3.5 - 5.2 g/dL Final   08/20/2020 2.7 (L) 3.5 - 5.2 g/dL Final     Total Bilirubin   Date Value Ref Range Status   01/19/2022 0.5 0.1 - 1.0 mg/dL Final     Comment:     For infants and newborns, interpretation of results should be based  on gestational age, weight and in agreement with clinical  observations.    Premature Infant recommended reference ranges:  Up to 24 hours.............<8.0 mg/dL  Up to 48  hours............<12.0 mg/dL  3-5 days..................<15.0 mg/dL  6-29 days.................<15.0 mg/dL     10/27/2020 0.3 0.1 - 1.0 mg/dL Final     Comment:     For infants and newborns, interpretation of results should be based  on gestational age, weight and in agreement with clinical  observations.  Premature Infant recommended reference ranges:  Up to 24 hours.............<8.0 mg/dL  Up to 48 hours............<12.0 mg/dL  3-5 days..................<15.0 mg/dL  6-29 days.................<15.0 mg/dL     08/20/2020 0.2 0.1 - 1.0 mg/dL Final     Comment:     For infants and newborns, interpretation of results should be based  on gestational age, weight and in agreement with clinical  observations.  Premature Infant recommended reference ranges:  Up to 24 hours.............<8.0 mg/dL  Up to 48 hours............<12.0 mg/dL  3-5 days..................<15.0 mg/dL  6-29 days.................<15.0 mg/dL       Alkaline Phosphatase   Date Value Ref Range Status   01/26/2022 135 55 - 135 U/L Final   01/19/2022 153 (H) 55 - 135 U/L Final   10/27/2020 118 55 - 135 U/L Final     AST   Date Value Ref Range Status   01/19/2022 9 (L) 10 - 40 U/L Final   10/27/2020 17 10 - 40 U/L Final   08/20/2020 11 10 - 40 U/L Final     ALT   Date Value Ref Range Status   01/19/2022 11 10 - 44 U/L Final   10/27/2020 11 10 - 44 U/L Final   08/20/2020 6 (L) 10 - 44 U/L Final     Anion Gap   Date Value Ref Range Status   01/19/2022 12 8 - 16 mmol/L Final   01/21/2021 10 8 - 16 mmol/L Final   10/27/2020 10 8 - 16 mmol/L Final     eGFR if    Date Value Ref Range Status   01/19/2022 48.7 (A) >60 mL/min/1.73 m^2 Final   01/21/2021 >60.0 >60 mL/min/1.73 m^2 Final   10/27/2020 >60.0 >60 mL/min/1.73 m^2 Final     eGFR if non    Date Value Ref Range Status   01/19/2022 42.3 (A) >60 mL/min/1.73 m^2 Final     Comment:     Calculation used to obtain the estimated glomerular filtration  rate (eGFR) is the CKD-EPI equation.  "     01/21/2021 >60.0 >60 mL/min/1.73 m^2 Final     Comment:     Calculation used to obtain the estimated glomerular filtration  rate (eGFR) is the CKD-EPI equation.      10/27/2020 >60.0 >60 mL/min/1.73 m^2 Final     Comment:     Calculation used to obtain the estimated glomerular filtration  rate (eGFR) is the CKD-EPI equation.        Creatinine, Urine   Date Value Ref Range Status   01/26/2022 65.0 15.0 - 325.0 mg/dL Final   12/10/2019 231.0 15.0 - 325.0 mg/dL Final     Comment:     The random urine reference ranges provided were established   for 24 hour urine collections.  No reference ranges exist for  random urine specimens.  Correlate clinically.     08/22/2019 226.0 15.0 - 325.0 mg/dL Final     Comment:     The random urine reference ranges provided were established   for 24 hour urine collections.  No reference ranges exist for  random urine specimens.  Correlate clinically.         REVIEW OF SYSTEMS:  Patient has no fever, fatigue, visual changes, chest pain, edema, cough, dyspnea, nausea, vomiting, constipation, diarrhea, arthralgias, pruritis, dizziness, weakness, depression, confusion.        PHYSICAL EXAM:   height is 5' 9" (1.753 m) and weight is 100.7 kg (222 lb 0.1 oz). Her blood pressure is 110/60 and her pulse is 84.   Gen: WDWN female in no apparent distress  Psych: Normal mood and affect  Skin: No rashes or ulcers  Eyes: Normal conjunctiva and lids, PERRLA  ENT: Normal hearing with no oropharyngeal lesions  Neck: No JVD  Chest: Clear with no rales, rhonchi, wheezing with normal effort  CV: Regular with no murmurs, gallops or rubs  Abd: Soft, nontender, no distension, positive bowel sounds  Ext: No cyanosis, clubbing or edema          IMPRESSION AND RECOMMENDATIONS:    1. CKD stage 2:  Creatinine has been essentially normal running between 0.8 at 1.3 for the past year.  Her creatinine fluctuates with her blood sugars increase which is indicative of hemodynamic effects.  We discussed the " importance of maintaining good A1c less than 7%.  Potassium is stable 4.1.    2. Hypertension:  Blood pressure is well controlled on current regimen.  She is on a RAAS inhibitor.    3. Microalbuminuria:  She has had evidence of microalbuminuria for at least 4 years.  Will check a urinalysis and PC ratio to see if she has progressed to overt proteinuria.  This is consistent with early diabetic glomerulopathy.  Fortunately her clearances have remained normal.

## 2022-02-20 RX ORDER — LEVOCETIRIZINE DIHYDROCHLORIDE 5 MG/1
5 TABLET, FILM COATED ORAL NIGHTLY
Qty: 30 TABLET | Refills: 11 | Status: CANCELLED | OUTPATIENT
Start: 2022-02-20

## 2022-02-21 NOTE — TELEPHONE ENCOUNTER
No new care gaps identified.  Powered by [employer]. Reference number: [id number].   2/20/2022 7:44:12 PM CST

## 2022-02-22 RX ORDER — LEVOCETIRIZINE DIHYDROCHLORIDE 5 MG/1
5 TABLET, FILM COATED ORAL NIGHTLY
Qty: 90 TABLET | Refills: 3 | Status: ON HOLD | OUTPATIENT
Start: 2022-02-22 | End: 2023-03-16 | Stop reason: HOSPADM

## 2022-02-22 NOTE — TELEPHONE ENCOUNTER
Refill Authorization Note   Kay Galarza  is requesting a refill authorization.  Brief Assessment and Rationale for Refill:  Approve     Medication Therapy Plan:           Comments:   --->Care Gap information included below if applicable.       Requested Prescriptions   Pending Prescriptions Disp Refills    levocetirizine (XYZAL) 5 MG tablet [Pharmacy Med Name: LEVOCETIRIZINE DIHYDROCHO  5MG TAB] 90 tablet 3     Sig: TAKE 1 TABLET (5 MG TOTAL) BY MOUTH EVERY EVENING.       Ear, Nose, and Throat:  Antihistamines Passed - 2/22/2022 11:01 AM        Passed - Patient is at least 18 years old        Passed - Valid encounter within last 15 months     Recent Visits  Date Type Provider Dept   01/19/22 Office Visit Lucy Negron MD [facility] Internal Medicine   01/25/21 Office Visit Lucy Negron MD [facility] Internal Medicine   10/27/20 Office Visit Lucy Negron MD [facility] Internal Medicine   07/20/20 Office Visit Lucy Negron MD [facility] Internal Medicine   Showing recent visits within past 720 days and meeting all other requirements  Future Appointments  No visits were found meeting these conditions.  Showing future appointments within next 150 days and meeting all other requirements      Future Appointments              Tomorrow [facility] M[facility]MO1 [facility], [place]    In 2 weeks [facility] NM1 [facility] - Lab & Imaging, [place]    In 2 weeks [facility] - Cardiopulmonary (Hospital), [place]    In 2 weeks [facility] NM1 [facility] - Lab & Imaging, [place]    In 1 month Antione Chavez MD [facility] - Cardiology 3rd Fl, [facility]    In 3 months LABORATORY, [facility] - Lab, [place]    In 3 months SPECIMEN, [facility] - Lab, [place]    In 3 months MD KLAUDIA [facility] - Nephrology, [facility]                    Appointments  past 12m or future 3m with PCP    Date Provider   Last Visit   1/19/2022  Lucy Negron MD   Next Visit   Visit date not found Lucy Negron MD   ED visits in past 90 days: 0     Note composed:11:02 AM 02/22/2022

## 2022-03-08 ENCOUNTER — TELEPHONE (OUTPATIENT)
Dept: FAMILY MEDICINE | Facility: CLINIC | Age: 67
End: 2022-03-08
Payer: MEDICARE

## 2022-03-08 RX ORDER — INSULIN PUMP SYRINGE, 3 ML
EACH MISCELLANEOUS
Qty: 1 EACH | Refills: 0 | Status: SHIPPED | OUTPATIENT
Start: 2022-03-08 | End: 2023-07-05

## 2022-03-08 RX ORDER — LANCETS
EACH MISCELLANEOUS
Qty: 180 EACH | Refills: 0 | Status: SHIPPED | OUTPATIENT
Start: 2022-03-08

## 2022-03-08 NOTE — TELEPHONE ENCOUNTER
New Glucometer sent to pharmacy . To the date she has not f.u with Ms Dennison ( DM educ). It is imperative that she sees her .

## 2022-03-17 ENCOUNTER — OFFICE VISIT (OUTPATIENT)
Dept: PODIATRY | Facility: CLINIC | Age: 67
End: 2022-03-17
Payer: MEDICARE

## 2022-03-17 VITALS — BODY MASS INDEX: 32.88 KG/M2 | WEIGHT: 222 LBS | HEIGHT: 69 IN

## 2022-03-17 DIAGNOSIS — E11.621 DIABETIC ULCER OF TOE OF LEFT FOOT ASSOCIATED WITH TYPE 2 DIABETES MELLITUS, WITH FAT LAYER EXPOSED: Primary | ICD-10-CM

## 2022-03-17 DIAGNOSIS — E11.49 TYPE II DIABETES MELLITUS WITH NEUROLOGICAL MANIFESTATIONS: ICD-10-CM

## 2022-03-17 DIAGNOSIS — L97.522 DIABETIC ULCER OF TOE OF LEFT FOOT ASSOCIATED WITH TYPE 2 DIABETES MELLITUS, WITH FAT LAYER EXPOSED: Primary | ICD-10-CM

## 2022-03-17 PROCEDURE — 1160F RVW MEDS BY RX/DR IN RCRD: CPT | Mod: CPTII,S$GLB,, | Performed by: PODIATRIST

## 2022-03-17 PROCEDURE — 11042 PR DEBRIDEMENT, SKIN, SUB-Q TISSUE,=<20 SQ CM: ICD-10-PCS | Mod: S$GLB,,, | Performed by: PODIATRIST

## 2022-03-17 PROCEDURE — 87186 SC STD MICRODIL/AGAR DIL: CPT | Performed by: PODIATRIST

## 2022-03-17 PROCEDURE — 3288F PR FALLS RISK ASSESSMENT DOCUMENTED: ICD-10-PCS | Mod: CPTII,S$GLB,, | Performed by: PODIATRIST

## 2022-03-17 PROCEDURE — 3288F FALL RISK ASSESSMENT DOCD: CPT | Mod: CPTII,S$GLB,, | Performed by: PODIATRIST

## 2022-03-17 PROCEDURE — 1159F MED LIST DOCD IN RCRD: CPT | Mod: CPTII,S$GLB,, | Performed by: PODIATRIST

## 2022-03-17 PROCEDURE — 3046F HEMOGLOBIN A1C LEVEL >9.0%: CPT | Mod: CPTII,S$GLB,, | Performed by: PODIATRIST

## 2022-03-17 PROCEDURE — 99214 OFFICE O/P EST MOD 30 MIN: CPT | Mod: 25,S$GLB,, | Performed by: PODIATRIST

## 2022-03-17 PROCEDURE — 1125F PR PAIN SEVERITY QUANTIFIED, PAIN PRESENT: ICD-10-PCS | Mod: CPTII,S$GLB,, | Performed by: PODIATRIST

## 2022-03-17 PROCEDURE — 1125F AMNT PAIN NOTED PAIN PRSNT: CPT | Mod: CPTII,S$GLB,, | Performed by: PODIATRIST

## 2022-03-17 PROCEDURE — 99999 PR PBB SHADOW E&M-EST. PATIENT-LVL IV: ICD-10-PCS | Mod: PBBFAC,,, | Performed by: PODIATRIST

## 2022-03-17 PROCEDURE — 87077 CULTURE AEROBIC IDENTIFY: CPT | Performed by: PODIATRIST

## 2022-03-17 PROCEDURE — 3062F PR POS MACROALBUMINURIA RESULT DOCUMENTED/REVIEW: ICD-10-PCS | Mod: CPTII,S$GLB,, | Performed by: PODIATRIST

## 2022-03-17 PROCEDURE — 3066F NEPHROPATHY DOC TX: CPT | Mod: CPTII,S$GLB,, | Performed by: PODIATRIST

## 2022-03-17 PROCEDURE — 4010F ACE/ARB THERAPY RXD/TAKEN: CPT | Mod: CPTII,S$GLB,, | Performed by: PODIATRIST

## 2022-03-17 PROCEDURE — 3008F PR BODY MASS INDEX (BMI) DOCUMENTED: ICD-10-PCS | Mod: CPTII,S$GLB,, | Performed by: PODIATRIST

## 2022-03-17 PROCEDURE — 3046F PR MOST RECENT HEMOGLOBIN A1C LEVEL > 9.0%: ICD-10-PCS | Mod: CPTII,S$GLB,, | Performed by: PODIATRIST

## 2022-03-17 PROCEDURE — 3062F POS MACROALBUMINURIA REV: CPT | Mod: CPTII,S$GLB,, | Performed by: PODIATRIST

## 2022-03-17 PROCEDURE — 1159F PR MEDICATION LIST DOCUMENTED IN MEDICAL RECORD: ICD-10-PCS | Mod: CPTII,S$GLB,, | Performed by: PODIATRIST

## 2022-03-17 PROCEDURE — 1160F PR REVIEW ALL MEDS BY PRESCRIBER/CLIN PHARMACIST DOCUMENTED: ICD-10-PCS | Mod: CPTII,S$GLB,, | Performed by: PODIATRIST

## 2022-03-17 PROCEDURE — 11042 DBRDMT SUBQ TIS 1ST 20SQCM/<: CPT | Mod: S$GLB,,, | Performed by: PODIATRIST

## 2022-03-17 PROCEDURE — 99214 PR OFFICE/OUTPT VISIT, EST, LEVL IV, 30-39 MIN: ICD-10-PCS | Mod: 25,S$GLB,, | Performed by: PODIATRIST

## 2022-03-17 PROCEDURE — 4010F PR ACE/ARB THEARPY RXD/TAKEN: ICD-10-PCS | Mod: CPTII,S$GLB,, | Performed by: PODIATRIST

## 2022-03-17 PROCEDURE — 3008F BODY MASS INDEX DOCD: CPT | Mod: CPTII,S$GLB,, | Performed by: PODIATRIST

## 2022-03-17 PROCEDURE — 1101F PR PT FALLS ASSESS DOC 0-1 FALLS W/OUT INJ PAST YR: ICD-10-PCS | Mod: CPTII,S$GLB,, | Performed by: PODIATRIST

## 2022-03-17 PROCEDURE — 3066F PR DOCUMENTATION OF TREATMENT FOR NEPHROPATHY: ICD-10-PCS | Mod: CPTII,S$GLB,, | Performed by: PODIATRIST

## 2022-03-17 PROCEDURE — 87070 CULTURE OTHR SPECIMN AEROBIC: CPT | Performed by: PODIATRIST

## 2022-03-17 PROCEDURE — 1101F PT FALLS ASSESS-DOCD LE1/YR: CPT | Mod: CPTII,S$GLB,, | Performed by: PODIATRIST

## 2022-03-17 PROCEDURE — 99999 PR PBB SHADOW E&M-EST. PATIENT-LVL IV: CPT | Mod: PBBFAC,,, | Performed by: PODIATRIST

## 2022-03-17 RX ORDER — ACETAMINOPHEN AND CODEINE PHOSPHATE 300; 30 MG/1; MG/1
1 TABLET ORAL EVERY 6 HOURS PRN
Qty: 28 TABLET | Refills: 0 | Status: SHIPPED | OUTPATIENT
Start: 2022-03-17 | End: 2022-03-24

## 2022-03-17 RX ORDER — DOXYCYCLINE 50 MG/1
50 CAPSULE ORAL EVERY 12 HOURS
Qty: 14 CAPSULE | Refills: 0 | Status: SHIPPED | OUTPATIENT
Start: 2022-03-17 | End: 2022-03-24

## 2022-03-17 NOTE — PROGRESS NOTES
Subjective:       Patient ID: Kay Galarza is a 66 y.o. female.    Chief Complaint: Diabetic Foot Ulcer (Left midfoot, 9/10 pain at present, pcp  last seen 01/19/2022)      HPI: Kay Galarza presents to the clinic today, for evaluation and treatment concerning an ulceration/wound/bulla(e) to the left plantar 1st MTPJ. Patient's Primary Care Provider is Lucy Negron MD. The PMHx. does include DM w/ peripheral neuropathy, venous insufficiency and acquired foot/ankle deformity/deformities. The wound(s) have/has been present for a week or so. Most recent local wound care w/ dry dressings.     Hemoglobin A1C   Date Value Ref Range Status   01/26/2022 >14.0 (H) 4.0 - 5.6 % Final     Comment:     ADA Screening Guidelines:  5.7-6.4%  Consistent with prediabetes  >or=6.5%  Consistent with diabetes    High levels of fetal hemoglobin interfere with the HbA1C  assay. Heterozygous hemoglobin variants (HbS, HgC, etc)do  not significantly interfere with this assay.   However, presence of multiple variants may affect accuracy.     01/19/2022 >14.0 (H) 4.0 - 5.6 % Corrected     Comment:     ADA Screening Guidelines:  5.7-6.4%  Consistent with prediabetes  >or=6.5%  Consistent with diabetes    High levels of fetal hemoglobin interfere with the HbA1C  assay. Heterozygous hemoglobin variants (HbS, HgC, etc)do  not significantly interfere with this assay.   However, presence of multiple variants may affect accuracy.  CORRECTED RESULT; previously reported as 5.9 on 01/20/2022 at 02:04.     01/21/2021 6.4 (H) 4.0 - 5.6 % Final     Comment:     ADA Screening Guidelines:  5.7-6.4%  Consistent with prediabetes  >or=6.5%  Consistent with diabetes  High levels of fetal hemoglobin interfere with the HbA1C  assay. Heterozygous hemoglobin variants (HbS, HgC, etc)do  not significantly interfere with this assay.   However, presence of multiple variants may affect accuracy.         Review  of patient's allergies indicates:   Allergen Reactions    Pollen extracts      Seasonal allergies, sneezing       Past Medical History:   Diagnosis Date    Arthritis     DERIAN KNEES    Asthma     SEASONAL    Cataract     Diabetes mellitus     Diabetes mellitus, type 2     Diabetic retinopathy     DM (diabetes mellitus) 2007    BS 97 09/29/2020    Hyperlipidemia     Hypertension        Family History   Problem Relation Age of Onset    Cancer Father         Prostate Ca    Hypertension Father     Stroke Father     Diabetes Sister     Glaucoma Mother     Hypertension Mother     Glaucoma Maternal Grandmother     Blindness Neg Hx     Macular degeneration Neg Hx     Retinal detachment Neg Hx     Strabismus Neg Hx        Social History     Socioeconomic History    Marital status:    Tobacco Use    Smoking status: Never Smoker    Smokeless tobacco: Never Used   Substance and Sexual Activity    Alcohol use: Never    Drug use: No    Sexual activity: Yes       Past Surgical History:   Procedure Laterality Date    APPLICATION OF WOUND VACUUM-ASSISTED CLOSURE DEVICE Left 8/18/2020    Procedure: APPLICATION, WOUND VAC;  Surgeon: Kirt Gray DPM;  Location: [facility] OR;  Service: Podiatry;  Laterality: Left;    CATARACT EXTRACTION W/  INTRAOCULAR LENS IMPLANT Right 07/18/2018    CATARACT EXTRACTION W/  INTRAOCULAR LENS IMPLANT Left 03/13/2019    COLONOSCOPY N/A 12/22/2018    Procedure: COLONOSCOPY;  Surgeon: Zak Dover MD;  Location: [facility] ENDO;  Service: Endoscopy;  Laterality: N/A;    ENDOSCOPIC VEIN LASER TREATMENT Bilateral 1990's    FIXATION OF SYNDESMOSIS OF ANKLE Left 7/23/2020    Procedure: FIXATION, SYNDESMOSIS, ANKLE;  Surgeon: Kirt Gray DPM;  Location: [facility] OR;  Service: Podiatry;  Laterality: Left;    MANIPULATION WITH ANESTHESIA Left 7/23/2020    Procedure: MANIPULATION, WITH ANESTHESIA;  Surgeon: Kirt Gray DPM;  Location: [facility] OR;  Service: Podiatry;   "Laterality: Left;    MIDFOOT ARTHRODESIS Left 7/23/2020    Procedure: FUSION, JOINT, MIDFOOT;  Surgeon: Kirt Gray DPM;  Location: [facility] OR;  Service: Podiatry;  Laterality: Left;  2nd tarsometatarsal joint dislocation via fusion    OPEN REDUCTION AND INTERNAL FIXATION (ORIF) OF INJURY OF ANKLE Left 7/23/2020    Procedure: ORIF, ANKLE;  Surgeon: Kirt Gray DPM;  Location: [facility] OR;  Service: Podiatry;  Laterality: Left;    RECONSTRUCTION WITH FUSION OF CHARCOT FOOT Left 8/18/2020    Procedure: RECONSTRUCTION, CHARCOT FOOT, WITH FUSION;  Surgeon: Kirt Gray DPM;  Location: [facility] OR;  Service: Podiatry;  Laterality: Left;    REMOVAL OF HARDWARE FROM LOWER EXTREMITY Left 1/27/2021    Procedure: REMOVAL, HARDWARE, LOWER EXTREMITY;  Surgeon: Kirt Gray DPM;  Location: [facility] OR;  Service: Podiatry;  Laterality: Left;    WOUND DEBRIDEMENT Left 8/18/2020    Procedure: DEBRIDEMENT, WOUND;  Surgeon: Kirt Gray DPM;  Location: [facility] OR;  Service: Podiatry;  Laterality: Left;       Review of Systems   Constitutional: Negative for chills, fatigue and fever.   HENT: Negative for hearing loss.    Eyes: Negative for photophobia and visual disturbance.   Respiratory: Negative for cough, chest tightness, shortness of breath and wheezing.    Cardiovascular: Positive for leg swelling. Negative for chest pain and palpitations.   Gastrointestinal: Negative for constipation, diarrhea, nausea and vomiting.   Endocrine: Negative for cold intolerance and heat intolerance.   Genitourinary: Negative for flank pain.   Musculoskeletal: Positive for gait problem. Negative for neck pain and neck stiffness.   Skin: Positive for wound. Negative for color change.   Neurological: Positive for numbness. Negative for light-headedness and headaches.   Psychiatric/Behavioral: Negative for sleep disturbance.          Objective:   Ht 5' 9" (1.753 m)   Wt 100.7 kg (222 lb 0.1 oz)   BMI 32.78 kg/m²               LOWER EXTREMITY " PHYSICAL EXAMINATION  NEUROLOGY: Sensation to light touch is intact. Proprioception is intact, bilateral. Sensation to pin prick is reduced to absent. Vibratory sensation is diminished to the left and right lower extremity. Examination with 5.07 [place] Arnie monofilament reveals that protective sensation is not intact to the left and right plantar surfaces of the foot and digits, as the patient has no sensation/detection at greater than 4 distinct points of contact.     DERMATOLOGY: There is an ulceration at the plantar left 1st MTPJ. The ulcer measures 1cm x 1cm x 0.20cm. No drainage is noted. No osseous exposure is noted. No undermining is noted. No malodor is noted.     VASCULAR: LLE DP and PT are palpable. CFT is WNL. Hair growth is noted.     Assessment:     1. Diabetic ulcer of toe of left foot associated with type 2 diabetes mellitus, with fat layer exposed    2. Type II diabetes mellitus with neurological manifestations        Plan:     Diabetic ulcer of toe of left foot associated with type 2 diabetes mellitus, with fat layer exposed  -     Aerobic culture (Specify Source)  -     doxycycline (MONODOX) 50 MG Cap; Take 1 capsule (50 mg total) by mouth every 12 (twelve) hours. for 7 days  Dispense: 14 capsule; Refill: 0  -     Ambulatory referral/consult to Home Health; Future; Expected date: 03/18/2022    Type II diabetes mellitus with neurological manifestations    Other orders  -     acetaminophen-codeine 300-30mg (TYLENOL #3) 300-30 mg Tab; Take 1 tablet by mouth every 6 (six) hours as needed (Pain).  Dispense: 28 tablet; Refill: 0    Thorough discussion is had with the patient today, concerning the diagnosis, its etiology, and the treatment algorithm at present.     The wound was surgically debrided after adequate prep with alcohol and/or betadine paint. Excisional wound debridement was performed using sharp #10/#15 blade/rounded scalpel and tissue nipper, with removal of all non-viable skin and soft  tissues; necrotic skin/tissue formation. The woundbase/wound bed was also debrided to encourage bleeding as to promote/stimulate healing. Debridement was excisional and included epidermal, dermal and subcutaneous tissues. Post debridement measurements are as above. Hemostasis was achieved. Patient tolerated procedure well and without complications. Local woundcare with collagen dressings and bandage thereafter.     Start [facility].    Off-loading ATC.    Strict DMII control.    WB with surgical shoe.          Future Appointments   Date Time Provider Department Center   3/28/2022 10:20 AM Antione Chavez MD [facility] CARDIO [facility]   5/25/2022  9:30 AM LABORATORY, [facility] LAB [facility]   5/25/2022  9:40 AM SPECIMEN, [facility] SPECLAB [facility]   6/1/2022 10:00 AM Crescencio Sena MD ON NEPHRO  Medical C

## 2022-03-18 ENCOUNTER — TELEPHONE (OUTPATIENT)
Dept: FAMILY MEDICINE | Facility: CLINIC | Age: 67
End: 2022-03-18
Payer: MEDICARE

## 2022-03-18 PROCEDURE — G0180 PR HOME HEALTH MD CERTIFICATION: ICD-10-PCS | Mod: ,,, | Performed by: PODIATRIST

## 2022-03-18 PROCEDURE — G0180 MD CERTIFICATION HHA PATIENT: HCPCS | Mod: ,,, | Performed by: PODIATRIST

## 2022-03-18 NOTE — TELEPHONE ENCOUNTER
----- Message from Karena Cherry sent at 3/18/2022 12:21 PM CDT -----  Regarding: glucose  Contact: Rema- [facility]  Ms lloyd wanted to report patients blood glucose level of 424 and patient /100, please call her back if needed at [phone]

## 2022-03-18 NOTE — TELEPHONE ENCOUNTER
Must have ER eval asap due to elevated glucose and BP  . She was   advised to follow up with DM management in January  Who manages he DM

## 2022-03-18 NOTE — TELEPHONE ENCOUNTER
Home health nurse went in to admitt pt to homehealth  Nurse stated that  blood glucose level of 424 and patient /100, voiced that she thinks pt is noncompliant with checking blood sugar and taking medication. Her log shows the last time checked was in February. Feels that pt would benefit from blood sugar meter due her noncompliance with checking  Voice that family member stated that she makes up blood sugar readings and is noncompliant with taking meds.

## 2022-03-21 ENCOUNTER — PATIENT MESSAGE (OUTPATIENT)
Dept: PODIATRY | Facility: CLINIC | Age: 67
End: 2022-03-21
Payer: MEDICARE

## 2022-03-21 ENCOUNTER — PATIENT MESSAGE (OUTPATIENT)
Dept: FAMILY MEDICINE | Facility: CLINIC | Age: 67
End: 2022-03-21
Payer: MEDICARE

## 2022-03-21 ENCOUNTER — PATIENT MESSAGE (OUTPATIENT)
Dept: CARDIOLOGY | Facility: HOSPITAL | Age: 67
End: 2022-03-21
Payer: MEDICARE

## 2022-03-21 LAB — BACTERIA SPEC AEROBE CULT: ABNORMAL

## 2022-03-23 ENCOUNTER — PATIENT MESSAGE (OUTPATIENT)
Dept: FAMILY MEDICINE | Facility: CLINIC | Age: 67
End: 2022-03-23
Payer: MEDICARE

## 2022-03-23 NOTE — TELEPHONE ENCOUNTER
No new care gaps identified.  Powered by [employer]. Reference number: [id number].   3/23/2022 2:09:53 PM CDT

## 2022-03-28 ENCOUNTER — PATIENT OUTREACH (OUTPATIENT)
Dept: ADMINISTRATIVE | Facility: OTHER | Age: 67
End: 2022-03-28
Payer: MEDICARE

## 2022-03-29 ENCOUNTER — EXTERNAL HOME HEALTH (OUTPATIENT)
Dept: HOME HEALTH SERVICES | Facility: HOSPITAL | Age: 67
End: 2022-03-29
Payer: MEDICARE

## 2022-03-29 ENCOUNTER — TELEPHONE (OUTPATIENT)
Dept: CARDIOLOGY | Facility: HOSPITAL | Age: 67
End: 2022-03-29
Payer: MEDICARE

## 2022-03-31 ENCOUNTER — TELEPHONE (OUTPATIENT)
Dept: FAMILY MEDICINE | Facility: CLINIC | Age: 67
End: 2022-03-31
Payer: MEDICARE

## 2022-03-31 NOTE — TELEPHONE ENCOUNTER
----- Message from Charles Singletary sent at 3/31/2022  3:36 PM CDT -----  Contact: Mahesh Aragon ([facility]) would like to consult with nurse regarding pt's blood sugar.  L:teodora states her blood sugar was 417.  Please contact Mahesh @ [phone].  Thanks/As

## 2022-03-31 NOTE — TELEPHONE ENCOUNTER
Spoke to Mahesh regarding pts bs. She states it was 417, and then found out she did not take her medication. She did take hers meds while with HH nurse. She is asymptomatic and was educated on taking medications and checking her bs. She will keep her appt with DM education.

## 2022-04-03 ENCOUNTER — TELEPHONE (OUTPATIENT)
Dept: ADMINISTRATIVE | Facility: HOSPITAL | Age: 67
End: 2022-04-03
Payer: MEDICARE

## 2022-04-03 DIAGNOSIS — Z12.11 COLON CANCER SCREENING: Primary | ICD-10-CM

## 2022-04-11 ENCOUNTER — PATIENT OUTREACH (OUTPATIENT)
Dept: ADMINISTRATIVE | Facility: HOSPITAL | Age: 67
End: 2022-04-11
Payer: MEDICARE

## 2022-04-11 DIAGNOSIS — Z79.4 TYPE 2 DIABETES MELLITUS WITHOUT COMPLICATION, WITH LONG-TERM CURRENT USE OF INSULIN: Primary | ICD-10-CM

## 2022-04-11 DIAGNOSIS — E11.9 TYPE 2 DIABETES MELLITUS WITHOUT COMPLICATION, WITH LONG-TERM CURRENT USE OF INSULIN: Primary | ICD-10-CM

## 2022-04-18 ENCOUNTER — HOSPITAL ENCOUNTER (OUTPATIENT)
Dept: PREADMISSION TESTING | Facility: HOSPITAL | Age: 67
Discharge: HOME | End: 2022-04-18
Attending: FAMILY MEDICINE
Payer: MEDICARE

## 2022-04-18 ENCOUNTER — PATIENT MESSAGE (OUTPATIENT)
Dept: PREADMISSION TESTING | Facility: HOSPITAL | Age: 67
End: 2022-04-18

## 2022-04-18 DIAGNOSIS — Z12.11 COLON CANCER SCREENING: Primary | ICD-10-CM

## 2022-04-18 RX ORDER — SODIUM, POTASSIUM,MAG SULFATES 17.5-3.13G
1 SOLUTION, RECONSTITUTED, ORAL ORAL DAILY
Qty: 1 KIT | Refills: 0 | Status: SHIPPED | OUTPATIENT
Start: 2022-04-18 | End: 2022-04-20

## 2022-04-26 ENCOUNTER — PATIENT MESSAGE (OUTPATIENT)
Dept: ADMINISTRATIVE | Facility: HOSPITAL | Age: 67
End: 2022-04-26
Payer: MEDICARE

## 2022-04-29 ENCOUNTER — PATIENT OUTREACH (OUTPATIENT)
Dept: ADMINISTRATIVE | Facility: OTHER | Age: 67
End: 2022-04-29
Payer: MEDICARE

## 2022-05-02 ENCOUNTER — PATIENT MESSAGE (OUTPATIENT)
Dept: DIABETES | Facility: CLINIC | Age: 67
End: 2022-05-02
Payer: MEDICARE

## 2022-05-02 NOTE — TELEPHONE ENCOUNTER
Care Due:                  Date            Visit Type   Department     Provider  --------------------------------------------------------------------------------                                EP -                              PRIMARY      [facility] INTERNAL  Lucy RIDER  Last Visit: 01-      CARE ([facility])   [facility]       Jamil  Next Visit: None Scheduled  None         None Found                                                            Last  Test          Frequency    Reason                     Performed    Due Date  --------------------------------------------------------------------------------    HBA1C.......  6 months...  TRULICITY, glimepiride,    01- 07-                             insulin..................    Powered by [employer]. Reference number: [id number].   5/02/2022 12:14:12 PM CDT

## 2022-05-03 RX ORDER — GABAPENTIN 100 MG/1
CAPSULE ORAL
Qty: 90 CAPSULE | Refills: 0 | Status: SHIPPED | OUTPATIENT
Start: 2022-05-03 | End: 2022-05-19

## 2022-05-03 RX ORDER — INSULIN GLARGINE 300 U/ML
50 INJECTION, SOLUTION SUBCUTANEOUS 2 TIMES DAILY
Qty: 18 PEN | Refills: 0 | Status: SHIPPED | OUTPATIENT
Start: 2022-05-03 | End: 2022-05-09 | Stop reason: ALTCHOICE

## 2022-05-03 RX ORDER — GLIMEPIRIDE 4 MG/1
TABLET ORAL
Qty: 30 TABLET | Refills: 0 | Status: SHIPPED | OUTPATIENT
Start: 2022-05-03 | End: 2022-05-19

## 2022-05-03 NOTE — TELEPHONE ENCOUNTER
Refill Routing Note   Medication(s) are not appropriate for processing by [facility] for the following reason(s):      - Outside of protocol  - Required laboratory values are abnormal    [facility] action(s):  Route  Defer  Approve Medication-related problems identified: Requires labs     Medication Therapy Plan: Route-Gabapentin OOS for [facility]; Defer-Glimiperide abnl lab; Approve-Toujeo; Labs ordered  Medication reconciliation completed: No     Appointments  past 12m or future 3m with PCP    Date Provider   Last Visit   1/19/2022 Lcuy Negron MD   Next Visit   Visit date not found Lucy Negron MD   ED visits in past 90 days: 0        Note composed:9:32 AM 05/03/2022

## 2022-05-04 ENCOUNTER — PATIENT MESSAGE (OUTPATIENT)
Dept: FAMILY MEDICINE | Facility: CLINIC | Age: 67
End: 2022-05-04
Payer: MEDICARE

## 2022-05-09 ENCOUNTER — OFFICE VISIT (OUTPATIENT)
Dept: DIABETES | Facility: CLINIC | Age: 67
End: 2022-05-09
Payer: MEDICARE

## 2022-05-09 VITALS
BODY MASS INDEX: 34.64 KG/M2 | WEIGHT: 234.56 LBS | SYSTOLIC BLOOD PRESSURE: 145 MMHG | DIASTOLIC BLOOD PRESSURE: 91 MMHG | HEART RATE: 95 BPM

## 2022-05-09 DIAGNOSIS — E11.42 DIABETIC PERIPHERAL NEUROPATHY ASSOCIATED WITH TYPE 2 DIABETES MELLITUS: ICD-10-CM

## 2022-05-09 DIAGNOSIS — L97.522 DIABETIC ULCER OF TOE OF LEFT FOOT ASSOCIATED WITH TYPE 2 DIABETES MELLITUS, WITH FAT LAYER EXPOSED: ICD-10-CM

## 2022-05-09 DIAGNOSIS — E78.2 MIXED HYPERLIPIDEMIA: ICD-10-CM

## 2022-05-09 DIAGNOSIS — Z79.4 UNCONTROLLED TYPE 2 DIABETES MELLITUS WITH HYPERGLYCEMIA, WITH LONG-TERM CURRENT USE OF INSULIN: Primary | ICD-10-CM

## 2022-05-09 DIAGNOSIS — N18.2 CKD STAGE 2 DUE TO TYPE 2 DIABETES MELLITUS: ICD-10-CM

## 2022-05-09 DIAGNOSIS — I10 ESSENTIAL HYPERTENSION: ICD-10-CM

## 2022-05-09 DIAGNOSIS — E11.3553 STABLE PROLIFERATIVE DIABETIC RETINOPATHY OF BOTH EYES ASSOCIATED WITH TYPE 2 DIABETES MELLITUS: ICD-10-CM

## 2022-05-09 DIAGNOSIS — M14.672 CHARCOT'S JOINT OF FOOT, LEFT: ICD-10-CM

## 2022-05-09 DIAGNOSIS — E11.65 UNCONTROLLED TYPE 2 DIABETES MELLITUS WITH HYPERGLYCEMIA, WITH LONG-TERM CURRENT USE OF INSULIN: Primary | ICD-10-CM

## 2022-05-09 DIAGNOSIS — E11.621 DIABETIC ULCER OF TOE OF LEFT FOOT ASSOCIATED WITH TYPE 2 DIABETES MELLITUS, WITH FAT LAYER EXPOSED: ICD-10-CM

## 2022-05-09 DIAGNOSIS — E11.22 CKD STAGE 2 DUE TO TYPE 2 DIABETES MELLITUS: ICD-10-CM

## 2022-05-09 LAB — GLUCOSE SERPL-MCNC: 125 MG/DL (ref 70–110)

## 2022-05-09 PROCEDURE — 3077F PR MOST RECENT SYSTOLIC BLOOD PRESSURE >= 140 MM HG: ICD-10-PCS | Mod: CPTII,S$GLB,, | Performed by: NURSE PRACTITIONER

## 2022-05-09 PROCEDURE — 1101F PR PT FALLS ASSESS DOC 0-1 FALLS W/OUT INJ PAST YR: ICD-10-PCS | Mod: CPTII,S$GLB,, | Performed by: NURSE PRACTITIONER

## 2022-05-09 PROCEDURE — 3008F BODY MASS INDEX DOCD: CPT | Mod: CPTII,S$GLB,, | Performed by: NURSE PRACTITIONER

## 2022-05-09 PROCEDURE — 3080F PR MOST RECENT DIASTOLIC BLOOD PRESSURE >= 90 MM HG: ICD-10-PCS | Mod: CPTII,S$GLB,, | Performed by: NURSE PRACTITIONER

## 2022-05-09 PROCEDURE — 95251 PR GLUCOSE MONITOR, 72 HOUR, PHYS INTERP: ICD-10-PCS | Mod: S$GLB,,, | Performed by: NURSE PRACTITIONER

## 2022-05-09 PROCEDURE — 1159F PR MEDICATION LIST DOCUMENTED IN MEDICAL RECORD: ICD-10-PCS | Mod: CPTII,S$GLB,, | Performed by: NURSE PRACTITIONER

## 2022-05-09 PROCEDURE — 99999 PR PBB SHADOW E&M-EST. PATIENT-LVL V: CPT | Mod: PBBFAC,,, | Performed by: NURSE PRACTITIONER

## 2022-05-09 PROCEDURE — 3008F PR BODY MASS INDEX (BMI) DOCUMENTED: ICD-10-PCS | Mod: CPTII,S$GLB,, | Performed by: NURSE PRACTITIONER

## 2022-05-09 PROCEDURE — 4010F ACE/ARB THERAPY RXD/TAKEN: CPT | Mod: CPTII,S$GLB,, | Performed by: NURSE PRACTITIONER

## 2022-05-09 PROCEDURE — 3077F SYST BP >= 140 MM HG: CPT | Mod: CPTII,S$GLB,, | Performed by: NURSE PRACTITIONER

## 2022-05-09 PROCEDURE — 3288F PR FALLS RISK ASSESSMENT DOCUMENTED: ICD-10-PCS | Mod: CPTII,S$GLB,, | Performed by: NURSE PRACTITIONER

## 2022-05-09 PROCEDURE — 3288F FALL RISK ASSESSMENT DOCD: CPT | Mod: CPTII,S$GLB,, | Performed by: NURSE PRACTITIONER

## 2022-05-09 PROCEDURE — 3062F PR POS MACROALBUMINURIA RESULT DOCUMENTED/REVIEW: ICD-10-PCS | Mod: CPTII,S$GLB,, | Performed by: NURSE PRACTITIONER

## 2022-05-09 PROCEDURE — 1160F RVW MEDS BY RX/DR IN RCRD: CPT | Mod: CPTII,S$GLB,, | Performed by: NURSE PRACTITIONER

## 2022-05-09 PROCEDURE — 95251 CONT GLUC MNTR ANALYSIS I&R: CPT | Mod: S$GLB,,, | Performed by: NURSE PRACTITIONER

## 2022-05-09 PROCEDURE — 3066F PR DOCUMENTATION OF TREATMENT FOR NEPHROPATHY: ICD-10-PCS | Mod: CPTII,S$GLB,, | Performed by: NURSE PRACTITIONER

## 2022-05-09 PROCEDURE — 99214 OFFICE O/P EST MOD 30 MIN: CPT | Mod: S$GLB,,, | Performed by: NURSE PRACTITIONER

## 2022-05-09 PROCEDURE — 99214 PR OFFICE/OUTPT VISIT, EST, LEVL IV, 30-39 MIN: ICD-10-PCS | Mod: S$GLB,,, | Performed by: NURSE PRACTITIONER

## 2022-05-09 PROCEDURE — 3080F DIAST BP >= 90 MM HG: CPT | Mod: CPTII,S$GLB,, | Performed by: NURSE PRACTITIONER

## 2022-05-09 PROCEDURE — 1160F PR REVIEW ALL MEDS BY PRESCRIBER/CLIN PHARMACIST DOCUMENTED: ICD-10-PCS | Mod: CPTII,S$GLB,, | Performed by: NURSE PRACTITIONER

## 2022-05-09 PROCEDURE — 82962 GLUCOSE BLOOD TEST: CPT | Mod: S$GLB,,, | Performed by: NURSE PRACTITIONER

## 2022-05-09 PROCEDURE — 1125F PR PAIN SEVERITY QUANTIFIED, PAIN PRESENT: ICD-10-PCS | Mod: CPTII,S$GLB,, | Performed by: NURSE PRACTITIONER

## 2022-05-09 PROCEDURE — 3062F POS MACROALBUMINURIA REV: CPT | Mod: CPTII,S$GLB,, | Performed by: NURSE PRACTITIONER

## 2022-05-09 PROCEDURE — 1125F AMNT PAIN NOTED PAIN PRSNT: CPT | Mod: CPTII,S$GLB,, | Performed by: NURSE PRACTITIONER

## 2022-05-09 PROCEDURE — 4010F PR ACE/ARB THEARPY RXD/TAKEN: ICD-10-PCS | Mod: CPTII,S$GLB,, | Performed by: NURSE PRACTITIONER

## 2022-05-09 PROCEDURE — 82962 POCT GLUCOSE, HAND-HELD DEVICE: ICD-10-PCS | Mod: S$GLB,,, | Performed by: NURSE PRACTITIONER

## 2022-05-09 PROCEDURE — 1159F MED LIST DOCD IN RCRD: CPT | Mod: CPTII,S$GLB,, | Performed by: NURSE PRACTITIONER

## 2022-05-09 PROCEDURE — 1101F PT FALLS ASSESS-DOCD LE1/YR: CPT | Mod: CPTII,S$GLB,, | Performed by: NURSE PRACTITIONER

## 2022-05-09 PROCEDURE — 3066F NEPHROPATHY DOC TX: CPT | Mod: CPTII,S$GLB,, | Performed by: NURSE PRACTITIONER

## 2022-05-09 PROCEDURE — 3046F HEMOGLOBIN A1C LEVEL >9.0%: CPT | Mod: CPTII,S$GLB,, | Performed by: NURSE PRACTITIONER

## 2022-05-09 PROCEDURE — 3046F PR MOST RECENT HEMOGLOBIN A1C LEVEL > 9.0%: ICD-10-PCS | Mod: CPTII,S$GLB,, | Performed by: NURSE PRACTITIONER

## 2022-05-09 PROCEDURE — 99999 PR PBB SHADOW E&M-EST. PATIENT-LVL V: ICD-10-PCS | Mod: PBBFAC,,, | Performed by: NURSE PRACTITIONER

## 2022-05-09 RX ORDER — PEN NEEDLE, DIABETIC 30 GX3/16"
NEEDLE, DISPOSABLE MISCELLANEOUS
Qty: 200 EACH | Refills: 5 | Status: SHIPPED | OUTPATIENT
Start: 2022-05-09 | End: 2022-06-17 | Stop reason: SDUPTHER

## 2022-05-09 RX ORDER — DAPAGLIFLOZIN 10 MG/1
10 TABLET, FILM COATED ORAL DAILY
Qty: 90 TABLET | Refills: 3 | Status: SHIPPED | OUTPATIENT
Start: 2022-05-09 | End: 2023-02-13

## 2022-05-09 RX ORDER — INSULIN ASPART 100 [IU]/ML
10 INJECTION, SOLUTION INTRAVENOUS; SUBCUTANEOUS
Qty: 15 ML | Refills: 5 | Status: SHIPPED | OUTPATIENT
Start: 2022-05-09 | End: 2022-06-09 | Stop reason: SDUPTHER

## 2022-05-09 RX ORDER — INSULIN GLARGINE 300 U/ML
80 INJECTION, SOLUTION SUBCUTANEOUS NIGHTLY
Qty: 4 PEN | Refills: 5 | Status: SHIPPED | OUTPATIENT
Start: 2022-05-09 | End: 2022-09-27

## 2022-05-09 NOTE — PATIENT INSTRUCTIONS
-- Los medicamentos ajustados para la visita de charlee incluyen:    Deje de Trulicity debido al malestar estomacal.    Comience Farxiga 10 mg al día. Avíseme si presenta algún signo/síntoma de paris infección urinaria (robert ardor al orinar, picazón vaginal). Debe mantenerse hidratado: si alguna vez se encuentra deshidratado, mantenga el medicamento hasta que esté completamente hidratado nuevamente.    Pare dos veces al día en Toujeo. Inicie Toujeo Max 80 unidades PARIS VEZ al día. Ajustaremos esta dosis en función del nivel de azúcar en david en ayunas.    Comience Novolog 10 unidades sheila veces al día antes de cada comida principal.    Continúe con Glimepirida/Amaryl diariamente con el desayuno.    -- Referencia para Dexcom G6. Se enviará a [facility].    -Los objetivos de azúcar en la david deben ser un nivel de azúcar en la david en ayunas entre 80 y 130, y ningún nivel de azúcar en la david a lo cornelio del día por encima de 180 es hennessy, menos de 160 es mejor.    --Seguimiento para shelby próxima visita en 6 semanas.    --Por favor, envíeme josh lecturas, envíelas por fax o MyChart según sea necesario.

## 2022-05-09 NOTE — PROGRESS NOTES
Subjective:         Patient ID: Kay Galarza is a 67 y.o. female.  Patient's current PCP is Lucy Negron MD.     Chief Complaint: Diabetes Mellitus (F/u)    HPI  Kay Galarza is a 67 y.o. White female presenting for a new consult with me, previously seen by Sana Dennison for diabetes. Patient has been diagnosed with diabetes since 2007 .  The patient was initially diagnosed with Type 2 diabetes mellitus based on the following criteria: blood work  Received diabetes education: Yes -years ago-needs new referral.    CURRENT DM MEDICATIONS:   Diabetes Medications             glimepiride (AMARYL) 4 MG tablet TAKE 1 TABLET  BY MOUTH DAILY WITH BREAKFAST.    TOUJEO SOLOSTAR U-300 INSULIN 300 unit/mL (1.5 mL) InPn pen INJECT 50 UNITS INTO THE SKIN 2 (TWO) TIMES DAILY.    TRULICITY 1.5 mg/0.5 mL pen injector INJECT 1.5 MG INTO THE SKIN EVERY 7 DAYS Will be discontinued today to see if this helps GI side effects    Novolog 10 units TID AC being initiated today.     Past failed treatment include: Xigduo- GI complaints    Blood glucose testing Continuous per Stephie (using 's reader)  Preferred lab: [facility]-    Her blood sugar in the clinic today was:   Lab Results   Component Value Date    POCGLU 125 (A) 05/09/2022       Kay Galarza presents today to discuss DM management.  Patient's daughter who is fluent in English and Malaysian is present with her and they declined a formal .  Glucose range  mg/dL.  Last evaluated by Sana Dennison 2019. She is currently utilizing Stephie to monitor blood sugars consistently.  She needs a referral for CGM. For the last 2 weeks, she is within target range 49%, high 29%, and she is 22% of readings above 250. Mild hypoglycemia occurring overnight/morning hours.  She has an average glucose of 192 and an estimated GMI of 7.9%.  Glucose variability of 38.0%.  Based on review of this download, plan to initiate mealtime insulin  with reduction of Toujeo.    She reports having worsening acid reflux-also alternating between constipation and diarrhea.  She has a colonoscopy planned later this week.  We discussed that Trulicity may be contributing to her symptoms and plan to discontinue this.  She will keep me updated.    Diabetic ulcer R foot- followed by Dr. Gray. Last evaluated 3/17/22 and home health ordered.    CKD II- sees nephrology, Dr. Sena.    Current diet:  Breakfast-toast or tortilla with coffee and milk.  She always has rice or bread with each meal.  She does have fruit snacks and does drink fruit juice daily.  We discussed the diabetic diet and she would benefit from a refresher with Education.  Activity Level:  No structured exercise    Lab Results   Component Value Date    HGBA1C >14.0 (H) 01/26/2022    HGBA1C >14.0 (H) 01/19/2022    HGBA1C 6.4 (H) 01/21/2021     Any episodes of hypoglycemia?  Yes  Hypoglycemia Unawareness? no   Severe hypoglycemia requiring 3rd party no  Complications related to diabetes: nephropathy, retinopathy, peripheral neuropathy, peripheral vascular disease and charcot foot (L,2020)    STANDARDS OF CARE  Diabetes Management Status    Statin: Taking  ACE/ARB: Taking    Screening or Prevention Patient's value Goal Complete/Controlled?   HgA1C Testing and Control   Lab Results   Component Value Date    HGBA1C >14.0 (H) 01/26/2022      Annually/Less than 8% Yes   Lipid profile : 01/26/2022 Annually Yes   LDL control Lab Results   Component Value Date    LDLCALC 80.0 01/26/2022    Annually/Less than 100 mg/dl  Yes   Nephropathy screening Lab Results   Component Value Date    LABMICR 540.0 01/26/2022     No results found for: PROTEINUA  No results found for: UTPCR   Annually Yes   Blood pressure BP Readings from Last 1 Encounters:   05/09/22 (!) 145/91    Less than 140/90 No   Dilated retinal exam : 04/23/2021 Annually Ol-bxeocxd-krapslm sent for scheduling   Foot exam   : 03/17/2022 Annually Yes     Labs  reviewed and are noted below.    Lab Results   Component Value Date    WBC 9.39 01/26/2022    HGB 13.0 01/26/2022    HCT 40.1 01/26/2022     01/26/2022    CHOL 133 01/26/2022    TRIG 95 01/26/2022    HDL 34 (L) 01/26/2022    LDLCALC 80.0 01/26/2022    ALT 11 01/19/2022    AST 9 (L) 01/19/2022     (L) 01/19/2022    K 4.1 01/19/2022    CL 95 01/19/2022    ANIONGAP 12 01/19/2022    CREATININE 1.3 01/19/2022    ESTGFRAFRICA 48.7 (A) 01/19/2022    EGFRNONAA 42.3 (A) 01/19/2022    BUN 35 (H) 01/19/2022    CO2 26 01/19/2022    TSH 1.557 02/20/2018     (H) 01/19/2022     Lab Results   Component Value Date    TSH 1.557 02/20/2018    CALCIUM 9.9 01/19/2022     No results found for: CPEPTIDE  No results found for: GLUTAMICACID  Glucose   Date Value Ref Range Status   01/19/2022 312 (H) 70 - 110 mg/dL Final     Anion Gap   Date Value Ref Range Status   01/19/2022 12 8 - 16 mmol/L Final     eGFR if    Date Value Ref Range Status   01/19/2022 48.7 (A) >60 mL/min/1.73 m^2 Final     eGFR if non    Date Value Ref Range Status   01/19/2022 42.3 (A) >60 mL/min/1.73 m^2 Final     Comment:     Calculation used to obtain the estimated glomerular filtration  rate (eGFR) is the CKD-EPI equation.          The following portions of the patient's history were reviewed and updated as appropriate: allergies, current medications, past family history, past medical history, past social history, past surgical history and problem list.    Review of patient's allergies indicates:   Allergen Reactions    Pollen extracts      Seasonal allergies, sneezing     Social History     Socioeconomic History    Marital status:    Tobacco Use    Smoking status: Never Smoker    Smokeless tobacco: Never Used   Substance and Sexual Activity    Alcohol use: Never    Drug use: No    Sexual activity: Yes     Past Medical History:   Diagnosis Date    Arthritis     DERIAN KNEES    Asthma     SEASONAL     "Cataract     Diabetes mellitus     Diabetes mellitus, type 2     Diabetic retinopathy     DM (diabetes mellitus) 2007    BS 97 09/29/2020    Hyperlipidemia     Hypertension      REVIEW OF SYSTEMS:  Cardiovascular: History of hypertension and hyperlipidemia  GI:  Reports intermittent constipation and diarrhea and also worsening reflux.  :  History of CKD, mild.  SKIN:  Ulceration of right foot.  Seeing Podiatry.  Neuro:  Positive neuropathy.  PSYCH: No tobacco use.  ENDO: See HPI.        Objective:      Vitals:    05/09/22 1024   BP: (!) 145/91   Pulse: 95     RESPIRATORY: No respiratory distress  NEUROLOGIC: Cranial nerves II-XII grossly intact.   PSYCHIATRIC: Alert & oriented x3. Normal mood and affect.  FOOT EXAMINATION:  Up-to-date and seeing podiatry routinely.  Assessment:       1. Uncontrolled type 2 diabetes mellitus with hyperglycemia, with long-term current use of insulin    2. Essential hypertension    3. Mixed hyperlipidemia    4. Charcot's joint of foot, left    5. Diabetic peripheral neuropathy associated with type 2 diabetes mellitus    6. Diabetic ulcer of toe of left foot associated with type 2 diabetes mellitus, with fat layer exposed    7. CKD stage 2 due to type 2 diabetes mellitus    8. Stable proliferative diabetic retinopathy of both eyes associated with type 2 diabetes mellitus        Plan:   Uncontrolled type 2 diabetes mellitus with hyperglycemia, with long-term current use of insulin  -     POCT Glucose, Hand-Held Device  -     insulin aspart U-100 (NOVOLOG FLEXPEN U-100 INSULIN) 100 unit/mL (3 mL) InPn pen; Inject 10 Units into the skin 3 (three) times daily before meals.  Dispense: 15 mL; Refill: 5  -     pen needle, diabetic (BD ULTRA-FINE SHORT PEN NEEDLE) 31 gauge x 5/16" Ndle; Use with Toujeo once daily and Novolog 3 times daily  Dispense: 200 each; Refill: 5  -     insulin glargine U-300 conc (TOUJEO MAX U-300 SOLOSTAR) 300 unit/mL (3 mL) insulin pen; Inject 80 Units into the " skin every evening. Patient to stop the twice daily Toujeo and start once daily Toujeo Max.  Dispense: 4 pen; Refill: 5  -     Ambulatory referral/consult to Diabetes Education; Future; Expected date: 05/09/2022  -     dapagliflozin (FARXIGA) 10 mg tablet; Take 1 tablet (10 mg total) by mouth once daily.  Dispense: 90 tablet; Refill: 3    Chronic,stable.     Referral for refresher DM education. Referral Dexcom G6 to [facility].    Start Toujeo Max 80 units once daily and discontinue Toujeo b.i.d.    Start NovoLog 10 units t.i.d. a.c..    Stop Trulicity to see if this helps GI complaints.  Start Farxiga 10 mg once daily.  Stay hydrated stressed.    Can continue Amaryl daily for now but this will be discontinued for further hypoglycemia.    Instructions were printed for patient in Thai and patient's daughter did review them to make sure they were accurate before provided to the patient.    Continuous glucose monitoring report:    The patient's CGM was downloaded and was reviewed for the last 14 days.     For the last 2 weeks, she is within target range 49%, high 29%, and she is 22% of readings above 250. Mild hypoglycemia occurring overnight/morning hours.  She has an average glucose of 192 and an estimated GMI of 7.9%.  Glucose variability of 38.0%.  Based on review of this download, plan to initiate mealtime insulin with reduction of Toujeo.    Essential hypertension    Chronic, stable.  Blood pressure is near goal.  Continue to monitor and continue current medications.    Mixed hyperlipidemia    Chronic, stable.  Continue Lipitor.  Charcot's joint of foot, left  Diabetic peripheral neuropathy associated with type 2 diabetes mellitus  Diabetic ulcer of toe of left foot associated with type 2 diabetes mellitus, with fat layer exposed    Chronic-she is currently having wound care with home health and seeing podiatry routinely.  Continue to follow-up as recommended.  She is on gabapentin for neuropathy.  She  "complains that the neuropathy is not well controlled-encourage patient to speak with prescribing provider to have this increased.    CKD stage 2 due to type 2 diabetes mellitus    Chronic, stable.  She has seen Nephrology.  Continue to monitor.  Follow-up is advised.    Stable proliferative diabetic retinopathy of both eyes associated with type 2 diabetes mellitus    Chronic-she is overdue for dilated eye exam.  Message sent to ophthalmology to assist with scheduling diabetic eye exam.    - Follow up: 6 weeks    I spent a total of 45 minutes on the day of the visit.This includes face to face time and non-face to face time preparing to see the patient (eg, review of tests), documenting clinical information in the electronic record, independently interpreting results and communicating results to the patient.    Portions of this note may have been created with voice recognition software. Occasional "wrong-word" or "sound-a-like" substitutions may have occurred due to the inherent limitations of voice recognition software. Please, read the note carefully and recognize, using context, where substitutions have occurred.           Sheri Ammons,FNP-C [facility]    "

## 2022-05-11 ENCOUNTER — ANESTHESIA EVENT (OUTPATIENT)
Dept: ENDOSCOPY | Facility: HOSPITAL | Age: 67
End: 2022-05-11
Payer: MEDICARE

## 2022-05-11 ENCOUNTER — HOSPITAL ENCOUNTER (OUTPATIENT)
Facility: HOSPITAL | Age: 67
Discharge: HOME | End: 2022-05-11
Attending: INTERNAL MEDICINE | Admitting: INTERNAL MEDICINE
Payer: MEDICARE

## 2022-05-11 ENCOUNTER — ANESTHESIA (OUTPATIENT)
Dept: ENDOSCOPY | Facility: HOSPITAL | Age: 67
End: 2022-05-11
Payer: MEDICARE

## 2022-05-11 DIAGNOSIS — K63.5 POLYP OF COLON, UNSPECIFIED PART OF COLON, UNSPECIFIED TYPE: Primary | ICD-10-CM

## 2022-05-11 DIAGNOSIS — K63.5 POLYP OF COLON: ICD-10-CM

## 2022-05-11 LAB
GLUCOSE SERPL-MCNC: 119 MG/DL (ref 70–110)
POCT GLUCOSE: 119 MG/DL (ref 70–110)

## 2022-05-11 PROCEDURE — 45385 COLONOSCOPY W/LESION REMOVAL: CPT | Mod: PT,,, | Performed by: INTERNAL MEDICINE

## 2022-05-11 PROCEDURE — 27201089 HC SNARE, DISP (ANY): Performed by: INTERNAL MEDICINE

## 2022-05-11 PROCEDURE — 63600175 PHARM REV CODE 636 W HCPCS: Performed by: NURSE ANESTHETIST, CERTIFIED REGISTERED

## 2022-05-11 PROCEDURE — 37000008 HC ANESTHESIA 1ST 15 MINUTES: Performed by: INTERNAL MEDICINE

## 2022-05-11 PROCEDURE — 88305 TISSUE EXAM BY PATHOLOGIST: CPT | Performed by: PATHOLOGY

## 2022-05-11 PROCEDURE — 88305 TISSUE EXAM BY PATHOLOGIST: CPT | Mod: 26,,, | Performed by: PATHOLOGY

## 2022-05-11 PROCEDURE — 37000009 HC ANESTHESIA EA ADD 15 MINS: Performed by: INTERNAL MEDICINE

## 2022-05-11 PROCEDURE — 88305 TISSUE EXAM BY PATHOLOGIST: ICD-10-PCS | Mod: 26,,, | Performed by: PATHOLOGY

## 2022-05-11 PROCEDURE — 25000003 PHARM REV CODE 250: Performed by: INTERNAL MEDICINE

## 2022-05-11 PROCEDURE — 45385 COLONOSCOPY W/LESION REMOVAL: CPT | Performed by: INTERNAL MEDICINE

## 2022-05-11 PROCEDURE — 45385 PR COLONOSCOPY,REMV LESN,SNARE: ICD-10-PCS | Mod: PT,,, | Performed by: INTERNAL MEDICINE

## 2022-05-11 PROCEDURE — 25000003 PHARM REV CODE 250: Performed by: NURSE ANESTHETIST, CERTIFIED REGISTERED

## 2022-05-11 RX ORDER — DEXTROMETHORPHAN/PSEUDOEPHED 2.5-7.5/.8
DROPS ORAL
Status: COMPLETED | OUTPATIENT
Start: 2022-05-11 | End: 2022-05-11

## 2022-05-11 RX ORDER — PROPOFOL 10 MG/ML
VIAL (ML) INTRAVENOUS
Status: DISCONTINUED | OUTPATIENT
Start: 2022-05-11 | End: 2022-05-11

## 2022-05-11 RX ORDER — LIDOCAINE HYDROCHLORIDE 10 MG/ML
INJECTION, SOLUTION EPIDURAL; INFILTRATION; INTRACAUDAL; PERINEURAL
Status: DISCONTINUED | OUTPATIENT
Start: 2022-05-11 | End: 2022-05-11

## 2022-05-11 RX ADMIN — SIMETHICONE 66.6 MG: 20 SUSPENSION/ DROPS ORAL at 09:05

## 2022-05-11 RX ADMIN — PROPOFOL 20 MG: 10 INJECTION, EMULSION INTRAVENOUS at 09:05

## 2022-05-11 RX ADMIN — PROPOFOL 50 MG: 10 INJECTION, EMULSION INTRAVENOUS at 09:05

## 2022-05-11 RX ADMIN — LIDOCAINE HYDROCHLORIDE 50 MG: 10 INJECTION, SOLUTION EPIDURAL; INFILTRATION; INTRACAUDAL; PERINEURAL at 09:05

## 2022-05-11 NOTE — TRANSFER OF CARE
"Anesthesia Transfer of Care Note    Patient: Kay Galarza    Procedure(s) Performed: Procedure(s) (LRB):  COLONOSCOPY (N/A)    Patient location: GI    Anesthesia Type: MAC    Transport from OR: Transported from OR on room air with adequate spontaneous ventilation    Post pain: adequate analgesia    Post assessment: no apparent anesthetic complications    Post vital signs: stable    Level of consciousness: awake    Nausea/Vomiting: no nausea/vomiting    Complications: none    Transfer of care protocol was followed      Last vitals:   Visit Vitals  BP (!) 161/97 (BP Location: Left arm, Patient Position: Sitting)   Pulse 91   Resp 19   Ht 5' 9" (1.753 m)   Wt 99.8 kg (220 lb)   SpO2 97%   Breastfeeding No   BMI 32.49 kg/m²     "

## 2022-05-11 NOTE — ANESTHESIA PREPROCEDURE EVALUATION
05/11/2022  Kay Galarza is a 67 y.o., female.      Pre-op Assessment    I have reviewed the Patient Summary Reports.     I have reviewed the Nursing Notes. I have reviewed the NPO Status.   I have reviewed the Medications.     Review of Systems  Cardiovascular:   Hypertension, well controlled hyperlipidemia    Pulmonary:   Asthma mild    Endocrine:   Diabetes, well controlled, type 2        Physical Exam  General: Well nourished, Cooperative and Alert    Airway:  Mallampati: II   Mouth Opening: Normal  TM Distance: Normal  Tongue: Normal  Neck ROM: Normal ROM    Dental:  Intact    Chest/Lungs:  Clear to auscultation, Normal Respiratory Rate    Heart:  Rate: Normal  Rhythm: Regular Rhythm        Anesthesia Plan  Type of Anesthesia, risks & benefits discussed:    Anesthesia Type: MAC  Intra-op Monitoring Plan: Standard ASA Monitors  Induction:  IV  Informed Consent: Informed consent signed with the Patient and all parties understand the risks and agree with anesthesia plan.  All questions answered.   ASA Score: 2  Day of Surgery Review of History & Physical: H&P Update referred to the surgeon/provider.    Ready For Surgery From Anesthesia Perspective.     .

## 2022-05-11 NOTE — ANESTHESIA POSTPROCEDURE EVALUATION
Anesthesia Post Evaluation    Patient: Kay Galarza    Procedure(s) Performed: Procedure(s) (LRB):  COLONOSCOPY (N/A)    Final Anesthesia Type: MAC      Patient location during evaluation: GI PACU  Patient participation: Yes- Able to Participate  Level of consciousness: awake and alert  Post-procedure vital signs: reviewed and stable  Pain management: adequate  Airway patency: patent    PONV status at discharge: No PONV  Anesthetic complications: no      Cardiovascular status: blood pressure returned to baseline  Respiratory status: unassisted and spontaneous ventilation  Hydration status: euvolemic  Follow-up not needed.          Vitals Value Taken Time   /77 05/11/22 0939   Temp 98 05/11/22 0939   Pulse 77 05/11/22 0939   Resp 12 05/11/22 0939   SpO2 98 05/11/22 0939         No case tracking events are documented in the log.      Pain/Pool Score: No data recorded

## 2022-05-11 NOTE — H&P
Notification of Inpatient Admission/Inpatient Authorization Request   This is a Notification of Inpatient Admission for [address]  Be advised that this patient was admitted to our facility under Inpatient Status  Contact Angus Jacobs at [phone] for additional admission information  [facility] DEPT DEDICATED [place]lizzeth Schultz [phone]  Patient Name:   Isaac Young   YOB: 1979       [address]   [address]:   [address]   Tax ID: [id number]  NPI: [id number] Attending Provider/NPI: Freddie Harden Md [[id number]]   Place of Service Code: 24     Place of Service Name:  [address] [place] [address]   Start Date: 1/9/20 1553     Discharge Date & Time: No discharge date for patient encounter  Type of Admission: Inpatient Status Discharge Disposition   (if discharged): Non [address]   Patient Diagnoses: Ureterolithiasis [N20 1]  Abdominal pain [R10 9]  History of lung transplant ([address] ) [Z94 2]  Obstruction of right ureteropelvic junction (UPJ) due to stone [N20 1]     Orders: Admission Orders (From admission, onward)     Ordered        01/09/20 1553  Inpatient Admission  Once         01/09/20 0053  Place in Observation  Once                    Assigned Utilization Review Contact: Angus Jacobs  Utilization   Network Utilization Review Department  Phone: [phone]; Fax [phone]  Email: [email or web address]   ATTENTION PAYERS: Please call the assigned Utilization  directly with any questions or concerns ALL voicemails in the department are confidential  Send all requests for admission clinical reviews, approved or denied determinations and any other requests to dedicated fax number belonging to the campus where the patient is receiving treatment  PRE PROCEDURE H&P    Patient Name: Kay Galarza  MRN: [id number]  : 1955  Date of Procedure:  2022  Referring Physician: Charles Berrios MD  Primary Physician: Lucy Negron MD  Procedure Physician: Charles Berrios MD       Planned Procedure: Colonoscopy  Diagnosis: previous adenomatous polyp  Chief Complaint: Same as above    HPI: Patient is an 67 y.o. female is here for the above.     Last colonoscopy:   Family history: None   Anticoagulation: None     Past Medical History:   Past Medical History:   Diagnosis Date    Arthritis     DERIAN KNEES    Asthma     SEASONAL    Cataract     Diabetes mellitus     Diabetes mellitus, type 2     Diabetic retinopathy     DM (diabetes mellitus)     BS 97 2020    Hyperlipidemia     Hypertension         Past Surgical History:  Past Surgical History:   Procedure Laterality Date    APPLICATION OF WOUND VACUUM-ASSISTED CLOSURE DEVICE Left 2020    Procedure: APPLICATION, WOUND VAC;  Surgeon: Kirt Gray DPM;  Location: [facility] OR;  Service: Podiatry;  Laterality: Left;    CATARACT EXTRACTION W/  INTRAOCULAR LENS IMPLANT Right 2018    CATARACT EXTRACTION W/  INTRAOCULAR LENS IMPLANT Left 2019    COLONOSCOPY N/A 2018    Procedure: COLONOSCOPY;  Surgeon: Zak Dover MD;  Location: [facility] ENDO;  Service: Endoscopy;  Laterality: N/A;    ENDOSCOPIC VEIN LASER TREATMENT Bilateral     FIXATION OF SYNDESMOSIS OF ANKLE Left 2020    Procedure: FIXATION, SYNDESMOSIS, ANKLE;  Surgeon: Kirt Gray DPM;  Location: [facility] OR;  Service: Podiatry;  Laterality: Left;    MANIPULATION WITH ANESTHESIA Left 2020    Procedure: MANIPULATION, WITH ANESTHESIA;  Surgeon: Kirt Gray DPM;  Location: [facility] OR;  Service: Podiatry;  Laterality: Left;    MIDFOOT ARTHRODESIS Left 2020    Procedure: FUSION, JOINT, MIDFOOT;  Surgeon: Kirt Gray DPM;  Location: [facility] OR;  Service: Podiatry;   Laterality: Left;  2nd tarsometatarsal joint dislocation via fusion    OPEN REDUCTION AND INTERNAL FIXATION (ORIF) OF INJURY OF ANKLE Left 7/23/2020    Procedure: ORIF, ANKLE;  Surgeon: Kirt Gray DPM;  Location: [facility] OR;  Service: Podiatry;  Laterality: Left;    RECONSTRUCTION WITH FUSION OF CHARCOT FOOT Left 8/18/2020    Procedure: RECONSTRUCTION, CHARCOT FOOT, WITH FUSION;  Surgeon: Kirt Gray DPM;  Location: [facility] OR;  Service: Podiatry;  Laterality: Left;    REMOVAL OF HARDWARE FROM LOWER EXTREMITY Left 1/27/2021    Procedure: REMOVAL, HARDWARE, LOWER EXTREMITY;  Surgeon: Kirt Gray DPM;  Location: [facility] OR;  Service: Podiatry;  Laterality: Left;    WOUND DEBRIDEMENT Left 8/18/2020    Procedure: DEBRIDEMENT, WOUND;  Surgeon: Kirt Gray DPM;  Location: [facility] OR;  Service: Podiatry;  Laterality: Left;        Home Medications:  Prior to Admission medications    Medication Sig Start Date End Date Taking? Authorizing Provider   atorvastatin (LIPITOR) 40 MG tablet TAKE 1 TABLET BY MOUTH ONCE DAILY. 6/28/21  Yes Lucy Negron MD   B infantis/B ani/B koby/B bifid (PROBIOTIC 4X ORAL) Take by mouth Daily.   Yes Historical Provider   dapagliflozin (FARXIGA) 10 mg tablet Take 1 tablet (10 mg total) by mouth once daily. 5/9/22  Yes Arlene Garcia NP   ergocalciferol (ERGOCALCIFEROL) 50,000 unit Cap TAKE 1 CAPSULE  BY MOUTH ONCE A WEEK FOR 4 DOSES 6/28/21  Yes Kirt Gray DPM   gabapentin (NEURONTIN) 100 MG capsule TAKE 1 CAPSULE  BY MOUTH 3  TIMES DAILY. 5/3/22  Yes Kenyetta Goff NP   insulin glargine U-300 conc (TOUJEO MAX U-300 SOLOSTAR) 300 unit/mL (3 mL) insulin pen Inject 80 Units into the skin every evening. Patient to stop the twice daily Toujeo and start once daily Toujeo Max. 5/9/22  Yes Arlene Garcia NP   levocetirizine (XYZAL) 5 MG tablet TAKE 1 TABLET (5 MG TOTAL) BY MOUTH EVERY EVENING. 2/22/22 2/22/23 Yes Lucy Negron MD    lisinopriL-hydrochlorothiazide (PRINZIDE,ZESTORETIC) 20-25 mg Tab TAKE 1 TABLET BY MOUTH ONCE DAILY. 6/28/21  Yes Lucy Negron MD   magnesium oxide 200 mg magnesium Tab TAKE 3 TABLETS  BY MOUTH EVERY EVENING. 8/2/21  Yes Lucy Negron MD   omeprazole (PRILOSEC) 20 MG capsule TAKE 1 CAPSULE BY MOUTH TWICE A DAY. 1/19/22  Yes Lucy Negron MD   traZODone (DESYREL) 150 MG tablet TAKE 1 TABLET  BY MOUTH NIGHTLY AS NEEDED FOR INSOMNIA. 6/28/21  Yes Lucy Negron MD   TRULICITY 1.5 mg/0.5 mL pen injector INJECT 1.5 MG INTO THE SKIN EVERY 7 DAYS 6/28/21  Yes Lucy Negron MD   acetaminophen (TYLENOL) 500 MG tablet Take 500 mg by mouth every 6 (six) hours as needed for Pain.    Historical Provider   albuterol (PROVENTIL/VENTOLIN HFA) 90 mcg/actuation inhaler INHALE 2 PUFFS INTO THE LUNGS EVERY 6 (SIX) HOURS AS NEEDED FOR WHEEZING. RESCUE 6/28/21   Lucy Negron MD   blood sugar diagnostic Strp To check BG 2 times daily, to use with insurance preferred meter 3/24/22   Lucy Negron MD   blood-glucose meter kit To check BG 2 times daily, to use with insurance preferred meter 3/8/22 3/8/23  Lucy Negron MD   fluocinolone and shower cap 0.01 % Oil  3/14/21   Historical Provider   fluticasone propionate (FLONASE) 50 mcg/actuation nasal spray 2 sprays (100 mcg total) by Each Nostril route once daily. 1/25/21   Lucy Negron MD   glimepiride (AMARYL) 4 MG tablet TAKE 1 TABLET  BY MOUTH DAILY WITH BREAKFAST. 5/3/22   Kenyetta Goff NP   insulin aspart U-100 (NOVOLOG FLEXPEN U-100 INSULIN) 100 unit/mL (3 mL) InPn pen Inject 10 Units into the skin 3 (three) times daily before meals. 5/9/22   Arlene Garcia NP   ketoconazole (NIZORAL) 2 % shampoo Apply topically 3 (three) times a week. 1/19/22   Lucy Negron MD   lancets [facility] To check BG 2 times daily, to use with insurance preferred  "meter 3/8/22   Lucy Negron MD   pen needle, diabetic (BD ULTRA-FINE SHORT PEN NEEDLE) 31 gauge x 5/16" Ndle Use with Toujeo once daily and Novolog 3 times daily 5/9/22   Arlene Garcia NP        Allergies:  Review of patient's allergies indicates:   Allergen Reactions    Pollen extracts      Seasonal allergies, sneezing        Social History:   Social History     Socioeconomic History    Marital status:    Tobacco Use    Smoking status: Never Smoker    Smokeless tobacco: Never Used   Substance and Sexual Activity    Alcohol use: Never    Drug use: No    Sexual activity: Yes       Family History:  Family History   Problem Relation Age of Onset    Cancer Father         Prostate Ca    Hypertension Father     Stroke Father     Diabetes Sister     Glaucoma Mother     Hypertension Mother     Glaucoma Maternal Grandmother     Blindness Neg Hx     Macular degeneration Neg Hx     Retinal detachment Neg Hx     Strabismus Neg Hx        ROS: No acute cardiac events, no acute respiratory complaints.     Physical Exam (all patients):    BP (!) 161/97 (BP Location: Left arm, Patient Position: Sitting)   Pulse 91   Resp 19   Ht 5' 9" (1.753 m)   Wt 99.8 kg (220 lb)   SpO2 97%   Breastfeeding No   BMI 32.49 kg/m²   Lungs: Clear to auscultation bilaterally, respirations unlabored  Heart: Regular rate and rhythm, S1 and S2 normal, no obvious murmurs  Abdomen:         Soft, non-tender, bowel sounds normal, no masses, no organomegaly    Lab Results   Component Value Date    WBC 9.39 01/26/2022    MCV 91 01/26/2022    RDW 12.6 01/26/2022     01/26/2022     (H) 01/19/2022    HGBA1C >14.0 (H) 01/26/2022    BUN 35 (H) 01/19/2022     (L) 01/19/2022    K 4.1 01/19/2022    CL 95 01/19/2022        SEDATION PLAN: per anesthesia      History reviewed, vital signs satisfactory, cardiopulmonary status satisfactory, sedation options, risks and plans have been discussed with the " patient  All their questions were answered and the patient agrees to the sedation procedures as planned and the patient is deemed an appropriate candidate for the sedation as planned.    Procedure explained to patient, informed consent obtained and placed in chart.    Charles Berrios  5/11/2022  9:13 AM

## 2022-05-11 NOTE — PROVATION PATIENT INSTRUCTIONS
Discharge Summary/Instructions after an Endoscopic Procedure  Patient Name: Kay Galarza  Patient MRN: [id number]  Patient   YOB: 1955  Wednesday, May 11, 2022 Charles Berrios MD  Dear patient,  As a result of recent federal legislation (The Federal Cures Act), you may   receive lab or pathology results from your procedure in your [facility]   account before your physician is able to contact you. Your physician or   their representative will relay the results to you with their   recommendations at their soonest availability.  Thank you,  RESTRICTIONS:  During your procedure today, you received medications for sedation.  These   medications may affect your judgment, balance and coordination.  Therefore,   for 24 hours, you have the following restrictions:   - DO NOT drive a car, operate machinery, make legal/financial decisions,   sign important papers or drink alcohol.    ACTIVITY:  Today: no heavy lifting, straining or running due to procedural   sedation/anesthesia.  The following day: return to full activity including work.  DIET:  Eat and drink normally unless instructed otherwise.     TREATMENT FOR COMMON SIDE EFFECTS:  - Mild abdominal pain, nausea, belching, bloating or excessive gas:  rest,   eat lightly and use a heating pad.  - Sore Throat: treat with throat lozenges and/or gargle with warm salt   water.  - Because air was used during the procedure, expelling large amounts of air   from your rectum or belching is normal.  - If a bowel prep was taken, you may not have a bowel movement for 1-3 days.    This is normal.  SYMPTOMS TO WATCH FOR AND REPORT TO YOUR PHYSICIAN:  1. Abdominal pain or bloating, other than gas cramps.  2. Chest pain.  3. Back pain.  4. Signs of infection such as: chills or fever occurring within 24 hours   after the procedure.  5. Rectal bleeding, which would show as bright red, maroon, or black stools.   (A tablespoon of blood from the rectum is not serious,  especially if   hemorrhoids are present.)  6. Vomiting.  7. Weakness or dizziness.  GO DIRECTLY TO THE NEAREST EMERGENCY ROOM IF YOU HAVE ANY OF THE FOLLOWING:      Difficulty breathing              Chills and/or fever over 101 F   Persistent vomiting and/or vomiting blood   Severe abdominal pain   Severe chest pain   Black, tarry stools   Bleeding- more than one tablespoon   Any other symptom or condition that you feel may need urgent attention  Your doctor recommends these additional instructions:  If any biopsies were taken, your doctors clinic will contact you in 1 to 2   weeks with any results.  - Discharge patient to home.   - Resume previous diet.   - Continue present medications.   - Await pathology results.   - Repeat colonoscopy in 5 years for surveillance.   - Return to referring physician.   - Patient has a contact number available for emergencies.  The signs and   symptoms of potential delayed complications were discussed with the   patient.  Return to normal activities tomorrow.  Written discharge   instructions were provided to the patient.  For questions, problems or results please call your physician Charles Berrios MD at Work:  [phone]  If you have any questions about the above instructions, call the GI   department at [phone] or call the endoscopy unit at [phone]   from 7am until 3 pm.  [facility] [place], EMERGENCY ROOM PHONE NUMBER:   [phone]  IF A COMPLICATION OR EMERGENCY SITUATION ARISES AND YOU ARE UNABLE TO REACH   YOUR PHYSICIAN - GO DIRECTLY TO THE EMERGENCY ROOM.  I have read or have had read to me these discharge instructions for my   procedure and have received a written copy.  I understand these   instructions and will follow-up with my physician if I have any questions.     __________________________________       _____________________________________  Nurse Signature                                          Patient/Designated    Responsible Party Signature  Charles Berrios MD  5/11/2022 9:36:48 AM  This report has been verified and signed electronically.  Dear patient,  As a result of recent federal legislation (The Federal Cures Act), you may   receive lab or pathology results from your procedure in your [facility]   account before your physician is able to contact you. Your physician or   their representative will relay the results to you with their   recommendations at their soonest availability.  Thank you,  PROVATION

## 2022-05-12 VITALS
SYSTOLIC BLOOD PRESSURE: 156 MMHG | TEMPERATURE: 98 F | BODY MASS INDEX: 32.58 KG/M2 | OXYGEN SATURATION: 97 % | HEIGHT: 69 IN | RESPIRATION RATE: 20 BRPM | DIASTOLIC BLOOD PRESSURE: 78 MMHG | HEART RATE: 84 BPM | WEIGHT: 220 LBS

## 2022-05-17 LAB
FINAL PATHOLOGIC DIAGNOSIS: NORMAL
GROSS: NORMAL
Lab: NORMAL

## 2022-05-17 PROCEDURE — G0179 MD RECERTIFICATION HHA PT: HCPCS | Mod: ,,, | Performed by: PODIATRIST

## 2022-05-17 PROCEDURE — G0179 PR HOME HEALTH MD RECERTIFICATION: ICD-10-PCS | Mod: ,,, | Performed by: PODIATRIST

## 2022-05-18 ENCOUNTER — HOSPITAL ENCOUNTER (OUTPATIENT)
Dept: RADIOLOGY | Facility: HOSPITAL | Age: 67
Discharge: HOME | End: 2022-05-18
Attending: PODIATRIST
Payer: MEDICARE

## 2022-05-18 ENCOUNTER — OFFICE VISIT (OUTPATIENT)
Dept: PODIATRY | Facility: CLINIC | Age: 67
End: 2022-05-18
Payer: MEDICARE

## 2022-05-18 VITALS — WEIGHT: 220 LBS | BODY MASS INDEX: 32.58 KG/M2 | HEIGHT: 69 IN

## 2022-05-18 DIAGNOSIS — M14.672 CHARCOT'S JOINT OF ANKLE, LEFT: ICD-10-CM

## 2022-05-18 DIAGNOSIS — M19.072 OSTEOARTHRITIS OF LEFT ANKLE OR FOOT: ICD-10-CM

## 2022-05-18 DIAGNOSIS — Z98.1 HISTORY OF SURGICAL FUSION JOINT: ICD-10-CM

## 2022-05-18 DIAGNOSIS — E11.621 DIABETIC ULCER OF LEFT MIDFOOT ASSOCIATED WITH TYPE 2 DIABETES MELLITUS, WITH FAT LAYER EXPOSED: Primary | ICD-10-CM

## 2022-05-18 DIAGNOSIS — Z98.890 HISTORY OF FOOT SURGERY: ICD-10-CM

## 2022-05-18 DIAGNOSIS — M25.572 PAIN IN JOINT INVOLVING LEFT ANKLE AND FOOT: ICD-10-CM

## 2022-05-18 DIAGNOSIS — E11.49 TYPE II DIABETES MELLITUS WITH NEUROLOGICAL MANIFESTATIONS: ICD-10-CM

## 2022-05-18 DIAGNOSIS — Z98.890 HISTORY OF ANKLE SURGERY: ICD-10-CM

## 2022-05-18 DIAGNOSIS — L97.422 DIABETIC ULCER OF LEFT MIDFOOT ASSOCIATED WITH TYPE 2 DIABETES MELLITUS, WITH FAT LAYER EXPOSED: ICD-10-CM

## 2022-05-18 DIAGNOSIS — M14.672 CHARCOT'S JOINT OF FOOT, LEFT: ICD-10-CM

## 2022-05-18 DIAGNOSIS — L97.422 DIABETIC ULCER OF LEFT MIDFOOT ASSOCIATED WITH TYPE 2 DIABETES MELLITUS, WITH FAT LAYER EXPOSED: Primary | ICD-10-CM

## 2022-05-18 DIAGNOSIS — E11.621 DIABETIC ULCER OF LEFT MIDFOOT ASSOCIATED WITH TYPE 2 DIABETES MELLITUS, WITH FAT LAYER EXPOSED: ICD-10-CM

## 2022-05-18 PROCEDURE — 1160F PR REVIEW ALL MEDS BY PRESCRIBER/CLIN PHARMACIST DOCUMENTED: ICD-10-PCS | Mod: CPTII,S$GLB,, | Performed by: PODIATRIST

## 2022-05-18 PROCEDURE — 3066F PR DOCUMENTATION OF TREATMENT FOR NEPHROPATHY: ICD-10-PCS | Mod: CPTII,S$GLB,, | Performed by: PODIATRIST

## 2022-05-18 PROCEDURE — 73630 X-RAY EXAM OF FOOT: CPT | Mod: 26,LT,, | Performed by: RADIOLOGY

## 2022-05-18 PROCEDURE — 87186 SC STD MICRODIL/AGAR DIL: CPT | Performed by: PODIATRIST

## 2022-05-18 PROCEDURE — 99214 PR OFFICE/OUTPT VISIT, EST, LEVL IV, 30-39 MIN: ICD-10-PCS | Mod: 25,S$GLB,, | Performed by: PODIATRIST

## 2022-05-18 PROCEDURE — 3046F HEMOGLOBIN A1C LEVEL >9.0%: CPT | Mod: CPTII,S$GLB,, | Performed by: PODIATRIST

## 2022-05-18 PROCEDURE — 3066F NEPHROPATHY DOC TX: CPT | Mod: CPTII,S$GLB,, | Performed by: PODIATRIST

## 2022-05-18 PROCEDURE — 1159F PR MEDICATION LIST DOCUMENTED IN MEDICAL RECORD: ICD-10-PCS | Mod: CPTII,S$GLB,, | Performed by: PODIATRIST

## 2022-05-18 PROCEDURE — 11042 DBRDMT SUBQ TIS 1ST 20SQCM/<: CPT | Mod: S$GLB,,, | Performed by: PODIATRIST

## 2022-05-18 PROCEDURE — 99214 OFFICE O/P EST MOD 30 MIN: CPT | Mod: 25,S$GLB,, | Performed by: PODIATRIST

## 2022-05-18 PROCEDURE — 11042 PR DEBRIDEMENT, SKIN, SUB-Q TISSUE,=<20 SQ CM: ICD-10-PCS | Mod: S$GLB,,, | Performed by: PODIATRIST

## 2022-05-18 PROCEDURE — 1159F MED LIST DOCD IN RCRD: CPT | Mod: CPTII,S$GLB,, | Performed by: PODIATRIST

## 2022-05-18 PROCEDURE — 3288F FALL RISK ASSESSMENT DOCD: CPT | Mod: CPTII,S$GLB,, | Performed by: PODIATRIST

## 2022-05-18 PROCEDURE — 73630 X-RAY EXAM OF FOOT: CPT | Mod: TC,LT

## 2022-05-18 PROCEDURE — 3062F PR POS MACROALBUMINURIA RESULT DOCUMENTED/REVIEW: ICD-10-PCS | Mod: CPTII,S$GLB,, | Performed by: PODIATRIST

## 2022-05-18 PROCEDURE — 3008F BODY MASS INDEX DOCD: CPT | Mod: CPTII,S$GLB,, | Performed by: PODIATRIST

## 2022-05-18 PROCEDURE — 99999 PR PBB SHADOW E&M-EST. PATIENT-LVL IV: CPT | Mod: PBBFAC,,, | Performed by: PODIATRIST

## 2022-05-18 PROCEDURE — 73630 XR FOOT COMPLETE 3 VIEW LEFT: ICD-10-PCS | Mod: 26,LT,, | Performed by: RADIOLOGY

## 2022-05-18 PROCEDURE — 4010F PR ACE/ARB THEARPY RXD/TAKEN: ICD-10-PCS | Mod: CPTII,S$GLB,, | Performed by: PODIATRIST

## 2022-05-18 PROCEDURE — 3008F PR BODY MASS INDEX (BMI) DOCUMENTED: ICD-10-PCS | Mod: CPTII,S$GLB,, | Performed by: PODIATRIST

## 2022-05-18 PROCEDURE — 99999 PR PBB SHADOW E&M-EST. PATIENT-LVL IV: ICD-10-PCS | Mod: PBBFAC,,, | Performed by: PODIATRIST

## 2022-05-18 PROCEDURE — 1101F PR PT FALLS ASSESS DOC 0-1 FALLS W/OUT INJ PAST YR: ICD-10-PCS | Mod: CPTII,S$GLB,, | Performed by: PODIATRIST

## 2022-05-18 PROCEDURE — 4010F ACE/ARB THERAPY RXD/TAKEN: CPT | Mod: CPTII,S$GLB,, | Performed by: PODIATRIST

## 2022-05-18 PROCEDURE — 87077 CULTURE AEROBIC IDENTIFY: CPT | Performed by: PODIATRIST

## 2022-05-18 PROCEDURE — 1101F PT FALLS ASSESS-DOCD LE1/YR: CPT | Mod: CPTII,S$GLB,, | Performed by: PODIATRIST

## 2022-05-18 PROCEDURE — 87070 CULTURE OTHR SPECIMN AEROBIC: CPT | Performed by: PODIATRIST

## 2022-05-18 PROCEDURE — 1160F RVW MEDS BY RX/DR IN RCRD: CPT | Mod: CPTII,S$GLB,, | Performed by: PODIATRIST

## 2022-05-18 PROCEDURE — 3288F PR FALLS RISK ASSESSMENT DOCUMENTED: ICD-10-PCS | Mod: CPTII,S$GLB,, | Performed by: PODIATRIST

## 2022-05-18 PROCEDURE — 3062F POS MACROALBUMINURIA REV: CPT | Mod: CPTII,S$GLB,, | Performed by: PODIATRIST

## 2022-05-18 PROCEDURE — 3046F PR MOST RECENT HEMOGLOBIN A1C LEVEL > 9.0%: ICD-10-PCS | Mod: CPTII,S$GLB,, | Performed by: PODIATRIST

## 2022-05-18 RX ORDER — ACETAMINOPHEN AND CODEINE PHOSPHATE 300; 30 MG/1; MG/1
1 TABLET ORAL EVERY 6 HOURS PRN
Qty: 28 TABLET | Refills: 0 | Status: SHIPPED | OUTPATIENT
Start: 2022-05-18 | End: 2022-05-25

## 2022-05-18 NOTE — PROGRESS NOTES
Subjective:       Patient ID: Kay Galarza is a 67 y.o. female.    Chief Complaint: Foot Problem (Left midfoot, mild pain at present, PCP:  last seen 01/19/2022)      HPI: Kay Galarza presents to the clinic today, for evaluation and treatment concerning an ulceration/wound/bulla(e) to the left plantar 1st MTPJ. Patient's Primary Care Provider is Lucy Negron MD. The PMHx. does include DM w/ peripheral neuropathy, venous insufficiency and acquired foot/ankle deformity/deformities. The wound(s) have/has been present for a week or so. Most recent local wound care w/ dry dressings as per [facility].    Hemoglobin A1C   Date Value Ref Range Status   01/26/2022 >14.0 (H) 4.0 - 5.6 % Final     Comment:     ADA Screening Guidelines:  5.7-6.4%  Consistent with prediabetes  >or=6.5%  Consistent with diabetes    High levels of fetal hemoglobin interfere with the HbA1C  assay. Heterozygous hemoglobin variants (HbS, HgC, etc)do  not significantly interfere with this assay.   However, presence of multiple variants may affect accuracy.     01/19/2022 >14.0 (H) 4.0 - 5.6 % Corrected     Comment:     ADA Screening Guidelines:  5.7-6.4%  Consistent with prediabetes  >or=6.5%  Consistent with diabetes    High levels of fetal hemoglobin interfere with the HbA1C  assay. Heterozygous hemoglobin variants (HbS, HgC, etc)do  not significantly interfere with this assay.   However, presence of multiple variants may affect accuracy.  CORRECTED RESULT; previously reported as 5.9 on 01/20/2022 at 02:04.     01/21/2021 6.4 (H) 4.0 - 5.6 % Final     Comment:     ADA Screening Guidelines:  5.7-6.4%  Consistent with prediabetes  >or=6.5%  Consistent with diabetes  High levels of fetal hemoglobin interfere with the HbA1C  assay. Heterozygous hemoglobin variants (HbS, HgC, etc)do  not significantly interfere with this assay.   However, presence of multiple variants may affect  accuracy.         Review of patient's allergies indicates:   Allergen Reactions    Pollen extracts      Seasonal allergies, sneezing       Past Medical History:   Diagnosis Date    Arthritis     DERIAN KNEES    Asthma     SEASONAL    Cataract     Diabetes mellitus     Diabetes mellitus, type 2     Diabetic retinopathy     DM (diabetes mellitus) 2007    BS 97 09/29/2020    Hyperlipidemia     Hypertension        Family History   Problem Relation Age of Onset    Cancer Father         Prostate Ca    Hypertension Father     Stroke Father     Diabetes Sister     Glaucoma Mother     Hypertension Mother     Glaucoma Maternal Grandmother     Blindness Neg Hx     Macular degeneration Neg Hx     Retinal detachment Neg Hx     Strabismus Neg Hx        Social History     Socioeconomic History    Marital status:    Tobacco Use    Smoking status: Never Smoker    Smokeless tobacco: Never Used   Substance and Sexual Activity    Alcohol use: Never    Drug use: No    Sexual activity: Yes       Past Surgical History:   Procedure Laterality Date    APPLICATION OF WOUND VACUUM-ASSISTED CLOSURE DEVICE Left 8/18/2020    Procedure: APPLICATION, WOUND VAC;  Surgeon: Kirt Gray DPM;  Location: [facility] OR;  Service: Podiatry;  Laterality: Left;    CATARACT EXTRACTION W/  INTRAOCULAR LENS IMPLANT Right 07/18/2018    CATARACT EXTRACTION W/  INTRAOCULAR LENS IMPLANT Left 03/13/2019    COLONOSCOPY N/A 12/22/2018    Procedure: COLONOSCOPY;  Surgeon: Zak Dover MD;  Location: [facility] ENDO;  Service: Endoscopy;  Laterality: N/A;    COLONOSCOPY N/A 5/11/2022    Procedure: COLONOSCOPY;  Surgeon: Charles Berrios MD;  Location: [facility] ENDO;  Service: Endoscopy;  Laterality: N/A;    ENDOSCOPIC VEIN LASER TREATMENT Bilateral 1990's    FIXATION OF SYNDESMOSIS OF ANKLE Left 7/23/2020    Procedure: FIXATION, SYNDESMOSIS, ANKLE;  Surgeon: Kirt Gray DPM;  Location: [facility] OR;  Service: Podiatry;  Laterality: Left;     MANIPULATION WITH ANESTHESIA Left 7/23/2020    Procedure: MANIPULATION, WITH ANESTHESIA;  Surgeon: Kirt Gray DPM;  Location: [facility] OR;  Service: Podiatry;  Laterality: Left;    MIDFOOT ARTHRODESIS Left 7/23/2020    Procedure: FUSION, JOINT, MIDFOOT;  Surgeon: Kirt Gray DPM;  Location: [facility] OR;  Service: Podiatry;  Laterality: Left;  2nd tarsometatarsal joint dislocation via fusion    OPEN REDUCTION AND INTERNAL FIXATION (ORIF) OF INJURY OF ANKLE Left 7/23/2020    Procedure: ORIF, ANKLE;  Surgeon: Kirt Gray DPM;  Location: [facility] OR;  Service: Podiatry;  Laterality: Left;    RECONSTRUCTION WITH FUSION OF CHARCOT FOOT Left 8/18/2020    Procedure: RECONSTRUCTION, CHARCOT FOOT, WITH FUSION;  Surgeon: Kirt Gray DPM;  Location: [facility] OR;  Service: Podiatry;  Laterality: Left;    REMOVAL OF HARDWARE FROM LOWER EXTREMITY Left 1/27/2021    Procedure: REMOVAL, HARDWARE, LOWER EXTREMITY;  Surgeon: Kirt Gray DPM;  Location: [facility] OR;  Service: Podiatry;  Laterality: Left;    WOUND DEBRIDEMENT Left 8/18/2020    Procedure: DEBRIDEMENT, WOUND;  Surgeon: Kirt Gray DPM;  Location: [facility] OR;  Service: Podiatry;  Laterality: Left;       Review of Systems   Constitutional: Negative for chills, fatigue and fever.   HENT: Negative for hearing loss.    Eyes: Negative for photophobia and visual disturbance.   Respiratory: Negative for cough, chest tightness, shortness of breath and wheezing.    Cardiovascular: Positive for leg swelling. Negative for chest pain and palpitations.   Gastrointestinal: Negative for constipation, diarrhea, nausea and vomiting.   Endocrine: Negative for cold intolerance and heat intolerance.   Genitourinary: Negative for flank pain.   Musculoskeletal: Positive for gait problem. Negative for neck pain and neck stiffness.   Skin: Positive for wound. Negative for color change.   Neurological: Positive for numbness. Negative for light-headedness and headaches.  "  Psychiatric/Behavioral: Negative for sleep disturbance.          Objective:   Ht 5' 9" (1.753 m)   Wt 99.8 kg (220 lb 0.3 oz)   BMI 32.49 kg/m²       LOWER EXTREMITY PHYSICAL EXAMINATION  NEUROLOGY: Sensation to light touch is intact. Proprioception is intact, bilateral. Sensation to pin prick is reduced to absent. Vibratory sensation is diminished to the left and right lower extremity. Examination with 5.07 [place] Arnie monofilament reveals that protective sensation is not intact to the left and right plantar surfaces of the foot and digits, as the patient has no sensation/detection at greater than 4 distinct points of contact.     DERMATOLOGY: There is an ulceration at the plantar left 1st MTPJ. The ulcer measures 1.25cm x 1.10cm x 0.20cm. No drainage is noted. No osseous exposure is noted. No undermining is noted. No malodor is noted.     VASCULAR: LLE DP and PT are palpable. CFT is WNL. Hair growth is noted. Edema is noted to the LE.     Assessment:     1. Diabetic ulcer of left midfoot associated with type 2 diabetes mellitus, with fat layer exposed    2. Type II diabetes mellitus with neurological manifestations    3. History of surgical fusion joint    4. History of ankle surgery    5. History of foot surgery    6. Charcot's joint of ankle, left    7. Charcot's joint of foot, left    8. Pain in joint involving left ankle and foot    9. Osteoarthritis of left ankle or foot        Plan:     Diabetic ulcer of left midfoot associated with type 2 diabetes mellitus, with fat layer exposed  -     SUBSEQUENT HOME HEALTH ORDERS  -     acetaminophen-codeine 300-30mg (TYLENOL #3) 300-30 mg Tab; Take 1 tablet by mouth every 6 (six) hours as needed (Pain).  Dispense: 28 tablet; Refill: 0  -     Aerobic culture (Specify Source)  -     X-Ray Foot Complete Left; Future; Expected date: 05/18/2022    Type II diabetes mellitus with neurological manifestations    History of surgical fusion joint    History of ankle " surgery    History of foot surgery    Charcot's joint of ankle, left    Charcot's joint of foot, left    Pain in joint involving left ankle and foot    Osteoarthritis of left ankle or foot    Thorough discussion is had with the patient today, concerning the diagnosis, its etiology, and the treatment algorithm at present.     The wound was surgically debrided after adequate prep with alcohol and/or betadine paint. Excisional wound debridement was performed using sharp #10/#15 blade/rounded scalpel and tissue nipper, with removal of all non-viable skin and soft tissues; necrotic skin/tissue formation. The woundbase/wound bed was also debrided to encourage bleeding as to promote/stimulate healing. Debridement was excisional and included epidermal, dermal and subcutaneous tissues. Post debridement measurements are as above. Hemostasis was achieved. Patient tolerated procedure well and without complications. Local woundcare with collagen dressings and bandage thereafter.     Continue [facility].    Off-loading ATC.    I do recommend TCC, but the patient prefers to defer at present.     Strict DMII control.            Future Appointments   Date Time Provider Department Marceline   5/25/2022  9:30 AM LABORATORY, [facility] LAB [facility]   5/25/2022  9:40 AM SPECIMEN, [facility] SPECLAB [facility]   6/1/2022 10:00 AM Crescencio Sena MD [facility]   6/6/2022 10:00 AM Arleen Ortega RD [facility]   6/13/2022  8:00 AM [facility]   6/21/2022 10:00 AM Arlene Garcia NP [facility]

## 2022-05-19 RX ORDER — GABAPENTIN 100 MG/1
CAPSULE ORAL
Qty: 90 CAPSULE | Refills: 0 | Status: SHIPPED | OUTPATIENT
Start: 2022-05-19 | End: 2022-06-27

## 2022-05-19 RX ORDER — INSULIN GLARGINE 300 U/ML
50 INJECTION, SOLUTION SUBCUTANEOUS 2 TIMES DAILY
Qty: 4 PEN | Refills: 0 | Status: SHIPPED | OUTPATIENT
Start: 2022-05-19 | End: 2022-07-13

## 2022-05-19 RX ORDER — GLIMEPIRIDE 4 MG/1
TABLET ORAL
Qty: 90 TABLET | Refills: 0 | Status: SHIPPED | OUTPATIENT
Start: 2022-05-19 | End: 2022-06-06

## 2022-05-19 NOTE — TELEPHONE ENCOUNTER
Refill Routing Note   Medication(s) are not appropriate for processing by [facility] for the following reason(s):      - Outside of protocol  - Patient has been seen in the ED/Hospital since the last PCP visit    [facility] action(s):  Route          Medication reconciliation completed: No     Appointments  past 12m or future 3m with PCP    Date Provider   Last Visit   1/19/2022 Lucy Negron MD   Next Visit   Visit date not found Lucy Negron MD   ED visits in past 90 days: 0        Note composed:8:26 AM 05/19/2022

## 2022-05-19 NOTE — TELEPHONE ENCOUNTER
No new care gaps identified.  [facility] Embedded Care Gaps. Reference number: [id number]. 5/18/2022   7:51:05 PM CDT

## 2022-05-21 DIAGNOSIS — E11.621 DIABETIC ULCER OF LEFT MIDFOOT ASSOCIATED WITH TYPE 2 DIABETES MELLITUS, WITH FAT LAYER EXPOSED: Primary | ICD-10-CM

## 2022-05-21 DIAGNOSIS — L97.422 DIABETIC ULCER OF LEFT MIDFOOT ASSOCIATED WITH TYPE 2 DIABETES MELLITUS, WITH FAT LAYER EXPOSED: Primary | ICD-10-CM

## 2022-05-21 LAB — BACTERIA SPEC AEROBE CULT: ABNORMAL

## 2022-05-21 RX ORDER — DOXYCYCLINE 100 MG/1
100 CAPSULE ORAL EVERY 12 HOURS
Qty: 14 CAPSULE | Refills: 0 | Status: SHIPPED | OUTPATIENT
Start: 2022-05-21 | End: 2022-05-28

## 2022-05-25 ENCOUNTER — LAB VISIT (OUTPATIENT)
Dept: LAB | Facility: HOSPITAL | Age: 67
End: 2022-05-25
Attending: INTERNAL MEDICINE
Payer: MEDICARE

## 2022-05-25 ENCOUNTER — PATIENT MESSAGE (OUTPATIENT)
Dept: ADMINISTRATIVE | Facility: HOSPITAL | Age: 67
End: 2022-05-25
Payer: MEDICARE

## 2022-05-25 DIAGNOSIS — Z79.4 TYPE 2 DIABETES MELLITUS WITHOUT COMPLICATION, WITH LONG-TERM CURRENT USE OF INSULIN: ICD-10-CM

## 2022-05-25 DIAGNOSIS — R80.9 MICROALBUMINURIA: ICD-10-CM

## 2022-05-25 DIAGNOSIS — I12.9 PARENCHYMAL RENAL HYPERTENSION, STAGE 1 THROUGH STAGE 4 OR UNSPECIFIED CHRONIC KIDNEY DISEASE: ICD-10-CM

## 2022-05-25 DIAGNOSIS — E11.9 TYPE 2 DIABETES MELLITUS WITHOUT COMPLICATION, WITH LONG-TERM CURRENT USE OF INSULIN: ICD-10-CM

## 2022-05-25 DIAGNOSIS — N18.2 CKD (CHRONIC KIDNEY DISEASE) STAGE 2, GFR 60-89 ML/MIN: ICD-10-CM

## 2022-05-25 LAB
ALBUMIN SERPL BCP-MCNC: 3.6 G/DL (ref 3.5–5.2)
ANION GAP SERPL CALC-SCNC: 11 MMOL/L (ref 8–16)
BUN SERPL-MCNC: 26 MG/DL (ref 8–23)
CALCIUM SERPL-MCNC: 9.1 MG/DL (ref 8.7–10.5)
CHLORIDE SERPL-SCNC: 101 MMOL/L (ref 95–110)
CO2 SERPL-SCNC: 27 MMOL/L (ref 23–29)
CREAT SERPL-MCNC: 1 MG/DL (ref 0.5–1.4)
EST. GFR  (AFRICAN AMERICAN): >60 ML/MIN/1.73 M^2
EST. GFR  (NON AFRICAN AMERICAN): 57.6 ML/MIN/1.73 M^2
ESTIMATED AVG GLUCOSE: 174 MG/DL (ref 68–131)
GLUCOSE SERPL-MCNC: 90 MG/DL (ref 70–110)
HBA1C MFR BLD: 7.7 % (ref 4–5.6)
PHOSPHATE SERPL-MCNC: 3.8 MG/DL (ref 2.7–4.5)
POTASSIUM SERPL-SCNC: 5 MMOL/L (ref 3.5–5.1)
SODIUM SERPL-SCNC: 139 MMOL/L (ref 136–145)

## 2022-05-25 PROCEDURE — 80069 RENAL FUNCTION PANEL: CPT | Performed by: INTERNAL MEDICINE

## 2022-05-25 PROCEDURE — 83036 HEMOGLOBIN GLYCOSYLATED A1C: CPT | Performed by: FAMILY MEDICINE

## 2022-05-25 PROCEDURE — 36415 COLL VENOUS BLD VENIPUNCTURE: CPT | Mod: PO | Performed by: INTERNAL MEDICINE

## 2022-05-26 ENCOUNTER — EXTERNAL HOME HEALTH (OUTPATIENT)
Dept: HOME HEALTH SERVICES | Facility: HOSPITAL | Age: 67
End: 2022-05-26
Payer: MEDICARE

## 2022-05-26 RX ORDER — INSULIN GLARGINE 300 U/ML
50 INJECTION, SOLUTION SUBCUTANEOUS 2 TIMES DAILY
Qty: 18 PEN | OUTPATIENT
Start: 2022-05-26

## 2022-05-26 NOTE — TELEPHONE ENCOUNTER
Torrie DC. Request already responded to by other means (e.g. phone or fax)   Refill Authorization Note   Kay Galarza  is requesting a refill authorization.  Brief Assessment and Rationale for Refill:  Quick Discontinue  Medication Therapy Plan:  Dose adjusted to Toujeo 80 units QHS 5/9/22    Medication Reconciliation Completed:  No      Comments:     Note composed:3:14 AM 05/26/2022

## 2022-05-26 NOTE — TELEPHONE ENCOUNTER
No new care gaps identified.  [facility] Embedded Care Gaps. Reference number: [id number]. 5/25/2022   10:56:39 PM CDT

## 2022-05-31 ENCOUNTER — PATIENT MESSAGE (OUTPATIENT)
Dept: FAMILY MEDICINE | Facility: CLINIC | Age: 67
End: 2022-05-31

## 2022-06-05 ENCOUNTER — PATIENT MESSAGE (OUTPATIENT)
Dept: ADMINISTRATIVE | Facility: HOSPITAL | Age: 67
End: 2022-06-05
Payer: MEDICARE

## 2022-06-05 ENCOUNTER — PATIENT MESSAGE (OUTPATIENT)
Dept: FAMILY MEDICINE | Facility: CLINIC | Age: 67
End: 2022-06-05
Payer: MEDICARE

## 2022-06-05 DIAGNOSIS — Z12.31 ENCOUNTER FOR SCREENING MAMMOGRAM FOR MALIGNANT NEOPLASM OF BREAST: Primary | ICD-10-CM

## 2022-06-05 NOTE — TELEPHONE ENCOUNTER
No new care gaps identified.  [facility] Embedded Care Gaps. Reference number: [id number]. 6/05/2022   3:05:29 PM CDT

## 2022-06-06 RX ORDER — GLIMEPIRIDE 4 MG/1
TABLET ORAL
Qty: 90 TABLET | Refills: 0 | Status: SHIPPED | OUTPATIENT
Start: 2022-06-06 | End: 2022-06-27

## 2022-06-06 NOTE — TELEPHONE ENCOUNTER
Refill Routing Note   Medication(s) are not appropriate for processing by [facility] for the following reason(s):      - Patient has been seen in the ED/Hospital since the last PCP visit    [facility] action(s):  Defer          Medication reconciliation completed: No     Appointments  past 12m or future 3m with PCP    Date Provider   Last Visit   1/19/2022 Lucy Negron MD   Next Visit   Visit date not found Lucy Negron MD   ED visits in past 90 days: 0        Note composed:3:09 PM 06/06/2022

## 2022-06-07 ENCOUNTER — TELEPHONE (OUTPATIENT)
Dept: DIABETES | Facility: CLINIC | Age: 67
End: 2022-06-07
Payer: MEDICARE

## 2022-06-07 ENCOUNTER — PATIENT MESSAGE (OUTPATIENT)
Dept: DIABETES | Facility: CLINIC | Age: 67
End: 2022-06-07
Payer: MEDICARE

## 2022-06-07 DIAGNOSIS — Z79.4 UNCONTROLLED TYPE 2 DIABETES MELLITUS WITH HYPERGLYCEMIA, WITH LONG-TERM CURRENT USE OF INSULIN: ICD-10-CM

## 2022-06-07 DIAGNOSIS — E11.65 UNCONTROLLED TYPE 2 DIABETES MELLITUS WITH HYPERGLYCEMIA, WITH LONG-TERM CURRENT USE OF INSULIN: ICD-10-CM

## 2022-06-07 NOTE — TELEPHONE ENCOUNTER
Left message regarding request for rescheduling a cancelled appt for diabetes education.   appt rescheduled for Thursday, June 9 at 8:00

## 2022-06-08 ENCOUNTER — OFFICE VISIT (OUTPATIENT)
Dept: PODIATRY | Facility: CLINIC | Age: 67
End: 2022-06-08
Payer: MEDICARE

## 2022-06-08 VITALS — WEIGHT: 220 LBS | BODY MASS INDEX: 32.58 KG/M2 | HEIGHT: 69 IN

## 2022-06-08 DIAGNOSIS — L97.422 DIABETIC ULCER OF LEFT MIDFOOT ASSOCIATED WITH TYPE 2 DIABETES MELLITUS, WITH FAT LAYER EXPOSED: Primary | ICD-10-CM

## 2022-06-08 DIAGNOSIS — E11.621 DIABETIC ULCER OF LEFT MIDFOOT ASSOCIATED WITH TYPE 2 DIABETES MELLITUS, WITH FAT LAYER EXPOSED: Primary | ICD-10-CM

## 2022-06-08 DIAGNOSIS — E11.49 TYPE II DIABETES MELLITUS WITH NEUROLOGICAL MANIFESTATIONS: ICD-10-CM

## 2022-06-08 PROCEDURE — 1125F PR PAIN SEVERITY QUANTIFIED, PAIN PRESENT: ICD-10-PCS | Mod: CPTII,S$GLB,, | Performed by: PODIATRIST

## 2022-06-08 PROCEDURE — 3051F HG A1C>EQUAL 7.0%<8.0%: CPT | Mod: CPTII,S$GLB,, | Performed by: PODIATRIST

## 2022-06-08 PROCEDURE — 3066F PR DOCUMENTATION OF TREATMENT FOR NEPHROPATHY: ICD-10-PCS | Mod: CPTII,S$GLB,, | Performed by: PODIATRIST

## 2022-06-08 PROCEDURE — 3008F BODY MASS INDEX DOCD: CPT | Mod: CPTII,S$GLB,, | Performed by: PODIATRIST

## 2022-06-08 PROCEDURE — 3062F PR POS MACROALBUMINURIA RESULT DOCUMENTED/REVIEW: ICD-10-PCS | Mod: CPTII,S$GLB,, | Performed by: PODIATRIST

## 2022-06-08 PROCEDURE — 1159F MED LIST DOCD IN RCRD: CPT | Mod: CPTII,S$GLB,, | Performed by: PODIATRIST

## 2022-06-08 PROCEDURE — 1125F AMNT PAIN NOTED PAIN PRSNT: CPT | Mod: CPTII,S$GLB,, | Performed by: PODIATRIST

## 2022-06-08 PROCEDURE — 3288F FALL RISK ASSESSMENT DOCD: CPT | Mod: CPTII,S$GLB,, | Performed by: PODIATRIST

## 2022-06-08 PROCEDURE — 29445 PR APPLY RIGID LEG CAST: ICD-10-PCS | Mod: 59,LT,S$GLB, | Performed by: PODIATRIST

## 2022-06-08 PROCEDURE — 3066F NEPHROPATHY DOC TX: CPT | Mod: CPTII,S$GLB,, | Performed by: PODIATRIST

## 2022-06-08 PROCEDURE — 11042 PR DEBRIDEMENT, SKIN, SUB-Q TISSUE,=<20 SQ CM: ICD-10-PCS | Mod: S$GLB,,, | Performed by: PODIATRIST

## 2022-06-08 PROCEDURE — 29445 APPL RIGID TOT CNTC LEG CAST: CPT | Mod: 59,LT,S$GLB, | Performed by: PODIATRIST

## 2022-06-08 PROCEDURE — 1101F PT FALLS ASSESS-DOCD LE1/YR: CPT | Mod: CPTII,S$GLB,, | Performed by: PODIATRIST

## 2022-06-08 PROCEDURE — 1159F PR MEDICATION LIST DOCUMENTED IN MEDICAL RECORD: ICD-10-PCS | Mod: CPTII,S$GLB,, | Performed by: PODIATRIST

## 2022-06-08 PROCEDURE — 99999 PR PBB SHADOW E&M-EST. PATIENT-LVL IV: ICD-10-PCS | Mod: PBBFAC,,, | Performed by: PODIATRIST

## 2022-06-08 PROCEDURE — 3051F PR MOST RECENT HEMOGLOBIN A1C LEVEL 7.0 - < 8.0%: ICD-10-PCS | Mod: CPTII,S$GLB,, | Performed by: PODIATRIST

## 2022-06-08 PROCEDURE — 99999 PR PBB SHADOW E&M-EST. PATIENT-LVL IV: CPT | Mod: PBBFAC,,, | Performed by: PODIATRIST

## 2022-06-08 PROCEDURE — 3008F PR BODY MASS INDEX (BMI) DOCUMENTED: ICD-10-PCS | Mod: CPTII,S$GLB,, | Performed by: PODIATRIST

## 2022-06-08 PROCEDURE — 1160F RVW MEDS BY RX/DR IN RCRD: CPT | Mod: CPTII,S$GLB,, | Performed by: PODIATRIST

## 2022-06-08 PROCEDURE — 99499 UNLISTED E&M SERVICE: CPT | Mod: S$GLB,,, | Performed by: PODIATRIST

## 2022-06-08 PROCEDURE — 4010F PR ACE/ARB THEARPY RXD/TAKEN: ICD-10-PCS | Mod: CPTII,S$GLB,, | Performed by: PODIATRIST

## 2022-06-08 PROCEDURE — 1101F PR PT FALLS ASSESS DOC 0-1 FALLS W/OUT INJ PAST YR: ICD-10-PCS | Mod: CPTII,S$GLB,, | Performed by: PODIATRIST

## 2022-06-08 PROCEDURE — 11042 DBRDMT SUBQ TIS 1ST 20SQCM/<: CPT | Mod: S$GLB,,, | Performed by: PODIATRIST

## 2022-06-08 PROCEDURE — 4010F ACE/ARB THERAPY RXD/TAKEN: CPT | Mod: CPTII,S$GLB,, | Performed by: PODIATRIST

## 2022-06-08 PROCEDURE — 3288F PR FALLS RISK ASSESSMENT DOCUMENTED: ICD-10-PCS | Mod: CPTII,S$GLB,, | Performed by: PODIATRIST

## 2022-06-08 PROCEDURE — 99499 NO LOS: ICD-10-PCS | Mod: S$GLB,,, | Performed by: PODIATRIST

## 2022-06-08 PROCEDURE — 3062F POS MACROALBUMINURIA REV: CPT | Mod: CPTII,S$GLB,, | Performed by: PODIATRIST

## 2022-06-08 PROCEDURE — 1160F PR REVIEW ALL MEDS BY PRESCRIBER/CLIN PHARMACIST DOCUMENTED: ICD-10-PCS | Mod: CPTII,S$GLB,, | Performed by: PODIATRIST

## 2022-06-08 NOTE — PROGRESS NOTES
Subjective:       Patient ID: Kay Galarza is a 67 y.o. female.    Chief Complaint: Wound Care (Diabetic ulcer of left midfoot, pt states she had a fall today and her foot feels warm and is also swelling, 9/10 pain at present, pcp  last seen 01/19/2022)      HPI: Kay Galarza presents to the clinic today, for evaluation and treatment concerning an ulceration/wound/bulla(e) to the left plantar 1st MTPJ. Patient's Primary Care Provider is Lucy Negron MD. The PMHx. does include DM w/ peripheral neuropathy, venous insufficiency and acquired foot/ankle deformity/deformities. The wound(s) have/has been present for several weeks. Most recent local wound care w/ collagen dressings with [facility].    Hemoglobin A1C   Date Value Ref Range Status   05/25/2022 7.7 (H) 4.0 - 5.6 % Final     Comment:     ADA Screening Guidelines:  5.7-6.4%  Consistent with prediabetes  >or=6.5%  Consistent with diabetes    High levels of fetal hemoglobin interfere with the HbA1C  assay. Heterozygous hemoglobin variants (HbS, HgC, etc)do  not significantly interfere with this assay.   However, presence of multiple variants may affect accuracy.     01/26/2022 >14.0 (H) 4.0 - 5.6 % Final     Comment:     ADA Screening Guidelines:  5.7-6.4%  Consistent with prediabetes  >or=6.5%  Consistent with diabetes    High levels of fetal hemoglobin interfere with the HbA1C  assay. Heterozygous hemoglobin variants (HbS, HgC, etc)do  not significantly interfere with this assay.   However, presence of multiple variants may affect accuracy.     01/19/2022 >14.0 (H) 4.0 - 5.6 % Corrected     Comment:     ADA Screening Guidelines:  5.7-6.4%  Consistent with prediabetes  >or=6.5%  Consistent with diabetes    High levels of fetal hemoglobin interfere with the HbA1C  assay. Heterozygous hemoglobin variants (HbS, HgC, etc)do  not significantly interfere with this assay.   However, presence of  multiple variants may affect accuracy.  CORRECTED RESULT; previously reported as 5.9 on 01/20/2022 at 02:04.         Review of patient's allergies indicates:   Allergen Reactions    Pollen extracts      Seasonal allergies, sneezing       Past Medical History:   Diagnosis Date    Arthritis     DERIAN KNEES    Asthma     SEASONAL    Cataract     Diabetes mellitus     Diabetes mellitus, type 2     Diabetic retinopathy     DM (diabetes mellitus) 2007    BS 97 09/29/2020    Hyperlipidemia     Hypertension        Family History   Problem Relation Age of Onset    Cancer Father         Prostate Ca    Hypertension Father     Stroke Father     Diabetes Sister     Glaucoma Mother     Hypertension Mother     Glaucoma Maternal Grandmother     Blindness Neg Hx     Macular degeneration Neg Hx     Retinal detachment Neg Hx     Strabismus Neg Hx        Social History     Socioeconomic History    Marital status:    Tobacco Use    Smoking status: Never Smoker    Smokeless tobacco: Never Used   Substance and Sexual Activity    Alcohol use: Never    Drug use: No    Sexual activity: Yes       Past Surgical History:   Procedure Laterality Date    APPLICATION OF WOUND VACUUM-ASSISTED CLOSURE DEVICE Left 8/18/2020    Procedure: APPLICATION, WOUND VAC;  Surgeon: Kirt Gray DPM;  Location: [facility] OR;  Service: Podiatry;  Laterality: Left;    CATARACT EXTRACTION W/  INTRAOCULAR LENS IMPLANT Right 07/18/2018    CATARACT EXTRACTION W/  INTRAOCULAR LENS IMPLANT Left 03/13/2019    COLONOSCOPY N/A 12/22/2018    Procedure: COLONOSCOPY;  Surgeon: Zak Dover MD;  Location: [facility] ENDO;  Service: Endoscopy;  Laterality: N/A;    COLONOSCOPY N/A 5/11/2022    Procedure: COLONOSCOPY;  Surgeon: Charles Berrios MD;  Location: [facility] ENDO;  Service: Endoscopy;  Laterality: N/A;    ENDOSCOPIC VEIN LASER TREATMENT Bilateral 1990's    FIXATION OF SYNDESMOSIS OF ANKLE Left 7/23/2020    Procedure: FIXATION,  SYNDESMOSIS, ANKLE;  Surgeon: Kirt Gray DPM;  Location: [facility] OR;  Service: Podiatry;  Laterality: Left;    MANIPULATION WITH ANESTHESIA Left 7/23/2020    Procedure: MANIPULATION, WITH ANESTHESIA;  Surgeon: Kirt Gray DPM;  Location: [facility] OR;  Service: Podiatry;  Laterality: Left;    MIDFOOT ARTHRODESIS Left 7/23/2020    Procedure: FUSION, JOINT, MIDFOOT;  Surgeon: Kirt Gray DPM;  Location: [facility] OR;  Service: Podiatry;  Laterality: Left;  2nd tarsometatarsal joint dislocation via fusion    OPEN REDUCTION AND INTERNAL FIXATION (ORIF) OF INJURY OF ANKLE Left 7/23/2020    Procedure: ORIF, ANKLE;  Surgeon: Kirt Gray DPM;  Location: [facility] OR;  Service: Podiatry;  Laterality: Left;    RECONSTRUCTION WITH FUSION OF CHARCOT FOOT Left 8/18/2020    Procedure: RECONSTRUCTION, CHARCOT FOOT, WITH FUSION;  Surgeon: Kirt Gray DPM;  Location: [facility] OR;  Service: Podiatry;  Laterality: Left;    REMOVAL OF HARDWARE FROM LOWER EXTREMITY Left 1/27/2021    Procedure: REMOVAL, HARDWARE, LOWER EXTREMITY;  Surgeon: Kirt Gray DPM;  Location: [facility] OR;  Service: Podiatry;  Laterality: Left;    WOUND DEBRIDEMENT Left 8/18/2020    Procedure: DEBRIDEMENT, WOUND;  Surgeon: Kirt Gray DPM;  Location: [facility] OR;  Service: Podiatry;  Laterality: Left;       Review of Systems   Constitutional: Negative for chills, fatigue and fever.   HENT: Negative for hearing loss.    Eyes: Negative for photophobia and visual disturbance.   Respiratory: Negative for cough, chest tightness, shortness of breath and wheezing.    Cardiovascular: Positive for leg swelling. Negative for chest pain and palpitations.   Gastrointestinal: Negative for constipation, diarrhea, nausea and vomiting.   Endocrine: Negative for cold intolerance and heat intolerance.   Genitourinary: Negative for flank pain.   Musculoskeletal: Positive for gait problem. Negative for neck pain and neck stiffness.   Skin: Positive for wound. Negative for  "color change.   Neurological: Positive for numbness. Negative for light-headedness and headaches.   Psychiatric/Behavioral: Negative for sleep disturbance.          Objective:   Ht 5' 9" (1.753 m)   Wt 99.8 kg (220 lb 0.3 oz)   BMI 32.49 kg/m²         LOWER EXTREMITY PHYSICAL EXAMINATION  DERMATOLOGY: There is an ulceration at the plantar left 1st MTPJ. The ulcer measures 1.10cm x 1.0cm x 0.20cm. No drainage is noted. No osseous exposure is noted. No undermining is noted. No malodor is noted.     NEUROLOGY: Sensation to light touch is intact. Proprioception is intact, bilateral. Sensation to pin prick is reduced to absent. Vibratory sensation is diminished to the left and right lower extremity. Examination with 5.07 [place] Arnie monofilament reveals that protective sensation is not intact to the left and right plantar surfaces of the foot and digits, as the patient has no sensation/detection at greater than 4 distinct points of contact.     VASCULAR: LLE DP and PT are palpable. CFT is WNL. Hair growth is noted. Edema is noted to the LE.     Assessment:     1. Diabetic ulcer of left midfoot associated with type 2 diabetes mellitus, with fat layer exposed    2. Type II diabetes mellitus with neurological manifestations        Plan:     Diabetic ulcer of left midfoot associated with type 2 diabetes mellitus, with fat layer exposed    Type II diabetes mellitus with neurological manifestations    Thorough discussion is had with the patient today, concerning the diagnosis, its etiology, and the treatment algorithm at present.     The wound was surgically debrided after adequate prep with alcohol and/or betadine paint. Excisional wound debridement was performed using sharp #10/#15 blade/rounded scalpel and tissue nipper, with removal of all non-viable skin and soft tissues; necrotic skin/tissue formation. The woundbase/wound bed was also debrided to encourage bleeding as to promote/stimulate healing. Debridement was " excisional and included epidermal, dermal and subcutaneous tissues. Post debridement measurements are as above. Hemostasis was achieved. Patient tolerated procedure well and without complications. Local woundcare with collagen dressings and bandage thereafter.     Any prior TCC (total contact cast) is removed and any pertinent wound care is performed, followed be application of a TCC to the LLE in standard fashion, being sure to pad, cushion and protect all bony prominences as to prevent post application complications. The ankle is held in the neutral position in the sagittal plane while the cast is applied and while the cast cures. Patient tolerated procedure well and without complications.     Strict DMII control.            Future Appointments   Date Time Provider Department Center   6/9/2022  8:00 AM Arleen Ortega RD [facility] JEM [facility] [place]   6/13/2022  8:00 AM MARYAM Knox MD [facility] NELLY [facility]   6/21/2022 10:00 AM Arlene Garcia NP [facility] OLVIN [facility]

## 2022-06-09 ENCOUNTER — TELEPHONE (OUTPATIENT)
Dept: DIABETES | Facility: CLINIC | Age: 67
End: 2022-06-09
Payer: MEDICARE

## 2022-06-09 RX ORDER — INSULIN ASPART 100 [IU]/ML
10 INJECTION, SOLUTION INTRAVENOUS; SUBCUTANEOUS
Qty: 15 ML | Refills: 5 | Status: SHIPPED | OUTPATIENT
Start: 2022-06-09 | End: 2022-06-17 | Stop reason: SDUPTHER

## 2022-06-09 NOTE — TELEPHONE ENCOUNTER
Called to r/s cancelled appt for diabetes education.   R/s with Eli Ace at [facility].

## 2022-06-10 ENCOUNTER — DOCUMENT SCAN (OUTPATIENT)
Dept: HOME HEALTH SERVICES | Facility: HOSPITAL | Age: 67
End: 2022-06-10
Payer: MEDICARE

## 2022-06-14 ENCOUNTER — OFFICE VISIT (OUTPATIENT)
Dept: PODIATRY | Facility: CLINIC | Age: 67
End: 2022-06-14
Payer: MEDICARE

## 2022-06-14 ENCOUNTER — PATIENT MESSAGE (OUTPATIENT)
Dept: PODIATRY | Facility: CLINIC | Age: 67
End: 2022-06-14

## 2022-06-14 ENCOUNTER — TELEPHONE (OUTPATIENT)
Dept: PODIATRY | Facility: CLINIC | Age: 67
End: 2022-06-14
Payer: MEDICARE

## 2022-06-14 DIAGNOSIS — E11.49 TYPE II DIABETES MELLITUS WITH NEUROLOGICAL MANIFESTATIONS: ICD-10-CM

## 2022-06-14 DIAGNOSIS — M14.672 CHARCOT'S JOINT OF ANKLE, LEFT: ICD-10-CM

## 2022-06-14 DIAGNOSIS — E11.621 DIABETIC ULCER OF LEFT MIDFOOT ASSOCIATED WITH TYPE 2 DIABETES MELLITUS, WITH FAT LAYER EXPOSED: Primary | ICD-10-CM

## 2022-06-14 DIAGNOSIS — M14.672 CHARCOT'S JOINT OF FOOT, LEFT: ICD-10-CM

## 2022-06-14 DIAGNOSIS — L97.422 DIABETIC ULCER OF LEFT MIDFOOT ASSOCIATED WITH TYPE 2 DIABETES MELLITUS, WITH FAT LAYER EXPOSED: Primary | ICD-10-CM

## 2022-06-14 DIAGNOSIS — M25.572 PAIN IN JOINT INVOLVING LEFT ANKLE AND FOOT: ICD-10-CM

## 2022-06-14 PROCEDURE — 11042 DBRDMT SUBQ TIS 1ST 20SQCM/<: CPT | Mod: S$GLB,,, | Performed by: PODIATRIST

## 2022-06-14 PROCEDURE — 99214 OFFICE O/P EST MOD 30 MIN: CPT | Mod: 25,S$GLB,, | Performed by: PODIATRIST

## 2022-06-14 PROCEDURE — 1160F PR REVIEW ALL MEDS BY PRESCRIBER/CLIN PHARMACIST DOCUMENTED: ICD-10-PCS | Mod: CPTII,S$GLB,, | Performed by: PODIATRIST

## 2022-06-14 PROCEDURE — 3051F HG A1C>EQUAL 7.0%<8.0%: CPT | Mod: CPTII,S$GLB,, | Performed by: PODIATRIST

## 2022-06-14 PROCEDURE — 1159F PR MEDICATION LIST DOCUMENTED IN MEDICAL RECORD: ICD-10-PCS | Mod: CPTII,S$GLB,, | Performed by: PODIATRIST

## 2022-06-14 PROCEDURE — 99999 PR PBB SHADOW E&M-EST. PATIENT-LVL I: CPT | Mod: PBBFAC,,, | Performed by: PODIATRIST

## 2022-06-14 PROCEDURE — 99999 PR PBB SHADOW E&M-EST. PATIENT-LVL I: ICD-10-PCS | Mod: PBBFAC,,, | Performed by: PODIATRIST

## 2022-06-14 PROCEDURE — 11042 PR DEBRIDEMENT, SKIN, SUB-Q TISSUE,=<20 SQ CM: ICD-10-PCS | Mod: S$GLB,,, | Performed by: PODIATRIST

## 2022-06-14 PROCEDURE — 3066F NEPHROPATHY DOC TX: CPT | Mod: CPTII,S$GLB,, | Performed by: PODIATRIST

## 2022-06-14 PROCEDURE — 4010F PR ACE/ARB THEARPY RXD/TAKEN: ICD-10-PCS | Mod: CPTII,S$GLB,, | Performed by: PODIATRIST

## 2022-06-14 PROCEDURE — 1159F MED LIST DOCD IN RCRD: CPT | Mod: CPTII,S$GLB,, | Performed by: PODIATRIST

## 2022-06-14 PROCEDURE — 3062F PR POS MACROALBUMINURIA RESULT DOCUMENTED/REVIEW: ICD-10-PCS | Mod: CPTII,S$GLB,, | Performed by: PODIATRIST

## 2022-06-14 PROCEDURE — 3062F POS MACROALBUMINURIA REV: CPT | Mod: CPTII,S$GLB,, | Performed by: PODIATRIST

## 2022-06-14 PROCEDURE — 3066F PR DOCUMENTATION OF TREATMENT FOR NEPHROPATHY: ICD-10-PCS | Mod: CPTII,S$GLB,, | Performed by: PODIATRIST

## 2022-06-14 PROCEDURE — 4010F ACE/ARB THERAPY RXD/TAKEN: CPT | Mod: CPTII,S$GLB,, | Performed by: PODIATRIST

## 2022-06-14 PROCEDURE — 99214 PR OFFICE/OUTPT VISIT, EST, LEVL IV, 30-39 MIN: ICD-10-PCS | Mod: 25,S$GLB,, | Performed by: PODIATRIST

## 2022-06-14 PROCEDURE — 3051F PR MOST RECENT HEMOGLOBIN A1C LEVEL 7.0 - < 8.0%: ICD-10-PCS | Mod: CPTII,S$GLB,, | Performed by: PODIATRIST

## 2022-06-14 PROCEDURE — 1160F RVW MEDS BY RX/DR IN RCRD: CPT | Mod: CPTII,S$GLB,, | Performed by: PODIATRIST

## 2022-06-14 RX ORDER — HYDROCODONE BITARTRATE AND ACETAMINOPHEN 5; 325 MG/1; MG/1
1 TABLET ORAL EVERY 6 HOURS PRN
Qty: 28 TABLET | Refills: 0 | Status: SHIPPED | OUTPATIENT
Start: 2022-06-14 | End: 2022-06-21

## 2022-06-14 NOTE — PROGRESS NOTES
Subjective:       Patient ID: Kay Galarza is a 67 y.o. female.    Chief Complaint: Foot Problem (Cast removal. /10 pain. Diabetic PCP: Dr. Abebe ) and Wound Care (F/U LLE TTC )      HPI: Kay Galarza presents to the clinic today, for evaluation and treatment concerning an ulceration/wound/bulla(e) to the left plantar 1st MTPJ. Patient's Primary Care Provider is Lucy Negron MD. The PMHx. does include DM w/ peripheral neuropathy, venous insufficiency and acquired foot/ankle deformity/deformities. The wound(s) have/has been present for several weeks. Most recent local wound care w/ collagen dressings and TCC. Does have [facility]. Presents this afternoon emergently to have TCC removed.    Hemoglobin A1C   Date Value Ref Range Status   05/25/2022 7.7 (H) 4.0 - 5.6 % Final     Comment:     ADA Screening Guidelines:  5.7-6.4%  Consistent with prediabetes  >or=6.5%  Consistent with diabetes    High levels of fetal hemoglobin interfere with the HbA1C  assay. Heterozygous hemoglobin variants (HbS, HgC, etc)do  not significantly interfere with this assay.   However, presence of multiple variants may affect accuracy.     01/26/2022 >14.0 (H) 4.0 - 5.6 % Final     Comment:     ADA Screening Guidelines:  5.7-6.4%  Consistent with prediabetes  >or=6.5%  Consistent with diabetes    High levels of fetal hemoglobin interfere with the HbA1C  assay. Heterozygous hemoglobin variants (HbS, HgC, etc)do  not significantly interfere with this assay.   However, presence of multiple variants may affect accuracy.     01/19/2022 >14.0 (H) 4.0 - 5.6 % Corrected     Comment:     ADA Screening Guidelines:  5.7-6.4%  Consistent with prediabetes  >or=6.5%  Consistent with diabetes    High levels of fetal hemoglobin interfere with the HbA1C  assay. Heterozygous hemoglobin variants (HbS, HgC, etc)do  not significantly interfere with this assay.   However, presence of multiple variants may  affect accuracy.  CORRECTED RESULT; previously reported as 5.9 on 01/20/2022 at 02:04.         Review of patient's allergies indicates:   Allergen Reactions    Pollen extracts      Seasonal allergies, sneezing       Past Medical History:   Diagnosis Date    Arthritis     DERIAN KNEES    Asthma     SEASONAL    Cataract     Diabetes mellitus     Diabetes mellitus, type 2     Diabetic retinopathy     DM (diabetes mellitus) 2007    BS 97 09/29/2020    Hyperlipidemia     Hypertension        Family History   Problem Relation Age of Onset    Cancer Father         Prostate Ca    Hypertension Father     Stroke Father     Diabetes Sister     Glaucoma Mother     Hypertension Mother     Glaucoma Maternal Grandmother     Blindness Neg Hx     Macular degeneration Neg Hx     Retinal detachment Neg Hx     Strabismus Neg Hx        Social History     Socioeconomic History    Marital status:    Tobacco Use    Smoking status: Never Smoker    Smokeless tobacco: Never Used   Substance and Sexual Activity    Alcohol use: Never    Drug use: No    Sexual activity: Yes       Past Surgical History:   Procedure Laterality Date    APPLICATION OF WOUND VACUUM-ASSISTED CLOSURE DEVICE Left 8/18/2020    Procedure: APPLICATION, WOUND VAC;  Surgeon: Kirt Gray DPM;  Location: [facility] OR;  Service: Podiatry;  Laterality: Left;    CATARACT EXTRACTION W/  INTRAOCULAR LENS IMPLANT Right 07/18/2018    CATARACT EXTRACTION W/  INTRAOCULAR LENS IMPLANT Left 03/13/2019    COLONOSCOPY N/A 12/22/2018    Procedure: COLONOSCOPY;  Surgeon: Zak Dover MD;  Location: [facility] ENDO;  Service: Endoscopy;  Laterality: N/A;    COLONOSCOPY N/A 5/11/2022    Procedure: COLONOSCOPY;  Surgeon: Charles Berrios MD;  Location: [facility] ENDO;  Service: Endoscopy;  Laterality: N/A;    ENDOSCOPIC VEIN LASER TREATMENT Bilateral 1990's    FIXATION OF SYNDESMOSIS OF ANKLE Left 7/23/2020    Procedure: FIXATION, SYNDESMOSIS, ANKLE;  Surgeon:  Kirt Gray DPM;  Location: [facility] OR;  Service: Podiatry;  Laterality: Left;    MANIPULATION WITH ANESTHESIA Left 7/23/2020    Procedure: MANIPULATION, WITH ANESTHESIA;  Surgeon: Kirt Gray DPM;  Location: [facility] OR;  Service: Podiatry;  Laterality: Left;    MIDFOOT ARTHRODESIS Left 7/23/2020    Procedure: FUSION, JOINT, MIDFOOT;  Surgeon: Kirt Gray DPM;  Location: [facility] OR;  Service: Podiatry;  Laterality: Left;  2nd tarsometatarsal joint dislocation via fusion    OPEN REDUCTION AND INTERNAL FIXATION (ORIF) OF INJURY OF ANKLE Left 7/23/2020    Procedure: ORIF, ANKLE;  Surgeon: Kirt Gray DPM;  Location: [facility] OR;  Service: Podiatry;  Laterality: Left;    RECONSTRUCTION WITH FUSION OF CHARCOT FOOT Left 8/18/2020    Procedure: RECONSTRUCTION, CHARCOT FOOT, WITH FUSION;  Surgeon: Kirt Gray DPM;  Location: [facility] OR;  Service: Podiatry;  Laterality: Left;    REMOVAL OF HARDWARE FROM LOWER EXTREMITY Left 1/27/2021    Procedure: REMOVAL, HARDWARE, LOWER EXTREMITY;  Surgeon: Kirt Gray DPM;  Location: [facility] OR;  Service: Podiatry;  Laterality: Left;    WOUND DEBRIDEMENT Left 8/18/2020    Procedure: DEBRIDEMENT, WOUND;  Surgeon: Kirt Gray DPM;  Location: [facility] OR;  Service: Podiatry;  Laterality: Left;       Review of Systems   Constitutional: Negative for chills, fatigue and fever.   HENT: Negative for hearing loss.    Eyes: Negative for photophobia and visual disturbance.   Respiratory: Negative for cough, chest tightness, shortness of breath and wheezing.    Cardiovascular: Positive for leg swelling. Negative for chest pain and palpitations.   Gastrointestinal: Negative for constipation, diarrhea, nausea and vomiting.   Endocrine: Negative for cold intolerance and heat intolerance.   Genitourinary: Negative for flank pain.   Musculoskeletal: Positive for gait problem. Negative for neck pain and neck stiffness.   Skin: Positive for wound. Negative for color change.   Neurological:  Positive for numbness. Negative for light-headedness and headaches.   Psychiatric/Behavioral: Negative for sleep disturbance.          Objective:   There were no vitals taken for this visit.        LOWER EXTREMITY PHYSICAL EXAMINATION    NEUROLOGY: Sensation to light touch is intact. Proprioception is intact, bilateral. Sensation to pin prick is reduced to absent. Vibratory sensation is diminished to the left and right lower extremity. Examination with 5.07 [place] Arnie monofilament reveals that protective sensation is not intact to the left and right plantar surfaces of the foot and digits, as the patient has no sensation/detection at greater than 4 distinct points of contact.     DERMATOLOGY: There is an ulceration at the plantar left 1st MTPJ. The ulcer measures 0.80cm x 0.60m x 0.10cm. No drainage is noted. No osseous exposure is noted. No undermining is noted. No malodor is noted.     VASCULAR: LLE DP and PT are palpable. CFT is WNL. Hair growth is noted. Edema is noted to the LE.     Assessment:     1. Diabetic ulcer of left midfoot associated with type 2 diabetes mellitus, with fat layer exposed    2. Pain in joint involving left ankle and foot    3. Type II diabetes mellitus with neurological manifestations    4. Charcot's joint of ankle, left    5. Charcot's joint of foot, left        Plan:     Diabetic ulcer of left midfoot associated with type 2 diabetes mellitus, with fat layer exposed  -     SUBSEQUENT HOME HEALTH ORDERS    Pain in joint involving left ankle and foot  -     HYDROcodone-acetaminophen (NORCO) 5-325 mg per tablet; Take 1 tablet by mouth every 6 (six) hours as needed for Pain.  Dispense: 28 tablet; Refill: 0    Type II diabetes mellitus with neurological manifestations    Charcot's joint of ankle, left    Charcot's joint of foot, left    Thorough discussion is had with the patient today, concerning the diagnosis, its etiology, and the treatment algorithm at present.     Any prior TCC  (total contact cast) is removed and any pertinent wound care is performed.    The wound was surgically debrided after adequate prep with alcohol and/or betadine paint. Excisional wound debridement was performed using sharp #10/#15 blade/rounded scalpel and tissue nipper, with removal of all non-viable skin and soft tissues; necrotic skin/tissue formation. The woundbase/wound bed was also debrided to encourage bleeding as to promote/stimulate healing. Debridement was excisional and included epidermal, dermal and subcutaneous tissues. Post debridement measurements are as above. Hemostasis was achieved. Patient tolerated procedure well and without complications. Local woundcare with collagen dressings and bandage thereafter. Patient will change the collagen dressings Q3 - Q4 days in standard fashion.    WBAT, only when necessary with surgical shoe.    Strict DMII control.            Future Appointments   Date Time Provider Department Center   6/21/2022 10:00 AM MAYA Ford   7/5/2022  2:30 PM MAYA Ford   7/13/2022  1:00 PM [facility] M[facility]MO2 [facility] [place]

## 2022-06-17 ENCOUNTER — TELEPHONE (OUTPATIENT)
Dept: FAMILY MEDICINE | Facility: CLINIC | Age: 67
End: 2022-06-17
Payer: MEDICARE

## 2022-06-17 DIAGNOSIS — Z79.4 UNCONTROLLED TYPE 2 DIABETES MELLITUS WITH HYPERGLYCEMIA, WITH LONG-TERM CURRENT USE OF INSULIN: ICD-10-CM

## 2022-06-17 DIAGNOSIS — E11.65 UNCONTROLLED TYPE 2 DIABETES MELLITUS WITH HYPERGLYCEMIA, WITH LONG-TERM CURRENT USE OF INSULIN: ICD-10-CM

## 2022-06-17 RX ORDER — PEN NEEDLE, DIABETIC 30 GX3/16"
NEEDLE, DISPOSABLE MISCELLANEOUS
Qty: 200 EACH | Refills: 5 | Status: SHIPPED | OUTPATIENT
Start: 2022-06-17 | End: 2023-03-03 | Stop reason: ALTCHOICE

## 2022-06-17 RX ORDER — INSULIN ASPART 100 [IU]/ML
10 INJECTION, SOLUTION INTRAVENOUS; SUBCUTANEOUS
Qty: 15 ML | Refills: 5 | Status: SHIPPED | OUTPATIENT
Start: 2022-06-17 | End: 2022-09-27

## 2022-06-17 NOTE — TELEPHONE ENCOUNTER
When did she stop taking Novolog? I had ordered 10 units three times daily, before each main meal. What are daytime Bgs running?

## 2022-06-17 NOTE — TELEPHONE ENCOUNTER
Spoke to daughter, she states pt's blood sugar has been really high. She knows one time it was 311 fasting. Pt is only taking Toujeo and wanted to know if she can take Novolog again on a sliding scale. Advised I would send to diabetes clinic. She verbalized understanding.

## 2022-06-17 NOTE — TELEPHONE ENCOUNTER
Federico Lebron MA attempted to call patient to discuss further - left a message for patient to call back. Since we are going into the weekend, I will send in Novolog per previous instructions to take 10 units TID. I am unsure why she stopped taking. Try this patient again Monday please.

## 2022-06-17 NOTE — TELEPHONE ENCOUNTER
----- Message from Alondra Hinojosa sent at 6/17/2022  8:28 AM CDT -----  Contact: jaren Lopez stated that pt blood sugar is high and she is no longer taking the novlog. She would like to know could the novlog be re instated. Please call her back [phone].        Thanks  DD

## 2022-07-11 ENCOUNTER — DOCUMENT SCAN (OUTPATIENT)
Dept: HOME HEALTH SERVICES | Facility: HOSPITAL | Age: 67
End: 2022-07-11
Payer: MEDICARE

## 2022-07-12 DIAGNOSIS — L21.9 SEBORRHEIC DERMATITIS: ICD-10-CM

## 2022-07-13 RX ORDER — INSULIN GLARGINE 300 U/ML
50 INJECTION, SOLUTION SUBCUTANEOUS 2 TIMES DAILY
Qty: 18 PEN | Refills: 0 | Status: SHIPPED | OUTPATIENT
Start: 2022-07-13 | End: 2022-08-22

## 2022-07-13 RX ORDER — CLOBETASOL PROPIONATE 0.46 MG/ML
SOLUTION TOPICAL
Qty: 50 ML | OUTPATIENT
Start: 2022-07-13

## 2022-07-13 NOTE — TELEPHONE ENCOUNTER
No new care gaps identified.  [facility] Embedded Care Gaps. Reference number: [id number]. 7/12/2022   10:18:42 PM CDT

## 2022-07-13 NOTE — TELEPHONE ENCOUNTER
Refill Routing Note   Medication(s) are not appropriate for processing by [facility] for the following reason(s):      - Drug-Drug Interaction (Duplicate Medication/Therapy: TOUJEO SOLOSTAR U-300 INSULIN, TOUJEO MAX U-300 SOLOSTAR)    [facility] action(s):  Defer  Quick Discontinue Medication-related problems identified: Drug-drug interaction     Medication Therapy Plan: DEFER toujeo--therapeutic duplication with toujeo max written by another MD; QUICKDC cream, pt told staff she was no longer using  Medication reconciliation completed: No     Appointments  past 12m or future 3m with PCP    Date Provider   Last Visit   1/19/2022 Lucy Negron MD   Next Visit   Visit date not found Lucy Negron MD   ED visits in past 90 days: 0        Note composed:2:53 PM 07/13/2022

## 2022-07-16 PROCEDURE — G0179 PR HOME HEALTH MD RECERTIFICATION: ICD-10-PCS | Mod: ,,, | Performed by: PODIATRIST

## 2022-07-16 PROCEDURE — G0179 MD RECERTIFICATION HHA PT: HCPCS | Mod: ,,, | Performed by: PODIATRIST

## 2022-07-26 ENCOUNTER — EXTERNAL HOME HEALTH (OUTPATIENT)
Dept: HOME HEALTH SERVICES | Facility: HOSPITAL | Age: 67
End: 2022-07-26
Payer: MEDICARE

## 2022-08-03 NOTE — TELEPHONE ENCOUNTER
No new care gaps identified.  [facility] Embedded Care Gaps. Reference number: [id number]. 8/03/2022   4:13:05 PM CDT

## 2022-08-04 RX ORDER — OMEPRAZOLE 20 MG/1
CAPSULE, DELAYED RELEASE ORAL
Qty: 180 CAPSULE | Refills: 1 | Status: SHIPPED | OUTPATIENT
Start: 2022-08-04 | End: 2023-01-03

## 2022-08-04 NOTE — TELEPHONE ENCOUNTER
Refill Authorization Note   Kay Darron Galarza  is requesting a refill authorization.  Brief Assessment and Rationale for Refill:  Approve     Medication Therapy Plan:       Medication Reconciliation Completed: No   Comments:     No Care Gaps recommended.     Note composed:10:57 AM 08/04/2022

## 2022-08-09 ENCOUNTER — PATIENT MESSAGE (OUTPATIENT)
Dept: ADMINISTRATIVE | Facility: HOSPITAL | Age: 67
End: 2022-08-09
Payer: MEDICARE

## 2022-08-13 DIAGNOSIS — E83.42 HYPOMAGNESEMIA: Primary | ICD-10-CM

## 2022-08-13 NOTE — TELEPHONE ENCOUNTER
No new care gaps identified.  [facility] Embedded Care Gaps. Reference number: [id number]. 8/13/2022   11:17:36 AM BINHT

## 2022-08-14 NOTE — TELEPHONE ENCOUNTER
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

## 2022-08-16 RX ORDER — GABAPENTIN 100 MG/1
CAPSULE ORAL
Qty: 90 CAPSULE | Refills: 0 | Status: SHIPPED | OUTPATIENT
Start: 2022-08-16 | End: 2022-10-10

## 2022-08-16 RX ORDER — VITS A,C,E/LUTEIN/MINERALS 300MCG-200
TABLET ORAL
Qty: 120 TABLET | Status: SHIPPED | OUTPATIENT
Start: 2022-08-16 | End: 2023-09-11

## 2022-08-16 RX ORDER — ATORVASTATIN CALCIUM 40 MG/1
TABLET, FILM COATED ORAL
Qty: 90 TABLET | Refills: 1 | Status: SHIPPED | OUTPATIENT
Start: 2022-08-16

## 2022-08-16 RX ORDER — LISINOPRIL AND HYDROCHLOROTHIAZIDE 20; 25 MG/1; MG/1
1 TABLET ORAL DAILY
Qty: 90 TABLET | Status: SHIPPED | OUTPATIENT
Start: 2022-08-16 | End: 2023-08-22

## 2022-08-22 RX ORDER — INSULIN GLARGINE 300 U/ML
80 INJECTION, SOLUTION SUBCUTANEOUS 2 TIMES DAILY
Qty: 27 PEN | Refills: 0 | Status: SHIPPED | OUTPATIENT
Start: 2022-08-22 | End: 2022-09-27 | Stop reason: SDUPTHER

## 2022-08-22 NOTE — TELEPHONE ENCOUNTER
No new care gaps identified.  [facility] Embedded Care Gaps. Reference number: [id number]. 8/21/2022   10:50:51 PM CDT

## 2022-08-22 NOTE — TELEPHONE ENCOUNTER
Refill Routing Note   Medication(s) are not appropriate for processing by [facility] for the following reason(s):      - Medication requested has undergone a recent dosage adjustment (<3 months)    [facility] action(s):  Defer Medication-related problems identified: Dose adjustment        --->Care Gap information included in message below if applicable.   Medication reconciliation completed: No   Automatic Epic Generated Protocol Data:        Requested Prescriptions   Pending Prescriptions Disp Refills    insulin glargine, TOUJEO, (TOUJEO SOLOSTAR U-300 INSULIN) 300 unit/mL (1.5 mL) InPn pen [Pharmacy Med Name: TOUJEO SOLOSTAR  300IU/M INJ] 27 pen      Sig: Inject 80 Units into the skin 2 (two) times daily.       Endocrinology:  Diabetes - Insulins Passed - 8/21/2022 10:50 PM        Passed - Patient is at least 18 years old        Passed - Valid encounter within last 15 months     Recent Visits  Date Type Provider Dept   01/19/22 Office Visit Lucy Negron MD [facility] Internal Medicine   01/25/21 Office Visit Lucy Negron MD [facility] Internal Medicine   10/27/20 Office Visit Lucy Negron MD [facility] Internal Medicine   Showing recent visits within past 720 days and meeting all other requirements  Future Appointments  No visits were found meeting these conditions.  Showing future appointments within next 150 days and meeting all other requirements      Future Appointments              In 1 week Kirt Gray DPM [facility],  Medical C    In 1 month Arlene Garcia NP [facility] Diabetes [address], [facility]                Passed - Matches previous order       Previous Authorizing Provider: Lucy Negron MD (insulin glargine, TOUJEO, (TOUJEO SOLOSTAR U-300 INSULIN) 300 unit/mL (1.5 mL) InPn pen)  Previous Pharmacy: [facility] [place], [place] - [id number]            Passed - No ED/Admission Since Last PCP visit                Passed - HBA1C within 180 days     Lab Results   Component Value Date    HGBA1C 7.7 (H) 05/25/2022    HGBA1C >14.0 (H) 01/26/2022    HGBA1C >14.0 (H) 01/19/2022              Passed - eGFR is 30 or above and within 360 days     Lab Results   Component Value Date    EGFRNONAA 57.6 (A) 05/25/2022    EGFRNONAA 42.3 (A) 01/19/2022    EGFRNONAA >60.0 01/21/2021                Passed - Cr is 1.4 or below and within 360 days     Lab Results   Component Value Date    CREATININE 1.0 05/25/2022    CREATININE 1.3 01/19/2022    CREATININE 0.9 01/21/2021                    Appointments  past 12m or future 3m with PCP    Date Provider   Last Visit   1/19/2022 Lucy Negron MD   Next Visit   Visit date not found Lucy Negron MD   ED visits in past 90 days: 0        Note composed:12:51 PM 08/22/2022

## 2022-08-24 DIAGNOSIS — Z78.0 MENOPAUSE: ICD-10-CM

## 2022-08-26 ENCOUNTER — TELEPHONE (OUTPATIENT)
Dept: FAMILY MEDICINE | Facility: CLINIC | Age: 67
End: 2022-08-26
Payer: MEDICARE

## 2022-08-26 DIAGNOSIS — E11.9 TYPE 2 DIABETES MELLITUS WITHOUT COMPLICATION, WITH LONG-TERM CURRENT USE OF INSULIN: Primary | ICD-10-CM

## 2022-08-26 DIAGNOSIS — Z79.4 TYPE 2 DIABETES MELLITUS WITHOUT COMPLICATION, WITH LONG-TERM CURRENT USE OF INSULIN: Primary | ICD-10-CM

## 2022-08-26 NOTE — TELEPHONE ENCOUNTER
Spoke to Laryn, [facility] nurse, she states that pt's blood sugar was 310 yesterday. She stated pt ate a big breakfast yesterday morning. She takes her insulin 3 times daily and one at night. She was calling as FYI and recommends pt follow-up with diabetes clinic.

## 2022-08-26 NOTE — TELEPHONE ENCOUNTER
----- Message from Ashlee Macias sent at 8/26/2022  8:37 AM CDT -----  Contact: Laryn - [facility]      Type:  Needs Medical Advice    Who Called: Mahesh  Symptoms (please be specific): The pt  b/s was 310 on 08/25 after eating and taking meds.  How long has patient had these symptoms: n/a  Pharmacy name and phone #: n/a  Would the patient rather a call back or a response via [facility]? Call back  Best Call Back Number: [phone]  Additional Information: n/a

## 2022-08-27 NOTE — TELEPHONE ENCOUNTER
Refill Routing Note   Medication(s) are not appropriate for processing by [facility] for the following reason(s):      - Required laboratory values are abnormal    [facility] action(s):  Defer Medication-related problems identified: Requires labs     Medication Therapy Plan: Labs (a1c); Last EGFRNONAA 57.6 (A) 05/25/2022  Medication reconciliation completed: No     Appointments  past 12m or future 3m with PCP    Date Provider   Last Visit   1/19/2022 Lucy Negron MD   Next Visit   Visit date not found Lucy Negron MD   ED visits in past 90 days: 0        Note composed:10:32 AM 08/27/2022

## 2022-08-27 NOTE — TELEPHONE ENCOUNTER
Care Due:                  Date            Visit Type   Department     Provider  --------------------------------------------------------------------------------                                EP -                              PRIMARY      [facility] INTERNAL  Lucy RIDER  Last Visit: 01-      CARE ([facility])   [facility]       Jamil  Next Visit: None Scheduled  None         None Found                                                            Last  Test          Frequency    Reason                     Performed    Due Date  --------------------------------------------------------------------------------    HBA1C.......  6 months...  TRULICITY, glimepiride,    05- 11-                             insulin..................    [employer] Embedded Care Gaps. Reference number: [id number]. 8/26/2022   10:36:29 PM CDT

## 2022-08-29 RX ORDER — GLIMEPIRIDE 4 MG/1
TABLET ORAL
Qty: 90 TABLET | Refills: 0 | Status: SHIPPED | OUTPATIENT
Start: 2022-08-29 | End: 2023-01-11

## 2022-08-31 ENCOUNTER — OFFICE VISIT (OUTPATIENT)
Dept: PODIATRY | Facility: CLINIC | Age: 67
End: 2022-08-31
Payer: MEDICARE

## 2022-08-31 VITALS — WEIGHT: 220 LBS | BODY MASS INDEX: 32.58 KG/M2 | HEIGHT: 69 IN

## 2022-08-31 DIAGNOSIS — M14.672 CHARCOT'S JOINT OF ANKLE, LEFT: ICD-10-CM

## 2022-08-31 DIAGNOSIS — E11.49 TYPE II DIABETES MELLITUS WITH NEUROLOGICAL MANIFESTATIONS: ICD-10-CM

## 2022-08-31 DIAGNOSIS — M14.672 CHARCOT'S JOINT OF FOOT, LEFT: ICD-10-CM

## 2022-08-31 DIAGNOSIS — E11.621 DIABETIC ULCER OF LEFT MIDFOOT ASSOCIATED WITH TYPE 2 DIABETES MELLITUS, WITH FAT LAYER EXPOSED: Primary | ICD-10-CM

## 2022-08-31 DIAGNOSIS — Z98.1 HISTORY OF SURGICAL FUSION JOINT: ICD-10-CM

## 2022-08-31 DIAGNOSIS — M25.572 PAIN IN JOINT INVOLVING LEFT ANKLE AND FOOT: ICD-10-CM

## 2022-08-31 DIAGNOSIS — L97.422 DIABETIC ULCER OF LEFT MIDFOOT ASSOCIATED WITH TYPE 2 DIABETES MELLITUS, WITH FAT LAYER EXPOSED: Primary | ICD-10-CM

## 2022-08-31 DIAGNOSIS — M21.70 LOWER LIMB LENGTH DIFFERENCE: ICD-10-CM

## 2022-08-31 PROCEDURE — 3008F BODY MASS INDEX DOCD: CPT | Mod: CPTII,S$GLB,, | Performed by: PODIATRIST

## 2022-08-31 PROCEDURE — 4010F PR ACE/ARB THEARPY RXD/TAKEN: ICD-10-PCS | Mod: CPTII,S$GLB,, | Performed by: PODIATRIST

## 2022-08-31 PROCEDURE — 1125F AMNT PAIN NOTED PAIN PRSNT: CPT | Mod: CPTII,S$GLB,, | Performed by: PODIATRIST

## 2022-08-31 PROCEDURE — 3062F POS MACROALBUMINURIA REV: CPT | Mod: CPTII,S$GLB,, | Performed by: PODIATRIST

## 2022-08-31 PROCEDURE — 3008F PR BODY MASS INDEX (BMI) DOCUMENTED: ICD-10-PCS | Mod: CPTII,S$GLB,, | Performed by: PODIATRIST

## 2022-08-31 PROCEDURE — 1160F PR REVIEW ALL MEDS BY PRESCRIBER/CLIN PHARMACIST DOCUMENTED: ICD-10-PCS | Mod: CPTII,S$GLB,, | Performed by: PODIATRIST

## 2022-08-31 PROCEDURE — 87070 CULTURE OTHR SPECIMN AEROBIC: CPT | Performed by: PODIATRIST

## 2022-08-31 PROCEDURE — 11042 DBRDMT SUBQ TIS 1ST 20SQCM/<: CPT | Mod: S$GLB,,, | Performed by: PODIATRIST

## 2022-08-31 PROCEDURE — 3051F HG A1C>EQUAL 7.0%<8.0%: CPT | Mod: CPTII,S$GLB,, | Performed by: PODIATRIST

## 2022-08-31 PROCEDURE — 3051F PR MOST RECENT HEMOGLOBIN A1C LEVEL 7.0 - < 8.0%: ICD-10-PCS | Mod: CPTII,S$GLB,, | Performed by: PODIATRIST

## 2022-08-31 PROCEDURE — 3066F NEPHROPATHY DOC TX: CPT | Mod: CPTII,S$GLB,, | Performed by: PODIATRIST

## 2022-08-31 PROCEDURE — 1125F PR PAIN SEVERITY QUANTIFIED, PAIN PRESENT: ICD-10-PCS | Mod: CPTII,S$GLB,, | Performed by: PODIATRIST

## 2022-08-31 PROCEDURE — 99214 PR OFFICE/OUTPT VISIT, EST, LEVL IV, 30-39 MIN: ICD-10-PCS | Mod: 25,S$GLB,, | Performed by: PODIATRIST

## 2022-08-31 PROCEDURE — 4010F ACE/ARB THERAPY RXD/TAKEN: CPT | Mod: CPTII,S$GLB,, | Performed by: PODIATRIST

## 2022-08-31 PROCEDURE — 1159F MED LIST DOCD IN RCRD: CPT | Mod: CPTII,S$GLB,, | Performed by: PODIATRIST

## 2022-08-31 PROCEDURE — 3066F PR DOCUMENTATION OF TREATMENT FOR NEPHROPATHY: ICD-10-PCS | Mod: CPTII,S$GLB,, | Performed by: PODIATRIST

## 2022-08-31 PROCEDURE — 99999 PR PBB SHADOW E&M-EST. PATIENT-LVL IV: CPT | Mod: PBBFAC,,, | Performed by: PODIATRIST

## 2022-08-31 PROCEDURE — 1159F PR MEDICATION LIST DOCUMENTED IN MEDICAL RECORD: ICD-10-PCS | Mod: CPTII,S$GLB,, | Performed by: PODIATRIST

## 2022-08-31 PROCEDURE — 11042 PR DEBRIDEMENT, SKIN, SUB-Q TISSUE,=<20 SQ CM: ICD-10-PCS | Mod: S$GLB,,, | Performed by: PODIATRIST

## 2022-08-31 PROCEDURE — 99214 OFFICE O/P EST MOD 30 MIN: CPT | Mod: 25,S$GLB,, | Performed by: PODIATRIST

## 2022-08-31 PROCEDURE — 99999 PR PBB SHADOW E&M-EST. PATIENT-LVL IV: ICD-10-PCS | Mod: PBBFAC,,, | Performed by: PODIATRIST

## 2022-08-31 PROCEDURE — 1160F RVW MEDS BY RX/DR IN RCRD: CPT | Mod: CPTII,S$GLB,, | Performed by: PODIATRIST

## 2022-08-31 PROCEDURE — 3062F PR POS MACROALBUMINURIA RESULT DOCUMENTED/REVIEW: ICD-10-PCS | Mod: CPTII,S$GLB,, | Performed by: PODIATRIST

## 2022-08-31 RX ORDER — HYDROCODONE BITARTRATE AND ACETAMINOPHEN 5; 325 MG/1; MG/1
1 TABLET ORAL EVERY 6 HOURS PRN
Qty: 28 TABLET | Refills: 0 | Status: SHIPPED | OUTPATIENT
Start: 2022-08-31 | End: 2022-09-07

## 2022-08-31 RX ORDER — PEN NEEDLE, DIABETIC 31 GX3/16"
NEEDLE, DISPOSABLE MISCELLANEOUS
COMMUNITY
Start: 2022-07-24 | End: 2023-03-03 | Stop reason: ALTCHOICE

## 2022-08-31 NOTE — PROGRESS NOTES
Subjective:       Patient ID: Kay Galarza is a 67 y.o. female.    Chief Complaint: Follow-up (Diabetic ulcer of left midfoot,  c/o swelling and 7/10 pain at present, PCP:  last seen 01/19/2022)      HPI: Kay Galarza presents to the clinic today, for evaluation and treatment concerning an ulceration/wound/bulla(e) to the left plantar 1st MTPJ. Patient's Primary Care Provider is Lucy Negron MD. The PMHx. does include DM w/ peripheral neuropathy, venous insufficiency and acquired foot/ankle deformity/deformities. The wound(s) have/has been present for several days. The wound had healed, but as per the patient and her daughter, the area re-ulcerated a few days ago. Most recent local wound care w/ collagen dressings prior to re-ulceration. Does have [facility].     Hemoglobin A1C   Date Value Ref Range Status   05/25/2022 7.7 (H) 4.0 - 5.6 % Final     Comment:     ADA Screening Guidelines:  5.7-6.4%  Consistent with prediabetes  >or=6.5%  Consistent with diabetes    High levels of fetal hemoglobin interfere with the HbA1C  assay. Heterozygous hemoglobin variants (HbS, HgC, etc)do  not significantly interfere with this assay.   However, presence of multiple variants may affect accuracy.     01/26/2022 >14.0 (H) 4.0 - 5.6 % Final     Comment:     ADA Screening Guidelines:  5.7-6.4%  Consistent with prediabetes  >or=6.5%  Consistent with diabetes    High levels of fetal hemoglobin interfere with the HbA1C  assay. Heterozygous hemoglobin variants (HbS, HgC, etc)do  not significantly interfere with this assay.   However, presence of multiple variants may affect accuracy.     01/19/2022 >14.0 (H) 4.0 - 5.6 % Corrected     Comment:     ADA Screening Guidelines:  5.7-6.4%  Consistent with prediabetes  >or=6.5%  Consistent with diabetes    High levels of fetal hemoglobin interfere with the HbA1C  assay. Heterozygous hemoglobin variants (HbS, HgC,  etc)do  not significantly interfere with this assay.   However, presence of multiple variants may affect accuracy.  CORRECTED RESULT; previously reported as 5.9 on 01/20/2022 at 02:04.         Review of patient's allergies indicates:   Allergen Reactions    Pollen extracts      Seasonal allergies, sneezing       Past Medical History:   Diagnosis Date    Arthritis     DREIAN KNEES    Asthma     SEASONAL    Cataract     Diabetes mellitus     Diabetes mellitus, type 2     Diabetic retinopathy     DM (diabetes mellitus) 2007    BS 97 09/29/2020    Hyperlipidemia     Hypertension        Family History   Problem Relation Age of Onset    Cancer Father         Prostate Ca    Hypertension Father     Stroke Father     Diabetes Sister     Glaucoma Mother     Hypertension Mother     Glaucoma Maternal Grandmother     Blindness Neg Hx     Macular degeneration Neg Hx     Retinal detachment Neg Hx     Strabismus Neg Hx        Social History     Socioeconomic History    Marital status:    Tobacco Use    Smoking status: Never    Smokeless tobacco: Never   Substance and Sexual Activity    Alcohol use: Never    Drug use: No    Sexual activity: Yes       Past Surgical History:   Procedure Laterality Date    APPLICATION OF WOUND VACUUM-ASSISTED CLOSURE DEVICE Left 8/18/2020    Procedure: APPLICATION, WOUND VAC;  Surgeon: Kirt Gray DPM;  Location: [facility] OR;  Service: Podiatry;  Laterality: Left;    CATARACT EXTRACTION W/  INTRAOCULAR LENS IMPLANT Right 07/18/2018    CATARACT EXTRACTION W/  INTRAOCULAR LENS IMPLANT Left 03/13/2019    COLONOSCOPY N/A 12/22/2018    Procedure: COLONOSCOPY;  Surgeon: Zak Dover MD;  Location: [facility] ENDO;  Service: Endoscopy;  Laterality: N/A;    COLONOSCOPY N/A 5/11/2022    Procedure: COLONOSCOPY;  Surgeon: Charles Berrios MD;  Location: [facility] ENDO;  Service: Endoscopy;  Laterality: N/A;    ENDOSCOPIC VEIN LASER TREATMENT Bilateral 1990's    FIXATION OF SYNDESMOSIS OF ANKLE Left 7/23/2020     Procedure: FIXATION, SYNDESMOSIS, ANKLE;  Surgeon: Kirt Gray DPM;  Location: [facility] OR;  Service: Podiatry;  Laterality: Left;    MANIPULATION WITH ANESTHESIA Left 7/23/2020    Procedure: MANIPULATION, WITH ANESTHESIA;  Surgeon: Kirt Gray DPM;  Location: [facility] OR;  Service: Podiatry;  Laterality: Left;    MIDFOOT ARTHRODESIS Left 7/23/2020    Procedure: FUSION, JOINT, MIDFOOT;  Surgeon: Kirt Gray DPM;  Location: [facility] OR;  Service: Podiatry;  Laterality: Left;  2nd tarsometatarsal joint dislocation via fusion    OPEN REDUCTION AND INTERNAL FIXATION (ORIF) OF INJURY OF ANKLE Left 7/23/2020    Procedure: ORIF, ANKLE;  Surgeon: Kirt Gray DPM;  Location: [facility] OR;  Service: Podiatry;  Laterality: Left;    RECONSTRUCTION WITH FUSION OF CHARCOT FOOT Left 8/18/2020    Procedure: RECONSTRUCTION, CHARCOT FOOT, WITH FUSION;  Surgeon: Kirt Gray DPM;  Location: [facility] OR;  Service: Podiatry;  Laterality: Left;    REMOVAL OF HARDWARE FROM LOWER EXTREMITY Left 1/27/2021    Procedure: REMOVAL, HARDWARE, LOWER EXTREMITY;  Surgeon: Kirt Gray DPM;  Location: [facility] OR;  Service: Podiatry;  Laterality: Left;    WOUND DEBRIDEMENT Left 8/18/2020    Procedure: DEBRIDEMENT, WOUND;  Surgeon: Kirt Gray DPM;  Location: [facility] OR;  Service: Podiatry;  Laterality: Left;       Review of Systems   Constitutional:  Negative for chills, fatigue and fever.   HENT:  Negative for hearing loss.    Eyes:  Negative for photophobia and visual disturbance.   Respiratory:  Negative for cough, chest tightness, shortness of breath and wheezing.    Cardiovascular:  Positive for leg swelling. Negative for chest pain and palpitations.   Gastrointestinal:  Negative for constipation, diarrhea, nausea and vomiting.   Endocrine: Negative for cold intolerance and heat intolerance.   Genitourinary:  Negative for flank pain.   Musculoskeletal:  Positive for gait problem. Negative for neck pain and neck stiffness.   Skin:  Positive  "for wound. Negative for color change.   Neurological:  Positive for numbness. Negative for light-headedness and headaches.   Psychiatric/Behavioral:  Negative for sleep disturbance.         Objective:   Ht 5' 9" (1.753 m)   Wt 99.8 kg (220 lb 0.3 oz)   BMI 32.49 kg/m²     LOWER EXTREMITY PHYSICAL EXAMINATION  NEUROLOGY: Sensation to light touch is intact. Proprioception is intact, bilateral. Sensation to pin prick is reduced to absent. Vibratory sensation is diminished to the left and right lower extremity. Examination with 5.07 [place] Arnie monofilament reveals that protective sensation is not intact to the left and right plantar surfaces of the foot and digits, as the patient has no sensation/detection at greater than 4 distinct points of contact.     VASCULAR: LLE DP and PT are palpable. CFT is WNL. Hair growth is noted. Edema is noted to the LE.     DERMATOLOGY: There is an ulceration at the plantar left 1st MTPJ. The ulcer measures 0.80cm x 0.60m x 0.20cm. No drainage is noted. No osseous exposure is noted. No undermining is noted. No malodor is noted.     Assessment:     1. Diabetic ulcer of left midfoot associated with type 2 diabetes mellitus, with fat layer exposed    2. Pain in joint involving left ankle and foot    3. Type II diabetes mellitus with neurological manifestations    4. Charcot's joint of ankle, left    5. Charcot's joint of foot, left    6. History of surgical fusion joint    7. Lower limb length difference          Plan:     Diabetic ulcer of left midfoot associated with type 2 diabetes mellitus, with fat layer exposed  -     DIABETIC SHOES FOR HOME USE  -     SUBSEQUENT HOME HEALTH ORDERS  -     Aerobic culture (Specify Source)    Pain in joint involving left ankle and foot  -     DIABETIC SHOES FOR HOME USE  -     HYDROcodone-acetaminophen (NORCO) 5-325 mg per tablet; Take 1 tablet by mouth every 6 (six) hours as needed for Pain.  Dispense: 28 tablet; Refill: 0    Type II diabetes " mellitus with neurological manifestations  -     DIABETIC SHOES FOR HOME USE    Charcot's joint of ankle, left  -     DIABETIC SHOES FOR HOME USE  -     HYDROcodone-acetaminophen (NORCO) 5-325 mg per tablet; Take 1 tablet by mouth every 6 (six) hours as needed for Pain.  Dispense: 28 tablet; Refill: 0    Charcot's joint of foot, left    History of surgical fusion joint    Lower limb length difference    Thorough discussion is had with the patient today, concerning the diagnosis, its etiology, and the treatment algorithm at present.     The wound was surgically debrided after adequate prep with alcohol and/or betadine paint. Excisional wound debridement was performed using sharp #10/#15 blade/rounded scalpel and tissue nipper, with removal of all non-viable skin and soft tissues; necrotic skin/tissue formation. The woundbase/wound bed was also debrided to encourage bleeding as to promote/stimulate healing. Debridement was excisional and included epidermal, dermal and subcutaneous tissues. Post debridement measurements are as above. Hemostasis was achieved. Patient tolerated procedure well and without complications. Local woundcare with topical Abx ointment dressings and bandage thereafter.     Rx with Sx shoe for now.    Rx for DM shoe gear (to be work ATC). Sole lift on the LLE due to Charcot Foot Type with pronation with functional LLD. States hip pains at present. NSAIDs prn if no overt contra-indication.     Strict DMII control.    [facility].             Future Appointments   Date Time Provider Department Center   9/21/2022 11:00 AM Kirt Gray DPM [facility]   9/27/2022  4:00 PM [facility]

## 2022-09-02 ENCOUNTER — PATIENT OUTREACH (OUTPATIENT)
Dept: ADMINISTRATIVE | Facility: HOSPITAL | Age: 67
End: 2022-09-02
Payer: MEDICARE

## 2022-09-02 NOTE — PROGRESS NOTES
Working the Non-Compliant Mammogram Report: Called patient to discuss scheduling a mammogram appointment. Left a message requesting a call back

## 2022-09-03 LAB — BACTERIA SPEC AEROBE CULT: NORMAL

## 2022-09-10 RX ORDER — DULAGLUTIDE 1.5 MG/.5ML
INJECTION, SOLUTION SUBCUTANEOUS
Qty: 12 PEN | Refills: 0 | Status: SHIPPED | OUTPATIENT
Start: 2022-09-10 | End: 2022-09-27 | Stop reason: SINTOL

## 2022-09-10 RX ORDER — GLIMEPIRIDE 4 MG/1
TABLET ORAL
Qty: 90 TABLET | Refills: 0 | OUTPATIENT
Start: 2022-09-10

## 2022-09-10 NOTE — TELEPHONE ENCOUNTER
Refill Decision Note   Kay Darron Galarza  is requesting a refill authorization.  Brief Assessment and Rationale for Refill:  Quick Discontinue  Approve     Medication Therapy Plan:  Glimepiride signed 1 week ago by PCP    Medication Reconciliation Completed: No   Comments:     No Care Gaps recommended.     Note composed:3:57 PM 09/10/2022

## 2022-09-10 NOTE — TELEPHONE ENCOUNTER
No new care gaps identified.  [facility] Embedded Care Gaps. Reference number: [id number]. 9/10/2022   2:47:03 PM CDT

## 2022-09-12 ENCOUNTER — DOCUMENT SCAN (OUTPATIENT)
Dept: HOME HEALTH SERVICES | Facility: HOSPITAL | Age: 67
End: 2022-09-12
Payer: MEDICARE

## 2022-09-14 PROCEDURE — G0179 MD RECERTIFICATION HHA PT: HCPCS | Mod: ,,, | Performed by: PODIATRIST

## 2022-09-14 PROCEDURE — G0179 PR HOME HEALTH MD RECERTIFICATION: ICD-10-PCS | Mod: ,,, | Performed by: PODIATRIST

## 2022-09-20 ENCOUNTER — PATIENT OUTREACH (OUTPATIENT)
Dept: ADMINISTRATIVE | Facility: HOSPITAL | Age: 67
End: 2022-09-20
Payer: MEDICARE

## 2022-09-20 NOTE — PROGRESS NOTES
Working Mammogram Report:    Spoke with patient daughter about Mammogram. Patient scheduled for 09/21 at 10:40am [place]

## 2022-09-21 ENCOUNTER — HOSPITAL ENCOUNTER (OUTPATIENT)
Dept: RADIOLOGY | Facility: HOSPITAL | Age: 67
Discharge: HOME | End: 2022-09-21
Attending: FAMILY MEDICINE
Payer: MEDICARE

## 2022-09-21 ENCOUNTER — EXTERNAL HOME HEALTH (OUTPATIENT)
Dept: HOME HEALTH SERVICES | Facility: HOSPITAL | Age: 67
End: 2022-09-21
Payer: MEDICARE

## 2022-09-21 ENCOUNTER — OFFICE VISIT (OUTPATIENT)
Dept: PODIATRY | Facility: CLINIC | Age: 67
End: 2022-09-21
Payer: MEDICARE

## 2022-09-21 VITALS — HEIGHT: 69 IN | WEIGHT: 220 LBS | BODY MASS INDEX: 32.58 KG/M2

## 2022-09-21 DIAGNOSIS — M14.672 CHARCOT'S JOINT OF ANKLE, LEFT: ICD-10-CM

## 2022-09-21 DIAGNOSIS — M19.072 OSTEOARTHRITIS OF LEFT ANKLE OR FOOT: ICD-10-CM

## 2022-09-21 DIAGNOSIS — Z12.31 ENCOUNTER FOR SCREENING MAMMOGRAM FOR MALIGNANT NEOPLASM OF BREAST: ICD-10-CM

## 2022-09-21 DIAGNOSIS — E11.49 TYPE II DIABETES MELLITUS WITH NEUROLOGICAL MANIFESTATIONS: ICD-10-CM

## 2022-09-21 DIAGNOSIS — M25.572 PAIN IN JOINT INVOLVING LEFT ANKLE AND FOOT: ICD-10-CM

## 2022-09-21 DIAGNOSIS — L97.422 DIABETIC ULCER OF LEFT MIDFOOT ASSOCIATED WITH TYPE 2 DIABETES MELLITUS, WITH FAT LAYER EXPOSED: Primary | ICD-10-CM

## 2022-09-21 DIAGNOSIS — M14.672 CHARCOT'S JOINT OF FOOT, LEFT: ICD-10-CM

## 2022-09-21 DIAGNOSIS — E11.621 DIABETIC ULCER OF LEFT MIDFOOT ASSOCIATED WITH TYPE 2 DIABETES MELLITUS, WITH FAT LAYER EXPOSED: Primary | ICD-10-CM

## 2022-09-21 PROCEDURE — 1160F RVW MEDS BY RX/DR IN RCRD: CPT | Mod: CPTII,S$GLB,, | Performed by: PODIATRIST

## 2022-09-21 PROCEDURE — 1159F PR MEDICATION LIST DOCUMENTED IN MEDICAL RECORD: ICD-10-PCS | Mod: CPTII,S$GLB,, | Performed by: PODIATRIST

## 2022-09-21 PROCEDURE — 4010F PR ACE/ARB THEARPY RXD/TAKEN: ICD-10-PCS | Mod: CPTII,S$GLB,, | Performed by: PODIATRIST

## 2022-09-21 PROCEDURE — 77067 MAMMO DIGITAL SCREENING BILAT WITH TOMO: ICD-10-PCS | Mod: 26,,, | Performed by: RADIOLOGY

## 2022-09-21 PROCEDURE — 11042 PR DEBRIDEMENT, SKIN, SUB-Q TISSUE,=<20 SQ CM: ICD-10-PCS | Mod: S$GLB,,, | Performed by: PODIATRIST

## 2022-09-21 PROCEDURE — 77067 SCR MAMMO BI INCL CAD: CPT | Mod: TC

## 2022-09-21 PROCEDURE — 1101F PT FALLS ASSESS-DOCD LE1/YR: CPT | Mod: CPTII,S$GLB,, | Performed by: PODIATRIST

## 2022-09-21 PROCEDURE — 1101F PR PT FALLS ASSESS DOC 0-1 FALLS W/OUT INJ PAST YR: ICD-10-PCS | Mod: CPTII,S$GLB,, | Performed by: PODIATRIST

## 2022-09-21 PROCEDURE — 3062F POS MACROALBUMINURIA REV: CPT | Mod: CPTII,S$GLB,, | Performed by: PODIATRIST

## 2022-09-21 PROCEDURE — 87070 CULTURE OTHR SPECIMN AEROBIC: CPT | Performed by: PODIATRIST

## 2022-09-21 PROCEDURE — 4010F ACE/ARB THERAPY RXD/TAKEN: CPT | Mod: CPTII,S$GLB,, | Performed by: PODIATRIST

## 2022-09-21 PROCEDURE — 99999 PR PBB SHADOW E&M-EST. PATIENT-LVL IV: CPT | Mod: PBBFAC,,, | Performed by: PODIATRIST

## 2022-09-21 PROCEDURE — 3288F FALL RISK ASSESSMENT DOCD: CPT | Mod: CPTII,S$GLB,, | Performed by: PODIATRIST

## 2022-09-21 PROCEDURE — 77063 BREAST TOMOSYNTHESIS BI: CPT | Mod: TC

## 2022-09-21 PROCEDURE — 11042 DBRDMT SUBQ TIS 1ST 20SQCM/<: CPT | Mod: S$GLB,,, | Performed by: PODIATRIST

## 2022-09-21 PROCEDURE — 77063 BREAST TOMOSYNTHESIS BI: CPT | Mod: 26,,, | Performed by: RADIOLOGY

## 2022-09-21 PROCEDURE — 3051F PR MOST RECENT HEMOGLOBIN A1C LEVEL 7.0 - < 8.0%: ICD-10-PCS | Mod: CPTII,S$GLB,, | Performed by: PODIATRIST

## 2022-09-21 PROCEDURE — 3066F NEPHROPATHY DOC TX: CPT | Mod: CPTII,S$GLB,, | Performed by: PODIATRIST

## 2022-09-21 PROCEDURE — 77067 SCR MAMMO BI INCL CAD: CPT | Mod: 26,,, | Performed by: RADIOLOGY

## 2022-09-21 PROCEDURE — 99214 OFFICE O/P EST MOD 30 MIN: CPT | Mod: 25,S$GLB,, | Performed by: PODIATRIST

## 2022-09-21 PROCEDURE — 99214 PR OFFICE/OUTPT VISIT, EST, LEVL IV, 30-39 MIN: ICD-10-PCS | Mod: 25,S$GLB,, | Performed by: PODIATRIST

## 2022-09-21 PROCEDURE — 1159F MED LIST DOCD IN RCRD: CPT | Mod: CPTII,S$GLB,, | Performed by: PODIATRIST

## 2022-09-21 PROCEDURE — 1160F PR REVIEW ALL MEDS BY PRESCRIBER/CLIN PHARMACIST DOCUMENTED: ICD-10-PCS | Mod: CPTII,S$GLB,, | Performed by: PODIATRIST

## 2022-09-21 PROCEDURE — 3008F BODY MASS INDEX DOCD: CPT | Mod: CPTII,S$GLB,, | Performed by: PODIATRIST

## 2022-09-21 PROCEDURE — 3008F PR BODY MASS INDEX (BMI) DOCUMENTED: ICD-10-PCS | Mod: CPTII,S$GLB,, | Performed by: PODIATRIST

## 2022-09-21 PROCEDURE — 87077 CULTURE AEROBIC IDENTIFY: CPT | Performed by: PODIATRIST

## 2022-09-21 PROCEDURE — 3051F HG A1C>EQUAL 7.0%<8.0%: CPT | Mod: CPTII,S$GLB,, | Performed by: PODIATRIST

## 2022-09-21 PROCEDURE — 77063 MAMMO DIGITAL SCREENING BILAT WITH TOMO: ICD-10-PCS | Mod: 26,,, | Performed by: RADIOLOGY

## 2022-09-21 PROCEDURE — 3066F PR DOCUMENTATION OF TREATMENT FOR NEPHROPATHY: ICD-10-PCS | Mod: CPTII,S$GLB,, | Performed by: PODIATRIST

## 2022-09-21 PROCEDURE — 1125F PR PAIN SEVERITY QUANTIFIED, PAIN PRESENT: ICD-10-PCS | Mod: CPTII,S$GLB,, | Performed by: PODIATRIST

## 2022-09-21 PROCEDURE — 87186 SC STD MICRODIL/AGAR DIL: CPT | Performed by: PODIATRIST

## 2022-09-21 PROCEDURE — 3288F PR FALLS RISK ASSESSMENT DOCUMENTED: ICD-10-PCS | Mod: CPTII,S$GLB,, | Performed by: PODIATRIST

## 2022-09-21 PROCEDURE — 1125F AMNT PAIN NOTED PAIN PRSNT: CPT | Mod: CPTII,S$GLB,, | Performed by: PODIATRIST

## 2022-09-21 PROCEDURE — 99999 PR PBB SHADOW E&M-EST. PATIENT-LVL IV: ICD-10-PCS | Mod: PBBFAC,,, | Performed by: PODIATRIST

## 2022-09-21 PROCEDURE — 3062F PR POS MACROALBUMINURIA RESULT DOCUMENTED/REVIEW: ICD-10-PCS | Mod: CPTII,S$GLB,, | Performed by: PODIATRIST

## 2022-09-21 RX ORDER — CELECOXIB 200 MG/1
200 CAPSULE ORAL DAILY
Qty: 30 CAPSULE | Refills: 1 | Status: SHIPPED | OUTPATIENT
Start: 2022-09-21 | End: 2023-01-29

## 2022-09-21 RX ORDER — AMOXICILLIN AND CLAVULANATE POTASSIUM 875; 125 MG/1; MG/1
1 TABLET, FILM COATED ORAL 2 TIMES DAILY
Qty: 14 TABLET | Refills: 0 | Status: SHIPPED | OUTPATIENT
Start: 2022-09-21 | End: 2022-09-28

## 2022-09-21 NOTE — PROGRESS NOTES
Subjective:       Patient ID: Kay Galarza is a 67 y.o. female.    Chief Complaint: Wound Care (Diabetic ulcer of left midfoot, 9/10 pain at present, pcp  last seen 01/19/2022)      HPI: Kay Galarza presents to the clinic today, for evaluation and treatment concerning an ulceration/wound/bulla(e) to the left plantar 1st MTPJ. Patient's Primary Care Provider is Lucy Negron MD. The PMHx. does include DM w/ peripheral neuropathy, venous insufficiency and acquired foot/ankle deformity/deformities. The wound(s) have/has been present for several weeks. Most recent local wound care w/ collagen dressings. [facility] States pedal and ankle redness and edema and warmth. Daughter is present. JORGE L WalkerJERRY. Is pending DM shoe gear.     Hemoglobin A1C   Date Value Ref Range Status   05/25/2022 7.7 (H) 4.0 - 5.6 % Final     Comment:     ADA Screening Guidelines:  5.7-6.4%  Consistent with prediabetes  >or=6.5%  Consistent with diabetes    High levels of fetal hemoglobin interfere with the HbA1C  assay. Heterozygous hemoglobin variants (HbS, HgC, etc)do  not significantly interfere with this assay.   However, presence of multiple variants may affect accuracy.     01/26/2022 >14.0 (H) 4.0 - 5.6 % Final     Comment:     ADA Screening Guidelines:  5.7-6.4%  Consistent with prediabetes  >or=6.5%  Consistent with diabetes    High levels of fetal hemoglobin interfere with the HbA1C  assay. Heterozygous hemoglobin variants (HbS, HgC, etc)do  not significantly interfere with this assay.   However, presence of multiple variants may affect accuracy.     01/19/2022 >14.0 (H) 4.0 - 5.6 % Corrected     Comment:     ADA Screening Guidelines:  5.7-6.4%  Consistent with prediabetes  >or=6.5%  Consistent with diabetes    High levels of fetal hemoglobin interfere with the HbA1C  assay. Heterozygous hemoglobin variants (HbS, HgC, etc)do  not significantly  interfere with this assay.   However, presence of multiple variants may affect accuracy.  CORRECTED RESULT; previously reported as 5.9 on 01/20/2022 at 02:04.         Review of patient's allergies indicates:   Allergen Reactions    Pollen extracts      Seasonal allergies, sneezing       Past Medical History:   Diagnosis Date    Arthritis     DERIAN KNEES    Asthma     SEASONAL    Cataract     Diabetes mellitus     Diabetes mellitus, type 2     Diabetic retinopathy     DM (diabetes mellitus) 2007    BS 97 09/29/2020    Hyperlipidemia     Hypertension        Family History   Problem Relation Age of Onset    Cancer Father         Prostate Ca    Hypertension Father     Stroke Father     Diabetes Sister     Glaucoma Mother     Hypertension Mother     Glaucoma Maternal Grandmother     Blindness Neg Hx     Macular degeneration Neg Hx     Retinal detachment Neg Hx     Strabismus Neg Hx        Social History     Socioeconomic History    Marital status:    Tobacco Use    Smoking status: Never    Smokeless tobacco: Never   Substance and Sexual Activity    Alcohol use: Never    Drug use: No    Sexual activity: Yes       Past Surgical History:   Procedure Laterality Date    APPLICATION OF WOUND VACUUM-ASSISTED CLOSURE DEVICE Left 8/18/2020    Procedure: APPLICATION, WOUND VAC;  Surgeon: Kirt Gray DPM;  Location: [facility] OR;  Service: Podiatry;  Laterality: Left;    CATARACT EXTRACTION W/  INTRAOCULAR LENS IMPLANT Right 07/18/2018    CATARACT EXTRACTION W/  INTRAOCULAR LENS IMPLANT Left 03/13/2019    COLONOSCOPY N/A 12/22/2018    Procedure: COLONOSCOPY;  Surgeon: Zak Dover MD;  Location: [facility] ENDO;  Service: Endoscopy;  Laterality: N/A;    COLONOSCOPY N/A 5/11/2022    Procedure: COLONOSCOPY;  Surgeon: Charles Berrios MD;  Location: [facility] ENDO;  Service: Endoscopy;  Laterality: N/A;    ENDOSCOPIC VEIN LASER TREATMENT Bilateral 1990's    FIXATION OF SYNDESMOSIS OF ANKLE Left 7/23/2020    Procedure: FIXATION,  SYNDESMOSIS, ANKLE;  Surgeon: Kirt Gray DPM;  Location: [facility] OR;  Service: Podiatry;  Laterality: Left;    MANIPULATION WITH ANESTHESIA Left 7/23/2020    Procedure: MANIPULATION, WITH ANESTHESIA;  Surgeon: Kirt Gray DPM;  Location: [facility] OR;  Service: Podiatry;  Laterality: Left;    MIDFOOT ARTHRODESIS Left 7/23/2020    Procedure: FUSION, JOINT, MIDFOOT;  Surgeon: Kirt rGay DPM;  Location: [facility] OR;  Service: Podiatry;  Laterality: Left;  2nd tarsometatarsal joint dislocation via fusion    OPEN REDUCTION AND INTERNAL FIXATION (ORIF) OF INJURY OF ANKLE Left 7/23/2020    Procedure: ORIF, ANKLE;  Surgeon: Kirt Gray DPM;  Location: [facility] OR;  Service: Podiatry;  Laterality: Left;    RECONSTRUCTION WITH FUSION OF CHARCOT FOOT Left 8/18/2020    Procedure: RECONSTRUCTION, CHARCOT FOOT, WITH FUSION;  Surgeon: Kirt Gray DPM;  Location: [facility] OR;  Service: Podiatry;  Laterality: Left;    REMOVAL OF HARDWARE FROM LOWER EXTREMITY Left 1/27/2021    Procedure: REMOVAL, HARDWARE, LOWER EXTREMITY;  Surgeon: Kirt Gray DPM;  Location: [facility] OR;  Service: Podiatry;  Laterality: Left;    WOUND DEBRIDEMENT Left 8/18/2020    Procedure: DEBRIDEMENT, WOUND;  Surgeon: Kirt Gray DPM;  Location: [facility] OR;  Service: Podiatry;  Laterality: Left;       Review of Systems   Constitutional:  Negative for chills, fatigue and fever.   HENT:  Negative for hearing loss.    Eyes:  Negative for photophobia and visual disturbance.   Respiratory:  Negative for cough, chest tightness, shortness of breath and wheezing.    Cardiovascular:  Positive for leg swelling. Negative for chest pain and palpitations.   Gastrointestinal:  Negative for constipation, diarrhea, nausea and vomiting.   Endocrine: Negative for cold intolerance and heat intolerance.   Genitourinary:  Negative for flank pain.   Musculoskeletal:  Positive for gait problem. Negative for neck pain and neck stiffness.   Skin:  Positive for wound. Negative  "for color change.   Neurological:  Positive for numbness. Negative for light-headedness and headaches.   Psychiatric/Behavioral:  Negative for sleep disturbance.         Objective:   Ht 5' 9" (1.753 m)   Wt 99.8 kg (220 lb 0.3 oz)   BMI 32.49 kg/m²     LOWER EXTREMITY PHYSICAL EXAMINATION  VASCULAR: LLE DP and PT are palpable. CFT is WNL. Hair growth is noted. Edema is noted to the LE.     NEUROLOGY: Sensation to light touch is intact. Proprioception is intact, bilateral. Sensation to pin prick is reduced to absent. Vibratory sensation is diminished to the left and right lower extremity. Examination with 5.07 [place] [employer] monofilament reveals that protective sensation is not intact to the left and right plantar surfaces of the foot and digits, as the patient has no sensation/detection at greater than 4 distinct points of contact.     ORTHOPEDIC: Palpable tibial sesamoid is noted, LLE. No equinus is noted. Mild edema with pes planus, LLE. Pains to palpation of the hindfoot and midfoot. Pains to the TN and medial column. Pains at the STJ and CC joint. No crepitus is noted. No rockerbottom foot type is noted.     DERMATOLOGY: There is an ulceration at the plantar left 1st MTPJ. The ulcer measures 1.10cm x 0.80m x 0.20cm. No drainage is noted. No osseous exposure is noted. No undermining is noted. No malodor is noted. The tibial sesamoid is palpable. Mild calor, LLE. No erythema is noted.     Assessment:     1. Diabetic ulcer of left midfoot associated with type 2 diabetes mellitus, with fat layer exposed    2. Charcot's joint of ankle, left    3. Charcot's joint of foot, left    4. Osteoarthritis of left ankle or foot    5. Type II diabetes mellitus with neurological manifestations    6. Pain in joint involving left ankle and foot        Plan:     Diabetic ulcer of left midfoot associated with type 2 diabetes mellitus, with fat layer exposed  -     Aerobic culture (Specify Source)  -     amoxicillin-clavulanate " 875-125mg (AUGMENTIN) 875-125 mg per tablet; Take 1 tablet by mouth 2 (two) times daily. for 7 days  Dispense: 14 tablet; Refill: 0  -     Wound care routine (specify)    Charcot's joint of ankle, left    Charcot's joint of foot, left    Osteoarthritis of left ankle or foot    Type II diabetes mellitus with neurological manifestations    Pain in joint involving left ankle and foot  -     celecoxib (CELEBREX) 200 MG capsule; Take 1 capsule (200 mg total) by mouth once daily.  Dispense: 30 capsule; Refill: 1      Thorough discussion is had with the patient today, concerning the diagnosis, its etiology, and the treatment algorithm at present.     The wound was surgically debrided after adequate prep with alcohol and/or betadine paint. Excisional wound debridement was performed using sharp #10/#15 blade/rounded scalpel and tissue nipper, with removal of all non-viable skin and soft tissues; necrotic skin/tissue formation. The woundbase/wound bed was also debrided to encourage bleeding as to promote/stimulate healing. Debridement was excisional and included epidermal, dermal and subcutaneous tissues. Post debridement measurements are as above. Hemostasis was achieved. Patient tolerated procedure well and without complications. Local woundcare with collagen dressings and bandage thereafter. Patient will change the collagen dressings Q3 - Q4 days in standard fashion.    CROW Walker for now.    Is pending DM shoe fabrication.    Strict DMII control.    Continue [facility].     Concerning the ulceration, needs tibial sesamoidectomy, LLE. There is no equinus noted.     Concerning the pes planus foot type and joint pains due to Charcot and DJD  , I do recommend continue CROW Walker and/or DM shoe gear. 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 The foot is stable however.          Future Appointments   Date Time Provider Department Center   9/27/2022   4:00 PM Arlene Garcia NP [facility] [place]   10/17/2022 11:30 AM Kirt Gray DPM [facility]

## 2022-09-26 ENCOUNTER — DOCUMENT SCAN (OUTPATIENT)
Dept: HOME HEALTH SERVICES | Facility: HOSPITAL | Age: 67
End: 2022-09-26
Payer: MEDICARE

## 2022-09-26 LAB — BACTERIA SPEC AEROBE CULT: ABNORMAL

## 2022-09-27 ENCOUNTER — OFFICE VISIT (OUTPATIENT)
Dept: DIABETES | Facility: CLINIC | Age: 67
End: 2022-09-27
Payer: MEDICARE

## 2022-09-27 ENCOUNTER — PATIENT MESSAGE (OUTPATIENT)
Dept: DIABETES | Facility: CLINIC | Age: 67
End: 2022-09-27

## 2022-09-27 DIAGNOSIS — N18.2 CKD STAGE 2 DUE TO TYPE 2 DIABETES MELLITUS: ICD-10-CM

## 2022-09-27 DIAGNOSIS — E78.2 MIXED HYPERLIPIDEMIA: ICD-10-CM

## 2022-09-27 DIAGNOSIS — I10 ESSENTIAL HYPERTENSION: ICD-10-CM

## 2022-09-27 DIAGNOSIS — E11.65 UNCONTROLLED TYPE 2 DIABETES MELLITUS WITH HYPERGLYCEMIA, WITH LONG-TERM CURRENT USE OF INSULIN: Primary | ICD-10-CM

## 2022-09-27 DIAGNOSIS — Z79.4 UNCONTROLLED TYPE 2 DIABETES MELLITUS WITH HYPERGLYCEMIA, WITH LONG-TERM CURRENT USE OF INSULIN: Primary | ICD-10-CM

## 2022-09-27 DIAGNOSIS — L97.522 DIABETIC ULCER OF TOE OF LEFT FOOT ASSOCIATED WITH TYPE 2 DIABETES MELLITUS, WITH FAT LAYER EXPOSED: ICD-10-CM

## 2022-09-27 DIAGNOSIS — E11.42 DIABETIC PERIPHERAL NEUROPATHY ASSOCIATED WITH TYPE 2 DIABETES MELLITUS: ICD-10-CM

## 2022-09-27 DIAGNOSIS — M14.672 CHARCOT'S JOINT OF FOOT, LEFT: ICD-10-CM

## 2022-09-27 DIAGNOSIS — E11.621 DIABETIC ULCER OF TOE OF LEFT FOOT ASSOCIATED WITH TYPE 2 DIABETES MELLITUS, WITH FAT LAYER EXPOSED: ICD-10-CM

## 2022-09-27 DIAGNOSIS — E11.3553 STABLE PROLIFERATIVE DIABETIC RETINOPATHY OF BOTH EYES ASSOCIATED WITH TYPE 2 DIABETES MELLITUS: ICD-10-CM

## 2022-09-27 DIAGNOSIS — E11.22 CKD STAGE 2 DUE TO TYPE 2 DIABETES MELLITUS: ICD-10-CM

## 2022-09-27 PROCEDURE — 1159F PR MEDICATION LIST DOCUMENTED IN MEDICAL RECORD: ICD-10-PCS | Mod: CPTII,95,, | Performed by: NURSE PRACTITIONER

## 2022-09-27 PROCEDURE — 1160F PR REVIEW ALL MEDS BY PRESCRIBER/CLIN PHARMACIST DOCUMENTED: ICD-10-PCS | Mod: CPTII,95,, | Performed by: NURSE PRACTITIONER

## 2022-09-27 PROCEDURE — 99214 PR OFFICE/OUTPT VISIT, EST, LEVL IV, 30-39 MIN: ICD-10-PCS | Mod: 95,,, | Performed by: NURSE PRACTITIONER

## 2022-09-27 PROCEDURE — 4010F ACE/ARB THERAPY RXD/TAKEN: CPT | Mod: CPTII,95,, | Performed by: NURSE PRACTITIONER

## 2022-09-27 PROCEDURE — 3066F NEPHROPATHY DOC TX: CPT | Mod: CPTII,95,, | Performed by: NURSE PRACTITIONER

## 2022-09-27 PROCEDURE — 3062F POS MACROALBUMINURIA REV: CPT | Mod: CPTII,95,, | Performed by: NURSE PRACTITIONER

## 2022-09-27 PROCEDURE — 4010F PR ACE/ARB THEARPY RXD/TAKEN: ICD-10-PCS | Mod: CPTII,95,, | Performed by: NURSE PRACTITIONER

## 2022-09-27 PROCEDURE — 1159F MED LIST DOCD IN RCRD: CPT | Mod: CPTII,95,, | Performed by: NURSE PRACTITIONER

## 2022-09-27 PROCEDURE — 3051F PR MOST RECENT HEMOGLOBIN A1C LEVEL 7.0 - < 8.0%: ICD-10-PCS | Mod: CPTII,95,, | Performed by: NURSE PRACTITIONER

## 2022-09-27 PROCEDURE — 3066F PR DOCUMENTATION OF TREATMENT FOR NEPHROPATHY: ICD-10-PCS | Mod: CPTII,95,, | Performed by: NURSE PRACTITIONER

## 2022-09-27 PROCEDURE — 99214 OFFICE O/P EST MOD 30 MIN: CPT | Mod: 95,,, | Performed by: NURSE PRACTITIONER

## 2022-09-27 PROCEDURE — 3062F PR POS MACROALBUMINURIA RESULT DOCUMENTED/REVIEW: ICD-10-PCS | Mod: CPTII,95,, | Performed by: NURSE PRACTITIONER

## 2022-09-27 PROCEDURE — 3051F HG A1C>EQUAL 7.0%<8.0%: CPT | Mod: CPTII,95,, | Performed by: NURSE PRACTITIONER

## 2022-09-27 PROCEDURE — 1160F RVW MEDS BY RX/DR IN RCRD: CPT | Mod: CPTII,95,, | Performed by: NURSE PRACTITIONER

## 2022-09-27 RX ORDER — INSULIN GLARGINE 300 U/ML
70 INJECTION, SOLUTION SUBCUTANEOUS NIGHTLY
Qty: 4 PEN | Refills: 5 | Status: SHIPPED | OUTPATIENT
Start: 2022-09-27 | End: 2023-10-10 | Stop reason: SDUPTHER

## 2022-09-27 RX ORDER — INSULIN ASPART 100 [IU]/ML
15 INJECTION, SOLUTION INTRAVENOUS; SUBCUTANEOUS
Qty: 15 ML | Refills: 5 | Status: SHIPPED | OUTPATIENT
Start: 2022-09-27 | End: 2022-12-08 | Stop reason: SDUPTHER

## 2022-09-27 NOTE — PATIENT INSTRUCTIONS
Decrease Toujeo Max to 70 units daily.    Increase Novolog to 15 units three times a day before each main meal.     Continue Farxiga 10 mg daily and Amaryl 4 mg in the AM as current.     Establish a Clarity account and upload data- let us know when account is created so we can get her linked with the clinic.     Schedule eye exam and diabetes education appt.

## 2022-09-27 NOTE — PROGRESS NOTES
The patient location is: [place]  The chief complaint leading to consultation is: diabetes management f/u    Visit type: audiovisual    Face to Face time with patient: 20 minutes  30 minutes of total time spent on the encounter, which includes face to face time and non-face to face time preparing to see the patient (eg, review of tests), Obtaining and/or reviewing separately obtained history, Documenting clinical information in the electronic or other health record, Independently interpreting results (not separately reported) and communicating results to the patient/family/caregiver, or Care coordination (not separately reported).         Each patient to whom he or she provides medical services by telemedicine is:  (1) informed of the relationship between the physician and patient and the respective role of any other health care provider with respect to management of the patient; and (2) notified that he or she may decline to receive medical services by telemedicine and may withdraw from such care at any time.    Notes:      Subjective:         Patient ID: Kay Galarza is a 67 y.o. female.  Patient's current PCP is Lucy Negron MD.     Chief Complaint: Diabetes Mellitus    HPI  Kay Galarza is a 67 y.o. White female presenting for a follow up for diabetes. Patient has been diagnosed with type 2 diabetes since 2007 .  Received diabetes education: Yes    CURRENT DM MEDICATIONS:   Diabetes Medications               dapagliflozin (FARXIGA) 10 mg tablet Take 1 tablet (10 mg total) by mouth once daily.    glimepiride (AMARYL) 4 MG tablet TAKE 1 TABLET  BY MOUTH DAILY WITH BREAKFAST.    insulin aspart U-100 (NOVOLOG FLEXPEN U-100 INSULIN) 100 unit/mL (3 mL) InPn pen Inject 10 Units into the skin 3 (three) times daily before meals.    insulin glargine U-300 conc (TOUJEO MAX U-300 SOLOSTAR) 300 unit/mL (3 mL) insulin pen Inject 80 Units into the skin every evening.            Past  "failed treatment include: Xigduo,Trulicity- GI complaints    Blood glucose testing Continuous per Dexcom G6 - , unable to download from home  Preferred lab: [facility]    Any episodes of hypoglycemia? Yes- reports overnight on one occcasion, at 60    Complications related to diabetes: nephropathy, retinopathy, peripheral neuropathy, peripheral vascular disease, and charcot foot (L,2020)    Her blood sugar in the clinic today was:   Lab Results   Component Value Date    POCGLU 119 (A) 05/11/2022     Kay BLANCO Kobe presents today for follow up visit to discuss diabetes management. Requires  At last visit, Trulicity was stopped to see if this helped GI complaints. Her GI symptoms did resolve upon discontinuation. Mealtime insulin was initiated and Toujeo reduced. She cancelled and/or no-showed her appointments with DM education. Her daughter is translating per patient request. She is using Dexcom on - her daughter is able to download Clarity on her laptop for uploading at home. She will work on this and send us a message to be linked with the clinic once completed. States her Bgs are "up and down" and has had a low blood sugar of 60 overnight on one occasion. She reports daytime Bgs are in the 200-300 range following meals. We reviewed MOA of long- vs short- acting insulins and will lower Toujeo with increase of Novolog. She verbalized understanding. Stressed importance of re-scheduling missed appt with DM education. She is overdue for dilated eye exam - has a history of retinopathy. Stressed blindness as a complication from untreated DR and advised to re-schedule appt. Patient's daughter verbalized understanding.    Diabetic ulcer R foot- followed by Dr. Gray.      CKD II- sees nephrology, Dr. Sena    Current diet: Needs to be re-scheduled with DM education.  Breakfast-toast or tortilla with coffee and milk.  She always has rice or bread with each meal.  She does have " fruit snacks and does drink fruit juice daily.  Activity Level: No structured exercise    Lab Results   Component Value Date    HGBA1C 7.7 (H) 05/25/2022    HGBA1C >14.0 (H) 01/26/2022    HGBA1C >14.0 (H) 01/19/2022     STANDARDS OF CARE  Diabetes Management Status    Statin: Taking  ACE/ARB: Taking    Screening or Prevention Patient's value Goal Complete/Controlled?   HgA1C Testing and Control   Lab Results   Component Value Date    HGBA1C 7.7 (H) 05/25/2022      Annually/Less than 8% Yes     Lipid profile : 01/26/2022 Annually Yes     LDL control Lab Results   Component Value Date    LDLCALC 80.0 01/26/2022    Annually/Less than 100 mg/dl  Yes     Nephropathy screening Lab Results   Component Value Date    LABMICR 570.0 05/25/2022     Lab Results   Component Value Date    PROTEINUA 2+ (A) 05/25/2022     Lab Results   Component Value Date    UTPCR 0.65 (H) 05/25/2022      Annually Yes     Blood pressure BP Readings from Last 1 Encounters:   05/11/22 (!) 156/78    Less than 140/90 No     Dilated retinal exam : 04/23/2021 Annually No     Foot exam   : 03/17/2022 Annually Yes        Labs reviewed and are noted below.    Lab Results   Component Value Date    WBC 9.39 01/26/2022    HGB 13.0 01/26/2022    HCT 40.1 01/26/2022     01/26/2022    CHOL 133 01/26/2022    TRIG 95 01/26/2022    HDL 34 (L) 01/26/2022    LDLCALC 80.0 01/26/2022    ALT 11 01/19/2022    AST 9 (L) 01/19/2022     05/25/2022    K 5.0 05/25/2022     05/25/2022    ANIONGAP 11 05/25/2022    CREATININE 1.0 05/25/2022    ESTGFRAFRICA >60.0 05/25/2022    EGFRNONAA 57.6 (A) 05/25/2022    BUN 26 (H) 05/25/2022    CO2 27 05/25/2022    TSH 1.557 02/20/2018    GLU 90 05/25/2022    UTPCR 0.65 (H) 05/25/2022     Lab Results   Component Value Date    TSH 1.557 02/20/2018    CALCIUM 9.1 05/25/2022    PHOS 3.8 05/25/2022     No results found for: CPEPTIDE  No results found for: GLUTAMICACID  Glucose   Date Value Ref Range Status   05/25/2022 90 70 -  110 mg/dL Final     Anion Gap   Date Value Ref Range Status   05/25/2022 11 8 - 16 mmol/L Final     eGFR if    Date Value Ref Range Status   05/25/2022 >60.0 >60 mL/min/1.73 m^2 Final     eGFR if non    Date Value Ref Range Status   05/25/2022 57.6 (A) >60 mL/min/1.73 m^2 Final     Comment:     Calculation used to obtain the estimated glomerular filtration  rate (eGFR) is the CKD-EPI equation.          The following portions of the patient's history were reviewed and updated as appropriate: allergies, current medications, past family history, past medical history, past social history, past surgical history, and problem list.    Review of patient's allergies indicates:   Allergen Reactions    Pollen extracts      Seasonal allergies, sneezing     Social History     Socioeconomic History    Marital status:    Tobacco Use    Smoking status: Never    Smokeless tobacco: Never   Substance and Sexual Activity    Alcohol use: Never    Drug use: No    Sexual activity: Yes     Past Medical History:   Diagnosis Date    Arthritis     DERIAN KNEES    Asthma     SEASONAL    Cataract     Diabetes mellitus     Diabetes mellitus, type 2     Diabetic retinopathy     DM (diabetes mellitus) 2007    BS 97 09/29/2020    Hyperlipidemia     Hypertension        REVIEW OF SYSTEMS:  Eyes No history of DR.  Cardiovascular: History of   GI: Denies nausea,vomiting,constipation,or diarrhea.  : Denies dysuria.  SKIN: Denies rashes and lesions.  Neuro: No neuropathy.  PSYCH: No tobacco use.  ENDO: See HPI.        Objective:      There were no vitals filed for this visit.  RESPIRATORY: No respiratory distress  NEUROLOGIC: Cranial nerves II-XII grossly intact.   PSYCHIATRIC: Alert & oriented x3. Normal mood and affect.  FOOT EXAMINATION:   Assessment:       1. Uncontrolled type 2 diabetes mellitus with hyperglycemia, with long-term current use of insulin    2. Essential hypertension    3. Mixed hyperlipidemia     4. Charcot's joint of foot, left    5. Diabetic peripheral neuropathy associated with type 2 diabetes mellitus    6. Diabetic ulcer of toe of left foot associated with type 2 diabetes mellitus, with fat layer exposed    7. CKD stage 2 due to type 2 diabetes mellitus    8. Stable proliferative diabetic retinopathy of both eyes associated with type 2 diabetes mellitus        Plan:   Uncontrolled type 2 diabetes mellitus with hyperglycemia, with long-term current use of insulin  -     insulin glargine U-300 conc (TOUJEO MAX U-300 SOLOSTAR) 300 unit/mL (3 mL) insulin pen; Inject 70 Units into the skin every evening.  Dispense: 4 pen; Refill: 5  -     insulin aspart U-100 (NOVOLOG FLEXPEN U-100 INSULIN) 100 unit/mL (3 mL) InPn pen; Inject 15 Units into the skin 3 (three) times daily before meals.  Dispense: 15 mL; Refill: 5  -     Hemoglobin A1C; Future; Expected date: 09/27/2022    -Set up Clarity acct at home and send message for getting patient linked with the clinic once complete.     Decrease Toujeo Max to 70 units daily.    Increase Novolog to 15 units three times a day before each main meal.     Continue Farxiga 10 mg daily and Amaryl 4 mg in the AM as current.     Establish a Clarity account and upload data- let us know when account is created so we can get her linked with the clinic.     Schedule eye exam and diabetes education appt.    Essential hypertension     Chronic, stable.  Blood pressure not assessed for today's video visit.  Continue to monitor and continue current medications.     Mixed hyperlipidemia     Chronic, stable.  Continue Lipitor.    Charcot's joint of foot, left  Diabetic peripheral neuropathy associated with type 2 diabetes mellitus  Diabetic ulcer of toe of left foot associated with type 2 diabetes mellitus, with fat layer exposed     Chronic-managed per podiatry. Follow up as advised.     CKD stage 2 due to type 2 diabetes mellitus     Chronic, stable.  Follow up with nephrology as  "advised.     Stable proliferative diabetic retinopathy of both eyes associated with type 2 diabetes mellitus     Chronic-she is overdue for dilated eye exam. Stressed importance of scheduling her appt as she is well overdue.    - Follow up: 2 months    Portions of this note may have been created with voice recognition software. Occasional "wrong-word" or "sound-a-like" substitutions may have occurred due to the inherent limitations of voice recognition software. Please, read the note carefully and recognize, using context, where substitutions have occurred.           MARYANN Jacobs-BECCA  [facility]     "

## 2022-09-28 ENCOUNTER — PATIENT MESSAGE (OUTPATIENT)
Dept: DIABETES | Facility: CLINIC | Age: 67
End: 2022-09-28
Payer: MEDICARE

## 2022-10-10 RX ORDER — DULAGLUTIDE 1.5 MG/.5ML
INJECTION, SOLUTION SUBCUTANEOUS
COMMUNITY
Start: 2022-10-09 | End: 2022-12-08 | Stop reason: SINTOL

## 2022-10-18 ENCOUNTER — PATIENT MESSAGE (OUTPATIENT)
Dept: PODIATRY | Facility: CLINIC | Age: 67
End: 2022-10-18
Payer: MEDICARE

## 2022-10-18 ENCOUNTER — PATIENT MESSAGE (OUTPATIENT)
Dept: ADMINISTRATIVE | Facility: HOSPITAL | Age: 67
End: 2022-10-18
Payer: MEDICARE

## 2022-10-18 ENCOUNTER — PATIENT MESSAGE (OUTPATIENT)
Dept: DIABETES | Facility: CLINIC | Age: 67
End: 2022-10-18
Payer: MEDICARE

## 2022-10-23 ENCOUNTER — PATIENT MESSAGE (OUTPATIENT)
Dept: PODIATRY | Facility: CLINIC | Age: 67
End: 2022-10-23
Payer: MEDICARE

## 2022-10-31 ENCOUNTER — OFFICE VISIT (OUTPATIENT)
Dept: PODIATRY | Facility: CLINIC | Age: 67
End: 2022-10-31
Payer: MEDICARE

## 2022-10-31 VITALS — WEIGHT: 220 LBS | HEIGHT: 69 IN | BODY MASS INDEX: 32.58 KG/M2

## 2022-10-31 DIAGNOSIS — M86.672 CHRONIC OSTEOMYELITIS OF LEFT FOOT: ICD-10-CM

## 2022-10-31 DIAGNOSIS — M19.072 OSTEOARTHRITIS OF LEFT ANKLE OR FOOT: ICD-10-CM

## 2022-10-31 DIAGNOSIS — M14.672 CHARCOT'S JOINT OF FOOT, LEFT: ICD-10-CM

## 2022-10-31 DIAGNOSIS — E11.621 DIABETIC ULCER OF LEFT MIDFOOT ASSOCIATED WITH TYPE 2 DIABETES MELLITUS, WITH FAT LAYER EXPOSED: Primary | ICD-10-CM

## 2022-10-31 DIAGNOSIS — M25.572 PAIN IN JOINT INVOLVING LEFT ANKLE AND FOOT: ICD-10-CM

## 2022-10-31 DIAGNOSIS — M14.672 CHARCOT'S JOINT OF ANKLE, LEFT: ICD-10-CM

## 2022-10-31 DIAGNOSIS — E11.49 TYPE II DIABETES MELLITUS WITH NEUROLOGICAL MANIFESTATIONS: ICD-10-CM

## 2022-10-31 DIAGNOSIS — L97.422 DIABETIC ULCER OF LEFT MIDFOOT ASSOCIATED WITH TYPE 2 DIABETES MELLITUS, WITH FAT LAYER EXPOSED: Primary | ICD-10-CM

## 2022-10-31 PROCEDURE — 87186 SC STD MICRODIL/AGAR DIL: CPT | Mod: 59 | Performed by: PODIATRIST

## 2022-10-31 PROCEDURE — 3051F PR MOST RECENT HEMOGLOBIN A1C LEVEL 7.0 - < 8.0%: ICD-10-PCS | Mod: CPTII,S$GLB,, | Performed by: PODIATRIST

## 2022-10-31 PROCEDURE — 99214 OFFICE O/P EST MOD 30 MIN: CPT | Mod: S$GLB,,, | Performed by: PODIATRIST

## 2022-10-31 PROCEDURE — 3066F NEPHROPATHY DOC TX: CPT | Mod: CPTII,S$GLB,, | Performed by: PODIATRIST

## 2022-10-31 PROCEDURE — 3062F POS MACROALBUMINURIA REV: CPT | Mod: CPTII,S$GLB,, | Performed by: PODIATRIST

## 2022-10-31 PROCEDURE — 1125F PR PAIN SEVERITY QUANTIFIED, PAIN PRESENT: ICD-10-PCS | Mod: CPTII,S$GLB,, | Performed by: PODIATRIST

## 2022-10-31 PROCEDURE — 3062F PR POS MACROALBUMINURIA RESULT DOCUMENTED/REVIEW: ICD-10-PCS | Mod: CPTII,S$GLB,, | Performed by: PODIATRIST

## 2022-10-31 PROCEDURE — 99214 PR OFFICE/OUTPT VISIT, EST, LEVL IV, 30-39 MIN: ICD-10-PCS | Mod: S$GLB,,, | Performed by: PODIATRIST

## 2022-10-31 PROCEDURE — 99999 PR PBB SHADOW E&M-EST. PATIENT-LVL IV: CPT | Mod: PBBFAC,,, | Performed by: PODIATRIST

## 2022-10-31 PROCEDURE — 3051F HG A1C>EQUAL 7.0%<8.0%: CPT | Mod: CPTII,S$GLB,, | Performed by: PODIATRIST

## 2022-10-31 PROCEDURE — 4010F PR ACE/ARB THEARPY RXD/TAKEN: ICD-10-PCS | Mod: CPTII,S$GLB,, | Performed by: PODIATRIST

## 2022-10-31 PROCEDURE — 1101F PT FALLS ASSESS-DOCD LE1/YR: CPT | Mod: CPTII,S$GLB,, | Performed by: PODIATRIST

## 2022-10-31 PROCEDURE — 87077 CULTURE AEROBIC IDENTIFY: CPT | Performed by: PODIATRIST

## 2022-10-31 PROCEDURE — 4010F ACE/ARB THERAPY RXD/TAKEN: CPT | Mod: CPTII,S$GLB,, | Performed by: PODIATRIST

## 2022-10-31 PROCEDURE — 1159F MED LIST DOCD IN RCRD: CPT | Mod: CPTII,S$GLB,, | Performed by: PODIATRIST

## 2022-10-31 PROCEDURE — 99999 PR PBB SHADOW E&M-EST. PATIENT-LVL IV: ICD-10-PCS | Mod: PBBFAC,,, | Performed by: PODIATRIST

## 2022-10-31 PROCEDURE — 1125F AMNT PAIN NOTED PAIN PRSNT: CPT | Mod: CPTII,S$GLB,, | Performed by: PODIATRIST

## 2022-10-31 PROCEDURE — 1159F PR MEDICATION LIST DOCUMENTED IN MEDICAL RECORD: ICD-10-PCS | Mod: CPTII,S$GLB,, | Performed by: PODIATRIST

## 2022-10-31 PROCEDURE — 3288F FALL RISK ASSESSMENT DOCD: CPT | Mod: CPTII,S$GLB,, | Performed by: PODIATRIST

## 2022-10-31 PROCEDURE — 3288F PR FALLS RISK ASSESSMENT DOCUMENTED: ICD-10-PCS | Mod: CPTII,S$GLB,, | Performed by: PODIATRIST

## 2022-10-31 PROCEDURE — 1101F PR PT FALLS ASSESS DOC 0-1 FALLS W/OUT INJ PAST YR: ICD-10-PCS | Mod: CPTII,S$GLB,, | Performed by: PODIATRIST

## 2022-10-31 PROCEDURE — 87070 CULTURE OTHR SPECIMN AEROBIC: CPT | Performed by: PODIATRIST

## 2022-10-31 PROCEDURE — 3066F PR DOCUMENTATION OF TREATMENT FOR NEPHROPATHY: ICD-10-PCS | Mod: CPTII,S$GLB,, | Performed by: PODIATRIST

## 2022-10-31 RX ORDER — MELOXICAM 15 MG/1
15 TABLET ORAL DAILY
Qty: 30 TABLET | Refills: 1 | Status: SHIPPED | OUTPATIENT
Start: 2022-10-31 | End: 2023-01-03

## 2022-10-31 RX ORDER — OXYCODONE AND ACETAMINOPHEN 5; 325 MG/1; MG/1
1 TABLET ORAL EVERY 6 HOURS PRN
Qty: 28 TABLET | Refills: 0 | Status: SHIPPED | OUTPATIENT
Start: 2022-10-31 | End: 2022-11-07

## 2022-10-31 NOTE — PROGRESS NOTES
Subjective:       Patient ID: Kay Galarza is a 67 y.o. female.    Chief Complaint: Follow-up (Diabetic ulcer of left midfoot, 10/10 pain at present, pcp  last seen 01/19/2022)      HPI: Kay Galarza presents to the clinic today, for evaluation and treatment concerning an ulceration/wound/bulla(e) to the left plantar 1st MTPJ. Patient's Primary Care Provider is Lucy Negron MD. The PMHx. does include DM w/ peripheral neuropathy, venous insufficiency and acquired foot/ankle deformity/deformities. The wound(s) have/has been present for several weeks. Most recent local wound care w/ collagen dressings. Does have [facility]. Home Health RN states non-compliance. Patient is WB today with slide sandal. States malodor from the wounds. States severe foot and ankle pains.  Daughter is present. JERRY Goldman.     Hemoglobin A1C   Date Value Ref Range Status   05/25/2022 7.7 (H) 4.0 - 5.6 % Final     Comment:     ADA Screening Guidelines:  5.7-6.4%  Consistent with prediabetes  >or=6.5%  Consistent with diabetes    High levels of fetal hemoglobin interfere with the HbA1C  assay. Heterozygous hemoglobin variants (HbS, HgC, etc)do  not significantly interfere with this assay.   However, presence of multiple variants may affect accuracy.     01/26/2022 >14.0 (H) 4.0 - 5.6 % Final     Comment:     ADA Screening Guidelines:  5.7-6.4%  Consistent with prediabetes  >or=6.5%  Consistent with diabetes    High levels of fetal hemoglobin interfere with the HbA1C  assay. Heterozygous hemoglobin variants (HbS, HgC, etc)do  not significantly interfere with this assay.   However, presence of multiple variants may affect accuracy.     01/19/2022 >14.0 (H) 4.0 - 5.6 % Corrected     Comment:     ADA Screening Guidelines:  5.7-6.4%  Consistent with prediabetes  >or=6.5%  Consistent with diabetes    High levels of fetal hemoglobin interfere with the HbA1C  assay. Heterozygous  hemoglobin variants (HbS, HgC, etc)do  not significantly interfere with this assay.   However, presence of multiple variants may affect accuracy.  CORRECTED RESULT; previously reported as 5.9 on 01/20/2022 at 02:04.         Review of patient's allergies indicates:   Allergen Reactions    Pollen extracts      Seasonal allergies, sneezing       Past Medical History:   Diagnosis Date    Arthritis     DERIAN KNEES    Asthma     SEASONAL    Cataract     Diabetes mellitus     Diabetes mellitus, type 2     Diabetic retinopathy     DM (diabetes mellitus) 2007    BS 97 09/29/2020    Hyperlipidemia     Hypertension        Family History   Problem Relation Age of Onset    Cancer Father         Prostate Ca    Hypertension Father     Stroke Father     Diabetes Sister     Glaucoma Mother     Hypertension Mother     Glaucoma Maternal Grandmother     Blindness Neg Hx     Macular degeneration Neg Hx     Retinal detachment Neg Hx     Strabismus Neg Hx        Social History     Socioeconomic History    Marital status:    Tobacco Use    Smoking status: Never    Smokeless tobacco: Never   Substance and Sexual Activity    Alcohol use: Never    Drug use: No    Sexual activity: Yes       Past Surgical History:   Procedure Laterality Date    APPLICATION OF WOUND VACUUM-ASSISTED CLOSURE DEVICE Left 8/18/2020    Procedure: APPLICATION, WOUND VAC;  Surgeon: Kirt Gray DPM;  Location: [facility] OR;  Service: Podiatry;  Laterality: Left;    CATARACT EXTRACTION W/  INTRAOCULAR LENS IMPLANT Right 07/18/2018    CATARACT EXTRACTION W/  INTRAOCULAR LENS IMPLANT Left 03/13/2019    COLONOSCOPY N/A 12/22/2018    Procedure: COLONOSCOPY;  Surgeon: Zak Dover MD;  Location: [facility] ENDO;  Service: Endoscopy;  Laterality: N/A;    COLONOSCOPY N/A 5/11/2022    Procedure: COLONOSCOPY;  Surgeon: Charles Berrios MD;  Location: [facility] ENDO;  Service: Endoscopy;  Laterality: N/A;    ENDOSCOPIC VEIN LASER TREATMENT Bilateral 1990's    FIXATION OF  SYNDESMOSIS OF ANKLE Left 7/23/2020    Procedure: FIXATION, SYNDESMOSIS, ANKLE;  Surgeon: Kirt Gray DPM;  Location: [facility] OR;  Service: Podiatry;  Laterality: Left;    MANIPULATION WITH ANESTHESIA Left 7/23/2020    Procedure: MANIPULATION, WITH ANESTHESIA;  Surgeon: Kirt Gray DPM;  Location: [facility] OR;  Service: Podiatry;  Laterality: Left;    MIDFOOT ARTHRODESIS Left 7/23/2020    Procedure: FUSION, JOINT, MIDFOOT;  Surgeon: Kitr Gray DPM;  Location: [facility] OR;  Service: Podiatry;  Laterality: Left;  2nd tarsometatarsal joint dislocation via fusion    OPEN REDUCTION AND INTERNAL FIXATION (ORIF) OF INJURY OF ANKLE Left 7/23/2020    Procedure: ORIF, ANKLE;  Surgeon: Kirt Gray DPM;  Location: [facility] OR;  Service: Podiatry;  Laterality: Left;    RECONSTRUCTION WITH FUSION OF CHARCOT FOOT Left 8/18/2020    Procedure: RECONSTRUCTION, CHARCOT FOOT, WITH FUSION;  Surgeon: Kirt Gray DPM;  Location: [facility] OR;  Service: Podiatry;  Laterality: Left;    REMOVAL OF HARDWARE FROM LOWER EXTREMITY Left 1/27/2021    Procedure: REMOVAL, HARDWARE, LOWER EXTREMITY;  Surgeon: Kirt Gray DPM;  Location: [facility] OR;  Service: Podiatry;  Laterality: Left;    WOUND DEBRIDEMENT Left 8/18/2020    Procedure: DEBRIDEMENT, WOUND;  Surgeon: Kirt Gray DPM;  Location: [facility] OR;  Service: Podiatry;  Laterality: Left;       Review of Systems   Constitutional:  Negative for chills, fatigue and fever.   HENT:  Negative for hearing loss.    Eyes:  Negative for photophobia and visual disturbance.   Respiratory:  Negative for cough, chest tightness, shortness of breath and wheezing.    Cardiovascular:  Positive for leg swelling. Negative for chest pain and palpitations.   Gastrointestinal:  Negative for constipation, diarrhea, nausea and vomiting.   Endocrine: Negative for cold intolerance and heat intolerance.   Genitourinary:  Negative for flank pain.   Musculoskeletal:  Positive for gait problem. Negative for neck  "pain and neck stiffness.   Skin:  Positive for wound. Negative for color change.   Neurological:  Positive for numbness. Negative for light-headedness and headaches.   Psychiatric/Behavioral:  Negative for sleep disturbance.         Objective:   Ht 5' 9" (1.753 m)   Wt 99.8 kg (220 lb 0.3 oz)   BMI 32.49 kg/m²     LOWER EXTREMITY PHYSICAL EXAMINATION  NEUROLOGY: Sensation to light touch is intact. Proprioception is intact. Sensation to pin prick is reduced to absent. Vibratory sensation is diminished to the left lower extremity. Examination with 5.07 [place] Arnie monofilament reveals that protective sensation is not intact to the left plantar surfaces of the foot and digits, as the patient has no sensation/detection at greater than 4 distinct points of contact.     VASCULAR: LLE DP and PT are palpable. CFT is WNL. Hair growth is noted. Edema is noted to the LE.     ORTHOPEDIC: Palpable tibial sesamoid is noted, LLE. No equinus is noted. Mild edema with pes planus, LLE. Pains to palpation of the hindfoot and midfoot. Pains to the TN and medial column are mild. Pains to the STJ and AM and AL gutters are severe. No crepitus is noted. No rockerbottom foot type is noted.     DERMATOLOGY: There is an ulceration at the plantar left 1st MTPJ. The ulcer measures 2.0cm x 1.50cm x 0.20cm. No drainage is noted. No osseous exposure is noted. No undermining is noted. Mild malodor is noted. The tibial sesamoid is palpable. Mild calor, LLE. No erythema is noted. No cellulitis is noted.     Assessment:     1. Diabetic ulcer of left midfoot associated with type 2 diabetes mellitus, with fat layer exposed    2. Charcot's joint of ankle, left    3. Charcot's joint of foot, left    4. Osteoarthritis of left ankle or foot    5. Type II diabetes mellitus with neurological manifestations    6. Pain in joint involving left ankle and foot    7. Chronic osteomyelitis of left foot        Plan:     Diabetic ulcer of left midfoot " associated with type 2 diabetes mellitus, with fat layer exposed  -     NM Bone Scan 3 Phase Foot; Future; Expected date: 10/31/2022  -     Aerobic culture (Specify Source)  -     SUBSEQUENT HOME HEALTH ORDERS    Charcot's joint of ankle, left    Charcot's joint of foot, left    Osteoarthritis of left ankle or foot    Type II diabetes mellitus with neurological manifestations    Pain in joint involving left ankle and foot  -     oxyCODONE-acetaminophen (PERCOCET) 5-325 mg per tablet; Take 1 tablet by mouth every 6 (six) hours as needed for Pain.  Dispense: 28 tablet; Refill: 0  -     meloxicam (MOBIC) 15 MG tablet; Take 1 tablet (15 mg total) by mouth once daily.  Dispense: 30 tablet; Refill: 1    Chronic osteomyelitis of left foot  -     NM Bone Scan 3 Phase Foot; Future; Expected date: 10/31/2022    Thorough discussion is had with the patient today, concerning the diagnosis, its etiology, and the treatment algorithm at present.     The wound was surgically debrided after adequate prep with alcohol and/or betadine paint. Excisional wound debridement was performed using sharp #10/#15 blade/rounded scalpel and tissue nipper, with removal of all non-viable skin and soft tissues; necrotic skin/tissue formation. The woundbase/wound bed was also debrided to encourage bleeding as to promote/stimulate healing. Debridement was excisional and included epidermal, dermal and subcutaneous tissues. Post debridement measurements are as above. Hemostasis was achieved. Patient tolerated procedure well and without complications. Local woundcare with alginate dressings and bandage thereafter.     Advised compliance with CROW Walker ATC.    Strict DMII control.    Continue [facility].     Concerning the ulceration, needs to R/O OM and possible tibial sesamoidectomy, LLE, as there is no equinus noted.     Will obtain NM Scan.    Patient states severe ankle and hindfoot pains to the LLE.     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     May need TTC fusion with revision of TN non-union.    She is non-compliant with CROW Walker or CAM Walker or DM Shoe gear.            Future Appointments   Date Time Provider Department Center   11/21/2022  9:30 AM Marbin Galicia OD [facility]   11/30/2022  9:30 AM Arlene Garcai NP [facility]

## 2022-11-03 DIAGNOSIS — L97.422 DIABETIC ULCER OF LEFT MIDFOOT ASSOCIATED WITH TYPE 2 DIABETES MELLITUS, WITH FAT LAYER EXPOSED: Primary | ICD-10-CM

## 2022-11-03 DIAGNOSIS — E11.621 DIABETIC ULCER OF LEFT MIDFOOT ASSOCIATED WITH TYPE 2 DIABETES MELLITUS, WITH FAT LAYER EXPOSED: Primary | ICD-10-CM

## 2022-11-03 RX ORDER — DOXYCYCLINE 100 MG/1
100 CAPSULE ORAL EVERY 12 HOURS
Qty: 20 CAPSULE | Refills: 0 | Status: SHIPPED | OUTPATIENT
Start: 2022-11-03 | End: 2022-11-13

## 2022-11-04 ENCOUNTER — HOSPITAL ENCOUNTER (OUTPATIENT)
Dept: RADIOLOGY | Facility: HOSPITAL | Age: 67
Discharge: HOME | End: 2022-11-04
Attending: PODIATRIST
Payer: MEDICARE

## 2022-11-04 DIAGNOSIS — E11.621 DIABETIC ULCER OF LEFT MIDFOOT ASSOCIATED WITH TYPE 2 DIABETES MELLITUS, WITH FAT LAYER EXPOSED: ICD-10-CM

## 2022-11-04 DIAGNOSIS — L97.422 DIABETIC ULCER OF LEFT MIDFOOT ASSOCIATED WITH TYPE 2 DIABETES MELLITUS, WITH FAT LAYER EXPOSED: ICD-10-CM

## 2022-11-04 DIAGNOSIS — E11.621 DIABETIC ULCER OF LEFT MIDFOOT ASSOCIATED WITH TYPE 2 DIABETES MELLITUS, WITH FAT LAYER EXPOSED: Primary | ICD-10-CM

## 2022-11-04 DIAGNOSIS — M86.672 CHRONIC OSTEOMYELITIS OF LEFT FOOT: ICD-10-CM

## 2022-11-04 DIAGNOSIS — L97.422 DIABETIC ULCER OF LEFT MIDFOOT ASSOCIATED WITH TYPE 2 DIABETES MELLITUS, WITH FAT LAYER EXPOSED: Primary | ICD-10-CM

## 2022-11-04 LAB
BACTERIA SPEC AEROBE CULT: ABNORMAL
BACTERIA SPEC AEROBE CULT: ABNORMAL

## 2022-11-04 PROCEDURE — 78315 NM BONE SCAN 3 PHASE FOOT: ICD-10-PCS | Mod: 26,,, | Performed by: RADIOLOGY

## 2022-11-04 PROCEDURE — A9503 TC99M MEDRONATE: HCPCS

## 2022-11-04 PROCEDURE — 78315 BONE IMAGING 3 PHASE: CPT | Mod: 26,,, | Performed by: RADIOLOGY

## 2022-11-04 RX ORDER — SULFAMETHOXAZOLE AND TRIMETHOPRIM 800; 160 MG/1; MG/1
1 TABLET ORAL 2 TIMES DAILY
Qty: 14 TABLET | Refills: 0 | Status: SHIPPED | OUTPATIENT
Start: 2022-11-04 | End: 2022-11-11

## 2022-11-13 PROCEDURE — G0179 PR HOME HEALTH MD RECERTIFICATION: ICD-10-PCS | Mod: ,,, | Performed by: PODIATRIST

## 2022-11-13 PROCEDURE — G0179 MD RECERTIFICATION HHA PT: HCPCS | Mod: ,,, | Performed by: PODIATRIST

## 2022-11-16 ENCOUNTER — PATIENT MESSAGE (OUTPATIENT)
Dept: PODIATRY | Facility: CLINIC | Age: 67
End: 2022-11-16
Payer: MEDICARE

## 2022-11-22 ENCOUNTER — OFFICE VISIT (OUTPATIENT)
Dept: PODIATRY | Facility: CLINIC | Age: 67
End: 2022-11-22
Payer: MEDICARE

## 2022-11-22 VITALS — HEIGHT: 69 IN | WEIGHT: 220 LBS | BODY MASS INDEX: 32.58 KG/M2

## 2022-11-22 DIAGNOSIS — E11.621 DIABETIC ULCER OF LEFT MIDFOOT ASSOCIATED WITH TYPE 2 DIABETES MELLITUS, WITH FAT LAYER EXPOSED: Primary | ICD-10-CM

## 2022-11-22 DIAGNOSIS — L97.422 DIABETIC ULCER OF LEFT MIDFOOT ASSOCIATED WITH TYPE 2 DIABETES MELLITUS, WITH FAT LAYER EXPOSED: Primary | ICD-10-CM

## 2022-11-22 DIAGNOSIS — E11.49 TYPE II DIABETES MELLITUS WITH NEUROLOGICAL MANIFESTATIONS: ICD-10-CM

## 2022-11-22 DIAGNOSIS — M14.672 CHARCOT'S JOINT OF FOOT, LEFT: ICD-10-CM

## 2022-11-22 DIAGNOSIS — M14.672 CHARCOT'S JOINT OF ANKLE, LEFT: ICD-10-CM

## 2022-11-22 DIAGNOSIS — T84.84XA PAINFUL ORTHOPAEDIC HARDWARE: ICD-10-CM

## 2022-11-22 DIAGNOSIS — M96.0 PSEUDARTHROSIS AFTER FUSION OR ARTHRODESIS: ICD-10-CM

## 2022-11-22 DIAGNOSIS — M19.072 OSTEOARTHRITIS OF LEFT ANKLE OR FOOT: ICD-10-CM

## 2022-11-22 DIAGNOSIS — M25.572 PAIN IN JOINT INVOLVING LEFT ANKLE AND FOOT: ICD-10-CM

## 2022-11-22 PROCEDURE — 1159F MED LIST DOCD IN RCRD: CPT | Mod: CPTII,S$GLB,, | Performed by: PODIATRIST

## 2022-11-22 PROCEDURE — 99999 PR PBB SHADOW E&M-EST. PATIENT-LVL III: ICD-10-PCS | Mod: PBBFAC,,, | Performed by: PODIATRIST

## 2022-11-22 PROCEDURE — 3062F PR POS MACROALBUMINURIA RESULT DOCUMENTED/REVIEW: ICD-10-PCS | Mod: CPTII,S$GLB,, | Performed by: PODIATRIST

## 2022-11-22 PROCEDURE — 3062F POS MACROALBUMINURIA REV: CPT | Mod: CPTII,S$GLB,, | Performed by: PODIATRIST

## 2022-11-22 PROCEDURE — 1160F RVW MEDS BY RX/DR IN RCRD: CPT | Mod: CPTII,S$GLB,, | Performed by: PODIATRIST

## 2022-11-22 PROCEDURE — 99214 PR OFFICE/OUTPT VISIT, EST, LEVL IV, 30-39 MIN: ICD-10-PCS | Mod: 25,S$GLB,, | Performed by: PODIATRIST

## 2022-11-22 PROCEDURE — 3066F PR DOCUMENTATION OF TREATMENT FOR NEPHROPATHY: ICD-10-PCS | Mod: CPTII,S$GLB,, | Performed by: PODIATRIST

## 2022-11-22 PROCEDURE — 1159F PR MEDICATION LIST DOCUMENTED IN MEDICAL RECORD: ICD-10-PCS | Mod: CPTII,S$GLB,, | Performed by: PODIATRIST

## 2022-11-22 PROCEDURE — 99214 OFFICE O/P EST MOD 30 MIN: CPT | Mod: 25,S$GLB,, | Performed by: PODIATRIST

## 2022-11-22 PROCEDURE — 11042 DBRDMT SUBQ TIS 1ST 20SQCM/<: CPT | Mod: S$GLB,,, | Performed by: PODIATRIST

## 2022-11-22 PROCEDURE — 4010F ACE/ARB THERAPY RXD/TAKEN: CPT | Mod: CPTII,S$GLB,, | Performed by: PODIATRIST

## 2022-11-22 PROCEDURE — 3008F PR BODY MASS INDEX (BMI) DOCUMENTED: ICD-10-PCS | Mod: CPTII,S$GLB,, | Performed by: PODIATRIST

## 2022-11-22 PROCEDURE — 11042 PR DEBRIDEMENT, SKIN, SUB-Q TISSUE,=<20 SQ CM: ICD-10-PCS | Mod: S$GLB,,, | Performed by: PODIATRIST

## 2022-11-22 PROCEDURE — 3051F HG A1C>EQUAL 7.0%<8.0%: CPT | Mod: CPTII,S$GLB,, | Performed by: PODIATRIST

## 2022-11-22 PROCEDURE — 1160F PR REVIEW ALL MEDS BY PRESCRIBER/CLIN PHARMACIST DOCUMENTED: ICD-10-PCS | Mod: CPTII,S$GLB,, | Performed by: PODIATRIST

## 2022-11-22 PROCEDURE — 3008F BODY MASS INDEX DOCD: CPT | Mod: CPTII,S$GLB,, | Performed by: PODIATRIST

## 2022-11-22 PROCEDURE — 1125F PR PAIN SEVERITY QUANTIFIED, PAIN PRESENT: ICD-10-PCS | Mod: CPTII,S$GLB,, | Performed by: PODIATRIST

## 2022-11-22 PROCEDURE — 4010F PR ACE/ARB THEARPY RXD/TAKEN: ICD-10-PCS | Mod: CPTII,S$GLB,, | Performed by: PODIATRIST

## 2022-11-22 PROCEDURE — 1125F AMNT PAIN NOTED PAIN PRSNT: CPT | Mod: CPTII,S$GLB,, | Performed by: PODIATRIST

## 2022-11-22 PROCEDURE — 3066F NEPHROPATHY DOC TX: CPT | Mod: CPTII,S$GLB,, | Performed by: PODIATRIST

## 2022-11-22 PROCEDURE — 3051F PR MOST RECENT HEMOGLOBIN A1C LEVEL 7.0 - < 8.0%: ICD-10-PCS | Mod: CPTII,S$GLB,, | Performed by: PODIATRIST

## 2022-11-22 PROCEDURE — 99999 PR PBB SHADOW E&M-EST. PATIENT-LVL III: CPT | Mod: PBBFAC,,, | Performed by: PODIATRIST

## 2022-11-22 NOTE — PROGRESS NOTES
Subjective:       Patient ID: Kay Galarza is a 67 y.o. female.    Chief Complaint: Foot Ulcer (8/10 pain to left foot. Diabetic PCP: Dr. Abebe last seen 01/19/22)      HPI: Kay Galarza presents to the clinic today, for evaluation and treatment concerning an ulceration/wound/bulla(e) to the left plantar 1st MTPJ. Patient's Primary Care Provider is Lucy Negron MD. The PMHx. does include DM w/ peripheral neuropathy, venous insufficiency and acquired foot/ankle deformity/deformities. The wound(s) have/has been present for several months. Most recent local wound care w/ collagen dressings. Does have [facility]. Patient is WB today with slide sandal. States the wound is nearly healed. Did have NM Scan that was equivocal. States moderate foot and ankle pains.  Daughter is present. Is to ambulate with CROW Walker on the LLE, but does not. Did complete PO Abx.     Hemoglobin A1C   Date Value Ref Range Status   05/25/2022 7.7 (H) 4.0 - 5.6 % Final     Comment:     ADA Screening Guidelines:  5.7-6.4%  Consistent with prediabetes  >or=6.5%  Consistent with diabetes    High levels of fetal hemoglobin interfere with the HbA1C  assay. Heterozygous hemoglobin variants (HbS, HgC, etc)do  not significantly interfere with this assay.   However, presence of multiple variants may affect accuracy.     01/26/2022 >14.0 (H) 4.0 - 5.6 % Final     Comment:     ADA Screening Guidelines:  5.7-6.4%  Consistent with prediabetes  >or=6.5%  Consistent with diabetes    High levels of fetal hemoglobin interfere with the HbA1C  assay. Heterozygous hemoglobin variants (HbS, HgC, etc)do  not significantly interfere with this assay.   However, presence of multiple variants may affect accuracy.     01/19/2022 >14.0 (H) 4.0 - 5.6 % Corrected     Comment:     ADA Screening Guidelines:  5.7-6.4%  Consistent with prediabetes  >or=6.5%  Consistent with diabetes    High levels of fetal hemoglobin  interfere with the HbA1C  assay. Heterozygous hemoglobin variants (HbS, HgC, etc)do  not significantly interfere with this assay.   However, presence of multiple variants may affect accuracy.  CORRECTED RESULT; previously reported as 5.9 on 01/20/2022 at 02:04.         Review of patient's allergies indicates:   Allergen Reactions    Pollen extracts      Seasonal allergies, sneezing       Past Medical History:   Diagnosis Date    Arthritis     DERINA KNEES    Asthma     SEASONAL    Cataract     Diabetes mellitus     Diabetes mellitus, type 2     Diabetic retinopathy     DM (diabetes mellitus) 2007    BS 97 09/29/2020    Hyperlipidemia     Hypertension        Family History   Problem Relation Age of Onset    Cancer Father         Prostate Ca    Hypertension Father     Stroke Father     Diabetes Sister     Glaucoma Mother     Hypertension Mother     Glaucoma Maternal Grandmother     Blindness Neg Hx     Macular degeneration Neg Hx     Retinal detachment Neg Hx     Strabismus Neg Hx        Social History     Socioeconomic History    Marital status:    Tobacco Use    Smoking status: Never    Smokeless tobacco: Never   Substance and Sexual Activity    Alcohol use: Never    Drug use: No    Sexual activity: Yes       Past Surgical History:   Procedure Laterality Date    APPLICATION OF WOUND VACUUM-ASSISTED CLOSURE DEVICE Left 8/18/2020    Procedure: APPLICATION, WOUND VAC;  Surgeon: Kirt Gray DPM;  Location: [facility] OR;  Service: Podiatry;  Laterality: Left;    CATARACT EXTRACTION W/  INTRAOCULAR LENS IMPLANT Right 07/18/2018    CATARACT EXTRACTION W/  INTRAOCULAR LENS IMPLANT Left 03/13/2019    COLONOSCOPY N/A 12/22/2018    Procedure: COLONOSCOPY;  Surgeon: Zak Dover MD;  Location: [facility] ENDO;  Service: Endoscopy;  Laterality: N/A;    COLONOSCOPY N/A 5/11/2022    Procedure: COLONOSCOPY;  Surgeon: Charles Berrios MD;  Location: [facility] ENDO;  Service: Endoscopy;  Laterality: N/A;    ENDOSCOPIC VEIN LASER  TREATMENT Bilateral 1990's    FIXATION OF SYNDESMOSIS OF ANKLE Left 7/23/2020    Procedure: FIXATION, SYNDESMOSIS, ANKLE;  Surgeon: Kirt Gray DPM;  Location: [facility] OR;  Service: Podiatry;  Laterality: Left;    MANIPULATION WITH ANESTHESIA Left 7/23/2020    Procedure: MANIPULATION, WITH ANESTHESIA;  Surgeon: Kirt Gray DPM;  Location: [facility] OR;  Service: Podiatry;  Laterality: Left;    MIDFOOT ARTHRODESIS Left 7/23/2020    Procedure: FUSION, JOINT, MIDFOOT;  Surgeon: Kirt Gray DPM;  Location: [facility] OR;  Service: Podiatry;  Laterality: Left;  2nd tarsometatarsal joint dislocation via fusion    OPEN REDUCTION AND INTERNAL FIXATION (ORIF) OF INJURY OF ANKLE Left 7/23/2020    Procedure: ORIF, ANKLE;  Surgeon: Kirt Gray DPM;  Location: [facility] OR;  Service: Podiatry;  Laterality: Left;    RECONSTRUCTION WITH FUSION OF CHARCOT FOOT Left 8/18/2020    Procedure: RECONSTRUCTION, CHARCOT FOOT, WITH FUSION;  Surgeon: Kirt Gray DPM;  Location: [facility] OR;  Service: Podiatry;  Laterality: Left;    REMOVAL OF HARDWARE FROM LOWER EXTREMITY Left 1/27/2021    Procedure: REMOVAL, HARDWARE, LOWER EXTREMITY;  Surgeon: Kirt Gray DPM;  Location: [facility] OR;  Service: Podiatry;  Laterality: Left;    WOUND DEBRIDEMENT Left 8/18/2020    Procedure: DEBRIDEMENT, WOUND;  Surgeon: Kirt Gray DPM;  Location: [facility] OR;  Service: Podiatry;  Laterality: Left;       Review of Systems   Constitutional:  Negative for chills, fatigue and fever.   HENT:  Negative for hearing loss.    Eyes:  Negative for photophobia and visual disturbance.   Respiratory:  Negative for cough, chest tightness, shortness of breath and wheezing.    Cardiovascular:  Positive for leg swelling. Negative for chest pain and palpitations.   Gastrointestinal:  Negative for constipation, diarrhea, nausea and vomiting.   Endocrine: Negative for cold intolerance and heat intolerance.   Genitourinary:  Negative for flank pain.   Musculoskeletal:   "Positive for gait problem. Negative for neck pain and neck stiffness.   Skin:  Positive for wound. Negative for color change.   Neurological:  Positive for numbness. Negative for light-headedness and headaches.   Psychiatric/Behavioral:  Negative for sleep disturbance.         Objective:   Ht 5' 9" (1.753 m)   Wt 99.8 kg (220 lb 0.3 oz)   BMI 32.49 kg/m²     LOWER EXTREMITY PHYSICAL EXAMINATION    VASCULAR: LLE DP and PT are palpable. CFT is WNL. Hair growth is noted. Edema is noted to the LE.     NEUROLOGY: Sensation to light touch is intact. Proprioception is intact. Sensation to pin prick is reduced to absent. Vibratory sensation is diminished to the left lower extremity. Examination with 5.07 [place] Arnie monofilament reveals that protective sensation is not intact to the left plantar surfaces of the foot and digits, as the patient has no sensation/detection at greater than 4 distinct points of contact.     ORTHOPEDIC: Palpable tibial sesamoid is noted, LLE. No equinus is noted. Mild edema with pes planus, LLE. Pains to palpation of the hindfoot and midfoot. Pains to the TN and medial column are mild. Pains to the STJ and AM and AL gutters are severe. No crepitus is noted. No rockerbottom foot type is noted.     DERMATOLOGY: There is an ulceration at the plantar left 1st MTPJ. The ulcer measures 0.40cm x 0.20cm x 0.10cm. No drainage is noted. No osseous exposure is noted. No undermining is noted. No malodor is noted. The tibial sesamoid is palpable. No calor, LLE. No erythema is noted. No cellulitis is noted.     Assessment:     1. Diabetic ulcer of left midfoot associated with type 2 diabetes mellitus, with fat layer exposed    2. Charcot's joint of ankle, left    3. Charcot's joint of foot, left    4. Osteoarthritis of left ankle or foot    5. Type II diabetes mellitus with neurological manifestations    6. Pain in joint involving left ankle and foot    7. Painful orthopaedic hardware    8. " Pseudarthrosis after fusion or arthrodesis        Plan:     Diabetic ulcer of left midfoot associated with type 2 diabetes mellitus, with fat layer exposed  -     SUBSEQUENT HOME HEALTH ORDERS    Charcot's joint of ankle, left  -     CT Foot Without Contrast Left; Future; Expected date: 11/23/2022  -     Case Request Operating Room: FUSION, SUBTALAR JOINT, RECONSTRUCTION, CHARCOT FOOT, WITH FUSION, REMOVAL, HARDWARE, FOOT    Charcot's joint of foot, left  -     CT Foot Without Contrast Left; Future; Expected date: 11/23/2022  -     Case Request Operating Room: FUSION, SUBTALAR JOINT, RECONSTRUCTION, CHARCOT FOOT, WITH FUSION, REMOVAL, HARDWARE, FOOT    Osteoarthritis of left ankle or foot  -     Case Request Operating Room: FUSION, SUBTALAR JOINT, RECONSTRUCTION, CHARCOT FOOT, WITH FUSION, REMOVAL, HARDWARE, FOOT    Type II diabetes mellitus with neurological manifestations    Pain in joint involving left ankle and foot  -     Case Request Operating Room: FUSION, SUBTALAR JOINT, RECONSTRUCTION, CHARCOT FOOT, WITH FUSION, REMOVAL, HARDWARE, FOOT    Painful orthopaedic hardware  -     Case Request Operating Room: FUSION, SUBTALAR JOINT, RECONSTRUCTION, CHARCOT FOOT, WITH FUSION, REMOVAL, HARDWARE, FOOT    Pseudarthrosis after fusion or arthrodesis  -     Case Request Operating Room: FUSION, SUBTALAR JOINT, RECONSTRUCTION, CHARCOT FOOT, WITH FUSION, REMOVAL, HARDWARE, FOOT    Thorough discussion is had with the patient today, concerning the diagnosis, its etiology, and the treatment algorithm at present.     The wound was surgically debrided after adequate prep with alcohol and/or betadine paint. Excisional wound debridement was performed using sharp #10/#15 blade/rounded scalpel and tissue nipper, with removal of all non-viable skin and soft tissues; necrotic skin/tissue formation. The woundbase/wound bed was also debrided to encourage bleeding as to promote/stimulate healing. Debridement was excisional and included  epidermal, dermal and subcutaneous tissues. Post debridement measurements are as above. Hemostasis was achieved. Patient tolerated procedure well and without complications. Local woundcare with collagen dressings and bandage thereafter.     Advised compliance with CROW Walker ATC.    Strict DMII control.    Continue [facility].     NM Bone Scan is reviewed in detail with the patient. All questions and concerns regarding findings and its/their implications are outlined and discussed.    Patient states severe ankle and hindfoot pains to the LLE.     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     May need TTC fusion with revision of TN non-union with STJ fusion.    I did advise to continue DM shoe gear vs CROW Walker as Charcot foot surgery is riddled with complications (mostly wound healing) and the chance of non-union. Patient and daughter would prefer surgical intervention given her level of pains.    She is non-compliant with CROW Walker or CAM Walker or DM Shoe gear.    Please complete PO Abx.           Future Appointments   Date Time Provider Department Center   11/30/2022  9:30 AM rAlene Garcia NP [facility] [place]   12/19/2022 10:30 AM [facility] CT1 LIMIT 500 LBS [facility] CT SCAN [place]   12/20/2022  2:30 PM [facility] BMD1 [facility] DEXABMD  [facility]   12/20/2022  3:00 PM Kirt Gray DPM [facility] POD  [facility]

## 2022-11-23 ENCOUNTER — EXTERNAL HOME HEALTH (OUTPATIENT)
Dept: HOME HEALTH SERVICES | Facility: HOSPITAL | Age: 67
End: 2022-11-23
Payer: MEDICARE

## 2022-11-28 ENCOUNTER — DOCUMENT SCAN (OUTPATIENT)
Dept: HOME HEALTH SERVICES | Facility: HOSPITAL | Age: 67
End: 2022-11-28
Payer: MEDICARE

## 2022-12-06 ENCOUNTER — TELEPHONE (OUTPATIENT)
Dept: DIABETES | Facility: CLINIC | Age: 67
End: 2022-12-06
Payer: MEDICARE

## 2022-12-07 ENCOUNTER — LAB VISIT (OUTPATIENT)
Dept: LAB | Facility: HOSPITAL | Age: 67
End: 2022-12-07
Attending: NURSE PRACTITIONER
Payer: MEDICARE

## 2022-12-07 ENCOUNTER — OFFICE VISIT (OUTPATIENT)
Dept: DIABETES | Facility: CLINIC | Age: 67
End: 2022-12-07
Payer: MEDICARE

## 2022-12-07 VITALS
DIASTOLIC BLOOD PRESSURE: 77 MMHG | BODY MASS INDEX: 36.02 KG/M2 | WEIGHT: 243.19 LBS | SYSTOLIC BLOOD PRESSURE: 123 MMHG | HEIGHT: 69 IN | HEART RATE: 96 BPM

## 2022-12-07 DIAGNOSIS — I10 ESSENTIAL HYPERTENSION: ICD-10-CM

## 2022-12-07 DIAGNOSIS — E11.65 UNCONTROLLED TYPE 2 DIABETES MELLITUS WITH HYPERGLYCEMIA, WITH LONG-TERM CURRENT USE OF INSULIN: ICD-10-CM

## 2022-12-07 DIAGNOSIS — E11.65 UNCONTROLLED TYPE 2 DIABETES MELLITUS WITH HYPERGLYCEMIA, WITH LONG-TERM CURRENT USE OF INSULIN: Primary | ICD-10-CM

## 2022-12-07 DIAGNOSIS — N18.2 CKD STAGE 2 DUE TO TYPE 2 DIABETES MELLITUS: ICD-10-CM

## 2022-12-07 DIAGNOSIS — E11.22 CKD STAGE 2 DUE TO TYPE 2 DIABETES MELLITUS: ICD-10-CM

## 2022-12-07 DIAGNOSIS — Z79.4 UNCONTROLLED TYPE 2 DIABETES MELLITUS WITH HYPERGLYCEMIA, WITH LONG-TERM CURRENT USE OF INSULIN: ICD-10-CM

## 2022-12-07 DIAGNOSIS — E78.2 MIXED HYPERLIPIDEMIA: ICD-10-CM

## 2022-12-07 DIAGNOSIS — E11.621 DIABETIC ULCER OF TOE OF LEFT FOOT ASSOCIATED WITH TYPE 2 DIABETES MELLITUS, WITH FAT LAYER EXPOSED: ICD-10-CM

## 2022-12-07 DIAGNOSIS — L97.522 DIABETIC ULCER OF TOE OF LEFT FOOT ASSOCIATED WITH TYPE 2 DIABETES MELLITUS, WITH FAT LAYER EXPOSED: ICD-10-CM

## 2022-12-07 DIAGNOSIS — M14.672 CHARCOT'S JOINT OF FOOT, LEFT: ICD-10-CM

## 2022-12-07 DIAGNOSIS — Z79.4 UNCONTROLLED TYPE 2 DIABETES MELLITUS WITH HYPERGLYCEMIA, WITH LONG-TERM CURRENT USE OF INSULIN: Primary | ICD-10-CM

## 2022-12-07 DIAGNOSIS — E11.3553 STABLE PROLIFERATIVE DIABETIC RETINOPATHY OF BOTH EYES ASSOCIATED WITH TYPE 2 DIABETES MELLITUS: ICD-10-CM

## 2022-12-07 DIAGNOSIS — E11.42 DIABETIC PERIPHERAL NEUROPATHY ASSOCIATED WITH TYPE 2 DIABETES MELLITUS: ICD-10-CM

## 2022-12-07 PROCEDURE — 3074F SYST BP LT 130 MM HG: CPT | Mod: HCNC,CPTII,S$GLB, | Performed by: NURSE PRACTITIONER

## 2022-12-07 PROCEDURE — 3078F PR MOST RECENT DIASTOLIC BLOOD PRESSURE < 80 MM HG: ICD-10-PCS | Mod: HCNC,CPTII,S$GLB, | Performed by: NURSE PRACTITIONER

## 2022-12-07 PROCEDURE — 99214 OFFICE O/P EST MOD 30 MIN: CPT | Mod: HCNC,S$GLB,, | Performed by: NURSE PRACTITIONER

## 2022-12-07 PROCEDURE — 95251 PR GLUCOSE MONITOR, 72 HOUR, PHYS INTERP: ICD-10-PCS | Mod: HCNC,S$GLB,, | Performed by: NURSE PRACTITIONER

## 2022-12-07 PROCEDURE — 1125F AMNT PAIN NOTED PAIN PRSNT: CPT | Mod: HCNC,CPTII,S$GLB, | Performed by: NURSE PRACTITIONER

## 2022-12-07 PROCEDURE — 1159F MED LIST DOCD IN RCRD: CPT | Mod: HCNC,CPTII,S$GLB, | Performed by: NURSE PRACTITIONER

## 2022-12-07 PROCEDURE — 3062F POS MACROALBUMINURIA REV: CPT | Mod: HCNC,CPTII,S$GLB, | Performed by: NURSE PRACTITIONER

## 2022-12-07 PROCEDURE — 3008F PR BODY MASS INDEX (BMI) DOCUMENTED: ICD-10-PCS | Mod: HCNC,CPTII,S$GLB, | Performed by: NURSE PRACTITIONER

## 2022-12-07 PROCEDURE — 3288F PR FALLS RISK ASSESSMENT DOCUMENTED: ICD-10-PCS | Mod: HCNC,CPTII,S$GLB, | Performed by: NURSE PRACTITIONER

## 2022-12-07 PROCEDURE — 4010F ACE/ARB THERAPY RXD/TAKEN: CPT | Mod: HCNC,CPTII,S$GLB, | Performed by: NURSE PRACTITIONER

## 2022-12-07 PROCEDURE — 1159F PR MEDICATION LIST DOCUMENTED IN MEDICAL RECORD: ICD-10-PCS | Mod: HCNC,CPTII,S$GLB, | Performed by: NURSE PRACTITIONER

## 2022-12-07 PROCEDURE — 3078F DIAST BP <80 MM HG: CPT | Mod: HCNC,CPTII,S$GLB, | Performed by: NURSE PRACTITIONER

## 2022-12-07 PROCEDURE — 83036 HEMOGLOBIN GLYCOSYLATED A1C: CPT | Mod: HCNC | Performed by: NURSE PRACTITIONER

## 2022-12-07 PROCEDURE — 3051F PR MOST RECENT HEMOGLOBIN A1C LEVEL 7.0 - < 8.0%: ICD-10-PCS | Mod: HCNC,CPTII,S$GLB, | Performed by: NURSE PRACTITIONER

## 2022-12-07 PROCEDURE — 1125F PR PAIN SEVERITY QUANTIFIED, PAIN PRESENT: ICD-10-PCS | Mod: HCNC,CPTII,S$GLB, | Performed by: NURSE PRACTITIONER

## 2022-12-07 PROCEDURE — 84443 ASSAY THYROID STIM HORMONE: CPT | Mod: HCNC | Performed by: NURSE PRACTITIONER

## 2022-12-07 PROCEDURE — 3074F PR MOST RECENT SYSTOLIC BLOOD PRESSURE < 130 MM HG: ICD-10-PCS | Mod: HCNC,CPTII,S$GLB, | Performed by: NURSE PRACTITIONER

## 2022-12-07 PROCEDURE — 1101F PR PT FALLS ASSESS DOC 0-1 FALLS W/OUT INJ PAST YR: ICD-10-PCS | Mod: HCNC,CPTII,S$GLB, | Performed by: NURSE PRACTITIONER

## 2022-12-07 PROCEDURE — 99214 PR OFFICE/OUTPT VISIT, EST, LEVL IV, 30-39 MIN: ICD-10-PCS | Mod: HCNC,S$GLB,, | Performed by: NURSE PRACTITIONER

## 2022-12-07 PROCEDURE — 3008F BODY MASS INDEX DOCD: CPT | Mod: HCNC,CPTII,S$GLB, | Performed by: NURSE PRACTITIONER

## 2022-12-07 PROCEDURE — 3066F PR DOCUMENTATION OF TREATMENT FOR NEPHROPATHY: ICD-10-PCS | Mod: HCNC,CPTII,S$GLB, | Performed by: NURSE PRACTITIONER

## 2022-12-07 PROCEDURE — 3288F FALL RISK ASSESSMENT DOCD: CPT | Mod: HCNC,CPTII,S$GLB, | Performed by: NURSE PRACTITIONER

## 2022-12-07 PROCEDURE — 95251 CONT GLUC MNTR ANALYSIS I&R: CPT | Mod: HCNC,S$GLB,, | Performed by: NURSE PRACTITIONER

## 2022-12-07 PROCEDURE — 3066F NEPHROPATHY DOC TX: CPT | Mod: HCNC,CPTII,S$GLB, | Performed by: NURSE PRACTITIONER

## 2022-12-07 PROCEDURE — 3062F PR POS MACROALBUMINURIA RESULT DOCUMENTED/REVIEW: ICD-10-PCS | Mod: HCNC,CPTII,S$GLB, | Performed by: NURSE PRACTITIONER

## 2022-12-07 PROCEDURE — 1101F PT FALLS ASSESS-DOCD LE1/YR: CPT | Mod: HCNC,CPTII,S$GLB, | Performed by: NURSE PRACTITIONER

## 2022-12-07 PROCEDURE — 99999 PR PBB SHADOW E&M-EST. PATIENT-LVL V: CPT | Mod: PBBFAC,HCNC,, | Performed by: NURSE PRACTITIONER

## 2022-12-07 PROCEDURE — 3051F HG A1C>EQUAL 7.0%<8.0%: CPT | Mod: HCNC,CPTII,S$GLB, | Performed by: NURSE PRACTITIONER

## 2022-12-07 PROCEDURE — 99999 PR PBB SHADOW E&M-EST. PATIENT-LVL V: ICD-10-PCS | Mod: PBBFAC,HCNC,, | Performed by: NURSE PRACTITIONER

## 2022-12-07 PROCEDURE — 1160F RVW MEDS BY RX/DR IN RCRD: CPT | Mod: HCNC,CPTII,S$GLB, | Performed by: NURSE PRACTITIONER

## 2022-12-07 PROCEDURE — 80048 BASIC METABOLIC PNL TOTAL CA: CPT | Mod: HCNC | Performed by: NURSE PRACTITIONER

## 2022-12-07 PROCEDURE — 1160F PR REVIEW ALL MEDS BY PRESCRIBER/CLIN PHARMACIST DOCUMENTED: ICD-10-PCS | Mod: HCNC,CPTII,S$GLB, | Performed by: NURSE PRACTITIONER

## 2022-12-07 PROCEDURE — 4010F PR ACE/ARB THEARPY RXD/TAKEN: ICD-10-PCS | Mod: HCNC,CPTII,S$GLB, | Performed by: NURSE PRACTITIONER

## 2022-12-07 PROCEDURE — 36415 COLL VENOUS BLD VENIPUNCTURE: CPT | Mod: HCNC | Performed by: NURSE PRACTITIONER

## 2022-12-07 NOTE — PROGRESS NOTES
Subjective:         Patient ID: Kay Galarza is a 67 y.o. female.  Patient's current PCP is Lucy Negron MD.     Chief Complaint: Diabetes Mellitus    HPI  Kay Galarza is a 67 y.o. White female presenting for a follow up for diabetes. Patient has been diagnosed with type 2 diabetes since 2007 .  Received diabetes education: Yes    CURRENT DM MEDICATIONS:   Diabetes Medications               dapagliflozin (FARXIGA) 10 mg tablet Take 1 tablet (10 mg total) by mouth once daily.    glimepiride (AMARYL) 4 MG tablet TAKE 1 TABLET  BY MOUTH DAILY WITH BREAKFAST.    insulin glargine U-300 conc (TOUJEO MAX U-300 SOLOSTAR) 300 unit/mL (3 mL) insulin pen Inject 70 Units into the skin every evening.    NOVOLOG FLEXPEN U-100 INSULIN 100 unit/mL (3 mL) InPn pen Inject 15 Units into the skin 3 (three) times daily before meals.     Past failed treatment include: Xigduo,Trulicity- GI complaints    Blood glucose testing Continuous per Dexcom G6 - , unable to download from home  Preferred lab: [facility]    Any episodes of hypoglycemia? 0% per Dexcom    Complications related to diabetes: nephropathy, retinopathy, peripheral neuropathy, peripheral vascular disease, and charcot foot (L,2020)    Her blood sugar in the clinic today was:   Lab Results   Component Value Date    POCGLU 119 (A) 05/11/2022     Kay Galarza presents today for follow up visit to discuss diabetes management.   Requires in person appt's due to inability to download Dexcom from home.  Requires -Welsh. She requested that her daughter translate for her for this visit.   Per Dexcom download, for the last 14 days: Glycemic control remains poor. No hypoglycemia. Consistent glucose spike associated with breakfast, as well as in the evening. She admits she is snacking on fruit or other carb snacks between meals often. Denies missing any injections. She is only rotating her insulin injections  between 2 sites on her abdomen. Based on review,advised patient to make snacks protein only, stressed the importance of rotating injection sites well, and will increase Humalog.       She is overdue for dilated eye exam - she is currently refusing to go. Stressed the importance of this with patient and her daughter. She has a history of retinopathy. Discussed blindness as a complication from untreated DR and advised to re-schedule appt.     Diabetic ulcer R foot- followed by Dr. Gray.      CKD II- sees nephrology, Dr. Sena    Current diet: Needs to be re-scheduled with DM education.  Breakfast-toast or tortilla with coffee and milk.  She always has rice or bread with each meal.  She does have fruit snacks and does drink fruit juice daily.  Activity Level: No structured exercise    Lab Results   Component Value Date    HGBA1C 7.6 (H) 12/07/2022    HGBA1C 7.7 (H) 05/25/2022    HGBA1C >14.0 (H) 01/26/2022     STANDARDS OF CARE  Diabetes Management Status    Statin: Taking  ACE/ARB: Taking    Screening or Prevention Patient's value Goal Complete/Controlled?   HgA1C Testing and Control   Lab Results   Component Value Date    HGBA1C 7.6 (H) 12/07/2022      Annually/Less than 8% Yes     Lipid profile : 01/26/2022 Annually Yes     LDL control Lab Results   Component Value Date    LDLCALC 80.0 01/26/2022    Annually/Less than 100 mg/dl  Yes     Nephropathy screening Lab Results   Component Value Date    LABMICR 570.0 05/25/2022     Lab Results   Component Value Date    PROTEINUA 2+ (A) 05/25/2022     Lab Results   Component Value Date    UTPCR 0.65 (H) 05/25/2022      Annually Yes     Blood pressure BP Readings from Last 1 Encounters:   12/07/22 123/77    Less than 140/90 Yes     Dilated retinal exam : 04/23/2021 Annually No Advised to schedule     Foot exam   : 03/17/2022 Annually Yes        Labs reviewed and are noted below.    Lab Results   Component Value Date    WBC 9.39 01/26/2022    HGB 13.0 01/26/2022    HCT  40.1 01/26/2022     01/26/2022    CHOL 133 01/26/2022    TRIG 95 01/26/2022    HDL 34 (L) 01/26/2022    LDLCALC 80.0 01/26/2022    ALT 11 01/19/2022    AST 9 (L) 01/19/2022     12/07/2022    K 4.8 12/07/2022     12/07/2022    ANIONGAP 11 12/07/2022    CREATININE 1.2 12/07/2022    ESTGFRAFRICA >60.0 05/25/2022    EGFRNONAA 57.6 (A) 05/25/2022    BUN 35 (H) 12/07/2022    CO2 28 12/07/2022    TSH 1.577 12/07/2022     12/07/2022    UTPCR 0.65 (H) 05/25/2022     Lab Results   Component Value Date    TSH 1.577 12/07/2022    CALCIUM 9.8 12/07/2022    PHOS 3.8 05/25/2022     No results found for: CPEPTIDE  No results found for: GLUTAMICACID  Glucose   Date Value Ref Range Status   12/07/2022 105 70 - 110 mg/dL Final     Anion Gap   Date Value Ref Range Status   12/07/2022 11 8 - 16 mmol/L Final     eGFR if    Date Value Ref Range Status   05/25/2022 >60.0 >60 mL/min/1.73 m^2 Final     eGFR if non    Date Value Ref Range Status   05/25/2022 57.6 (A) >60 mL/min/1.73 m^2 Final     Comment:     Calculation used to obtain the estimated glomerular filtration  rate (eGFR) is the CKD-EPI equation.          The following portions of the patient's history were reviewed and updated as appropriate: allergies, current medications, past family history, past medical history, past social history, past surgical history, and problem list.    Review of patient's allergies indicates:   Allergen Reactions    Pollen extracts      Seasonal allergies, sneezing     Social History     Socioeconomic History    Marital status:    Tobacco Use    Smoking status: Never    Smokeless tobacco: Never   Substance and Sexual Activity    Alcohol use: Never    Drug use: No    Sexual activity: Yes     Past Medical History:   Diagnosis Date    Arthritis     DERIAN KNEES    Asthma     SEASONAL    Cataract     Diabetes mellitus     Diabetes mellitus, type 2     Diabetic retinopathy     DM (diabetes  mellitus) 2007    BS 97 09/29/2020    Hyperlipidemia     Hypertension      REVIEW OF SYSTEMS:  Eyes History of    Cardiovascular: History of HTN and hyperlipidemia.  GI: Tolerating Trulicity well without GI side effects   : Tolerating Farxiga well from a  standpoint.  Neuro: No neuropathy.  PSYCH: No tobacco use.  ENDO: See HPI.        Objective:      Vitals:    12/07/22 1115   BP: 123/77   Pulse: 96     RESPIRATORY: No respiratory distress  NEUROLOGIC: Cranial nerves II-XII grossly intact.   PSYCHIATRIC: Alert & oriented x3. Normal mood and affect.  FOOT EXAMINATION: UTD  Assessment:       1. Uncontrolled type 2 diabetes mellitus with hyperglycemia, with long-term current use of insulin    2. Essential hypertension    3. Mixed hyperlipidemia    4. Charcot's joint of foot, left    5. Diabetic peripheral neuropathy associated with type 2 diabetes mellitus    6. Diabetic ulcer of toe of left foot associated with type 2 diabetes mellitus, with fat layer exposed    7. CKD stage 2 due to type 2 diabetes mellitus    8. Stable proliferative diabetic retinopathy of both eyes associated with type 2 diabetes mellitus          Plan:   Kay was seen today for diabetes mellitus.    Diagnoses and all orders for this visit:    Uncontrolled type 2 diabetes mellitus with hyperglycemia, with long-term current use of insulin  -     Hemoglobin A1C; Future  -     TSH; Future  -     Basic Metabolic Panel; Future  -     NOVOLOG FLEXPEN U-100 INSULIN 100 unit/mL (3 mL) InPn pen; Inject 18 Units into the skin 3 (three) times daily before meals.    Chronic-    Continue Toujeo Max to 70 units daily.    Increase Novolog to 18 units three times a day before each main meal.     Continue Farxiga 10 mg daily and Amaryl 4 mg in the AM as current.      Continuous glucose monitoring report:    The patient's CGM was downloaded and was reviewed for the last 14 days. See HPI for interpretation & plan.     Essential hypertension  -     TSH;  "Future  -     Basic Metabolic Panel; Future    Chronic,stable- BP is at goal. Continue current.    Mixed hyperlipidemia    Chronic, stable.  Continue Lipitor.    Charcot's joint of foot, left  Diabetic peripheral neuropathy associated with type 2 diabetes mellitus  Diabetic ulcer of toe of left foot associated with type 2 diabetes mellitus, with fat layer exposed     Chronic-managed per podiatry. Follow up as advised.     CKD stage 2 due to type 2 diabetes mellitus     Chronic, stable.  Follow up with nephrology as advised.     Stable proliferative diabetic retinopathy of both eyes associated with type 2 diabetes mellitus     Chronic-she is overdue for dilated eye exam. Stressed importance of scheduling her appt as she is well overdue.    - Follow up: 2 months    Portions of this note may have been created with voice recognition software. Occasional "wrong-word" or "sound-a-like" substitutions may have occurred due to the inherent limitations of voice recognition software. Please, read the note carefully and recognize, using context, where substitutions have occurred.             Sheri Ammons,FNP-C [facility]       "

## 2022-12-07 NOTE — PATIENT INSTRUCTIONS
Continue Toujeo Max to 70 units daily.    Increase Novolog to 18 units three times a day before each main meal.     Continue Farxiga 10 mg daily and Amaryl 4 mg in the AM as current.      Schedule eye exam and diabetes education appt.

## 2022-12-08 ENCOUNTER — TELEPHONE (OUTPATIENT)
Dept: DIABETES | Facility: CLINIC | Age: 67
End: 2022-12-08
Payer: MEDICARE

## 2022-12-08 LAB
ANION GAP SERPL CALC-SCNC: 11 MMOL/L (ref 8–16)
BUN SERPL-MCNC: 35 MG/DL (ref 8–23)
CALCIUM SERPL-MCNC: 9.8 MG/DL (ref 8.7–10.5)
CHLORIDE SERPL-SCNC: 102 MMOL/L (ref 95–110)
CO2 SERPL-SCNC: 28 MMOL/L (ref 23–29)
CREAT SERPL-MCNC: 1.2 MG/DL (ref 0.5–1.4)
EST. GFR  (NO RACE VARIABLE): 49 ML/MIN/1.73 M^2
ESTIMATED AVG GLUCOSE: 171 MG/DL (ref 68–131)
GLUCOSE SERPL-MCNC: 105 MG/DL (ref 70–110)
HBA1C MFR BLD: 7.6 % (ref 4–5.6)
POTASSIUM SERPL-SCNC: 4.8 MMOL/L (ref 3.5–5.1)
SODIUM SERPL-SCNC: 141 MMOL/L (ref 136–145)
TSH SERPL DL<=0.005 MIU/L-ACNC: 1.58 UIU/ML (ref 0.4–4)

## 2022-12-08 RX ORDER — INSULIN ASPART 100 [IU]/ML
18 INJECTION, SOLUTION INTRAVENOUS; SUBCUTANEOUS
Qty: 30 ML | Refills: 5 | Status: SHIPPED | OUTPATIENT
Start: 2022-12-08 | End: 2023-10-10 | Stop reason: SDUPTHER

## 2022-12-08 NOTE — TELEPHONE ENCOUNTER
----- Message from Arlene Garcia NP sent at 12/8/2022  8:13 AM CST -----  Normal thyroid function. A1c about the same - slightly better at 7.6%. Stable kidney function.

## 2022-12-08 NOTE — TELEPHONE ENCOUNTER
Called pt to inform her of what her recent labs were. Pt did not answer lvm for pt to call back .          Normal thyroid function. A1c about the same - slightly better at 7.6%. Stable kidney function.

## 2022-12-12 ENCOUNTER — OFFICE VISIT (OUTPATIENT)
Dept: FAMILY MEDICINE | Facility: CLINIC | Age: 67
End: 2022-12-12
Attending: FAMILY MEDICINE
Payer: MEDICARE

## 2022-12-12 ENCOUNTER — PATIENT MESSAGE (OUTPATIENT)
Dept: FAMILY MEDICINE | Facility: CLINIC | Age: 67
End: 2022-12-12

## 2022-12-12 ENCOUNTER — LAB VISIT (OUTPATIENT)
Dept: LAB | Facility: HOSPITAL | Age: 67
End: 2022-12-12
Attending: FAMILY MEDICINE
Payer: MEDICARE

## 2022-12-12 VITALS
TEMPERATURE: 97 F | WEIGHT: 241.63 LBS | DIASTOLIC BLOOD PRESSURE: 82 MMHG | HEART RATE: 91 BPM | HEIGHT: 69 IN | SYSTOLIC BLOOD PRESSURE: 128 MMHG | BODY MASS INDEX: 35.79 KG/M2 | OXYGEN SATURATION: 96 %

## 2022-12-12 DIAGNOSIS — J45.20 MILD INTERMITTENT ASTHMA WITHOUT COMPLICATION: ICD-10-CM

## 2022-12-12 DIAGNOSIS — E66.01 SEVERE OBESITY (BMI 35.0-35.9 WITH COMORBIDITY): ICD-10-CM

## 2022-12-12 DIAGNOSIS — K59.00 CONSTIPATION, UNSPECIFIED CONSTIPATION TYPE: ICD-10-CM

## 2022-12-12 DIAGNOSIS — E11.69 DM TYPE 2 WITH DIABETIC DYSLIPIDEMIA: ICD-10-CM

## 2022-12-12 DIAGNOSIS — E78.5 DM TYPE 2 WITH DIABETIC DYSLIPIDEMIA: ICD-10-CM

## 2022-12-12 DIAGNOSIS — Z01.818 PRE-OPERATIVE CLEARANCE: ICD-10-CM

## 2022-12-12 DIAGNOSIS — Z01.818 PRE-OPERATIVE CLEARANCE: Primary | ICD-10-CM

## 2022-12-12 LAB
ALBUMIN SERPL BCP-MCNC: 3.8 G/DL (ref 3.5–5.2)
ALP SERPL-CCNC: 128 U/L (ref 55–135)
ALT SERPL W/O P-5'-P-CCNC: 14 U/L (ref 10–44)
ANION GAP SERPL CALC-SCNC: 9 MMOL/L (ref 8–16)
AST SERPL-CCNC: 17 U/L (ref 10–40)
BASOPHILS # BLD AUTO: 0.03 K/UL (ref 0–0.2)
BASOPHILS NFR BLD: 0.3 % (ref 0–1.9)
BILIRUB SERPL-MCNC: 0.4 MG/DL (ref 0.1–1)
BUN SERPL-MCNC: 30 MG/DL (ref 8–23)
CALCIUM SERPL-MCNC: 10 MG/DL (ref 8.7–10.5)
CHLORIDE SERPL-SCNC: 102 MMOL/L (ref 95–110)
CO2 SERPL-SCNC: 29 MMOL/L (ref 23–29)
CREAT SERPL-MCNC: 1 MG/DL (ref 0.5–1.4)
DIFFERENTIAL METHOD: ABNORMAL
EOSINOPHIL # BLD AUTO: 0.2 K/UL (ref 0–0.5)
EOSINOPHIL NFR BLD: 1.6 % (ref 0–8)
ERYTHROCYTE [DISTWIDTH] IN BLOOD BY AUTOMATED COUNT: 14.3 % (ref 11.5–14.5)
EST. GFR  (NO RACE VARIABLE): >60 ML/MIN/1.73 M^2
GLUCOSE SERPL-MCNC: 97 MG/DL (ref 70–110)
HCT VFR BLD AUTO: 46.6 % (ref 37–48.5)
HGB BLD-MCNC: 15.1 G/DL (ref 12–16)
IMM GRANULOCYTES # BLD AUTO: 0.03 K/UL (ref 0–0.04)
IMM GRANULOCYTES NFR BLD AUTO: 0.3 % (ref 0–0.5)
LYMPHOCYTES # BLD AUTO: 1.4 K/UL (ref 1–4.8)
LYMPHOCYTES NFR BLD: 13.6 % (ref 18–48)
MCH RBC QN AUTO: 28.8 PG (ref 27–31)
MCHC RBC AUTO-ENTMCNC: 32.4 G/DL (ref 32–36)
MCV RBC AUTO: 89 FL (ref 82–98)
MONOCYTES # BLD AUTO: 0.5 K/UL (ref 0.3–1)
MONOCYTES NFR BLD: 4.7 % (ref 4–15)
NEUTROPHILS # BLD AUTO: 8.2 K/UL (ref 1.8–7.7)
NEUTROPHILS NFR BLD: 79.5 % (ref 38–73)
NRBC BLD-RTO: 0 /100 WBC
PLATELET # BLD AUTO: 245 K/UL (ref 150–450)
PMV BLD AUTO: 10.9 FL (ref 9.2–12.9)
POTASSIUM SERPL-SCNC: 4.4 MMOL/L (ref 3.5–5.1)
PROT SERPL-MCNC: 7.2 G/DL (ref 6–8.4)
RBC # BLD AUTO: 5.25 M/UL (ref 4–5.4)
SODIUM SERPL-SCNC: 140 MMOL/L (ref 136–145)
WBC # BLD AUTO: 10.37 K/UL (ref 3.9–12.7)

## 2022-12-12 PROCEDURE — G0009 PNEUMOCOCCAL CONJUGATE VACCINE 20-VALENT: ICD-10-PCS | Mod: HCNC,S$GLB,, | Performed by: FAMILY MEDICINE

## 2022-12-12 PROCEDURE — 99214 PR OFFICE/OUTPT VISIT, EST, LEVL IV, 30-39 MIN: ICD-10-PCS | Mod: HCNC,S$GLB,, | Performed by: FAMILY MEDICINE

## 2022-12-12 PROCEDURE — 80053 COMPREHEN METABOLIC PANEL: CPT | Mod: HCNC | Performed by: FAMILY MEDICINE

## 2022-12-12 PROCEDURE — 90694 FLU VACCINE - QUADRIVALENT - ADJUVANTED: ICD-10-PCS | Mod: HCNC,S$GLB,, | Performed by: FAMILY MEDICINE

## 2022-12-12 PROCEDURE — G0008 FLU VACCINE - QUADRIVALENT - ADJUVANTED: ICD-10-PCS | Mod: HCNC,S$GLB,, | Performed by: FAMILY MEDICINE

## 2022-12-12 PROCEDURE — G0009 ADMIN PNEUMOCOCCAL VACCINE: HCPCS | Mod: HCNC,S$GLB,, | Performed by: FAMILY MEDICINE

## 2022-12-12 PROCEDURE — 3051F HG A1C>EQUAL 7.0%<8.0%: CPT | Mod: HCNC,CPTII,S$GLB, | Performed by: FAMILY MEDICINE

## 2022-12-12 PROCEDURE — 1159F MED LIST DOCD IN RCRD: CPT | Mod: HCNC,CPTII,S$GLB, | Performed by: FAMILY MEDICINE

## 2022-12-12 PROCEDURE — 36415 COLL VENOUS BLD VENIPUNCTURE: CPT | Mod: HCNC,PO | Performed by: FAMILY MEDICINE

## 2022-12-12 PROCEDURE — 4010F PR ACE/ARB THEARPY RXD/TAKEN: ICD-10-PCS | Mod: HCNC,CPTII,S$GLB, | Performed by: FAMILY MEDICINE

## 2022-12-12 PROCEDURE — 3066F NEPHROPATHY DOC TX: CPT | Mod: HCNC,CPTII,S$GLB, | Performed by: FAMILY MEDICINE

## 2022-12-12 PROCEDURE — 90677 PCV20 VACCINE IM: CPT | Mod: HCNC,S$GLB,, | Performed by: FAMILY MEDICINE

## 2022-12-12 PROCEDURE — 93010 ELECTROCARDIOGRAM REPORT: CPT | Mod: HCNC,S$GLB,, | Performed by: INTERNAL MEDICINE

## 2022-12-12 PROCEDURE — 85025 COMPLETE CBC W/AUTO DIFF WBC: CPT | Mod: HCNC | Performed by: FAMILY MEDICINE

## 2022-12-12 PROCEDURE — 3074F PR MOST RECENT SYSTOLIC BLOOD PRESSURE < 130 MM HG: ICD-10-PCS | Mod: HCNC,CPTII,S$GLB, | Performed by: FAMILY MEDICINE

## 2022-12-12 PROCEDURE — 3008F PR BODY MASS INDEX (BMI) DOCUMENTED: ICD-10-PCS | Mod: HCNC,CPTII,S$GLB, | Performed by: FAMILY MEDICINE

## 2022-12-12 PROCEDURE — 3062F POS MACROALBUMINURIA REV: CPT | Mod: HCNC,CPTII,S$GLB, | Performed by: FAMILY MEDICINE

## 2022-12-12 PROCEDURE — 1125F PR PAIN SEVERITY QUANTIFIED, PAIN PRESENT: ICD-10-PCS | Mod: HCNC,CPTII,S$GLB, | Performed by: FAMILY MEDICINE

## 2022-12-12 PROCEDURE — 1125F AMNT PAIN NOTED PAIN PRSNT: CPT | Mod: HCNC,CPTII,S$GLB, | Performed by: FAMILY MEDICINE

## 2022-12-12 PROCEDURE — 1101F PT FALLS ASSESS-DOCD LE1/YR: CPT | Mod: HCNC,CPTII,S$GLB, | Performed by: FAMILY MEDICINE

## 2022-12-12 PROCEDURE — 3051F PR MOST RECENT HEMOGLOBIN A1C LEVEL 7.0 - < 8.0%: ICD-10-PCS | Mod: HCNC,CPTII,S$GLB, | Performed by: FAMILY MEDICINE

## 2022-12-12 PROCEDURE — 93005 EKG 12-LEAD: ICD-10-PCS | Mod: HCNC,S$GLB,, | Performed by: FAMILY MEDICINE

## 2022-12-12 PROCEDURE — 93005 ELECTROCARDIOGRAM TRACING: CPT | Mod: HCNC,S$GLB,, | Performed by: FAMILY MEDICINE

## 2022-12-12 PROCEDURE — G0008 ADMIN INFLUENZA VIRUS VAC: HCPCS | Mod: HCNC,S$GLB,, | Performed by: FAMILY MEDICINE

## 2022-12-12 PROCEDURE — 3079F PR MOST RECENT DIASTOLIC BLOOD PRESSURE 80-89 MM HG: ICD-10-PCS | Mod: HCNC,CPTII,S$GLB, | Performed by: FAMILY MEDICINE

## 2022-12-12 PROCEDURE — 90677 PNEUMOCOCCAL CONJUGATE VACCINE 20-VALENT: ICD-10-PCS | Mod: HCNC,S$GLB,, | Performed by: FAMILY MEDICINE

## 2022-12-12 PROCEDURE — 4010F ACE/ARB THERAPY RXD/TAKEN: CPT | Mod: HCNC,CPTII,S$GLB, | Performed by: FAMILY MEDICINE

## 2022-12-12 PROCEDURE — 90694 VACC AIIV4 NO PRSRV 0.5ML IM: CPT | Mod: HCNC,S$GLB,, | Performed by: FAMILY MEDICINE

## 2022-12-12 PROCEDURE — 93010 EKG 12-LEAD: ICD-10-PCS | Mod: HCNC,S$GLB,, | Performed by: INTERNAL MEDICINE

## 2022-12-12 PROCEDURE — 1101F PR PT FALLS ASSESS DOC 0-1 FALLS W/OUT INJ PAST YR: ICD-10-PCS | Mod: HCNC,CPTII,S$GLB, | Performed by: FAMILY MEDICINE

## 2022-12-12 PROCEDURE — 3062F PR POS MACROALBUMINURIA RESULT DOCUMENTED/REVIEW: ICD-10-PCS | Mod: HCNC,CPTII,S$GLB, | Performed by: FAMILY MEDICINE

## 2022-12-12 PROCEDURE — 3079F DIAST BP 80-89 MM HG: CPT | Mod: HCNC,CPTII,S$GLB, | Performed by: FAMILY MEDICINE

## 2022-12-12 PROCEDURE — 3288F FALL RISK ASSESSMENT DOCD: CPT | Mod: HCNC,CPTII,S$GLB, | Performed by: FAMILY MEDICINE

## 2022-12-12 PROCEDURE — 99214 OFFICE O/P EST MOD 30 MIN: CPT | Mod: HCNC,S$GLB,, | Performed by: FAMILY MEDICINE

## 2022-12-12 PROCEDURE — 99999 PR PBB SHADOW E&M-EST. PATIENT-LVL V: CPT | Mod: PBBFAC,HCNC,, | Performed by: FAMILY MEDICINE

## 2022-12-12 PROCEDURE — 99999 PR PBB SHADOW E&M-EST. PATIENT-LVL V: ICD-10-PCS | Mod: PBBFAC,HCNC,, | Performed by: FAMILY MEDICINE

## 2022-12-12 PROCEDURE — 3074F SYST BP LT 130 MM HG: CPT | Mod: HCNC,CPTII,S$GLB, | Performed by: FAMILY MEDICINE

## 2022-12-12 PROCEDURE — 3066F PR DOCUMENTATION OF TREATMENT FOR NEPHROPATHY: ICD-10-PCS | Mod: HCNC,CPTII,S$GLB, | Performed by: FAMILY MEDICINE

## 2022-12-12 PROCEDURE — 1159F PR MEDICATION LIST DOCUMENTED IN MEDICAL RECORD: ICD-10-PCS | Mod: HCNC,CPTII,S$GLB, | Performed by: FAMILY MEDICINE

## 2022-12-12 PROCEDURE — 3288F PR FALLS RISK ASSESSMENT DOCUMENTED: ICD-10-PCS | Mod: HCNC,CPTII,S$GLB, | Performed by: FAMILY MEDICINE

## 2022-12-12 PROCEDURE — 3008F BODY MASS INDEX DOCD: CPT | Mod: HCNC,CPTII,S$GLB, | Performed by: FAMILY MEDICINE

## 2022-12-12 RX ORDER — POLYETHYLENE GLYCOL 3350 17 G/17G
17 POWDER, FOR SOLUTION ORAL DAILY
Qty: 30 PACKET | Refills: 0 | Status: SHIPPED | OUTPATIENT
Start: 2022-12-12

## 2022-12-12 NOTE — PROGRESS NOTES
Subjective:       Patient ID: Kay Galarza is a 67 y.o. female.    Chief Complaint: Pre-op Exam     67 y old female here for fpre op cleramce for  L foot  subtalar joint fussion .     Review of Systems    Objective:      Physical Exam    Assessment:       No diagnosis found.    Plan:     There are no diagnoses linked to this encounter.

## 2022-12-12 NOTE — PROGRESS NOTES
Subjective:       Patient ID: Kay Galarza is a 67 y.o. female.    Chief Complaint: Pre-op Exam    67 y old  Female   With t2 DM , htn ,mild int asthma , depression  dlp and obesity here for pre op clearance for L foot surgery on 1/6  by Dr Gray . Most recent surgery was L foot ORIF FUSION. Glucose has improved. Sedenatry due to foot pain . Seldom gets asthma exacerbation     Review of Systems   Constitutional: Negative.    HENT: Negative.     Eyes: Negative.    Respiratory: Negative.     Cardiovascular: Negative.    Gastrointestinal: Negative.    Genitourinary: Negative.    Musculoskeletal: Negative.    Skin: Negative.    Hematological: Negative.      Objective:      Physical Exam  Constitutional:       General: She is not in acute distress.     Appearance: She is well-developed. She is not diaphoretic.   HENT:      Head: Normocephalic and atraumatic.      Right Ear: External ear normal.      Left Ear: External ear normal.      Mouth/Throat:      Pharynx: No oropharyngeal exudate.   Eyes:      General: No scleral icterus.        Right eye: No discharge.         Left eye: No discharge.      Conjunctiva/sclera: Conjunctivae normal.      Pupils: Pupils are equal, round, and reactive to light.   Neck:      Thyroid: No thyromegaly.      Vascular: No JVD.      Trachea: No tracheal deviation.   Cardiovascular:      Rate and Rhythm: Normal rate and regular rhythm.      Heart sounds: Normal heart sounds. No murmur heard.    No friction rub. No gallop.   Pulmonary:      Effort: Pulmonary effort is normal. No respiratory distress.      Breath sounds: Normal breath sounds. No stridor. No wheezing or rales.   Chest:      Chest wall: No tenderness.   Abdominal:      General: Bowel sounds are normal. There is no distension.      Palpations: Abdomen is soft.      Tenderness: There is no abdominal tenderness. There is no guarding or rebound.   Musculoskeletal:         General: Normal range of motion.      Cervical  back: Normal range of motion and neck supple.   Lymphadenopathy:      Cervical: No cervical adenopathy.   Skin:     General: Skin is warm and dry.   Neurological:      Mental Status: She is alert and oriented to person, place, and time.   Psychiatric:         Behavior: Behavior normal.         Thought Content: Thought content normal.         Judgment: Judgment normal.       Assessment:       Kay was seen today for pre-op exam.    Diagnoses and all orders for this visit:    Pre-operative clearance  -     IN OFFICE EKG 12-LEAD (to Muse)  -     CBC Auto Differential; Future  -     Comprehensive Metabolic Panel; Future    Constipation, unspecified constipation type  -     polyethylene glycol (GLYCOLAX) 17 gram PwPk; Take 17 g by mouth once daily.    Mild intermittent asthma without complication    DM type 2 with diabetic dyslipidemia    Severe obesity (BMI 35.0-35.9 with comorbidity)    Other orders  -     (In Office Administered) Pneumococcal Conjugate Vaccine (20 Valent) (IM)       Plan:     There are no diagnoses linked to this encounter.

## 2022-12-19 ENCOUNTER — HOSPITAL ENCOUNTER (OUTPATIENT)
Dept: RADIOLOGY | Facility: HOSPITAL | Age: 67
Discharge: HOME | End: 2022-12-19
Attending: PODIATRIST
Payer: MEDICARE

## 2022-12-19 DIAGNOSIS — M14.672 CHARCOT'S JOINT OF FOOT, LEFT: ICD-10-CM

## 2022-12-19 DIAGNOSIS — M14.672 CHARCOT'S JOINT OF ANKLE, LEFT: ICD-10-CM

## 2022-12-19 PROCEDURE — 73700 CT LOWER EXTREMITY W/O DYE: CPT | Mod: TC,HCNC,LT

## 2022-12-20 ENCOUNTER — OFFICE VISIT (OUTPATIENT)
Dept: PODIATRY | Facility: CLINIC | Age: 67
End: 2022-12-20
Payer: MEDICARE

## 2022-12-20 ENCOUNTER — APPOINTMENT (OUTPATIENT)
Dept: RADIOLOGY | Facility: HOSPITAL | Age: 67
End: 2022-12-20
Attending: FAMILY MEDICINE
Payer: MEDICARE

## 2022-12-20 VITALS — HEIGHT: 69 IN | WEIGHT: 241 LBS | BODY MASS INDEX: 35.7 KG/M2

## 2022-12-20 DIAGNOSIS — M14.672 CHARCOT'S JOINT OF FOOT, LEFT: Primary | ICD-10-CM

## 2022-12-20 DIAGNOSIS — Z91.199 NONCOMPLIANCE: ICD-10-CM

## 2022-12-20 DIAGNOSIS — T84.84XA PAINFUL ORTHOPAEDIC HARDWARE: ICD-10-CM

## 2022-12-20 DIAGNOSIS — Z78.0 MENOPAUSE: ICD-10-CM

## 2022-12-20 DIAGNOSIS — M19.072 OSTEOARTHRITIS OF LEFT ANKLE OR FOOT: ICD-10-CM

## 2022-12-20 DIAGNOSIS — M14.672 CHARCOT'S JOINT OF ANKLE, LEFT: ICD-10-CM

## 2022-12-20 DIAGNOSIS — E11.49 TYPE II DIABETES MELLITUS WITH NEUROLOGICAL MANIFESTATIONS: ICD-10-CM

## 2022-12-20 DIAGNOSIS — M25.572 PAIN IN JOINT INVOLVING LEFT ANKLE AND FOOT: ICD-10-CM

## 2022-12-20 DIAGNOSIS — M96.0 PSEUDARTHROSIS AFTER FUSION OR ARTHRODESIS: ICD-10-CM

## 2022-12-20 PROCEDURE — 4010F ACE/ARB THERAPY RXD/TAKEN: CPT | Mod: HCNC,CPTII,S$GLB, | Performed by: PODIATRIST

## 2022-12-20 PROCEDURE — 3008F BODY MASS INDEX DOCD: CPT | Mod: HCNC,CPTII,S$GLB, | Performed by: PODIATRIST

## 2022-12-20 PROCEDURE — 1125F AMNT PAIN NOTED PAIN PRSNT: CPT | Mod: HCNC,CPTII,S$GLB, | Performed by: PODIATRIST

## 2022-12-20 PROCEDURE — 1125F PR PAIN SEVERITY QUANTIFIED, PAIN PRESENT: ICD-10-PCS | Mod: HCNC,CPTII,S$GLB, | Performed by: PODIATRIST

## 2022-12-20 PROCEDURE — 3051F PR MOST RECENT HEMOGLOBIN A1C LEVEL 7.0 - < 8.0%: ICD-10-PCS | Mod: HCNC,CPTII,S$GLB, | Performed by: PODIATRIST

## 2022-12-20 PROCEDURE — 77080 DXA BONE DENSITY AXIAL: CPT | Mod: TC,HCNC

## 2022-12-20 PROCEDURE — 3066F PR DOCUMENTATION OF TREATMENT FOR NEPHROPATHY: ICD-10-PCS | Mod: HCNC,CPTII,S$GLB, | Performed by: PODIATRIST

## 2022-12-20 PROCEDURE — 99215 OFFICE O/P EST HI 40 MIN: CPT | Mod: HCNC,S$GLB,, | Performed by: PODIATRIST

## 2022-12-20 PROCEDURE — 3288F PR FALLS RISK ASSESSMENT DOCUMENTED: ICD-10-PCS | Mod: HCNC,CPTII,S$GLB, | Performed by: PODIATRIST

## 2022-12-20 PROCEDURE — 3288F FALL RISK ASSESSMENT DOCD: CPT | Mod: HCNC,CPTII,S$GLB, | Performed by: PODIATRIST

## 2022-12-20 PROCEDURE — 4010F PR ACE/ARB THEARPY RXD/TAKEN: ICD-10-PCS | Mod: HCNC,CPTII,S$GLB, | Performed by: PODIATRIST

## 2022-12-20 PROCEDURE — 77080 DXA BONE DENSITY AXIAL: CPT | Mod: 26,HCNC,, | Performed by: RADIOLOGY

## 2022-12-20 PROCEDURE — 3062F POS MACROALBUMINURIA REV: CPT | Mod: HCNC,CPTII,S$GLB, | Performed by: PODIATRIST

## 2022-12-20 PROCEDURE — 1101F PT FALLS ASSESS-DOCD LE1/YR: CPT | Mod: HCNC,CPTII,S$GLB, | Performed by: PODIATRIST

## 2022-12-20 PROCEDURE — 1160F PR REVIEW ALL MEDS BY PRESCRIBER/CLIN PHARMACIST DOCUMENTED: ICD-10-PCS | Mod: HCNC,CPTII,S$GLB, | Performed by: PODIATRIST

## 2022-12-20 PROCEDURE — 3008F PR BODY MASS INDEX (BMI) DOCUMENTED: ICD-10-PCS | Mod: HCNC,CPTII,S$GLB, | Performed by: PODIATRIST

## 2022-12-20 PROCEDURE — 77080 DEXA BONE DENSITY SPINE HIP: ICD-10-PCS | Mod: 26,HCNC,, | Performed by: RADIOLOGY

## 2022-12-20 PROCEDURE — 99215 PR OFFICE/OUTPT VISIT, EST, LEVL V, 40-54 MIN: ICD-10-PCS | Mod: HCNC,S$GLB,, | Performed by: PODIATRIST

## 2022-12-20 PROCEDURE — 99999 PR PBB SHADOW E&M-EST. PATIENT-LVL IV: ICD-10-PCS | Mod: PBBFAC,HCNC,, | Performed by: PODIATRIST

## 2022-12-20 PROCEDURE — 1159F PR MEDICATION LIST DOCUMENTED IN MEDICAL RECORD: ICD-10-PCS | Mod: HCNC,CPTII,S$GLB, | Performed by: PODIATRIST

## 2022-12-20 PROCEDURE — 3066F NEPHROPATHY DOC TX: CPT | Mod: HCNC,CPTII,S$GLB, | Performed by: PODIATRIST

## 2022-12-20 PROCEDURE — 1160F RVW MEDS BY RX/DR IN RCRD: CPT | Mod: HCNC,CPTII,S$GLB, | Performed by: PODIATRIST

## 2022-12-20 PROCEDURE — 99999 PR PBB SHADOW E&M-EST. PATIENT-LVL IV: CPT | Mod: PBBFAC,HCNC,, | Performed by: PODIATRIST

## 2022-12-20 PROCEDURE — 3051F HG A1C>EQUAL 7.0%<8.0%: CPT | Mod: HCNC,CPTII,S$GLB, | Performed by: PODIATRIST

## 2022-12-20 PROCEDURE — 3062F PR POS MACROALBUMINURIA RESULT DOCUMENTED/REVIEW: ICD-10-PCS | Mod: HCNC,CPTII,S$GLB, | Performed by: PODIATRIST

## 2022-12-20 PROCEDURE — 1101F PR PT FALLS ASSESS DOC 0-1 FALLS W/OUT INJ PAST YR: ICD-10-PCS | Mod: HCNC,CPTII,S$GLB, | Performed by: PODIATRIST

## 2022-12-20 PROCEDURE — 1159F MED LIST DOCD IN RCRD: CPT | Mod: HCNC,CPTII,S$GLB, | Performed by: PODIATRIST

## 2022-12-28 ENCOUNTER — PATIENT MESSAGE (OUTPATIENT)
Dept: SURGERY | Facility: HOSPITAL | Age: 67
End: 2022-12-28
Payer: MEDICARE

## 2022-12-30 ENCOUNTER — TELEPHONE (OUTPATIENT)
Dept: PODIATRY | Facility: CLINIC | Age: 67
End: 2022-12-30
Payer: MEDICARE

## 2022-12-30 ENCOUNTER — PATIENT MESSAGE (OUTPATIENT)
Dept: PODIATRY | Facility: CLINIC | Age: 67
End: 2022-12-30
Payer: MEDICARE

## 2023-01-03 ENCOUNTER — TELEPHONE (OUTPATIENT)
Dept: PREADMISSION TESTING | Facility: HOSPITAL | Age: 68
End: 2023-01-03
Payer: MEDICARE

## 2023-01-10 ENCOUNTER — PATIENT OUTREACH (OUTPATIENT)
Dept: ADMINISTRATIVE | Facility: HOSPITAL | Age: 68
End: 2023-01-10
Payer: MEDICARE

## 2023-01-10 ENCOUNTER — OFFICE VISIT (OUTPATIENT)
Dept: CARDIOLOGY | Facility: CLINIC | Age: 68
End: 2023-01-10
Payer: MEDICARE

## 2023-01-10 VITALS
BODY MASS INDEX: 36.6 KG/M2 | DIASTOLIC BLOOD PRESSURE: 84 MMHG | SYSTOLIC BLOOD PRESSURE: 132 MMHG | HEIGHT: 69 IN | WEIGHT: 247.13 LBS | HEART RATE: 80 BPM | OXYGEN SATURATION: 98 %

## 2023-01-10 DIAGNOSIS — I35.1 AORTIC VALVE INSUFFICIENCY, ETIOLOGY OF CARDIAC VALVE DISEASE UNSPECIFIED: ICD-10-CM

## 2023-01-10 DIAGNOSIS — E66.01 SEVERE OBESITY (BMI 35.0-39.9) WITH COMORBIDITY: ICD-10-CM

## 2023-01-10 DIAGNOSIS — Z01.810 PRE-OPERATIVE CARDIOVASCULAR EXAMINATION: ICD-10-CM

## 2023-01-10 DIAGNOSIS — R07.9 CHEST PAIN, UNSPECIFIED TYPE: Primary | ICD-10-CM

## 2023-01-10 DIAGNOSIS — E78.5 HYPERLIPIDEMIA, UNSPECIFIED HYPERLIPIDEMIA TYPE: ICD-10-CM

## 2023-01-10 DIAGNOSIS — E11.49 TYPE II DIABETES MELLITUS WITH NEUROLOGICAL MANIFESTATIONS: ICD-10-CM

## 2023-01-10 DIAGNOSIS — R94.31 NONSPECIFIC ABNORMAL ELECTROCARDIOGRAM (ECG) (EKG): ICD-10-CM

## 2023-01-10 DIAGNOSIS — I51.7 CARDIOMEGALY: ICD-10-CM

## 2023-01-10 DIAGNOSIS — I10 ESSENTIAL HYPERTENSION: ICD-10-CM

## 2023-01-10 DIAGNOSIS — J45.20 MILD INTERMITTENT ASTHMA, UNSPECIFIED WHETHER COMPLICATED: ICD-10-CM

## 2023-01-10 PROCEDURE — 3079F DIAST BP 80-89 MM HG: CPT | Mod: HCNC,CPTII,S$GLB, | Performed by: INTERNAL MEDICINE

## 2023-01-10 PROCEDURE — 3008F BODY MASS INDEX DOCD: CPT | Mod: HCNC,CPTII,S$GLB, | Performed by: INTERNAL MEDICINE

## 2023-01-10 PROCEDURE — 3075F SYST BP GE 130 - 139MM HG: CPT | Mod: HCNC,CPTII,S$GLB, | Performed by: INTERNAL MEDICINE

## 2023-01-10 PROCEDURE — 99999 PR PBB SHADOW E&M-EST. PATIENT-LVL V: CPT | Mod: PBBFAC,HCNC,, | Performed by: INTERNAL MEDICINE

## 2023-01-10 PROCEDURE — 99214 PR OFFICE/OUTPT VISIT, EST, LEVL IV, 30-39 MIN: ICD-10-PCS | Mod: HCNC,S$GLB,, | Performed by: INTERNAL MEDICINE

## 2023-01-10 PROCEDURE — 3288F FALL RISK ASSESSMENT DOCD: CPT | Mod: HCNC,CPTII,S$GLB, | Performed by: INTERNAL MEDICINE

## 2023-01-10 PROCEDURE — 99999 PR PBB SHADOW E&M-EST. PATIENT-LVL V: ICD-10-PCS | Mod: PBBFAC,HCNC,, | Performed by: INTERNAL MEDICINE

## 2023-01-10 PROCEDURE — 1159F MED LIST DOCD IN RCRD: CPT | Mod: HCNC,CPTII,S$GLB, | Performed by: INTERNAL MEDICINE

## 2023-01-10 PROCEDURE — 3008F PR BODY MASS INDEX (BMI) DOCUMENTED: ICD-10-PCS | Mod: HCNC,CPTII,S$GLB, | Performed by: INTERNAL MEDICINE

## 2023-01-10 PROCEDURE — 99214 OFFICE O/P EST MOD 30 MIN: CPT | Mod: HCNC,S$GLB,, | Performed by: INTERNAL MEDICINE

## 2023-01-10 PROCEDURE — 1159F PR MEDICATION LIST DOCUMENTED IN MEDICAL RECORD: ICD-10-PCS | Mod: HCNC,CPTII,S$GLB, | Performed by: INTERNAL MEDICINE

## 2023-01-10 PROCEDURE — 3288F PR FALLS RISK ASSESSMENT DOCUMENTED: ICD-10-PCS | Mod: HCNC,CPTII,S$GLB, | Performed by: INTERNAL MEDICINE

## 2023-01-10 PROCEDURE — 1101F PR PT FALLS ASSESS DOC 0-1 FALLS W/OUT INJ PAST YR: ICD-10-PCS | Mod: HCNC,CPTII,S$GLB, | Performed by: INTERNAL MEDICINE

## 2023-01-10 PROCEDURE — 1126F PR PAIN SEVERITY QUANTIFIED, NO PAIN PRESENT: ICD-10-PCS | Mod: HCNC,CPTII,S$GLB, | Performed by: INTERNAL MEDICINE

## 2023-01-10 PROCEDURE — 3079F PR MOST RECENT DIASTOLIC BLOOD PRESSURE 80-89 MM HG: ICD-10-PCS | Mod: HCNC,CPTII,S$GLB, | Performed by: INTERNAL MEDICINE

## 2023-01-10 PROCEDURE — 3075F PR MOST RECENT SYSTOLIC BLOOD PRESS GE 130-139MM HG: ICD-10-PCS | Mod: HCNC,CPTII,S$GLB, | Performed by: INTERNAL MEDICINE

## 2023-01-10 PROCEDURE — 1126F AMNT PAIN NOTED NONE PRSNT: CPT | Mod: HCNC,CPTII,S$GLB, | Performed by: INTERNAL MEDICINE

## 2023-01-10 PROCEDURE — 1101F PT FALLS ASSESS-DOCD LE1/YR: CPT | Mod: HCNC,CPTII,S$GLB, | Performed by: INTERNAL MEDICINE

## 2023-01-10 NOTE — PROGRESS NOTES
Subjective:   Patient ID:  Kay Galarza is a 67 y.o. female who presents for follow-up of No chief complaint on file.  This is a pleasant 65-year-old female who is here for preop evaluation.  The patient has a history of diabetes, hypertension, and asthma history.  Patient has had no exertional symptoms.  Patient denies chest pain, angina or symptoms associated with anginal equivalent.  Prior EKG done on January 19, 2021 revealed slight prolongation of the QT interval.  Repeat EKG will be done today.  Patient has had no symptoms lightheadedness dizziness no syncope or near syncopal episodes.  This pleasant patient here for preop evaluation for foot surgery.  Patient has significant ankle and foot pain.  No chest discomfort has been noted.     Patient is here post podiatry surgery.  Doing well this time no recurrence of chest pain shortness breath there is evidence aortic insufficiency from echo 2 years ago will repeat another echo this spring.  Otherwise clinically stable this time.  Follow-up evaluation after echos performed.  No evidence of heart failure or edema today.  Presents in the office with episodic chest discomfort as well for this reason will go ahead with a nuclear stress test.  No acute EKG change with exception of several PVCs which is patient states that occasionally she feels but no lightheadedness or dizziness.  No edema today.  Of note the patient blood sugars have been quite elevated this needs to be addressed.       01/10/2023:   Overall this patient is now having surgery on her foot.  Last year's preop evaluation did not get completed she did not have surgery she had an ulcer on her foot and at this time the ulcer on the foot is healed.  The patient also had control her blood sugars last blood sugar 109.  Hemoglobin A1c is improved and she is stable.  The patient still has intermittent chest discomfort for this reason will go ahead with a nuclear stress test given the fact we can  not observe her overall exercise endurance.  EKGs remained stable normal sinus rhythm with intermittent PVCs.  No syncope or lightheadedness noted the daughter was present today.      Review of Systems   Constitutional: Negative for chills, diaphoresis, night sweats, weight gain and weight loss.   HENT:  Negative for congestion, hoarse voice, sore throat and stridor.    Eyes:  Negative for double vision and pain.   Cardiovascular:  Negative for chest pain, claudication, cyanosis, dyspnea on exertion, irregular heartbeat, leg swelling, near-syncope, orthopnea, palpitations, paroxysmal nocturnal dyspnea and syncope.   Respiratory:  Negative for cough, hemoptysis, shortness of breath, sleep disturbances due to breathing, snoring, sputum production and wheezing.    Endocrine: Negative for cold intolerance, heat intolerance and polydipsia.   Hematologic/Lymphatic: Negative for bleeding problem. Does not bruise/bleed easily.   Skin:  Negative for color change, dry skin and rash.   Musculoskeletal:  Negative for joint swelling and muscle cramps.   Gastrointestinal:  Negative for bloating, abdominal pain, constipation, diarrhea, dysphagia, melena, nausea and vomiting.   Genitourinary:  Negative for flank pain and urgency.   Neurological:  Negative for dizziness, focal weakness, headaches, light-headedness, loss of balance, seizures and weakness.   Psychiatric/Behavioral:  Negative for altered mental status and memory loss. The patient is not nervous/anxious.    Family History   Problem Relation Age of Onset    Cancer Father         Prostate Ca    Hypertension Father     Stroke Father     Diabetes Sister     Glaucoma Mother     Hypertension Mother     Glaucoma Maternal Grandmother     Blindness Neg Hx     Macular degeneration Neg Hx     Retinal detachment Neg Hx     Strabismus Neg Hx      Past Medical History:   Diagnosis Date    Arthritis     DERIAN KNEES    Asthma     SEASONAL    Cataract     Diabetes mellitus     Diabetes  "mellitus, type 2     Diabetic retinopathy     DM (diabetes mellitus) 2007    BS 97 09/29/2020    Hyperlipidemia     Hypertension      Social History     Socioeconomic History    Marital status:    Tobacco Use    Smoking status: Never    Smokeless tobacco: Never   Substance and Sexual Activity    Alcohol use: Never    Drug use: No    Sexual activity: Yes     Current Outpatient Medications on File Prior to Visit   Medication Sig Dispense Refill    ACCU-CHEK GUIDE TEST STRIPS Strp TO CHECK BG 2 TIMES DAILY, TO USE WITH INSURANCE PREFERRED METER 200 strip 3    acetaminophen (TYLENOL) 500 MG tablet Take 500 mg by mouth every 6 (six) hours as needed for Pain.      albuterol (PROVENTIL/VENTOLIN HFA) 90 mcg/actuation inhaler INHALE 2 PUFFS INTO THE LUNGS EVERY 6  HOURS AS NEEDED FOR WHEEZING. RESCUE 54 g 3    atorvastatin (LIPITOR) 40 MG tablet TAKE 1 TABLET BY MOUTH ONCE DAILY. 90 tablet 1    B infantis/B ani/B koby/B bifid (PROBIOTIC 4X ORAL) Take by mouth Daily.      blood-glucose meter kit To check BG 2 times daily, to use with insurance preferred meter 1 each 0    clobetasoL (TEMOVATE) 0.05 % external solution APPLY TOPICALLY 2 TIMES DAILY TO SCALP ONLY 50 mL PRN    dapagliflozin (FARXIGA) 10 mg tablet Take 1 tablet (10 mg total) by mouth once daily. 90 tablet 3    EASY TOUCH 31 gauge x 3/16" Ndle       fluocinolone and shower cap 0.01 % Oil       fluticasone propionate (FLONASE) 50 mcg/actuation nasal spray 2 sprays (100 mcg total) by Each Nostril route once daily. 16 g 0    gabapentin (NEURONTIN) 100 MG capsule TAKE 1 CAPSULE  BY MOUTH 3  TIMES DAILY. 90 capsule 0    gabapentin (NEURONTIN) 100 MG capsule TAKE 1 CAPSULE  BY MOUTH 3  TIMES DAILY. 90 capsule 0    glimepiride (AMARYL) 4 MG tablet TAKE 1 TABLET  BY MOUTH DAILY WITH BREAKFAST. 90 tablet 0    insulin glargine U-300 conc (TOUJEO MAX U-300 SOLOSTAR) 300 unit/mL (3 mL) insulin pen Inject 70 Units into the skin every evening. (Patient taking differently: " "Inject 80 Units into the skin every evening.) 4 pen 5    ketoconazole (NIZORAL) 2 % shampoo APPLY TOPICALLY 3 TIMES A WEEK. 120 mL 3    lancets Misc To check BG 2 times daily, to use with insurance preferred meter 180 each 0    levocetirizine (XYZAL) 5 MG tablet TAKE 1 TABLET (5 MG TOTAL) BY MOUTH EVERY EVENING. 90 tablet 3    lisinopriL-hydrochlorothiazide (PRINZIDE,ZESTORETIC) 20-25 mg Tab TAKE 1 TABLET BY MOUTH ONCE DAILY. 90 tablet PRN    magnesium oxide 200 mg magnesium Tab TAKE 3 TABLETS  BY MOUTH EVERY EVENING. 120 tablet PRN    meloxicam (MOBIC) 15 MG tablet TAKE 1 TABLET (15 MG TOTAL) BY MOUTH ONCE DAILY. 30 tablet 1    NOVOLOG FLEXPEN U-100 INSULIN 100 unit/mL (3 mL) InPn pen Inject 18 Units into the skin 3 (three) times daily before meals. 30 mL 5    omeprazole (PRILOSEC) 20 MG capsule TAKE 1 CAPSULE BY MOUTH TWICE A DAY. 180 capsule 3    pen needle, diabetic (BD ULTRA-FINE SHORT PEN NEEDLE) 31 gauge x 5/16" Ndle Use with Toujeo once daily and Novolog 3 times daily 200 each 5    polyethylene glycol (GLYCOLAX) 17 gram PwPk Take 17 g by mouth once daily. 30 packet 0    traZODone (DESYREL) 150 MG tablet TAKE 1 TABLET  BY MOUTH NIGHTLY AS NEEDED FOR INSOMNIA. 90 tablet PRN    VITAMIN D2 1,250 mcg (50,000 unit) capsule TAKE 1 CAPSULE  BY MOUTH ONCE A WEEK FOR 4 DOSES 4 capsule PRN     Current Facility-Administered Medications on File Prior to Visit   Medication Dose Route Frequency Provider Last Rate Last Admin    diphenhydrAMINE injection 25 mg  25 mg Intravenous Q6H PRN Dharmesh Macias MD         Review of patient's allergies indicates:   Allergen Reactions    Pollen extracts      Seasonal allergies, sneezing       Objective:     Physical Exam  Eyes:      Pupils: Pupils are equal, round, and reactive to light.   Neck:      Trachea: No tracheal deviation.   Cardiovascular:      Rate and Rhythm: Normal rate and regular rhythm.      Pulses: Intact distal pulses.           Carotid pulses are 2+ on the right " side and 2+ on the left side.       Radial pulses are 2+ on the right side and 2+ on the left side.        Femoral pulses are 2+ on the right side and 2+ on the left side.       Popliteal pulses are 2+ on the right side and 2+ on the left side.        Dorsalis pedis pulses are 2+ on the right side and 2+ on the left side.        Posterior tibial pulses are 2+ on the right side and 2+ on the left side.      Heart sounds: Normal heart sounds. No murmur heard.    No friction rub. No gallop.   Pulmonary:      Effort: Pulmonary effort is normal. No respiratory distress.      Breath sounds: Normal breath sounds. No stridor. No wheezing or rales.   Chest:      Chest wall: No tenderness.   Abdominal:      General: There is no distension.      Tenderness: There is no abdominal tenderness. There is no rebound.   Musculoskeletal:         General: No tenderness.      Cervical back: Normal range of motion.   Skin:     General: Skin is warm and dry.   Neurological:      Mental Status: She is alert and oriented to person, place, and time.   EKG showed normal sinus rhythm with multiple PVCs.This EKG was personally reviewed by myself, I agree with the interpretation.  Assessment:     1. Cardiomegaly    2. Hyperlipidemia, unspecified hyperlipidemia type    3. Aortic valve insufficiency, etiology of cardiac valve disease unspecified    4. Essential hypertension    5. Mild intermittent asthma, unspecified whether complicated    6. Severe obesity (BMI 35.0-39.9) with comorbidity    7. Chest pain, unspecified type    8. Nonspecific abnormal electrocardiogram (ECG) (EKG)    9. Pre-operative cardiovascular examination    10. Type II diabetes mellitus with neurological manifestations        Plan:     Cardiomegaly    Hyperlipidemia, unspecified hyperlipidemia type    Aortic valve insufficiency, etiology of cardiac valve disease unspecified    Essential hypertension    Mild intermittent asthma, unspecified whether complicated    Severe  obesity (BMI 35.0-39.9) with comorbidity    Chest pain, unspecified type    Nonspecific abnormal electrocardiogram (ECG) (EKG)    Pre-operative cardiovascular examination    Type II diabetes mellitus with neurological manifestations      Impression 1 hypertension stable   2. Asthma stable   3 diabetes under good better control   4. Intermittent chest pain will have a nuclear stress test for preop evaluation and cleared the patient if the nuclear stress test is free of disease.

## 2023-01-10 NOTE — PROGRESS NOTES
DM eye report: Attempted to contact the patient to schedule overdue eye exam, no answer, left voicemail.

## 2023-01-12 PROCEDURE — G0179 PR HOME HEALTH MD RECERTIFICATION: ICD-10-PCS | Mod: ,,, | Performed by: PODIATRIST

## 2023-01-12 PROCEDURE — G0179 MD RECERTIFICATION HHA PT: HCPCS | Mod: ,,, | Performed by: PODIATRIST

## 2023-01-13 ENCOUNTER — DOCUMENT SCAN (OUTPATIENT)
Dept: HOME HEALTH SERVICES | Facility: HOSPITAL | Age: 68
End: 2023-01-13
Payer: MEDICARE

## 2023-01-18 ENCOUNTER — HOSPITAL ENCOUNTER (OUTPATIENT)
Dept: CARDIOLOGY | Facility: HOSPITAL | Age: 68
Discharge: HOME | End: 2023-01-18
Attending: INTERNAL MEDICINE
Payer: MEDICARE

## 2023-01-18 ENCOUNTER — HOSPITAL ENCOUNTER (OUTPATIENT)
Dept: RADIOLOGY | Facility: HOSPITAL | Age: 68
Discharge: HOME | End: 2023-01-18
Attending: INTERNAL MEDICINE
Payer: MEDICARE

## 2023-01-18 DIAGNOSIS — R07.9 CHEST PAIN, UNSPECIFIED TYPE: ICD-10-CM

## 2023-01-18 DIAGNOSIS — I51.7 CARDIOMEGALY: ICD-10-CM

## 2023-01-18 DIAGNOSIS — I10 ESSENTIAL HYPERTENSION: ICD-10-CM

## 2023-01-18 DIAGNOSIS — E66.01 SEVERE OBESITY (BMI 35.0-39.9) WITH COMORBIDITY: ICD-10-CM

## 2023-01-18 DIAGNOSIS — I35.1 AORTIC VALVE INSUFFICIENCY, ETIOLOGY OF CARDIAC VALVE DISEASE UNSPECIFIED: ICD-10-CM

## 2023-01-18 DIAGNOSIS — E78.5 HYPERLIPIDEMIA, UNSPECIFIED HYPERLIPIDEMIA TYPE: ICD-10-CM

## 2023-01-18 DIAGNOSIS — Z01.810 PRE-OPERATIVE CARDIOVASCULAR EXAMINATION: ICD-10-CM

## 2023-01-18 DIAGNOSIS — J45.20 MILD INTERMITTENT ASTHMA, UNSPECIFIED WHETHER COMPLICATED: ICD-10-CM

## 2023-01-18 DIAGNOSIS — R94.31 NONSPECIFIC ABNORMAL ELECTROCARDIOGRAM (ECG) (EKG): ICD-10-CM

## 2023-01-18 DIAGNOSIS — E11.49 TYPE II DIABETES MELLITUS WITH NEUROLOGICAL MANIFESTATIONS: ICD-10-CM

## 2023-01-18 LAB
CV STRESS BASE HR: 82 BPM
DIASTOLIC BLOOD PRESSURE: 86 MMHG
NUC REST EJECTION FRACTION: 63
NUC STRESS EJECTION FRACTION: 66 %
OHS CV CPX 85 PERCENT MAX PREDICTED HEART RATE MALE: 123
OHS CV CPX ESTIMATED METS: 1
OHS CV CPX MAX PREDICTED HEART RATE: 145
OHS CV CPX PATIENT IS FEMALE: 1
OHS CV CPX PATIENT IS MALE: 0
OHS CV CPX PEAK DIASTOLIC BLOOD PRESSURE: 76 MMHG
OHS CV CPX PEAK HEAR RATE: 94 BPM
OHS CV CPX PEAK RATE PRESSURE PRODUCT: NORMAL
OHS CV CPX PEAK SYSTOLIC BLOOD PRESSURE: 128 MMHG
OHS CV CPX PERCENT MAX PREDICTED HEART RATE ACHIEVED: 65
OHS CV CPX RATE PRESSURE PRODUCT PRESENTING: NORMAL
STRESS ECHO POST EXERCISE DUR MIN: 1 MINUTES
SYSTOLIC BLOOD PRESSURE: 126 MMHG

## 2023-01-18 PROCEDURE — 63600175 PHARM REV CODE 636 W HCPCS: Mod: HCNC | Performed by: INTERNAL MEDICINE

## 2023-01-18 PROCEDURE — 93018 NUCLEAR STRESS - CARDIOLOGY INTERPRETED (CUPID ONLY): ICD-10-PCS | Mod: HCNC,,, | Performed by: INTERNAL MEDICINE

## 2023-01-18 PROCEDURE — 78452 HT MUSCLE IMAGE SPECT MULT: CPT | Mod: HCNC

## 2023-01-18 PROCEDURE — 93016 CV STRESS TEST SUPVJ ONLY: CPT | Mod: HCNC,,, | Performed by: INTERNAL MEDICINE

## 2023-01-18 PROCEDURE — A9502 TC99M TETROFOSMIN: HCPCS | Mod: HCNC

## 2023-01-18 PROCEDURE — 93017 CV STRESS TEST TRACING ONLY: CPT | Mod: HCNC

## 2023-01-18 PROCEDURE — 93016 NUCLEAR STRESS - CARDIOLOGY INTERPRETED (CUPID ONLY): ICD-10-PCS | Mod: HCNC,,, | Performed by: INTERNAL MEDICINE

## 2023-01-18 PROCEDURE — 78452 HT MUSCLE IMAGE SPECT MULT: CPT | Mod: 26,HCNC,, | Performed by: INTERNAL MEDICINE

## 2023-01-18 PROCEDURE — 78452 NUCLEAR STRESS - CARDIOLOGY INTERPRETED (CUPID ONLY): ICD-10-PCS | Mod: 26,HCNC,, | Performed by: INTERNAL MEDICINE

## 2023-01-18 PROCEDURE — 93018 CV STRESS TEST I&R ONLY: CPT | Mod: HCNC,,, | Performed by: INTERNAL MEDICINE

## 2023-01-18 RX ORDER — REGADENOSON 0.08 MG/ML
0.4 INJECTION, SOLUTION INTRAVENOUS ONCE
Status: COMPLETED | OUTPATIENT
Start: 2023-01-18 | End: 2023-01-18

## 2023-01-18 RX ADMIN — REGADENOSON 0.4 MG: 0.08 INJECTION, SOLUTION INTRAVENOUS at 09:01

## 2023-01-19 ENCOUNTER — TELEPHONE (OUTPATIENT)
Dept: CARDIOLOGY | Facility: CLINIC | Age: 68
End: 2023-01-19
Payer: MEDICARE

## 2023-01-19 DIAGNOSIS — J45.20 MILD INTERMITTENT ASTHMA, UNSPECIFIED WHETHER COMPLICATED: ICD-10-CM

## 2023-01-19 DIAGNOSIS — I35.1 AORTIC VALVE INSUFFICIENCY, ETIOLOGY OF CARDIAC VALVE DISEASE UNSPECIFIED: ICD-10-CM

## 2023-01-19 DIAGNOSIS — I51.7 CARDIOMEGALY: Primary | ICD-10-CM

## 2023-01-19 DIAGNOSIS — R07.9 CHEST PAIN, UNSPECIFIED TYPE: ICD-10-CM

## 2023-01-19 NOTE — TELEPHONE ENCOUNTER
Spoke with patient daughter to scheduled a nurse visit  to sign consents for LHC for 01/23/2023. Also gave patient daughter stress test results patient daughter stated understanding.    ----- Message from Antione Chavez MD sent at 1/18/2023  7:08 PM CST -----  Need to have her evaluated for LHC, is pre op for foot surgery, either sign up for Dr Bonilla next week, or have her see me next week, thanks, positive stress test.  ----- Message -----  From: Alec Xie MD  Sent: 1/18/2023   4:26 PM CST  To: Antione Chavez MD

## 2023-01-23 ENCOUNTER — TELEPHONE (OUTPATIENT)
Dept: CARDIOLOGY | Facility: CLINIC | Age: 68
End: 2023-01-23
Payer: MEDICARE

## 2023-01-23 ENCOUNTER — LAB VISIT (OUTPATIENT)
Dept: LAB | Facility: HOSPITAL | Age: 68
End: 2023-01-23
Attending: INTERNAL MEDICINE
Payer: MEDICARE

## 2023-01-23 DIAGNOSIS — I51.7 CARDIOMEGALY: ICD-10-CM

## 2023-01-23 DIAGNOSIS — I35.1 AORTIC VALVE INSUFFICIENCY, ETIOLOGY OF CARDIAC VALVE DISEASE UNSPECIFIED: ICD-10-CM

## 2023-01-23 DIAGNOSIS — J45.20 MILD INTERMITTENT ASTHMA, UNSPECIFIED WHETHER COMPLICATED: ICD-10-CM

## 2023-01-23 DIAGNOSIS — R07.9 CHEST PAIN, UNSPECIFIED TYPE: ICD-10-CM

## 2023-01-23 PROBLEM — R94.39 ABNORMAL NUCLEAR STRESS TEST: Status: ACTIVE | Noted: 2023-01-23

## 2023-01-23 LAB
ANION GAP SERPL CALC-SCNC: 8 MMOL/L (ref 8–16)
APTT BLDCRRT: 27.9 SEC (ref 21–32)
BUN SERPL-MCNC: 24 MG/DL (ref 8–23)
CALCIUM SERPL-MCNC: 9.9 MG/DL (ref 8.7–10.5)
CHLORIDE SERPL-SCNC: 101 MMOL/L (ref 95–110)
CO2 SERPL-SCNC: 28 MMOL/L (ref 23–29)
CREAT SERPL-MCNC: 1 MG/DL (ref 0.5–1.4)
ERYTHROCYTE [DISTWIDTH] IN BLOOD BY AUTOMATED COUNT: 14.6 % (ref 11.5–14.5)
EST. GFR  (NO RACE VARIABLE): >60 ML/MIN/1.73 M^2
GLUCOSE SERPL-MCNC: 177 MG/DL (ref 70–110)
HCT VFR BLD AUTO: 45.9 % (ref 37–48.5)
HGB BLD-MCNC: 15 G/DL (ref 12–16)
INR PPP: 1.1 (ref 0.8–1.2)
MCH RBC QN AUTO: 30.1 PG (ref 27–31)
MCHC RBC AUTO-ENTMCNC: 32.7 G/DL (ref 32–36)
MCV RBC AUTO: 92 FL (ref 82–98)
PLATELET # BLD AUTO: 254 K/UL (ref 150–450)
PMV BLD AUTO: 10.7 FL (ref 9.2–12.9)
POTASSIUM SERPL-SCNC: 4.6 MMOL/L (ref 3.5–5.1)
PROTHROMBIN TIME: 10.9 SEC (ref 9–12.5)
RBC # BLD AUTO: 4.99 M/UL (ref 4–5.4)
SODIUM SERPL-SCNC: 137 MMOL/L (ref 136–145)
WBC # BLD AUTO: 7.82 K/UL (ref 3.9–12.7)

## 2023-01-23 PROCEDURE — 36415 COLL VENOUS BLD VENIPUNCTURE: CPT | Mod: HCNC | Performed by: INTERNAL MEDICINE

## 2023-01-23 PROCEDURE — 85027 COMPLETE CBC AUTOMATED: CPT | Mod: HCNC | Performed by: INTERNAL MEDICINE

## 2023-01-23 PROCEDURE — 80048 BASIC METABOLIC PNL TOTAL CA: CPT | Mod: HCNC | Performed by: INTERNAL MEDICINE

## 2023-01-23 PROCEDURE — 85730 THROMBOPLASTIN TIME PARTIAL: CPT | Mod: HCNC | Performed by: INTERNAL MEDICINE

## 2023-01-23 PROCEDURE — 85610 PROTHROMBIN TIME: CPT | Mod: HCNC | Performed by: INTERNAL MEDICINE

## 2023-01-23 NOTE — TELEPHONE ENCOUNTER
I have attempted without success to contact this patient by phone to discuss lab results and I left a message on answering machine.      ----- Message from Antione Chavez MD sent at 1/23/2023  4:54 PM CST -----  Improved labs, glucose better  ----- Message -----  From: Remy, [facility] Interface  Sent: 1/23/2023   3:55 PM CST  To: Antione Chavez MD

## 2023-01-24 ENCOUNTER — HOSPITAL ENCOUNTER (OUTPATIENT)
Facility: HOSPITAL | Age: 68
Discharge: HOME | End: 2023-01-24
Attending: INTERNAL MEDICINE | Admitting: INTERNAL MEDICINE
Payer: MEDICARE

## 2023-01-24 VITALS
DIASTOLIC BLOOD PRESSURE: 70 MMHG | HEIGHT: 69 IN | SYSTOLIC BLOOD PRESSURE: 146 MMHG | TEMPERATURE: 98 F | RESPIRATION RATE: 17 BRPM | HEART RATE: 75 BPM | OXYGEN SATURATION: 97 % | WEIGHT: 247.13 LBS | BODY MASS INDEX: 36.6 KG/M2

## 2023-01-24 DIAGNOSIS — I51.7 CARDIOMEGALY: ICD-10-CM

## 2023-01-24 DIAGNOSIS — R07.9 CHEST PAIN, UNSPECIFIED TYPE: ICD-10-CM

## 2023-01-24 DIAGNOSIS — R94.39 ABNORMAL NUCLEAR STRESS TEST: Primary | ICD-10-CM

## 2023-01-24 DIAGNOSIS — R94.39 ABNORMAL STRESS TEST: ICD-10-CM

## 2023-01-24 DIAGNOSIS — R94.31 ABNORMAL EKG: ICD-10-CM

## 2023-01-24 LAB — CATH EF QUANTITATIVE: 60 %

## 2023-01-24 PROCEDURE — C1894 INTRO/SHEATH, NON-LASER: HCPCS | Mod: HCNC | Performed by: INTERNAL MEDICINE

## 2023-01-24 PROCEDURE — 27201423 OPTIME MED/SURG SUP & DEVICES STERILE SUPPLY: Mod: HCNC | Performed by: INTERNAL MEDICINE

## 2023-01-24 PROCEDURE — 93458 L HRT ARTERY/VENTRICLE ANGIO: CPT | Mod: 26,HCNC,, | Performed by: INTERNAL MEDICINE

## 2023-01-24 PROCEDURE — 99153 MOD SED SAME PHYS/QHP EA: CPT | Mod: HCNC | Performed by: INTERNAL MEDICINE

## 2023-01-24 PROCEDURE — 63600175 PHARM REV CODE 636 W HCPCS: Mod: HCNC | Performed by: INTERNAL MEDICINE

## 2023-01-24 PROCEDURE — 99152 PR MOD CONSCIOUS SEDATION, SAME PHYS, 5+ YRS, FIRST 15 MIN: ICD-10-PCS | Mod: HCNC,,, | Performed by: INTERNAL MEDICINE

## 2023-01-24 PROCEDURE — 99152 MOD SED SAME PHYS/QHP 5/>YRS: CPT | Mod: HCNC | Performed by: INTERNAL MEDICINE

## 2023-01-24 PROCEDURE — 93458 L HRT ARTERY/VENTRICLE ANGIO: CPT | Mod: HCNC | Performed by: INTERNAL MEDICINE

## 2023-01-24 PROCEDURE — 99152 MOD SED SAME PHYS/QHP 5/>YRS: CPT | Mod: HCNC,,, | Performed by: INTERNAL MEDICINE

## 2023-01-24 PROCEDURE — 25000003 PHARM REV CODE 250: Mod: HCNC | Performed by: INTERNAL MEDICINE

## 2023-01-24 PROCEDURE — C1769 GUIDE WIRE: HCPCS | Mod: HCNC | Performed by: INTERNAL MEDICINE

## 2023-01-24 PROCEDURE — 93458 PR CATH PLACE/CORON ANGIO, IMG SUPER/INTERP,W LEFT HEART VENTRICULOGRAPHY: ICD-10-PCS | Mod: 26,HCNC,, | Performed by: INTERNAL MEDICINE

## 2023-01-24 PROCEDURE — 25500020 PHARM REV CODE 255: Mod: HCNC | Performed by: INTERNAL MEDICINE

## 2023-01-24 RX ORDER — LIDOCAINE HYDROCHLORIDE 20 MG/ML
INJECTION, SOLUTION EPIDURAL; INFILTRATION; INTRACAUDAL; PERINEURAL
Status: DISCONTINUED | OUTPATIENT
Start: 2023-01-24 | End: 2023-01-24 | Stop reason: HOSPADM

## 2023-01-24 RX ORDER — SODIUM CHLORIDE 9 MG/ML
INJECTION, SOLUTION INTRAVENOUS CONTINUOUS
Status: DISCONTINUED | OUTPATIENT
Start: 2023-01-24 | End: 2023-01-24 | Stop reason: HOSPADM

## 2023-01-24 RX ORDER — NAPROXEN SODIUM 220 MG/1
81 TABLET, FILM COATED ORAL ONCE
Status: COMPLETED | OUTPATIENT
Start: 2023-01-24 | End: 2023-01-24

## 2023-01-24 RX ORDER — DIPHENHYDRAMINE HCL 50 MG
50 CAPSULE ORAL ONCE
Status: COMPLETED | OUTPATIENT
Start: 2023-01-24 | End: 2023-01-24

## 2023-01-24 RX ORDER — SODIUM CHLORIDE 9 MG/ML
INJECTION, SOLUTION INTRAVENOUS CONTINUOUS
Status: ACTIVE | OUTPATIENT
Start: 2023-01-24 | End: 2023-01-24

## 2023-01-24 RX ORDER — NITROGLYCERIN 5 MG/ML
INJECTION, SOLUTION INTRAVENOUS
Status: DISCONTINUED | OUTPATIENT
Start: 2023-01-24 | End: 2023-01-24 | Stop reason: HOSPADM

## 2023-01-24 RX ORDER — VERAPAMIL HYDROCHLORIDE 2.5 MG/ML
INJECTION, SOLUTION INTRAVENOUS
Status: DISCONTINUED | OUTPATIENT
Start: 2023-01-24 | End: 2023-01-24 | Stop reason: HOSPADM

## 2023-01-24 RX ORDER — ONDANSETRON 8 MG/1
8 TABLET, ORALLY DISINTEGRATING ORAL EVERY 8 HOURS PRN
Status: DISCONTINUED | OUTPATIENT
Start: 2023-01-24 | End: 2023-01-24 | Stop reason: HOSPADM

## 2023-01-24 RX ORDER — DIAZEPAM 5 MG/1
5 TABLET ORAL
Status: DISCONTINUED | OUTPATIENT
Start: 2023-01-24 | End: 2023-03-15

## 2023-01-24 RX ORDER — HEPARIN SODIUM 1000 [USP'U]/ML
INJECTION, SOLUTION INTRAVENOUS; SUBCUTANEOUS
Status: DISCONTINUED | OUTPATIENT
Start: 2023-01-24 | End: 2023-01-24 | Stop reason: HOSPADM

## 2023-01-24 RX ORDER — ACETAMINOPHEN 325 MG/1
650 TABLET ORAL EVERY 4 HOURS PRN
Status: DISCONTINUED | OUTPATIENT
Start: 2023-01-24 | End: 2023-01-24 | Stop reason: HOSPADM

## 2023-01-24 RX ORDER — MIDAZOLAM HYDROCHLORIDE 1 MG/ML
INJECTION, SOLUTION INTRAMUSCULAR; INTRAVENOUS
Status: DISCONTINUED | OUTPATIENT
Start: 2023-01-24 | End: 2023-01-24 | Stop reason: HOSPADM

## 2023-01-24 RX ORDER — SODIUM CHLORIDE 0.9 % (FLUSH) 0.9 %
10 SYRINGE (ML) INJECTION
Status: ACTIVE | OUTPATIENT
Start: 2023-01-24

## 2023-01-24 RX ORDER — FENTANYL CITRATE 50 UG/ML
INJECTION, SOLUTION INTRAMUSCULAR; INTRAVENOUS
Status: DISCONTINUED | OUTPATIENT
Start: 2023-01-24 | End: 2023-01-24 | Stop reason: HOSPADM

## 2023-01-24 RX ADMIN — DIAZEPAM 5 MG: 5 TABLET ORAL at 10:01

## 2023-01-24 RX ADMIN — ASPIRIN 81 MG CHEWABLE TABLET 81 MG: 81 TABLET CHEWABLE at 10:01

## 2023-01-24 RX ADMIN — DIPHENHYDRAMINE HYDROCHLORIDE 50 MG: 50 CAPSULE ORAL at 10:01

## 2023-01-24 RX ADMIN — SODIUM CHLORIDE: 9 INJECTION, SOLUTION INTRAVENOUS at 10:01

## 2023-01-24 RX ADMIN — ACETAMINOPHEN 650 MG: 325 TABLET ORAL at 01:01

## 2023-01-24 NOTE — INTERVAL H&P NOTE
The patient has been examined and the H&P has been reviewed:    I concur with the findings and no changes have occurred since H&P was written.    Procedure risks, benefits and alternative options discussed and understood by patient/family.          Active Hospital Problems    Diagnosis  POA    *Abnormal nuclear stress test [R94.39]  Yes    ILD (interstitial lung disease) [J84.9]  Yes    Cardiomegaly [I51.7]  Yes    HLD (hyperlipidemia) [E78.5]  Yes    Type II diabetes mellitus with neurological manifestations [E11.49]  Yes    Charcot's neuro arthropathy with dislocation of Lisfranc Joint of left foot [M14.672]  Yes    Severe obesity (BMI 35.0-39.9) with comorbidity [E66.01]  Yes    Type 2 diabetes mellitus [E11.9]  Yes    Essential hypertension [I10]  Yes      Resolved Hospital Problems   No resolved problems to display.

## 2023-01-24 NOTE — OP NOTE
INPATIENT Operative Note         SUMMARY     Surgery Date: 1/24/2023     Surgeon(s) and Role:     * Salma Bonilla MD - Primary    ASSISTANT:none    Pre-op Diagnosis:  Abnormal stress test [R94.39]  Cardiomegaly [I51.7]  Chest pain, unspecified type [R07.9]  Abnormal EKG [R94.31]      Post-op Diagnosis:  Abnormal stress test [R94.39]  Cardiomegaly [I51.7]  Chest pain, unspecified type [R07.9]  Abnormal EKG [R94.31]    Procedure(s) (LRB):  CATHETERIZATION, HEART, LEFT (Left)    COMPLICATION:none    Anesthesia: RN IV Sedation    Findings/Key Components:  Normal coronaries normal lvf  Lvedp 19-22  Estimated Blood Loss: < 50 ML.         SPECIMEN: NONE    Devices/Prostetics: None    PLAN:   reassure

## 2023-01-24 NOTE — PLAN OF CARE
Discharge instructions rev'd w/ pt and dtr. Dtr is interpreting for patient  w/o diff. Verbalizes understanding and able to teach back. Ambulatory w/ assist in room. VSS. IV removed. Pt wheeled to car.

## 2023-01-25 ENCOUNTER — PATIENT MESSAGE (OUTPATIENT)
Dept: ADMINISTRATIVE | Facility: HOSPITAL | Age: 68
End: 2023-01-25
Payer: MEDICARE

## 2023-01-25 NOTE — DISCHARGE SUMMARY
[facility] - Cath Lab (Hospital)  Discharge Note  Short Stay    Procedure(s) (LRB):  CATHETERIZATION, HEART, LEFT (Left)      OUTCOME: Patient tolerated treatment/procedure well without complication and is now ready for discharge.    DISPOSITION: Home or Self Care    FINAL DIAGNOSIS:  Abnormal nuclear stress test    FOLLOWUP: In clinic    DISCHARGE INSTRUCTIONS:    Discharge Procedure Orders   Diet general     Call MD for:  temperature >100.4     Call MD for:  persistent nausea and vomiting     Call MD for:  severe uncontrolled pain     Call MD for:  difficulty breathing, headache or visual disturbances     Call MD for:  redness, tenderness, or signs of infection (pain, swelling, redness, odor or green/yellow discharge around incision site)     Call MD for:  hives     Call MD for:  persistent dizziness or light-headedness     Call MD for:  extreme fatigue        TIME SPENT ON DISCHARGE: 15 minutes

## 2023-02-01 DIAGNOSIS — E11.9 TYPE 2 DIABETES MELLITUS WITHOUT COMPLICATION: ICD-10-CM

## 2023-02-07 ENCOUNTER — PATIENT MESSAGE (OUTPATIENT)
Dept: DIABETES | Facility: CLINIC | Age: 68
End: 2023-02-07
Payer: MEDICARE

## 2023-02-09 ENCOUNTER — EXTERNAL HOME HEALTH (OUTPATIENT)
Dept: HOME HEALTH SERVICES | Facility: HOSPITAL | Age: 68
End: 2023-02-09
Payer: MEDICARE

## 2023-02-09 ENCOUNTER — PATIENT MESSAGE (OUTPATIENT)
Dept: PODIATRY | Facility: CLINIC | Age: 68
End: 2023-02-09

## 2023-02-09 ENCOUNTER — HOSPITAL ENCOUNTER (OUTPATIENT)
Dept: RADIOLOGY | Facility: HOSPITAL | Age: 68
Discharge: HOME | End: 2023-02-09
Attending: PODIATRIST
Payer: MEDICARE

## 2023-02-09 ENCOUNTER — OFFICE VISIT (OUTPATIENT)
Dept: PODIATRY | Facility: CLINIC | Age: 68
End: 2023-02-09
Payer: MEDICARE

## 2023-02-09 VITALS — BODY MASS INDEX: 36.58 KG/M2 | HEIGHT: 69 IN | WEIGHT: 247 LBS

## 2023-02-09 DIAGNOSIS — M14.672 CHARCOT'S JOINT OF ANKLE, LEFT: ICD-10-CM

## 2023-02-09 DIAGNOSIS — M14.672 CHARCOT'S JOINT OF FOOT, LEFT: ICD-10-CM

## 2023-02-09 DIAGNOSIS — S91.209A TRAUMATIC AVULSION OF NAIL PLATE OF TOE, INITIAL ENCOUNTER: ICD-10-CM

## 2023-02-09 DIAGNOSIS — L97.422 DIABETIC ULCER OF LEFT MIDFOOT ASSOCIATED WITH TYPE 2 DIABETES MELLITUS, WITH FAT LAYER EXPOSED: ICD-10-CM

## 2023-02-09 DIAGNOSIS — Z98.890 HISTORY OF LEFT HEART CATHETERIZATION: ICD-10-CM

## 2023-02-09 DIAGNOSIS — T84.84XA PAINFUL ORTHOPAEDIC HARDWARE: ICD-10-CM

## 2023-02-09 DIAGNOSIS — M19.072 OSTEOARTHRITIS OF LEFT ANKLE OR FOOT: ICD-10-CM

## 2023-02-09 DIAGNOSIS — L97.422 DIABETIC ULCER OF LEFT MIDFOOT ASSOCIATED WITH TYPE 2 DIABETES MELLITUS, WITH FAT LAYER EXPOSED: Primary | ICD-10-CM

## 2023-02-09 DIAGNOSIS — M96.0 PSEUDARTHROSIS AFTER FUSION OR ARTHRODESIS: ICD-10-CM

## 2023-02-09 DIAGNOSIS — M25.572 PAIN IN JOINT INVOLVING LEFT ANKLE AND FOOT: ICD-10-CM

## 2023-02-09 DIAGNOSIS — E11.49 TYPE II DIABETES MELLITUS WITH NEUROLOGICAL MANIFESTATIONS: ICD-10-CM

## 2023-02-09 DIAGNOSIS — E11.621 DIABETIC ULCER OF LEFT MIDFOOT ASSOCIATED WITH TYPE 2 DIABETES MELLITUS, WITH FAT LAYER EXPOSED: ICD-10-CM

## 2023-02-09 DIAGNOSIS — E11.621 DIABETIC ULCER OF LEFT MIDFOOT ASSOCIATED WITH TYPE 2 DIABETES MELLITUS, WITH FAT LAYER EXPOSED: Primary | ICD-10-CM

## 2023-02-09 DIAGNOSIS — B35.1 ONYCHOMYCOSIS DUE TO DERMATOPHYTE: ICD-10-CM

## 2023-02-09 PROCEDURE — 1101F PT FALLS ASSESS-DOCD LE1/YR: CPT | Mod: HCNC,CPTII,S$GLB, | Performed by: PODIATRIST

## 2023-02-09 PROCEDURE — 99999 PR PBB SHADOW E&M-EST. PATIENT-LVL IV: ICD-10-PCS | Mod: PBBFAC,HCNC,, | Performed by: PODIATRIST

## 2023-02-09 PROCEDURE — 87186 SC STD MICRODIL/AGAR DIL: CPT | Mod: HCNC | Performed by: PODIATRIST

## 2023-02-09 PROCEDURE — 11042 PR DEBRIDEMENT, SKIN, SUB-Q TISSUE,=<20 SQ CM: ICD-10-PCS | Mod: 59,HCNC,S$GLB, | Performed by: PODIATRIST

## 2023-02-09 PROCEDURE — 99214 PR OFFICE/OUTPT VISIT, EST, LEVL IV, 30-39 MIN: ICD-10-PCS | Mod: 25,HCNC,S$GLB, | Performed by: PODIATRIST

## 2023-02-09 PROCEDURE — 3008F PR BODY MASS INDEX (BMI) DOCUMENTED: ICD-10-PCS | Mod: HCNC,CPTII,S$GLB, | Performed by: PODIATRIST

## 2023-02-09 PROCEDURE — 3008F BODY MASS INDEX DOCD: CPT | Mod: HCNC,CPTII,S$GLB, | Performed by: PODIATRIST

## 2023-02-09 PROCEDURE — 1101F PR PT FALLS ASSESS DOC 0-1 FALLS W/OUT INJ PAST YR: ICD-10-PCS | Mod: HCNC,CPTII,S$GLB, | Performed by: PODIATRIST

## 2023-02-09 PROCEDURE — 87077 CULTURE AEROBIC IDENTIFY: CPT | Mod: HCNC | Performed by: PODIATRIST

## 2023-02-09 PROCEDURE — 1159F PR MEDICATION LIST DOCUMENTED IN MEDICAL RECORD: ICD-10-PCS | Mod: HCNC,CPTII,S$GLB, | Performed by: PODIATRIST

## 2023-02-09 PROCEDURE — 1160F RVW MEDS BY RX/DR IN RCRD: CPT | Mod: HCNC,CPTII,S$GLB, | Performed by: PODIATRIST

## 2023-02-09 PROCEDURE — 11721 PR DEBRIDEMENT OF NAILS, 6 OR MORE: ICD-10-PCS | Mod: 59,Q9,HCNC,S$GLB | Performed by: PODIATRIST

## 2023-02-09 PROCEDURE — 1159F MED LIST DOCD IN RCRD: CPT | Mod: HCNC,CPTII,S$GLB, | Performed by: PODIATRIST

## 2023-02-09 PROCEDURE — 11042 DBRDMT SUBQ TIS 1ST 20SQCM/<: CPT | Mod: 59,HCNC,S$GLB, | Performed by: PODIATRIST

## 2023-02-09 PROCEDURE — 87070 CULTURE OTHR SPECIMN AEROBIC: CPT | Mod: HCNC | Performed by: PODIATRIST

## 2023-02-09 PROCEDURE — 73630 X-RAY EXAM OF FOOT: CPT | Mod: TC,HCNC,LT

## 2023-02-09 PROCEDURE — 11721 DEBRIDE NAIL 6 OR MORE: CPT | Mod: 59,Q9,HCNC,S$GLB | Performed by: PODIATRIST

## 2023-02-09 PROCEDURE — 3288F FALL RISK ASSESSMENT DOCD: CPT | Mod: HCNC,CPTII,S$GLB, | Performed by: PODIATRIST

## 2023-02-09 PROCEDURE — 73630 XR FOOT COMPLETE 3 VIEW LEFT: ICD-10-PCS | Mod: 26,HCNC,LT, | Performed by: RADIOLOGY

## 2023-02-09 PROCEDURE — 99999 PR PBB SHADOW E&M-EST. PATIENT-LVL IV: CPT | Mod: PBBFAC,HCNC,, | Performed by: PODIATRIST

## 2023-02-09 PROCEDURE — 3288F PR FALLS RISK ASSESSMENT DOCUMENTED: ICD-10-PCS | Mod: HCNC,CPTII,S$GLB, | Performed by: PODIATRIST

## 2023-02-09 PROCEDURE — 1125F PR PAIN SEVERITY QUANTIFIED, PAIN PRESENT: ICD-10-PCS | Mod: HCNC,CPTII,S$GLB, | Performed by: PODIATRIST

## 2023-02-09 PROCEDURE — 1125F AMNT PAIN NOTED PAIN PRSNT: CPT | Mod: HCNC,CPTII,S$GLB, | Performed by: PODIATRIST

## 2023-02-09 PROCEDURE — 99214 OFFICE O/P EST MOD 30 MIN: CPT | Mod: 25,HCNC,S$GLB, | Performed by: PODIATRIST

## 2023-02-09 PROCEDURE — 1160F PR REVIEW ALL MEDS BY PRESCRIBER/CLIN PHARMACIST DOCUMENTED: ICD-10-PCS | Mod: HCNC,CPTII,S$GLB, | Performed by: PODIATRIST

## 2023-02-09 PROCEDURE — 73630 X-RAY EXAM OF FOOT: CPT | Mod: 26,HCNC,LT, | Performed by: RADIOLOGY

## 2023-02-09 NOTE — PROGRESS NOTES
Subjective:       Patient ID: Kay Galarza is a 67 y.o. female.    Chief Complaint: Foot Ulcer (C/o ulcer on the left foot, sub 1st met head, rates pain 8/10, comes and goes, toenail came off on the right great toe, diabetic, wears sandals, last seen PCP Dr. Negron on 12/22/22)        HPI: Kay Galarza presents to the clinic today, for evaluation and possible pre-op discussion concerning revision of LLE Charcot Foot. She also presents to follow up on the wound to the plantar 1st MTPJ. States no infection. Did have recent CT scan of the foot. Did have recent NM Scan as well. Patient's Primary Care Provider is Lucy Negron MD. The PMHx. does include DM w/ peripheral neuropathy, venous insufficiency and acquired foot/ankle deformity/deformities. I have repeatedly advised against revision surgery, but patient states moderate pains at the TN joint (due to non-union) and the STJ. Patient has also been non-compliant with DM Shoe gear and CAM Walker and CROW Walker. She is frustrated with the shape of her foot and the abduction. Is s/p LHC a few weeks ago, w/o ailment. Her daughter is present. WB with foam flip flops. States DMII control.  States traumatic avulsion, right great toenail plate, a few days ago. Does have [facility].     Hemoglobin A1C   Date Value Ref Range Status   12/07/2022 7.6 (H) 4.0 - 5.6 % Final     Comment:     ADA Screening Guidelines:  5.7-6.4%  Consistent with prediabetes  >or=6.5%  Consistent with diabetes    High levels of fetal hemoglobin interfere with the HbA1C  assay. Heterozygous hemoglobin variants (HbS, HgC, etc)do  not significantly interfere with this assay.   However, presence of multiple variants may affect accuracy.     05/25/2022 7.7 (H) 4.0 - 5.6 % Final     Comment:     ADA Screening Guidelines:  5.7-6.4%  Consistent with prediabetes  >or=6.5%  Consistent with diabetes    High levels of fetal hemoglobin interfere with the  HbA1C  assay. Heterozygous hemoglobin variants (HbS, HgC, etc)do  not significantly interfere with this assay.   However, presence of multiple variants may affect accuracy.     01/26/2022 >14.0 (H) 4.0 - 5.6 % Final     Comment:     ADA Screening Guidelines:  5.7-6.4%  Consistent with prediabetes  >or=6.5%  Consistent with diabetes    High levels of fetal hemoglobin interfere with the HbA1C  assay. Heterozygous hemoglobin variants (HbS, HgC, etc)do  not significantly interfere with this assay.   However, presence of multiple variants may affect accuracy.         Review of patient's allergies indicates:   Allergen Reactions    Pollen extracts      Seasonal allergies, sneezing       Past Medical History:   Diagnosis Date    Abnormal nuclear stress test 1/23/2023    Arthritis     DERIAN KNEES    Asthma     SEASONAL    Cataract     Diabetes mellitus     Diabetes mellitus, type 2     Diabetic retinopathy     DM (diabetes mellitus) 2007    BS 97 09/29/2020    Hyperlipidemia     Hypertension        Family History   Problem Relation Age of Onset    Cancer Father         Prostate Ca    Hypertension Father     Stroke Father     Diabetes Sister     Glaucoma Mother     Hypertension Mother     Glaucoma Maternal Grandmother     Blindness Neg Hx     Macular degeneration Neg Hx     Retinal detachment Neg Hx     Strabismus Neg Hx        Social History     Socioeconomic History    Marital status:    Tobacco Use    Smoking status: Never    Smokeless tobacco: Never   Substance and Sexual Activity    Alcohol use: Never    Drug use: No    Sexual activity: Yes       Past Surgical History:   Procedure Laterality Date    APPLICATION OF WOUND VACUUM-ASSISTED CLOSURE DEVICE Left 8/18/2020    Procedure: APPLICATION, WOUND VAC;  Surgeon: Kirt Gray DPM;  Location: [facility];  Service: Podiatry;  Laterality: Left;    CATARACT EXTRACTION W/  INTRAOCULAR LENS IMPLANT Right 07/18/2018    CATARACT EXTRACTION W/  INTRAOCULAR LENS IMPLANT Left  03/13/2019    COLONOSCOPY N/A 12/22/2018    Procedure: COLONOSCOPY;  Surgeon: Zak Dover MD;  Location: [facility] ENDO;  Service: Endoscopy;  Laterality: N/A;    COLONOSCOPY N/A 5/11/2022    Procedure: COLONOSCOPY;  Surgeon: Charles Berrios MD;  Location: [facility] ENDO;  Service: Endoscopy;  Laterality: N/A;    ENDOSCOPIC VEIN LASER TREATMENT Bilateral 1990's    FIXATION OF SYNDESMOSIS OF ANKLE Left 7/23/2020    Procedure: FIXATION, SYNDESMOSIS, ANKLE;  Surgeon: Kirt Gray DPM;  Location: [facility] OR;  Service: Podiatry;  Laterality: Left;    LEFT HEART CATHETERIZATION Left 1/24/2023    Procedure: CATHETERIZATION, HEART, LEFT;  Surgeon: Salma Bonilla MD;  Location: [facility] CATH LAB;  Service: Cardiology;  Laterality: Left;  pt given 130 start time    MANIPULATION WITH ANESTHESIA Left 7/23/2020    Procedure: MANIPULATION, WITH ANESTHESIA;  Surgeon: Kirt Gray DPM;  Location: [facility] OR;  Service: Podiatry;  Laterality: Left;    MIDFOOT ARTHRODESIS Left 7/23/2020    Procedure: FUSION, JOINT, MIDFOOT;  Surgeon: Kirt Gray DPM;  Location: [facility] OR;  Service: Podiatry;  Laterality: Left;  2nd tarsometatarsal joint dislocation via fusion    OPEN REDUCTION AND INTERNAL FIXATION (ORIF) OF INJURY OF ANKLE Left 7/23/2020    Procedure: ORIF, ANKLE;  Surgeon: Kirt Gray DPM;  Location: [facility] OR;  Service: Podiatry;  Laterality: Left;    RECONSTRUCTION WITH FUSION OF CHARCOT FOOT Left 8/18/2020    Procedure: RECONSTRUCTION, CHARCOT FOOT, WITH FUSION;  Surgeon: Kirt Gray DPM;  Location: [facility] OR;  Service: Podiatry;  Laterality: Left;    REMOVAL OF HARDWARE FROM LOWER EXTREMITY Left 1/27/2021    Procedure: REMOVAL, HARDWARE, LOWER EXTREMITY;  Surgeon: Kirt Gray DPM;  Location: [facility] OR;  Service: Podiatry;  Laterality: Left;    WOUND DEBRIDEMENT Left 8/18/2020    Procedure: DEBRIDEMENT, WOUND;  Surgeon: Kirt Gray DPM;  Location: [facility] OR;  Service: Podiatry;  Laterality: Left;       Review of  "Systems   Constitutional:  Negative for chills, fatigue and fever.   HENT:  Negative for hearing loss.    Eyes:  Negative for photophobia and visual disturbance.   Respiratory:  Negative for cough, chest tightness, shortness of breath and wheezing.    Cardiovascular:  Positive for leg swelling. Negative for chest pain and palpitations.   Gastrointestinal:  Negative for constipation, diarrhea, nausea and vomiting.   Endocrine: Negative for cold intolerance and heat intolerance.   Genitourinary:  Negative for flank pain.   Musculoskeletal:  Positive for gait problem. Negative for neck pain and neck stiffness.   Skin:  Positive for wound. Negative for color change.   Neurological:  Positive for numbness. Negative for light-headedness and headaches.   Psychiatric/Behavioral:  Negative for sleep disturbance.               Objective:   Ht 5' 9" (1.753 m)   Wt 112 kg (247 lb)   BMI 36.48 kg/m²     LOWER EXTREMITY PHYSICAL EXAMINATION    NEUROLOGY: Sensation to light touch is intact. Proprioception is intact. Sensation to pin prick is reduced to absent. Vibratory sensation is diminished to the left lower extremity. Examination with 5.07 [place] Arnie monofilament reveals that protective sensation is not intact to the left plantar surfaces of the foot and digits, as the patient has no sensation/detection at greater than 4 distinct points of contact.     VASCULAR: LLE DP and PT are palpable. CFT is WNL. Hair growth is noted. Edema is noted to the LE.     ORTHOPEDIC: Palpable tibial sesamoid is noted, LLE. No equinus is noted. Mild edema with pes planus, LLE. Pains to palpation of the hindfoot and midfoot. Pains to the TN and medial column are mild. Pains to the STJ and AM and AL gutters are severe. No crepitus is noted. No rockerbottom foot type is noted. Abducted midfoot is noted.     DERMATOLOGY: There is an ulceration at the plantar left 1st MTPJ.  The area measures 1.50 cm x 1.50 cm x 0.20 cm.  Granulation tissue " noted.  No probe to bone or osseous exposure.  No malodor.  No drainage.  No periwound erythema is noted.  Hyperkeratosis noted.  Traumatic nail avulsion, right great.  No infection is noted.  No nail bed lacerations.    Assessment:     1. Diabetic ulcer of left midfoot associated with type 2 diabetes mellitus, with fat layer exposed    2. Charcot's joint of foot, left    3. Charcot's joint of ankle, left    4. Osteoarthritis of left ankle or foot    5. Pain in joint involving left ankle and foot    6. Painful orthopaedic hardware    7. Pseudarthrosis after fusion or arthrodesis    8. Type II diabetes mellitus with neurological manifestations    9. History of left heart catheterization    10. Traumatic avulsion of nail plate of toe, initial encounter    11. Onychomycosis due to dermatophyte          Plan:     Diabetic ulcer of left midfoot associated with type 2 diabetes mellitus, with fat layer exposed  -     X-Ray Foot Complete Left; Future; Expected date: 02/09/2023  -     SUBSEQUENT HOME HEALTH ORDERS    The wound was surgically debrided after adequate prep with alcohol and/or betadine paint. Excisional wound debridement was performed using sharp #10/#15 blade/rounded scalpel and tissue nipper, with removal of all non-viable skin and soft tissues; necrotic skin/tissue formation. The woundbase/wound bed was also debrided to encourage bleeding as to promote/stimulate healing. Debridement was excisional and included epidermal, dermal and subcutaneous tissues. Post debridement measurements are as above. Hemostasis was achieved. Patient tolerated procedure well and without complications. Local woundcare with collagen dressings and bandage thereafter. Patient will change the collagen dressings Q3 - Q4 days in standard fashion.    [facility].    Compliance with DM shoe gear.    Charcot's joint of foot, left  Charcot's joint of ankle, left  Osteoarthritis of left ankle or foot  Pain in joint involving left  ankle and foot  Painful orthopaedic hardware  Pseudarthrosis after fusion or arthrodesis  -     Case Request Operating Room: APPLICATION, EXTERNAL FIXATION DEVICE, REMOVAL, HARDWARE, FOOT, SESAMOIDECTOMY, FUSION, SUBTALAR JOINT    Thorough discussion is had with the patient today, concerning the diagnosis, its etiology, and the treatment algorithm at present.     XRAYS are reviewed in detail with the patient. All questions and concerns regarding findings and its/their implications are outlined and discussed.    NM Bone Scan is reviewed in detail with the patient. All questions and concerns regarding findings and its/their implications are outlined and discussed.,    CT Scan(s) is/are reviewed in detail with the patient. All questions and concerns regarding findings and its/their implications are outlined and discussed.    The procedure of (left foot KAYLA with STJ fusion with CC fusion with medial column fusion (revision) and possible bulk autograft from fibula with possible sesamoid excision vs 1st MTPJ fusion) was thoroughly explained to the patient. Its necessity and/or purpose and the implications therein were outlined, including any pertinent advantages and/or disadvantages, and possible complications, if any. Possible complications include recurrence of pathology and/or deformity, infection (cellulitis, drainage, purulence, malodor, etc...), pain, numbness, neuritis, edema, burning, loss of function, need for further surgery, possible need for removal of any implanted hardware, soft tissue contracture and/or scarring, etc... No guarantees were given and/or implied. Post-operative expectations and weightbearing protocol is thoroughly explained to the patient, who acknowledges understanding.     Type II diabetes mellitus with neurological manifestations  I counseled the patient on his/her Diabetic Mellitus regarding today's clinical examination and objection findings. We did also discuss recent medication changes,  pertinent labs and imaging evaluations and other medical consultation notes and progress notes. Greater than 50% of this visit was spent on counseling and coordination of care. Greater than 20 minutes of this appt. was spent on education about the diabetic foot, in relation to PVD and/or neuropathy, and the prevention of limb loss.     Shoe gear is inspected and wear and proper fit/type. Patient is reminded of the importance of good nutrition and blood sugar control to help prevent podiatric complications of diabetes. Patient instructed on proper foot hygeine. We discussed wearing proper shoe gear, daily foot inspections, never walking without protective shoe gear, never putting sharp instruments to feet.  Patient  will continue to monitor the areas daily, inspect feet, wear protective shoe gear when ambulatory, moisturizer to maintain skin integrity.     Patient's DMI/DMII is managed by Primary Care Provider and/or Endocrinology Advanced Practice Provider. As per the most recent glycohemoglobin level, this patient is at goal.    History of left heart catheterization  Patient advised to follow up with Cardiologist for management of comorbid states.    Traumatic avulsion of nail plate of toe, initial encounter  -     SUBSEQUENT HOME HEALTH ORDERS    Onychomycosis  Onychomycotic nail plates, as outlined above, are sharply debrided with double action nail nipper, and/or with the assistance of a mechanical rotary aditya for reduction of pains. Nails are reduced in terms of length, width and girth with removal of subungual debris to facilitate pain free weight bearing and ambulation. Elongated nails as outlined in the objective portion of this note, were trimmed to appropriate length for alleviation/reduction of pains as well. Follow up in approx. 4-6 months.             No future appointments.

## 2023-02-13 DIAGNOSIS — E11.621 DIABETIC ULCER OF LEFT MIDFOOT ASSOCIATED WITH TYPE 2 DIABETES MELLITUS, WITH FAT LAYER EXPOSED: Primary | ICD-10-CM

## 2023-02-13 DIAGNOSIS — L97.422 DIABETIC ULCER OF LEFT MIDFOOT ASSOCIATED WITH TYPE 2 DIABETES MELLITUS, WITH FAT LAYER EXPOSED: Primary | ICD-10-CM

## 2023-02-13 LAB — BACTERIA SPEC AEROBE CULT: ABNORMAL

## 2023-02-13 RX ORDER — AMOXICILLIN AND CLAVULANATE POTASSIUM 875; 125 MG/1; MG/1
1 TABLET, FILM COATED ORAL EVERY 12 HOURS
Qty: 14 TABLET | Refills: 0 | Status: SHIPPED | OUTPATIENT
Start: 2023-02-13 | End: 2023-02-20

## 2023-02-22 ENCOUNTER — PATIENT MESSAGE (OUTPATIENT)
Dept: SURGERY | Facility: HOSPITAL | Age: 68
End: 2023-02-22
Payer: MEDICARE

## 2023-02-24 ENCOUNTER — PATIENT MESSAGE (OUTPATIENT)
Dept: PODIATRY | Facility: CLINIC | Age: 68
End: 2023-02-24
Payer: MEDICARE

## 2023-02-28 ENCOUNTER — PATIENT MESSAGE (OUTPATIENT)
Dept: SURGERY | Facility: HOSPITAL | Age: 68
End: 2023-02-28
Payer: MEDICARE

## 2023-03-03 ENCOUNTER — OFFICE VISIT (OUTPATIENT)
Dept: CARDIOLOGY | Facility: CLINIC | Age: 68
End: 2023-03-03
Payer: MEDICARE

## 2023-03-03 ENCOUNTER — OFFICE VISIT (OUTPATIENT)
Dept: PODIATRY | Facility: CLINIC | Age: 68
End: 2023-03-03
Payer: MEDICARE

## 2023-03-03 VITALS
WEIGHT: 247.38 LBS | HEIGHT: 69 IN | SYSTOLIC BLOOD PRESSURE: 140 MMHG | OXYGEN SATURATION: 97 % | DIASTOLIC BLOOD PRESSURE: 82 MMHG | HEART RATE: 68 BPM | BODY MASS INDEX: 36.64 KG/M2

## 2023-03-03 VITALS — WEIGHT: 247 LBS | HEIGHT: 69 IN | BODY MASS INDEX: 36.58 KG/M2

## 2023-03-03 DIAGNOSIS — M25.572 PAIN IN JOINT INVOLVING LEFT ANKLE AND FOOT: ICD-10-CM

## 2023-03-03 DIAGNOSIS — M96.0 PSEUDARTHROSIS AFTER FUSION OR ARTHRODESIS: ICD-10-CM

## 2023-03-03 DIAGNOSIS — Z01.818 PREOP TESTING: ICD-10-CM

## 2023-03-03 DIAGNOSIS — T84.84XA PAINFUL ORTHOPAEDIC HARDWARE: ICD-10-CM

## 2023-03-03 DIAGNOSIS — M14.672 CHARCOT'S JOINT OF FOOT, LEFT: ICD-10-CM

## 2023-03-03 DIAGNOSIS — I51.7 CARDIOMEGALY: ICD-10-CM

## 2023-03-03 DIAGNOSIS — E11.621 DIABETIC ULCER OF LEFT MIDFOOT ASSOCIATED WITH TYPE 2 DIABETES MELLITUS, WITH FAT LAYER EXPOSED: Primary | ICD-10-CM

## 2023-03-03 DIAGNOSIS — I10 ESSENTIAL HYPERTENSION: Primary | ICD-10-CM

## 2023-03-03 DIAGNOSIS — E78.2 MIXED HYPERLIPIDEMIA: ICD-10-CM

## 2023-03-03 DIAGNOSIS — E11.49 TYPE II DIABETES MELLITUS WITH NEUROLOGICAL MANIFESTATIONS: ICD-10-CM

## 2023-03-03 DIAGNOSIS — L97.422 DIABETIC ULCER OF LEFT MIDFOOT ASSOCIATED WITH TYPE 2 DIABETES MELLITUS, WITH FAT LAYER EXPOSED: Primary | ICD-10-CM

## 2023-03-03 DIAGNOSIS — M19.072 OSTEOARTHRITIS OF LEFT ANKLE OR FOOT: ICD-10-CM

## 2023-03-03 DIAGNOSIS — M14.672 CHARCOT'S JOINT OF ANKLE, LEFT: ICD-10-CM

## 2023-03-03 DIAGNOSIS — R94.39 ABNORMAL NUCLEAR STRESS TEST: ICD-10-CM

## 2023-03-03 PROCEDURE — 4010F ACE/ARB THERAPY RXD/TAKEN: CPT | Mod: HCNC,CPTII,S$GLB, | Performed by: INTERNAL MEDICINE

## 2023-03-03 PROCEDURE — 99214 OFFICE O/P EST MOD 30 MIN: CPT | Mod: 25,HCNC,S$GLB, | Performed by: PODIATRIST

## 2023-03-03 PROCEDURE — 3288F PR FALLS RISK ASSESSMENT DOCUMENTED: ICD-10-PCS | Mod: HCNC,CPTII,S$GLB, | Performed by: PODIATRIST

## 2023-03-03 PROCEDURE — 99999 PR PBB SHADOW E&M-EST. PATIENT-LVL IV: ICD-10-PCS | Mod: PBBFAC,HCNC,, | Performed by: PODIATRIST

## 2023-03-03 PROCEDURE — 99999 PR PBB SHADOW E&M-EST. PATIENT-LVL V: ICD-10-PCS | Mod: PBBFAC,HCNC,, | Performed by: INTERNAL MEDICINE

## 2023-03-03 PROCEDURE — 1125F AMNT PAIN NOTED PAIN PRSNT: CPT | Mod: HCNC,CPTII,S$GLB, | Performed by: INTERNAL MEDICINE

## 2023-03-03 PROCEDURE — 3288F FALL RISK ASSESSMENT DOCD: CPT | Mod: HCNC,CPTII,S$GLB, | Performed by: INTERNAL MEDICINE

## 2023-03-03 PROCEDURE — 3008F PR BODY MASS INDEX (BMI) DOCUMENTED: ICD-10-PCS | Mod: HCNC,CPTII,S$GLB, | Performed by: PODIATRIST

## 2023-03-03 PROCEDURE — 1160F PR REVIEW ALL MEDS BY PRESCRIBER/CLIN PHARMACIST DOCUMENTED: ICD-10-PCS | Mod: HCNC,CPTII,S$GLB, | Performed by: PODIATRIST

## 2023-03-03 PROCEDURE — 99214 OFFICE O/P EST MOD 30 MIN: CPT | Mod: HCNC,S$GLB,, | Performed by: INTERNAL MEDICINE

## 2023-03-03 PROCEDURE — 11042 DBRDMT SUBQ TIS 1ST 20SQCM/<: CPT | Mod: HCNC,S$GLB,, | Performed by: PODIATRIST

## 2023-03-03 PROCEDURE — 1160F RVW MEDS BY RX/DR IN RCRD: CPT | Mod: HCNC,CPTII,S$GLB, | Performed by: PODIATRIST

## 2023-03-03 PROCEDURE — 4010F PR ACE/ARB THEARPY RXD/TAKEN: ICD-10-PCS | Mod: HCNC,CPTII,S$GLB, | Performed by: PODIATRIST

## 2023-03-03 PROCEDURE — 1159F PR MEDICATION LIST DOCUMENTED IN MEDICAL RECORD: ICD-10-PCS | Mod: HCNC,CPTII,S$GLB, | Performed by: PODIATRIST

## 2023-03-03 PROCEDURE — 99999 PR PBB SHADOW E&M-EST. PATIENT-LVL V: CPT | Mod: PBBFAC,HCNC,, | Performed by: INTERNAL MEDICINE

## 2023-03-03 PROCEDURE — 3077F SYST BP >= 140 MM HG: CPT | Mod: HCNC,CPTII,S$GLB, | Performed by: INTERNAL MEDICINE

## 2023-03-03 PROCEDURE — 1125F PR PAIN SEVERITY QUANTIFIED, PAIN PRESENT: ICD-10-PCS | Mod: HCNC,CPTII,S$GLB, | Performed by: INTERNAL MEDICINE

## 2023-03-03 PROCEDURE — 3008F BODY MASS INDEX DOCD: CPT | Mod: HCNC,CPTII,S$GLB, | Performed by: PODIATRIST

## 2023-03-03 PROCEDURE — 99999 PR PBB SHADOW E&M-EST. PATIENT-LVL IV: CPT | Mod: PBBFAC,HCNC,, | Performed by: PODIATRIST

## 2023-03-03 PROCEDURE — 3008F PR BODY MASS INDEX (BMI) DOCUMENTED: ICD-10-PCS | Mod: HCNC,CPTII,S$GLB, | Performed by: INTERNAL MEDICINE

## 2023-03-03 PROCEDURE — 1101F PR PT FALLS ASSESS DOC 0-1 FALLS W/OUT INJ PAST YR: ICD-10-PCS | Mod: HCNC,CPTII,S$GLB, | Performed by: PODIATRIST

## 2023-03-03 PROCEDURE — 1101F PT FALLS ASSESS-DOCD LE1/YR: CPT | Mod: HCNC,CPTII,S$GLB, | Performed by: INTERNAL MEDICINE

## 2023-03-03 PROCEDURE — 3079F PR MOST RECENT DIASTOLIC BLOOD PRESSURE 80-89 MM HG: ICD-10-PCS | Mod: HCNC,CPTII,S$GLB, | Performed by: INTERNAL MEDICINE

## 2023-03-03 PROCEDURE — 99214 PR OFFICE/OUTPT VISIT, EST, LEVL IV, 30-39 MIN: ICD-10-PCS | Mod: 25,HCNC,S$GLB, | Performed by: PODIATRIST

## 2023-03-03 PROCEDURE — 3008F BODY MASS INDEX DOCD: CPT | Mod: HCNC,CPTII,S$GLB, | Performed by: INTERNAL MEDICINE

## 2023-03-03 PROCEDURE — 87070 CULTURE OTHR SPECIMN AEROBIC: CPT | Mod: HCNC | Performed by: PODIATRIST

## 2023-03-03 PROCEDURE — 3077F PR MOST RECENT SYSTOLIC BLOOD PRESSURE >= 140 MM HG: ICD-10-PCS | Mod: HCNC,CPTII,S$GLB, | Performed by: INTERNAL MEDICINE

## 2023-03-03 PROCEDURE — 4010F ACE/ARB THERAPY RXD/TAKEN: CPT | Mod: HCNC,CPTII,S$GLB, | Performed by: PODIATRIST

## 2023-03-03 PROCEDURE — 87077 CULTURE AEROBIC IDENTIFY: CPT | Mod: 59,HCNC | Performed by: PODIATRIST

## 2023-03-03 PROCEDURE — 1159F MED LIST DOCD IN RCRD: CPT | Mod: HCNC,CPTII,S$GLB, | Performed by: PODIATRIST

## 2023-03-03 PROCEDURE — 1159F MED LIST DOCD IN RCRD: CPT | Mod: HCNC,CPTII,S$GLB, | Performed by: INTERNAL MEDICINE

## 2023-03-03 PROCEDURE — 1159F PR MEDICATION LIST DOCUMENTED IN MEDICAL RECORD: ICD-10-PCS | Mod: HCNC,CPTII,S$GLB, | Performed by: INTERNAL MEDICINE

## 2023-03-03 PROCEDURE — 1101F PR PT FALLS ASSESS DOC 0-1 FALLS W/OUT INJ PAST YR: ICD-10-PCS | Mod: HCNC,CPTII,S$GLB, | Performed by: INTERNAL MEDICINE

## 2023-03-03 PROCEDURE — 3079F DIAST BP 80-89 MM HG: CPT | Mod: HCNC,CPTII,S$GLB, | Performed by: INTERNAL MEDICINE

## 2023-03-03 PROCEDURE — 4010F PR ACE/ARB THEARPY RXD/TAKEN: ICD-10-PCS | Mod: HCNC,CPTII,S$GLB, | Performed by: INTERNAL MEDICINE

## 2023-03-03 PROCEDURE — 3288F FALL RISK ASSESSMENT DOCD: CPT | Mod: HCNC,CPTII,S$GLB, | Performed by: PODIATRIST

## 2023-03-03 PROCEDURE — 99214 PR OFFICE/OUTPT VISIT, EST, LEVL IV, 30-39 MIN: ICD-10-PCS | Mod: HCNC,S$GLB,, | Performed by: INTERNAL MEDICINE

## 2023-03-03 PROCEDURE — 87186 SC STD MICRODIL/AGAR DIL: CPT | Mod: 59,HCNC | Performed by: PODIATRIST

## 2023-03-03 PROCEDURE — 11042 PR DEBRIDEMENT, SKIN, SUB-Q TISSUE,=<20 SQ CM: ICD-10-PCS | Mod: HCNC,S$GLB,, | Performed by: PODIATRIST

## 2023-03-03 PROCEDURE — 3288F PR FALLS RISK ASSESSMENT DOCUMENTED: ICD-10-PCS | Mod: HCNC,CPTII,S$GLB, | Performed by: INTERNAL MEDICINE

## 2023-03-03 PROCEDURE — 1101F PT FALLS ASSESS-DOCD LE1/YR: CPT | Mod: HCNC,CPTII,S$GLB, | Performed by: PODIATRIST

## 2023-03-03 NOTE — PROGRESS NOTES
Subjective:       Patient ID: Kay Galarza is a 67 y.o. female.    Chief Complaint: Wound Check (Wound to left foot, rates pain 9/10, wears surgical shoe with sock and dressing, diabetic Pt, PCP Dr. Abebe)      HPI: Kay Galarza presents to the clinic today, for evaluation and treatment concerning an ulceration at the plantar LLE 1st MTPJ. She states no infection. Recent CT and NM Scan was negative for OM at the area. Patient's Primary Care Provider is Lucy Negron MD. The PMHx. does include DM w/ peripheral neuropathy, venous insufficiency and acquired foot/ankle deformity/deformities.     Patient is WB with normal shoe gear at present. Patient interested in surgery to remove painful medial column hardware and to fusion the STJ and possible CC joint. I have advised against revision surgery, given that her foot is stable at present and her arthritic and pseudoarthritic pains are not overly severe.    Patient has also been non-compliant with DM shoe gear and CAM Walker and CROW Walker. She is frustrated with the shape of her foot and the mild abduction.     She did have recent LHC a few weeks ago, w/o ailment.     Her son is present at this time. States DMII control.     Does have [facility].     Hemoglobin A1C   Date Value Ref Range Status   12/07/2022 7.6 (H) 4.0 - 5.6 % Final     Comment:     ADA Screening Guidelines:  5.7-6.4%  Consistent with prediabetes  >or=6.5%  Consistent with diabetes    High levels of fetal hemoglobin interfere with the HbA1C  assay. Heterozygous hemoglobin variants (HbS, HgC, etc)do  not significantly interfere with this assay.   However, presence of multiple variants may affect accuracy.     05/25/2022 7.7 (H) 4.0 - 5.6 % Final     Comment:     ADA Screening Guidelines:  5.7-6.4%  Consistent with prediabetes  >or=6.5%  Consistent with diabetes    High levels of fetal hemoglobin interfere with the HbA1C  assay. Heterozygous hemoglobin  variants (HbS, HgC, etc)do  not significantly interfere with this assay.   However, presence of multiple variants may affect accuracy.     01/26/2022 >14.0 (H) 4.0 - 5.6 % Final     Comment:     ADA Screening Guidelines:  5.7-6.4%  Consistent with prediabetes  >or=6.5%  Consistent with diabetes    High levels of fetal hemoglobin interfere with the HbA1C  assay. Heterozygous hemoglobin variants (HbS, HgC, etc)do  not significantly interfere with this assay.   However, presence of multiple variants may affect accuracy.         Review of patient's allergies indicates:   Allergen Reactions    Pollen extracts      Seasonal allergies, sneezing       Past Medical History:   Diagnosis Date    Abnormal nuclear stress test 1/23/2023    Arthritis     DERIAN KNEES    Asthma     SEASONAL    Cataract     Diabetes mellitus     Diabetes mellitus, type 2     Diabetic retinopathy     DM (diabetes mellitus) 2007    BS 97 09/29/2020    Hyperlipidemia     Hypertension        Family History   Problem Relation Age of Onset    Cancer Father         Prostate Ca    Hypertension Father     Stroke Father     Diabetes Sister     Glaucoma Mother     Hypertension Mother     Glaucoma Maternal Grandmother     Blindness Neg Hx     Macular degeneration Neg Hx     Retinal detachment Neg Hx     Strabismus Neg Hx        Social History     Socioeconomic History    Marital status:    Tobacco Use    Smoking status: Never    Smokeless tobacco: Never   Substance and Sexual Activity    Alcohol use: Never    Drug use: No    Sexual activity: Yes     Social Determinants of Health     Financial Resource Strain: Unknown    Difficulty of Paying Living Expenses: Patient refused   Food Insecurity: Unknown    Worried About Running Out of Food in the Last Year: Patient refused    Ran Out of Food in the Last Year: Patient refused   Transportation Needs: Unknown    Lack of Transportation (Medical): Patient refused    Lack of Transportation (Non-Medical): Patient  refused   Physical Activity: Unknown    Days of Exercise per Week: Patient refused    Minutes of Exercise per Session: 10 min   Stress: Unknown    Feeling of Stress : Patient refused   Social Connections: Unknown    Frequency of Communication with Friends and Family: Patient refused    Frequency of Social Gatherings with Friends and Family: Patient refused    Active Member of Clubs or Organizations: Patient refused    Attends Club or Organization Meetings: Patient refused    Marital Status: Patient refused   Housing Stability: Unknown    Unable to Pay for Housing in the Last Year: Patient refused    Unstable Housing in the Last Year: Patient refused       Past Surgical History:   Procedure Laterality Date    APPLICATION OF WOUND VACUUM-ASSISTED CLOSURE DEVICE Left 8/18/2020    Procedure: APPLICATION, WOUND VAC;  Surgeon: Kirt Gray DPM;  Location: [facility] OR;  Service: Podiatry;  Laterality: Left;    CATARACT EXTRACTION W/  INTRAOCULAR LENS IMPLANT Right 07/18/2018    CATARACT EXTRACTION W/  INTRAOCULAR LENS IMPLANT Left 03/13/2019    COLONOSCOPY N/A 12/22/2018    Procedure: COLONOSCOPY;  Surgeon: Zak Dover MD;  Location: [facility] ENDO;  Service: Endoscopy;  Laterality: N/A;    COLONOSCOPY N/A 5/11/2022    Procedure: COLONOSCOPY;  Surgeon: Charles Berrios MD;  Location: [facility] ENDO;  Service: Endoscopy;  Laterality: N/A;    ENDOSCOPIC VEIN LASER TREATMENT Bilateral 1990's    FIXATION OF SYNDESMOSIS OF ANKLE Left 7/23/2020    Procedure: FIXATION, SYNDESMOSIS, ANKLE;  Surgeon: Kirt Gray DPM;  Location: [facility] OR;  Service: Podiatry;  Laterality: Left;    LEFT HEART CATHETERIZATION Left 1/24/2023    Procedure: CATHETERIZATION, HEART, LEFT;  Surgeon: Salma Bonilla MD;  Location: [facility] CATH LAB;  Service: Cardiology;  Laterality: Left;  pt given 130 start time    MANIPULATION WITH ANESTHESIA Left 7/23/2020    Procedure: MANIPULATION, WITH ANESTHESIA;  Surgeon: Kirt Gray DPM;  Location: [facility] OR;   "Service: Podiatry;  Laterality: Left;    MIDFOOT ARTHRODESIS Left 7/23/2020    Procedure: FUSION, JOINT, MIDFOOT;  Surgeon: Kirt Gray DPM;  Location: [facility] OR;  Service: Podiatry;  Laterality: Left;  2nd tarsometatarsal joint dislocation via fusion    OPEN REDUCTION AND INTERNAL FIXATION (ORIF) OF INJURY OF ANKLE Left 7/23/2020    Procedure: ORIF, ANKLE;  Surgeon: Kirt Gray DPM;  Location: [facility] OR;  Service: Podiatry;  Laterality: Left;    RECONSTRUCTION WITH FUSION OF CHARCOT FOOT Left 8/18/2020    Procedure: RECONSTRUCTION, CHARCOT FOOT, WITH FUSION;  Surgeon: Kirt Gray DPM;  Location: [facility] OR;  Service: Podiatry;  Laterality: Left;    REMOVAL OF HARDWARE FROM LOWER EXTREMITY Left 1/27/2021    Procedure: REMOVAL, HARDWARE, LOWER EXTREMITY;  Surgeon: Kirt Gray DPM;  Location: [facility] OR;  Service: Podiatry;  Laterality: Left;    WOUND DEBRIDEMENT Left 8/18/2020    Procedure: DEBRIDEMENT, WOUND;  Surgeon: Kirt Gray DPM;  Location: [facility] OR;  Service: Podiatry;  Laterality: Left;       Review of Systems   Constitutional:  Negative for chills, fatigue and fever.   HENT:  Negative for hearing loss.    Eyes:  Negative for photophobia and visual disturbance.   Respiratory:  Negative for cough, chest tightness, shortness of breath and wheezing.    Cardiovascular:  Positive for leg swelling. Negative for chest pain and palpitations.   Gastrointestinal:  Negative for constipation, diarrhea, nausea and vomiting.   Endocrine: Negative for cold intolerance and heat intolerance.   Genitourinary:  Negative for flank pain.   Musculoskeletal:  Positive for gait problem. Negative for neck pain and neck stiffness.   Skin:  Positive for wound. Negative for color change.   Neurological:  Positive for numbness. Negative for light-headedness and headaches.   Psychiatric/Behavioral:  Negative for sleep disturbance.                 Objective:   Ht 5' 9" (1.753 m)   Wt 112 kg (247 lb)   BMI 36.48 kg/m² "     LOWER EXTREMITY PHYSICAL EXAMINATION    VASCULAR: LLE DP and PT are 2/4. CFT is WNL. Hair growth is noted. Edema is noted to the LE.     NEUROLOGY: Sensation to light touch is intact. Proprioception is intact. Sensation to pin prick is reduced to absent. Vibratory sensation is diminished to the left lower extremity. Examination with 5.07 [place] Arnie monofilament reveals that protective sensation is not intact to the left plantar surfaces of the foot and digits, as the patient has no sensation/detection at greater than 4 distinct points of contact.     ORTHOPEDIC: Palpable tibial sesamoid is noted, LLE. No equinus is noted. Mild hindfoot and midfoot edema. Pes planus, LLE. Pains to palpation of the hindfoot and midfoot. Pains to the TN and medial column are mild. Pains to the STJ and AM and AL gutters are severe. No crepitus is noted. No rockerbottom foot type is noted. Abducted midfoot is noted.     DERMATOLOGY: There is an ulceration at the plantar left 1st MTPJ.  The area measures 2.50 cm x 1.50 cm x 0.20 cm.  Granulation tissue noted.  No probe to bone or osseous exposure.  No malodor.  No drainage.  No periwound erythema is noted.      Assessment:     1. Diabetic ulcer of left midfoot associated with type 2 diabetes mellitus, with fat layer exposed    2. Charcot's joint of foot, left    3. Charcot's joint of ankle, left    4. Osteoarthritis of left ankle or foot    5. Pain in joint involving left ankle and foot    6. Pseudarthrosis after fusion or arthrodesis    7. Painful orthopaedic hardware    8. Type II diabetes mellitus with neurological manifestations          Plan:     Diabetic ulcer of left midfoot associated with type 2 diabetes mellitus, with fat layer exposed  -     Aerobic culture (Specify Source)  -     SUBSEQUENT HOME HEALTH ORDERS    Charcot's joint of foot, left    Charcot's joint of ankle, left    Osteoarthritis of left ankle or foot    Pain in joint involving left ankle and  foot    Pseudarthrosis after fusion or arthrodesis    Painful orthopaedic hardware    Type II diabetes mellitus with neurological manifestations       The wound was surgically debrided after adequate prep with alcohol and/or betadine paint. Excisional wound debridement was performed using sharp #10/#15 blade/rounded scalpel and tissue nipper, with removal of all non-viable skin and soft tissues; necrotic skin/tissue formation. The woundbase/wound bed was also debrided to encourage bleeding as to promote/stimulate healing. Debridement was excisional and included epidermal, dermal and subcutaneous tissues. Post debridement measurements are as above. Hemostasis was achieved. Patient tolerated procedure well and without complications. Local woundcare with collagen dressings and bandage thereafter.     [facility].    The procedure of (KAYLA, LLE, with STJ fusion and possible CC fusion with application of Ex-Fix w/ possible sesamiodectomy) was thoroughly explained to the patient. Its necessity and/or purpose and the implications therein were outlined, including any pertinent advantages and/or disadvantages, and possible complications, if any. Possible complications include recurrence of pathology and/or deformity, infection (cellulitis, drainage, purulence, malodor, etc...), pain, numbness, neuritis, edema, burning, loss of function, need for further surgery, possible need for removal of any implanted hardware, soft tissue contracture and/or scarring, etc... No guarantees were given and/or implied. Post-operative expectations and weightbearing protocol is thoroughly explained to the patient, who acknowledges understanding.     She is pending Cardiology clearance with Dr. Chavez.         Future Appointments   Date Time Provider Department Center   6/12/2023  2:20 PM Antione Chavez MD [facility] [place]

## 2023-03-03 NOTE — PROGRESS NOTES
Subjective:   Patient ID:  Kay Galarza is a 67 y.o. female who presents for follow-up of No chief complaint on file.    This is a pleasant 65-year-old female who is here for preop evaluation.  The patient has a history of diabetes, hypertension, and asthma history.  Patient has had no exertional symptoms.  Patient denies chest pain, angina or symptoms associated with anginal equivalent.  Prior EKG done on January 19, 2021 revealed slight prolongation of the QT interval.  Repeat EKG will be done today.  Patient has had no symptoms lightheadedness dizziness no syncope or near syncopal episodes.  This pleasant patient here for preop evaluation for foot surgery.  Patient has significant ankle and foot pain.  No chest discomfort has been noted.     Patient is here post podiatry surgery.  Doing well this time no recurrence of chest pain shortness breath there is evidence aortic insufficiency from echo 2 years ago will repeat another echo this spring.  Otherwise clinically stable this time.  Follow-up evaluation after echos performed.  No evidence of heart failure or edema today.  Presents in the office with episodic chest discomfort as well for this reason will go ahead with a nuclear stress test.  No acute EKG change with exception of several PVCs which is patient states that occasionally she feels but no lightheadedness or dizziness.  No edema today.  Of note the patient blood sugars have been quite elevated this needs to be addressed.        01/10/2023:   Overall this patient is now having surgery on her foot.  Last year's preop evaluation did not get completed she did not have surgery she had an ulcer on her foot and at this time the ulcer on the foot is healed.  The patient also had control her blood sugars last blood sugar 109.  Hemoglobin A1c is improved and she is stable.  The patient still has intermittent chest discomfort for this reason will go ahead with a nuclear stress test given the fact we  can not observe her overall exercise endurance.  EKGs remained stable normal sinus rhythm with intermittent PVCs.  No syncope or lightheadedness noted the daughter was present today.     03/03/2023:   Overall patient doing well heart catheterization showed no evidence of cardiac ischemia.  The patient has normal coronary anatomy.  The patient will be planned now to have foot surgery and even though she has intermittent chest discomfort shortness of breath she is stable heart function is normal and she is otherwise doing well.      Review of Systems   Constitutional: Negative for chills, diaphoresis, night sweats, weight gain and weight loss.   HENT:  Negative for congestion, hoarse voice, sore throat and stridor.    Eyes:  Negative for double vision and pain.   Cardiovascular:  Negative for chest pain, claudication, cyanosis, dyspnea on exertion, irregular heartbeat, leg swelling, near-syncope, orthopnea, palpitations, paroxysmal nocturnal dyspnea and syncope.   Respiratory:  Negative for cough, hemoptysis, shortness of breath, sleep disturbances due to breathing, snoring, sputum production and wheezing.    Endocrine: Negative for cold intolerance, heat intolerance and polydipsia.   Hematologic/Lymphatic: Negative for bleeding problem. Does not bruise/bleed easily.   Skin:  Negative for color change, dry skin and rash.   Musculoskeletal:  Negative for joint swelling and muscle cramps.   Gastrointestinal:  Negative for bloating, abdominal pain, constipation, diarrhea, dysphagia, melena, nausea and vomiting.   Genitourinary:  Negative for flank pain and urgency.   Neurological:  Negative for dizziness, focal weakness, headaches, light-headedness, loss of balance, seizures and weakness.   Psychiatric/Behavioral:  Negative for altered mental status and memory loss. The patient is not nervous/anxious.    Family History   Problem Relation Age of Onset    Cancer Father         Prostate Ca    Hypertension Father     Stroke  "Father     Diabetes Sister     Glaucoma Mother     Hypertension Mother     Glaucoma Maternal Grandmother     Blindness Neg Hx     Macular degeneration Neg Hx     Retinal detachment Neg Hx     Strabismus Neg Hx      Past Medical History:   Diagnosis Date    Abnormal nuclear stress test 1/23/2023    Arthritis     DERIAN KNEES    Asthma     SEASONAL    Cataract     Diabetes mellitus     Diabetes mellitus, type 2     Diabetic retinopathy     DM (diabetes mellitus) 2007    BS 97 09/29/2020    Hyperlipidemia     Hypertension      Social History     Socioeconomic History    Marital status:    Tobacco Use    Smoking status: Never    Smokeless tobacco: Never   Substance and Sexual Activity    Alcohol use: Never    Drug use: No    Sexual activity: Yes     Current Outpatient Medications on File Prior to Visit   Medication Sig Dispense Refill    ACCU-CHEK GUIDE TEST STRIPS Strp TO CHECK BG 2 TIMES DAILY, TO USE WITH INSURANCE PREFERRED METER 200 strip 3    acetaminophen (TYLENOL) 500 MG tablet Take 500 mg by mouth every 6 (six) hours as needed for Pain.      albuterol (PROVENTIL/VENTOLIN HFA) 90 mcg/actuation inhaler INHALE 2 PUFFS INTO THE LUNGS EVERY 6  HOURS AS NEEDED FOR WHEEZING. RESCUE 54 g 3    atorvastatin (LIPITOR) 40 MG tablet TAKE 1 TABLET BY MOUTH ONCE DAILY. 90 tablet 1    B infantis/B ani/B koby/B bifid (PROBIOTIC 4X ORAL) Take by mouth Daily.      blood-glucose meter kit To check BG 2 times daily, to use with insurance preferred meter 1 each 0    clobetasoL (TEMOVATE) 0.05 % external solution APPLY TOPICALLY 2 TIMES DAILY TO SCALP ONLY 50 mL PRN    EASY TOUCH 31 gauge x 3/16" Ndle       FARXIGA 10 mg tablet TAKE 1 TABLET (10 MG TOTAL) BY MOUTH ONCE DAILY. 90 tablet 3    fluocinolone and shower cap 0.01 % Oil       fluticasone propionate (FLONASE) 50 mcg/actuation nasal spray 2 sprays (100 mcg total) by Each Nostril route once daily. 16 g 0    gabapentin (NEURONTIN) 100 MG capsule TAKE 1 CAPSULE  BY MOUTH 3  " "TIMES DAILY. 90 capsule 0    gabapentin (NEURONTIN) 100 MG capsule TAKE 1 CAPSULE  BY MOUTH 3  TIMES DAILY. 90 capsule 0    glimepiride (AMARYL) 4 MG tablet TAKE 1 TABLET  BY MOUTH DAILY WITH BREAKFAST. 90 tablet 1    insulin glargine U-300 conc (TOUJEO MAX U-300 SOLOSTAR) 300 unit/mL (3 mL) insulin pen Inject 70 Units into the skin every evening. 4 pen 5    ketoconazole (NIZORAL) 2 % shampoo APPLY TOPICALLY 3 TIMES A WEEK. 120 mL 3    lancets Misc To check BG 2 times daily, to use with insurance preferred meter 180 each 0    levocetirizine (XYZAL) 5 MG tablet TAKE 1 TABLET (5 MG TOTAL) BY MOUTH EVERY EVENING. 90 tablet 3    lisinopriL-hydrochlorothiazide (PRINZIDE,ZESTORETIC) 20-25 mg Tab TAKE 1 TABLET BY MOUTH ONCE DAILY. 90 tablet PRN    magnesium oxide 200 mg magnesium Tab TAKE 3 TABLETS  BY MOUTH EVERY EVENING. 120 tablet PRN    NOVOLOG FLEXPEN U-100 INSULIN 100 unit/mL (3 mL) InPn pen Inject 18 Units into the skin 3 (three) times daily before meals. 30 mL 5    omeprazole (PRILOSEC) 20 MG capsule TAKE 1 CAPSULE BY MOUTH TWICE A DAY. 180 capsule 3    pen needle, diabetic (BD ULTRA-FINE SHORT PEN NEEDLE) 31 gauge x 5/16" Ndle Use with Toujeo once daily and Novolog 3 times daily 200 each 5    polyethylene glycol (GLYCOLAX) 17 gram PwPk Take 17 g by mouth once daily. 30 packet 0    traZODone (DESYREL) 150 MG tablet TAKE 1 TABLET  BY MOUTH NIGHTLY AS NEEDED FOR INSOMNIA. 90 tablet PRN    VITAMIN D2 1,250 mcg (50,000 unit) capsule TAKE 1 CAPSULE  BY MOUTH ONCE A WEEK FOR 4 DOSES 4 capsule PRN     Current Facility-Administered Medications on File Prior to Visit   Medication Dose Route Frequency Provider Last Rate Last Admin    diazePAM tablet 5 mg  5 mg Oral On Call Procedure Salma Bonilla MD   5 mg at 01/24/23 1031    diphenhydrAMINE injection 25 mg  25 mg Intravenous Q6H PRN Dharemsh Macias MD        sodium chloride 0.9% flush 10 mL  10 mL Intravenous PRN Salma Bonilla MD         Review of patient's " allergies indicates:   Allergen Reactions    Pollen extracts      Seasonal allergies, sneezing       Objective:     Physical Exam  Eyes:      Pupils: Pupils are equal, round, and reactive to light.   Neck:      Trachea: No tracheal deviation.   Cardiovascular:      Rate and Rhythm: Normal rate and regular rhythm.      Pulses: Intact distal pulses.           Carotid pulses are 2+ on the right side and 2+ on the left side.       Radial pulses are 2+ on the right side and 2+ on the left side.        Femoral pulses are 2+ on the right side and 2+ on the left side.       Popliteal pulses are 2+ on the right side and 2+ on the left side.        Dorsalis pedis pulses are 2+ on the right side and 2+ on the left side.        Posterior tibial pulses are 2+ on the right side and 2+ on the left side.      Heart sounds: Normal heart sounds. No murmur heard.    No friction rub. No gallop.   Pulmonary:      Effort: Pulmonary effort is normal. No respiratory distress.      Breath sounds: Normal breath sounds. No stridor. No wheezing or rales.   Chest:      Chest wall: No tenderness.   Abdominal:      General: There is no distension.      Tenderness: There is no abdominal tenderness. There is no rebound.   Musculoskeletal:         General: No tenderness.      Cervical back: Normal range of motion.   Skin:     General: Skin is warm and dry.   Neurological:      Mental Status: She is alert and oriented to person, place, and time.       Left heart catheterization 01/24/2023:    The ejection fraction was calculated to be 60%.    The pre-procedure left ventricular end diastolic pressure was 19.    The post-procedure left ventricular end diastolic pressure was 21.    The estimated blood loss was none.    The coronary arteries were normal..    01/18/2023    Equivocal myocardial perfusion scan.    There is a moderate to severe intensity, large sized, mostly fixed perfusion abnormality with some reversibilty in the anterolateral wall(s):  breast attenuation defect noted on image review; however there is reversibility as well suggestive of possible significant coronary ischemia.  Clinical correlation strongly advised.    There are no other significant perfusion abnormalities.    The gated perfusion images showed an ejection fraction of 63% at rest. The gated perfusion images showed an ejection fraction of 66% post stress.    The ECG portion of the study is negative for ischemia.    During stress, occasional PVCs are noted.     Assessment:     1. Essential hypertension    2. Mixed hyperlipidemia    3. Cardiomegaly    4. Abnormal nuclear stress test    5.      Preop evaluation    Plan:     Essential hypertension    Mixed hyperlipidemia    Cardiomegaly    Abnormal nuclear stress test    Impression 1 hypertension stable current dose of medications including lisinopril   2. Mixed hyperlipidemia stable    3 chest pain resolved  4. Preop evaluation patient is stable for upcoming foot surgery and heart catheterization showed no evidence of cardiac ischemia no blockages noted.  The patient is cleared for upcoming surgery at low cardiac risk giving normal coronary anatomy.

## 2023-03-03 NOTE — H&P (VIEW-ONLY)
Subjective:   Patient ID:  Kay Galarza is a 67 y.o. female who presents for follow-up of No chief complaint on file.    This is a pleasant 65-year-old female who is here for preop evaluation.  The patient has a history of diabetes, hypertension, and asthma history.  Patient has had no exertional symptoms.  Patient denies chest pain, angina or symptoms associated with anginal equivalent.  Prior EKG done on January 19, 2021 revealed slight prolongation of the QT interval.  Repeat EKG will be done today.  Patient has had no symptoms lightheadedness dizziness no syncope or near syncopal episodes.  This pleasant patient here for preop evaluation for foot surgery.  Patient has significant ankle and foot pain.  No chest discomfort has been noted.     Patient is here post podiatry surgery.  Doing well this time no recurrence of chest pain shortness breath there is evidence aortic insufficiency from echo 2 years ago will repeat another echo this spring.  Otherwise clinically stable this time.  Follow-up evaluation after echos performed.  No evidence of heart failure or edema today.  Presents in the office with episodic chest discomfort as well for this reason will go ahead with a nuclear stress test.  No acute EKG change with exception of several PVCs which is patient states that occasionally she feels but no lightheadedness or dizziness.  No edema today.  Of note the patient blood sugars have been quite elevated this needs to be addressed.        01/10/2023:   Overall this patient is now having surgery on her foot.  Last year's preop evaluation did not get completed she did not have surgery she had an ulcer on her foot and at this time the ulcer on the foot is healed.  The patient also had control her blood sugars last blood sugar 109.  Hemoglobin A1c is improved and she is stable.  The patient still has intermittent chest discomfort for this reason will go ahead with a nuclear stress test given the fact we  can not observe her overall exercise endurance.  EKGs remained stable normal sinus rhythm with intermittent PVCs.  No syncope or lightheadedness noted the daughter was present today.     03/03/2023:   Overall patient doing well heart catheterization showed no evidence of cardiac ischemia.  The patient has normal coronary anatomy.  The patient will be planned now to have foot surgery and even though she has intermittent chest discomfort shortness of breath she is stable heart function is normal and she is otherwise doing well.      Review of Systems   Constitutional: Negative for chills, diaphoresis, night sweats, weight gain and weight loss.   HENT:  Negative for congestion, hoarse voice, sore throat and stridor.    Eyes:  Negative for double vision and pain.   Cardiovascular:  Negative for chest pain, claudication, cyanosis, dyspnea on exertion, irregular heartbeat, leg swelling, near-syncope, orthopnea, palpitations, paroxysmal nocturnal dyspnea and syncope.   Respiratory:  Negative for cough, hemoptysis, shortness of breath, sleep disturbances due to breathing, snoring, sputum production and wheezing.    Endocrine: Negative for cold intolerance, heat intolerance and polydipsia.   Hematologic/Lymphatic: Negative for bleeding problem. Does not bruise/bleed easily.   Skin:  Negative for color change, dry skin and rash.   Musculoskeletal:  Negative for joint swelling and muscle cramps.   Gastrointestinal:  Negative for bloating, abdominal pain, constipation, diarrhea, dysphagia, melena, nausea and vomiting.   Genitourinary:  Negative for flank pain and urgency.   Neurological:  Negative for dizziness, focal weakness, headaches, light-headedness, loss of balance, seizures and weakness.   Psychiatric/Behavioral:  Negative for altered mental status and memory loss. The patient is not nervous/anxious.    Family History   Problem Relation Age of Onset    Cancer Father         Prostate Ca    Hypertension Father     Stroke  "Father     Diabetes Sister     Glaucoma Mother     Hypertension Mother     Glaucoma Maternal Grandmother     Blindness Neg Hx     Macular degeneration Neg Hx     Retinal detachment Neg Hx     Strabismus Neg Hx      Past Medical History:   Diagnosis Date    Abnormal nuclear stress test 1/23/2023    Arthritis     DERIAN KNEES    Asthma     SEASONAL    Cataract     Diabetes mellitus     Diabetes mellitus, type 2     Diabetic retinopathy     DM (diabetes mellitus) 2007    BS 97 09/29/2020    Hyperlipidemia     Hypertension      Social History     Socioeconomic History    Marital status:    Tobacco Use    Smoking status: Never    Smokeless tobacco: Never   Substance and Sexual Activity    Alcohol use: Never    Drug use: No    Sexual activity: Yes     Current Outpatient Medications on File Prior to Visit   Medication Sig Dispense Refill    ACCU-CHEK GUIDE TEST STRIPS Strp TO CHECK BG 2 TIMES DAILY, TO USE WITH INSURANCE PREFERRED METER 200 strip 3    acetaminophen (TYLENOL) 500 MG tablet Take 500 mg by mouth every 6 (six) hours as needed for Pain.      albuterol (PROVENTIL/VENTOLIN HFA) 90 mcg/actuation inhaler INHALE 2 PUFFS INTO THE LUNGS EVERY 6  HOURS AS NEEDED FOR WHEEZING. RESCUE 54 g 3    atorvastatin (LIPITOR) 40 MG tablet TAKE 1 TABLET BY MOUTH ONCE DAILY. 90 tablet 1    B infantis/B ani/B koby/B bifid (PROBIOTIC 4X ORAL) Take by mouth Daily.      blood-glucose meter kit To check BG 2 times daily, to use with insurance preferred meter 1 each 0    clobetasoL (TEMOVATE) 0.05 % external solution APPLY TOPICALLY 2 TIMES DAILY TO SCALP ONLY 50 mL PRN    EASY TOUCH 31 gauge x 3/16" Ndle       FARXIGA 10 mg tablet TAKE 1 TABLET (10 MG TOTAL) BY MOUTH ONCE DAILY. 90 tablet 3    fluocinolone and shower cap 0.01 % Oil       fluticasone propionate (FLONASE) 50 mcg/actuation nasal spray 2 sprays (100 mcg total) by Each Nostril route once daily. 16 g 0    gabapentin (NEURONTIN) 100 MG capsule TAKE 1 CAPSULE  BY MOUTH 3  " "TIMES DAILY. 90 capsule 0    gabapentin (NEURONTIN) 100 MG capsule TAKE 1 CAPSULE  BY MOUTH 3  TIMES DAILY. 90 capsule 0    glimepiride (AMARYL) 4 MG tablet TAKE 1 TABLET  BY MOUTH DAILY WITH BREAKFAST. 90 tablet 1    insulin glargine U-300 conc (TOUJEO MAX U-300 SOLOSTAR) 300 unit/mL (3 mL) insulin pen Inject 70 Units into the skin every evening. 4 pen 5    ketoconazole (NIZORAL) 2 % shampoo APPLY TOPICALLY 3 TIMES A WEEK. 120 mL 3    lancets Misc To check BG 2 times daily, to use with insurance preferred meter 180 each 0    levocetirizine (XYZAL) 5 MG tablet TAKE 1 TABLET (5 MG TOTAL) BY MOUTH EVERY EVENING. 90 tablet 3    lisinopriL-hydrochlorothiazide (PRINZIDE,ZESTORETIC) 20-25 mg Tab TAKE 1 TABLET BY MOUTH ONCE DAILY. 90 tablet PRN    magnesium oxide 200 mg magnesium Tab TAKE 3 TABLETS  BY MOUTH EVERY EVENING. 120 tablet PRN    NOVOLOG FLEXPEN U-100 INSULIN 100 unit/mL (3 mL) InPn pen Inject 18 Units into the skin 3 (three) times daily before meals. 30 mL 5    omeprazole (PRILOSEC) 20 MG capsule TAKE 1 CAPSULE BY MOUTH TWICE A DAY. 180 capsule 3    pen needle, diabetic (BD ULTRA-FINE SHORT PEN NEEDLE) 31 gauge x 5/16" Ndle Use with Toujeo once daily and Novolog 3 times daily 200 each 5    polyethylene glycol (GLYCOLAX) 17 gram PwPk Take 17 g by mouth once daily. 30 packet 0    traZODone (DESYREL) 150 MG tablet TAKE 1 TABLET  BY MOUTH NIGHTLY AS NEEDED FOR INSOMNIA. 90 tablet PRN    VITAMIN D2 1,250 mcg (50,000 unit) capsule TAKE 1 CAPSULE  BY MOUTH ONCE A WEEK FOR 4 DOSES 4 capsule PRN     Current Facility-Administered Medications on File Prior to Visit   Medication Dose Route Frequency Provider Last Rate Last Admin    diazePAM tablet 5 mg  5 mg Oral On Call Procedure Salma Bonilla MD   5 mg at 01/24/23 1031    diphenhydrAMINE injection 25 mg  25 mg Intravenous Q6H PRN Dharmesh Macias MD        sodium chloride 0.9% flush 10 mL  10 mL Intravenous PRN Salma Bonilla MD         Review of patient's " allergies indicates:   Allergen Reactions    Pollen extracts      Seasonal allergies, sneezing       Objective:     Physical Exam  Eyes:      Pupils: Pupils are equal, round, and reactive to light.   Neck:      Trachea: No tracheal deviation.   Cardiovascular:      Rate and Rhythm: Normal rate and regular rhythm.      Pulses: Intact distal pulses.           Carotid pulses are 2+ on the right side and 2+ on the left side.       Radial pulses are 2+ on the right side and 2+ on the left side.        Femoral pulses are 2+ on the right side and 2+ on the left side.       Popliteal pulses are 2+ on the right side and 2+ on the left side.        Dorsalis pedis pulses are 2+ on the right side and 2+ on the left side.        Posterior tibial pulses are 2+ on the right side and 2+ on the left side.      Heart sounds: Normal heart sounds. No murmur heard.    No friction rub. No gallop.   Pulmonary:      Effort: Pulmonary effort is normal. No respiratory distress.      Breath sounds: Normal breath sounds. No stridor. No wheezing or rales.   Chest:      Chest wall: No tenderness.   Abdominal:      General: There is no distension.      Tenderness: There is no abdominal tenderness. There is no rebound.   Musculoskeletal:         General: No tenderness.      Cervical back: Normal range of motion.   Skin:     General: Skin is warm and dry.   Neurological:      Mental Status: She is alert and oriented to person, place, and time.       Left heart catheterization 01/24/2023:    The ejection fraction was calculated to be 60%.    The pre-procedure left ventricular end diastolic pressure was 19.    The post-procedure left ventricular end diastolic pressure was 21.    The estimated blood loss was none.    The coronary arteries were normal..    01/18/2023    Equivocal myocardial perfusion scan.    There is a moderate to severe intensity, large sized, mostly fixed perfusion abnormality with some reversibilty in the anterolateral wall(s):  breast attenuation defect noted on image review; however there is reversibility as well suggestive of possible significant coronary ischemia.  Clinical correlation strongly advised.    There are no other significant perfusion abnormalities.    The gated perfusion images showed an ejection fraction of 63% at rest. The gated perfusion images showed an ejection fraction of 66% post stress.    The ECG portion of the study is negative for ischemia.    During stress, occasional PVCs are noted.     Assessment:     1. Essential hypertension    2. Mixed hyperlipidemia    3. Cardiomegaly    4. Abnormal nuclear stress test    5.      Preop evaluation    Plan:     Essential hypertension    Mixed hyperlipidemia    Cardiomegaly    Abnormal nuclear stress test    Impression 1 hypertension stable current dose of medications including lisinopril   2. Mixed hyperlipidemia stable    3 chest pain resolved  4. Preop evaluation patient is stable for upcoming foot surgery and heart catheterization showed no evidence of cardiac ischemia no blockages noted.  The patient is cleared for upcoming surgery at low cardiac risk giving normal coronary anatomy.

## 2023-03-07 DIAGNOSIS — L97.422 DIABETIC ULCER OF LEFT MIDFOOT ASSOCIATED WITH TYPE 2 DIABETES MELLITUS, WITH FAT LAYER EXPOSED: Primary | ICD-10-CM

## 2023-03-07 DIAGNOSIS — E11.621 DIABETIC ULCER OF LEFT MIDFOOT ASSOCIATED WITH TYPE 2 DIABETES MELLITUS, WITH FAT LAYER EXPOSED: Primary | ICD-10-CM

## 2023-03-07 LAB
BACTERIA SPEC AEROBE CULT: ABNORMAL
BACTERIA SPEC AEROBE CULT: ABNORMAL

## 2023-03-07 RX ORDER — LEVOFLOXACIN 750 MG/1
750 TABLET ORAL DAILY
Qty: 10 TABLET | Refills: 0 | Status: ON HOLD | OUTPATIENT
Start: 2023-03-07 | End: 2023-03-16 | Stop reason: SDUPTHER

## 2023-03-09 ENCOUNTER — TELEPHONE (OUTPATIENT)
Dept: PREADMISSION TESTING | Facility: HOSPITAL | Age: 68
End: 2023-03-09
Payer: MEDICARE

## 2023-03-09 NOTE — TELEPHONE ENCOUNTER
Pre op instructions reviewed with Pt's daughter,verbalized understanding.    To confirm, Surgery is scheduled on 3/13/23. We will call you late afternoon the business day prior to surgery with your arrival time.    *Please report to the [facility] Lobby (1st Floor) located off of [facility] (2nd Entrance/Building on the left, in front of the flag pole).  Address: [address] [place] [address] [place] [id number]        INSTRUCTIONS IMPORTANT!!!  Do Not Eat, Drink, or Smoke after 12 midnight unless instructed otherwise by your Surgeon. OK to brush teeth, no gum, candy or mints!      *Take Only these medications with a small sip of water Morning of Surgery:  Albuterol inhaler  Prilosec      ____  HOLD all vitamins, herbal supplements, aspirin products & NSAIDS 7 days prior to surgery, as these can thin the blood.  ____  NO Acrylic/fake nails or nail polish worn day of surgery (specifically hand/arm & foot surgeries).  ____  NO powder, lotions, deodorants, oils or cream on body.  ____  Remove all jewelry & piercings prior to surgery.  ____  Remove Dentures, Hearing Aids & Contact Lens prior to surgery.  ____  Bring photo ID and insurance information to hospital (Leave Valuables at Home).  ____  If going home the same day, arrange for a ride home. You will not be able to drive for 24 hrs if Anesthesia was used.   ____  Females (ages 11-60): may need to give a urine sample the morning of surgery; please see Pre op Nurse prior to using the restroom.  ____  Males: Stop ED medications (Viagra, Cialis) 24 hrs prior to surgery.  ____  Wear clean, loose fitting clothing to allow for dressings/ bandages.            Diabetic Patients: If you take diabetic medication, do NOT take morning of surgery unless instructed by Doctor. Metformin to be stopped 24 hrs prior to surgery time. DO NOT take long-acting insulin the evening before surgery. Blood sugars will be checked in pre-op by Nurse.    Bathing Instructions:    -Shower  with anti-bacterial Soap (Hibiclens or Dial) the night before surgery and the morning of.   -Do not use Hibiclens on your face or genitals.   -Apply clean clothes after shower.  -Do not shave your face or body 2 days prior to surgery unless instructed otherwise by your Surgeon.  -Do not shave pubic hair 7 days prior to surgery (gyn pt's).    [facility]/Ride Policy:  Only 2 adults allowed (over the age of 18) to accompany you to the Hospital. You Must have a ride home from a responsible adult that you know and trust. Medical Transport, Uber or Lyft can only be used if patient has a responsible adult to accompany them during ride home.    Discharge Instructions: You will receive Post-op/Discharge instructions by your Discharge Nurse prior to going home. Please call your Surgeon's office with any post-surgery questions/concerns @ [phone].    *If you are running late or have questions the morning of surgery, please call the Surgery Dept @ [phone]  *Insurance/ Financial Questions, please call [phone]    Thank you,  -[facility] Nurse  [phone] or [phone]  M-F 7:30 am-4:30 pm (Closed Major Holidays)

## 2023-03-10 ENCOUNTER — TELEPHONE (OUTPATIENT)
Dept: PREADMISSION TESTING | Facility: HOSPITAL | Age: 68
End: 2023-03-10
Payer: MEDICARE

## 2023-03-10 NOTE — TELEPHONE ENCOUNTER
Called and spoke with pt daughter about the following:     Please arrive to [facility] ([facility] at 7 am on 3/13/23 for your scheduled procedure.  Address: [address] [place] [address] [place] [address] (2nd Building on left, 1st Floor Lobby)  >>>NO eating or drinking after midnight unless instructed otherwise by your Surgeon<<<    Thank you,  -[facility].  Mon-Fri 8 am - 4 pm [phone]

## 2023-03-12 ENCOUNTER — ANESTHESIA EVENT (OUTPATIENT)
Dept: SURGERY | Facility: HOSPITAL | Age: 68
DRG: 982 | End: 2023-03-12
Payer: MEDICARE

## 2023-03-12 NOTE — ANESTHESIA PREPROCEDURE EVALUATION
03/12/2023  Kay Galarza is a 67 y.o., female.  Patient Active Problem List   Diagnosis    Type 2 diabetes mellitus    Essential hypertension    Mild intermittent asthma    Severe obesity (BMI 35.0-39.9) with comorbidity    Open angle with borderline findings, low risk, bilateral    Special screening for malignant neoplasms, colon    Benign neoplasm of cecum    Benign neoplasm of ascending colon    Benign neoplasm of transverse colon    Benign neoplasm of descending colon    Benign neoplasm of rectosigmoid junction    Charcot's neuro arthropathy with dislocation of Lisfranc Joint of left foot    Type II diabetes mellitus with neurological manifestations    Lisfranc dislocation, left, sequela    HLD (hyperlipidemia)    Charcot's joint of foot, left    ILD (interstitial lung disease)    Cardiomegaly    Painful orthopaedic hardware    Abnormal nuclear stress test     Current Outpatient Medications on File Prior to Visit   Medication Sig Dispense Refill    ACCU-CHEK GUIDE TEST STRIPS Strp TO CHECK BG 2 TIMES DAILY, TO USE WITH INSURANCE PREFERRED METER 200 strip 3    acetaminophen (TYLENOL) 500 MG tablet Take 500 mg by mouth every 6 (six) hours as needed for Pain.      albuterol (PROVENTIL/VENTOLIN HFA) 90 mcg/actuation inhaler INHALE 2 PUFFS INTO THE LUNGS EVERY 6  HOURS AS NEEDED FOR WHEEZING. RESCUE 54 g 3    atorvastatin (LIPITOR) 40 MG tablet TAKE 1 TABLET BY MOUTH ONCE DAILY. 90 tablet 1    B infantis/B ani/B koby/B bifid (PROBIOTIC 4X ORAL) Take by mouth Daily.      blood-glucose meter kit To check BG 2 times daily, to use with insurance preferred meter 1 each 0    celecoxib (CELEBREX) 200 MG capsule TAKE 1 CAPSULE (200 MG TOTAL) BY MOUTH ONCE DAILY. 30 capsule 1    clobetasoL (TEMOVATE) 0.05 % external solution APPLY TOPICALLY 2 TIMES DAILY TO SCALP ONLY 50 mL PRN     FARXIGA 10 mg tablet TAKE 1 TABLET (10 MG TOTAL) BY MOUTH ONCE DAILY. 90 tablet 3    fluocinolone and shower cap 0.01 % Oil       fluticasone propionate (FLONASE) 50 mcg/actuation nasal spray 2 sprays (100 mcg total) by Each Nostril route once daily. 16 g 0    glimepiride (AMARYL) 4 MG tablet TAKE 1 TABLET  BY MOUTH DAILY WITH BREAKFAST. 90 tablet 1    insulin glargine U-300 conc (TOUJEO MAX U-300 SOLOSTAR) 300 unit/mL (3 mL) insulin pen Inject 70 Units into the skin every evening. 4 pen 5    ketoconazole (NIZORAL) 2 % shampoo APPLY TOPICALLY 3 TIMES A WEEK. 120 mL 3    lancets Misc To check BG 2 times daily, to use with insurance preferred meter 180 each 0    levocetirizine (XYZAL) 5 MG tablet TAKE 1 TABLET (5 MG TOTAL) BY MOUTH EVERY EVENING. 90 tablet 3    levoFLOXacin (LEVAQUIN) 750 MG tablet Take 1 tablet (750 mg total) by mouth once daily. for 10 days 10 tablet 0    lisinopriL-hydrochlorothiazide (PRINZIDE,ZESTORETIC) 20-25 mg Tab TAKE 1 TABLET BY MOUTH ONCE DAILY. 90 tablet PRN    magnesium oxide 200 mg magnesium Tab TAKE 3 TABLETS  BY MOUTH EVERY EVENING. 120 tablet PRN    NOVOLOG FLEXPEN U-100 INSULIN 100 unit/mL (3 mL) InPn pen Inject 18 Units into the skin 3 (three) times daily before meals. 30 mL 5    omeprazole (PRILOSEC) 20 MG capsule TAKE 1 CAPSULE BY MOUTH TWICE A DAY. 180 capsule 3    polyethylene glycol (GLYCOLAX) 17 gram PwPk Take 17 g by mouth once daily. 30 packet 0    traZODone (DESYREL) 150 MG tablet TAKE 1 TABLET  BY MOUTH NIGHTLY AS NEEDED FOR INSOMNIA. 90 tablet PRN    VITAMIN D2 1,250 mcg (50,000 unit) capsule TAKE 1 CAPSULE  BY MOUTH ONCE A WEEK FOR 4 DOSES 4 capsule PRN     Current Facility-Administered Medications on File Prior to Visit   Medication Dose Route Frequency Provider Last Rate Last Admin    diazePAM tablet 5 mg  5 mg Oral On Call Procedure Salma Bonilla MD   5 mg at 01/24/23 1031    diphenhydrAMINE injection 25 mg  25 mg Intravenous Q6H PRN Dharmesh VILLA  MD Gilberto        sodium chloride 0.9% flush 10 mL  10 mL Intravenous PRN Salma Bonilla MD         Past Surgical History:   Procedure Laterality Date    APPLICATION OF WOUND VACUUM-ASSISTED CLOSURE DEVICE Left 8/18/2020    Procedure: APPLICATION, WOUND VAC;  Surgeon: Kirt Gray DPM;  Location: [facility];  Service: Podiatry;  Laterality: Left;    CATARACT EXTRACTION W/  INTRAOCULAR LENS IMPLANT Right 07/18/2018    CATARACT EXTRACTION W/  INTRAOCULAR LENS IMPLANT Left 03/13/2019    COLONOSCOPY N/A 12/22/2018    Procedure: COLONOSCOPY;  Surgeon: Zak Dover MD;  Location: [facility] ENDO;  Service: Endoscopy;  Laterality: N/A;    COLONOSCOPY N/A 5/11/2022    Procedure: COLONOSCOPY;  Surgeon: Charles Berrios MD;  Location: [facility] ENDO;  Service: Endoscopy;  Laterality: N/A;    ENDOSCOPIC VEIN LASER TREATMENT Bilateral 1990's    FIXATION OF SYNDESMOSIS OF ANKLE Left 7/23/2020    Procedure: FIXATION, SYNDESMOSIS, ANKLE;  Surgeon: Kirt Gray DPM;  Location: [facility] OR;  Service: Podiatry;  Laterality: Left;    LEFT HEART CATHETERIZATION Left 1/24/2023    Procedure: CATHETERIZATION, HEART, LEFT;  Surgeon: Salma Bonilla MD;  Location: [facility] CATH LAB;  Service: Cardiology;  Laterality: Left;  pt given 130 start time    MANIPULATION WITH ANESTHESIA Left 7/23/2020    Procedure: MANIPULATION, WITH ANESTHESIA;  Surgeon: Kirt Gray DPM;  Location: [facility] OR;  Service: Podiatry;  Laterality: Left;    MIDFOOT ARTHRODESIS Left 7/23/2020    Procedure: FUSION, JOINT, MIDFOOT;  Surgeon: Kirt Gray DPM;  Location: [facility] OR;  Service: Podiatry;  Laterality: Left;  2nd tarsometatarsal joint dislocation via fusion    OPEN REDUCTION AND INTERNAL FIXATION (ORIF) OF INJURY OF ANKLE Left 7/23/2020    Procedure: ORIF, ANKLE;  Surgeon: Kirt Gray DPM;  Location: [facility];  Service: Podiatry;  Laterality: Left;    RECONSTRUCTION WITH FUSION OF CHARCOT FOOT Left 8/18/2020    Procedure: RECONSTRUCTION, CHARCOT  FOOT, WITH FUSION;  Surgeon: Kirt Gray DPM;  Location: [facility] OR;  Service: Podiatry;  Laterality: Left;    REMOVAL OF HARDWARE FROM LOWER EXTREMITY Left 1/27/2021    Procedure: REMOVAL, HARDWARE, LOWER EXTREMITY;  Surgeon: Kirt Gray DPM;  Location: [facility] OR;  Service: Podiatry;  Laterality: Left;    WOUND DEBRIDEMENT Left 8/18/2020    Procedure: DEBRIDEMENT, WOUND;  Surgeon: Kirt Gray DPM;  Location: [facility] OR;  Service: Podiatry;  Laterality: Left;       Pre-op Assessment    I have reviewed the Patient Summary Reports.    I have reviewed the Nursing Notes. I have reviewed the NPO Status.   I have reviewed the Medications.     Review of Systems  Anesthesia Hx:  No problems with previous Anesthesia  History of prior surgery of interest to airway management or planning: Previous anesthesia: General Denies Family Hx of Anesthesia complications.   Denies Personal Hx of Anesthesia complications.   Social:  Non-Smoker    Hematology/Oncology:  Hematology Normal   Oncology Normal     EENT/Dental:EENT/Dental Normal   Cardiovascular:   Hypertension ECG has been reviewed. Cleared by cardiologist with normal coronary aa and LV function on 2023 [facility]   Echo 12/019  CONCLUSIONS     1 - Concentric hypertrophy.     2 - No wall motion abnormalities.     3 - Normal left ventricular systolic function (EF 55-60%).     4 - Normal left ventricular diastolic function.     5 - Normal right ventricular systolic function .     6 - The estimated PA systolic pressure is 20 mmHg.     7 - Mild aortic regurgitation.    Pulmonary:   Asthma mild    Renal/:  Renal/ Normal     Hepatic/GI:  Hepatic/GI Normal    Musculoskeletal:  Musculoskeletal Normal    Neurological:  Neurology Normal    Endocrine:   Diabetes, type 2  Obesity / BMI > 30  Dermatological:  Skin Normal    Psych:  Psychiatric Normal           Physical Exam  General:  Well nourished and Obesity      Airway/Jaw/Neck:  Airway Findings: Mouth Opening: Normal   Tongue:  Normal   Mallampati: II     Dental:  Dental Findings: In tact     Chest/Lungs:  Chest/Lungs Clear    Heart/Vascular:  Heart Findings: Normal Heart murmur: negative       Mental Status:  Mental Status Findings:  Cooperative, Alert and Oriented         Chemistry        Component Value Date/Time     01/23/2023 1033    K 4.6 01/23/2023 1033     01/23/2023 1033    CO2 28 01/23/2023 1033    BUN 24 (H) 01/23/2023 1033    CREATININE 1.0 01/23/2023 1033     (H) 01/23/2023 1033        Component Value Date/Time    CALCIUM 9.9 01/23/2023 1033    ALKPHOS 128 12/12/2022 1135    AST 17 12/12/2022 1135    ALT 14 12/12/2022 1135    BILITOT 0.4 12/12/2022 1135    ESTGFRAFRICA >60.0 05/25/2022 0857    EGFRNONAA 57.6 (A) 05/25/2022 0857        Lab Results   Component Value Date    WBC 7.82 01/23/2023    HGB 15.0 01/23/2023    HCT 45.9 01/23/2023    MCV 92 01/23/2023     01/23/2023           Anesthesia Plan  Type of Anesthesia, risks & benefits discussed:  Anesthesia Type:  general, MAC    Patient's Preference:   Plan Factors:          Intra-op Monitoring Plan: standard ASA monitors  Intra-op Monitoring Plan Comments:   Post Op Pain Control Plan: multimodal analgesia and peripheral nerve block  Post Op Pain Control Plan Comments:     Induction:   IV  Beta Blocker:  Patient is not currently on a Beta-Blocker (No further documentation required).       Informed Consent: Informed consent signed with the Patient and all parties understand the risks and agree with anesthesia plan.  All questions answered.  Anesthesia consent signed with patient.  ASA Score: 3     Day of Surgery Review of History & Physical: I have interviewed and examined the patient. I have reviewed the patient's H&P dated:              Ready For Surgery From Anesthesia Perspective.           Physical Exam  General: Well nourished and Obesity    Airway:  Mallampati: II   Mouth Opening: Normal  Tongue: Normal    Dental:  In tact      Lab Results    Component Value Date    WBC 7.82 01/23/2023    HGB 15.0 01/23/2023    HCT 45.9 01/23/2023    MCV 92 01/23/2023     01/23/2023         Anesthesia Plan  Type of Anesthesia, risks & benefits discussed:    Anesthesia Type: general, MAC  Intra-op Monitoring Plan: standard ASA monitors  Post Op Pain Control Plan: multimodal analgesia and peripheral nerve block  Induction:  IV  Informed Consent: Informed consent signed with the Patient and all parties understand the risks and agree with anesthesia plan.  All questions answered.   ASA Score: 3  Day of Surgery Review of History & Physical: I have interviewed and examined the patient. I have reviewed the patient's H&P dated:     Ready For Surgery From Anesthesia Perspective.       .

## 2023-03-13 ENCOUNTER — HOSPITAL ENCOUNTER (INPATIENT)
Facility: HOSPITAL | Age: 68
LOS: 1 days | Discharge: NURSING, REHAB OR LONG-TERM CARE | DRG: 982 | End: 2023-03-16
Attending: PODIATRIST | Admitting: FAMILY MEDICINE
Payer: MEDICARE

## 2023-03-13 ENCOUNTER — ANESTHESIA (OUTPATIENT)
Dept: SURGERY | Facility: HOSPITAL | Age: 68
DRG: 982 | End: 2023-03-13
Payer: MEDICARE

## 2023-03-13 DIAGNOSIS — Z09 POSTOP CHECK: Primary | ICD-10-CM

## 2023-03-13 DIAGNOSIS — R07.9 CHEST PAIN: ICD-10-CM

## 2023-03-13 DIAGNOSIS — E11.9 TYPE 2 DIABETES MELLITUS WITHOUT COMPLICATION, WITH LONG-TERM CURRENT USE OF INSULIN: ICD-10-CM

## 2023-03-13 DIAGNOSIS — M14.672 CHARCOT'S JOINT OF FOOT, LEFT: ICD-10-CM

## 2023-03-13 DIAGNOSIS — Z79.4 TYPE 2 DIABETES MELLITUS WITHOUT COMPLICATION, WITH LONG-TERM CURRENT USE OF INSULIN: ICD-10-CM

## 2023-03-13 DIAGNOSIS — E78.2 MIXED HYPERLIPIDEMIA: ICD-10-CM

## 2023-03-13 DIAGNOSIS — I10 ESSENTIAL HYPERTENSION: ICD-10-CM

## 2023-03-13 DIAGNOSIS — L03.119 CELLULITIS OF FOOT: ICD-10-CM

## 2023-03-13 DIAGNOSIS — I51.7 CARDIOMEGALY: ICD-10-CM

## 2023-03-13 DIAGNOSIS — E11.621 DIABETIC ULCER OF LEFT MIDFOOT ASSOCIATED WITH TYPE 2 DIABETES MELLITUS, WITH FAT LAYER EXPOSED: Primary | ICD-10-CM

## 2023-03-13 DIAGNOSIS — E66.01 SEVERE OBESITY (BMI 35.0-39.9) WITH COMORBIDITY: ICD-10-CM

## 2023-03-13 DIAGNOSIS — J45.20 MILD INTERMITTENT ASTHMA WITHOUT COMPLICATION: ICD-10-CM

## 2023-03-13 DIAGNOSIS — L97.422 DIABETIC ULCER OF LEFT MIDFOOT ASSOCIATED WITH TYPE 2 DIABETES MELLITUS, WITH FAT LAYER EXPOSED: Primary | ICD-10-CM

## 2023-03-13 LAB
ANION GAP SERPL CALC-SCNC: 11 MMOL/L (ref 8–16)
BASOPHILS # BLD AUTO: 0.06 K/UL (ref 0–0.2)
BASOPHILS NFR BLD: 0.7 % (ref 0–1.9)
BUN SERPL-MCNC: 34 MG/DL (ref 8–23)
CALCIUM SERPL-MCNC: 9.3 MG/DL (ref 8.7–10.5)
CHLORIDE SERPL-SCNC: 105 MMOL/L (ref 95–110)
CO2 SERPL-SCNC: 24 MMOL/L (ref 23–29)
CREAT SERPL-MCNC: 1.3 MG/DL (ref 0.5–1.4)
DIFFERENTIAL METHOD: ABNORMAL
EOSINOPHIL # BLD AUTO: 0.3 K/UL (ref 0–0.5)
EOSINOPHIL NFR BLD: 2.8 % (ref 0–8)
ERYTHROCYTE [DISTWIDTH] IN BLOOD BY AUTOMATED COUNT: 13.6 % (ref 11.5–14.5)
EST. GFR  (NO RACE VARIABLE): 45 ML/MIN/1.73 M^2
GLUCOSE SERPL-MCNC: 148 MG/DL (ref 70–110)
HCT VFR BLD AUTO: 42.8 % (ref 37–48.5)
HGB BLD-MCNC: 14.2 G/DL (ref 12–16)
IMM GRANULOCYTES # BLD AUTO: 0.02 K/UL (ref 0–0.04)
IMM GRANULOCYTES NFR BLD AUTO: 0.2 % (ref 0–0.5)
LYMPHOCYTES # BLD AUTO: 1.4 K/UL (ref 1–4.8)
LYMPHOCYTES NFR BLD: 15.3 % (ref 18–48)
MCH RBC QN AUTO: 29.6 PG (ref 27–31)
MCHC RBC AUTO-ENTMCNC: 33.2 G/DL (ref 32–36)
MCV RBC AUTO: 89 FL (ref 82–98)
MONOCYTES # BLD AUTO: 0.5 K/UL (ref 0.3–1)
MONOCYTES NFR BLD: 5.9 % (ref 4–15)
NEUTROPHILS # BLD AUTO: 6.7 K/UL (ref 1.8–7.7)
NEUTROPHILS NFR BLD: 75.1 % (ref 38–73)
NRBC BLD-RTO: 0 /100 WBC
PLATELET # BLD AUTO: 238 K/UL (ref 150–450)
PMV BLD AUTO: 9.5 FL (ref 9.2–12.9)
POCT GLUCOSE: 142 MG/DL (ref 70–110)
POCT GLUCOSE: 162 MG/DL (ref 70–110)
POTASSIUM SERPL-SCNC: 4.5 MMOL/L (ref 3.5–5.1)
RBC # BLD AUTO: 4.79 M/UL (ref 4–5.4)
SODIUM SERPL-SCNC: 140 MMOL/L (ref 136–145)
WBC # BLD AUTO: 8.95 K/UL (ref 3.9–12.7)

## 2023-03-13 PROCEDURE — 25000003 PHARM REV CODE 250: Mod: HCNC | Performed by: PODIATRIST

## 2023-03-13 PROCEDURE — 20680 REMOVAL OF IMPLANT DEEP: CPT | Mod: 51,HCNC,LT, | Performed by: PODIATRIST

## 2023-03-13 PROCEDURE — 64445 NJX AA&/STRD SCIATIC NRV IMG: CPT | Mod: HCNC | Performed by: ANESTHESIOLOGY

## 2023-03-13 PROCEDURE — C1713 ANCHOR/SCREW BN/BN,TIS/BN: HCPCS | Mod: HCNC | Performed by: PODIATRIST

## 2023-03-13 PROCEDURE — 20680 PR REMOVAL DEEP IMPLANT: ICD-10-PCS | Mod: 51,HCNC,LT, | Performed by: PODIATRIST

## 2023-03-13 PROCEDURE — C1769 GUIDE WIRE: HCPCS | Mod: HCNC | Performed by: PODIATRIST

## 2023-03-13 PROCEDURE — 63600175 PHARM REV CODE 636 W HCPCS: Mod: HCNC | Performed by: ANESTHESIOLOGY

## 2023-03-13 PROCEDURE — 25000003 PHARM REV CODE 250: Mod: HCNC | Performed by: NURSE ANESTHETIST, CERTIFIED REGISTERED

## 2023-03-13 PROCEDURE — 71000033 HC RECOVERY, INTIAL HOUR: Mod: HCNC | Performed by: PODIATRIST

## 2023-03-13 PROCEDURE — 37000008 HC ANESTHESIA 1ST 15 MINUTES: Mod: HCNC | Performed by: PODIATRIST

## 2023-03-13 PROCEDURE — 85025 COMPLETE CBC W/AUTO DIFF WBC: CPT | Mod: HCNC | Performed by: PODIATRIST

## 2023-03-13 PROCEDURE — 63600175 PHARM REV CODE 636 W HCPCS: Mod: HCNC | Performed by: NURSE ANESTHETIST, CERTIFIED REGISTERED

## 2023-03-13 PROCEDURE — 27800903 OPTIME MED/SURG SUP & DEVICES OTHER IMPLANTS: Mod: HCNC | Performed by: PODIATRIST

## 2023-03-13 PROCEDURE — 80048 BASIC METABOLIC PNL TOTAL CA: CPT | Mod: HCNC | Performed by: PODIATRIST

## 2023-03-13 PROCEDURE — 63600175 PHARM REV CODE 636 W HCPCS: Mod: HCNC | Performed by: PODIATRIST

## 2023-03-13 PROCEDURE — 71000039 HC RECOVERY, EACH ADD'L HOUR: Mod: HCNC | Performed by: PODIATRIST

## 2023-03-13 PROCEDURE — 27201423 OPTIME MED/SURG SUP & DEVICES STERILE SUPPLY: Mod: HCNC | Performed by: PODIATRIST

## 2023-03-13 PROCEDURE — 36000708 HC OR TIME LEV III 1ST 15 MIN: Mod: HCNC | Performed by: PODIATRIST

## 2023-03-13 PROCEDURE — 28715 ARTHRODESIS TRIPLE: CPT | Mod: HCNC,LT,, | Performed by: PODIATRIST

## 2023-03-13 PROCEDURE — 28715 PR FUSION FOOT BONES,TRIPLE: ICD-10-PCS | Mod: HCNC,LT,, | Performed by: PODIATRIST

## 2023-03-13 PROCEDURE — 25000003 PHARM REV CODE 250: Mod: HCNC | Performed by: ANESTHESIOLOGY

## 2023-03-13 PROCEDURE — 51798 US URINE CAPACITY MEASURE: CPT | Mod: HCNC

## 2023-03-13 PROCEDURE — 36000709 HC OR TIME LEV III EA ADD 15 MIN: Mod: HCNC | Performed by: PODIATRIST

## 2023-03-13 PROCEDURE — 37000009 HC ANESTHESIA EA ADD 15 MINS: Mod: HCNC | Performed by: PODIATRIST

## 2023-03-13 DEVICE — IMPLANTABLE DEVICE: Type: IMPLANTABLE DEVICE | Site: FOOT | Status: FUNCTIONAL

## 2023-03-13 DEVICE — PUTTY DBX 5CC: Type: IMPLANTABLE DEVICE | Site: FOOT | Status: FUNCTIONAL

## 2023-03-13 DEVICE — BONE CHIP CANC 1.7-10MM 15ML: Type: IMPLANTABLE DEVICE | Site: FOOT | Status: FUNCTIONAL

## 2023-03-13 RX ORDER — METOCLOPRAMIDE HYDROCHLORIDE 5 MG/ML
10 INJECTION INTRAMUSCULAR; INTRAVENOUS EVERY 6 HOURS PRN
Status: DISCONTINUED | OUTPATIENT
Start: 2023-03-13 | End: 2023-03-16 | Stop reason: HOSPADM

## 2023-03-13 RX ORDER — CLINDAMYCIN PHOSPHATE 900 MG/50ML
900 INJECTION, SOLUTION INTRAVENOUS
Status: DISCONTINUED | OUTPATIENT
Start: 2023-03-13 | End: 2023-03-13 | Stop reason: HOSPADM

## 2023-03-13 RX ORDER — EPHEDRINE SULFATE 50 MG/ML
INJECTION, SOLUTION INTRAVENOUS
Status: DISCONTINUED | OUTPATIENT
Start: 2023-03-13 | End: 2023-03-13

## 2023-03-13 RX ORDER — PROCHLORPERAZINE EDISYLATE 5 MG/ML
5 INJECTION INTRAMUSCULAR; INTRAVENOUS EVERY 30 MIN PRN
Status: DISCONTINUED | OUTPATIENT
Start: 2023-03-13 | End: 2023-03-13 | Stop reason: HOSPADM

## 2023-03-13 RX ORDER — MIDAZOLAM HYDROCHLORIDE 1 MG/ML
INJECTION, SOLUTION INTRAMUSCULAR; INTRAVENOUS
Status: DISCONTINUED | OUTPATIENT
Start: 2023-03-13 | End: 2023-03-13

## 2023-03-13 RX ORDER — INSULIN ASPART 100 [IU]/ML
0-5 INJECTION, SOLUTION INTRAVENOUS; SUBCUTANEOUS
Status: DISCONTINUED | OUTPATIENT
Start: 2023-03-13 | End: 2023-03-16 | Stop reason: HOSPADM

## 2023-03-13 RX ORDER — HYDRALAZINE HYDROCHLORIDE 20 MG/ML
10 INJECTION INTRAMUSCULAR; INTRAVENOUS EVERY 6 HOURS PRN
Status: DISCONTINUED | OUTPATIENT
Start: 2023-03-13 | End: 2023-03-16 | Stop reason: HOSPADM

## 2023-03-13 RX ORDER — CYCLOBENZAPRINE HCL 5 MG
5 TABLET ORAL 3 TIMES DAILY PRN
Status: DISCONTINUED | OUTPATIENT
Start: 2023-03-13 | End: 2023-03-16 | Stop reason: HOSPADM

## 2023-03-13 RX ORDER — POLYETHYLENE GLYCOL 3350 17 G/17G
17 POWDER, FOR SOLUTION ORAL DAILY
Status: DISCONTINUED | OUTPATIENT
Start: 2023-03-14 | End: 2023-03-16 | Stop reason: HOSPADM

## 2023-03-13 RX ORDER — NALOXONE HCL 0.4 MG/ML
0.02 VIAL (ML) INJECTION
Status: DISCONTINUED | OUTPATIENT
Start: 2023-03-13 | End: 2023-03-16 | Stop reason: HOSPADM

## 2023-03-13 RX ORDER — OXYCODONE HYDROCHLORIDE 5 MG/1
5 TABLET ORAL
Status: DISCONTINUED | OUTPATIENT
Start: 2023-03-13 | End: 2023-03-13 | Stop reason: HOSPADM

## 2023-03-13 RX ORDER — LIDOCAINE HYDROCHLORIDE 10 MG/ML
INJECTION, SOLUTION EPIDURAL; INFILTRATION; INTRACAUDAL; PERINEURAL
Status: DISCONTINUED | OUTPATIENT
Start: 2023-03-13 | End: 2023-03-13

## 2023-03-13 RX ORDER — DOCUSATE SODIUM 100 MG/1
100 CAPSULE, LIQUID FILLED ORAL 2 TIMES DAILY
Status: DISCONTINUED | OUTPATIENT
Start: 2023-03-13 | End: 2023-03-16 | Stop reason: HOSPADM

## 2023-03-13 RX ORDER — TALC
6 POWDER (GRAM) TOPICAL NIGHTLY PRN
Status: DISCONTINUED | OUTPATIENT
Start: 2023-03-13 | End: 2023-03-16 | Stop reason: HOSPADM

## 2023-03-13 RX ORDER — OXYCODONE HYDROCHLORIDE 5 MG/1
10 TABLET ORAL EVERY 6 HOURS PRN
Status: DISCONTINUED | OUTPATIENT
Start: 2023-03-13 | End: 2023-03-16 | Stop reason: HOSPADM

## 2023-03-13 RX ORDER — BUPIVACAINE HYDROCHLORIDE 2.5 MG/ML
INJECTION, SOLUTION EPIDURAL; INFILTRATION; INTRACAUDAL
Status: COMPLETED | OUTPATIENT
Start: 2023-03-13 | End: 2023-03-13

## 2023-03-13 RX ORDER — ONDANSETRON 2 MG/ML
INJECTION INTRAMUSCULAR; INTRAVENOUS
Status: DISCONTINUED | OUTPATIENT
Start: 2023-03-13 | End: 2023-03-13

## 2023-03-13 RX ORDER — PHENYLEPHRINE HYDROCHLORIDE 10 MG/ML
INJECTION INTRAVENOUS
Status: DISCONTINUED | OUTPATIENT
Start: 2023-03-13 | End: 2023-03-13

## 2023-03-13 RX ORDER — NYSTATIN AND TRIAMCINOLONE ACETONIDE 100000; 1 [USP'U]/G; MG/G
OINTMENT TOPICAL 2 TIMES DAILY
Status: DISCONTINUED | OUTPATIENT
Start: 2023-03-13 | End: 2023-03-16 | Stop reason: HOSPADM

## 2023-03-13 RX ORDER — SODIUM CHLORIDE 0.9 % (FLUSH) 0.9 %
10 SYRINGE (ML) INJECTION EVERY 6 HOURS PRN
Status: DISCONTINUED | OUTPATIENT
Start: 2023-03-13 | End: 2023-03-16 | Stop reason: HOSPADM

## 2023-03-13 RX ORDER — PANTOPRAZOLE SODIUM 40 MG/1
40 TABLET, DELAYED RELEASE ORAL DAILY
Status: DISCONTINUED | OUTPATIENT
Start: 2023-03-14 | End: 2023-03-16 | Stop reason: HOSPADM

## 2023-03-13 RX ORDER — DIPHENHYDRAMINE HYDROCHLORIDE 50 MG/ML
25 INJECTION INTRAMUSCULAR; INTRAVENOUS EVERY 6 HOURS PRN
Status: DISCONTINUED | OUTPATIENT
Start: 2023-03-13 | End: 2023-03-13 | Stop reason: HOSPADM

## 2023-03-13 RX ORDER — ONDANSETRON 2 MG/ML
4 INJECTION INTRAMUSCULAR; INTRAVENOUS DAILY PRN
Status: DISCONTINUED | OUTPATIENT
Start: 2023-03-13 | End: 2023-03-13 | Stop reason: HOSPADM

## 2023-03-13 RX ORDER — SODIUM CHLORIDE 0.9 % (FLUSH) 0.9 %
3 SYRINGE (ML) INJECTION
Status: DISCONTINUED | OUTPATIENT
Start: 2023-03-13 | End: 2023-03-16 | Stop reason: HOSPADM

## 2023-03-13 RX ORDER — FLUCONAZOLE 150 MG/1
150 TABLET ORAL
Status: DISCONTINUED | OUTPATIENT
Start: 2023-03-13 | End: 2023-03-14

## 2023-03-13 RX ORDER — ROCURONIUM BROMIDE 10 MG/ML
INJECTION, SOLUTION INTRAVENOUS
Status: DISCONTINUED | OUTPATIENT
Start: 2023-03-13 | End: 2023-03-13

## 2023-03-13 RX ORDER — HYDROMORPHONE HYDROCHLORIDE 2 MG/ML
0.2 INJECTION, SOLUTION INTRAMUSCULAR; INTRAVENOUS; SUBCUTANEOUS EVERY 5 MIN PRN
Status: DISCONTINUED | OUTPATIENT
Start: 2023-03-13 | End: 2023-03-13 | Stop reason: HOSPADM

## 2023-03-13 RX ORDER — KETOROLAC TROMETHAMINE 30 MG/ML
15 INJECTION, SOLUTION INTRAMUSCULAR; INTRAVENOUS EVERY 8 HOURS PRN
Status: DISCONTINUED | OUTPATIENT
Start: 2023-03-13 | End: 2023-03-13 | Stop reason: HOSPADM

## 2023-03-13 RX ORDER — GLUCAGON 1 MG
1 KIT INJECTION
Status: DISCONTINUED | OUTPATIENT
Start: 2023-03-13 | End: 2023-03-16 | Stop reason: HOSPADM

## 2023-03-13 RX ORDER — ERGOCALCIFEROL 1.25 MG/1
50000 CAPSULE ORAL
Qty: 4 CAPSULE | Refills: 0 | Status: SHIPPED | OUTPATIENT
Start: 2023-03-13 | End: 2023-04-11

## 2023-03-13 RX ORDER — ALBUTEROL SULFATE 0.83 MG/ML
2.5 SOLUTION RESPIRATORY (INHALATION) EVERY 4 HOURS PRN
Status: DISCONTINUED | OUTPATIENT
Start: 2023-03-13 | End: 2023-03-13 | Stop reason: HOSPADM

## 2023-03-13 RX ORDER — FENTANYL CITRATE 50 UG/ML
INJECTION, SOLUTION INTRAMUSCULAR; INTRAVENOUS
Status: DISCONTINUED | OUTPATIENT
Start: 2023-03-13 | End: 2023-03-13

## 2023-03-13 RX ORDER — CLINDAMYCIN PHOSPHATE 900 MG/50ML
900 INJECTION, SOLUTION INTRAVENOUS
Status: COMPLETED | OUTPATIENT
Start: 2023-03-13 | End: 2023-03-14

## 2023-03-13 RX ORDER — GABAPENTIN 300 MG/1
300 CAPSULE ORAL 2 TIMES DAILY
Qty: 60 CAPSULE | Refills: 0 | Status: SHIPPED | OUTPATIENT
Start: 2023-03-13 | End: 2023-06-27

## 2023-03-13 RX ORDER — FLUCONAZOLE 150 MG/1
150 TABLET ORAL
Qty: 2 TABLET | Refills: 0 | Status: SHIPPED | OUTPATIENT
Start: 2023-03-13 | End: 2023-03-20

## 2023-03-13 RX ORDER — ONDANSETRON 2 MG/ML
4 INJECTION INTRAMUSCULAR; INTRAVENOUS EVERY 6 HOURS PRN
Status: DISCONTINUED | OUTPATIENT
Start: 2023-03-13 | End: 2023-03-16 | Stop reason: HOSPADM

## 2023-03-13 RX ORDER — NYSTATIN AND TRIAMCINOLONE ACETONIDE 100000; 1 [USP'U]/G; MG/G
OINTMENT TOPICAL 2 TIMES DAILY
Qty: 60 G | Refills: 1 | Status: SHIPPED | OUTPATIENT
Start: 2023-03-13 | End: 2024-01-04

## 2023-03-13 RX ORDER — NEOSTIGMINE METHYLSULFATE 1 MG/ML
INJECTION, SOLUTION INTRAVENOUS
Status: DISCONTINUED | OUTPATIENT
Start: 2023-03-13 | End: 2023-03-13

## 2023-03-13 RX ORDER — MORPHINE SULFATE 4 MG/ML
4 INJECTION, SOLUTION INTRAMUSCULAR; INTRAVENOUS EVERY 4 HOURS PRN
Status: DISCONTINUED | OUTPATIENT
Start: 2023-03-13 | End: 2023-03-16 | Stop reason: HOSPADM

## 2023-03-13 RX ORDER — MEPERIDINE HYDROCHLORIDE 25 MG/ML
12.5 INJECTION INTRAMUSCULAR; INTRAVENOUS; SUBCUTANEOUS ONCE
Status: COMPLETED | OUTPATIENT
Start: 2023-03-13 | End: 2023-03-13

## 2023-03-13 RX ORDER — DEXTROSE 40 %
15 GEL (GRAM) ORAL
Status: DISCONTINUED | OUTPATIENT
Start: 2023-03-13 | End: 2023-03-16 | Stop reason: HOSPADM

## 2023-03-13 RX ORDER — DEXTROSE 40 %
30 GEL (GRAM) ORAL
Status: DISCONTINUED | OUTPATIENT
Start: 2023-03-13 | End: 2023-03-16 | Stop reason: HOSPADM

## 2023-03-13 RX ORDER — ACETAMINOPHEN 325 MG/1
650 TABLET ORAL EVERY 6 HOURS PRN
Status: DISCONTINUED | OUTPATIENT
Start: 2023-03-13 | End: 2023-03-16 | Stop reason: HOSPADM

## 2023-03-13 RX ORDER — PROPOFOL 10 MG/ML
VIAL (ML) INTRAVENOUS
Status: DISCONTINUED | OUTPATIENT
Start: 2023-03-13 | End: 2023-03-13

## 2023-03-13 RX ORDER — ATORVASTATIN CALCIUM 40 MG/1
40 TABLET, FILM COATED ORAL DAILY
Status: DISCONTINUED | OUTPATIENT
Start: 2023-03-14 | End: 2023-03-16 | Stop reason: HOSPADM

## 2023-03-13 RX ORDER — CLINDAMYCIN PHOSPHATE 900 MG/50ML
900 INJECTION, SOLUTION INTRAVENOUS
Status: COMPLETED | OUTPATIENT
Start: 2023-03-13 | End: 2023-03-13

## 2023-03-13 RX ORDER — OXYCODONE AND ACETAMINOPHEN 10; 325 MG/1; MG/1
1 TABLET ORAL EVERY 6 HOURS PRN
Qty: 28 TABLET | Refills: 0 | Status: SHIPPED | OUTPATIENT
Start: 2023-03-13 | End: 2023-03-21

## 2023-03-13 RX ADMIN — PHENYLEPHRINE HYDROCHLORIDE 100 MCG: 10 INJECTION INTRAVENOUS at 11:03

## 2023-03-13 RX ADMIN — PHENYLEPHRINE HYDROCHLORIDE 100 MCG: 10 INJECTION INTRAVENOUS at 01:03

## 2023-03-13 RX ADMIN — PHENYLEPHRINE HYDROCHLORIDE 100 MCG: 10 INJECTION INTRAVENOUS at 10:03

## 2023-03-13 RX ADMIN — GLYCOPYRROLATE 0.8 MG: 0.2 INJECTION, SOLUTION INTRAMUSCULAR; INTRAVENOUS at 03:03

## 2023-03-13 RX ADMIN — SODIUM CHLORIDE, SODIUM LACTATE, POTASSIUM CHLORIDE, AND CALCIUM CHLORIDE: .6; .31; .03; .02 INJECTION, SOLUTION INTRAVENOUS at 02:03

## 2023-03-13 RX ADMIN — HYDROMORPHONE HYDROCHLORIDE 0.2 MG: 2 INJECTION INTRAMUSCULAR; INTRAVENOUS; SUBCUTANEOUS at 04:03

## 2023-03-13 RX ADMIN — FENTANYL CITRATE 100 MCG: 50 INJECTION, SOLUTION INTRAMUSCULAR; INTRAVENOUS at 09:03

## 2023-03-13 RX ADMIN — PHENYLEPHRINE HYDROCHLORIDE 200 MCG: 10 INJECTION INTRAVENOUS at 02:03

## 2023-03-13 RX ADMIN — CYCLOBENZAPRINE HYDROCHLORIDE 5 MG: 5 TABLET, FILM COATED ORAL at 09:03

## 2023-03-13 RX ADMIN — CLINDAMYCIN IN 5 PERCENT DEXTROSE 900 MG: 18 INJECTION, SOLUTION INTRAVENOUS at 09:03

## 2023-03-13 RX ADMIN — PHENYLEPHRINE HYDROCHLORIDE 200 MCG: 10 INJECTION INTRAVENOUS at 03:03

## 2023-03-13 RX ADMIN — SODIUM CHLORIDE, SODIUM LACTATE, POTASSIUM CHLORIDE, AND CALCIUM CHLORIDE: .6; .31; .03; .02 INJECTION, SOLUTION INTRAVENOUS at 12:03

## 2023-03-13 RX ADMIN — FLUCONAZOLE 150 MG: 150 TABLET ORAL at 09:03

## 2023-03-13 RX ADMIN — PHENYLEPHRINE HYDROCHLORIDE 100 MCG: 10 INJECTION INTRAVENOUS at 02:03

## 2023-03-13 RX ADMIN — NEOSTIGMINE METHYLSULFATE 5 MG: 1 INJECTION INTRAVENOUS at 03:03

## 2023-03-13 RX ADMIN — MIDAZOLAM 2 MG: 1 INJECTION INTRAMUSCULAR; INTRAVENOUS at 09:03

## 2023-03-13 RX ADMIN — LIDOCAINE HYDROCHLORIDE 50 MG: 10 INJECTION, SOLUTION EPIDURAL; INFILTRATION; INTRACAUDAL; PERINEURAL at 09:03

## 2023-03-13 RX ADMIN — ROCURONIUM BROMIDE 50 MG: 10 INJECTION, SOLUTION INTRAVENOUS at 09:03

## 2023-03-13 RX ADMIN — ROCURONIUM BROMIDE 10 MG: 10 INJECTION, SOLUTION INTRAVENOUS at 11:03

## 2023-03-13 RX ADMIN — RIVAROXABAN 10 MG: 10 TABLET, FILM COATED ORAL at 06:03

## 2023-03-13 RX ADMIN — PROPOFOL 100 MG: 10 INJECTION, EMULSION INTRAVENOUS at 09:03

## 2023-03-13 RX ADMIN — PHENYLEPHRINE HYDROCHLORIDE 200 MCG: 10 INJECTION INTRAVENOUS at 10:03

## 2023-03-13 RX ADMIN — NYSTATIN AND TRIAMCINOLONE ACETONIDE: 100000; 1 OINTMENT TOPICAL at 09:03

## 2023-03-13 RX ADMIN — PHENYLEPHRINE HYDROCHLORIDE 200 MCG: 10 INJECTION INTRAVENOUS at 01:03

## 2023-03-13 RX ADMIN — DOCUSATE SODIUM 100 MG: 100 CAPSULE, LIQUID FILLED ORAL at 08:03

## 2023-03-13 RX ADMIN — EPHEDRINE SULFATE 10 MG: 50 INJECTION INTRAVENOUS at 01:03

## 2023-03-13 RX ADMIN — SODIUM CHLORIDE, SODIUM LACTATE, POTASSIUM CHLORIDE, AND CALCIUM CHLORIDE: .6; .31; .03; .02 INJECTION, SOLUTION INTRAVENOUS at 09:03

## 2023-03-13 RX ADMIN — BUPIVACAINE HYDROCHLORIDE 25 ML: 2.5 INJECTION, SOLUTION EPIDURAL; INFILTRATION; INTRACAUDAL at 09:03

## 2023-03-13 RX ADMIN — ROCURONIUM BROMIDE 10 MG: 10 INJECTION, SOLUTION INTRAVENOUS at 02:03

## 2023-03-13 RX ADMIN — MEPERIDINE HYDROCHLORIDE 12.5 MG: 25 INJECTION INTRAMUSCULAR; INTRAVENOUS; SUBCUTANEOUS at 04:03

## 2023-03-13 RX ADMIN — PHENYLEPHRINE HYDROCHLORIDE 100 MCG: 10 INJECTION INTRAVENOUS at 12:03

## 2023-03-13 RX ADMIN — ONDANSETRON 4 MG: 2 INJECTION INTRAMUSCULAR; INTRAVENOUS at 03:03

## 2023-03-13 RX ADMIN — ROCURONIUM BROMIDE 10 MG: 10 INJECTION, SOLUTION INTRAVENOUS at 12:03

## 2023-03-13 RX ADMIN — BUPIVACAINE HYDROCHLORIDE 25 ML: 2.5 INJECTION, SOLUTION EPIDURAL; INFILTRATION; INTRACAUDAL; PERINEURAL at 09:03

## 2023-03-13 RX ADMIN — CLINDAMYCIN PHOSPHATE 900 MG: 900 INJECTION, SOLUTION INTRAVENOUS at 06:03

## 2023-03-13 RX ADMIN — EPHEDRINE SULFATE 10 MG: 50 INJECTION INTRAVENOUS at 12:03

## 2023-03-13 RX ADMIN — PHENYLEPHRINE HYDROCHLORIDE 200 MCG: 10 INJECTION INTRAVENOUS at 12:03

## 2023-03-13 RX ADMIN — PHENYLEPHRINE HYDROCHLORIDE 300 MCG: 10 INJECTION INTRAVENOUS at 03:03

## 2023-03-13 RX ADMIN — MORPHINE SULFATE 4 MG: 4 INJECTION INTRAVENOUS at 06:03

## 2023-03-13 RX ADMIN — OXYCODONE HYDROCHLORIDE 10 MG: 5 TABLET ORAL at 09:03

## 2023-03-13 NOTE — PLAN OF CARE
s/p 3/13/23 LLE KAYLA (removal of hardware) with STJ fusion and midfoot fusion via Ex-Fix, due to Charcot Foot.    WBAT to the LLE with External Fixator.    DVT Ppx with Eliquis as out-patient.    All Rx sent to [facility] (Diflucan (2 tablets), Percocet, Vit. D, Mycolog, Gabapentin and Eliquis).    Is currently on Levaquin and should resume upon D/C.     PT/OT.    Rehab placement.     RICE therapy ATC.    Very important to NOT leave the limb in the dependent position.    Limb should be neutral (lying in bed) or legs up on the recliner.    Very important to have ice pack at the ipsilateral knee fossa ATC.    Post-op appt is made.     [facility] does have updated orders (in case D/C to home) (when D/Cd to home).    Diflucan Q72H PO and Mycolog BID while in house.

## 2023-03-13 NOTE — H&P
[facility]  History & Physical    Patient Name: Kay Galarza  MRN: [id number]  Patient Class: OP- Outpatient Recovery  Admission Date: 3/13/2023  Attending Physician: Cristina Birmingham MD   Primary Care Provider: Lucy Negron MD         Patient information was obtained from patient and past medical records.     Subjective:     Principal Problem:Charcot's joint of left foot    Chief Complaint:   Chief Complaint   Patient presents with    charcot joint        HPI: Patient is a 67-year-old  female with a history of diabetes mellitus with diabetic foot ulcer on left, Charcot joint left ankle, hypertension, hyperlipidemia, chronic pain who underwent ORIF of left ankle with external fixation by Dr. Angel today.  Hospital Medicine is consulted for admission for pain control, but OT/PT eval and treat.  Patient is seen in postop holding she is lethargic but arousable interpreting service was used and patient's only complaint was continued pain in her left ankle.      Past Medical History:   Diagnosis Date    Abnormal nuclear stress test 1/23/2023    Arthritis     DERIAN KNEES    Asthma     SEASONAL    Cataract     Diabetes mellitus     Diabetes mellitus, type 2     Diabetic retinopathy     DM (diabetes mellitus) 2007    BS 97 09/29/2020    Hyperlipidemia     Hypertension        Past Surgical History:   Procedure Laterality Date    APPLICATION OF WOUND VACUUM-ASSISTED CLOSURE DEVICE Left 8/18/2020    Procedure: APPLICATION, WOUND VAC;  Surgeon: Kirt Gray DPM;  Location: [facility] OR;  Service: Podiatry;  Laterality: Left;    CATARACT EXTRACTION W/  INTRAOCULAR LENS IMPLANT Right 07/18/2018    CATARACT EXTRACTION W/  INTRAOCULAR LENS IMPLANT Left 03/13/2019    COLONOSCOPY N/A 12/22/2018    Procedure: COLONOSCOPY;  Surgeon: Zak Dover MD;  Location: [facility] ENDO;  Service: Endoscopy;  Laterality: N/A;    COLONOSCOPY N/A 5/11/2022    Procedure:  COLONOSCOPY;  Surgeon: Charles Berrios MD;  Location: [facility] ENDO;  Service: Endoscopy;  Laterality: N/A;    ENDOSCOPIC VEIN LASER TREATMENT Bilateral 1990's    FIXATION OF SYNDESMOSIS OF ANKLE Left 7/23/2020    Procedure: FIXATION, SYNDESMOSIS, ANKLE;  Surgeon: Kirt Gray DPM;  Location: [facility] OR;  Service: Podiatry;  Laterality: Left;    LEFT HEART CATHETERIZATION Left 1/24/2023    Procedure: CATHETERIZATION, HEART, LEFT;  Surgeon: Salma Bonilla MD;  Location: [facility] CATH LAB;  Service: Cardiology;  Laterality: Left;  pt given 130 start time    MANIPULATION WITH ANESTHESIA Left 7/23/2020    Procedure: MANIPULATION, WITH ANESTHESIA;  Surgeon: Kirt Gray DPM;  Location: [facility] OR;  Service: Podiatry;  Laterality: Left;    MIDFOOT ARTHRODESIS Left 7/23/2020    Procedure: FUSION, JOINT, MIDFOOT;  Surgeon: Kirt Gray DPM;  Location: [facility] OR;  Service: Podiatry;  Laterality: Left;  2nd tarsometatarsal joint dislocation via fusion    OPEN REDUCTION AND INTERNAL FIXATION (ORIF) OF INJURY OF ANKLE Left 7/23/2020    Procedure: ORIF, ANKLE;  Surgeon: Kirt Gray DPM;  Location: [facility] OR;  Service: Podiatry;  Laterality: Left;    RECONSTRUCTION WITH FUSION OF CHARCOT FOOT Left 8/18/2020    Procedure: RECONSTRUCTION, CHARCOT FOOT, WITH FUSION;  Surgeon: Kirt Gray DPM;  Location: [facility] OR;  Service: Podiatry;  Laterality: Left;    REMOVAL OF HARDWARE FROM LOWER EXTREMITY Left 1/27/2021    Procedure: REMOVAL, HARDWARE, LOWER EXTREMITY;  Surgeon: Kirt Gray DPM;  Location: [facility] OR;  Service: Podiatry;  Laterality: Left;    WOUND DEBRIDEMENT Left 8/18/2020    Procedure: DEBRIDEMENT, WOUND;  Surgeon: Kirt Gray DPM;  Location: [facility] OR;  Service: Podiatry;  Laterality: Left;       Review of patient's allergies indicates:   Allergen Reactions    Pollen extracts      Seasonal allergies, sneezing       Current Facility-Administered Medications on File Prior to Encounter   Medication     diazePAM tablet 5 mg    diphenhydrAMINE injection 25 mg    sodium chloride 0.9% flush 10 mL     Current Outpatient Medications on File Prior to Encounter   Medication Sig    acetaminophen (TYLENOL) 500 MG tablet Take 500 mg by mouth every 6 (six) hours as needed for Pain.    albuterol (PROVENTIL/VENTOLIN HFA) 90 mcg/actuation inhaler INHALE 2 PUFFS INTO THE LUNGS EVERY 6  HOURS AS NEEDED FOR WHEEZING. RESCUE    atorvastatin (LIPITOR) 40 MG tablet TAKE 1 TABLET BY MOUTH ONCE DAILY.    B infantis/B ani/B koby/B bifid (PROBIOTIC 4X ORAL) Take by mouth Daily.    glimepiride (AMARYL) 4 MG tablet TAKE 1 TABLET  BY MOUTH DAILY WITH BREAKFAST.    insulin glargine U-300 conc (TOUJEO MAX U-300 SOLOSTAR) 300 unit/mL (3 mL) insulin pen Inject 70 Units into the skin every evening.    lisinopriL-hydrochlorothiazide (PRINZIDE,ZESTORETIC) 20-25 mg Tab TAKE 1 TABLET BY MOUTH ONCE DAILY.    magnesium oxide 200 mg magnesium Tab TAKE 3 TABLETS  BY MOUTH EVERY EVENING.    NOVOLOG FLEXPEN U-100 INSULIN 100 unit/mL (3 mL) InPn pen Inject 18 Units into the skin 3 (three) times daily before meals.    omeprazole (PRILOSEC) 20 MG capsule TAKE 1 CAPSULE BY MOUTH TWICE A DAY.    polyethylene glycol (GLYCOLAX) 17 gram PwPk Take 17 g by mouth once daily.    traZODone (DESYREL) 150 MG tablet TAKE 1 TABLET  BY MOUTH NIGHTLY AS NEEDED FOR INSOMNIA.    VITAMIN D2 1,250 mcg (50,000 unit) capsule TAKE 1 CAPSULE  BY MOUTH ONCE A WEEK FOR 4 DOSES    ACCU-CHEK GUIDE TEST STRIPS Strp TO CHECK BG 2 TIMES DAILY, TO USE WITH INSURANCE PREFERRED METER    blood-glucose meter kit To check BG 2 times daily, to use with insurance preferred meter    clobetasoL (TEMOVATE) 0.05 % external solution APPLY TOPICALLY 2 TIMES DAILY TO SCALP ONLY    fluocinolone and shower cap 0.01 % Oil     fluticasone propionate (FLONASE) 50 mcg/actuation nasal spray 2 sprays (100 mcg total) by Each Nostril route once daily.    ketoconazole (NIZORAL) 2 % shampoo APPLY  TOPICALLY 3 TIMES A WEEK.    lancets Misc To check BG 2 times daily, to use with insurance preferred meter    levocetirizine (XYZAL) 5 MG tablet TAKE 1 TABLET (5 MG TOTAL) BY MOUTH EVERY EVENING.     Family History       Problem Relation (Age of Onset)    Cancer Father    Diabetes Sister    Glaucoma Mother, Maternal Grandmother    Hypertension Father, Mother    Stroke Father          Tobacco Use    Smoking status: Never    Smokeless tobacco: Never   Substance and Sexual Activity    Alcohol use: Never    Drug use: No    Sexual activity: Yes     Review of Systems   Constitutional:  Negative for fatigue and fever.   Respiratory:  Negative for cough and shortness of breath.    Cardiovascular:  Negative for chest pain and leg swelling.   Gastrointestinal:  Negative for nausea and vomiting.   Musculoskeletal:  Positive for arthralgias and gait problem.   All other systems reviewed and are negative.  Objective:     Vital Signs (Most Recent):  Temp: 98.4 °F (36.9 °C) (03/13/23 1605)  Pulse: 85 (03/13/23 1700)  Resp: 17 (03/13/23 1700)  BP: (!) 146/65 (03/13/23 1700)  SpO2: 95 % (03/13/23 1700)   Vital Signs (24h Range):  Temp:  [98.2 °F (36.8 °C)-98.4 °F (36.9 °C)] 98.4 °F (36.9 °C)  Pulse:  [81-94] 85  Resp:  [11-21] 17  SpO2:  [94 %-100 %] 95 %  BP: (124-189)/(57-95) 146/65     Weight: 113 kg (249 lb 3.7 oz)  Body mass index is 36.8 kg/m².    Physical Exam  Vitals reviewed.   Constitutional:       Appearance: Normal appearance. She is obese.   HENT:      Head: Normocephalic and atraumatic.      Mouth/Throat:      Mouth: Mucous membranes are moist.      Pharynx: Oropharynx is clear.   Eyes:      Extraocular Movements: Extraocular movements intact.      Conjunctiva/sclera: Conjunctivae normal.   Cardiovascular:      Rate and Rhythm: Normal rate and regular rhythm.      Pulses: Normal pulses.      Heart sounds: Normal heart sounds.   Pulmonary:      Effort: Pulmonary effort is normal.      Breath sounds: Normal breath  sounds.   Abdominal:      General: Bowel sounds are normal.      Palpations: Abdomen is soft.   Musculoskeletal:         General: Normal range of motion.      Cervical back: Normal range of motion and neck supple.      Comments: Left lower extremity with halo external fixator in place with foot and leg in Ace wrap.   Skin:     General: Skin is warm and dry.   Neurological:      General: No focal deficit present.      Mental Status: She is alert and oriented to person, place, and time. Mental status is at baseline.   Psychiatric:         Mood and Affect: Mood normal.         Behavior: Behavior normal.         Thought Content: Thought content normal.           Significant Labs: All pertinent labs within the past 24 hours have been reviewed.  A1C:   Recent Labs   Lab 12/07/22  1211   HGBA1C 7.6*     CBC:   Recent Labs   Lab 03/13/23  0757   WBC 8.95   HGB 14.2   HCT 42.8        CMP:   Recent Labs   Lab 03/13/23  0757      K 4.5      CO2 24   *   BUN 34*   CREATININE 1.3   CALCIUM 9.3   ANIONGAP 11       Significant Imaging: I have reviewed all pertinent imaging results/findings within the past 24 hours.    Assessment/Plan:     * Charcot's neuro arthropathy with dislocation of Lisfranc Joint of left foot    Status post external fixator placed  Multimodal pain control  Bowel protocol  OT PT eval and treat  Social work consult for possible placement    Diabetic ulcer of left midfoot associated with type 2 diabetes mellitus, with fat layer exposed  Patient's FSGs are controlled on current medication regimen.  Last A1c reviewed-   Lab Results   Component Value Date    HGBA1C 7.6 (H) 12/07/2022     Most recent fingerstick glucose reviewed-   Recent Labs   Lab 03/13/23  0808   POCTGLUCOSE 142*     Current correctional scale  Low  Maintain anti-hyperglycemic dose as follows-   Antihyperglycemics (From admission, onward)    Start     Stop Route Frequency Ordered    03/13/23 7400  insulin aspart U-100  pen 0-5 Units         -- SubQ Before meals & nightly PRN 03/13/23 1649        Hold Oral hypoglycemics while patient is in the hospital.    Abnormal nuclear stress test  Status post cardiac catheterization with nonobstructive lesions      ILD (interstitial lung disease)  Patient on albuterol nebs will continue      HLD (hyperlipidemia)  Continue outpatient statin      Severe obesity (BMI 35.0-39.9) with comorbidity  Body mass index is 36.8 kg/m². Morbid obesity complicates all aspects of disease management from diagnostic modalities to treatment. Weight loss encouraged and health benefits explained to patient.         Essential hypertension  Will restart ACE-inhibitor and diuretic postoperatively  P.r.n. hydralazine      Type 2 diabetes mellitus  Patient's FSGs are controlled on current medication regimen.  Last A1c reviewed-   Lab Results   Component Value Date    HGBA1C 7.6 (H) 12/07/2022     Most recent fingerstick glucose reviewed-   Recent Labs   Lab 03/13/23  0808   POCTGLUCOSE 142*     Current correctional scale  Low  Maintain anti-hyperglycemic dose as follows-   Antihyperglycemics (From admission, onward)    None        Hold Oral hypoglycemics while patient is in the hospital.    VTE Risk Mitigation (From admission, onward)         Ordered     rivaroxaban tablet 10 mg  With dinner         03/13/23 1632                   Cristina Crawford MD  Department of Hospital Medicine   [facility] - Med Surg

## 2023-03-13 NOTE — ASSESSMENT & PLAN NOTE
Body mass index is 36.8 kg/m². Morbid obesity complicates all aspects of disease management from diagnostic modalities to treatment. Weight loss encouraged and health benefits explained to patient.

## 2023-03-13 NOTE — ASSESSMENT & PLAN NOTE
Status post external fixator placed  Multimodal pain control  Bowel protocol  OT PT eval and treat  Social work consult for possible placement

## 2023-03-13 NOTE — ASSESSMENT & PLAN NOTE
Patient's FSGs are controlled on current medication regimen.  Last A1c reviewed-   Lab Results   Component Value Date    HGBA1C 7.6 (H) 12/07/2022     Most recent fingerstick glucose reviewed-   Recent Labs   Lab 03/13/23  0808   POCTGLUCOSE 142*     Current correctional scale  Low  Maintain anti-hyperglycemic dose as follows-   Antihyperglycemics (From admission, onward)    Start     Stop Route Frequency Ordered    03/13/23 1748  insulin aspart U-100 pen 0-5 Units         -- SubQ Before meals & nightly PRN 03/13/23 1649        Hold Oral hypoglycemics while patient is in the hospital.

## 2023-03-13 NOTE — SUBJECTIVE & OBJECTIVE
Past Medical History:   Diagnosis Date    Abnormal nuclear stress test 1/23/2023    Arthritis     DERIAN KNEES    Asthma     SEASONAL    Cataract     Diabetes mellitus     Diabetes mellitus, type 2     Diabetic retinopathy     DM (diabetes mellitus) 2007    BS 97 09/29/2020    Hyperlipidemia     Hypertension        Past Surgical History:   Procedure Laterality Date    APPLICATION OF WOUND VACUUM-ASSISTED CLOSURE DEVICE Left 8/18/2020    Procedure: APPLICATION, WOUND VAC;  Surgeon: Kirt Gray DPM;  Location: [facility] OR;  Service: Podiatry;  Laterality: Left;    CATARACT EXTRACTION W/  INTRAOCULAR LENS IMPLANT Right 07/18/2018    CATARACT EXTRACTION W/  INTRAOCULAR LENS IMPLANT Left 03/13/2019    COLONOSCOPY N/A 12/22/2018    Procedure: COLONOSCOPY;  Surgeon: Zak Dover MD;  Location: [facility] ENDO;  Service: Endoscopy;  Laterality: N/A;    COLONOSCOPY N/A 5/11/2022    Procedure: COLONOSCOPY;  Surgeon: Charles Berrios MD;  Location: [facility] ENDO;  Service: Endoscopy;  Laterality: N/A;    ENDOSCOPIC VEIN LASER TREATMENT Bilateral 1990's    FIXATION OF SYNDESMOSIS OF ANKLE Left 7/23/2020    Procedure: FIXATION, SYNDESMOSIS, ANKLE;  Surgeon: Krit Gray DPM;  Location: [facility] OR;  Service: Podiatry;  Laterality: Left;    LEFT HEART CATHETERIZATION Left 1/24/2023    Procedure: CATHETERIZATION, HEART, LEFT;  Surgeon: Salma Bonilla MD;  Location: [facility] CATH LAB;  Service: Cardiology;  Laterality: Left;  pt given 130 start time    MANIPULATION WITH ANESTHESIA Left 7/23/2020    Procedure: MANIPULATION, WITH ANESTHESIA;  Surgeon: Kirt Gray DPM;  Location: [facility] OR;  Service: Podiatry;  Laterality: Left;    MIDFOOT ARTHRODESIS Left 7/23/2020    Procedure: FUSION, JOINT, MIDFOOT;  Surgeon: Kirt Gray DPM;  Location: [facility] OR;  Service: Podiatry;  Laterality: Left;  2nd tarsometatarsal joint dislocation via fusion    OPEN REDUCTION AND INTERNAL FIXATION (ORIF) OF INJURY OF ANKLE Left 7/23/2020    Procedure: ORIF,  ANKLE;  Surgeon: Kirt Gray DPM;  Location: [facility] OR;  Service: Podiatry;  Laterality: Left;    RECONSTRUCTION WITH FUSION OF CHARCOT FOOT Left 8/18/2020    Procedure: RECONSTRUCTION, CHARCOT FOOT, WITH FUSION;  Surgeon: Kirt Gray DPM;  Location: [facility] OR;  Service: Podiatry;  Laterality: Left;    REMOVAL OF HARDWARE FROM LOWER EXTREMITY Left 1/27/2021    Procedure: REMOVAL, HARDWARE, LOWER EXTREMITY;  Surgeon: Kirt Gray DPM;  Location: [facility] OR;  Service: Podiatry;  Laterality: Left;    WOUND DEBRIDEMENT Left 8/18/2020    Procedure: DEBRIDEMENT, WOUND;  Surgeon: Kirt Gray DPM;  Location: [facility] OR;  Service: Podiatry;  Laterality: Left;       Review of patient's allergies indicates:   Allergen Reactions    Pollen extracts      Seasonal allergies, sneezing       Current Facility-Administered Medications on File Prior to Encounter   Medication    diazePAM tablet 5 mg    diphenhydrAMINE injection 25 mg    sodium chloride 0.9% flush 10 mL     Current Outpatient Medications on File Prior to Encounter   Medication Sig    acetaminophen (TYLENOL) 500 MG tablet Take 500 mg by mouth every 6 (six) hours as needed for Pain.    albuterol (PROVENTIL/VENTOLIN HFA) 90 mcg/actuation inhaler INHALE 2 PUFFS INTO THE LUNGS EVERY 6  HOURS AS NEEDED FOR WHEEZING. RESCUE    atorvastatin (LIPITOR) 40 MG tablet TAKE 1 TABLET BY MOUTH ONCE DAILY.    B infantis/B ani/B koby/B bifid (PROBIOTIC 4X ORAL) Take by mouth Daily.    glimepiride (AMARYL) 4 MG tablet TAKE 1 TABLET  BY MOUTH DAILY WITH BREAKFAST.    insulin glargine U-300 conc (TOUJEO MAX U-300 SOLOSTAR) 300 unit/mL (3 mL) insulin pen Inject 70 Units into the skin every evening.    lisinopriL-hydrochlorothiazide (PRINZIDE,ZESTORETIC) 20-25 mg Tab TAKE 1 TABLET BY MOUTH ONCE DAILY.    magnesium oxide 200 mg magnesium Tab TAKE 3 TABLETS  BY MOUTH EVERY EVENING.    NOVOLOG FLEXPEN U-100 INSULIN 100 unit/mL (3 mL) InPn pen Inject 18 Units into the skin 3 (three) times  daily before meals.    omeprazole (PRILOSEC) 20 MG capsule TAKE 1 CAPSULE BY MOUTH TWICE A DAY.    polyethylene glycol (GLYCOLAX) 17 gram PwPk Take 17 g by mouth once daily.    traZODone (DESYREL) 150 MG tablet TAKE 1 TABLET  BY MOUTH NIGHTLY AS NEEDED FOR INSOMNIA.    VITAMIN D2 1,250 mcg (50,000 unit) capsule TAKE 1 CAPSULE  BY MOUTH ONCE A WEEK FOR 4 DOSES    ACCU-CHEK GUIDE TEST STRIPS Strp TO CHECK BG 2 TIMES DAILY, TO USE WITH INSURANCE PREFERRED METER    blood-glucose meter kit To check BG 2 times daily, to use with insurance preferred meter    clobetasoL (TEMOVATE) 0.05 % external solution APPLY TOPICALLY 2 TIMES DAILY TO SCALP ONLY    fluocinolone and shower cap 0.01 % Oil     fluticasone propionate (FLONASE) 50 mcg/actuation nasal spray 2 sprays (100 mcg total) by Each Nostril route once daily.    ketoconazole (NIZORAL) 2 % shampoo APPLY TOPICALLY 3 TIMES A WEEK.    lancets Misc To check BG 2 times daily, to use with insurance preferred meter    levocetirizine (XYZAL) 5 MG tablet TAKE 1 TABLET (5 MG TOTAL) BY MOUTH EVERY EVENING.     Family History       Problem Relation (Age of Onset)    Cancer Father    Diabetes Sister    Glaucoma Mother, Maternal Grandmother    Hypertension Father, Mother    Stroke Father          Tobacco Use    Smoking status: Never    Smokeless tobacco: Never   Substance and Sexual Activity    Alcohol use: Never    Drug use: No    Sexual activity: Yes     Review of Systems   Constitutional:  Negative for fatigue and fever.   Respiratory:  Negative for cough and shortness of breath.    Cardiovascular:  Negative for chest pain and leg swelling.   Gastrointestinal:  Negative for nausea and vomiting.   Musculoskeletal:  Positive for arthralgias and gait problem.   All other systems reviewed and are negative.  Objective:     Vital Signs (Most Recent):  Temp: 98.4 °F (36.9 °C) (03/13/23 1605)  Pulse: 85 (03/13/23 1700)  Resp: 17 (03/13/23 1700)  BP: (!) 146/65 (03/13/23 1700)  SpO2: 95 %  (03/13/23 1700)   Vital Signs (24h Range):  Temp:  [98.2 °F (36.8 °C)-98.4 °F (36.9 °C)] 98.4 °F (36.9 °C)  Pulse:  [81-94] 85  Resp:  [11-21] 17  SpO2:  [94 %-100 %] 95 %  BP: (124-189)/(57-95) 146/65     Weight: 113 kg (249 lb 3.7 oz)  Body mass index is 36.8 kg/m².    Physical Exam  Vitals reviewed.   Constitutional:       Appearance: Normal appearance. She is obese.   HENT:      Head: Normocephalic and atraumatic.      Mouth/Throat:      Mouth: Mucous membranes are moist.      Pharynx: Oropharynx is clear.   Eyes:      Extraocular Movements: Extraocular movements intact.      Conjunctiva/sclera: Conjunctivae normal.   Cardiovascular:      Rate and Rhythm: Normal rate and regular rhythm.      Pulses: Normal pulses.      Heart sounds: Normal heart sounds.   Pulmonary:      Effort: Pulmonary effort is normal.      Breath sounds: Normal breath sounds.   Abdominal:      General: Bowel sounds are normal.      Palpations: Abdomen is soft.   Musculoskeletal:         General: Normal range of motion.      Cervical back: Normal range of motion and neck supple.      Comments: Left lower extremity with halo external fixator in place with foot and leg in Ace wrap.   Skin:     General: Skin is warm and dry.   Neurological:      General: No focal deficit present.      Mental Status: She is alert and oriented to person, place, and time. Mental status is at baseline.   Psychiatric:         Mood and Affect: Mood normal.         Behavior: Behavior normal.         Thought Content: Thought content normal.           Significant Labs: All pertinent labs within the past 24 hours have been reviewed.  A1C:   Recent Labs   Lab 12/07/22  1211   HGBA1C 7.6*     CBC:   Recent Labs   Lab 03/13/23  0757   WBC 8.95   HGB 14.2   HCT 42.8        CMP:   Recent Labs   Lab 03/13/23  0757      K 4.5      CO2 24   *   BUN 34*   CREATININE 1.3   CALCIUM 9.3   ANIONGAP 11       Significant Imaging: I have reviewed all pertinent  imaging results/findings within the past 24 hours.

## 2023-03-13 NOTE — ANESTHESIA PROCEDURE NOTES
Peripheral Block    Patient location during procedure: holding area   Block not for primary anesthetic.  Reason for block: at surgeon's request and post-op pain management   Post-op Pain Location: left ankle   Start time: 3/13/2023 9:18 AM  Timeout: 3/13/2023 9:00 AM   End time: 3/13/2023 9:25 AM    Staffing  Authorizing Provider: Roberto Perez MD  Performing Provider: Roberto Perez MD    Staffing  Other anesthesia staff: Pratik Pittman II, MD  Preanesthetic Checklist  Completed: patient identified, IV checked, site marked, risks and benefits discussed, surgical consent, monitors and equipment checked, pre-op evaluation and timeout performed  Peripheral Block  Patient position: right lateral decubitus  Prep: ChloraPrep  Patient monitoring: heart rate, cardiac monitor, continuous pulse ox, continuous capnometry and frequent blood pressure checks  Block type: popliteal  Laterality: left  Injection technique: single shot  Needle  Needle type: Echogenic   Needle gauge: 21 G  Needle length: 4 in  Needle localization: ultrasound guidance     Assessment  Injection assessment: negative aspiration and negative parasthesia  Paresthesia pain: none  Heart rate change: no  Slow fractionated injection: yes  Pain Tolerance: comfortable throughout block and no complaints  Medications:    Medications: bupivacaine (pf) (MARCAINE) injection 0.25% - Perineural   25 mL - 3/13/2023 9:23:00 AM

## 2023-03-13 NOTE — ANESTHESIA PROCEDURE NOTES
Intubation    Date/Time: 3/13/2023 9:52 AM  Performed by: Precious Pope CRNA  Authorized by: Roberto Perez MD     Intubation:     Induction:  Intravenous    Intubated:  Postinduction    Mask Ventilation:  Easy mask    Attempts:  1    Attempted By:  CRNA    Method of Intubation:  Direct    Blade:  Brett 3    Laryngeal View Grade: Grade I - full view of cords      Difficult Airway Encountered?: No      Complications:  None    Airway Device:  Oral endotracheal tube    Airway Device Size:  7.5    Style/Cuff Inflation:  Cuffed (inflated to minimal occlusive pressure)    Tube secured:  21    Secured at:  The lips    Placement Verified By:  Capnometry and Revisualization with laryngoscopy    Complicating Factors:  None    Findings Post-Intubation:  BS equal bilateral and atraumatic/condition of teeth unchanged

## 2023-03-13 NOTE — ASSESSMENT & PLAN NOTE
Patient's FSGs are controlled on current medication regimen.  Last A1c reviewed-   Lab Results   Component Value Date    HGBA1C 7.6 (H) 12/07/2022     Most recent fingerstick glucose reviewed-   Recent Labs   Lab 03/13/23  0808   POCTGLUCOSE 142*     Current correctional scale  Low  Maintain anti-hyperglycemic dose as follows-   Antihyperglycemics (From admission, onward)    None        Hold Oral hypoglycemics while patient is in the hospital.

## 2023-03-13 NOTE — TRANSFER OF CARE
"Anesthesia Transfer of Care Note    Patient: Kay Galarza    Procedure(s) Performed: Procedure(s) (LRB):  APPLICATION, EXTERNAL FIXATION DEVICE (Left)  REMOVAL, HARDWARE, FOOT (Left)  FUSION, SUBTALAR JOINT (Left)    Patient location: PACU    Anesthesia Type: general    Transport from OR: Transported from OR on room air with adequate spontaneous ventilation    Post pain: adequate analgesia    Post assessment: no apparent anesthetic complications and tolerated procedure well    Post vital signs: stable    Level of consciousness: awake    Nausea/Vomiting: no nausea/vomiting    Complications: none    Transfer of care protocol was followed      Last vitals:   Visit Vitals  BP (!) 171/79   Pulse 82   Temp 36.8 °C (98.2 °F) (Temporal)   Resp 18   Ht 5' 9" (1.753 m)   Wt 113 kg (249 lb 3.7 oz)   SpO2 95%   Breastfeeding No   BMI 36.80 kg/m²     "

## 2023-03-13 NOTE — ANESTHESIA PROCEDURE NOTES
Peripheral Block    Patient location during procedure: holding area   Block not for primary anesthetic.  Reason for block: at surgeon's request and post-op pain management   Post-op Pain Location: left ankle   Start time: 3/13/2023 9:03 AM  Timeout: 3/13/2023 9:00 AM   End time: 3/13/2023 9:11 AM    Staffing  Authorizing Provider: Roberto Perez MD  Performing Provider: Roberto Perez MD    Staffing  Other anesthesia staff: Pratik Pittman II, MD  Preanesthetic Checklist  Completed: patient identified, IV checked, site marked, risks and benefits discussed, surgical consent, monitors and equipment checked, pre-op evaluation and timeout performed  Peripheral Block  Patient position: supine  Prep: ChloraPrep  Patient monitoring: cardiac monitor, continuous pulse ox, continuous capnometry and frequent blood pressure checks  Block type: adductor canal  Laterality: left  Injection technique: single shot  Needle  Needle type: Echogenic   Needle gauge: 21 G  Needle length: 4 in  Needle localization: ultrasound guidance     Assessment  Injection assessment: negative aspiration, negative parasthesia and local visualized surrounding nerve  Paresthesia pain: none  Heart rate change: no  Slow fractionated injection: yes  Pain Tolerance: comfortable throughout block and no complaints  Medications:    Medications: bupivacaine (pf) (MARCAINE) injection 0.25% - Perineural   25 mL - 3/13/2023 9:35:00 AM

## 2023-03-13 NOTE — HPI
Patient is a 67-year-old  female with a history of diabetes mellitus with diabetic foot ulcer on left, Charcot joint left ankle, hypertension, hyperlipidemia, chronic pain who underwent ORIF of left ankle with external fixation by Dr. Angel today.  Hospital Medicine is consulted for admission for pain control, but OT/PT eval and treat.  Patient is seen in postop holding she is lethargic but arousable interpreting service was used and patient's only complaint was continued pain in her left ankle.

## 2023-03-14 ENCOUNTER — PATIENT MESSAGE (OUTPATIENT)
Dept: PODIATRY | Facility: CLINIC | Age: 68
End: 2023-03-14
Payer: MEDICARE

## 2023-03-14 LAB
ALBUMIN SERPL BCP-MCNC: 3.1 G/DL (ref 3.5–5.2)
ANION GAP SERPL CALC-SCNC: 7 MMOL/L (ref 8–16)
BASOPHILS # BLD AUTO: 0.04 K/UL (ref 0–0.2)
BASOPHILS NFR BLD: 0.5 % (ref 0–1.9)
BUN SERPL-MCNC: 27 MG/DL (ref 8–23)
CALCIUM SERPL-MCNC: 8.2 MG/DL (ref 8.7–10.5)
CHLORIDE SERPL-SCNC: 104 MMOL/L (ref 95–110)
CO2 SERPL-SCNC: 27 MMOL/L (ref 23–29)
CREAT SERPL-MCNC: 1.1 MG/DL (ref 0.5–1.4)
DIFFERENTIAL METHOD: ABNORMAL
EOSINOPHIL # BLD AUTO: 0.2 K/UL (ref 0–0.5)
EOSINOPHIL NFR BLD: 2 % (ref 0–8)
ERYTHROCYTE [DISTWIDTH] IN BLOOD BY AUTOMATED COUNT: 13.8 % (ref 11.5–14.5)
EST. GFR  (NO RACE VARIABLE): 54 ML/MIN/1.73 M^2
GLUCOSE SERPL-MCNC: 180 MG/DL (ref 70–110)
HCT VFR BLD AUTO: 36.4 % (ref 37–48.5)
HGB BLD-MCNC: 11.9 G/DL (ref 12–16)
IMM GRANULOCYTES # BLD AUTO: 0.04 K/UL (ref 0–0.04)
IMM GRANULOCYTES NFR BLD AUTO: 0.5 % (ref 0–0.5)
LYMPHOCYTES # BLD AUTO: 0.7 K/UL (ref 1–4.8)
LYMPHOCYTES NFR BLD: 9.1 % (ref 18–48)
MCH RBC QN AUTO: 30.6 PG (ref 27–31)
MCHC RBC AUTO-ENTMCNC: 32.7 G/DL (ref 32–36)
MCV RBC AUTO: 94 FL (ref 82–98)
MONOCYTES # BLD AUTO: 0.8 K/UL (ref 0.3–1)
MONOCYTES NFR BLD: 9.6 % (ref 4–15)
NEUTROPHILS # BLD AUTO: 6.2 K/UL (ref 1.8–7.7)
NEUTROPHILS NFR BLD: 78.3 % (ref 38–73)
NRBC BLD-RTO: 0 /100 WBC
PHOSPHATE SERPL-MCNC: 3.5 MG/DL (ref 2.7–4.5)
PLATELET # BLD AUTO: 200 K/UL (ref 150–450)
PMV BLD AUTO: 9.8 FL (ref 9.2–12.9)
POCT GLUCOSE: 172 MG/DL (ref 70–110)
POCT GLUCOSE: 188 MG/DL (ref 70–110)
POCT GLUCOSE: 201 MG/DL (ref 70–110)
POTASSIUM SERPL-SCNC: 4.9 MMOL/L (ref 3.5–5.1)
RBC # BLD AUTO: 3.89 M/UL (ref 4–5.4)
SODIUM SERPL-SCNC: 138 MMOL/L (ref 136–145)
WBC # BLD AUTO: 7.98 K/UL (ref 3.9–12.7)

## 2023-03-14 PROCEDURE — 36415 COLL VENOUS BLD VENIPUNCTURE: CPT | Mod: HCNC | Performed by: INTERNAL MEDICINE

## 2023-03-14 PROCEDURE — 63600175 PHARM REV CODE 636 W HCPCS: Mod: HCNC | Performed by: PODIATRIST

## 2023-03-14 PROCEDURE — 25000003 PHARM REV CODE 250: Mod: HCNC | Performed by: INTERNAL MEDICINE

## 2023-03-14 PROCEDURE — 25000003 PHARM REV CODE 250: Mod: HCNC | Performed by: PODIATRIST

## 2023-03-14 PROCEDURE — 97530 THERAPEUTIC ACTIVITIES: CPT | Mod: HCNC

## 2023-03-14 PROCEDURE — 94761 N-INVAS EAR/PLS OXIMETRY MLT: CPT | Mod: HCNC

## 2023-03-14 PROCEDURE — 27000221 HC OXYGEN, UP TO 24 HOURS: Mod: HCNC

## 2023-03-14 PROCEDURE — 63600175 PHARM REV CODE 636 W HCPCS: Mod: HCNC | Performed by: INTERNAL MEDICINE

## 2023-03-14 PROCEDURE — 99900035 HC TECH TIME PER 15 MIN (STAT): Mod: HCNC

## 2023-03-14 PROCEDURE — 80069 RENAL FUNCTION PANEL: CPT | Mod: HCNC | Performed by: INTERNAL MEDICINE

## 2023-03-14 PROCEDURE — 97166 OT EVAL MOD COMPLEX 45 MIN: CPT | Mod: HCNC

## 2023-03-14 PROCEDURE — 97162 PT EVAL MOD COMPLEX 30 MIN: CPT | Mod: HCNC

## 2023-03-14 PROCEDURE — 85025 COMPLETE CBC W/AUTO DIFF WBC: CPT | Mod: HCNC | Performed by: INTERNAL MEDICINE

## 2023-03-14 RX ADMIN — ONDANSETRON 4 MG: 2 INJECTION INTRAMUSCULAR; INTRAVENOUS at 05:03

## 2023-03-14 RX ADMIN — APIXABAN 2.5 MG: 2.5 TABLET, FILM COATED ORAL at 09:03

## 2023-03-14 RX ADMIN — CLINDAMYCIN PHOSPHATE 900 MG: 900 INJECTION, SOLUTION INTRAVENOUS at 03:03

## 2023-03-14 RX ADMIN — CYCLOBENZAPRINE HYDROCHLORIDE 5 MG: 5 TABLET, FILM COATED ORAL at 01:03

## 2023-03-14 RX ADMIN — OXYCODONE HYDROCHLORIDE 10 MG: 5 TABLET ORAL at 01:03

## 2023-03-14 RX ADMIN — PANTOPRAZOLE SODIUM 40 MG: 40 TABLET, DELAYED RELEASE ORAL at 09:03

## 2023-03-14 RX ADMIN — MORPHINE SULFATE 4 MG: 4 INJECTION INTRAVENOUS at 09:03

## 2023-03-14 RX ADMIN — ATORVASTATIN CALCIUM 40 MG: 40 TABLET, FILM COATED ORAL at 09:03

## 2023-03-14 RX ADMIN — APIXABAN 2.5 MG: 2.5 TABLET, FILM COATED ORAL at 01:03

## 2023-03-14 RX ADMIN — NYSTATIN AND TRIAMCINOLONE ACETONIDE: 100000; 1 OINTMENT TOPICAL at 01:03

## 2023-03-14 RX ADMIN — MORPHINE SULFATE 4 MG: 4 INJECTION INTRAVENOUS at 05:03

## 2023-03-14 RX ADMIN — DOCUSATE SODIUM 100 MG: 100 CAPSULE, LIQUID FILLED ORAL at 09:03

## 2023-03-14 RX ADMIN — POLYETHYLENE GLYCOL 3350 17 G: 17 POWDER, FOR SOLUTION ORAL at 09:03

## 2023-03-14 RX ADMIN — CYCLOBENZAPRINE HYDROCHLORIDE 5 MG: 5 TABLET, FILM COATED ORAL at 05:03

## 2023-03-14 RX ADMIN — NYSTATIN AND TRIAMCINOLONE ACETONIDE: 100000; 1 OINTMENT TOPICAL at 09:03

## 2023-03-14 RX ADMIN — CLINDAMYCIN PHOSPHATE 900 MG: 900 INJECTION, SOLUTION INTRAVENOUS at 10:03

## 2023-03-14 RX ADMIN — INSULIN ASPART 2 UNITS: 100 INJECTION, SOLUTION INTRAVENOUS; SUBCUTANEOUS at 05:03

## 2023-03-14 NOTE — ANESTHESIA POSTPROCEDURE EVALUATION
Anesthesia Post Evaluation    Patient: Kay Galarza    Procedure(s) Performed: Procedure(s) (LRB):  APPLICATION, EXTERNAL FIXATION DEVICE (Left)  REMOVAL, HARDWARE, FOOT (Left)  FUSION, SUBTALAR JOINT (Left)  FUSION, JOINT, MIDFOOT (Left)    Final Anesthesia Type: general      Patient location during evaluation: PACU  Patient participation: Yes- Able to Participate  Level of consciousness: awake  Post-procedure vital signs: reviewed and stable  Pain management: adequate  Airway patency: patent    PONV status at discharge: No PONV  Anesthetic complications: no      Cardiovascular status: hemodynamically stable  Respiratory status: unassisted  Hydration status: euvolemic  Follow-up not needed.          Vitals Value Taken Time   /62 03/13/23 1948   Temp 36.6 °C (97.8 °F) 03/13/23 2138   Pulse 82 03/13/23 1948   Resp 18 03/13/23 2138   SpO2 98 % 03/13/23 1948         Event Time   Out of Recovery 17:18:08         Pain/Pool Score: Pain Rating Prior to Med Admin: 10 (3/13/2023  9:38 PM)  Pain Rating Post Med Admin: 10 (3/13/2023  6:42 PM)  Pool Score: 8 (3/13/2023  4:45 PM)

## 2023-03-14 NOTE — ASSESSMENT & PLAN NOTE
Body mass index is 38.48 kg/m². Morbid obesity complicates all aspects of disease management from diagnostic modalities to treatment. Weight loss encouraged and health benefits explained to patient

## 2023-03-14 NOTE — PATIENT INSTRUCTIONS
Weightbearing Status and Wound Care:  1. You make weight bearing on the external fixator with a walker.    2. When ambulating, use a walker at all times. Put only partial weight on the foot.     3. During daytime/awake hours, if a CAST or POSTERIOR SPLINT is in place, ice the posterior aspect of the leg, behind the knee, 20 minutes on (w/ ice) and followed by 20 minutes off (w/o ice) until follow up; repeat accordingly until follow up. IF no CAST or POSTERIOR SPLINT is in place, ice the anterior aspect of the ankle, in a similar manner. During night time/sleep hours, simply elevating the limb above the level of the heart is sufficient.     4. Elevate the extremity above the level of the heart at all times when sitting/sleeping.    5. Take the pain mediations as prescribed for the initial 3 or so days, then only as needed.    6. Start Xarelto 10mg QD (with dinner) or Eliquis 2.5mg BID on tomorrow for DVT PPx.    7. Do not change the dressings.    8. Do not get the dressings wet.      9. Please be reminded of the harmful effects of nicotine/tobacco/cigarette/cigar/marijuana smoking, especially in relation to the lower extremity, particularly in reference to post operative healing. I do rec. complete or near complete cessation of any or all of the aforementioned until you are well out of the post-operative period.    10. If you were prescribed more than one short acting opioid, e.g., (Morphine or Dilaudid), with Percocet or Norco or Tramadol, the Morphine (MSIR) or Dilaudid (Hydromorphone) is to be taken during the immediate initial days s/p, at an interval of every 6 hours or 5 hours or 4 hours, as needed for pain control. Once you are complete with the Morphine (MSIR) or Dilaudid (Hydromorphone), you may/should convert to the secondary medication. DO NOT take both medications together at any time. It is not advisable to start with the less potent medication, Percocet or Norco or Tramadol, and then convert to the  stronger medication.

## 2023-03-14 NOTE — SUBJECTIVE & OBJECTIVE
Interval History: awake, alert, lying in bed, daughter at bedside. She c/o pain to her left foot, improved with pain medication. On Supplemental oxygen, wean as tolerated. PT/OT recommended SNF, placement pending.     Review of Systems   Constitutional:  Negative for fatigue and fever.   Respiratory:  Negative for cough and shortness of breath.    Cardiovascular:  Negative for chest pain and palpitations.   Gastrointestinal:  Negative for nausea and vomiting.   Musculoskeletal:  Positive for joint swelling.        Left ankle pain   All other systems reviewed and are negative.  Objective:     Vital Signs (Most Recent):  Temp: 99.3 °F (37.4 °C) (03/14/23 1105)  Pulse: 84 (03/14/23 1105)  Resp: 14 (03/14/23 1332)  BP: 135/61 (03/14/23 1105)  SpO2: 97 % (03/14/23 1300) Vital Signs (24h Range):  Temp:  [96.9 °F (36.1 °C)-99.5 °F (37.5 °C)] 99.3 °F (37.4 °C)  Pulse:  [81-95] 84  Resp:  [11-21] 14  SpO2:  [95 %-100 %] 97 %  BP: (120-150)/(57-94) 135/61     Weight: 118.2 kg (260 lb 9.3 oz)  Body mass index is 38.48 kg/m².    Intake/Output Summary (Last 24 hours) at 3/14/2023 1510  Last data filed at 3/14/2023 1405  Gross per 24 hour   Intake 561.28 ml   Output 1300 ml   Net -738.72 ml      Physical Exam  Vitals and nursing note reviewed.   Constitutional:       Appearance: She is obese.   Cardiovascular:      Rate and Rhythm: Normal rate and regular rhythm.      Heart sounds: No murmur heard.  Pulmonary:      Effort: Pulmonary effort is normal.      Breath sounds: Normal breath sounds.   Abdominal:      General: Bowel sounds are normal.      Palpations: Abdomen is soft.   Musculoskeletal:      Left ankle: Decreased range of motion.      Comments: EX fix to left ankle   Feet:      Comments: Left foot surgical ACE wrap with small amount of drainage at heel  Skin:     General: Skin is warm and dry.   Neurological:      General: No focal deficit present.      Mental Status: She is alert and oriented to person, place, and time.    Psychiatric:         Mood and Affect: Mood normal.         Behavior: Behavior normal.       Significant Labs: All pertinent labs within the past 24 hours have been reviewed.  A1C:   Recent Labs   Lab 12/07/22  1211   HGBA1C 7.6*     BMP:   Recent Labs   Lab 03/14/23  0608   *      K 4.9      CO2 27   BUN 27*   CREATININE 1.1   CALCIUM 8.2*     CBC:   Recent Labs   Lab 03/13/23  0757 03/14/23  0608   WBC 8.95 7.98   HGB 14.2 11.9*   HCT 42.8 36.4*    200     CMP:   Recent Labs   Lab 03/13/23  0757 03/14/23  0608    138   K 4.5 4.9    104   CO2 24 27   * 180*   BUN 34* 27*   CREATININE 1.3 1.1   CALCIUM 9.3 8.2*   ALBUMIN  --  3.1*   ANIONGAP 11 7*     Troponin: No results for input(s): TROPONINI, TROPONINIHS in the last 48 hours.  TSH:   Recent Labs   Lab 12/07/22  1211   TSH 1.577     Urine Studies: No results for input(s): COLORU, APPEARANCEUA, PHUR, SPECGRAV, PROTEINUA, GLUCUA, KETONESU, BILIRUBINUA, OCCULTUA, NITRITE, UROBILINOGEN, LEUKOCYTESUR, RBCUA, WBCUA, BACTERIA, SQUAMEPITHEL, HYALINECASTS in the last 48 hours.    Invalid input(s): CHATO    Significant Imaging: I have reviewed all pertinent imaging results/findings within the past 24 hours.

## 2023-03-14 NOTE — PLAN OF CARE
[facility] - Med Surg  Initial Discharge Assessment       Primary Care Provider: Lucy Negron MD    Admission Diagnosis: Diabetic ulcer of left midfoot associated with type 2 diabetes mellitus, with fat layer exposed [E11.621, L97.422]  Charcot's joint of foot, left [M14.672]  Charcot's joint of ankle, left [M14.672]  Painful orthopaedic hardware [T84.84XA]  Pseudarthrosis after fusion or arthrodesis [M96.0]    Admission Date: 3/13/2023  Expected Discharge Date:     Discharge Barriers Identified: None    Payor: HUMANA MANAGED MEDICARE / Plan: HUMANA SNP (SPECIAL NEEDS PLAN) / Product Type: Medicare Advantage /     Extended Emergency Contact Information  Primary Emergency Contact: Cass Castillo  Address: [address] [place] [address] [address] [place] [address] [place]  Mobile Phone: [phone]  Relation: Daughter  Secondary Emergency Contact: Rik Castillo  [place] Phone: [phone]  Mobile Phone: [phone]  Relation: Son  Preferred language: Chinese   needed? Yes    Discharge Plan A: Skilled Nursing Facility  Discharge Plan B: Home Health (previously attempted HH after last sx and was unable to care for self in current environment)      [facility] [place] - [address]  [address]  [facility] [address]  Phone: [phone] Fax: [phone]      Initial Assessment (most recent)       Adult Discharge Assessment - 03/14/23 1250          Discharge Assessment    Assessment Type Discharge Planning Assessment     Confirmed/corrected address, phone number and insurance Yes     Confirmed Demographics Correct on Facesheet     Source of Information family     If unable to respond/provide information was family/caregiver contacted? Yes     Contact Name/Number daryl castillo     Reason For Admission sx for diabetic ulcer/ charcots foot- ex fix application     People in Home child(denver), adult     Facility Arrived From: home     Do you  expect to return to your current living situation? Yes   fter a skilled stay for rehab and recovery    Do you have help at home or someone to help you manage your care at home? Yes     Who are your caregiver(s) and their phone number(s)? son- Rik Verduzco     Prior to hospitilization cognitive status: Alert/Oriented   Does not speak English/ Dtr very fluent, and pt has given permission for dtr to translate    Current cognitive status: Alert/Oriented     Walking or Climbing Stairs ambulation difficulty, dependent;transferring difficulty, dependent     Mobility Management pending therapy notes but NWB with ex fix in place     Dressing/Bathing bathing difficulty, dependent;dressing difficulty, dependent     Dressing/Bathing Management pending therapy notes but NWB with ex fix in place     Home Accessibility wheelchair accessible     Home Layout Able to live on 1st floor     Equipment Currently Used at Home walker, rolling;wheelchair     Readmission within 30 days? No     Patient currently being followed by outpatient case management? No     Do you currently have service(s) that help you manage your care at home? No     Do you take prescription medications? Yes     Do you have prescription coverage? Yes     Do you have any problems affording any of your prescribed medications? No     Is the patient taking medications as prescribed? yes     Who is going to help you get home at discharge? son or dtr     How do you get to doctors appointments? family or friend will provide     Are you on dialysis? No     Do you take coumadin? No     Discharge Plan A Skilled Nursing Facility     Discharge Plan B Home Health   previously attempted HH after last sx and was unable to care for self in current environment    DME Needed Upon Discharge  shower chair     Discharge Plan discussed with: Adult children     Discharge Barriers Identified None                      Anticipated DC dispo: Skilled Nursing Facility  Prior Level of  Function: min assist/supervision  PCP:     Comments:  CM met with patient at bedside to introduce role and discuss discharge planning. Patient lives with son and DIL  who will also be help at home and can provide transport at time of discharge. CM discharge needs depends on hospital progress. CM will continue following to assist with other needs.    Current plan is for skilled level of care prior to DC home with family. Pending therapy notes to initiate referral process.

## 2023-03-14 NOTE — CONSULTS
Diabetes Education  Author: Makenna Wright, RN  Date: 3/14/2023      Consulted for diabetes education.  needed. Cuca used for Venezuelan speaking patient.  Eric # [id number]. Patient states she has the Dexcom to monitor her blood sugar levels continuous and that she normally ranges between 100-150, but the last week her blood sugar has been increasing and gets up to the 300's very often. Her last A1C level was 7.6 on 12/7/22. She takes her medication as ordered and has all supplies needed. Patient states she is in pain. Primary nurse informed. No additional questions at this time. Patient encouraged to contact myself for any additional information. Educational handout given in Maori and male family member at bedside does speak English.                   Health Maintenance was reviewed today with patient. Discussed with patient importance of routine eye exams, foot exams/foot care, blood work (i.e.: A1c, microalbumin, and lipid), dental visits, yearly flu vaccine, and pneumonia vaccine as indicated by PCP. Patient verbalized understanding.     Health Maintenance Topics with due status: Not Due       Topic Last Completion Date    TETANUS VACCINE 12/11/2017    Colorectal Cancer Screening 05/11/2022    Diabetes Urine Screening 05/25/2022    Mammogram 09/21/2022    Hemoglobin A1c 12/07/2022    DEXA Scan 12/20/2022    Low Dose Statin 03/13/2023     Health Maintenance Due   Topic Date Due    Hepatitis C Screening  Never done    Shingles Vaccine (2 of 3) 01/02/2018    COVID-19 Vaccine (3 - Booster for Pfizer series) 09/13/2021    Eye Exam  04/23/2022    Lipid Panel  01/26/2023    Foot Exam  03/17/2023                                                                                                    Today's Self-Management Care Plan was developed with the patient's input and is based on barriers identified during today's assessment.    The long and short-term goals in the care plan were written with  the patient/caregiver's input. The patient has agreed to work toward these goals to improve her overall diabetes control.        The patient was encouraged to communicate with her physician and care team regarding her condition(s) and treatment.  I provided the patient with my contact information today and encouraged her to contact me via phone or patient portal as needed.

## 2023-03-14 NOTE — NURSING
ADULT BRAIN DEATH PRONOUNCEMENT NOTE    Patient's Name: Petar Lazar   Patient's YOB: 1984  MRN Number: [id number]    Admitting Provider: Zulema Steiner DO  Attending Provider: Zulema Steiner DO    Number Examinations:  One examination by a physician, no specified period of time after neurological insult  Prerequisites Guidelines  Irreversible and identifiable cause of coma - including but not limited to, Traumatic Brain Injury and Anoxic Brain Injury  Blood Alcohol Level (BAL) less than 0.08%  Electrolyte levels or acid-base balance normal, intentionally abnormal, or irreversible abnormal because of known disease process   Sedatives, Analgesics, and Other Central Nervous System Depressant Drugs - absent or 5 times the drugs half-life from last use (assuming normal hepatic and renal function, use of hypothermia may delay drug metabolism)  Neuromuscular blockade (paralytics) - not used or off for sufficient period of time to be out of system and excluded as contributing factor. If neuromuscular blockade discontinued, test maximal ulnar nerve stimulation and need to have train of four out of four twitches before proceeding with exam.  Metabolic intoxication - excluded as contributing factor based on history or laboratory examinations  No spontaneous respiratory effort while on ventilator  Core body temperature is 36oC or greater   Systolic Blood Pressure (SBP) equal or greater than 100 mm Hg. If unable to meet SBP parameters with vasopressors, will need to perform ancillary tests after performing Neurological Exam and Apnea Exam, which indicates brain death.   Neurological Exam - all brain stem reflexes MUST be ABSENT to meet criteria for brain stem death  Apnea Exam -   Need to perform only one Apnea exam  Pre-ABG PaCO2 should be eucapnic (35 to 45 mmHg) or at patient's baseline PaCO2  Must have no respiratory effort observed during apnea exam  Post-ABG demonstrates a PaCO2 rises by at Pt was due to void in the 0500 hour post straight cath with an output of 600ml. Pt has not voided, bladder scanner shows 105mls. Hospital medicine notified. No new orders at this time.    least 20 mm Hg above baseline OR is greater than 60 mm Hg (if patient does not have history of CO2 retention)  Terminate Apnea Exam if meets positive criteria or the patient becomes hypoxic (SpO2 less than 85%) or unstable (SBP less than 90 mm Hg)  If patient does not tolerate Apnea Exam then will need to proceed with Ancillary Test  Ancillary Tests - NOT REQUIRED UNLESS ONE OR MORE OF THE FOLLOWING CRITERIA PRESENT:   Neurologic and/or Apnea Examinations attempted but patient could not tolerate examination(s)  Any uncertainty about the Neurologic and/or Apnea Examinations  Concern regarding potential confounding factors (such as pre-existing neuromuscular disease or diaphragm paralysis)     Results of Neurological Exam   Date/Time of Exam Date: 10/31/2022  Time: 1231 hours   Prerequisites:    Cause of Coma Intracranial hemorrhage     on 10/30/22   Sedatives, Analgesics, CNS Depressants - Has been off all CNS depressant medications for at least 5 half lives   Neuromuscular blockade Absent    Metabolic intoxication Chronic hyperammonemic noted   Spontaneous respiratory effort while on ventilator Absent    Core body temperature 97.9F   /65   Neurological Exam:    Response to pain stimuli sternal rub or supraorbital pressure Absent    Yawning Absent    Posturing Absent    Pupillary reaction with no local eye trauma or mydriatics Absent    Corneal reflex Absent    Gag reflex Absent    Cough reflex (when suctioning trach tube) Absent    Oculovestibular reflex Absent    Oculocephalic reflex (Doll's eyes) Absent     Provider completing Exam  Electronically signed by David Blanco DO on 10/31/22 at 1:53 PM EDT     Apnea Test Date: 10/31/2022  Time: 1217 hours  Pre PaCO2: 37.5 mmHg  Pre pH: 7.43  Post PaCO2: 58.7 mmHg  Post pH: 7.276  Results: apneic    Provider completing Apnea Exam: Electronically signed by Surendra Oquendo MD on 10/31/22 at 2:22 PM EDT     Ancillary Tests:   Not Indicated    Signature and Pronouncement of Brain Death   I certify that Brain Death Neurological Exam and Apnea Test confirms irreversible cessation of function of the brain and brainstem.   The patient is declared brain dead on 10/31/2022 at 12:31 PM.     Electronically signed by Colie Cooks, MD on 10/31/22 at 2:22 PM EDT  Examiner's Specialty: Critical Care Physician     Electronically signed by Dilma Villa DO on 10/31/2022 at 1:54 PM   Examiner's Specialty: Neurology

## 2023-03-14 NOTE — PT/OT/SLP EVAL
Occupational Therapy Evaluation and Treatment    Name: Kay Galarza  MRN: [id number]  Admitting Diagnosis: Charcot's joint of left foot 1 Day Post-Op  Recent Surgery: Procedure(s) (LRB):  APPLICATION, EXTERNAL FIXATION DEVICE (Left)  REMOVAL, HARDWARE, FOOT (Left)  FUSION, SUBTALAR JOINT (Left)  FUSION, JOINT, MIDFOOT (Left) 1 Day Post-Op    Recommendations:     Discharge Recommendations: nursing facility, skilled  Level of Assistance Recommended: 24 hours significant assistance  Discharge Equipment Recommendations: none  Barriers to discharge:      Assessment:   Kay Galarza is a 67 y.o. female with a medical diagnosis of Charcot's joint of left foot. She presents with performance deficits affecting function including weakness, impaired functional mobility, decreased safety awareness, impaired endurance, gait instability, impaired balance, impaired self care skills, visual deficits, pain, decreased lower extremity function, decreased upper extremity function. Patient tolerated session fair.     Rehab Prognosis: Fair; patient would benefit from acute skilled OT services to address these deficits and reach maximum level of function.    Plan:     Patient to be seen 2 x/week to address the above listed problems via self-care/home management, therapeutic activities, therapeutic exercises  Plan of Care Expires: 03/28/23  Plan of Care Reviewed with: patient    Subjective     Chief Complaint: DEBILITY AND GENERALIZED WEAKNESS  Patient Comments/Goals:   Pain/Comfort:  Pain Rating 1: 0/10    Patients cultural, spiritual, Protestant conflicts given the current situation:      Social History:  Living Environment: Patient lives with their family in a single story home, number of outside stairs: 0 .  Prior Level of Function: Prior to admission, patient was independent with ADLs.  Roles and Routines: OCCUPATIONAL THERAPY  Equipment Used at Home  DME owned (not c  Upon discharge, patient will have  assistance from facility staff.    Objective:     Communicated with NURSE AND Epic CHART REVIEW prior to session. Patient found HOB elevated with peripheral IV, PureWick upon OT entry to room.    General Precautions: Standard, fall   Orthopedic Precautions:    Braces: N/A   Respiratory Status: Room air    Occupational Performance  PT DOES NOT SPEAK ENGLISH HOWEVER DAUGHTER REPORTED SHE WILL VBE ABLE TO TRANSLATE  Bed Mobility:  Rolling/Turning to Left with maximal assistance WITH BED RAIL  Rolling/Turning to Right with maximal assistance WITH BED RAIL    Activities of Daily Living:  Upper Body Dressing: moderate assistance . HOSPITAL GOWN  Lower Body Dressing: total assistance .JESSICA/DOFF SOCK R LE    Cognitive/Visual Perceptual:  DAUGHTER REPORTED DOES NOT SPEAK OR INTERPRET ENGLISH. DAUGHTER REPORTED WILL BE WILLING AND ABLE TO TRANSLATE   Physical Exam:  Upper Extremity Range of Motion:     -       Right Upper Extremity: WFL  -       Left Upper Extremity: WFL  Upper Extremity Strength:    -       Right Upper Extremity: MMT: 3/5 GROSSLY  -       Left Upper Extremity: MMT: 3/5 GROSSLY   Strength:    -       Right Upper Extremity: MMT: 3/5 GROSSLY  -       Left Upper Extremity: MMT: 3/5 GROSSLY    AMPAC 6 Click ADL:  AMPAC Total Score: 17    Treatment & Education:  Educated on the Importance of mobility to maximize recovery.  Educated on weight bearing status  Will continue to educate as needed    EDUCATED ON POSTIONING OF L LE    Patient not clear to transfer with RN/PCT.    Patient left  L LE  with ABOVE HEART RATE all lines intact, call button in reach, and family present.    GOALS:   Multidisciplinary Problems       Occupational Therapy Goals          Problem: Occupational Therapy    Goal Priority Disciplines Outcome Interventions   Occupational Therapy Goal     OT, PT/OT     Description: O.T. GOALS TO BE MET 3-28-23  PT WILL TOLERATE 1 SET X 15 REPS WITH MIN RESISTANCE  S WITH UE DRESSING  S WITH SUPINE<SIT  EOB TO PREP FOR G/H TASK SEATED  MIN A WITH LE DRESSING                       History:     Past Medical History:   Diagnosis Date    Abnormal nuclear stress test 1/23/2023    Arthritis     DERIAN KNEES    Asthma     SEASONAL    Cataract     Diabetes mellitus     Diabetes mellitus, type 2     Diabetic retinopathy     DM (diabetes mellitus) 2007    BS 97 09/29/2020    Hyperlipidemia     Hypertension          Past Surgical History:   Procedure Laterality Date    APPLICATION OF WOUND VACUUM-ASSISTED CLOSURE DEVICE Left 8/18/2020    Procedure: APPLICATION, WOUND VAC;  Surgeon: Kirt Gray DPM;  Location: [facility] OR;  Service: Podiatry;  Laterality: Left;    CATARACT EXTRACTION W/  INTRAOCULAR LENS IMPLANT Right 07/18/2018    CATARACT EXTRACTION W/  INTRAOCULAR LENS IMPLANT Left 03/13/2019    COLONOSCOPY N/A 12/22/2018    Procedure: COLONOSCOPY;  Surgeon: Zak Dover MD;  Location: [facility] ENDO;  Service: Endoscopy;  Laterality: N/A;    COLONOSCOPY N/A 5/11/2022    Procedure: COLONOSCOPY;  Surgeon: Charles Berrios MD;  Location: [facility] ENDO;  Service: Endoscopy;  Laterality: N/A;    ENDOSCOPIC VEIN LASER TREATMENT Bilateral 1990's    FIXATION OF SYNDESMOSIS OF ANKLE Left 7/23/2020    Procedure: FIXATION, SYNDESMOSIS, ANKLE;  Surgeon: Kirt Gray DPM;  Location: [facility] OR;  Service: Podiatry;  Laterality: Left;    LEFT HEART CATHETERIZATION Left 1/24/2023    Procedure: CATHETERIZATION, HEART, LEFT;  Surgeon: Salma Bonilla MD;  Location: [facility] CATH LAB;  Service: Cardiology;  Laterality: Left;  pt given 130 start time    MANIPULATION WITH ANESTHESIA Left 7/23/2020    Procedure: MANIPULATION, WITH ANESTHESIA;  Surgeon: Kirt Gray DPM;  Location: [facility] OR;  Service: Podiatry;  Laterality: Left;    MIDFOOT ARTHRODESIS Left 7/23/2020    Procedure: FUSION, JOINT, MIDFOOT;  Surgeon: Kirt Gray DPM;  Location: [facility] OR;  Service: Podiatry;  Laterality: Left;  2nd tarsometatarsal joint dislocation via fusion    OPEN  REDUCTION AND INTERNAL FIXATION (ORIF) OF INJURY OF ANKLE Left 7/23/2020    Procedure: ORIF, ANKLE;  Surgeon: Kirt rGay DPM;  Location: [facility] OR;  Service: Podiatry;  Laterality: Left;    RECONSTRUCTION WITH FUSION OF CHARCOT FOOT Left 8/18/2020    Procedure: RECONSTRUCTION, CHARCOT FOOT, WITH FUSION;  Surgeon: Kirt Gray DPM;  Location: [facility] OR;  Service: Podiatry;  Laterality: Left;    REMOVAL OF HARDWARE FROM LOWER EXTREMITY Left 1/27/2021    Procedure: REMOVAL, HARDWARE, LOWER EXTREMITY;  Surgeon: Kirt Gray DPM;  Location: [facility] OR;  Service: Podiatry;  Laterality: Left;    WOUND DEBRIDEMENT Left 8/18/2020    Procedure: DEBRIDEMENT, WOUND;  Surgeon: Kirt Gray DPM;  Location: [facility] OR;  Service: Podiatry;  Laterality: Left;       Time Tracking:     OT Date of Treatment: 03/14/23  OT Start Time: 1211  OT Stop Time: 1226  OT Total Time (min): 15 min    Billable Minutes: Evaluation 15    3/14/2023

## 2023-03-14 NOTE — OP NOTE
[facility]  Podiatric Medicine & Surgery  Operative Report    SUMMARY     Date of Procedure: 3/13/2023    Procedure: Procedure(s):  APPLICATION, EXTERNAL FIXATION DEVICE  REMOVAL, HARDWARE, FOOT  FUSION, SUBTALAR JOINT  FUSION, JOINT, MIDFOOT    Surgeon(s) and Role: Surgeon(s) and Role:     * Kirt Gray, DPM - Primary    Pre-Operative Diagnosis: Pre-Op Diagnosis Codes:     * Diabetic ulcer of left midfoot associated with type 2 diabetes mellitus, with fat layer exposed [E11.621, L97.422]     * Charcot's joint of foot, left [M14.672]     * Charcot's joint of ankle, left [M14.672]     * Painful orthopaedic hardware [T84.84XA]     * Pseudarthrosis after fusion or arthrodesis [M96.0]    Post-Operative Diagnosis: Post-Op Diagnosis Codes:     * Diabetic ulcer of left midfoot associated with type 2 diabetes mellitus, with fat layer exposed [E11.621, L97.422]     * Charcot's joint of foot, left [M14.672]     * Charcot's joint of ankle, left [M14.672]     * Painful orthopaedic hardware [T84.84XA]     * Pseudarthrosis after fusion or arthrodesis [M96.0]    Anesthesia: General    Technical Procedures Used:   1. Removal of hardware left midfoot.   2. STJ fusion, LLE.   3. Calcaneal cuboid fusion, LLE.   4. Application of external fixator, LLE    Description of the Findings of the Procedure: The patient was seen in the Holding Room. Pre-op Popliteal and Saphenous Nerve Block. The risks, benefits, complications, treatment options, and expected outcomes were discussed with the patient. The risks and potential complications of their problem and purposed treatment include but are not limited to infection, nerve injury, vascular injury, nonunion/malunion/delayed union of the surgical site, persistent pain, potential skin necrosis, deep vein thrombosis, loss of limb, gangrene, possible pulmonary embolus, complications of the anesthetics and failure of the implant.  The patient concurred with the proposed  plan, giving informed consent. The patient is aware that the procedure may be a part of a staged collection of procedures for definitive cure and/or alleviation of symptoms. The site of surgery properly noted/marked. Preoperative intravenous antibiotics are hanging at bedside, and are currently being administered via the heparin lock. The patient was taken to Operating Suite.    Once in the operative suite, the patient is transferred onto the operative table in the supine position. A TIME-OUT is taken as per protocol to identify the proper patient, procedure to be performed, and laterality.  The patient is properly positioned on the operating room table for ease of dissection and for any ancillary imaging.  A well-padded tourniquet was applied to the left thigh.  Nursing and ancillary OR staff prepared the patient for the procedure. The patient is adequately sedated by the Attending Anesthesiologist and/or covering CRNA. Next, the operative limb was rendered sterile using chlorhexidine paint and scrub.  Sterile sheets and drapes were applied thereafter. Another TIME-OUT is taken as per protocol to identify the proper patient, procedure to be performed, and laterality.      The tourniquet is elevated to 350mmHg after the limb is exsanguinated for approximately 2 min with an Esmarch bandage.    Following this, sharp skin incision atop the area of plate fixation and hardware.  Deep dissection with tenotomy scissor.  Electrocautery as necessitated.  Neurovascular structures are retracted.  With the assistance of fluoroscopy, the screw construct(s)/plate fixation/staple fixation are triangulated.  The hardware at the medial midfoot is removed in toto.  Live/stress fluoroscopy across the joint line under magnification. Of note, no movement is noted and no instability is noted at any of the midfoot joint, even the TN. The TN appears partially fused, medially.     The TN joint is opened with an osteotome. The TN joint is  drilled. The TN joint is backed with DBM putty and cancellous chips.    Following this, a marking pen was used to joellen out the planned area of incision for the subtalar joint arthrodesis.  Fluoroscopy was also used to triangulate this incision. The incision commences just distal tip of the fibula and runs toward the 4th metatarsal base.  Sharp skin incision with a scalpel blade being mindful to avoid the intermediate dorsal cutaneous nerve and the sural nerve.  The peroneal tendons were visualized and the tendon sheath is entered and the tendons were retracted plantarly.  The sural nerve was visualized and retracted plantarly.  The intermediate dorsal cutaneous nerve was not visualized.  Deep dissection with a large tenotomy scissor.  The sinus tarsi is entered and its contents were debrided away with a bone rongeur.     Following this, an incision is made on the dorsal TN. The bone spurring is resected. The non-united TN joint is debrided and filled with DBM putty and cancellous chips.    After adequate debridement of all the contents of the sinus tarsi, a pin-distractor is used to distract the subtalar joint for visualization of the posterior and middle facets.  After adequate distraction, the cartilage in the opposing surfaces of the middle and posterior facets are debrided with a combination of bone curette, osteotomes, rotary bur and etc.  After adequate joint preparation, the surfaces are subchondral drilled using a 2.0mm drill bit or similar type instrumentation.  Following this, the incision is extended to the CC joint. The CC joint is prepped in similar fashion.    The alignment of the fusion site at the STJ is checked and the medial or lateral sides of the opposing surfaces or feathered as necessary to provide neutral to slight valgus hindfoot alignment.  The alignment was checked in relationship to the talonavicular joint in the DP projection as well as the lateral projection.  After fluoroscopy was used  to confirm anatomic alignment and position, the fusion site was fixated using two 5.0mm P28 screws , after insertion of DBM putty and cancellous chips.     The calcaneal cuboid is wedged with a 8mm CC P28 allograft. The joint is not fixated at this time.    Following standard technique, a large, multiple planar external fixation device is applied to the foot, ankle and leg. The external fixator is used to compress the TN joint and the CC joint. The ex-fix is applied with the assistance of fluoroscopy.    Deep closure of this wound using Vicryl suture. The peroneal tendon sheath repaired using Vicryl suture. The skin is closed using 3-0 Nylon.    All incisions and pin sites dressed with Xeroform/Adaptic nonadherent dressings followed by sterile 4 x 4 gauze, Sheron/Kerlix and light ACE.     The anesthesia is weaned. There were no complications to this procedure. Any final necessary imaging to be performed in the PACU if it was not performed here in the OR suite.  The patient is transferred to the [facility] bed/stretcher, [facility]. The patient is transferred to the PACU.    Significant Surgical Tasks Conducted by the Assistant(s), if Applicable: N/A    Complications: * No complications entered in OR log *    Estimated Blood Loss (EBL): Minimal    Drains: N/A    Implants:   Implant Name Type Inv. Item Serial No.  Lot No. LRB No. Used Action   Medial Straddle Plate Left X-large    [employer]  Left 1 Explanted   4.2X24 LOCKING    [employer]  Left 1 Explanted   4.2X34 LOCKING    [employer]  Left 1 Explanted   4.2X32 LOCKING    [employer]  Left 1 Explanted   4.2X30 LOCKING    [employer]  Left 2 Explanted   4.2X34 NONLOCKING      Left 2 Explanted   [id number]  BONE CHIP CANC 1.7-10MM 15ML [id number] MUSCULOSKELETAL TRANSPLANT FND  Left 1 Implanted   [id number] Bone  [id number] [employer]  Left 1 Implanted   [id number]  PUTTY DBX 5CC [id number] MUSCULOSKELETAL TRANSPLANT  FND  Left 1 Implanted   KWIRE SMTH TRCR TIP 2.0A846IO - [id number]  KWIRE SMTH TRCR TIP 2.3Y174XM  [employer]  Left 5 Implanted and Explanted   KWIRE SMTH TRCR TIP 1.7Y275XU - [id number]  KWIRE SMTH TRCR TIP 1.7Q889DP  [employer]  Left 6 Implanted and Explanted   5.5 x 90 Headless short thread      Left 1 Implanted   5.5 x 75 Headed short thread      Left 1 Implanted   7.0 x 80 Headed short thread      Left 1 Implanted and Explanted   Threaded pillar      Left 8 Implanted   HA Cortical Bone Screw     [id number] Left 1 Implanted   Connection bolt 16mm      Left 18 Implanted   Connecting nut      Left 21 Implanted   Wire fixation bolt      Left 13 Implanted   5/8 Ring 180      Left 1 Implanted   Full 180 ring      Left 1 Implanted   Foot plate 160      Left 1 Implanted   Half pin, self drilling, 06.0mm x 50mm, 180mm, shank length, HA coated      Left 2 Implanted   2mm washer    [employer]  Left 4 Implanted   4mm washer    [employer]  Left 6 Implanted   2 hole post    [employer]  Left 3 Implanted   universal clamp    [employer]  Left 2 Implanted   half ring    [employer]  Left 1 Implanted       Specimens: * No specimens in log *    Condition: stable    Disposition: PACU - hemodynamically stable.    Attestation: I performed the procedure.

## 2023-03-14 NOTE — PLAN OF CARE
Lexi called and ARIS faxed, awaiting 142. Patient bathing, CM to attempt to see patient after bath complete.

## 2023-03-14 NOTE — PROGRESS NOTES
[facility] Medicine  Progress Note    Patient Name: Kay Galarza  MRN: [id number]  Patient Class: OP- Outpatient Recovery   Admission Date: 3/13/2023  Length of Stay: 0 days  Attending Physician: Mary Grace Todd MD  Primary Care Provider: Lucy Negron MD        Subjective:     Principal Problem:Charcot's joint of left foot      HPI:  Patient is a 67-year-old  female with a history of diabetes mellitus with diabetic foot ulcer on left, Charcot joint left ankle, hypertension, hyperlipidemia, chronic pain who underwent ORIF of left ankle with external fixation by Dr. Angel today.  Hospital Medicine is consulted for admission for pain control, but OT/PT eval and treat.  Patient is seen in postop holding she is lethargic but arousable interpreting service was used and patient's only complaint was continued pain in her left ankle.      Overview/Hospital Course:  68 y/o female admitted with diabetic foot ulcer on left with charcot joint left ankle for ORIF left malleolus with ex -fix placement by Dr. Gray on 3/13/23. She was admitted for pain control and PT/OT. PT/OT recommended SNF placement,  following.  She has some post op urinary retention, requiring in/out cath, will place booth catheter if needed.       Interval History: awake, alert, lying in bed, daughter at bedside. She c/o pain to her left foot, improved with pain medication. On Supplemental oxygen, wean as tolerated. PT/OT recommended SNF, placement pending.     Review of Systems   Constitutional:  Negative for fatigue and fever.   Respiratory:  Negative for cough and shortness of breath.    Cardiovascular:  Negative for chest pain and palpitations.   Gastrointestinal:  Negative for nausea and vomiting.   Musculoskeletal:  Positive for joint swelling.        Left ankle pain   All other systems reviewed and are negative.  Objective:     Vital Signs (Most Recent):  Temp: 99.3 °F (37.4 °C)  (03/14/23 1105)  Pulse: 84 (03/14/23 1105)  Resp: 14 (03/14/23 1332)  BP: 135/61 (03/14/23 1105)  SpO2: 97 % (03/14/23 1300) Vital Signs (24h Range):  Temp:  [96.9 °F (36.1 °C)-99.5 °F (37.5 °C)] 99.3 °F (37.4 °C)  Pulse:  [81-95] 84  Resp:  [11-21] 14  SpO2:  [95 %-100 %] 97 %  BP: (120-150)/(57-94) 135/61     Weight: 118.2 kg (260 lb 9.3 oz)  Body mass index is 38.48 kg/m².    Intake/Output Summary (Last 24 hours) at 3/14/2023 1510  Last data filed at 3/14/2023 1405  Gross per 24 hour   Intake 561.28 ml   Output 1300 ml   Net -738.72 ml      Physical Exam  Vitals and nursing note reviewed.   Constitutional:       Appearance: She is obese.   Cardiovascular:      Rate and Rhythm: Normal rate and regular rhythm.      Heart sounds: No murmur heard.  Pulmonary:      Effort: Pulmonary effort is normal.      Breath sounds: Normal breath sounds.   Abdominal:      General: Bowel sounds are normal.      Palpations: Abdomen is soft.   Musculoskeletal:      Left ankle: Decreased range of motion.      Comments: EX fix to left ankle   Feet:      Comments: Left foot surgical ACE wrap with small amount of drainage at heel  Skin:     General: Skin is warm and dry.   Neurological:      General: No focal deficit present.      Mental Status: She is alert and oriented to person, place, and time.   Psychiatric:         Mood and Affect: Mood normal.         Behavior: Behavior normal.       Significant Labs: All pertinent labs within the past 24 hours have been reviewed.  A1C:   Recent Labs   Lab 12/07/22  1211   HGBA1C 7.6*     BMP:   Recent Labs   Lab 03/14/23  0608   *      K 4.9      CO2 27   BUN 27*   CREATININE 1.1   CALCIUM 8.2*     CBC:   Recent Labs   Lab 03/13/23  0757 03/14/23  0608   WBC 8.95 7.98   HGB 14.2 11.9*   HCT 42.8 36.4*    200     CMP:   Recent Labs   Lab 03/13/23  0757 03/14/23  0608    138   K 4.5 4.9    104   CO2 24 27   * 180*   BUN 34* 27*   CREATININE 1.3 1.1    CALCIUM 9.3 8.2*   ALBUMIN  --  3.1*   ANIONGAP 11 7*     Troponin: No results for input(s): TROPONINI, TROPONINIHS in the last 48 hours.  TSH:   Recent Labs   Lab 12/07/22  1211   TSH 1.577     Urine Studies: No results for input(s): COLORU, APPEARANCEUA, PHUR, SPECGRAV, PROTEINUA, GLUCUA, KETONESU, BILIRUBINUA, OCCULTUA, NITRITE, UROBILINOGEN, LEUKOCYTESUR, RBCUA, WBCUA, BACTERIA, SQUAMEPITHEL, HYALINECASTS in the last 48 hours.    Invalid input(s): WRIGHTSUR    Significant Imaging: I have reviewed all pertinent imaging results/findings within the past 24 hours.      Assessment/Plan:      * Charcot's neuro arthropathy with dislocation of Lisfranc Joint of left foot  -S/p removal of hardware left midfoot, left ST joint/calcaneal cuboid fusion, and external fixator placement 3/13/23, Dr. Gray  -post op mgmt per podiatry  -Multimodal pain control  -Bowel protocol  -PT/OT recommending SNF  -Social work following for placement    Diabetic ulcer of left midfoot associated with type 2 diabetes mellitus, with fat layer exposed  Patient's FSGs are controlled on current medication regimen.  Last A1c reviewed-   Lab Results   Component Value Date    HGBA1C 7.6 (H) 12/07/2022     Most recent fingerstick glucose reviewed-   Recent Labs   Lab 03/13/23 2055 03/14/23  0534   POCTGLUCOSE 162* 172*     Current correctional scale  Low  Maintain anti-hyperglycemic dose as follows-   Antihyperglycemics (From admission, onward)    Start     Stop Route Frequency Ordered    03/13/23 1748  insulin aspart U-100 pen 0-5 Units         -- SubQ Before meals & nightly PRN 03/13/23 1649        Hold Oral hypoglycemics while patient is in the hospital.    Type 2 diabetes mellitus  Patient's FSGs are controlled on current medication regimen.  Last A1c reviewed-   Lab Results   Component Value Date    HGBA1C 7.6 (H) 12/07/2022     Most recent fingerstick glucose reviewed-   Recent Labs   Lab 03/13/23 2055 03/14/23  0534   POCTGLUCOSE 162* 172*      Current correctional scale  Low  Maintain anti-hyperglycemic dose as follows-   Antihyperglycemics (From admission, onward)    Start     Stop Route Frequency Ordered    03/13/23 1748  insulin aspart U-100 pen 0-5 Units         -- SubQ Before meals & nightly PRN 03/13/23 1649        Hold Oral hypoglycemics while patient is in the hospital.    Essential hypertension  Chronic, controlled  -Latest blood pressure and vitals reviewed-   Temp:  [96.9 °F (36.1 °C)-99.5 °F (37.5 °C)]   Pulse:  [81-95]   Resp:  [11-21]   BP: (120-150)/(57-94)   SpO2:  [95 %-100 %] .   -Home meds for hypertension were reviewed and noted below.   Hypertension Medications             lisinopriL-hydrochlorothiazide (PRINZIDE,ZESTORETIC) 20-25 mg Tab TAKE 1 TABLET BY MOUTH ONCE DAILY.        -While in the hospital, will manage blood pressure as follows; Continue home antihypertensive regimen  -Will utilize PRN blood pressure medication only if patient's blood pressure greater than  180/110 and she develops symptoms such as worsening chest pain or shortness of breath.  -optimize pain management    HLD (hyperlipidemia)   -Patient is chronically on statin. will continue for now. -Monitor clinically  -Last LDL was   Lab Results   Component Value Date    LDLCALC 80.0 01/26/2022      -follow up with PCP/cardiology OP    Abnormal nuclear stress test  -s/p cardiac catheterization with nonobstructive lesions  -follow up OP    ILD (interstitial lung disease)  -cont nebs    Severe obesity (BMI 35.0-39.9) with comorbidity  Body mass index is 38.48 kg/m². Morbid obesity complicates all aspects of disease management from diagnostic modalities to treatment. Weight loss encouraged and health benefits explained to patient      VTE Risk Mitigation (From admission, onward)         Ordered     apixaban tablet 2.5 mg  2 times daily         03/14/23 1244                Discharge Planning   TING:      Code Status: Full Code   Is the patient medically ready for  discharge?:     Reason for patient still in hospital (select all that apply): Patient trending condition, Laboratory test, Treatment, Consult recommendations and Pending disposition  Discharge Plan A: Skilled Nursing Facility              Angelica Whitfield NP  Department of Hospital Medicine   [facility] Surg

## 2023-03-14 NOTE — PLAN OF CARE
Pt remains free from falls/injuries this shift. Safety precautions maintained. LLE incision clean, dry, intact and elevated above the level of the heart. Pt was due to void and bladder scanned. Straight cath performed with 600mL output. Due to void by 0500. Chart check completed.   Problem: Adult Inpatient Plan of Care  Goal: Plan of Care Review  Outcome: Ongoing, Progressing  Goal: Patient-Specific Goal (Individualized)  Outcome: Ongoing, Progressing  Goal: Absence of Hospital-Acquired Illness or Injury  Outcome: Ongoing, Progressing  Goal: Optimal Comfort and Wellbeing  Outcome: Ongoing, Progressing  Goal: Readiness for Transition of Care  Outcome: Ongoing, Progressing     Problem: Diabetes Comorbidity  Goal: Blood Glucose Level Within Targeted Range  Outcome: Ongoing, Progressing     Problem: Infection  Goal: Absence of Infection Signs and Symptoms  Outcome: Ongoing, Progressing     Problem: Skin Injury Risk Increased  Goal: Skin Health and Integrity  Outcome: Ongoing, Progressing     Problem: Fall Injury Risk  Goal: Absence of Fall and Fall-Related Injury  Outcome: Ongoing, Progressing

## 2023-03-14 NOTE — PLAN OF CARE
CM/SW attempted to meet with family per request. Patient having booth placed at this time. CM to f/u.

## 2023-03-14 NOTE — PROGRESS NOTES
[facility]  Podiatry  Progress Note    Patient Name: Kay Galarza  MRN: [id number]  Admission Date: 3/13/2023  Hospital Length of Stay: 0 days  Attending Physician: Mary Grace Todd MD  Primary Care Provider: Lucy Negron MD     Subjective:     Interval History: s/p 3/13/23 LLE KAYLA (removal of hardware) with STJ fusion and midfoot fusion via Ex-Fix, due to Charcot Foot. States severe pains.  passed away on last night on Telemetry downstairs. Daughter is present.     Follow-up For: Procedure(s) (LRB):  APPLICATION, EXTERNAL FIXATION DEVICE (Left)  REMOVAL, HARDWARE, FOOT (Left)  FUSION, SUBTALAR JOINT (Left)  FUSION, JOINT, MIDFOOT (Left)    Post-Operative Day: 1 Day Post-Op    Scheduled Meds:   apixaban  2.5 mg Oral BID    atorvastatin  40 mg Oral Daily    docusate sodium  100 mg Oral BID    nystatin-triamcinolone   Topical (Top) BID    pantoprazole  40 mg Oral Daily    polyethylene glycol  17 g Oral Daily     Continuous Infusions:  PRN Meds:acetaminophen, cyclobenzaprine, dextrose 10%, dextrose 10%, dextrose, dextrose, glucagon (human recombinant), hydrALAZINE, insulin aspart U-100, melatonin, metoclopramide HCl, morphine, naloxone, ondansetron, oxyCODONE, potassium bicarbonate, potassium bicarbonate, sodium chloride 0.9%, sodium chloride 0.9%    Review of Systems   Constitutional:  Negative for chills, fatigue and fever.   HENT:  Negative for hearing loss.    Eyes:  Negative for photophobia and visual disturbance.   Respiratory:  Negative for cough, chest tightness, shortness of breath and wheezing.    Cardiovascular:  Positive for leg swelling. Negative for chest pain and palpitations.   Gastrointestinal:  Negative for constipation, diarrhea, nausea and vomiting.   Endocrine: Negative for cold intolerance and heat intolerance.   Genitourinary:  Negative for flank pain.   Musculoskeletal:  Positive for arthralgias, joint swelling and myalgias. Negative for neck pain and neck  stiffness.   Neurological:  Positive for numbness. Negative for light-headedness and headaches.        Neuritis    Psychiatric/Behavioral:  Negative for sleep disturbance.    Objective:     Vital Signs (Most Recent):  Temp: 99.3 °F (37.4 °C) (03/14/23 1105)  Pulse: 84 (03/14/23 1105)  Resp: 14 (03/14/23 1332)  BP: 135/61 (03/14/23 1105)  SpO2: 97 % (03/14/23 1300) Vital Signs (24h Range):  Temp:  [96.9 °F (36.1 °C)-99.5 °F (37.5 °C)] 99.3 °F (37.4 °C)  Pulse:  [81-95] 84  Resp:  [11-21] 14  SpO2:  [95 %-100 %] 97 %  BP: (120-150)/(57-94) 135/61     Weight: 118.2 kg (260 lb 9.3 oz)  Body mass index is 38.48 kg/m².    Foot Exam    Laboratory:  All pertinent labs reviewed within the last 24 hours.    Diagnostic Results:  I have reviewed all pertinent imaging results/findings within the past 24 hours.    Clinical Findings:  CFT is WNL. No exsanguination is noted. Pin sites appear healthy. ROM of the knee is noted.    Assessment/Plan:     Active Diagnoses:    Diagnosis Date Noted POA    PRINCIPAL PROBLEM:  Charcot's neuro arthropathy with dislocation of Lisfranc Joint of left foot [M14.672] 12/19/2019 Yes    Diabetic ulcer of left midfoot associated with type 2 diabetes mellitus, with fat layer exposed [E11.621, L97.422] 03/13/2023 Unknown    Abnormal nuclear stress test [R94.39] 01/23/2023 Yes    ILD (interstitial lung disease) [J84.9] 01/26/2021 Yes    HLD (hyperlipidemia) [E78.5] 07/23/2020 Yes    Severe obesity (BMI 35.0-39.9) with comorbidity [E66.01] 11/10/2017 Yes    Type 2 diabetes mellitus [E11.9] 11/07/2017 Yes    Essential hypertension [I10] 11/07/2017 Yes      Problems Resolved During this Admission:       Is pending placement.    ICE pack at the ipsilateral knee fossa ATC.    PWB to WBAT to the LLE with walker ATC.    PT/OT.    Vit. D ATC.    All Rx have been sent to [facility].    DVT Ppx with Eliquis for duration of NWB.     (Trever Verduzco), admitted downstairs to Tele on 3/13/23, passed away  around 730PM on 3/13/23. Daughter plans to tell mother Wednesday afternoon.    S/P appt is in.        Kirt Gray DPM  Podiatry  [facility]

## 2023-03-14 NOTE — ASSESSMENT & PLAN NOTE
Patient's FSGs are controlled on current medication regimen.  Last A1c reviewed-   Lab Results   Component Value Date    HGBA1C 7.6 (H) 12/07/2022     Most recent fingerstick glucose reviewed-   Recent Labs   Lab 03/13/23 2055 03/14/23  0534   POCTGLUCOSE 162* 172*     Current correctional scale  Low  Maintain anti-hyperglycemic dose as follows-   Antihyperglycemics (From admission, onward)    Start     Stop Route Frequency Ordered    03/13/23 1748  insulin aspart U-100 pen 0-5 Units         -- SubQ Before meals & nightly PRN 03/13/23 1649        Hold Oral hypoglycemics while patient is in the hospital.

## 2023-03-14 NOTE — PLAN OF CARE
CM/TING and  met with daughter per request. Patient's spouse passed away 3/13 and family requesting additional support for when they tell patient. Daughter states she prefers to wait until closer to discharge to inform patient of spouse passing. CM and  contact info provided along with grief resource booklet.    Daughter states she would like patient to go to [facility] or [facility], referrals placed via careport.

## 2023-03-14 NOTE — PLAN OF CARE
SEE EVAL FOR DETAILS. PT DISPLAYED DEFICITS WITH ADL'S SKILLS DECREASE B UE STRENGTH/ENDURANCE AS WELL AS DECREASE BED MOBILITY. RECOMMEND : SNF

## 2023-03-14 NOTE — PLAN OF CARE
Awaiting update from [facility] regarding SNF approval for CLC/auth.  Per careport not accepting SNFs at this time. Patient to have procedure today.

## 2023-03-14 NOTE — BRIEF OP NOTE
[facility] - Med Surg  Brief Operative Note    Surgery Date: 3/13/2023     Surgeon(s) and Role:     * Kirt Gray DPM - Primary    Assisting Surgeon: None    Pre-op Diagnosis:  Diabetic ulcer of left midfoot associated with type 2 diabetes mellitus, with fat layer exposed [E11.621, L97.422]  Charcot's joint of foot, left [M14.672]  Charcot's joint of ankle, left [M14.672]  Painful orthopaedic hardware [T84.84XA]  Pseudarthrosis after fusion or arthrodesis [M96.0]    Post-op Diagnosis:  Post-Op Diagnosis Codes:     * Diabetic ulcer of left midfoot associated with type 2 diabetes mellitus, with fat layer exposed [E11.621, L97.422]     * Charcot's joint of foot, left [M14.672]     * Charcot's joint of ankle, left [M14.672]     * Painful orthopaedic hardware [T84.84XA]     * Pseudarthrosis after fusion or arthrodesis [M96.0]    Procedure(s) (LRB):  APPLICATION, EXTERNAL FIXATION DEVICE (Left)  REMOVAL, HARDWARE, FOOT (Left)  FUSION, SUBTALAR JOINT (Left)  FUSION, JOINT, MIDFOOT (Left)    Anesthesia: General    Operative Findings: As per Dx,    Estimated Blood Loss: * No values recorded between 3/13/2023 10:13 AM and 3/13/2023  4:04 PM *         Specimens:   Specimen (24h ago, onward)      None              Discharge Note    OUTCOME: Patient tolerated treatment/procedure well without complication and is now ready for discharge.    DISPOSITION: Home or Self Care    FINAL DIAGNOSIS:  Charcot's joint of left foot    FOLLOWUP: In clinic    DISCHARGE INSTRUCTIONS:   Weightbearing Status and Wound Care:  1. You make weight bearing on the external fixator with a walker.    2. When ambulating, use a walker at all times. Put only partial weight on the foot.     3. During daytime/awake hours, if a CAST or POSTERIOR SPLINT is in place, ice the posterior aspect of the leg, behind the knee, 20 minutes on (w/ ice) and followed by 20 minutes off (w/o ice) until follow up; repeat accordingly until follow up. IF no CAST or POSTERIOR  SPLINT is in place, ice the anterior aspect of the ankle, in a similar manner. During night time/sleep hours, simply elevating the limb above the level of the heart is sufficient.     4. Elevate the extremity above the level of the heart at all times when sitting/sleeping.    5. Take the pain mediations as prescribed for the initial 3 or so days, then only as needed.    6. Start Xarelto 10mg QD (with dinner) or Eliquis 2.5mg BID on tomorrow for DVT PPx.    7. Do not change the dressings.    8. Do not get the dressings wet.      9. Please be reminded of the harmful effects of nicotine/tobacco/cigarette/cigar/marijuana smoking, especially in relation to the lower extremity, particularly in reference to post operative healing. I do rec. complete or near complete cessation of any or all of the aforementioned until you are well out of the post-operative period.    10. If you were prescribed more than one short acting opioid, e.g., (Morphine or Dilaudid), with Percocet or Norco or Tramadol, the Morphine (MSIR) or Dilaudid (Hydromorphone) is to be taken during the immediate initial days s/p, at an interval of every 6 hours or 5 hours or 4 hours, as needed for pain control. Once you are complete with the Morphine (MSIR) or Dilaudid (Hydromorphone), you may/should convert to the secondary medication. DO NOT take both medications together at any time. It is not advisable to start with the less potent medication, Percocet or Norco or Tramadol, and then convert to the stronger medication.

## 2023-03-14 NOTE — PT/OT/SLP EVAL
Physical Therapy Evaluation    Patient Name:  Kay Galarza   MRN:  [id number]    Recommendations:     Discharge Recommendations: nursing facility, skilled   Discharge Equipment Recommendations: none   Barriers to discharge: Decreased caregiver support    Assessment:     Kay Galarza is a 67 y.o. female admitted with a medical diagnosis of Charcot's joint of left foot.  She presents with the following impairments/functional limitations: weakness, gait instability, impaired balance, pain, impaired self care skills, impaired functional mobility, decreased coordination, edema, impaired skin, decreased lower extremity function, decreased ROM, decreased upper extremity function, impaired endurance .    Rehab Prognosis: Fair; patient would benefit from acute skilled PT services to address these deficits and reach maximum level of function.    Recent Surgery: Procedure(s) (LRB):  APPLICATION, EXTERNAL FIXATION DEVICE (Left)  REMOVAL, HARDWARE, FOOT (Left)  FUSION, SUBTALAR JOINT (Left)  FUSION, JOINT, MIDFOOT (Left) 1 Day Post-Op    Plan:     During this hospitalization, patient to be seen 3 x/week to address the identified rehab impairments via therapeutic activities, therapeutic exercises and progress toward the following goals:    Plan of Care Expires:  03/28/23    Subjective     Chief Complaint: pain L LE   Patient/Family Comments/goals: INC MOBILITY   Pain/Comfort:  Pain Rating 1: 9/10  Location - Side 1: Left  Location 1: leg  Pain Rating Post-Intervention 1: 9/10    Patients cultural, spiritual, [employer] conflicts given the current situation:      Living Environment:   PT LIVES AT HOME WITH SON AND DIL WITH NO STEPS TO ENTE RHOME   Prior to admission, patients level of function was MOD A FOR T/F TO WC FOR PAST 2 MONTHS PER SON.  Equipment used at home: walker, rolling, wheelchair, bedside commode, grab bar.  DME owned (not currently used): none.  Upon discharge, patient will have  "assistance from SON .    Objective:     Communicated with NURSE TELECA AND Epic CHART REVIEW  prior to session.  Patient found supine with peripheral IV, PureWick, Other (comments) (EXT FIXATOR)  upon PT entry to room.    General Precautions: Standard, fall  Orthopedic Precautions:LLE weight bearing as tolerated, LLE partial weight bearing (PER CLARIFYING ORDER WITH MD)   Braces: N/A  Respiratory Status: Nasal cannula, flow 2 L/min    Exams:  RLE ROM: WFL  RLE Strength: WFL  LLE ROM: LIMITED  LLE Strength: NA D/T PAIN     Functional Mobility:  Bed Mobility:     Rolling Right: minimum assistance  Supine to Sit: minimum assistance  Sit to Supine: minimum assistance  Transfers:     Sit to Stand:  unable with rolling walker  Gait: unable       AM-PAC 6 CLICK MOBILITY  Total Score:10       Treatment & Education:  PT SEATED EOB AND ROM ASSESSED AND TE COMPLETED R LE AP, TKE X 10 REPS. PT WITH INC PAIN. PT UNABLE TO STAND NWB L LE  ( P.T. RECEIVED CLARITY FROM MD FOR PT TO BE PWB >WBAT ).  PT SCOOTED TO RIGHT IN BED WITH CGA. PT RETURNED TO SUP IN BED WITH MIN A. PT ALSO EDUCATED ON HIP ADD/ABD AND HS FOR INC ROM. PT L LE TO BE NEUTRAL OR ELEVATED WHEN AT REST PER MD.   PT EDUCATED ON RISK FOR FALLS DUE TO GENERALIZED WEAKNESS, EDUCATED ON "CALL DON'T FALL", ENCOURAGED TO CALL FOR ASSISTANCE WITH ALL NEEDS SUCH AS BED<>CHAIR TRANSFERS OR TRIPS TO BATHROOM      Patient left  FOOT OF BED ELEVATED   with call button in reach and bed alarm on.    GOALS:   Multidisciplinary Problems       Physical Therapy Goals          Problem: Physical Therapy    Goal Priority Disciplines Outcome Goal Variances Interventions   Physical Therapy Goal     PT, PT/OT      Description: LTG: 3/28/23  1. PT WILL COMPLETE BED MOBILITY WITH SBA  2. PT WILL STAND WITH RW PWB/ WBAT TO L LE FOR T/F TO CHAIR WITH RW AND MAX A  3. PT WILL TOLERATE B LE TE X 20 REPS FOR STRENGTHENING.                        History:     Past Medical History:   Diagnosis Date "    Abnormal nuclear stress test 1/23/2023    Arthritis     DERIAN KNEES    Asthma     SEASONAL    Cataract     Diabetes mellitus     Diabetes mellitus, type 2     Diabetic retinopathy     DM (diabetes mellitus) 2007    BS 97 09/29/2020    Hyperlipidemia     Hypertension        Past Surgical History:   Procedure Laterality Date    APPLICATION OF WOUND VACUUM-ASSISTED CLOSURE DEVICE Left 8/18/2020    Procedure: APPLICATION, WOUND VAC;  Surgeon: Kirt Gray DPM;  Location: [facility] OR;  Service: Podiatry;  Laterality: Left;    CATARACT EXTRACTION W/  INTRAOCULAR LENS IMPLANT Right 07/18/2018    CATARACT EXTRACTION W/  INTRAOCULAR LENS IMPLANT Left 03/13/2019    COLONOSCOPY N/A 12/22/2018    Procedure: COLONOSCOPY;  Surgeon: Zak Dover MD;  Location: [facility] ENDO;  Service: Endoscopy;  Laterality: N/A;    COLONOSCOPY N/A 5/11/2022    Procedure: COLONOSCOPY;  Surgeon: Charles Berrios MD;  Location: [facility] ENDO;  Service: Endoscopy;  Laterality: N/A;    ENDOSCOPIC VEIN LASER TREATMENT Bilateral 1990's    FIXATION OF SYNDESMOSIS OF ANKLE Left 7/23/2020    Procedure: FIXATION, SYNDESMOSIS, ANKLE;  Surgeon: Kirt Gray DPM;  Location: [facility] OR;  Service: Podiatry;  Laterality: Left;    LEFT HEART CATHETERIZATION Left 1/24/2023    Procedure: CATHETERIZATION, HEART, LEFT;  Surgeon: Salma Bonilla MD;  Location: [facility] CATH LAB;  Service: Cardiology;  Laterality: Left;  pt given 130 start time    MANIPULATION WITH ANESTHESIA Left 7/23/2020    Procedure: MANIPULATION, WITH ANESTHESIA;  Surgeon: Kirt Gray DPM;  Location: [facility] OR;  Service: Podiatry;  Laterality: Left;    MIDFOOT ARTHRODESIS Left 7/23/2020    Procedure: FUSION, JOINT, MIDFOOT;  Surgeon: Kirt Gray DPM;  Location: [facility] OR;  Service: Podiatry;  Laterality: Left;  2nd tarsometatarsal joint dislocation via fusion    OPEN REDUCTION AND INTERNAL FIXATION (ORIF) OF INJURY OF ANKLE Left 7/23/2020    Procedure: ORIF, ANKLE;  Surgeon: Kirt Gray DPM;   Location: [facility] OR;  Service: Podiatry;  Laterality: Left;    RECONSTRUCTION WITH FUSION OF CHARCOT FOOT Left 8/18/2020    Procedure: RECONSTRUCTION, CHARCOT FOOT, WITH FUSION;  Surgeon: Kirt Gray DPM;  Location: [facility] OR;  Service: Podiatry;  Laterality: Left;    REMOVAL OF HARDWARE FROM LOWER EXTREMITY Left 1/27/2021    Procedure: REMOVAL, HARDWARE, LOWER EXTREMITY;  Surgeon: Kirt Gray DPM;  Location: [facility] OR;  Service: Podiatry;  Laterality: Left;    WOUND DEBRIDEMENT Left 8/18/2020    Procedure: DEBRIDEMENT, WOUND;  Surgeon: Kirt Gray DPM;  Location: [facility] OR;  Service: Podiatry;  Laterality: Left;       Time Tracking:     PT Received On: 03/14/23  PT Start Time: 0846     PT Stop Time: 0915  PT Total Time (min): 29 min     Billable Minutes: Evaluation 19 and Therapeutic Activity 10      03/14/2023

## 2023-03-14 NOTE — ASSESSMENT & PLAN NOTE
-S/p removal of hardware left midfoot, left ST joint/calcaneal cuboid fusion, and external fixator placement 3/13/23, Dr. Gray  -post op mgmt per podiatry  -Multimodal pain control  -Bowel protocol  -PT/OT recommending SNF  -Social work following for placement

## 2023-03-14 NOTE — HOSPITAL COURSE
68 y/o female admitted with diabetic foot ulcer on left with charcot joint left ankle for ORIF left malleolus with ex -fix placement by Dr. Gray on 3/13/23. She was admitted for pain control and PT/OT. PT/OT recommended SNF placement.    She has some post op urinary retention, requiring in/out cath, booth catheter placed 3/14/23. Will plan to discharge with booth catheter to be bladder trained at SNF when able to move around better.   EKG with Qtc 459.     Patient learned of her husbands death while she was in the hospital. She was given a 1 time dose of Ativan.     Antibiotics switched to doxycyline PO x 10 days on discharge, Levaquin stopped and PICC line discontinued prior to discharge.   She is WBAT to LLE. Continue eliquis 2.5 mg BID for DVT ppx. Pin site and dressing changes per podiatry. RICE therapy ATC. Keep LLE elevated when sitting down and do not leave limb in dependent position.     Continue Accu-checks AC/HS and SSI.     Prescription for Norco PRN acute surgical pain sent with patient to the facility. Podiatry sent prescriptions for discharge to pharmacy, can resume OP pain medication on discharge from SNF.    [facility] accepted and insurance approved.     Follow up with Dr. Gray, podiatry in 1 week for wound check.   Follow up with PCP on discharge from SNF.    Patient seen and examined on the day of discharge.  All questions and concerns were addressed prior to discharge.    Face to face encounter with patient: 30 minutes

## 2023-03-14 NOTE — ASSESSMENT & PLAN NOTE
Chronic, controlled  -Latest blood pressure and vitals reviewed-   Temp:  [96.9 °F (36.1 °C)-99.5 °F (37.5 °C)]   Pulse:  [81-95]   Resp:  [11-21]   BP: (120-150)/(57-94)   SpO2:  [95 %-100 %] .   -Home meds for hypertension were reviewed and noted below.   Hypertension Medications             lisinopriL-hydrochlorothiazide (PRINZIDE,ZESTORETIC) 20-25 mg Tab TAKE 1 TABLET BY MOUTH ONCE DAILY.        -While in the hospital, will manage blood pressure as follows; Continue home antihypertensive regimen  -Will utilize PRN blood pressure medication only if patient's blood pressure greater than  180/110 and she develops symptoms such as worsening chest pain or shortness of breath.  -optimize pain management

## 2023-03-14 NOTE — ASSESSMENT & PLAN NOTE
-Patient is chronically on statin. will continue for now. -Monitor clinically  -Last LDL was   Lab Results   Component Value Date    LDLCALC 80.0 01/26/2022      -follow up with PCP/cardiology OP

## 2023-03-15 LAB
POCT GLUCOSE: 168 MG/DL (ref 70–110)
POCT GLUCOSE: 185 MG/DL (ref 70–110)
POCT GLUCOSE: 196 MG/DL (ref 70–110)
POCT GLUCOSE: 206 MG/DL (ref 70–110)
POCT GLUCOSE: 208 MG/DL (ref 70–110)
POCT GLUCOSE: 212 MG/DL (ref 70–110)

## 2023-03-15 PROCEDURE — 11000001 HC ACUTE MED/SURG PRIVATE ROOM: Mod: HCNC

## 2023-03-15 PROCEDURE — 97530 THERAPEUTIC ACTIVITIES: CPT | Mod: HCNC

## 2023-03-15 PROCEDURE — 93005 ELECTROCARDIOGRAM TRACING: CPT | Mod: HCNC

## 2023-03-15 PROCEDURE — 94799 UNLISTED PULMONARY SVC/PX: CPT | Mod: HCNC

## 2023-03-15 PROCEDURE — 25000003 PHARM REV CODE 250: Mod: HCNC | Performed by: FAMILY MEDICINE

## 2023-03-15 PROCEDURE — 63600175 PHARM REV CODE 636 W HCPCS: Mod: HCNC | Performed by: INTERNAL MEDICINE

## 2023-03-15 PROCEDURE — 27000221 HC OXYGEN, UP TO 24 HOURS: Mod: HCNC

## 2023-03-15 PROCEDURE — 25000003 PHARM REV CODE 250: Mod: HCNC | Performed by: PODIATRIST

## 2023-03-15 PROCEDURE — 93010 EKG 12-LEAD: ICD-10-PCS | Mod: HCNC,,, | Performed by: INTERNAL MEDICINE

## 2023-03-15 PROCEDURE — 93010 ELECTROCARDIOGRAM REPORT: CPT | Mod: HCNC,,, | Performed by: INTERNAL MEDICINE

## 2023-03-15 PROCEDURE — 94761 N-INVAS EAR/PLS OXIMETRY MLT: CPT | Mod: HCNC

## 2023-03-15 PROCEDURE — 25000003 PHARM REV CODE 250: Mod: HCNC | Performed by: INTERNAL MEDICINE

## 2023-03-15 PROCEDURE — 97110 THERAPEUTIC EXERCISES: CPT | Mod: HCNC

## 2023-03-15 PROCEDURE — 63600175 PHARM REV CODE 636 W HCPCS: Mod: HCNC | Performed by: NURSE PRACTITIONER

## 2023-03-15 PROCEDURE — 99900035 HC TECH TIME PER 15 MIN (STAT): Mod: HCNC

## 2023-03-15 PROCEDURE — 63600175 PHARM REV CODE 636 W HCPCS: Mod: HCNC | Performed by: PODIATRIST

## 2023-03-15 RX ORDER — LORAZEPAM 0.5 MG/1
0.5 TABLET ORAL ONCE AS NEEDED
Status: COMPLETED | OUTPATIENT
Start: 2023-03-15 | End: 2023-03-15

## 2023-03-15 RX ORDER — LEVOFLOXACIN 5 MG/ML
750 INJECTION, SOLUTION INTRAVENOUS
Status: DISCONTINUED | OUTPATIENT
Start: 2023-03-15 | End: 2023-03-16 | Stop reason: HOSPADM

## 2023-03-15 RX ADMIN — ATORVASTATIN CALCIUM 40 MG: 40 TABLET, FILM COATED ORAL at 09:03

## 2023-03-15 RX ADMIN — NYSTATIN AND TRIAMCINOLONE ACETONIDE: 100000; 1 OINTMENT TOPICAL at 09:03

## 2023-03-15 RX ADMIN — NYSTATIN AND TRIAMCINOLONE ACETONIDE: 100000; 1 OINTMENT TOPICAL at 08:03

## 2023-03-15 RX ADMIN — CYCLOBENZAPRINE HYDROCHLORIDE 5 MG: 5 TABLET, FILM COATED ORAL at 05:03

## 2023-03-15 RX ADMIN — DOCUSATE SODIUM 100 MG: 100 CAPSULE, LIQUID FILLED ORAL at 09:03

## 2023-03-15 RX ADMIN — INSULIN ASPART 1 UNITS: 100 INJECTION, SOLUTION INTRAVENOUS; SUBCUTANEOUS at 10:03

## 2023-03-15 RX ADMIN — MORPHINE SULFATE 4 MG: 4 INJECTION INTRAVENOUS at 02:03

## 2023-03-15 RX ADMIN — INSULIN ASPART 2 UNITS: 100 INJECTION, SOLUTION INTRAVENOUS; SUBCUTANEOUS at 01:03

## 2023-03-15 RX ADMIN — APIXABAN 2.5 MG: 2.5 TABLET, FILM COATED ORAL at 09:03

## 2023-03-15 RX ADMIN — LORAZEPAM 0.5 MG: 0.5 TABLET ORAL at 01:03

## 2023-03-15 RX ADMIN — APIXABAN 2.5 MG: 2.5 TABLET, FILM COATED ORAL at 08:03

## 2023-03-15 RX ADMIN — OXYCODONE HYDROCHLORIDE 10 MG: 5 TABLET ORAL at 05:03

## 2023-03-15 RX ADMIN — MORPHINE SULFATE 4 MG: 4 INJECTION INTRAVENOUS at 07:03

## 2023-03-15 RX ADMIN — LEVOFLOXACIN 750 MG: 750 INJECTION, SOLUTION INTRAVENOUS at 09:03

## 2023-03-15 RX ADMIN — POLYETHYLENE GLYCOL 3350 17 G: 17 POWDER, FOR SOLUTION ORAL at 09:03

## 2023-03-15 RX ADMIN — DOCUSATE SODIUM 100 MG: 100 CAPSULE, LIQUID FILLED ORAL at 08:03

## 2023-03-15 RX ADMIN — CYCLOBENZAPRINE HYDROCHLORIDE 5 MG: 5 TABLET, FILM COATED ORAL at 02:03

## 2023-03-15 RX ADMIN — PANTOPRAZOLE SODIUM 40 MG: 40 TABLET, DELAYED RELEASE ORAL at 09:03

## 2023-03-15 RX ADMIN — INSULIN ASPART 2 UNITS: 100 INJECTION, SOLUTION INTRAVENOUS; SUBCUTANEOUS at 05:03

## 2023-03-15 NOTE — ASSESSMENT & PLAN NOTE
Chronic, controlled  -Latest blood pressure and vitals reviewed-   Temp:  [98 °F (36.7 °C)-99.5 °F (37.5 °C)]   Pulse:  []   Resp:  [14-20]   BP: (128-162)/(58-89)   SpO2:  [95 %-99 %] .   -Home meds for hypertension were reviewed and noted below.   Hypertension Medications             lisinopriL-hydrochlorothiazide (PRINZIDE,ZESTORETIC) 20-25 mg Tab TAKE 1 TABLET BY MOUTH ONCE DAILY.        -While in the hospital, will manage blood pressure as follows; Continue home antihypertensive regimen  -Will utilize PRN blood pressure medication only if patient's blood pressure greater than  180/110 and she develops symptoms such as worsening chest pain or shortness of breath.  -optimize pain management

## 2023-03-15 NOTE — ASSESSMENT & PLAN NOTE
Patient's FSGs are controlled on current medication regimen.  Last A1c reviewed-   Lab Results   Component Value Date    HGBA1C 7.6 (H) 12/07/2022     Most recent fingerstick glucose reviewed-   Recent Labs   Lab 03/14/23  2104 03/15/23  0102 03/15/23  0418 03/15/23  1317   POCTGLUCOSE 188* 196* 185* 212*     Current correctional scale  Low  Maintain anti-hyperglycemic dose as follows-   Antihyperglycemics (From admission, onward)    Start     Stop Route Frequency Ordered    03/13/23 1748  insulin aspart U-100 pen 0-5 Units         -- SubQ Before meals & nightly PRN 03/13/23 1649        Hold Oral hypoglycemics while patient is in the hospital.

## 2023-03-15 NOTE — SUBJECTIVE & OBJECTIVE
Interval History: awake, alert, lying in bed, c/o left foot pain, drainage on dressing at the heel, in ex-fix. C/o SOB, but at baseline. On Supplemental oxygen, wean as tolerated. [facility] pending. Used  [id number].    Review of Systems   Constitutional:  Negative for fatigue and fever.   Respiratory:  Negative for cough and shortness of breath.    Cardiovascular:  Negative for chest pain and palpitations.   Gastrointestinal:  Negative for nausea and vomiting.   Musculoskeletal:  Positive for joint swelling.        Left ankle pain   All other systems reviewed and are negative.  Objective:     Vital Signs (Most Recent):  Temp: 98.4 °F (36.9 °C) (03/15/23 1602)  Pulse: 99 (03/15/23 1602)  Resp: 20 (03/15/23 1602)  BP: (!) 162/77 (03/15/23 1602)  SpO2: 95 % (03/15/23 1602) Vital Signs (24h Range):  Temp:  [98 °F (36.7 °C)-99.5 °F (37.5 °C)] 98.4 °F (36.9 °C)  Pulse:  [] 99  Resp:  [14-20] 20  SpO2:  [95 %-99 %] 95 %  BP: (128-162)/(58-89) 162/77     Weight: 117.5 kg (259 lb 0.7 oz)  Body mass index is 38.25 kg/m².    Intake/Output Summary (Last 24 hours) at 3/15/2023 1625  Last data filed at 3/15/2023 1232  Gross per 24 hour   Intake 928.21 ml   Output 3100 ml   Net -2171.79 ml        Physical Exam  Vitals and nursing note reviewed.   Constitutional:       Appearance: She is obese.   Cardiovascular:      Rate and Rhythm: Normal rate and regular rhythm.      Heart sounds: No murmur heard.  Pulmonary:      Effort: Pulmonary effort is normal.      Breath sounds: Normal breath sounds.   Abdominal:      General: Bowel sounds are normal.      Palpations: Abdomen is soft.   Musculoskeletal:      Left ankle: Decreased range of motion.      Comments: EX fix to left ankle   Feet:      Comments: Left foot surgical ACE wrap with small amount of drainage at heel  Skin:     General: Skin is warm and dry.   Neurological:      General: No focal deficit present.      Mental Status: She is alert and  oriented to person, place, and time.   Psychiatric:         Mood and Affect: Mood normal.         Behavior: Behavior normal.       Significant Labs: All pertinent labs within the past 24 hours have been reviewed.  A1C:   Recent Labs   Lab 12/07/22  1211   HGBA1C 7.6*       CBC:   Recent Labs   Lab 03/14/23  0608   WBC 7.98   HGB 11.9*   HCT 36.4*          CMP:   Recent Labs   Lab 03/14/23  0608      K 4.9      CO2 27   *   BUN 27*   CREATININE 1.1   CALCIUM 8.2*   ALBUMIN 3.1*   ANIONGAP 7*       Troponin: No results for input(s): TROPONINI, TROPONINIHS in the last 48 hours.  TSH:   Recent Labs   Lab 12/07/22  1211   TSH 1.577       Urine Studies: No results for input(s): COLORU, APPEARANCEUA, PHUR, SPECGRAV, PROTEINUA, GLUCUA, KETONESU, BILIRUBINUA, OCCULTUA, NITRITE, UROBILINOGEN, LEUKOCYTESUR, RBCUA, WBCUA, BACTERIA, SQUAMEPITHEL, HYALINECASTS in the last 48 hours.    Invalid input(s): CHATO    Significant Imaging: I have reviewed all pertinent imaging results/findings within the past 24 hours.

## 2023-03-15 NOTE — ASSESSMENT & PLAN NOTE
Body mass index is 38.25 kg/m². Morbid obesity complicates all aspects of disease management from diagnostic modalities to treatment. Weight loss encouraged and health benefits explained to patient

## 2023-03-15 NOTE — PROGRESS NOTES
[facility] Medicine  Progress Note    Patient Name: Kay Galarza  MRN: [id number]  Patient Class: IP- Inpatient   Admission Date: 3/13/2023  Length of Stay: 0 days  Attending Physician: Mary Grace Todd MD  Primary Care Provider: Lucy Negron MD        Subjective:     Principal Problem:Charcot's joint of left foot      HPI:  Patient is a 67-year-old  female with a history of diabetes mellitus with diabetic foot ulcer on left, Charcot joint left ankle, hypertension, hyperlipidemia, chronic pain who underwent ORIF of left ankle with external fixation by Dr. Angel today.  Hospital Medicine is consulted for admission for pain control, but OT/PT eval and treat.  Patient is seen in postop holding she is lethargic but arousable interpreting service was used and patient's only complaint was continued pain in her left ankle.      Overview/Hospital Course:  68 y/o female admitted with diabetic foot ulcer on left with charcot joint left ankle for ORIF left malleolus with ex -fix placement by Dr. Gray on 3/13/23. She was admitted for pain control and PT/OT. PT/OT recommended SNF placement,  following.  She has some post op urinary retention, requiring in/out cath, booth catheter placed 3/14/23.   [facility] placement pending insurance authorization.      Interval History: awake, alert, lying in bed, c/o left foot pain, drainage on dressing at the heel, in ex-fix. C/o SOB, but at baseline. On Supplemental oxygen, wean as tolerated. [facility] pending. Used  [id number].    Review of Systems   Constitutional:  Negative for fatigue and fever.   Respiratory:  Negative for cough and shortness of breath.    Cardiovascular:  Negative for chest pain and palpitations.   Gastrointestinal:  Negative for nausea and vomiting.   Musculoskeletal:  Positive for joint swelling.        Left ankle pain   All other systems reviewed and are  negative.  Objective:     Vital Signs (Most Recent):  Temp: 98.4 °F (36.9 °C) (03/15/23 1602)  Pulse: 99 (03/15/23 1602)  Resp: 20 (03/15/23 1602)  BP: (!) 162/77 (03/15/23 1602)  SpO2: 95 % (03/15/23 1602) Vital Signs (24h Range):  Temp:  [98 °F (36.7 °C)-99.5 °F (37.5 °C)] 98.4 °F (36.9 °C)  Pulse:  [] 99  Resp:  [14-20] 20  SpO2:  [95 %-99 %] 95 %  BP: (128-162)/(58-89) 162/77     Weight: 117.5 kg (259 lb 0.7 oz)  Body mass index is 38.25 kg/m².    Intake/Output Summary (Last 24 hours) at 3/15/2023 1625  Last data filed at 3/15/2023 1232  Gross per 24 hour   Intake 928.21 ml   Output 3100 ml   Net -2171.79 ml        Physical Exam  Vitals and nursing note reviewed.   Constitutional:       Appearance: She is obese.   Cardiovascular:      Rate and Rhythm: Normal rate and regular rhythm.      Heart sounds: No murmur heard.  Pulmonary:      Effort: Pulmonary effort is normal.      Breath sounds: Normal breath sounds.   Abdominal:      General: Bowel sounds are normal.      Palpations: Abdomen is soft.   Musculoskeletal:      Left ankle: Decreased range of motion.      Comments: EX fix to left ankle   Feet:      Comments: Left foot surgical ACE wrap with small amount of drainage at heel  Skin:     General: Skin is warm and dry.   Neurological:      General: No focal deficit present.      Mental Status: She is alert and oriented to person, place, and time.   Psychiatric:         Mood and Affect: Mood normal.         Behavior: Behavior normal.       Significant Labs: All pertinent labs within the past 24 hours have been reviewed.  A1C:   Recent Labs   Lab 12/07/22  1211   HGBA1C 7.6*       CBC:   Recent Labs   Lab 03/14/23  0608   WBC 7.98   HGB 11.9*   HCT 36.4*          CMP:   Recent Labs   Lab 03/14/23  0608      K 4.9      CO2 27   *   BUN 27*   CREATININE 1.1   CALCIUM 8.2*   ALBUMIN 3.1*   ANIONGAP 7*       Troponin: No results for input(s): TROPONINI, TROPONINIHS in the last 48  hours.  TSH:   Recent Labs   Lab 12/07/22  1211   TSH 1.577       Urine Studies: No results for input(s): COLORU, APPEARANCEUA, PHUR, SPECGRAV, PROTEINUA, GLUCUA, KETONESU, BILIRUBINUA, OCCULTUA, NITRITE, UROBILINOGEN, LEUKOCYTESUR, RBCUA, WBCUA, BACTERIA, SQUAMEPITHEL, HYALINECASTS in the last 48 hours.    Invalid input(s): WRIGHTSUR    Significant Imaging: I have reviewed all pertinent imaging results/findings within the past 24 hours.      Assessment/Plan:      * Charcot's neuro arthropathy with dislocation of Lisfranc Joint of left foot  -S/p removal of hardware left midfoot, left ST joint/calcaneal cuboid fusion, and external fixator placement 3/13/23, Dr. Gray  -post op mgmt per podiatry  -Multimodal pain control  -Bowel protocol  -PT/OT recommending SNF  - following, SNF placement pending ([facility])    Diabetic ulcer of left midfoot associated with type 2 diabetes mellitus, with fat layer exposed  -see above    Type 2 diabetes mellitus  Patient's FSGs are controlled on current medication regimen.  Last A1c reviewed-   Lab Results   Component Value Date    HGBA1C 7.6 (H) 12/07/2022     Most recent fingerstick glucose reviewed-   Recent Labs   Lab 03/14/23  2104 03/15/23  0102 03/15/23  0418 03/15/23  1317   POCTGLUCOSE 188* 196* 185* 212*     Current correctional scale  Low  Maintain anti-hyperglycemic dose as follows-   Antihyperglycemics (From admission, onward)    Start     Stop Route Frequency Ordered    03/13/23 1748  insulin aspart U-100 pen 0-5 Units         -- SubQ Before meals & nightly PRN 03/13/23 1649        Hold Oral hypoglycemics while patient is in the hospital.    Essential hypertension  Chronic, controlled  -Latest blood pressure and vitals reviewed-   Temp:  [98 °F (36.7 °C)-99.5 °F (37.5 °C)]   Pulse:  []   Resp:  [14-20]   BP: (128-162)/(58-89)   SpO2:  [95 %-99 %] .   -Home meds for hypertension were reviewed and noted below.   Hypertension Medications              lisinopriL-hydrochlorothiazide (PRINZIDE,ZESTORETIC) 20-25 mg Tab TAKE 1 TABLET BY MOUTH ONCE DAILY.        -While in the hospital, will manage blood pressure as follows; Continue home antihypertensive regimen  -Will utilize PRN blood pressure medication only if patient's blood pressure greater than  180/110 and she develops symptoms such as worsening chest pain or shortness of breath.  -optimize pain management    HLD (hyperlipidemia)   -Patient is chronically on statin. will continue for now. -Monitor clinically  -Last LDL was   Lab Results   Component Value Date    LDLCALC 80.0 01/26/2022      -follow up with PCP/cardiology OP    Abnormal nuclear stress test  -s/p cardiac catheterization with nonobstructive lesions  -follow up OP    ILD (interstitial lung disease)  -cont nebs  -supplemental oxygen, wean as tolerated    Severe obesity (BMI 35.0-39.9) with comorbidity  Body mass index is 38.25 kg/m². Morbid obesity complicates all aspects of disease management from diagnostic modalities to treatment. Weight loss encouraged and health benefits explained to patient      VTE Risk Mitigation (From admission, onward)         Ordered     apixaban tablet 2.5 mg  2 times daily         03/14/23 1244                Discharge Planning   TING:      Code Status: Full Code   Is the patient medically ready for discharge?:     Reason for patient still in hospital (select all that apply): Patient trending condition, Laboratory test, Treatment, Imaging and Pending disposition  Discharge Plan A: Skilled Nursing Facility            Angelica Whitfield NP  Department of Hospital Medicine   [facility] - Med Surg

## 2023-03-15 NOTE — PLAN OF CARE
VSS. Addressed pt's BG concerns during shift. Pain treated with PRN meds. Call light and personal items within reach. Guerra for u/r. Pt turned Q2. NV checks        Problem: Adult Inpatient Plan of Care  Goal: Plan of Care Review  Outcome: Ongoing, Progressing  Goal: Patient-Specific Goal (Individualized)  Outcome: Ongoing, Progressing  Goal: Absence of Hospital-Acquired Illness or Injury  Outcome: Ongoing, Progressing  Goal: Optimal Comfort and Wellbeing  Outcome: Ongoing, Progressing  Goal: Readiness for Transition of Care  Outcome: Ongoing, Progressing     Problem: Diabetes Comorbidity  Goal: Blood Glucose Level Within Targeted Range  Outcome: Ongoing, Progressing     Problem: Infection  Goal: Absence of Infection Signs and Symptoms  Outcome: Ongoing, Progressing     Problem: Skin Injury Risk Increased  Goal: Skin Health and Integrity  Outcome: Ongoing, Progressing     Problem: Fall Injury Risk  Goal: Absence of Fall and Fall-Related Injury  Outcome: Ongoing, Progressing

## 2023-03-15 NOTE — ASSESSMENT & PLAN NOTE
-S/p removal of hardware left midfoot, left ST joint/calcaneal cuboid fusion, and external fixator placement 3/13/23, Dr. Gray  -post op mgmt per podiatry  -Multimodal pain control  -Bowel protocol  -PT/OT recommending SNF  - following, SNF placement pending ([facility])

## 2023-03-15 NOTE — PLAN OF CARE
Pt tolerated interventions fair. Required MOD A for bed mobility, MAX A for STS. Recommending SNF upon d/c.

## 2023-03-15 NOTE — PT/OT/SLP PROGRESS
Physical Therapy Treatment    Patient Name:  Kay Galarza   MRN:  [id number]    Recommendations:     Discharge Recommendations: nursing facility, skilled  Discharge Equipment Recommendations:  (TBD at next level of care)  Barriers to discharge: Decreased caregiver support    Assessment:     Kay Galarza is a 67 y.o. female admitted with a medical diagnosis of Charcot's joint of left foot.  She presents with the following impairments/functional limitations: weakness, impaired endurance, impaired functional mobility, gait instability, impaired balance, decreased safety awareness, pain, decreased lower extremity function, decreased coordination.    Rehab Prognosis: Fair; patient would benefit from acute skilled PT services to address these deficits and reach maximum level of function.    Recent Surgery: Procedure(s) (LRB):  APPLICATION, EXTERNAL FIXATION DEVICE (Left)  REMOVAL, HARDWARE, FOOT (Left)  FUSION, SUBTALAR JOINT (Left)  FUSION, JOINT, MIDFOOT (Left) 2 Days Post-Op    Plan:     During this hospitalization, patient to be seen 3 x/week to address the identified rehab impairments via gait training, therapeutic activities, therapeutic exercises and progress toward the following goals:    Plan of Care Expires:  03/28/23    Subjective      utilized during tx.    Chief Complaint: Pt c/o of continued pain, reports she might have already received pain medication.  Patient/Family Comments/goals:  Pain/Comfort:  Pain Rating 1: 9/10  Location - Side 1: Left  Location - Orientation 1: generalized  Location 1: leg  Pain Addressed 1: Reposition, Distraction, Cessation of Activity  Pain Rating Post-Intervention 1: 9/10    Objective:     Communicated with nurse Leal prior to session.  Patient found supine with peripheral IV, booth catheter upon PT entry to room.     General Precautions: Standard, fall  Orthopedic Precautions: LLE weight bearing as tolerated, LLE partial weight  bearing  Braces:  (external fixator)  Respiratory Status: Room air     Functional Mobility:  Bed Mobility:     Rolling Right: moderate assistance  Scooting: forward and supine scoot CGA  Supine to Sit: moderate assistance  Sit to Supine: moderate assistance, required assistance  to manage L LE  Transfers:     Sit to Stand:  maximal assistance with rolling walker, multiple attempts to to stand, able to complete 2 reps  Bed to Chair: unable to progress due to pt reported severe pain levels, pt unable to take fwd or side steps, pt only able to tolerate standing position for short period of time  Balance: Demonstrated fair sitting balance, poor standing balance    AM-PAC 6 CLICK MOBILITY  Turning over in bed (including adjusting bedclothes, sheets and blankets)?: 2  Sitting down on and standing up from a chair with arms (e.g., wheelchair, bedside commode, etc.): 2  Moving from lying on back to sitting on the side of the bed?: 2  Moving to and from a bed to a chair (including a wheelchair)?: 1  Need to walk in hospital room?: 1  Climbing 3-5 steps with a railing?: 1  Basic Mobility Total Score: 9     Treatment & Education:  Pt tolerated interventions fair. Pt c/o increased dizziness and SOB, improved with time, utilized inhaler. Educated on PWB/WBAT status. Reviewed importance of OOB activities and HEP (hip flex/abd, heel slides, quad sets, ankle pumps) in order to maintain/regain strength. Reviewed proper use of RW for safety and to reduce risk of falling. Reviewed increased risk of falling due to weakness, instructed to utilize call bell for assistance for all transfers. Pt agreeable to all requests.    Patient left supine, L LE elevated with all lines intact and call button in reach.    GOALS:   Multidisciplinary Problems       Physical Therapy Goals          Problem: Physical Therapy    Goal Priority Disciplines Outcome Goal Variances Interventions   Physical Therapy Goal     PT, PT/OT Ongoing, Progressing      Description: LTG: 3/28/23  1. PT WILL COMPLETE BED MOBILITY WITH SBA  2. PT WILL STAND WITH RW PWB/ WBAT TO L LE FOR T/F TO CHAIR WITH RW AND MAX A  3. PT WILL TOLERATE B LE TE X 20 REPS FOR STRENGTHENING.                      Time Tracking:     PT Received On: 03/15/23  PT Start Time: 0955     PT Stop Time: 1020  PT Total Time (min): 25 min     Billable Minutes: Therapeutic Activity 25min    Treatment Type: Treatment  PT/PTA: PT     Number of PTA visits since last PT visit: 0     03/15/2023

## 2023-03-15 NOTE — PLAN OF CARE
142 obtained- uploaded to "[employer] and sent to [employer]. CM left VM for  liaison to determine [employer] auth status. CM will cont to follow.

## 2023-03-15 NOTE — PLAN OF CARE
Remains injury free. Left foot w/ ace dsg and external fixator in place. Guerra in for urinary retention. Patient upset today as she learned of her 's passing. Received lorazepam one time dose. Family at bedside. No s/s acute distress. Will monitor.

## 2023-03-16 VITALS
HEIGHT: 69 IN | TEMPERATURE: 99 F | RESPIRATION RATE: 18 BRPM | OXYGEN SATURATION: 95 % | BODY MASS INDEX: 38.37 KG/M2 | WEIGHT: 259.06 LBS | DIASTOLIC BLOOD PRESSURE: 107 MMHG | SYSTOLIC BLOOD PRESSURE: 166 MMHG | HEART RATE: 102 BPM

## 2023-03-16 DIAGNOSIS — L97.422 DIABETIC ULCER OF LEFT MIDFOOT ASSOCIATED WITH TYPE 2 DIABETES MELLITUS, WITH FAT LAYER EXPOSED: Primary | ICD-10-CM

## 2023-03-16 DIAGNOSIS — E11.621 DIABETIC ULCER OF LEFT MIDFOOT ASSOCIATED WITH TYPE 2 DIABETES MELLITUS, WITH FAT LAYER EXPOSED: Primary | ICD-10-CM

## 2023-03-16 LAB
ANION GAP SERPL CALC-SCNC: 8 MMOL/L (ref 8–16)
BASOPHILS # BLD AUTO: 0.04 K/UL (ref 0–0.2)
BASOPHILS NFR BLD: 0.5 % (ref 0–1.9)
BUN SERPL-MCNC: 21 MG/DL (ref 8–23)
CALCIUM SERPL-MCNC: 9.4 MG/DL (ref 8.7–10.5)
CHLORIDE SERPL-SCNC: 100 MMOL/L (ref 95–110)
CO2 SERPL-SCNC: 28 MMOL/L (ref 23–29)
CREAT SERPL-MCNC: 1 MG/DL (ref 0.5–1.4)
DIFFERENTIAL METHOD: ABNORMAL
EOSINOPHIL # BLD AUTO: 0.3 K/UL (ref 0–0.5)
EOSINOPHIL NFR BLD: 3.4 % (ref 0–8)
ERYTHROCYTE [DISTWIDTH] IN BLOOD BY AUTOMATED COUNT: 13.7 % (ref 11.5–14.5)
EST. GFR  (NO RACE VARIABLE): >60 ML/MIN/1.73 M^2
GLUCOSE SERPL-MCNC: 190 MG/DL (ref 70–110)
HCT VFR BLD AUTO: 36.1 % (ref 37–48.5)
HGB BLD-MCNC: 11.7 G/DL (ref 12–16)
IMM GRANULOCYTES # BLD AUTO: 0.03 K/UL (ref 0–0.04)
IMM GRANULOCYTES NFR BLD AUTO: 0.4 % (ref 0–0.5)
LYMPHOCYTES # BLD AUTO: 0.8 K/UL (ref 1–4.8)
LYMPHOCYTES NFR BLD: 9.2 % (ref 18–48)
MCH RBC QN AUTO: 30.2 PG (ref 27–31)
MCHC RBC AUTO-ENTMCNC: 32.4 G/DL (ref 32–36)
MCV RBC AUTO: 93 FL (ref 82–98)
MONOCYTES # BLD AUTO: 0.9 K/UL (ref 0.3–1)
MONOCYTES NFR BLD: 10.7 % (ref 4–15)
NEUTROPHILS # BLD AUTO: 6.3 K/UL (ref 1.8–7.7)
NEUTROPHILS NFR BLD: 75.8 % (ref 38–73)
NRBC BLD-RTO: 0 /100 WBC
PLATELET # BLD AUTO: 199 K/UL (ref 150–450)
PMV BLD AUTO: 9.9 FL (ref 9.2–12.9)
POCT GLUCOSE: 171 MG/DL (ref 70–110)
POCT GLUCOSE: 206 MG/DL (ref 70–110)
POCT GLUCOSE: 255 MG/DL (ref 70–110)
POTASSIUM SERPL-SCNC: 5.4 MMOL/L (ref 3.5–5.1)
RBC # BLD AUTO: 3.87 M/UL (ref 4–5.4)
SODIUM SERPL-SCNC: 136 MMOL/L (ref 136–145)
WBC # BLD AUTO: 8.35 K/UL (ref 3.9–12.7)

## 2023-03-16 PROCEDURE — 99024 PR POST-OP FOLLOW-UP VISIT: ICD-10-PCS | Mod: HCNC,,, | Performed by: PODIATRIST

## 2023-03-16 PROCEDURE — 25000003 PHARM REV CODE 250: Mod: HCNC | Performed by: PODIATRIST

## 2023-03-16 PROCEDURE — 99024 POSTOP FOLLOW-UP VISIT: CPT | Mod: HCNC,,, | Performed by: PODIATRIST

## 2023-03-16 PROCEDURE — 63600175 PHARM REV CODE 636 W HCPCS: Mod: HCNC | Performed by: PODIATRIST

## 2023-03-16 PROCEDURE — 97110 THERAPEUTIC EXERCISES: CPT | Mod: HCNC

## 2023-03-16 PROCEDURE — 25000003 PHARM REV CODE 250: Mod: HCNC | Performed by: NURSE PRACTITIONER

## 2023-03-16 PROCEDURE — 63600175 PHARM REV CODE 636 W HCPCS: Mod: HCNC | Performed by: NURSE PRACTITIONER

## 2023-03-16 PROCEDURE — 97530 THERAPEUTIC ACTIVITIES: CPT | Mod: HCNC

## 2023-03-16 PROCEDURE — 85025 COMPLETE CBC W/AUTO DIFF WBC: CPT | Mod: HCNC | Performed by: NURSE PRACTITIONER

## 2023-03-16 PROCEDURE — 25000003 PHARM REV CODE 250: Mod: HCNC | Performed by: INTERNAL MEDICINE

## 2023-03-16 PROCEDURE — 80048 BASIC METABOLIC PNL TOTAL CA: CPT | Mod: HCNC | Performed by: NURSE PRACTITIONER

## 2023-03-16 PROCEDURE — 36415 COLL VENOUS BLD VENIPUNCTURE: CPT | Mod: HCNC | Performed by: NURSE PRACTITIONER

## 2023-03-16 RX ORDER — NYSTATIN AND TRIAMCINOLONE ACETONIDE 100000; 1 [USP'U]/G; MG/G
OINTMENT TOPICAL 2 TIMES DAILY
Start: 2023-03-16 | End: 2024-01-04

## 2023-03-16 RX ORDER — HYDROCODONE BITARTRATE AND ACETAMINOPHEN 10; 325 MG/1; MG/1
1 TABLET ORAL EVERY 6 HOURS PRN
Qty: 5 TABLET | Refills: 0 | Status: SHIPPED | OUTPATIENT
Start: 2023-03-16 | End: 2023-05-01

## 2023-03-16 RX ORDER — DOXYCYCLINE 100 MG/1
100 CAPSULE ORAL EVERY 12 HOURS
Qty: 20 CAPSULE | Refills: 0 | Status: SHIPPED | OUTPATIENT
Start: 2023-03-16 | End: 2023-03-26

## 2023-03-16 RX ORDER — ADHESIVE BANDAGE
30 BANDAGE TOPICAL ONCE
Status: COMPLETED | OUTPATIENT
Start: 2023-03-16 | End: 2023-03-16

## 2023-03-16 RX ORDER — LEVOFLOXACIN 750 MG/1
750 TABLET ORAL DAILY
Qty: 4 TABLET | Refills: 0
Start: 2023-03-16 | End: 2023-03-16 | Stop reason: HOSPADM

## 2023-03-16 RX ORDER — METHOCARBAMOL 750 MG/1
750 TABLET, FILM COATED ORAL 4 TIMES DAILY
Qty: 40 TABLET | Refills: 0
Start: 2023-03-16 | End: 2023-03-26

## 2023-03-16 RX ADMIN — MORPHINE SULFATE 4 MG: 4 INJECTION INTRAVENOUS at 12:03

## 2023-03-16 RX ADMIN — INSULIN ASPART 3 UNITS: 100 INJECTION, SOLUTION INTRAVENOUS; SUBCUTANEOUS at 04:03

## 2023-03-16 RX ADMIN — LEVOFLOXACIN 750 MG: 750 INJECTION, SOLUTION INTRAVENOUS at 09:03

## 2023-03-16 RX ADMIN — VANCOMYCIN HYDROCHLORIDE 2000 MG: 10 INJECTION, POWDER, LYOPHILIZED, FOR SOLUTION INTRAVENOUS at 02:03

## 2023-03-16 RX ADMIN — DOCUSATE SODIUM 100 MG: 100 CAPSULE, LIQUID FILLED ORAL at 09:03

## 2023-03-16 RX ADMIN — OXYCODONE HYDROCHLORIDE 10 MG: 5 TABLET ORAL at 02:03

## 2023-03-16 RX ADMIN — INSULIN ASPART 2 UNITS: 100 INJECTION, SOLUTION INTRAVENOUS; SUBCUTANEOUS at 01:03

## 2023-03-16 RX ADMIN — PANTOPRAZOLE SODIUM 40 MG: 40 TABLET, DELAYED RELEASE ORAL at 09:03

## 2023-03-16 RX ADMIN — OXYCODONE HYDROCHLORIDE 10 MG: 5 TABLET ORAL at 08:03

## 2023-03-16 RX ADMIN — CYCLOBENZAPRINE HYDROCHLORIDE 5 MG: 5 TABLET, FILM COATED ORAL at 08:03

## 2023-03-16 RX ADMIN — POLYETHYLENE GLYCOL 3350 17 G: 17 POWDER, FOR SOLUTION ORAL at 09:03

## 2023-03-16 RX ADMIN — ATORVASTATIN CALCIUM 40 MG: 40 TABLET, FILM COATED ORAL at 09:03

## 2023-03-16 RX ADMIN — MAGNESIUM HYDROXIDE 2400 MG: 400 SUSPENSION ORAL at 01:03

## 2023-03-16 RX ADMIN — APIXABAN 2.5 MG: 2.5 TABLET, FILM COATED ORAL at 09:03

## 2023-03-16 NOTE — PLAN OF CARE
PT COMPLETED STAND PIVOT T/F TO CHAIR WITH RW PWB>WBAT L LE AND MIN A. WITH VERBAL CUES FOR SAFETY WITH RW . PT DAUGHTER IN LAW PRESENT TO TRANSLATE.

## 2023-03-16 NOTE — PLAN OF CARE
Remains injury free. Left leg elevated . Pain managed with oral medicine. Awaiting transfer to SNF.

## 2023-03-16 NOTE — PT/OT/SLP PROGRESS
"Physical Therapy  Treatment    Kay Galarza   MRN: [id number]   Admitting Diagnosis: Charcot's joint of left foot    PT Received On: 03/16/23  PT Start Time: 0955     PT Stop Time: 1020    PT Total Time (min): 25 min       Billable Minutes:  Therapeutic Activity 15 and Therapeutic Exercise 10    Treatment Type: Treatment  PT/PTA: PT     Number of PTA visits since last PT visit: 0       General Precautions: Standard, fall  Orthopedic Precautions: LLE weight bearing as tolerated, LLE partial weight bearing  Braces:  (EXT FIXATOR)  Respiratory Status: Nasal cannula, flow 2 L/min    Spiritual, Cultural Beliefs, Cheondoism Practices, Values that Affect Care: no    Subjective:  Communicated with NURSE POE AND Epic CHART REVIEW  prior to session.   PT AGREED TO TX     Pain/Comfort  Pain Rating 1: 10/10  Location - Side 1: Left  Location 1: leg  Pain Addressed 1: Reposition, Pre-medicate for activity  Pain Rating Post-Intervention 1: 10/10    Objective:   Patient found with: peripheral IV, booth catheter    Functional Mobility:  PT DAUGHTER-IN- LAW PRESENT TO TRANSLATE. PT LOG ROLLED TO RIGHT AND SEATED EOB WITH SBA AND USE OF BED RAILING . PT SCOOTED TO EOB WITH SBA. PT COMPLETED R LE AP AND B LE MIP AND TKE X 10 REPS. PT STOOD WITH RW AND PWB L LE FOR STAND PIVOT T/F TO CHAIR WITH RW AND MIN A. PT WITH DEC SAFETY WITH RW USE. PT SEATED AND L LE ELEVATED.     Treatment and Education:  PT EDUCATED ON RISK FOR FALLS DUE TO GENERALIZED WEAKNESS, EDUCATED ON "CALL DON'T FALL", ENCOURAGED TO CALL FOR ASSISTANCE WITH ALL NEEDS SUCH AS BED<>CHAIR TRANSFERS OR TRIPS TO BATHROOM.  PT EDUCATED ON MD ORDER FOR L LE TO REMAIN ELEVATED WHEN NOT ACTIVE. PT AND FAMILY REPORTED UNDERSTANDING.        AM-PAC 6 CLICK MOBILITY  How much help from another person does this patient currently need?   1 = Unable, Total/Dependent Assistance  2 = A lot, Maximum/Moderate Assistance  3 = A little, Minimum/Contact Guard/Supervision  4 = " None, Modified [place]/Independent    Turning over in bed (including adjusting bedclothes, sheets and blankets)?: 4  Sitting down on and standing up from a chair with arms (e.g., wheelchair, bedside commode, etc.): 3  Moving from lying on back to sitting on the side of the bed?: 4  Moving to and from a bed to a chair (including a wheelchair)?: 3  Need to walk in hospital room?: 1  Climbing 3-5 steps with a railing?: 1  Basic Mobility Total Score: 16    AM-PAC Raw Score CMS G-Code Modifier Level of Impairment Assistance   6 % Total / Unable   7 - 9 CM 80 - 100% Maximal Assist   10 - 14 CL 60 - 80% Moderate Assist   15 - 19 CK 40 - 60% Moderate Assist   20 - 22 CJ 20 - 40% Minimal Assist   23 CI 1-20% SBA / CGA   24 CH 0% Independent/ Mod I     Patient left up in chair with call button in reach and FAMILY present.    Assessment:  PT PROGRESSING WITH TRANSFER TRAINING TODAY.     Rehab identified problem list/impairments: weakness, gait instability, impaired balance, impaired endurance, impaired self care skills, pain, impaired functional mobility, decreased coordination, edema, orthopedic precautions, decreased safety awareness, decreased lower extremity function, decreased ROM    Rehab potential is good.    Activity tolerance: Fair    Discharge recommendations: nursing facility, skilled      Barriers to discharge:      Equipment recommendations: other (see comments) (TBD AT NEXT LEVEL OF CARE)     GOALS:   Multidisciplinary Problems       Physical Therapy Goals          Problem: Physical Therapy    Goal Priority Disciplines Outcome Goal Variances Interventions   Physical Therapy Goal     PT, PT/OT Ongoing, Progressing     Description: LTG: 3/28/23  1. PT WILL COMPLETE BED MOBILITY WITH SBA  2. PT WILL STAND WITH RW PWB/ WBAT TO L LE FOR T/F TO CHAIR WITH RW AND MAX A  3. PT WILL TOLERATE B LE TE X 20 REPS FOR STRENGTHENING.                        PLAN:    Patient to be seen 3 x/week to address the above  listed problems via gait training, therapeutic activities, therapeutic exercises  Plan of Care expires: 03/28/23  Plan of Care reviewed with: patient, daughter         03/16/2023

## 2023-03-16 NOTE — PLAN OF CARE
03/16/23 1105   Post-Acute Status   Post-Acute Authorization Placement   Post-Acute Placement Status Set-up Complete/Auth obtained   Discharge Delays (!) Patient and Family Barriers   Discharge Plan   Discharge Plan A Skilled Nursing Facility     Juvencio notified by Vonnie at [facility] that insurance auth is approved. Vonnie stated she is only waiting for pt's dtr, Cass, to completed admission paperwork that was emailed to her yesterday.     Juvencio met with pt and pt's dtr, Cass, and explained that insurance is approved and plan is for admit to [facility] today pending dtr completing emailed paperwork; Cass voiced her understanding.     Sw to fax d/c orders once available.     1140 d/c orders, AVS, and prescription sent through [facility] to [facility].    Diagnostic testing not indicated for today's encounter

## 2023-03-16 NOTE — PT/OT/SLP PROGRESS
Occupational Therapy  Treatment    Kay Galarza   MRN: [id number]   Admitting Diagnosis: Charcot's joint of left foot    OT Date of Treatment: 03/16/23   OT Start Time: 1400  OT Stop Time: 1423  OT Total Time (min): 23 min    Billable Minutes:  Therapeutic Activity 23 MINUTES    OT/RAFFAELE: OT          General Precautions: Standard, fall  Orthopedic Precautions: LLE partial weight bearing  Braces: N/A  Respiratory Status: Room air         Subjective:  Communicated with NURSE AND Epic CHART REVIEW prior to session.    Pain/Comfort  Pain Rating 1: 0/10    Objective:  Patient found with: peripheral IV     Functional Mobility:  Bed Mobility:  Sit<supine min a   Supine scooting higher in bed min a with leg lift    Transfers:   Mod a with squat<pivot  transfers    Functional Ambulation:   na  Balance:   Static Sit: FAIR+: Able to take MINIMAL challenges from all directions  Dynamic Sit: FAIR+: Maintains balance through MINIMAL excursions of active trunk motion  Static Stand: POOR: Needs MODERATE assist to maintain  Dynamic stand: POOR: Needs MOD (moderate) assist during gait    Therapeutic Activities and Exercises:  Pt seen in room sitting on  BSC and requested  to return to bed. Pt req mod a with squat<pivot transfers x 3 attempts. Pt educated on  on hand placements pt displayed fair+ sitting balance pt req min a with leg lift sitting<supine. Pt req min a with leg lift with supine scooting higher in bed. Pt  left with all needs met and call button in reach. Daughter/ nurse present in room upon leaving room.    AM-PAC 6 CLICK ADL   How much help from another person does this patient currently need?   1 = Unable, Total/Dependent Assistance  2 = A lot, Maximum/Moderate Assistance  3 = A little, Minimum/Contact Guard/Supervision  4 = None, Modified [place]/Independent    Putting on and taking off regular lower body clothing? : 2  Bathing (including washing, rinsing, drying)?: 2  Toileting, which includes using  "toilet, bedpan, or urinal? : 2  Putting on and taking off regular upper body clothing?: 3  Taking care of personal grooming such as brushing teeth?: 4  Eating meals?: 4  Daily Activity Total Score: 17     AM-PAC Raw Score CMS "G-Code Modifier Level of Impairment Assistance   6 % Total / Unable   7 - 8 CM 80 - 100% Maximal Assist   9-13 CL 60 - 80% Moderate Assist   14 - 19 CK 40 - 60% Moderate Assist   20 - 22 CJ 20 - 40% Minimal Assist   23 CI 1-20% SBA / CGA   24 CH 0% Independent/ Mod I       Patient left HOB elevated with all lines intact, call button in reach, nursE  notified, and NURSE AND DAUGHTER  present    ASSESSMENT:  Kay Galarza is a 67 y.o. female with a medical diagnosis of Charcot's joint of left foot and presents with DEBILITY AND GENERALIZED WEAKNESS.    Rehab identified problem list/impairments:  weakness, impaired functional mobility, decreased safety awareness, impaired endurance, gait instability, impaired balance, decreased upper extremity function, impaired self care skills    Rehab potential is good.    Activity tolerance: Good    Discharge recommendations: nursing facility, skilled   Barriers to discharge:      Equipment recommendations: other (see comments) (TBD BY NEXT LEVEL OF CARE)    GOALS:   Multidisciplinary Problems       Occupational Therapy Goals          Problem: Occupational Therapy    Goal Priority Disciplines Outcome Interventions   Occupational Therapy Goal     OT, PT/OT Ongoing, Progressing    Description: O.T. GOALS TO BE MET 3-28-23  PT WILL TOLERATE 1 SET X 15 REPS WITH MIN RESISTANCE  S WITH UE DRESSING  S WITH SUPINE<SIT EOB TO PREP FOR G/H TASK SEATED  MIN A WITH LE DRESSING                       Plan:  Patient to be seen 2 x/week to address the above listed problems via self-care/home management, therapeutic activities, therapeutic exercises  Plan of Care expires: 03/28/23  Plan of Care reviewed with: patient, daughter         03/16/2023  "

## 2023-03-16 NOTE — PLAN OF CARE
Problem: Adult Inpatient Plan of Care  Goal: Plan of Care Review  Outcome: Ongoing, Progressing     Problem: Diabetes Comorbidity  Goal: Blood Glucose Level Within Targeted Range  Outcome: Ongoing, Progressing     Problem: Infection  Goal: Absence of Infection Signs and Symptoms  Outcome: Ongoing, Progressing     Problem: Skin Injury Risk Increased  Goal: Skin Health and Integrity  Outcome: Ongoing, Progressing     Problem: Fall Injury Risk  Goal: Absence of Fall and Fall-Related Injury  Outcome: Ongoing, Progressing    Discussed POC with pt and daughter, verbalized understanding.  Patient remains free from injury. Safety precautions maintained. No s/s of acute distress.  Purposeful rounding every two hours.   Pain controlled per MD order.  Blood glucose monitoring continued.   Diet orders continued, pt diet: diabetic  Guerra care continued this shift.   Neurovascular checks q4 continued this shift.   Chart and orders review completed. Pt education about care completed.

## 2023-03-16 NOTE — PROGRESS NOTES
[facility] Surg  Podiatry  Progress Note    Patient Name: Kay Galarza  MRN: [id number]  Admission Date: 3/13/2023  Hospital Length of Stay: 1 days  Attending Physician: Mary Grace Todd MD  Primary Care Provider: Lucy Negron MD     Subjective:     Interval History: Doing well s/p. States moderate pains to the LLE. States PT/OT. Daughter is present.     Follow-up For: Procedure(s) (LRB):  APPLICATION, EXTERNAL FIXATION DEVICE (Left)  REMOVAL, HARDWARE, FOOT (Left)  FUSION, SUBTALAR JOINT (Left)  FUSION, JOINT, MIDFOOT (Left)    Post-Operative Day: 3 Days Post-Op    Scheduled Meds:   apixaban  2.5 mg Oral BID    atorvastatin  40 mg Oral Daily    docusate sodium  100 mg Oral BID    levoFLOXacin  750 mg Intravenous Q24H    nystatin-triamcinolone   Topical (Top) BID    pantoprazole  40 mg Oral Daily    polyethylene glycol  17 g Oral Daily     Continuous Infusions:  PRN Meds:acetaminophen, cyclobenzaprine, dextrose 10%, dextrose 10%, dextrose, dextrose, glucagon (human recombinant), hydrALAZINE, insulin aspart U-100, melatonin, metoclopramide HCl, morphine, naloxone, ondansetron, oxyCODONE, potassium bicarbonate, potassium bicarbonate, sodium chloride 0.9%, sodium chloride 0.9%    Review of Systems   Constitutional:  Negative for chills, fatigue and fever.   HENT:  Negative for hearing loss.    Eyes:  Negative for photophobia and visual disturbance.   Respiratory:  Negative for cough, chest tightness, shortness of breath and wheezing.    Cardiovascular:  Positive for leg swelling. Negative for chest pain and palpitations.   Gastrointestinal:  Negative for constipation, diarrhea, nausea and vomiting.   Endocrine: Negative for cold intolerance and heat intolerance.   Genitourinary:  Negative for flank pain.   Musculoskeletal:  Positive for arthralgias, gait problem, joint swelling and myalgias. Negative for neck pain and neck stiffness.   Skin:  Positive for color change and wound.   Neurological:  Patient Unable To Complete Cage Questionnaire Due To Medical Issue (Limited Life Expectancy): No " Positive for numbness. Negative for light-headedness and headaches.        Neuritis     Psychiatric/Behavioral:  Negative for sleep disturbance.    Objective:     Vital Signs (Most Recent):  Temp: 98.1 °F (36.7 °C) (03/16/23 0803)  Pulse: 93 (03/16/23 0803)  Resp: 16 (03/16/23 0803)  BP: (!) 175/74 (03/16/23 0803)  SpO2: 96 % (03/16/23 0803)   Vital Signs (24h Range):  Temp:  [97.9 °F (36.6 °C)-99.5 °F (37.5 °C)] 98.1 °F (36.7 °C)  Pulse:  [89-99] 93  Resp:  [16-20] 16  SpO2:  [95 %-97 %] 96 %  BP: (130-175)/(69-89) 175/74     Weight: 117.5 kg (259 lb 0.7 oz)  Body mass index is 38.25 kg/m².    Foot Exam    Laboratory:  All pertinent labs reviewed within the last 24 hours.    Diagnostic Results:  I have reviewed all pertinent imaging results/findings within the past 24 hours.    Clinical Findings:  Mild erythema, 1/2 pin, distal tibal ring. No abscess is noted. No exsanguination. All other pin sites and incision are WNL.     Assessment/Plan:     Active Diagnoses:    Diagnosis Date Noted POA    PRINCIPAL PROBLEM:  Charcot's neuro arthropathy with dislocation of Lisfranc Joint of left foot [M14.672] 12/19/2019 Yes    Diabetic ulcer of left midfoot associated with type 2 diabetes mellitus, with fat layer exposed [E11.621, L97.422] 03/13/2023 Yes    Abnormal nuclear stress test [R94.39] 01/23/2023 Yes    ILD (interstitial lung disease) [J84.9] 01/26/2021 Yes    HLD (hyperlipidemia) [E78.5] 07/23/2020 Yes    Severe obesity (BMI 35.0-39.9) with comorbidity [E66.01] 11/10/2017 Yes    Type 2 diabetes mellitus [E11.9] 11/07/2017 Yes    Essential hypertension [I10] 11/07/2017 Yes      Problems Resolved During this Admission:       s/p appt is made.    Local wound care with Q3 days or twice weekly Xerform to all pin/wire sites and incisions, followed by 2" joce/3" joce and 2"/3" ACE Wrap.     Ice to the ipsilateral knee fossa ATC.    STAT Vancomycin for distal tibial 1/2 pin mild erythema.    RICE therapy.     Change D/C PO " Abx from Levaquin to Doxycycline (Rx sent to [facility]).       Kirt Gray DPM  Podiatry  [facility]

## 2023-03-16 NOTE — PT/OT/SLP PROGRESS
Occupational Therapy      Patient Name:  Kay Galarza   MRN:  [id number]    S: PT COOPERATIVE WITH THERAPY SESSION..\  O: PT SEEN IN ROOM SUPINE WITH HOB ELEVATED . PT EDUCATED ON HEP. PT PERFORMED 1 SET X 15 REPS B UE ROM EXERCISE WITH A 2 LB DOWEL IN ALL AVAILABLE RANGES AND PLANES(SHOULDER FLEXION/ROTATION; ELBOW FLEXION/ EXT; CHEST PRESS; HANDS/DIGITS FLEX/EXT)  A: PT DISPLAYED IMPROVEMENT WITH B UE STRENGTH/ENDURANCE AS EVIDENCE BY COMPLETING HEP WITH  MIN RESISTANCE  P: CONTINUE WITH POC  Shwetha Ward OT  3/15/2023  1 KIMBERLY

## 2023-03-16 NOTE — CONSULTS
Pharmacokinetic Initial Assessment: IV Vancomycin    Assessment/Plan:    Initiate intravenous vancomycin with loading dose of 2000 mg once followed by a maintenance dose of vancomycin 1250 mg IV every 12 hours  Desired empiric serum trough concentration is 10 to 15 mcg/mL  Draw vancomycin trough level 60 min prior to fourth dose on 3/18/23 at approximately 0130.  Pharmacy will continue to follow and monitor vancomycin.      Please contact pharmacy at extension [phone] with any questions regarding this assessment.     Thank you for the consult,   Precious Vazquez, LyndonD       Patient brief summary:  Kay Galarza is a 67 y.o. female initiated on antimicrobial therapy with IV Vancomycin for treatment of suspected skin & soft tissue infection    Drug Allergies:   Review of patient's allergies indicates:   Allergen Reactions    Pollen extracts      Seasonal allergies, sneezing       Actual Body Weight:   117.5 kg    Renal Function:   Estimated Creatinine Clearance: 72.7 mL/min (based on SCr of 1 mg/dL).,     Dialysis Method (if applicable):  N/A    CBC (last 72 hours):  Recent Labs   Lab Result Units 03/14/23  0608 03/16/23  0629   WBC K/uL 7.98 8.35   Hemoglobin g/dL 11.9* 11.7*   Hematocrit % 36.4* 36.1*   Platelets K/uL 200 199   Gran % % 78.3* 75.8*   Lymph % % 9.1* 9.2*   Mono % % 9.6 10.7   Eosinophil % % 2.0 3.4   Basophil % % 0.5 0.5   Differential Method  Automated Automated       Metabolic Panel (last 72 hours):  Recent Labs   Lab Result Units 03/14/23  0608 03/16/23  0629   Sodium mmol/L 138 136   Potassium mmol/L 4.9 5.4*   Chloride mmol/L 104 100   CO2 mmol/L 27 28   Glucose mg/dL 180* 190*   BUN mg/dL 27* 21   Creatinine mg/dL 1.1 1.0   Albumin g/dL 3.1*  --    Phosphorus mg/dL 3.5  --        Drug levels (last 3 results):  No results for input(s): VANCOMYCINRA, VANCORANDOM, VANCOMYCINPE, VANCOPEAK, VANCOMYCINTR, VANCOTROUGH in the last 72 hours.    Microbiologic Results:  Microbiology Results  (last 7 days)       ** No results found for the last 168 hours. **          Thank you for allowing us to participate in this patient's care.     Precious Vazquez, PharmFRANCIS 03/16/2023 3:51 PM

## 2023-03-16 NOTE — DISCHARGE SUMMARY
[facility] Medicine  Discharge Summary      Patient Name: Kay Galarza  MRN: [id number]  JOVANNA: [id number]  Patient Class: IP- Inpatient  Admission Date: 3/13/2023  Hospital Length of Stay: 1 days  Discharge Date and Time: No discharge date for patient encounter.  Attending Physician: Mary Grace Todd MD   Discharging Provider: Angelica Whitfield NP  Primary Care Provider: Lucy Negron MD    Primary Care Team: [facility] MEDICINE D    HPI:   Patient is a 67-year-old  female with a history of diabetes mellitus with diabetic foot ulcer on left, Charcot joint left ankle, hypertension, hyperlipidemia, chronic pain who underwent ORIF of left ankle with external fixation by Dr. Angel today.  Hospital Medicine is consulted for admission for pain control, but OT/PT eval and treat.  Patient is seen in postop holding she is lethargic but arousable interpreting service was used and patient's only complaint was continued pain in her left ankle.      Procedure(s) (LRB):  APPLICATION, EXTERNAL FIXATION DEVICE (Left)  REMOVAL, HARDWARE, FOOT (Left)  FUSION, SUBTALAR JOINT (Left)  FUSION, JOINT, MIDFOOT (Left)      Hospital Course:   66 y/o female admitted with diabetic foot ulcer on left with charcot joint left ankle for ORIF left malleolus with ex -fix placement by Dr. Gray on 3/13/23. She was admitted for pain control and PT/OT. PT/OT recommended SNF placement.    She has some post op urinary retention, requiring in/out cath, booth catheter placed 3/14/23. Will plan to discharge with booth catheter to be bladder trained at SNF when able to move around better.   EKG with Qtc 459.     Patient learned of her husbands death while she was in the hospital. She was given a 1 time dose of Ativan.     Antibiotics switched to doxycyline PO x 10 days on discharge, Levaquin stopped and PICC line discontinued prior to discharge.   She is WBAT to LLE. Continue eliquis 2.5 mg BID for DVT  ppx. Pin site and dressing changes per podiatry. RICE therapy ATC. Keep LLE elevated when sitting down and do not leave limb in dependent position.     Continue Accu-checks AC/HS and SSI.     Prescription for Norco PRN acute surgical pain sent with patient to the facility. Podiatry sent prescriptions for discharge to pharmacy, can resume OP pain medication on discharge from SNF.    Denilson Tai [facility] accepted and insurance approved.     Follow up with Dr. Gray, podiatry in 1 week for wound check.   Follow up with PCP on discharge from SNF.    Patient seen and examined on the day of discharge.  All questions and concerns were addressed prior to discharge.    Face to face encounter with patient: 30 minutes       Goals of Care Treatment Preferences:  Code Status: Full Code      Consults:   Consults (From admission, onward)          Status Ordering Provider     Pharmacy to dose Vancomycin consult  Once        Provider:  (Not yet assigned)   See [facility] for full Linked Orders Report.    Acknowledged KENN GRAY     Inpatient consult to Diabetes educator  Once        Provider:  (Not yet assigned)    Completed LUPIS CINTRON     Inpatient consult to Social Work/Case Management  Once        Provider:  (Not yet assigned)    Completed NIMISHA KAY            No new Assessment & Plan notes have been filed under this hospital service since the last note was generated.  Service: Hospital Medicine    Final Active Diagnoses:    Diagnosis Date Noted POA    PRINCIPAL PROBLEM:  Charcot's neuro arthropathy with dislocation of Lisfranc Joint of left foot [M14.672] 12/19/2019 Yes    Diabetic ulcer of left midfoot associated with type 2 diabetes mellitus, with fat layer exposed [E11.621, L97.422] 03/13/2023 Yes    Type 2 diabetes mellitus [E11.9] 11/07/2017 Yes    Essential hypertension [I10] 11/07/2017 Yes    HLD (hyperlipidemia) [E78.5] 07/23/2020 Yes    Abnormal nuclear stress test [R94.39] 01/23/2023 Yes    ILD  (interstitial lung disease) [J84.9] 01/26/2021 Yes    Severe obesity (BMI 35.0-39.9) with comorbidity [E66.01] 11/10/2017 Yes      Problems Resolved During this Admission:       Discharged Condition: good    Disposition: Skilled Nursing Facility    Follow Up:   Contact information for follow-up providers       Kirt Gray DPM Follow up on 3/28/2023.    Specialty: Podiatry  Why: @ 11:30 am  Contact information:  [address] [place] [address] [address] Leisa [address] [address]  [phone]               Lucy Negron MD. Schedule an appointment as soon as possible for a visit in 3 day(s).    Specialty: Family Medicine  Why: call office and schedule hospital follow up after d/c from SNF  Contact information:  [address] [facility] [address]  [phone]               Antione Chavez MD Follow up on 6/12/2023.    Specialty: Cardiology  Why: @ 2:20 PM  Contact information:  [address]  [place] [address]  [phone]                       Contact information for after-discharge care       Destination       [facility] .    Service: Skilled Nursing  Contact information:  [address] [facility] [address]  [phone]                                 Patient Instructions:      Ambulatory referral/consult to Outpatient Case Management   Referral Priority: Routine Referral Type: Consultation   Referral Reason: Specialty Services Required   Number of Visits Requested: 1     Diet diabetic     Other restrictions (specify):   Order Comments: RICE therapy ATC, very important to have ice pack at the ipsilateral knee fossa ATC  Do not leave limb in the dependent position, limb should be neutral (lying in bed) or legs up on the recliner     Notify your health care provider if you experience any of the following:  redness, tenderness, or signs of infection (pain, swelling, redness, odor or green/yellow discharge around incision site)     Notify your health care provider  if you experience any of the following:  temperature >100.4     Notify your health care provider if you experience any of the following:  severe uncontrolled pain     Change dressing (specify)   Order Comments: Pin site care and dressing changes at the direction of podiatry, Dr. Gray     Activity as tolerated     Weight bearing restrictions (specify):   Order Comments: WBAT to LLE with external fixator       Significant Diagnostic Studies: Labs: CMP   Recent Labs   Lab 03/16/23  0629      K 5.4*      CO2 28   *   BUN 21   CREATININE 1.0   CALCIUM 9.4   ANIONGAP 8   , CBC   Recent Labs   Lab 03/16/23  0629   WBC 8.35   HGB 11.7*   HCT 36.1*      , and A1C:   Recent Labs   Lab 12/07/22  1211   HGBA1C 7.6*     Microbiology: Blood Culture No results found for: LABBLOO  Radiology: X-Ray: CXR: X-Ray Chest 1 View (CXR): No results found for this visit on 03/13/23.    Pending Diagnostic Studies:       None           Medications:  Reconciled Home Medications:      Medication List        START taking these medications      doxycycline 100 MG Cap  Commonly known as: VIBRAMYCIN  Take 1 capsule (100 mg total) by mouth every 12 (twelve) hours. for 10 days     ELIQUIS 2.5 mg Tab  Generic drug: apixaban  [place] tableta (2.5 mg en total) por vía oral 2 veces al día.  (Take 1 tablet (2.5 mg total) by mouth 2 (two) times daily.)     fluconazole 150 MG Tab  Commonly known as: DIFLUCAN  Take 1 tablet (150 mg total) by mouth every 72 hours. for 2 doses     gabapentin 300 MG capsule  Commonly known as: NEURONTIN  [place] cápsula (300 mg en total) por vía oral 2 veces al día.  (Take 1 capsule (300 mg total) by mouth 2 (two) times daily.)     HYDROcodone-acetaminophen  mg per tablet  Commonly known as: NORCO  Take 1 tablet by mouth every 6 (six) hours as needed for Pain.     methocarbamoL 750 MG Tab  Commonly known as: ROBAXIN  Take 1 tablet (750 mg total) by mouth 4 (four) times daily. for 10 days     *  nystatin-triamcinolone ointment  Commonly known as: MYCOLOG  Aplique de manera tópica 2 veces al día.  (Apply topically 2 (two) times daily.)     * nystatin-triamcinolone ointment  Commonly known as: MYCOLOG  Apply topically 2 (two) times daily.     oxyCODONE-acetaminophen  mg per tablet  Commonly known as: PERCOCET  [place]is tableta por vía oral cada 6 horas según sea necesario para el dolor.  (Take 1 tablet by mouth every 6 (six) hours as needed for Pain.)           * This list has 2 medication(s) that are the same as other medications prescribed for you. Read the directions carefully, and ask your doctor or other care provider to review them with you.                CHANGE how you take these medications      * VITAMIN D2 50,000 unit Cap  Generic drug: ergocalciferol  TAKE 1 CAPSULE  BY MOUTH ONCE A WEEK FOR 4 DOSES  What changed: Another medication with the same name was added. Make sure you understand how and when to take each.     * ergocalciferol 50,000 unit Cap  Commonly known as: ERGOCALCIFEROL  Take 1 capsule (50,000 Units total) by mouth every 7 days. for 4 doses  What changed: You were already taking a medication with the same name, and this prescription was added. Make sure you understand how and when to take each.           * This list has 2 medication(s) that are the same as other medications prescribed for you. Read the directions carefully, and ask your doctor or other care provider to review them with you.                CONTINUE taking these medications      ACCU-CHEK GUIDE TEST STRIPS Strp  Generic drug: blood sugar diagnostic  TO CHECK BG 2 TIMES DAILY, TO USE WITH INSURANCE PREFERRED METER     acetaminophen 500 MG tablet  Commonly known as: TYLENOL  Take 500 mg by mouth every 6 (six) hours as needed for Pain.     albuterol 90 mcg/actuation inhaler  Commonly known as: PROVENTIL/VENTOLIN HFA  INHALE 2 PUFFS INTO THE LUNGS EVERY 6  HOURS AS NEEDED FOR WHEEZING. RESCUE     atorvastatin 40 MG  tablet  Commonly known as: LIPITOR  TAKE 1 TABLET BY MOUTH ONCE DAILY.     blood-glucose meter kit  To check BG 2 times daily, to use with insurance preferred meter     clobetasoL 0.05 % external solution  Commonly known as: TEMOVATE  APPLY TOPICALLY 2 TIMES DAILY TO SCALP ONLY     FARXIGA 10 mg tablet  Generic drug: dapagliflozin  TAKE 1 TABLET (10 MG TOTAL) BY MOUTH ONCE DAILY.     fluocinolone and shower cap 0.01 % Oil     fluticasone propionate 50 mcg/actuation nasal spray  Commonly known as: FLONASE  2 sprays (100 mcg total) by Each Nostril route once daily.     glimepiride 4 MG tablet  Commonly known as: AMARYL  TAKE 1 TABLET  BY MOUTH DAILY WITH BREAKFAST.     ketoconazole 2 % shampoo  Commonly known as: NIZORAL  APPLY TOPICALLY 3 TIMES A WEEK.     lancets Misc  To check BG 2 times daily, to use with insurance preferred meter     lisinopriL-hydrochlorothiazide 20-25 mg Tab  Commonly known as: PRINZIDE,ZESTORETIC  TAKE 1 TABLET BY MOUTH ONCE DAILY.     magnesium oxide 200 mg magnesium Tab  TAKE 3 TABLETS  BY MOUTH EVERY EVENING.     NovoLOG FlexPen U-100 Insulin 100 unit/mL (3 mL) Inpn pen  Generic drug: insulin aspart U-100  Inject 18 Units into the skin 3 (three) times daily before meals.     omeprazole 20 MG capsule  Commonly known as: PRILOSEC  TAKE 1 CAPSULE BY MOUTH TWICE A DAY.     polyethylene glycol 17 gram Pwpk  Commonly known as: GLYCOLAX  Take 17 g by mouth once daily.     PROBIOTIC 4X ORAL  Take by mouth Daily.     TOUJEO MAX U-300 SOLOSTAR 300 unit/mL (3 mL) insulin pen  Generic drug: insulin glargine U-300 conc  Inject 70 Units into the skin every evening.     traZODone 150 MG tablet  Commonly known as: DESYREL  TAKE 1 TABLET  BY MOUTH NIGHTLY AS NEEDED FOR INSOMNIA.            STOP taking these medications      celecoxib 200 MG capsule  Commonly known as: CeleBREX     levocetirizine 5 MG tablet  Commonly known as: XYZAL     levoFLOXacin 750 MG tablet  Commonly known as: LEVAQUIN               Indwelling Lines/Drains at time of discharge:   Lines/Drains/Airways       Drain  Duration                  Urethral Catheter 03/14/23 1330 16 Fr. 1 day                    Time spent on the discharge of patient: 45 minutes         Angelica Whitfield NP  Department of Hospital Medicine  [facility] Surg

## 2023-03-17 ENCOUNTER — PATIENT MESSAGE (OUTPATIENT)
Dept: PODIATRY | Facility: CLINIC | Age: 68
End: 2023-03-17
Payer: MEDICARE

## 2023-03-17 NOTE — PT/OT/SLP PROGRESS
"Occupational Therapy      Patient Name:  Kay Galarza   MRN:  [id number]    S: Pt agreeable to therapy session. Daughter agree to translate.  O:  Pt seen in room sitting in bed side chair with left le propped on bed. Pt reported no pain. Pt requested to sit in bedside chair a "lil longer" per daughter. Pt educated on b ue rom exercise. Pt performed 1 set x 15  b ue rom exercise with min resistance (shoulder flexion; chest press; elbow flexion/ext; hand /digits flexion/ext).  A: Pt continue to work toward increase b ue strength /endurance by evidence of completing HEP with min resistance.  P:Continue with poc. Recommend: [facility]  Shwetha Ward, OT  1 twan  3/16/2023  "

## 2023-03-20 DIAGNOSIS — Z48.89 ENCOUNTER FOR POSTOPERATIVE CARE: Primary | ICD-10-CM

## 2023-03-21 ENCOUNTER — PATIENT OUTREACH (OUTPATIENT)
Dept: ADMINISTRATIVE | Facility: HOSPITAL | Age: 68
End: 2023-03-21
Payer: MEDICARE

## 2023-03-21 NOTE — PROGRESS NOTES
DM EYE REPORT: Called and spoke with patients daughter. She reported patient has not had a recent eye exam. She however just had foot surgery and is unable to get around at this time. They declined to get scheduled. Stated she would call back once she is able to get around.

## 2023-03-22 ENCOUNTER — PATIENT MESSAGE (OUTPATIENT)
Dept: PODIATRY | Facility: CLINIC | Age: 68
End: 2023-03-22
Payer: MEDICARE

## 2023-03-23 ENCOUNTER — OUTPATIENT CASE MANAGEMENT (OUTPATIENT)
Dept: ADMINISTRATIVE | Facility: OTHER | Age: 68
End: 2023-03-23
Payer: MEDICARE

## 2023-03-23 NOTE — PROGRESS NOTES
Outpatient Care Management  Patient Not Qualified    Patient: Kay Galarza  MRN:  [id number]  Date of Service:  3/23/2023  Completed by:  Michelle Whitfield RN    Chief Complaint   Patient presents with    OPCM Chart Review    Case Closure     3-23-23  Pt was discharged to [facility]        Patient Summary     Program:  OPCM High Risk     Reason Not Qualified:  Followed by SNF

## 2023-03-28 ENCOUNTER — OFFICE VISIT (OUTPATIENT)
Dept: PODIATRY | Facility: CLINIC | Age: 68
End: 2023-03-28
Payer: MEDICARE

## 2023-03-28 VITALS — HEIGHT: 69 IN | BODY MASS INDEX: 38.36 KG/M2 | WEIGHT: 259 LBS

## 2023-03-28 DIAGNOSIS — M25.572 PAIN IN JOINT INVOLVING LEFT ANKLE AND FOOT: ICD-10-CM

## 2023-03-28 DIAGNOSIS — M96.0 PSEUDARTHROSIS AFTER FUSION OR ARTHRODESIS: ICD-10-CM

## 2023-03-28 DIAGNOSIS — T84.84XA PAINFUL ORTHOPAEDIC HARDWARE: ICD-10-CM

## 2023-03-28 DIAGNOSIS — M14.672 CHARCOT'S JOINT OF FOOT, LEFT: ICD-10-CM

## 2023-03-28 DIAGNOSIS — M19.072 OSTEOARTHRITIS OF LEFT ANKLE OR FOOT: ICD-10-CM

## 2023-03-28 DIAGNOSIS — E11.49 TYPE II DIABETES MELLITUS WITH NEUROLOGICAL MANIFESTATIONS: ICD-10-CM

## 2023-03-28 DIAGNOSIS — Z09 POSTOP CHECK: Primary | ICD-10-CM

## 2023-03-28 PROCEDURE — 1159F PR MEDICATION LIST DOCUMENTED IN MEDICAL RECORD: ICD-10-PCS | Mod: HCNC,CPTII,S$GLB, | Performed by: PODIATRIST

## 2023-03-28 PROCEDURE — 3288F FALL RISK ASSESSMENT DOCD: CPT | Mod: HCNC,CPTII,S$GLB, | Performed by: PODIATRIST

## 2023-03-28 PROCEDURE — 3008F BODY MASS INDEX DOCD: CPT | Mod: HCNC,CPTII,S$GLB, | Performed by: PODIATRIST

## 2023-03-28 PROCEDURE — 99024 POSTOP FOLLOW-UP VISIT: CPT | Mod: HCNC,S$GLB,, | Performed by: PODIATRIST

## 2023-03-28 PROCEDURE — 99999 PR PBB SHADOW E&M-EST. PATIENT-LVL IV: ICD-10-PCS | Mod: PBBFAC,HCNC,, | Performed by: PODIATRIST

## 2023-03-28 PROCEDURE — 4010F ACE/ARB THERAPY RXD/TAKEN: CPT | Mod: HCNC,CPTII,S$GLB, | Performed by: PODIATRIST

## 2023-03-28 PROCEDURE — 1125F PR PAIN SEVERITY QUANTIFIED, PAIN PRESENT: ICD-10-PCS | Mod: HCNC,CPTII,S$GLB, | Performed by: PODIATRIST

## 2023-03-28 PROCEDURE — 1160F PR REVIEW ALL MEDS BY PRESCRIBER/CLIN PHARMACIST DOCUMENTED: ICD-10-PCS | Mod: HCNC,CPTII,S$GLB, | Performed by: PODIATRIST

## 2023-03-28 PROCEDURE — 99024 PR POST-OP FOLLOW-UP VISIT: ICD-10-PCS | Mod: HCNC,S$GLB,, | Performed by: PODIATRIST

## 2023-03-28 PROCEDURE — 1160F RVW MEDS BY RX/DR IN RCRD: CPT | Mod: HCNC,CPTII,S$GLB, | Performed by: PODIATRIST

## 2023-03-28 PROCEDURE — 3288F PR FALLS RISK ASSESSMENT DOCUMENTED: ICD-10-PCS | Mod: HCNC,CPTII,S$GLB, | Performed by: PODIATRIST

## 2023-03-28 PROCEDURE — 1101F PT FALLS ASSESS-DOCD LE1/YR: CPT | Mod: HCNC,CPTII,S$GLB, | Performed by: PODIATRIST

## 2023-03-28 PROCEDURE — 1159F MED LIST DOCD IN RCRD: CPT | Mod: HCNC,CPTII,S$GLB, | Performed by: PODIATRIST

## 2023-03-28 PROCEDURE — 99999 PR PBB SHADOW E&M-EST. PATIENT-LVL IV: CPT | Mod: PBBFAC,HCNC,, | Performed by: PODIATRIST

## 2023-03-28 PROCEDURE — 3008F PR BODY MASS INDEX (BMI) DOCUMENTED: ICD-10-PCS | Mod: HCNC,CPTII,S$GLB, | Performed by: PODIATRIST

## 2023-03-28 PROCEDURE — 1101F PR PT FALLS ASSESS DOC 0-1 FALLS W/OUT INJ PAST YR: ICD-10-PCS | Mod: HCNC,CPTII,S$GLB, | Performed by: PODIATRIST

## 2023-03-28 PROCEDURE — 4010F PR ACE/ARB THEARPY RXD/TAKEN: ICD-10-PCS | Mod: HCNC,CPTII,S$GLB, | Performed by: PODIATRIST

## 2023-03-28 PROCEDURE — 1125F AMNT PAIN NOTED PAIN PRSNT: CPT | Mod: HCNC,CPTII,S$GLB, | Performed by: PODIATRIST

## 2023-03-28 RX ORDER — OXYCODONE AND ACETAMINOPHEN 7.5; 325 MG/1; MG/1
1 TABLET ORAL EVERY 6 HOURS PRN
Qty: 28 TABLET | Refills: 0 | Status: SHIPPED | OUTPATIENT
Start: 2023-03-28 | End: 2023-04-04

## 2023-03-28 RX ORDER — DOXYCYCLINE 100 MG/1
100 CAPSULE ORAL EVERY 12 HOURS
Qty: 14 CAPSULE | Refills: 0 | Status: SHIPPED | OUTPATIENT
Start: 2023-03-28 | End: 2023-04-04

## 2023-03-28 NOTE — PROGRESS NOTES
Subjective:       Patient ID: Kay Galarza is a 67 y.o. female.    Chief Complaint: Post-op Evaluation (2 week s/p, DOS: 03/13/2023, APPLICATION, EXTERNAL FIXATION DEVICE, REMOVAL, HARDWARE, FOOT, FUSION, SUBTALAR JOINT FUSION, JOINT, MIDFOOT, left foot, rates pain 9/10, diabetic)      HPI:  Kay Galarza presents to the office today, s/p 3/13/2023 LLR Ex-Fix application with KAYLA. Is a SNF at present. Daughter is here today. States mild pains to the proximal tibial ring pins. States no redness or infection. States PT/OT in SNF. Will D/C to home on tomorrow. States Vit. D. and DVT Ppx. States DMII control.    Hemoglobin A1C   Date Value Ref Range Status   12/07/2022 7.6 (H) 4.0 - 5.6 % Final     Comment:     ADA Screening Guidelines:  5.7-6.4%  Consistent with prediabetes  >or=6.5%  Consistent with diabetes    High levels of fetal hemoglobin interfere with the HbA1C  assay. Heterozygous hemoglobin variants (HbS, HgC, etc)do  not significantly interfere with this assay.   However, presence of multiple variants may affect accuracy.     05/25/2022 7.7 (H) 4.0 - 5.6 % Final     Comment:     ADA Screening Guidelines:  5.7-6.4%  Consistent with prediabetes  >or=6.5%  Consistent with diabetes    High levels of fetal hemoglobin interfere with the HbA1C  assay. Heterozygous hemoglobin variants (HbS, HgC, etc)do  not significantly interfere with this assay.   However, presence of multiple variants may affect accuracy.     01/26/2022 >14.0 (H) 4.0 - 5.6 % Final     Comment:     ADA Screening Guidelines:  5.7-6.4%  Consistent with prediabetes  >or=6.5%  Consistent with diabetes    High levels of fetal hemoglobin interfere with the HbA1C  assay. Heterozygous hemoglobin variants (HbS, HgC, etc)do  not significantly interfere with this assay.   However, presence of multiple variants may affect accuracy.         Review of patient's allergies indicates:   Allergen Reactions    Pollen extracts      Seasonal  allergies, sneezing       Past Medical History:   Diagnosis Date    Abnormal nuclear stress test 1/23/2023    Arthritis     DERIAN KNEES    Asthma     SEASONAL    Cataract     Diabetes mellitus     Diabetes mellitus, type 2     Diabetic retinopathy     DM (diabetes mellitus) 2007    BS 97 09/29/2020    Hyperlipidemia     Hypertension        Family History   Problem Relation Age of Onset    Cancer Father         Prostate Ca    Hypertension Father     Stroke Father     Diabetes Sister     Glaucoma Mother     Hypertension Mother     Glaucoma Maternal Grandmother     Blindness Neg Hx     Macular degeneration Neg Hx     Retinal detachment Neg Hx     Strabismus Neg Hx        Social History     Socioeconomic History    Marital status:    Tobacco Use    Smoking status: Never    Smokeless tobacco: Never   Substance and Sexual Activity    Alcohol use: Never    Drug use: No    Sexual activity: Yes     Social Determinants of Health     Financial Resource Strain: Unknown    Difficulty of Paying Living Expenses: Patient refused   Food Insecurity: Unknown    Worried About Running Out of Food in the Last Year: Patient refused    Ran Out of Food in the Last Year: Patient refused   Transportation Needs: Unknown    Lack of Transportation (Medical): Patient refused    Lack of Transportation (Non-Medical): Patient refused   Physical Activity: Unknown    Days of Exercise per Week: Patient refused    Minutes of Exercise per Session: 10 min   Stress: Unknown    Feeling of Stress : Patient refused   Social Connections: Unknown    Frequency of Communication with Friends and Family: Patient refused    Frequency of Social Gatherings with Friends and Family: Patient refused    Active Member of Clubs or Organizations: Patient refused    Attends Club or Organization Meetings: Patient refused    Marital Status: Patient refused   Housing Stability: Unknown    Unable to Pay for Housing in the Last Year: Patient refused    Unstable Housing in the  Last Year: Patient refused       Past Surgical History:   Procedure Laterality Date    APPLICATION OF WOUND VACUUM-ASSISTED CLOSURE DEVICE Left 8/18/2020    Procedure: APPLICATION, WOUND VAC;  Surgeon: Kirt Gray DPM;  Location: [facility] OR;  Service: Podiatry;  Laterality: Left;    APPLICATION, EXTERNAL FIXATION DEVICE Left 3/13/2023    Procedure: APPLICATION, EXTERNAL FIXATION DEVICE;  Surgeon: Kirt Gray DPM;  Location: [facility] OR;  Service: Podiatry;  Laterality: Left;    CATARACT EXTRACTION W/  INTRAOCULAR LENS IMPLANT Right 07/18/2018    CATARACT EXTRACTION W/  INTRAOCULAR LENS IMPLANT Left 03/13/2019    COLONOSCOPY N/A 12/22/2018    Procedure: COLONOSCOPY;  Surgeon: Zak Dover MD;  Location: [facility] ENDO;  Service: Endoscopy;  Laterality: N/A;    COLONOSCOPY N/A 5/11/2022    Procedure: COLONOSCOPY;  Surgeon: Charles Berrios MD;  Location: [facility] ENDO;  Service: Endoscopy;  Laterality: N/A;    ENDOSCOPIC VEIN LASER TREATMENT Bilateral 1990's    FIXATION OF SYNDESMOSIS OF ANKLE Left 7/23/2020    Procedure: FIXATION, SYNDESMOSIS, ANKLE;  Surgeon: Kirt Gray DPM;  Location: [facility] OR;  Service: Podiatry;  Laterality: Left;    FOOT HARDWARE REMOVAL Left 3/13/2023    Procedure: REMOVAL, HARDWARE, FOOT;  Surgeon: Kirt Gray DPM;  Location: [facility] OR;  Service: Podiatry;  Laterality: Left;    FUSION OF SUBTALAR JOINT Left 3/13/2023    Procedure: FUSION, SUBTALAR JOINT;  Surgeon: Kirt Gray DPM;  Location: [facility] OR;  Service: Podiatry;  Laterality: Left;    LEFT HEART CATHETERIZATION Left 1/24/2023    Procedure: CATHETERIZATION, HEART, LEFT;  Surgeon: Salma Bonilla MD;  Location: [facility] CATH LAB;  Service: Cardiology;  Laterality: Left;  pt given 130 start time    MANIPULATION WITH ANESTHESIA Left 7/23/2020    Procedure: MANIPULATION, WITH ANESTHESIA;  Surgeon: Kirt Gray DPM;  Location: [facility] OR;  Service: Podiatry;  Laterality: Left;    MIDFOOT ARTHRODESIS Left 7/23/2020    Procedure:  "FUSION, JOINT, MIDFOOT;  Surgeon: Kirt Gray DPM;  Location: [facility] OR;  Service: Podiatry;  Laterality: Left;  2nd tarsometatarsal joint dislocation via fusion    MIDFOOT ARTHRODESIS Left 3/13/2023    Procedure: FUSION, JOINT, MIDFOOT;  Surgeon: Kirt Gray DPM;  Location: [facility] OR;  Service: Podiatry;  Laterality: Left;    OPEN REDUCTION AND INTERNAL FIXATION (ORIF) OF INJURY OF ANKLE Left 7/23/2020    Procedure: ORIF, ANKLE;  Surgeon: Kirt Gray DPM;  Location: [facility] OR;  Service: Podiatry;  Laterality: Left;    RECONSTRUCTION WITH FUSION OF CHARCOT FOOT Left 8/18/2020    Procedure: RECONSTRUCTION, CHARCOT FOOT, WITH FUSION;  Surgeon: Kirt Gray DPM;  Location: [facility] OR;  Service: Podiatry;  Laterality: Left;    REMOVAL OF HARDWARE FROM LOWER EXTREMITY Left 1/27/2021    Procedure: REMOVAL, HARDWARE, LOWER EXTREMITY;  Surgeon: Kirt Gray DPM;  Location: [facility] OR;  Service: Podiatry;  Laterality: Left;    WOUND DEBRIDEMENT Left 8/18/2020    Procedure: DEBRIDEMENT, WOUND;  Surgeon: Kirt Gray DPM;  Location: [facility] OR;  Service: Podiatry;  Laterality: Left;       Review of Systems   Constitutional:  Negative for chills, fatigue and fever.   HENT:  Negative for hearing loss.    Eyes:  Negative for photophobia and visual disturbance.   Respiratory:  Negative for cough, chest tightness, shortness of breath and wheezing.    Cardiovascular:  Negative for chest pain and palpitations.   Gastrointestinal:  Negative for constipation, diarrhea, nausea and vomiting.   Endocrine: Negative for cold intolerance and heat intolerance.   Genitourinary:  Negative for flank pain.   Musculoskeletal:  Positive for gait problem. Negative for neck pain and neck stiffness.   Skin:  Negative for wound.   Neurological:  Negative for light-headedness and headaches.   Psychiatric/Behavioral:  Negative for sleep disturbance.         Objective:   Ht 5' 9" (1.753 m)   Wt 117.5 kg (259 lb)   BMI 38.25 kg/m² "         Physical Exam  LOWER EXTREMITY PHYSICAL EXAMINATION    VASCULAR: Proximal to distal temperature is warm to warm. No ipsilateral calf pain or tenderness is noted with palpation and compression. No palpable cords noted.    DERMATOLOGY: Mild erythema w/o drainage to the proximal tibial ring. No calor or crepitus is noted. No fluctuance.     ORTHOPEDIC: Mild pains to palpation to the pin sites.      Assessment:     1. Postop check    2. Charcot's joint of foot, left    3. Osteoarthritis of left ankle or foot    4. Pain in joint involving left ankle and foot    5. Pseudarthrosis after fusion or arthrodesis    6. Painful orthopaedic hardware    7. Type II diabetes mellitus with neurological manifestations          Plan:     Postop check  -     oxyCODONE-acetaminophen (PERCOCET) 7.5-325 mg per tablet; Take 1 tablet by mouth every 6 (six) hours as needed for Pain.  Dispense: 28 tablet; Refill: 0  -     SUBSEQUENT HOME HEALTH ORDERS  -     doxycycline (VIBRAMYCIN) 100 MG Cap; Take 1 capsule (100 mg total) by mouth every 12 (twelve) hours. for 7 days  Dispense: 14 capsule; Refill: 0    Charcot's joint of foot, left    Osteoarthritis of left ankle or foot    Pain in joint involving left ankle and foot  -     oxyCODONE-acetaminophen (PERCOCET) 7.5-325 mg per tablet; Take 1 tablet by mouth every 6 (six) hours as needed for Pain.  Dispense: 28 tablet; Refill: 0    Pseudarthrosis after fusion or arthrodesis    Painful orthopaedic hardware    Type II diabetes mellitus with neurological manifestations      Thorough discussion is had with the patient today, concerning the diagnosis, its etiology, and the treatment algorithm at present.     No evidence of wound healing complications at present.    Local wound care with Bacitracin and DSD.    Will D/C to home tomorrow.    Will start PT/OT and Wound Care with [facility].    WBAT, minimally with HALO and walker ATC.    RICE therapy ATC.    Vit. D.    DVT Ppx.    Strict  DMII control.        Future Appointments   Date Time Provider Department Center   4/17/2023 11:15 AM Kirt Gray DPM [facility]   6/12/2023  2:20 PM Antione Chavez MD [facility] CARDIO [facility]

## 2023-03-30 PROCEDURE — G0180 MD CERTIFICATION HHA PATIENT: HCPCS | Mod: ,,, | Performed by: PODIATRIST

## 2023-03-30 PROCEDURE — G0180 PR HOME HEALTH MD CERTIFICATION: ICD-10-PCS | Mod: ,,, | Performed by: PODIATRIST

## 2023-04-05 ENCOUNTER — DOCUMENT SCAN (OUTPATIENT)
Dept: HOME HEALTH SERVICES | Facility: HOSPITAL | Age: 68
End: 2023-04-05
Payer: MEDICARE

## 2023-04-10 ENCOUNTER — PATIENT OUTREACH (OUTPATIENT)
Dept: ADMINISTRATIVE | Facility: HOSPITAL | Age: 68
End: 2023-04-10
Payer: MEDICARE

## 2023-04-17 ENCOUNTER — OFFICE VISIT (OUTPATIENT)
Dept: PODIATRY | Facility: CLINIC | Age: 68
End: 2023-04-17
Payer: MEDICARE

## 2023-04-17 VITALS — WEIGHT: 259 LBS | HEIGHT: 69 IN | BODY MASS INDEX: 38.36 KG/M2

## 2023-04-17 DIAGNOSIS — M25.572 PAIN IN JOINT INVOLVING LEFT ANKLE AND FOOT: ICD-10-CM

## 2023-04-17 DIAGNOSIS — T84.84XA PAINFUL ORTHOPAEDIC HARDWARE: ICD-10-CM

## 2023-04-17 DIAGNOSIS — M14.672 CHARCOT'S JOINT OF FOOT, LEFT: ICD-10-CM

## 2023-04-17 DIAGNOSIS — M96.0 PSEUDARTHROSIS AFTER FUSION OR ARTHRODESIS: ICD-10-CM

## 2023-04-17 DIAGNOSIS — Z09 POSTOP CHECK: Primary | ICD-10-CM

## 2023-04-17 DIAGNOSIS — E11.49 TYPE II DIABETES MELLITUS WITH NEUROLOGICAL MANIFESTATIONS: ICD-10-CM

## 2023-04-17 DIAGNOSIS — B35.1 ONYCHOMYCOSIS: ICD-10-CM

## 2023-04-17 DIAGNOSIS — M19.072 OSTEOARTHRITIS OF LEFT ANKLE OR FOOT: ICD-10-CM

## 2023-04-17 PROCEDURE — 99999 PR PBB SHADOW E&M-EST. PATIENT-LVL V: CPT | Mod: PBBFAC,HCNC,, | Performed by: PODIATRIST

## 2023-04-17 PROCEDURE — 1125F AMNT PAIN NOTED PAIN PRSNT: CPT | Mod: HCNC,CPTII,S$GLB, | Performed by: PODIATRIST

## 2023-04-17 PROCEDURE — 99999 PR PBB SHADOW E&M-EST. PATIENT-LVL V: ICD-10-PCS | Mod: PBBFAC,HCNC,, | Performed by: PODIATRIST

## 2023-04-17 PROCEDURE — 99024 PR POST-OP FOLLOW-UP VISIT: ICD-10-PCS | Mod: HCNC,S$GLB,, | Performed by: PODIATRIST

## 2023-04-17 PROCEDURE — 4010F PR ACE/ARB THEARPY RXD/TAKEN: ICD-10-PCS | Mod: HCNC,CPTII,S$GLB, | Performed by: PODIATRIST

## 2023-04-17 PROCEDURE — 1101F PT FALLS ASSESS-DOCD LE1/YR: CPT | Mod: HCNC,CPTII,S$GLB, | Performed by: PODIATRIST

## 2023-04-17 PROCEDURE — 3288F PR FALLS RISK ASSESSMENT DOCUMENTED: ICD-10-PCS | Mod: HCNC,CPTII,S$GLB, | Performed by: PODIATRIST

## 2023-04-17 PROCEDURE — 3008F PR BODY MASS INDEX (BMI) DOCUMENTED: ICD-10-PCS | Mod: HCNC,CPTII,S$GLB, | Performed by: PODIATRIST

## 2023-04-17 PROCEDURE — 99024 POSTOP FOLLOW-UP VISIT: CPT | Mod: HCNC,S$GLB,, | Performed by: PODIATRIST

## 2023-04-17 PROCEDURE — 11721 PR DEBRIDEMENT OF NAILS, 6 OR MORE: ICD-10-PCS | Mod: 79,Q9,HCNC,S$GLB | Performed by: PODIATRIST

## 2023-04-17 PROCEDURE — 1125F PR PAIN SEVERITY QUANTIFIED, PAIN PRESENT: ICD-10-PCS | Mod: HCNC,CPTII,S$GLB, | Performed by: PODIATRIST

## 2023-04-17 PROCEDURE — 3008F BODY MASS INDEX DOCD: CPT | Mod: HCNC,CPTII,S$GLB, | Performed by: PODIATRIST

## 2023-04-17 PROCEDURE — 4010F ACE/ARB THERAPY RXD/TAKEN: CPT | Mod: HCNC,CPTII,S$GLB, | Performed by: PODIATRIST

## 2023-04-17 PROCEDURE — 11721 DEBRIDE NAIL 6 OR MORE: CPT | Mod: 79,Q9,HCNC,S$GLB | Performed by: PODIATRIST

## 2023-04-17 PROCEDURE — 1159F PR MEDICATION LIST DOCUMENTED IN MEDICAL RECORD: ICD-10-PCS | Mod: HCNC,CPTII,S$GLB, | Performed by: PODIATRIST

## 2023-04-17 PROCEDURE — 1159F MED LIST DOCD IN RCRD: CPT | Mod: HCNC,CPTII,S$GLB, | Performed by: PODIATRIST

## 2023-04-17 PROCEDURE — 1160F PR REVIEW ALL MEDS BY PRESCRIBER/CLIN PHARMACIST DOCUMENTED: ICD-10-PCS | Mod: HCNC,CPTII,S$GLB, | Performed by: PODIATRIST

## 2023-04-17 PROCEDURE — 1160F RVW MEDS BY RX/DR IN RCRD: CPT | Mod: HCNC,CPTII,S$GLB, | Performed by: PODIATRIST

## 2023-04-17 PROCEDURE — 3288F FALL RISK ASSESSMENT DOCD: CPT | Mod: HCNC,CPTII,S$GLB, | Performed by: PODIATRIST

## 2023-04-17 PROCEDURE — 1101F PR PT FALLS ASSESS DOC 0-1 FALLS W/OUT INJ PAST YR: ICD-10-PCS | Mod: HCNC,CPTII,S$GLB, | Performed by: PODIATRIST

## 2023-04-17 NOTE — PROGRESS NOTES
Subjective:       Patient ID: Kay Galarza is a 67 y.o. female.    Chief Complaint: Post-op Evaluation (Post op follow up from surgery on March 13, diabetic pt, pt rates pain 10/10, pt is wearing medical equipment on left foot and tennis shoes on right foot, pt was last seen on 12/12/22 by PCP Lucy Negron MD)      HPI:  Kay Galarza presents to the office today, s/p 3/13/2023 LLR Ex-Fix application with KAYLA. Daughter is present. States mild pains to the proximal tibial ring pins. States no redness or infection. Does have [facility] for wound care and OT/PT. States Vit. D. and DVT Ppx. States DMII control.    Hemoglobin A1C   Date Value Ref Range Status   12/07/2022 7.6 (H) 4.0 - 5.6 % Final     Comment:     ADA Screening Guidelines:  5.7-6.4%  Consistent with prediabetes  >or=6.5%  Consistent with diabetes    High levels of fetal hemoglobin interfere with the HbA1C  assay. Heterozygous hemoglobin variants (HbS, HgC, etc)do  not significantly interfere with this assay.   However, presence of multiple variants may affect accuracy.     05/25/2022 7.7 (H) 4.0 - 5.6 % Final     Comment:     ADA Screening Guidelines:  5.7-6.4%  Consistent with prediabetes  >or=6.5%  Consistent with diabetes    High levels of fetal hemoglobin interfere with the HbA1C  assay. Heterozygous hemoglobin variants (HbS, HgC, etc)do  not significantly interfere with this assay.   However, presence of multiple variants may affect accuracy.     01/26/2022 >14.0 (H) 4.0 - 5.6 % Final     Comment:     ADA Screening Guidelines:  5.7-6.4%  Consistent with prediabetes  >or=6.5%  Consistent with diabetes    High levels of fetal hemoglobin interfere with the HbA1C  assay. Heterozygous hemoglobin variants (HbS, HgC, etc)do  not significantly interfere with this assay.   However, presence of multiple variants may affect accuracy.         Review of patient's allergies indicates:   Allergen Reactions     Pollen extracts      Seasonal allergies, sneezing       Past Medical History:   Diagnosis Date    Abnormal nuclear stress test 1/23/2023    Arthritis     DERIAN KNEES    Asthma     SEASONAL    Cataract     Diabetes mellitus     Diabetes mellitus, type 2     Diabetic retinopathy     DM (diabetes mellitus) 2007    BS 97 09/29/2020    Hyperlipidemia     Hypertension        Family History   Problem Relation Age of Onset    Cancer Father         Prostate Ca    Hypertension Father     Stroke Father     Diabetes Sister     Glaucoma Mother     Hypertension Mother     Glaucoma Maternal Grandmother     Blindness Neg Hx     Macular degeneration Neg Hx     Retinal detachment Neg Hx     Strabismus Neg Hx        Social History     Socioeconomic History    Marital status:    Tobacco Use    Smoking status: Never    Smokeless tobacco: Never   Substance and Sexual Activity    Alcohol use: Never    Drug use: No    Sexual activity: Yes     Social Determinants of Health     Financial Resource Strain: Unknown    Difficulty of Paying Living Expenses: Patient refused   Food Insecurity: Unknown    Worried About Running Out of Food in the Last Year: Patient refused    Ran Out of Food in the Last Year: Patient refused   Transportation Needs: Unknown    Lack of Transportation (Medical): Patient refused    Lack of Transportation (Non-Medical): Patient refused   Physical Activity: Unknown    Days of Exercise per Week: Patient refused    Minutes of Exercise per Session: 10 min   Stress: Unknown    Feeling of Stress : Patient refused   Social Connections: Unknown    Frequency of Communication with Friends and Family: Patient refused    Frequency of Social Gatherings with Friends and Family: Patient refused    Active Member of Clubs or Organizations: Patient refused    Attends Club or Organization Meetings: Patient refused    Marital Status: Patient refused   Housing Stability: Unknown    Unable to Pay for Housing in the Last Year: Patient  refused    Unstable Housing in the Last Year: Patient refused       Past Surgical History:   Procedure Laterality Date    APPLICATION OF WOUND VACUUM-ASSISTED CLOSURE DEVICE Left 8/18/2020    Procedure: APPLICATION, WOUND VAC;  Surgeon: Kirt Gray DPM;  Location: [facility] OR;  Service: Podiatry;  Laterality: Left;    APPLICATION, EXTERNAL FIXATION DEVICE Left 3/13/2023    Procedure: APPLICATION, EXTERNAL FIXATION DEVICE;  Surgeon: Kirt Gray DPM;  Location: [facility] OR;  Service: Podiatry;  Laterality: Left;    CATARACT EXTRACTION W/  INTRAOCULAR LENS IMPLANT Right 07/18/2018    CATARACT EXTRACTION W/  INTRAOCULAR LENS IMPLANT Left 03/13/2019    COLONOSCOPY N/A 12/22/2018    Procedure: COLONOSCOPY;  Surgeon: Zak Dover MD;  Location: [facility] ENDO;  Service: Endoscopy;  Laterality: N/A;    COLONOSCOPY N/A 5/11/2022    Procedure: COLONOSCOPY;  Surgeon: Charles Berrios MD;  Location: [facility] ENDO;  Service: Endoscopy;  Laterality: N/A;    ENDOSCOPIC VEIN LASER TREATMENT Bilateral 1990's    FIXATION OF SYNDESMOSIS OF ANKLE Left 7/23/2020    Procedure: FIXATION, SYNDESMOSIS, ANKLE;  Surgeon: Kirt Gray DPM;  Location: [facility] OR;  Service: Podiatry;  Laterality: Left;    FOOT HARDWARE REMOVAL Left 3/13/2023    Procedure: REMOVAL, HARDWARE, FOOT;  Surgeon: Kirt Gray DPM;  Location: [facility] OR;  Service: Podiatry;  Laterality: Left;    FUSION OF SUBTALAR JOINT Left 3/13/2023    Procedure: FUSION, SUBTALAR JOINT;  Surgeon: Kirt Gray DPM;  Location: [facility] OR;  Service: Podiatry;  Laterality: Left;    LEFT HEART CATHETERIZATION Left 1/24/2023    Procedure: CATHETERIZATION, HEART, LEFT;  Surgeon: Salma Bonilla MD;  Location: [facility] CATH LAB;  Service: Cardiology;  Laterality: Left;  pt given 130 start time    MANIPULATION WITH ANESTHESIA Left 7/23/2020    Procedure: MANIPULATION, WITH ANESTHESIA;  Surgeon: Kirt Gray DPM;  Location: [facility] OR;  Service: Podiatry;  Laterality: Left;    MIDFOOT ARTHRODESIS  "Left 7/23/2020    Procedure: FUSION, JOINT, MIDFOOT;  Surgeon: Kirt Gray DPM;  Location: [facility] OR;  Service: Podiatry;  Laterality: Left;  2nd tarsometatarsal joint dislocation via fusion    MIDFOOT ARTHRODESIS Left 3/13/2023    Procedure: FUSION, JOINT, MIDFOOT;  Surgeon: Kirt Gray DPM;  Location: [facility] OR;  Service: Podiatry;  Laterality: Left;    OPEN REDUCTION AND INTERNAL FIXATION (ORIF) OF INJURY OF ANKLE Left 7/23/2020    Procedure: ORIF, ANKLE;  Surgeon: Kirt Gray DPM;  Location: [facility] OR;  Service: Podiatry;  Laterality: Left;    RECONSTRUCTION WITH FUSION OF CHARCOT FOOT Left 8/18/2020    Procedure: RECONSTRUCTION, CHARCOT FOOT, WITH FUSION;  Surgeon: Kirt Gray DPM;  Location: [facility] OR;  Service: Podiatry;  Laterality: Left;    REMOVAL OF HARDWARE FROM LOWER EXTREMITY Left 1/27/2021    Procedure: REMOVAL, HARDWARE, LOWER EXTREMITY;  Surgeon: Kirt Gray DPM;  Location: [facility] OR;  Service: Podiatry;  Laterality: Left;    WOUND DEBRIDEMENT Left 8/18/2020    Procedure: DEBRIDEMENT, WOUND;  Surgeon: Kirt Gray DPM;  Location: [facility] OR;  Service: Podiatry;  Laterality: Left;       Review of Systems   Constitutional:  Negative for chills, fatigue and fever.   HENT:  Negative for hearing loss.    Eyes:  Negative for photophobia and visual disturbance.   Respiratory:  Negative for cough, chest tightness, shortness of breath and wheezing.    Cardiovascular:  Negative for chest pain and palpitations.   Gastrointestinal:  Negative for constipation, diarrhea, nausea and vomiting.   Endocrine: Negative for cold intolerance and heat intolerance.   Genitourinary:  Negative for flank pain.   Musculoskeletal:  Positive for gait problem. Negative for neck pain and neck stiffness.   Skin:  Negative for wound.   Neurological:  Negative for light-headedness and headaches.   Psychiatric/Behavioral:  Negative for sleep disturbance.         Objective:   Ht 5' 9" (1.753 m)   Wt 117.5 kg (259 lb)  "  BMI 38.25 kg/m²     Physical Exam  LOWER EXTREMITY PHYSICAL EXAMINATION    VASCULAR: Proximal to distal temperature is warm to warm. No ipsilateral calf pain or tenderness is noted with palpation and compression. No palpable cords noted.    DERMATOLOGY: Minimal superficial sloughing is noted, lateral heel wound. No dehiscence is noted. No infection is noted. On the left foot, nails 1, 2, and 3 are suggestive of onychomycotic changes. On the right foot, nails 2, 3, and 4 are suggestive of onychomycotic changes. These nail plates are thickened, are dystrophic, chaulky in appearance and malodorous with substantial subungual debris. These nail plates are yellow to brown in appearance. The remaining nail plates are elongated and do not have suggestive clinical features of onychomycosis.     ORTHOPEDIC: Mild pains to palpation to the pin sites.    Assessment:     1. Postop check    2. Charcot's joint of foot, left    3. Osteoarthritis of left ankle or foot    4. Pain in joint involving left ankle and foot    5. Pseudarthrosis after fusion or arthrodesis    6. Painful orthopaedic hardware    7. Type II diabetes mellitus with neurological manifestations    8. Onychomycosis            Plan:     Postop check  -     X-Ray Tibia Fibula 2 View Left; Future; Expected date: 04/24/2023  -     X-Ray Foot Complete Left; Future; Expected date: 04/17/2023  -     SUBSEQUENT HOME HEALTH ORDERS    Charcot's joint of foot, left    Osteoarthritis of left ankle or foot    Pain in joint involving left ankle and foot    Pseudarthrosis after fusion or arthrodesis    Painful orthopaedic hardware    Type II diabetes mellitus with neurological manifestations    Onychomycosis        Thorough discussion is had with the patient today, concerning the diagnosis, its etiology, and the treatment algorithm at present.     No evidence of wound healing complications at present.    Collagen/Alginate is applied to the lateral heel superficial  sloughing.    Local wound care with Bacitracin and DSD.    Continue in home PT/OT and Wound Care with [facility].    WBAT, minimally with HALO and walker ATC.    RICE therapy ATC.    Vit. D.    DVT Ppx.    Strict DMII control.    Onychomycotic nail plates, as outlined above, are sharply debrided with double action nail nipper, and/or with the assistance of a mechanical rotary aditya for reduction of pains. Nails are reduced in terms of length, width and girth with removal of subungual debris to facilitate pain free weight bearing and ambulation. Elongated nails as outlined in the objective portion of this note, were trimmed to appropriate length for alleviation/reduction of pains as well. Follow up in approx. 4-6 months.        Future Appointments   Date Time Provider Department Center   5/9/2023 11:00 AM [facility] XR1-DR [facility]   5/9/2023 11:15 AM [facility] XR1-DR [facility]   5/9/2023 11:30 AM Kirt Gray DPM [facility]   6/12/2023  2:20 PM [place] [facility] [place]

## 2023-04-19 ENCOUNTER — PATIENT MESSAGE (OUTPATIENT)
Dept: ADMINISTRATIVE | Facility: HOSPITAL | Age: 68
End: 2023-04-19
Payer: MEDICARE

## 2023-04-27 ENCOUNTER — EXTERNAL HOME HEALTH (OUTPATIENT)
Dept: HOME HEALTH SERVICES | Facility: HOSPITAL | Age: 68
End: 2023-04-27
Payer: MEDICARE

## 2023-05-01 ENCOUNTER — HOSPITAL ENCOUNTER (OUTPATIENT)
Dept: RADIOLOGY | Facility: HOSPITAL | Age: 68
Discharge: HOME | End: 2023-05-01
Attending: PODIATRIST
Payer: MEDICARE

## 2023-05-01 ENCOUNTER — OFFICE VISIT (OUTPATIENT)
Dept: PODIATRY | Facility: CLINIC | Age: 68
End: 2023-05-01
Payer: MEDICARE

## 2023-05-01 VITALS — WEIGHT: 259 LBS | HEIGHT: 69 IN | BODY MASS INDEX: 38.36 KG/M2

## 2023-05-01 DIAGNOSIS — Z09 POSTOP CHECK: ICD-10-CM

## 2023-05-01 DIAGNOSIS — M14.672 CHARCOT'S JOINT OF FOOT, LEFT: ICD-10-CM

## 2023-05-01 DIAGNOSIS — M96.0 PSEUDARTHROSIS AFTER FUSION OR ARTHRODESIS: ICD-10-CM

## 2023-05-01 DIAGNOSIS — Z09 POSTOP CHECK: Primary | ICD-10-CM

## 2023-05-01 DIAGNOSIS — T84.84XA PAINFUL ORTHOPAEDIC HARDWARE: ICD-10-CM

## 2023-05-01 DIAGNOSIS — M19.072 OSTEOARTHRITIS OF LEFT ANKLE OR FOOT: ICD-10-CM

## 2023-05-01 DIAGNOSIS — Z01.818 PREOP EXAMINATION: ICD-10-CM

## 2023-05-01 DIAGNOSIS — E11.49 TYPE II DIABETES MELLITUS WITH NEUROLOGICAL MANIFESTATIONS: ICD-10-CM

## 2023-05-01 DIAGNOSIS — T84.7XXA: ICD-10-CM

## 2023-05-01 DIAGNOSIS — M25.572 PAIN IN JOINT INVOLVING LEFT ANKLE AND FOOT: ICD-10-CM

## 2023-05-01 PROCEDURE — 99024 PR POST-OP FOLLOW-UP VISIT: ICD-10-PCS | Mod: S$GLB,,, | Performed by: PODIATRIST

## 2023-05-01 PROCEDURE — 99999 PR PBB SHADOW E&M-EST. PATIENT-LVL V: ICD-10-PCS | Mod: PBBFAC,,, | Performed by: PODIATRIST

## 2023-05-01 PROCEDURE — 99999 PR PBB SHADOW E&M-EST. PATIENT-LVL V: CPT | Mod: PBBFAC,,, | Performed by: PODIATRIST

## 2023-05-01 PROCEDURE — 1160F RVW MEDS BY RX/DR IN RCRD: CPT | Mod: CPTII,S$GLB,, | Performed by: PODIATRIST

## 2023-05-01 PROCEDURE — 73630 X-RAY EXAM OF FOOT: CPT | Mod: TC,LT

## 2023-05-01 PROCEDURE — 1159F PR MEDICATION LIST DOCUMENTED IN MEDICAL RECORD: ICD-10-PCS | Mod: CPTII,S$GLB,, | Performed by: PODIATRIST

## 2023-05-01 PROCEDURE — 1101F PR PT FALLS ASSESS DOC 0-1 FALLS W/OUT INJ PAST YR: ICD-10-PCS | Mod: CPTII,S$GLB,, | Performed by: PODIATRIST

## 2023-05-01 PROCEDURE — 1125F AMNT PAIN NOTED PAIN PRSNT: CPT | Mod: CPTII,S$GLB,, | Performed by: PODIATRIST

## 2023-05-01 PROCEDURE — 3288F FALL RISK ASSESSMENT DOCD: CPT | Mod: CPTII,S$GLB,, | Performed by: PODIATRIST

## 2023-05-01 PROCEDURE — 1125F PR PAIN SEVERITY QUANTIFIED, PAIN PRESENT: ICD-10-PCS | Mod: CPTII,S$GLB,, | Performed by: PODIATRIST

## 2023-05-01 PROCEDURE — 73630 X-RAY EXAM OF FOOT: CPT | Mod: 26,LT,, | Performed by: RADIOLOGY

## 2023-05-01 PROCEDURE — 1160F PR REVIEW ALL MEDS BY PRESCRIBER/CLIN PHARMACIST DOCUMENTED: ICD-10-PCS | Mod: CPTII,S$GLB,, | Performed by: PODIATRIST

## 2023-05-01 PROCEDURE — 3288F PR FALLS RISK ASSESSMENT DOCUMENTED: ICD-10-PCS | Mod: CPTII,S$GLB,, | Performed by: PODIATRIST

## 2023-05-01 PROCEDURE — 3008F PR BODY MASS INDEX (BMI) DOCUMENTED: ICD-10-PCS | Mod: CPTII,S$GLB,, | Performed by: PODIATRIST

## 2023-05-01 PROCEDURE — 1159F MED LIST DOCD IN RCRD: CPT | Mod: CPTII,S$GLB,, | Performed by: PODIATRIST

## 2023-05-01 PROCEDURE — 73630 XR FOOT COMPLETE 3 VIEW LEFT: ICD-10-PCS | Mod: 26,LT,, | Performed by: RADIOLOGY

## 2023-05-01 PROCEDURE — 3008F BODY MASS INDEX DOCD: CPT | Mod: CPTII,S$GLB,, | Performed by: PODIATRIST

## 2023-05-01 PROCEDURE — 73590 X-RAY EXAM OF LOWER LEG: CPT | Mod: 26,LT,, | Performed by: RADIOLOGY

## 2023-05-01 PROCEDURE — 4010F ACE/ARB THERAPY RXD/TAKEN: CPT | Mod: CPTII,S$GLB,, | Performed by: PODIATRIST

## 2023-05-01 PROCEDURE — 73590 X-RAY EXAM OF LOWER LEG: CPT | Mod: TC,LT

## 2023-05-01 PROCEDURE — 99024 POSTOP FOLLOW-UP VISIT: CPT | Mod: S$GLB,,, | Performed by: PODIATRIST

## 2023-05-01 PROCEDURE — 4010F PR ACE/ARB THEARPY RXD/TAKEN: ICD-10-PCS | Mod: CPTII,S$GLB,, | Performed by: PODIATRIST

## 2023-05-01 PROCEDURE — 73590 XR TIBIA FIBULA 2 VIEW LEFT: ICD-10-PCS | Mod: 26,LT,, | Performed by: RADIOLOGY

## 2023-05-01 PROCEDURE — 1101F PT FALLS ASSESS-DOCD LE1/YR: CPT | Mod: CPTII,S$GLB,, | Performed by: PODIATRIST

## 2023-05-01 RX ORDER — AMOXICILLIN AND CLAVULANATE POTASSIUM 875; 125 MG/1; MG/1
1 TABLET, FILM COATED ORAL 2 TIMES DAILY
Qty: 14 TABLET | Refills: 0 | Status: SHIPPED | OUTPATIENT
Start: 2023-05-01 | End: 2023-05-08

## 2023-05-01 RX ORDER — DOXYCYCLINE 100 MG/1
100 CAPSULE ORAL EVERY 12 HOURS
Qty: 20 CAPSULE | Refills: 0 | Status: SHIPPED | OUTPATIENT
Start: 2023-05-01 | End: 2023-05-11

## 2023-05-01 RX ORDER — ACETAMINOPHEN AND CODEINE PHOSPHATE 300; 30 MG/1; MG/1
1 TABLET ORAL EVERY 6 HOURS PRN
Qty: 28 TABLET | Refills: 0 | Status: SHIPPED | OUTPATIENT
Start: 2023-05-01 | End: 2023-05-08

## 2023-05-01 NOTE — PROGRESS NOTES
Subjective:       Patient ID: Kay Galarza is a 68 y.o. female.    Chief Complaint: Post-op Evaluation (S/p, APPLICATION, EXTERNAL FIXATION DEVICEREMOVAL, HARDWARE, FOOT/FUSION, SUBTALAR JOINT FUSION, JOINT, MIDFOOT, DOS 03/13/2023, Rates pain 10/10)      HPI:  Kay Galarza presents to the office today, s/p 3/13/2023 LLE Ex-Fix application with KAYLA. Daughter is present. States moderate pains to the proximal tibial ring pins. States no redness or infection. Does have [facility] for wound care and OT/PT. States Vit. D. and DVT Ppx. States DMII control. States possible infection. Wants to have HALO removed ASAP. Has been NWB since last week at my request due to stated pains as per nurse.     Hemoglobin A1C   Date Value Ref Range Status   12/07/2022 7.6 (H) 4.0 - 5.6 % Final     Comment:     ADA Screening Guidelines:  5.7-6.4%  Consistent with prediabetes  >or=6.5%  Consistent with diabetes    High levels of fetal hemoglobin interfere with the HbA1C  assay. Heterozygous hemoglobin variants (HbS, HgC, etc)do  not significantly interfere with this assay.   However, presence of multiple variants may affect accuracy.     05/25/2022 7.7 (H) 4.0 - 5.6 % Final     Comment:     ADA Screening Guidelines:  5.7-6.4%  Consistent with prediabetes  >or=6.5%  Consistent with diabetes    High levels of fetal hemoglobin interfere with the HbA1C  assay. Heterozygous hemoglobin variants (HbS, HgC, etc)do  not significantly interfere with this assay.   However, presence of multiple variants may affect accuracy.     01/26/2022 >14.0 (H) 4.0 - 5.6 % Final     Comment:     ADA Screening Guidelines:  5.7-6.4%  Consistent with prediabetes  >or=6.5%  Consistent with diabetes    High levels of fetal hemoglobin interfere with the HbA1C  assay. Heterozygous hemoglobin variants (HbS, HgC, etc)do  not significantly interfere with this assay.   However, presence of multiple variants may affect accuracy.          Review of patient's allergies indicates:   Allergen Reactions    Pollen extracts      Seasonal allergies, sneezing       Past Medical History:   Diagnosis Date    Abnormal nuclear stress test 1/23/2023    Arthritis     DERIAN KNEES    Asthma     SEASONAL    Cataract     Diabetes mellitus     Diabetes mellitus, type 2     Diabetic retinopathy     DM (diabetes mellitus) 2007    BS 97 09/29/2020    Hyperlipidemia     Hypertension        Family History   Problem Relation Age of Onset    Cancer Father         Prostate Ca    Hypertension Father     Stroke Father     Diabetes Sister     Glaucoma Mother     Hypertension Mother     Glaucoma Maternal Grandmother     Blindness Neg Hx     Macular degeneration Neg Hx     Retinal detachment Neg Hx     Strabismus Neg Hx        Social History     Socioeconomic History    Marital status:    Tobacco Use    Smoking status: Never    Smokeless tobacco: Never   Substance and Sexual Activity    Alcohol use: Never    Drug use: No    Sexual activity: Yes     Social Determinants of Health     Financial Resource Strain: Unknown    Difficulty of Paying Living Expenses: Patient refused   Food Insecurity: Unknown    Worried About Running Out of Food in the Last Year: Patient refused    Ran Out of Food in the Last Year: Patient refused   Transportation Needs: Unknown    Lack of Transportation (Medical): Patient refused    Lack of Transportation (Non-Medical): Patient refused   Physical Activity: Unknown    Days of Exercise per Week: Patient refused    Minutes of Exercise per Session: 10 min   Stress: Unknown    Feeling of Stress : Patient refused   Social Connections: Unknown    Frequency of Communication with Friends and Family: Patient refused    Frequency of Social Gatherings with Friends and Family: Patient refused    Active Member of Clubs or Organizations: Patient refused    Attends Club or Organization Meetings: Patient refused    Marital  Status: Patient refused   Housing Stability: Unknown    Unable to Pay for Housing in the Last Year: Patient refused    Unstable Housing in the Last Year: Patient refused       Past Surgical History:   Procedure Laterality Date    APPLICATION OF WOUND VACUUM-ASSISTED CLOSURE DEVICE Left 8/18/2020    Procedure: APPLICATION, WOUND VAC;  Surgeon: Kirt Gray DPM;  Location: [facility] OR;  Service: Podiatry;  Laterality: Left;    APPLICATION, EXTERNAL FIXATION DEVICE Left 3/13/2023    Procedure: APPLICATION, EXTERNAL FIXATION DEVICE;  Surgeon: Krit Gray DPM;  Location: [facility] OR;  Service: Podiatry;  Laterality: Left;    CATARACT EXTRACTION W/  INTRAOCULAR LENS IMPLANT Right 07/18/2018    CATARACT EXTRACTION W/  INTRAOCULAR LENS IMPLANT Left 03/13/2019    COLONOSCOPY N/A 12/22/2018    Procedure: COLONOSCOPY;  Surgeon: Zak Dover MD;  Location: [facility] ENDO;  Service: Endoscopy;  Laterality: N/A;    COLONOSCOPY N/A 5/11/2022    Procedure: COLONOSCOPY;  Surgeon: Charles Berrios MD;  Location: [facility] ENDO;  Service: Endoscopy;  Laterality: N/A;    ENDOSCOPIC VEIN LASER TREATMENT Bilateral 1990's    FIXATION OF SYNDESMOSIS OF ANKLE Left 7/23/2020    Procedure: FIXATION, SYNDESMOSIS, ANKLE;  Surgeon: Kirt Gray DPM;  Location: [facility] OR;  Service: Podiatry;  Laterality: Left;    FOOT HARDWARE REMOVAL Left 3/13/2023    Procedure: REMOVAL, HARDWARE, FOOT;  Surgeon: Kirt Gray DPM;  Location: [facility] OR;  Service: Podiatry;  Laterality: Left;    FUSION OF SUBTALAR JOINT Left 3/13/2023    Procedure: FUSION, SUBTALAR JOINT;  Surgeon: Kirt Gray DPM;  Location: [facility] OR;  Service: Podiatry;  Laterality: Left;    LEFT HEART CATHETERIZATION Left 1/24/2023    Procedure: CATHETERIZATION, HEART, LEFT;  Surgeon: Salma Bonilla MD;  Location: [facility] CATH LAB;  Service: Cardiology;  Laterality: Left;  pt given 130 start time    MANIPULATION WITH ANESTHESIA Left 7/23/2020    Procedure: MANIPULATION, WITH  ANESTHESIA;  Surgeon: Kirt Gray DPM;  Location: [facility] OR;  Service: Podiatry;  Laterality: Left;    MIDFOOT ARTHRODESIS Left 7/23/2020    Procedure: FUSION, JOINT, MIDFOOT;  Surgeon: Kirt Gray DPM;  Location: [facility] OR;  Service: Podiatry;  Laterality: Left;  2nd tarsometatarsal joint dislocation via fusion    MIDFOOT ARTHRODESIS Left 3/13/2023    Procedure: FUSION, JOINT, MIDFOOT;  Surgeon: Kirt Gray DPM;  Location: [facility] OR;  Service: Podiatry;  Laterality: Left;    OPEN REDUCTION AND INTERNAL FIXATION (ORIF) OF INJURY OF ANKLE Left 7/23/2020    Procedure: ORIF, ANKLE;  Surgeon: Kirt Gray DPM;  Location: [facility] OR;  Service: Podiatry;  Laterality: Left;    RECONSTRUCTION WITH FUSION OF CHARCOT FOOT Left 8/18/2020    Procedure: RECONSTRUCTION, CHARCOT FOOT, WITH FUSION;  Surgeon: Kirt Gray DPM;  Location: [facility] OR;  Service: Podiatry;  Laterality: Left;    REMOVAL OF HARDWARE FROM LOWER EXTREMITY Left 1/27/2021    Procedure: REMOVAL, HARDWARE, LOWER EXTREMITY;  Surgeon: Kirt Gray DPM;  Location: [facility] OR;  Service: Podiatry;  Laterality: Left;    WOUND DEBRIDEMENT Left 8/18/2020    Procedure: DEBRIDEMENT, WOUND;  Surgeon: Kirt Gray DPM;  Location: [facility] OR;  Service: Podiatry;  Laterality: Left;       Review of Systems   Constitutional:  Negative for chills, fatigue and fever.   HENT:  Negative for hearing loss.    Eyes:  Negative for photophobia and visual disturbance.   Respiratory:  Negative for cough, chest tightness, shortness of breath and wheezing.    Cardiovascular:  Negative for chest pain and palpitations.   Gastrointestinal:  Negative for constipation, diarrhea, nausea and vomiting.   Endocrine: Negative for cold intolerance and heat intolerance.   Genitourinary:  Negative for flank pain.   Musculoskeletal:  Positive for gait problem. Negative for neck pain and neck stiffness.   Skin:  Negative for wound.   Neurological:  Negative for light-headedness and  "headaches.   Psychiatric/Behavioral:  Negative for sleep disturbance.         Objective:   Ht 5' 9" (1.753 m)   Wt 117.5 kg (259 lb)   BMI 38.25 kg/m²     Physical Exam  LOWER EXTREMITY PHYSICAL EXAMINATION    VASCULAR: Proximal to distal temperature is warm to warm. No ipsilateral calf pain or tenderness is noted with palpation and compression. No palpable cords noted.    DERMATOLOGY: Minimal erythema is noted, k-wires at the pins sites, leg, not foot or ankle. No fluctuance is noted. No calor is noted.     ORTHOPEDIC: Mild pains to palpation to the pin sites.    Assessment:     1. Postop check    2. Charcot's joint of foot, left    3. Osteoarthritis of left ankle or foot    4. Pain in joint involving left ankle and foot    5. Pseudarthrosis after fusion or arthrodesis    6. Painful orthopaedic hardware    7. Type II diabetes mellitus with neurological manifestations    8. Infection of halo orthosis pin site, initial encounter    9. Preop examination        Plan:     Postop check  -     CT Foot Without Contrast Left; Future; Expected date: 05/01/2023  -     SUBSEQUENT HOME HEALTH ORDERS  -     acetaminophen-codeine 300-30mg (TYLENOL #3) 300-30 mg Tab; Take 1 tablet by mouth every 6 (six) hours as needed (Pain).  Dispense: 28 tablet; Refill: 0    Charcot's joint of foot, left  -     CT Foot Without Contrast Left; Future; Expected date: 05/01/2023  -     acetaminophen-codeine 300-30mg (TYLENOL #3) 300-30 mg Tab; Take 1 tablet by mouth every 6 (six) hours as needed (Pain).  Dispense: 28 tablet; Refill: 0  -     Case Request Operating Room: REMOVAL, EXTERNAL FIXATION DEVICE    Osteoarthritis of left ankle or foot  -     CT Foot Without Contrast Left; Future; Expected date: 05/01/2023  -     Case Request Operating Room: REMOVAL, EXTERNAL FIXATION DEVICE    Pain in joint involving left ankle and foot    Pseudarthrosis after fusion or arthrodesis    Painful orthopaedic hardware    Type II diabetes mellitus with " neurological manifestations    Infection of halo orthosis pin site, initial encounter  -     amoxicillin-clavulanate 875-125mg (AUGMENTIN) 875-125 mg per tablet; Take 1 tablet by mouth 2 (two) times daily. for 7 days  Dispense: 14 tablet; Refill: 0  -     doxycycline (VIBRAMYCIN) 100 MG Cap; Take 1 capsule (100 mg total) by mouth every 12 (twelve) hours. for 10 days  Dispense: 20 capsule; Refill: 0    Preop examination  -     Ambulatory referral/consult to Pre-Admit; Future; Expected date: 05/09/2023        Thorough discussion is had with the patient today, concerning the diagnosis, its etiology, and the treatment algorithm at present.     No evidence of wound healing complications at present.    Mild erythema around pin sites.    Start PO Abx.     Continue in home PT/OT and Wound Care with [facility].    Continue NWB for now.    RICE therapy ATC.    Vit. D.    DVT Ppx.    Strict DMII control.    Start PO Abx.    HALO should remain for 12 weeks, but patient states she cannot make it that long.    Will plan to remove s/p 9 weeks.    CT Scan prior.    Strict DMII control.    She is aware of the consequences if the bones are note completely fused.    The procedure of (removal of external fixator, LLE) was thoroughly explained to the patient. Its necessity and/or purpose and the implications therein were outlined, including any pertinent advantages and/or disadvantages, and possible complications, if any. Possible complications include recurrence of pathology and/or deformity, infection (cellulitis, drainage, purulence, malodor, etc...), pain, numbness, neuritis, edema, burning, loss of function, need for further surgery, possible need for removal of any implanted hardware, soft tissue contracture and/or scarring, etc... No guarantees were given and/or implied. Post-operative expectations and weightbearing protocol is thoroughly explained to the patient, who acknowledges understanding.     XRAYS are reviewed in  detail with the patient. All questions and concerns regarding findings and its/their implications are outlined and discussed.    Will need to HOLD AC 24-48 hours pre-op.         Future Appointments   Date Time Provider Department Center   5/23/2023  3:00 PM [facility] CT1 LIMIT 500 LBS [facility] CT SCAN [place]   6/12/2023  2:20 PM Antione Chavez MD [facility] [place]

## 2023-05-02 DIAGNOSIS — Z01.818 PREOP TESTING: Primary | ICD-10-CM

## 2023-05-05 DIAGNOSIS — M25.572 PAIN IN JOINT INVOLVING LEFT ANKLE AND FOOT: ICD-10-CM

## 2023-05-05 RX ORDER — MELOXICAM 15 MG/1
15 TABLET ORAL DAILY
Qty: 30 TABLET | Refills: 1 | Status: SHIPPED | OUTPATIENT
Start: 2023-05-05 | End: 2023-06-04

## 2023-05-05 RX ORDER — CELECOXIB 200 MG/1
200 CAPSULE ORAL DAILY
Qty: 30 CAPSULE | Refills: 1 | OUTPATIENT
Start: 2023-05-05 | End: 2023-06-04

## 2023-05-06 ENCOUNTER — DOCUMENT SCAN (OUTPATIENT)
Dept: HOME HEALTH SERVICES | Facility: HOSPITAL | Age: 68
End: 2023-05-06
Payer: MEDICARE

## 2023-05-08 ENCOUNTER — LAB VISIT (OUTPATIENT)
Dept: LAB | Facility: HOSPITAL | Age: 68
End: 2023-05-08
Attending: NURSE PRACTITIONER
Payer: MEDICARE

## 2023-05-08 ENCOUNTER — OFFICE VISIT (OUTPATIENT)
Dept: INTERNAL MEDICINE | Facility: CLINIC | Age: 68
End: 2023-05-08
Payer: MEDICARE

## 2023-05-08 VITALS
RESPIRATION RATE: 16 BRPM | DIASTOLIC BLOOD PRESSURE: 79 MMHG | TEMPERATURE: 98 F | HEART RATE: 88 BPM | SYSTOLIC BLOOD PRESSURE: 125 MMHG | OXYGEN SATURATION: 95 %

## 2023-05-08 DIAGNOSIS — J45.20 MILD INTERMITTENT ASTHMA WITHOUT COMPLICATION: ICD-10-CM

## 2023-05-08 DIAGNOSIS — E11.49 TYPE II DIABETES MELLITUS WITH NEUROLOGICAL MANIFESTATIONS: ICD-10-CM

## 2023-05-08 DIAGNOSIS — Z01.818 PREOP TESTING: ICD-10-CM

## 2023-05-08 DIAGNOSIS — Z01.818 PRE-OP EVALUATION: ICD-10-CM

## 2023-05-08 DIAGNOSIS — Z01.818 PREOP EXAMINATION: ICD-10-CM

## 2023-05-08 DIAGNOSIS — M14.672 CHARCOT'S JOINT OF FOOT, LEFT: ICD-10-CM

## 2023-05-08 DIAGNOSIS — I10 ESSENTIAL HYPERTENSION: ICD-10-CM

## 2023-05-08 DIAGNOSIS — R94.39 ABNORMAL NUCLEAR STRESS TEST: ICD-10-CM

## 2023-05-08 LAB
ANION GAP SERPL CALC-SCNC: 12 MMOL/L (ref 8–16)
BASOPHILS # BLD AUTO: 0.05 K/UL (ref 0–0.2)
BASOPHILS NFR BLD: 0.6 % (ref 0–1.9)
BUN SERPL-MCNC: 29 MG/DL (ref 8–23)
CALCIUM SERPL-MCNC: 10 MG/DL (ref 8.7–10.5)
CHLORIDE SERPL-SCNC: 102 MMOL/L (ref 95–110)
CO2 SERPL-SCNC: 29 MMOL/L (ref 23–29)
CREAT SERPL-MCNC: 1.1 MG/DL (ref 0.5–1.4)
DIFFERENTIAL METHOD: ABNORMAL
EOSINOPHIL # BLD AUTO: 0.2 K/UL (ref 0–0.5)
EOSINOPHIL NFR BLD: 3 % (ref 0–8)
ERYTHROCYTE [DISTWIDTH] IN BLOOD BY AUTOMATED COUNT: 13.4 % (ref 11.5–14.5)
EST. GFR  (NO RACE VARIABLE): 54 ML/MIN/1.73 M^2
GLUCOSE SERPL-MCNC: 187 MG/DL (ref 70–110)
HCT VFR BLD AUTO: 43.7 % (ref 37–48.5)
HGB BLD-MCNC: 13.8 G/DL (ref 12–16)
IMM GRANULOCYTES # BLD AUTO: 0.02 K/UL (ref 0–0.04)
IMM GRANULOCYTES NFR BLD AUTO: 0.3 % (ref 0–0.5)
LYMPHOCYTES # BLD AUTO: 1 K/UL (ref 1–4.8)
LYMPHOCYTES NFR BLD: 12.9 % (ref 18–48)
MCH RBC QN AUTO: 28 PG (ref 27–31)
MCHC RBC AUTO-ENTMCNC: 31.6 G/DL (ref 32–36)
MCV RBC AUTO: 89 FL (ref 82–98)
MONOCYTES # BLD AUTO: 0.3 K/UL (ref 0.3–1)
MONOCYTES NFR BLD: 4.4 % (ref 4–15)
NEUTROPHILS # BLD AUTO: 6.1 K/UL (ref 1.8–7.7)
NEUTROPHILS NFR BLD: 78.8 % (ref 38–73)
NRBC BLD-RTO: 0 /100 WBC
PLATELET # BLD AUTO: 254 K/UL (ref 150–450)
PMV BLD AUTO: 9.8 FL (ref 9.2–12.9)
POTASSIUM SERPL-SCNC: 5 MMOL/L (ref 3.5–5.1)
RBC # BLD AUTO: 4.93 M/UL (ref 4–5.4)
SODIUM SERPL-SCNC: 143 MMOL/L (ref 136–145)
WBC # BLD AUTO: 7.7 K/UL (ref 3.9–12.7)

## 2023-05-08 PROCEDURE — 1159F MED LIST DOCD IN RCRD: CPT | Mod: CPTII,S$GLB,, | Performed by: SPECIALIST

## 2023-05-08 PROCEDURE — 99999 PR PBB SHADOW E&M-EST. PATIENT-LVL IV: CPT | Mod: PBBFAC,,,

## 2023-05-08 PROCEDURE — 4010F ACE/ARB THERAPY RXD/TAKEN: CPT | Mod: CPTII,S$GLB,, | Performed by: SPECIALIST

## 2023-05-08 PROCEDURE — 1160F PR REVIEW ALL MEDS BY PRESCRIBER/CLIN PHARMACIST DOCUMENTED: ICD-10-PCS | Mod: CPTII,S$GLB,, | Performed by: SPECIALIST

## 2023-05-08 PROCEDURE — 4010F PR ACE/ARB THEARPY RXD/TAKEN: ICD-10-PCS | Mod: CPTII,S$GLB,, | Performed by: SPECIALIST

## 2023-05-08 PROCEDURE — 99214 OFFICE O/P EST MOD 30 MIN: CPT | Mod: S$GLB,,, | Performed by: SPECIALIST

## 2023-05-08 PROCEDURE — 36415 COLL VENOUS BLD VENIPUNCTURE: CPT | Performed by: NURSE PRACTITIONER

## 2023-05-08 PROCEDURE — 3078F PR MOST RECENT DIASTOLIC BLOOD PRESSURE < 80 MM HG: ICD-10-PCS | Mod: CPTII,S$GLB,, | Performed by: SPECIALIST

## 2023-05-08 PROCEDURE — 3074F SYST BP LT 130 MM HG: CPT | Mod: CPTII,S$GLB,, | Performed by: SPECIALIST

## 2023-05-08 PROCEDURE — 1160F RVW MEDS BY RX/DR IN RCRD: CPT | Mod: CPTII,S$GLB,, | Performed by: SPECIALIST

## 2023-05-08 PROCEDURE — 3074F PR MOST RECENT SYSTOLIC BLOOD PRESSURE < 130 MM HG: ICD-10-PCS | Mod: CPTII,S$GLB,, | Performed by: SPECIALIST

## 2023-05-08 PROCEDURE — 99999 PR PBB SHADOW E&M-EST. PATIENT-LVL IV: ICD-10-PCS | Mod: PBBFAC,,,

## 2023-05-08 PROCEDURE — 99214 PR OFFICE/OUTPT VISIT, EST, LEVL IV, 30-39 MIN: ICD-10-PCS | Mod: S$GLB,,, | Performed by: SPECIALIST

## 2023-05-08 PROCEDURE — 3078F DIAST BP <80 MM HG: CPT | Mod: CPTII,S$GLB,, | Performed by: SPECIALIST

## 2023-05-08 PROCEDURE — 85025 COMPLETE CBC W/AUTO DIFF WBC: CPT | Performed by: NURSE PRACTITIONER

## 2023-05-08 PROCEDURE — 80048 BASIC METABOLIC PNL TOTAL CA: CPT | Performed by: NURSE PRACTITIONER

## 2023-05-08 PROCEDURE — 1159F PR MEDICATION LIST DOCUMENTED IN MEDICAL RECORD: ICD-10-PCS | Mod: CPTII,S$GLB,, | Performed by: SPECIALIST

## 2023-05-08 NOTE — ASSESSMENT & PLAN NOTE
Known risk factors for perioperative complications: HTN, Obesity, DM    Difficulty with intubation is not anticipated.    Cardiac Risk Estimation: Based on the Revised Cardiac Risk index, patient is a Class I risk with a 3.9 % risk of a major cardiac event in a low risk procedure.    1.) Preoperative workup as follows: ECG, hemoglobin, hematocrit, electrolytes, creatinine, glucose.  2.) Change in medication regimen before surgery: discontinue ASA 6 days before surgery, discontinue NSAIDs 5 days before surgery, hold Metformin 24 hours prior to surgery.  3.) Prophylaxis for cardiac events with perioperative beta-blockers: not indicated.  4.) Invasive hemodynamic monitoring perioperatively: at the discretion of anesthesiologist.  5.) Deep vein thrombosis prophylaxis postoperatively: intermittent pneumatic compression boots and regimen to be chosen by surgical team.  6.) Surveillance for postoperative MI with ECG immediately postoperatively and on postoperati ve days 1 and 2 AND troponin levels 24 hours postoperatively and on day 4 or hospital discharge (whichever comes first): not indicated.  7.) Current medications which may produce withdrawal symptoms if withheld perioperatively: none  8.) Other measures: Careful attention to perioperative glycemic control (blood glucose monitoring and SSI as needed ).  Postoperative hypertension management with IV hydralazine until able to take oral medications.  Postoperative incentive spirometry to prevent pneumonia.

## 2023-05-08 NOTE — ASSESSMENT & PLAN NOTE
- previous abnormal stress test on 1/18, left heart cath from 1/29/2023 with no evidence of cardiac ischemia; normal coronary anatomy   - last seen by cardiology on 3/3, Dr. Chavez and deemed low risk for surgery   - denies any CP, palpitations, dyspnea at pre op appointment

## 2023-05-08 NOTE — ASSESSMENT & PLAN NOTE
- follows with Dr. Gray  - S/p, ORIF Left ankle and ex fix application on 3/13/2023  - planning on ex-fix removal 5/26  - on eliquis 2.5 bid for dvt prophylaxis, will hold am dose prior to sx

## 2023-05-08 NOTE — DISCHARGE INSTRUCTIONS
Pre op instructions reviewed with patient & daughter during Clinic Visit with Provider, verbalized understanding.    To confirm, Surgery is scheduled on 5/26/23. We will call you late afternoon the business day prior to surgery with your arrival time.    *Please report to the [facility] Lobby (1st Floor) located off of [facility] (2nd Entrance/Building on the left, in front of the flag pole).  Address: [address] [place] [address] [place]. [id number]      INSTRUCTIONS IMPORTANT!!!  Do Not Eat, Drink, or Smoke after 12 midnight unless instructed otherwise by your Surgeon. OK to brush teeth, no gum, candy or mints!      *Take Only these medications with a small sip of water Morning of Surgery as instructed by Melina Mann, NP:  Inhaler  Omeprazole  Gabapentin    ____  HOLD all vitamins, herbal supplements, aspirin products & NSAIDS 7 days prior to surgery, as these can thin the blood.  ____  Avoid Alcoholic beverages 3 days prior to surgery, as it can thin the blood.  ____  NO Acrylic/fake nails or nail polish worn day of surgery (specifically hand/arm & foot surgeries).  ____  NO powder, lotions, deodorants, oils or cream on body.  ____  Remove all jewelry & piercings prior to surgery.  ____  Remove Dentures, Hearing Aids & Contact Lens prior to surgery.  ____  Bring photo ID and insurance information to hospital (Leave Valuables at Home).  ____  If going home the same day, arrange for a ride home. You will not be able to drive for 24 hrs if Anesthesia was used.   ____  Females (ages 11-60): may need to give a urine sample the morning of surgery; please see Pre op Nurse prior to using the restroom.  ____  Males: Stop ED medications (Viagra, Cialis) 24 hrs prior to surgery.  ____  Wear clean, loose fitting clothing to allow for dressings/ bandages.            Diabetic Patients: If you take diabetic medication, do NOT take morning of surgery unless instructed by Doctor. Metformin to be stopped 24 hrs prior to  surgery time. DO NOT take long-acting insulin the evening before surgery. Blood sugars will be checked in pre-op by Nurse. Give 15 units Toujeo night prior to surgery!    Bathing Instructions:    -Shower with anti-bacterial Soap (Hibiclens or Dial) the night before surgery and the morning of.   -Do not use Hibiclens on your face or genitals.   -Apply clean clothes after shower.  -Do not shave your face or body 2 days prior to surgery unless instructed otherwise by your Surgeon.  -Do not shave pubic hair 7 days prior to surgery (gyn pt's).    [facility]/Ride Policy:  Only 2 adults allowed (over the age of 18) to accompany you to the Hospital. You Must have a ride home from a responsible adult that you know and trust. Medical Transport, Uber or Lyft can only be used if patient has a responsible adult to accompany them during ride home.    Discharge Instructions: You will receive Post-op/Discharge instructions by your Discharge Nurse prior to going home. Please call your Surgeon's office with any post-surgery questions/concerns @ [phone].    *If you are running late or have questions the morning of surgery, please call the Surgery Dept @ [phone]  *Insurance/ Financial Questions, please call [phone]    Thank you,  -[facility] Pre Admit Dept.  [phone] or [phone]  M-F 7:30 am-4:00 pm (Closed Major Holidays)    Additional Testing Scheduled Today:  Labs (1st Floor) Check in at the !

## 2023-05-08 NOTE — PROGRESS NOTES
Preoperative History and Physical                                                                     Chief Complaint: Preoperative evaluation     History of Present Illness:      Kay Galarza is a 68 y.o. British speaking female who presents to office with Daughter Cass (helps with translation per family request) with a history of diabetes mellitus type II, Charcot joint left ankle, hypertension and hyperlipidemia who presents to the office today for a preoperative consultation at the request of Dr. Gray who plans on performing ex fix removal on May 26.     Functional Status:      The patient is not able to climb a flight of stairs d/t non-weight bearing status. The patient's functional status is affected by the surgical problem. Prior to ex-fix placement, patient able to ambulate without difficulty and perform ADLs.  The patient's functional status is not affected by shortness of breath, chest pain, dyspnea on exertion and fatigue.    MET score greater than 4    Past Medical History:      Past Medical History:   Diagnosis Date    Abnormal nuclear stress test 1/23/2023    Arthritis     DERIAN KNEES    Asthma     SEASONAL    Cataract     Diabetes mellitus     Diabetes mellitus, type 2     Diabetic retinopathy     DM (diabetes mellitus) 2007    BS 97 09/29/2020    Hyperlipidemia     Hypertension         Past Surgical History:      Past Surgical History:   Procedure Laterality Date    APPLICATION OF WOUND VACUUM-ASSISTED CLOSURE DEVICE Left 8/18/2020    Procedure: APPLICATION, WOUND VAC;  Surgeon: Kirt Gray DPM;  Location: [facility] OR;  Service: Podiatry;  Laterality: Left;    APPLICATION, EXTERNAL FIXATION DEVICE Left 3/13/2023    Procedure: APPLICATION, EXTERNAL FIXATION DEVICE;  Surgeon: Kirt Gray DPM;  Location: [facility] OR;  Service: Podiatry;  Laterality: Left;    CATARACT EXTRACTION W/  INTRAOCULAR LENS IMPLANT Right 07/18/2018    CATARACT  EXTRACTION W/  INTRAOCULAR LENS IMPLANT Left 03/13/2019    COLONOSCOPY N/A 12/22/2018    Procedure: COLONOSCOPY;  Surgeon: Zak Dover MD;  Location: [facility] ENDO;  Service: Endoscopy;  Laterality: N/A;    COLONOSCOPY N/A 5/11/2022    Procedure: COLONOSCOPY;  Surgeon: Charles Berrios MD;  Location: [facility] ENDO;  Service: Endoscopy;  Laterality: N/A;    ENDOSCOPIC VEIN LASER TREATMENT Bilateral 1990's    FIXATION OF SYNDESMOSIS OF ANKLE Left 7/23/2020    Procedure: FIXATION, SYNDESMOSIS, ANKLE;  Surgeon: Kirt Gray DPM;  Location: [facility] OR;  Service: Podiatry;  Laterality: Left;    FOOT HARDWARE REMOVAL Left 3/13/2023    Procedure: REMOVAL, HARDWARE, FOOT;  Surgeon: Kirt Gray DPM;  Location: [facility] OR;  Service: Podiatry;  Laterality: Left;    FUSION OF SUBTALAR JOINT Left 3/13/2023    Procedure: FUSION, SUBTALAR JOINT;  Surgeon: Kirt Gray DPM;  Location: [facility] OR;  Service: Podiatry;  Laterality: Left;    LEFT HEART CATHETERIZATION Left 1/24/2023    Procedure: CATHETERIZATION, HEART, LEFT;  Surgeon: Salma Bonilla MD;  Location: [facility] CATH LAB;  Service: Cardiology;  Laterality: Left;  pt given 130 start time    MANIPULATION WITH ANESTHESIA Left 7/23/2020    Procedure: MANIPULATION, WITH ANESTHESIA;  Surgeon: Kirt Gray DPM;  Location: [facility] OR;  Service: Podiatry;  Laterality: Left;    MIDFOOT ARTHRODESIS Left 7/23/2020    Procedure: FUSION, JOINT, MIDFOOT;  Surgeon: Kirt Gray DPM;  Location: [facility] OR;  Service: Podiatry;  Laterality: Left;  2nd tarsometatarsal joint dislocation via fusion    MIDFOOT ARTHRODESIS Left 3/13/2023    Procedure: FUSION, JOINT, MIDFOOT;  Surgeon: Kirt Gray DPM;  Location: [facility] OR;  Service: Podiatry;  Laterality: Left;    OPEN REDUCTION AND INTERNAL FIXATION (ORIF) OF INJURY OF ANKLE Left 7/23/2020    Procedure: ORIF, ANKLE;  Surgeon: Kirt Gray DPM;  Location: [facility] OR;  Service: Podiatry;  Laterality: Left;    RECONSTRUCTION WITH FUSION OF  CHARCOT FOOT Left 8/18/2020    Procedure: RECONSTRUCTION, CHARCOT FOOT, WITH FUSION;  Surgeon: Kirt Gray DPM;  Location: [facility] OR;  Service: Podiatry;  Laterality: Left;    REMOVAL OF HARDWARE FROM LOWER EXTREMITY Left 1/27/2021    Procedure: REMOVAL, HARDWARE, LOWER EXTREMITY;  Surgeon: Krit Gray DPM;  Location: [facility] OR;  Service: Podiatry;  Laterality: Left;    WOUND DEBRIDEMENT Left 8/18/2020    Procedure: DEBRIDEMENT, WOUND;  Surgeon: Kirt Gray DPM;  Location: [facility] OR;  Service: Podiatry;  Laterality: Left;        Social History:      Social History     Socioeconomic History    Marital status:    Tobacco Use    Smoking status: Never    Smokeless tobacco: Never   Substance and Sexual Activity    Alcohol use: Never    Drug use: No    Sexual activity: Yes     Social Determinants of Health     Financial Resource Strain: Unknown    Difficulty of Paying Living Expenses: Patient refused   Food Insecurity: Unknown    Worried About Running Out of Food in the Last Year: Patient refused    Ran Out of Food in the Last Year: Patient refused   Transportation Needs: Unknown    Lack of Transportation (Medical): Patient refused    Lack of Transportation (Non-Medical): Patient refused   Physical Activity: Unknown    Days of Exercise per Week: Patient refused    Minutes of Exercise per Session: 10 min   Stress: Unknown    Feeling of Stress : Patient refused   Social Connections: Unknown    Frequency of Communication with Friends and Family: Patient refused    Frequency of Social Gatherings with Friends and Family: Patient refused    Active Member of Clubs or Organizations: Patient refused    Attends Club or Organization Meetings: Patient refused    Marital Status: Patient refused   Housing Stability: Unknown    Unable to Pay for Housing in the Last Year: Patient refused    Unstable Housing in the Last Year: Patient refused        Family History:      Family History   Problem Relation Age of Onset     Cancer Father         Prostate Ca    Hypertension Father     Stroke Father     Diabetes Sister     Glaucoma Mother     Hypertension Mother     Glaucoma Maternal Grandmother     Blindness Neg Hx     Macular degeneration Neg Hx     Retinal detachment Neg Hx     Strabismus Neg Hx        Allergies:      Review of patient's allergies indicates:   Allergen Reactions    Pollen extracts      Seasonal allergies, sneezing       Medications:      Current Outpatient Medications   Medication Sig    ACCU-CHEK GUIDE TEST STRIPS Strp TO CHECK BG 2 TIMES DAILY, TO USE WITH INSURANCE PREFERRED METER    acetaminophen (TYLENOL) 500 MG tablet Take 500 mg by mouth every 6 (six) hours as needed for Pain.    acetaminophen-codeine 300-30mg (TYLENOL #3) 300-30 mg Tab Take 1 tablet by mouth every 6 (six) hours as needed (Pain).    albuterol (PROVENTIL/VENTOLIN HFA) 90 mcg/actuation inhaler INHALE 2 PUFFS INTO THE LUNGS EVERY 6  HOURS AS NEEDED FOR WHEEZING. RESCUE    amoxicillin-clavulanate 875-125mg (AUGMENTIN) 875-125 mg per tablet Take 1 tablet by mouth 2 (two) times daily. for 7 days    apixaban (ELIQUIS) 2.5 mg Tab Take 1 tablet (2.5 mg total) by mouth 2 (two) times daily.    atorvastatin (LIPITOR) 40 MG tablet TAKE 1 TABLET BY MOUTH ONCE DAILY.    B infantis/B ani/B koby/B bifid (PROBIOTIC 4X ORAL) Take by mouth Daily.    blood-glucose meter kit To check BG 2 times daily, to use with insurance preferred meter    clobetasoL (TEMOVATE) 0.05 % external solution APPLY TOPICALLY 2 TIMES DAILY TO SCALP ONLY    doxycycline (VIBRAMYCIN) 100 MG Cap Take 1 capsule (100 mg total) by mouth every 12 (twelve) hours. for 10 days    FARXIGA 10 mg tablet TAKE 1 TABLET (10 MG TOTAL) BY MOUTH ONCE DAILY.    fluocinolone and shower cap 0.01 % Oil     fluticasone propionate (FLONASE) 50 mcg/actuation nasal spray 2 sprays (100 mcg total) by Each Nostril route once daily.    gabapentin (NEURONTIN) 300 MG capsule Take 1 capsule (300 mg total) by mouth 2 (two)  times daily.    glimepiride (AMARYL) 4 MG tablet TAKE 1 TABLET  BY MOUTH DAILY WITH BREAKFAST.    insulin glargine U-300 conc (TOUJEO MAX U-300 SOLOSTAR) 300 unit/mL (3 mL) insulin pen Inject 70 Units into the skin every evening.    ketoconazole (NIZORAL) 2 % shampoo APPLY TOPICALLY 3 TIMES A WEEK.    lancets Misc To check BG 2 times daily, to use with insurance preferred meter    lisinopriL-hydrochlorothiazide (PRINZIDE,ZESTORETIC) 20-25 mg Tab TAKE 1 TABLET BY MOUTH ONCE DAILY.    magnesium oxide 200 mg magnesium Tab TAKE 3 TABLETS  BY MOUTH EVERY EVENING.    meloxicam (MOBIC) 15 MG tablet TAKE 1 TABLET (15 MG TOTAL) BY MOUTH ONCE DAILY.    NOVOLOG FLEXPEN U-100 INSULIN 100 unit/mL (3 mL) InPn pen Inject 18 Units into the skin 3 (three) times daily before meals.    nystatin-triamcinolone (MYCOLOG) ointment Apply topically 2 (two) times daily.    nystatin-triamcinolone (MYCOLOG) ointment Apply topically 2 (two) times daily.    omeprazole (PRILOSEC) 20 MG capsule TAKE 1 CAPSULE BY MOUTH TWICE A DAY.    polyethylene glycol (GLYCOLAX) 17 gram PwPk Take 17 g by mouth once daily.    traZODone (DESYREL) 150 MG tablet TAKE 1 TABLET  BY MOUTH NIGHTLY AS NEEDED FOR INSOMNIA.    VITAMIN D2 1,250 mcg (50,000 unit) capsule TAKE 1 CAPSULE  BY MOUTH ONCE A WEEK FOR 4 DOSES     No current facility-administered medications for this visit.     Facility-Administered Medications Ordered in Other Visits   Medication    diphenhydrAMINE injection 25 mg    sodium chloride 0.9% flush 10 mL       Vitals:      Vitals:    05/08/23 1031   BP: 125/79   Pulse: 88   Resp: 16   Temp: 97.7 °F (36.5 °C)       Review of Systems:        Constitutional: Negative for fever, chills, weight loss, malaise/fatigue and diaphoresis.   HENT: Negative for hearing loss, ear pain, nosebleeds, congestion, sore throat, neck pain, tinnitus and ear discharge.    Eyes: Negative for blurred vision, double vision, photophobia, pain, discharge and redness.    Respiratory: Negative for cough, hemoptysis, sputum production, shortness of breath, wheezing and stridor.    Cardiovascular: Negative for chest pain, palpitations, orthopnea, claudication, leg swelling and PND.   Gastrointestinal: Negative for heartburn, nausea, vomiting, abdominal pain, diarrhea, constipation, blood in stool and melena.   Genitourinary: Negative for dysuria, urgency, frequency, hematuria and flank pain.   Musculoskeletal: + left ankle.foot pain, 10/10 Negative for myalgias, back pain, and falls.   Skin: Negative for itching and rash.   Neurological: Negative for dizziness, tingling, tremors, sensory change, speech change, focal weakness, seizures, loss of consciousness, weakness and headaches.   Endo/Heme/Allergies: Negative for environmental allergies and polydipsia. Does not bruise/bleed easily.   Psychiatric/Behavioral: Negative for depression, suicidal ideas, hallucinations, memory loss and substance abuse. The patient is not nervous/anxious and does not have insomnia.    All 14 systems reviewed and negative except as noted above.    Physical Exam:      Constitutional: Appears well-developed, well-nourished and in no acute distress.  Patient is oriented to person, place, and time.   Head: Normocephalic and atraumatic. Mucous membranes moist.  Neck: Neck supple no mass.   Cardiovascular: Normal rate and regular rhythm.  S1 S2 appreciated by ascultation.  Pulmonary/Chest: Effort normal and clear to auscultation bilaterally. No respiratory distress.   Abdomen: Soft. Non-tender and non-distended. Bowel sounds are normal.   Neurological: Patient is alert and oriented to person, place and time. Moves all extremities.  Skin: ex-fix left ankle in place Warm and dry. No lesions.  Extremities: No clubbing, cyanosis or edema.    Laboratory data:      Reviewed and noted in plan where applicable. Please see chart for full laboratory data.    Lab Results   Component Value Date    WBC 7.70 05/08/2023     HGB 13.8 05/08/2023    HCT 43.7 05/08/2023    MCV 89 05/08/2023     05/08/2023     Sodium   Date Value Ref Range Status   05/08/2023 143 136 - 145 mmol/L Final     Chloride   Date Value Ref Range Status   05/08/2023 102 95 - 110 mmol/L Final     CO2   Date Value Ref Range Status   05/08/2023 29 23 - 29 mmol/L Final     Glucose   Date Value Ref Range Status   05/08/2023 187 (H) 70 - 110 mg/dL Final     BUN   Date Value Ref Range Status   05/08/2023 29 (H) 8 - 23 mg/dL Final     Creatinine   Date Value Ref Range Status   05/08/2023 1.1 0.5 - 1.4 mg/dL Final     Calcium   Date Value Ref Range Status   05/08/2023 10.0 8.7 - 10.5 mg/dL Final     Anion Gap   Date Value Ref Range Status   05/08/2023 12 8 - 16 mmol/L Final     eGFR   Date Value Ref Range Status   05/08/2023 54 (A) >60 mL/min/1.73 m^2 Final           Predictors of intubation difficulty:       Morbid obesity? yes - BMI 38   Anatomically abnormal facies? yes - BMI 38   Prominent incisors? no   Receding mandible? no   Short, thick neck? no   Neck range of motion: normal   Dentition:  upper dentures   Based on the Modified Mallampati, patient is a mallampati score: II (hard and soft palate, upper portion of tonsils anduvula visible)    Cardiographics:      ECG: sinus tachycardia   Echocardiogram: not indicated    Imaging:      Chest x-ray: not indicated    Assessment and Plan:      Pre-op evaluation  Known risk factors for perioperative complications: HTN, Obesity, DM    Difficulty with intubation is not anticipated.    Cardiac Risk Estimation: Based on the Revised Cardiac Risk index, patient is a Class I risk with a 3.9 % risk of a major cardiac event in a low risk procedure.    1.) Preoperative workup as follows: ECG, hemoglobin, hematocrit, electrolytes, creatinine, glucose.  2.) Change in medication regimen before surgery: discontinue ASA 6 days before surgery, discontinue NSAIDs 5 days before surgery, hold Metformin 24 hours prior to surgery.  3.)  Prophylaxis for cardiac events with perioperative beta-blockers: not indicated.  4.) Invasive hemodynamic monitoring perioperatively: at the discretion of anesthesiologist.  5.) Deep vein thrombosis prophylaxis postoperatively: intermittent pneumatic compression boots and regimen to be chosen by surgical team.  6.) Surveillance for postoperative MI with ECG immediately postoperatively and on postoperati ve days 1 and 2 AND troponin levels 24 hours postoperatively and on day 4 or hospital discharge (whichever comes first): not indicated.  7.) Current medications which may produce withdrawal symptoms if withheld perioperatively: none  8.) Other measures: Careful attention to perioperative glycemic control (blood glucose monitoring and SSI as needed ).  Postoperative hypertension management with IV hydralazine until able to take oral medications.  Postoperative incentive spirometry to prevent pneumonia.    Charcot's joint of foot, left  - follows with Dr. Gray  - S/p, ORIF Left ankle and ex fix application on 3/13/2023  - planning on ex-fix removal 5/26  - on course of po abx per podiatry for possible pin site infection, Augmentin/Doxy    - on eliquis 2.5 bid for dvt prophylaxis, will hold am dose prior to sx    Abnormal nuclear stress test  - previous abnormal stress test on 1/18, left heart cath from 1/29/2023 with no evidence of cardiac ischemia; normal coronary anatomy   - last seen by cardiology on 3/3, Dr. Chavez and deemed low risk for surgery   - denies any CP, palpitations, dyspnea at pre op appointment    Essential hypertension  - bp normotensive, will hold home ace/hctz the am of surgery and resume postoperatively     Type II diabetes mellitus with neurological manifestations  - A1C 7.6 in 12/22, previously > 14 one year prior  - home regimen includes glimepiride, Toujeo 70 units nightly and Novolg 18 units with meals  - will hold home glimepiride the am of surgery and decrease Toujeo to 15 units night  prior to surgery     Mild intermittent asthma  - not in exacerbation, o2 sats stable on room air  - uses albuterol inhaler approx once a month, hasn't used in over 1 month       Electronically signed by Melina MAZARIEGOS, FNP-C on 5/8/2023 at 7:57 AM.

## 2023-05-08 NOTE — PRE-PROCEDURE INSTRUCTIONS
Pre op instructions reviewed with patient & daughter during Clinic Visit with Provider, verbalized understanding.    To confirm, Surgery is scheduled on 5/26/23. We will call you late afternoon the business day prior to surgery with your arrival time.    *Please report to the [facility] Lobby (1st Floor) located off of [facility] (2nd Entrance/Building on the left, in front of the flag pole).  Address: [address] [place] [address] [place]. [id number]      INSTRUCTIONS IMPORTANT!!!  Do Not Eat, Drink, or Smoke after 12 midnight unless instructed otherwise by your Surgeon. OK to brush teeth, no gum, candy or mints!      *Take Only these medications with a small sip of water Morning of Surgery as instructed by Melina Mann, NP:  Inhaler  Omeprazole  Gabapentin    ____  HOLD all vitamins, herbal supplements, aspirin products & NSAIDS 7 days prior to surgery, as these can thin the blood.  ____  Avoid Alcoholic beverages 3 days prior to surgery, as it can thin the blood.  ____  NO Acrylic/fake nails or nail polish worn day of surgery (specifically hand/arm & foot surgeries).  ____  NO powder, lotions, deodorants, oils or cream on body.  ____  Remove all jewelry & piercings prior to surgery.  ____  Remove Dentures, Hearing Aids & Contact Lens prior to surgery.  ____  Bring photo ID and insurance information to hospital (Leave Valuables at Home).  ____  If going home the same day, arrange for a ride home. You will not be able to drive for 24 hrs if Anesthesia was used.   ____  Females (ages 11-60): may need to give a urine sample the morning of surgery; please see Pre op Nurse prior to using the restroom.  ____  Males: Stop ED medications (Viagra, Cialis) 24 hrs prior to surgery.  ____  Wear clean, loose fitting clothing to allow for dressings/ bandages.            Diabetic Patients: If you take diabetic medication, do NOT take morning of surgery unless instructed by Doctor. Metformin to be stopped 24 hrs prior to  surgery time. DO NOT take long-acting insulin the evening before surgery. Blood sugars will be checked in pre-op by Nurse. Give 15 units Toujeo night prior to surgery!    Bathing Instructions:    -Shower with anti-bacterial Soap (Hibiclens or Dial) the night before surgery and the morning of.   -Do not use Hibiclens on your face or genitals.   -Apply clean clothes after shower.  -Do not shave your face or body 2 days prior to surgery unless instructed otherwise by your Surgeon.  -Do not shave pubic hair 7 days prior to surgery (gyn pt's).    [facility] Visitor/Ride Policy:  Only 2 adults allowed (over the age of 18) to accompany you to the Hospital. You Must have a ride home from a responsible adult that you know and trust. Medical Transport, Uber or Lyft can only be used if patient has a responsible adult to accompany them during ride home.    Discharge Instructions: You will receive Post-op/Discharge instructions by your Discharge Nurse prior to going home. Please call your Surgeon's office with any post-surgery questions/concerns @ [phone].    *If you are running late or have questions the morning of surgery, please call the Surgery Dept @ [phone]  *Insurance/ Financial Questions, please call [phone]    Thank you,  -[facility] Pre Admit Dept.  [phone] or [phone]  M-F 7:30 am-4:00 pm (Closed Major Holidays)    Additional Testing Scheduled Today:  Labs (1st Floor) Check in at the !

## 2023-05-08 NOTE — H&P (VIEW-ONLY)
Preoperative History and Physical                                                                     Chief Complaint: Preoperative evaluation     History of Present Illness:      Kay Galarza is a 68 y.o. Stateless speaking female who presents to office with Daughter Cass (helps with translation per family request) with a history of diabetes mellitus type II, Charcot joint left ankle, hypertension and hyperlipidemia who presents to the office today for a preoperative consultation at the request of Dr. Gray who plans on performing ex fix removal on May 26.     Functional Status:      The patient is not able to climb a flight of stairs d/t non-weight bearing status. The patient's functional status is affected by the surgical problem. Prior to ex-fix placement, patient able to ambulate without difficulty and perform ADLs.  The patient's functional status is not affected by shortness of breath, chest pain, dyspnea on exertion and fatigue.    MET score greater than 4    Past Medical History:      Past Medical History:   Diagnosis Date    Abnormal nuclear stress test 1/23/2023    Arthritis     DERIAN KNEES    Asthma     SEASONAL    Cataract     Diabetes mellitus     Diabetes mellitus, type 2     Diabetic retinopathy     DM (diabetes mellitus) 2007    BS 97 09/29/2020    Hyperlipidemia     Hypertension         Past Surgical History:      Past Surgical History:   Procedure Laterality Date    APPLICATION OF WOUND VACUUM-ASSISTED CLOSURE DEVICE Left 8/18/2020    Procedure: APPLICATION, WOUND VAC;  Surgeon: Kirt Gray DPM;  Location: [facility] OR;  Service: Podiatry;  Laterality: Left;    APPLICATION, EXTERNAL FIXATION DEVICE Left 3/13/2023    Procedure: APPLICATION, EXTERNAL FIXATION DEVICE;  Surgeon: Kirt Gray DPM;  Location: [facility] OR;  Service: Podiatry;  Laterality: Left;    CATARACT EXTRACTION W/  INTRAOCULAR LENS IMPLANT Right 07/18/2018    CATARACT  EXTRACTION W/  INTRAOCULAR LENS IMPLANT Left 03/13/2019    COLONOSCOPY N/A 12/22/2018    Procedure: COLONOSCOPY;  Surgeon: Zak Dover MD;  Location: [facility] ENDO;  Service: Endoscopy;  Laterality: N/A;    COLONOSCOPY N/A 5/11/2022    Procedure: COLONOSCOPY;  Surgeon: Charles Berrios MD;  Location: [facility] ENDO;  Service: Endoscopy;  Laterality: N/A;    ENDOSCOPIC VEIN LASER TREATMENT Bilateral 1990's    FIXATION OF SYNDESMOSIS OF ANKLE Left 7/23/2020    Procedure: FIXATION, SYNDESMOSIS, ANKLE;  Surgeon: Kirt Gray DPM;  Location: [facility] OR;  Service: Podiatry;  Laterality: Left;    FOOT HARDWARE REMOVAL Left 3/13/2023    Procedure: REMOVAL, HARDWARE, FOOT;  Surgeon: Kirt Gray DPM;  Location: [facility] OR;  Service: Podiatry;  Laterality: Left;    FUSION OF SUBTALAR JOINT Left 3/13/2023    Procedure: FUSION, SUBTALAR JOINT;  Surgeon: Kirt Gray DPM;  Location: [facility] OR;  Service: Podiatry;  Laterality: Left;    LEFT HEART CATHETERIZATION Left 1/24/2023    Procedure: CATHETERIZATION, HEART, LEFT;  Surgeon: Salma Bonilla MD;  Location: [facility] CATH LAB;  Service: Cardiology;  Laterality: Left;  pt given 130 start time    MANIPULATION WITH ANESTHESIA Left 7/23/2020    Procedure: MANIPULATION, WITH ANESTHESIA;  Surgeon: Kirt Gray DPM;  Location: [facility] OR;  Service: Podiatry;  Laterality: Left;    MIDFOOT ARTHRODESIS Left 7/23/2020    Procedure: FUSION, JOINT, MIDFOOT;  Surgeon: Kirt Gray DPM;  Location: [facility] OR;  Service: Podiatry;  Laterality: Left;  2nd tarsometatarsal joint dislocation via fusion    MIDFOOT ARTHRODESIS Left 3/13/2023    Procedure: FUSION, JOINT, MIDFOOT;  Surgeon: Kirt Gray DPM;  Location: [facility] OR;  Service: Podiatry;  Laterality: Left;    OPEN REDUCTION AND INTERNAL FIXATION (ORIF) OF INJURY OF ANKLE Left 7/23/2020    Procedure: ORIF, ANKLE;  Surgeon: Kirt Gray DPM;  Location: [facility] OR;  Service: Podiatry;  Laterality: Left;    RECONSTRUCTION WITH FUSION OF  CHARCOT FOOT Left 8/18/2020    Procedure: RECONSTRUCTION, CHARCOT FOOT, WITH FUSION;  Surgeon: Kirt Gray DPM;  Location: [facility] OR;  Service: Podiatry;  Laterality: Left;    REMOVAL OF HARDWARE FROM LOWER EXTREMITY Left 1/27/2021    Procedure: REMOVAL, HARDWARE, LOWER EXTREMITY;  Surgeon: Kirt Gray DPM;  Location: [facility] OR;  Service: Podiatry;  Laterality: Left;    WOUND DEBRIDEMENT Left 8/18/2020    Procedure: DEBRIDEMENT, WOUND;  Surgeon: Kirt Gray DPM;  Location: [facility] OR;  Service: Podiatry;  Laterality: Left;        Social History:      Social History     Socioeconomic History    Marital status:    Tobacco Use    Smoking status: Never    Smokeless tobacco: Never   Substance and Sexual Activity    Alcohol use: Never    Drug use: No    Sexual activity: Yes     Social Determinants of Health     Financial Resource Strain: Unknown    Difficulty of Paying Living Expenses: Patient refused   Food Insecurity: Unknown    Worried About Running Out of Food in the Last Year: Patient refused    Ran Out of Food in the Last Year: Patient refused   Transportation Needs: Unknown    Lack of Transportation (Medical): Patient refused    Lack of Transportation (Non-Medical): Patient refused   Physical Activity: Unknown    Days of Exercise per Week: Patient refused    Minutes of Exercise per Session: 10 min   Stress: Unknown    Feeling of Stress : Patient refused   Social Connections: Unknown    Frequency of Communication with Friends and Family: Patient refused    Frequency of Social Gatherings with Friends and Family: Patient refused    Active Member of Clubs or Organizations: Patient refused    Attends Club or Organization Meetings: Patient refused    Marital Status: Patient refused   Housing Stability: Unknown    Unable to Pay for Housing in the Last Year: Patient refused    Unstable Housing in the Last Year: Patient refused        Family History:      Family History   Problem Relation Age of Onset     Cancer Father         Prostate Ca    Hypertension Father     Stroke Father     Diabetes Sister     Glaucoma Mother     Hypertension Mother     Glaucoma Maternal Grandmother     Blindness Neg Hx     Macular degeneration Neg Hx     Retinal detachment Neg Hx     Strabismus Neg Hx        Allergies:      Review of patient's allergies indicates:   Allergen Reactions    Pollen extracts      Seasonal allergies, sneezing       Medications:      Current Outpatient Medications   Medication Sig    ACCU-CHEK GUIDE TEST STRIPS Strp TO CHECK BG 2 TIMES DAILY, TO USE WITH INSURANCE PREFERRED METER    acetaminophen (TYLENOL) 500 MG tablet Take 500 mg by mouth every 6 (six) hours as needed for Pain.    acetaminophen-codeine 300-30mg (TYLENOL #3) 300-30 mg Tab Take 1 tablet by mouth every 6 (six) hours as needed (Pain).    albuterol (PROVENTIL/VENTOLIN HFA) 90 mcg/actuation inhaler INHALE 2 PUFFS INTO THE LUNGS EVERY 6  HOURS AS NEEDED FOR WHEEZING. RESCUE    amoxicillin-clavulanate 875-125mg (AUGMENTIN) 875-125 mg per tablet Take 1 tablet by mouth 2 (two) times daily. for 7 days    apixaban (ELIQUIS) 2.5 mg Tab Take 1 tablet (2.5 mg total) by mouth 2 (two) times daily.    atorvastatin (LIPITOR) 40 MG tablet TAKE 1 TABLET BY MOUTH ONCE DAILY.    B infantis/B ani/B koby/B bifid (PROBIOTIC 4X ORAL) Take by mouth Daily.    blood-glucose meter kit To check BG 2 times daily, to use with insurance preferred meter    clobetasoL (TEMOVATE) 0.05 % external solution APPLY TOPICALLY 2 TIMES DAILY TO SCALP ONLY    doxycycline (VIBRAMYCIN) 100 MG Cap Take 1 capsule (100 mg total) by mouth every 12 (twelve) hours. for 10 days    FARXIGA 10 mg tablet TAKE 1 TABLET (10 MG TOTAL) BY MOUTH ONCE DAILY.    fluocinolone and shower cap 0.01 % Oil     fluticasone propionate (FLONASE) 50 mcg/actuation nasal spray 2 sprays (100 mcg total) by Each Nostril route once daily.    gabapentin (NEURONTIN) 300 MG capsule Take 1 capsule (300 mg total) by mouth 2 (two)  times daily.    glimepiride (AMARYL) 4 MG tablet TAKE 1 TABLET  BY MOUTH DAILY WITH BREAKFAST.    insulin glargine U-300 conc (TOUJEO MAX U-300 SOLOSTAR) 300 unit/mL (3 mL) insulin pen Inject 70 Units into the skin every evening.    ketoconazole (NIZORAL) 2 % shampoo APPLY TOPICALLY 3 TIMES A WEEK.    lancets Misc To check BG 2 times daily, to use with insurance preferred meter    lisinopriL-hydrochlorothiazide (PRINZIDE,ZESTORETIC) 20-25 mg Tab TAKE 1 TABLET BY MOUTH ONCE DAILY.    magnesium oxide 200 mg magnesium Tab TAKE 3 TABLETS  BY MOUTH EVERY EVENING.    meloxicam (MOBIC) 15 MG tablet TAKE 1 TABLET (15 MG TOTAL) BY MOUTH ONCE DAILY.    NOVOLOG FLEXPEN U-100 INSULIN 100 unit/mL (3 mL) InPn pen Inject 18 Units into the skin 3 (three) times daily before meals.    nystatin-triamcinolone (MYCOLOG) ointment Apply topically 2 (two) times daily.    nystatin-triamcinolone (MYCOLOG) ointment Apply topically 2 (two) times daily.    omeprazole (PRILOSEC) 20 MG capsule TAKE 1 CAPSULE BY MOUTH TWICE A DAY.    polyethylene glycol (GLYCOLAX) 17 gram PwPk Take 17 g by mouth once daily.    traZODone (DESYREL) 150 MG tablet TAKE 1 TABLET  BY MOUTH NIGHTLY AS NEEDED FOR INSOMNIA.    VITAMIN D2 1,250 mcg (50,000 unit) capsule TAKE 1 CAPSULE  BY MOUTH ONCE A WEEK FOR 4 DOSES     No current facility-administered medications for this visit.     Facility-Administered Medications Ordered in Other Visits   Medication    diphenhydrAMINE injection 25 mg    sodium chloride 0.9% flush 10 mL       Vitals:      Vitals:    05/08/23 1031   BP: 125/79   Pulse: 88   Resp: 16   Temp: 97.7 °F (36.5 °C)       Review of Systems:        Constitutional: Negative for fever, chills, weight loss, malaise/fatigue and diaphoresis.   HENT: Negative for hearing loss, ear pain, nosebleeds, congestion, sore throat, neck pain, tinnitus and ear discharge.    Eyes: Negative for blurred vision, double vision, photophobia, pain, discharge and redness.    Respiratory: Negative for cough, hemoptysis, sputum production, shortness of breath, wheezing and stridor.    Cardiovascular: Negative for chest pain, palpitations, orthopnea, claudication, leg swelling and PND.   Gastrointestinal: Negative for heartburn, nausea, vomiting, abdominal pain, diarrhea, constipation, blood in stool and melena.   Genitourinary: Negative for dysuria, urgency, frequency, hematuria and flank pain.   Musculoskeletal: + left ankle.foot pain, 10/10 Negative for myalgias, back pain, and falls.   Skin: Negative for itching and rash.   Neurological: Negative for dizziness, tingling, tremors, sensory change, speech change, focal weakness, seizures, loss of consciousness, weakness and headaches.   Endo/Heme/Allergies: Negative for environmental allergies and polydipsia. Does not bruise/bleed easily.   Psychiatric/Behavioral: Negative for depression, suicidal ideas, hallucinations, memory loss and substance abuse. The patient is not nervous/anxious and does not have insomnia.    All 14 systems reviewed and negative except as noted above.    Physical Exam:      Constitutional: Appears well-developed, well-nourished and in no acute distress.  Patient is oriented to person, place, and time.   Head: Normocephalic and atraumatic. Mucous membranes moist.  Neck: Neck supple no mass.   Cardiovascular: Normal rate and regular rhythm.  S1 S2 appreciated by ascultation.  Pulmonary/Chest: Effort normal and clear to auscultation bilaterally. No respiratory distress.   Abdomen: Soft. Non-tender and non-distended. Bowel sounds are normal.   Neurological: Patient is alert and oriented to person, place and time. Moves all extremities.  Skin: ex-fix left ankle in place Warm and dry. No lesions.  Extremities: No clubbing, cyanosis or edema.    Laboratory data:      Reviewed and noted in plan where applicable. Please see chart for full laboratory data.    Lab Results   Component Value Date    WBC 7.70 05/08/2023     HGB 13.8 05/08/2023    HCT 43.7 05/08/2023    MCV 89 05/08/2023     05/08/2023     Sodium   Date Value Ref Range Status   05/08/2023 143 136 - 145 mmol/L Final     Chloride   Date Value Ref Range Status   05/08/2023 102 95 - 110 mmol/L Final     CO2   Date Value Ref Range Status   05/08/2023 29 23 - 29 mmol/L Final     Glucose   Date Value Ref Range Status   05/08/2023 187 (H) 70 - 110 mg/dL Final     BUN   Date Value Ref Range Status   05/08/2023 29 (H) 8 - 23 mg/dL Final     Creatinine   Date Value Ref Range Status   05/08/2023 1.1 0.5 - 1.4 mg/dL Final     Calcium   Date Value Ref Range Status   05/08/2023 10.0 8.7 - 10.5 mg/dL Final     Anion Gap   Date Value Ref Range Status   05/08/2023 12 8 - 16 mmol/L Final     eGFR   Date Value Ref Range Status   05/08/2023 54 (A) >60 mL/min/1.73 m^2 Final           Predictors of intubation difficulty:       Morbid obesity? yes - BMI 38   Anatomically abnormal facies? yes - BMI 38   Prominent incisors? no   Receding mandible? no   Short, thick neck? no   Neck range of motion: normal   Dentition:  upper dentures   Based on the Modified Mallampati, patient is a mallampati score: II (hard and soft palate, upper portion of tonsils anduvula visible)    Cardiographics:      ECG: sinus tachycardia   Echocardiogram: not indicated    Imaging:      Chest x-ray: not indicated    Assessment and Plan:      Pre-op evaluation  Known risk factors for perioperative complications: HTN, Obesity, DM    Difficulty with intubation is not anticipated.    Cardiac Risk Estimation: Based on the Revised Cardiac Risk index, patient is a Class I risk with a 3.9 % risk of a major cardiac event in a low risk procedure.    1.) Preoperative workup as follows: ECG, hemoglobin, hematocrit, electrolytes, creatinine, glucose.  2.) Change in medication regimen before surgery: discontinue ASA 6 days before surgery, discontinue NSAIDs 5 days before surgery, hold Metformin 24 hours prior to surgery.  3.)  Prophylaxis for cardiac events with perioperative beta-blockers: not indicated.  4.) Invasive hemodynamic monitoring perioperatively: at the discretion of anesthesiologist.  5.) Deep vein thrombosis prophylaxis postoperatively: intermittent pneumatic compression boots and regimen to be chosen by surgical team.  6.) Surveillance for postoperative MI with ECG immediately postoperatively and on postoperati ve days 1 and 2 AND troponin levels 24 hours postoperatively and on day 4 or hospital discharge (whichever comes first): not indicated.  7.) Current medications which may produce withdrawal symptoms if withheld perioperatively: none  8.) Other measures: Careful attention to perioperative glycemic control (blood glucose monitoring and SSI as needed ).  Postoperative hypertension management with IV hydralazine until able to take oral medications.  Postoperative incentive spirometry to prevent pneumonia.    Charcot's joint of foot, left  - follows with Dr. Gray  - S/p, ORIF Left ankle and ex fix application on 3/13/2023  - planning on ex-fix removal 5/26  - on course of po abx per podiatry for possible pin site infection, Augmentin/Doxy    - on eliquis 2.5 bid for dvt prophylaxis, will hold am dose prior to sx    Abnormal nuclear stress test  - previous abnormal stress test on 1/18, left heart cath from 1/29/2023 with no evidence of cardiac ischemia; normal coronary anatomy   - last seen by cardiology on 3/3, Dr. Chavez and deemed low risk for surgery   - denies any CP, palpitations, dyspnea at pre op appointment    Essential hypertension  - bp normotensive, will hold home ace/hctz the am of surgery and resume postoperatively     Type II diabetes mellitus with neurological manifestations  - A1C 7.6 in 12/22, previously > 14 one year prior  - home regimen includes glimepiride, Toujeo 70 units nightly and Novolg 18 units with meals  - will hold home glimepiride the am of surgery and decrease Toujeo to 15 units night  prior to surgery     Mild intermittent asthma  - not in exacerbation, o2 sats stable on room air  - uses albuterol inhaler approx once a month, hasn't used in over 1 month       Electronically signed by Melina MAZARIEGOS, FNP-C on 5/8/2023 at 7:57 AM.

## 2023-05-08 NOTE — ASSESSMENT & PLAN NOTE
- not in exacerbation, o2 sats stable on room air  - uses albuterol inhaler approx once a month, hasn't used in over 1 month

## 2023-05-08 NOTE — ASSESSMENT & PLAN NOTE
- A1C 7.6 in 12/22  - home regimen includes glimepiride, Toujeo 70 units nightly and Novolg 18 units with meals  - will hold home glimepiride the am of surgery and decrease Toujeo to 15 units night prior to surgery

## 2023-05-20 ENCOUNTER — DOCUMENT SCAN (OUTPATIENT)
Dept: HOME HEALTH SERVICES | Facility: HOSPITAL | Age: 68
End: 2023-05-20
Payer: MEDICARE

## 2023-05-23 ENCOUNTER — HOSPITAL ENCOUNTER (OUTPATIENT)
Dept: RADIOLOGY | Facility: HOSPITAL | Age: 68
Discharge: HOME | End: 2023-05-23
Attending: PODIATRIST
Payer: MEDICARE

## 2023-05-23 DIAGNOSIS — M19.072 OSTEOARTHRITIS OF LEFT ANKLE OR FOOT: ICD-10-CM

## 2023-05-23 DIAGNOSIS — M14.672 CHARCOT'S JOINT OF FOOT, LEFT: ICD-10-CM

## 2023-05-23 DIAGNOSIS — Z09 POSTOP CHECK: ICD-10-CM

## 2023-05-23 PROCEDURE — 73700 CT LOWER EXTREMITY W/O DYE: CPT | Mod: TC,LT

## 2023-05-23 PROCEDURE — 73700 CT LOWER EXTREMITY W/O DYE: CPT | Mod: 26,LT,, | Performed by: STUDENT IN AN ORGANIZED HEALTH CARE EDUCATION/TRAINING PROGRAM

## 2023-05-23 PROCEDURE — 73700 CT FOOT WITHOUT CONTRAST LEFT: ICD-10-PCS | Mod: 26,LT,, | Performed by: STUDENT IN AN ORGANIZED HEALTH CARE EDUCATION/TRAINING PROGRAM

## 2023-05-25 ENCOUNTER — TELEPHONE (OUTPATIENT)
Dept: PREADMISSION TESTING | Facility: HOSPITAL | Age: 68
End: 2023-05-25
Payer: MEDICARE

## 2023-05-25 ENCOUNTER — ANESTHESIA EVENT (OUTPATIENT)
Dept: SURGERY | Facility: HOSPITAL | Age: 68
End: 2023-05-25
Payer: MEDICARE

## 2023-05-26 ENCOUNTER — ANESTHESIA (OUTPATIENT)
Dept: SURGERY | Facility: HOSPITAL | Age: 68
End: 2023-05-26
Payer: MEDICARE

## 2023-05-26 ENCOUNTER — HOSPITAL ENCOUNTER (OUTPATIENT)
Facility: HOSPITAL | Age: 68
Discharge: HOME | End: 2023-05-26
Attending: PODIATRIST | Admitting: PODIATRIST
Payer: MEDICARE

## 2023-05-26 VITALS
BODY MASS INDEX: 35.7 KG/M2 | SYSTOLIC BLOOD PRESSURE: 168 MMHG | DIASTOLIC BLOOD PRESSURE: 79 MMHG | TEMPERATURE: 98 F | RESPIRATION RATE: 20 BRPM | HEIGHT: 69 IN | HEART RATE: 92 BPM | OXYGEN SATURATION: 96 % | WEIGHT: 241 LBS

## 2023-05-26 DIAGNOSIS — T84.84XA PAINFUL ORTHOPAEDIC HARDWARE: ICD-10-CM

## 2023-05-26 DIAGNOSIS — Z98.890 POSTOPERATIVE STATE: Primary | ICD-10-CM

## 2023-05-26 LAB
POCT GLUCOSE: 130 MG/DL (ref 70–110)
POCT GLUCOSE: 137 MG/DL (ref 70–110)

## 2023-05-26 PROCEDURE — 63600175 PHARM REV CODE 636 W HCPCS: Performed by: CLINIC/CENTER

## 2023-05-26 PROCEDURE — 36000708 HC OR TIME LEV III 1ST 15 MIN: Performed by: PODIATRIST

## 2023-05-26 PROCEDURE — 20694 PR REMOVE EXTERN BONE FIX DEV W ANESTH: ICD-10-PCS | Mod: 78,51,, | Performed by: PODIATRIST

## 2023-05-26 PROCEDURE — 28315 PR REMOV SESAMOID BONE,1ST TOE: ICD-10-PCS | Mod: 79,59,LT, | Performed by: PODIATRIST

## 2023-05-26 PROCEDURE — C1713 ANCHOR/SCREW BN/BN,TIS/BN: HCPCS | Performed by: PODIATRIST

## 2023-05-26 PROCEDURE — 71000033 HC RECOVERY, INTIAL HOUR: Performed by: PODIATRIST

## 2023-05-26 PROCEDURE — 71000039 HC RECOVERY, EACH ADD'L HOUR: Performed by: PODIATRIST

## 2023-05-26 PROCEDURE — 71000015 HC POSTOP RECOV 1ST HR: Performed by: PODIATRIST

## 2023-05-26 PROCEDURE — 37000008 HC ANESTHESIA 1ST 15 MINUTES: Performed by: PODIATRIST

## 2023-05-26 PROCEDURE — 25000003 PHARM REV CODE 250: Performed by: PODIATRIST

## 2023-05-26 PROCEDURE — 28740 FUSION OF FOOT BONES: CPT | Mod: 78,LT,, | Performed by: PODIATRIST

## 2023-05-26 PROCEDURE — 27201423 OPTIME MED/SURG SUP & DEVICES STERILE SUPPLY: Performed by: PODIATRIST

## 2023-05-26 PROCEDURE — 25000003 PHARM REV CODE 250: Performed by: CLINIC/CENTER

## 2023-05-26 PROCEDURE — 28740 PR FUSION FOOT BONE,MIDTARSAL,1 JT: ICD-10-PCS | Mod: 78,LT,, | Performed by: PODIATRIST

## 2023-05-26 PROCEDURE — C1769 GUIDE WIRE: HCPCS | Performed by: PODIATRIST

## 2023-05-26 PROCEDURE — 37000009 HC ANESTHESIA EA ADD 15 MINS: Performed by: PODIATRIST

## 2023-05-26 PROCEDURE — 20694 RMVL EXT FIXJ SYS UNDER ANES: CPT | Mod: 78,51,, | Performed by: PODIATRIST

## 2023-05-26 PROCEDURE — 28315 REMOVAL OF SESAMOID BONE: CPT | Mod: 79,59,LT, | Performed by: PODIATRIST

## 2023-05-26 PROCEDURE — 36000709 HC OR TIME LEV III EA ADD 15 MIN: Performed by: PODIATRIST

## 2023-05-26 DEVICE — IMPLANTABLE DEVICE: Type: IMPLANTABLE DEVICE | Site: FOOT | Status: FUNCTIONAL

## 2023-05-26 RX ORDER — DEXAMETHASONE SODIUM PHOSPHATE 4 MG/ML
INJECTION, SOLUTION INTRA-ARTICULAR; INTRALESIONAL; INTRAMUSCULAR; INTRAVENOUS; SOFT TISSUE
Status: DISCONTINUED | OUTPATIENT
Start: 2023-05-26 | End: 2023-05-26

## 2023-05-26 RX ORDER — FENTANYL CITRATE 50 UG/ML
INJECTION, SOLUTION INTRAMUSCULAR; INTRAVENOUS
Status: DISCONTINUED | OUTPATIENT
Start: 2023-05-26 | End: 2023-05-26

## 2023-05-26 RX ORDER — LIDOCAINE HYDROCHLORIDE 20 MG/ML
INJECTION INTRAVENOUS
Status: DISCONTINUED | OUTPATIENT
Start: 2023-05-26 | End: 2023-05-26

## 2023-05-26 RX ORDER — OXYCODONE AND ACETAMINOPHEN 7.5; 325 MG/1; MG/1
1 TABLET ORAL EVERY 6 HOURS PRN
Qty: 28 TABLET | Refills: 0 | Status: SHIPPED | OUTPATIENT
Start: 2023-05-26 | End: 2023-06-02

## 2023-05-26 RX ORDER — SODIUM CHLORIDE 0.9 % (FLUSH) 0.9 %
3 SYRINGE (ML) INJECTION
Status: DISCONTINUED | OUTPATIENT
Start: 2023-05-26 | End: 2023-05-26 | Stop reason: HOSPADM

## 2023-05-26 RX ORDER — HYDROMORPHONE HYDROCHLORIDE 2 MG/ML
0.2 INJECTION, SOLUTION INTRAMUSCULAR; INTRAVENOUS; SUBCUTANEOUS EVERY 5 MIN PRN
Status: DISCONTINUED | OUTPATIENT
Start: 2023-05-26 | End: 2023-05-26 | Stop reason: HOSPADM

## 2023-05-26 RX ORDER — PHENYLEPHRINE HYDROCHLORIDE 10 MG/ML
INJECTION INTRAVENOUS
Status: DISCONTINUED | OUTPATIENT
Start: 2023-05-26 | End: 2023-05-26

## 2023-05-26 RX ORDER — MIDAZOLAM HYDROCHLORIDE 1 MG/ML
INJECTION, SOLUTION INTRAMUSCULAR; INTRAVENOUS
Status: DISCONTINUED | OUTPATIENT
Start: 2023-05-26 | End: 2023-05-26

## 2023-05-26 RX ORDER — ALBUTEROL SULFATE 0.83 MG/ML
2.5 SOLUTION RESPIRATORY (INHALATION) EVERY 4 HOURS PRN
Status: DISCONTINUED | OUTPATIENT
Start: 2023-05-26 | End: 2023-05-26 | Stop reason: HOSPADM

## 2023-05-26 RX ORDER — SUCCINYLCHOLINE CHLORIDE 20 MG/ML
INJECTION INTRAMUSCULAR; INTRAVENOUS
Status: DISCONTINUED | OUTPATIENT
Start: 2023-05-26 | End: 2023-05-26

## 2023-05-26 RX ORDER — ROCURONIUM BROMIDE 10 MG/ML
INJECTION, SOLUTION INTRAVENOUS
Status: DISCONTINUED | OUTPATIENT
Start: 2023-05-26 | End: 2023-05-26

## 2023-05-26 RX ORDER — ONDANSETRON 2 MG/ML
4 INJECTION INTRAMUSCULAR; INTRAVENOUS DAILY PRN
Status: DISCONTINUED | OUTPATIENT
Start: 2023-05-26 | End: 2023-05-26 | Stop reason: HOSPADM

## 2023-05-26 RX ORDER — DOXYCYCLINE 100 MG/1
100 CAPSULE ORAL EVERY 12 HOURS
Qty: 14 CAPSULE | Refills: 0 | Status: SHIPPED | OUTPATIENT
Start: 2023-05-26 | End: 2023-06-02

## 2023-05-26 RX ORDER — ONDANSETRON 2 MG/ML
INJECTION INTRAMUSCULAR; INTRAVENOUS
Status: DISCONTINUED | OUTPATIENT
Start: 2023-05-26 | End: 2023-05-26

## 2023-05-26 RX ORDER — PROPOFOL 10 MG/ML
VIAL (ML) INTRAVENOUS
Status: DISCONTINUED | OUTPATIENT
Start: 2023-05-26 | End: 2023-05-26

## 2023-05-26 RX ORDER — MEPERIDINE HYDROCHLORIDE 25 MG/ML
12.5 INJECTION INTRAMUSCULAR; INTRAVENOUS; SUBCUTANEOUS ONCE
Status: DISCONTINUED | OUTPATIENT
Start: 2023-05-26 | End: 2023-05-26 | Stop reason: HOSPADM

## 2023-05-26 RX ORDER — KETOROLAC TROMETHAMINE 30 MG/ML
15 INJECTION, SOLUTION INTRAMUSCULAR; INTRAVENOUS EVERY 8 HOURS PRN
Status: DISCONTINUED | OUTPATIENT
Start: 2023-05-26 | End: 2023-05-26 | Stop reason: HOSPADM

## 2023-05-26 RX ORDER — BUPIVACAINE HYDROCHLORIDE 5 MG/ML
INJECTION, SOLUTION EPIDURAL; INTRACAUDAL
Status: DISCONTINUED | OUTPATIENT
Start: 2023-05-26 | End: 2023-05-26 | Stop reason: HOSPADM

## 2023-05-26 RX ORDER — DIPHENHYDRAMINE HYDROCHLORIDE 50 MG/ML
25 INJECTION INTRAMUSCULAR; INTRAVENOUS EVERY 6 HOURS PRN
Status: DISCONTINUED | OUTPATIENT
Start: 2023-05-26 | End: 2023-05-26 | Stop reason: HOSPADM

## 2023-05-26 RX ORDER — CLINDAMYCIN PHOSPHATE 900 MG/50ML
900 INJECTION, SOLUTION INTRAVENOUS
Status: COMPLETED | OUTPATIENT
Start: 2023-05-26 | End: 2023-05-26

## 2023-05-26 RX ORDER — OXYCODONE HYDROCHLORIDE 5 MG/1
5 TABLET ORAL
Status: DISCONTINUED | OUTPATIENT
Start: 2023-05-26 | End: 2023-05-26 | Stop reason: HOSPADM

## 2023-05-26 RX ORDER — PROCHLORPERAZINE EDISYLATE 5 MG/ML
5 INJECTION INTRAMUSCULAR; INTRAVENOUS EVERY 30 MIN PRN
Status: DISCONTINUED | OUTPATIENT
Start: 2023-05-26 | End: 2023-05-26 | Stop reason: HOSPADM

## 2023-05-26 RX ADMIN — PHENYLEPHRINE HYDROCHLORIDE 100 MCG: 10 INJECTION INTRAVENOUS at 08:05

## 2023-05-26 RX ADMIN — MIDAZOLAM 2 MG: 1 INJECTION INTRAMUSCULAR; INTRAVENOUS at 07:05

## 2023-05-26 RX ADMIN — PROPOFOL 150 MG: 10 INJECTION, EMULSION INTRAVENOUS at 07:05

## 2023-05-26 RX ADMIN — FENTANYL CITRATE 25 MCG: 50 INJECTION, SOLUTION INTRAMUSCULAR; INTRAVENOUS at 09:05

## 2023-05-26 RX ADMIN — ROCURONIUM BROMIDE 5 MG: 10 INJECTION, SOLUTION INTRAVENOUS at 07:05

## 2023-05-26 RX ADMIN — LIDOCAINE HYDROCHLORIDE 100 MG: 20 INJECTION INTRAVENOUS at 07:05

## 2023-05-26 RX ADMIN — ONDANSETRON 4 MG: 2 INJECTION INTRAMUSCULAR; INTRAVENOUS at 07:05

## 2023-05-26 RX ADMIN — SODIUM CHLORIDE, SODIUM LACTATE, POTASSIUM CHLORIDE, AND CALCIUM CHLORIDE: .6; .31; .03; .02 INJECTION, SOLUTION INTRAVENOUS at 07:05

## 2023-05-26 RX ADMIN — CLINDAMYCIN IN 5 PERCENT DEXTROSE 900 MG: 18 INJECTION, SOLUTION INTRAVENOUS at 07:05

## 2023-05-26 RX ADMIN — DEXAMETHASONE SODIUM PHOSPHATE 4 MG: 4 INJECTION, SOLUTION INTRA-ARTICULAR; INTRALESIONAL; INTRAMUSCULAR; INTRAVENOUS; SOFT TISSUE at 07:05

## 2023-05-26 RX ADMIN — GLYCOPYRROLATE 0.2 MG: 0.2 INJECTION, SOLUTION INTRAMUSCULAR; INTRAVENOUS at 07:05

## 2023-05-26 RX ADMIN — SUCCINYLCHOLINE CHLORIDE 120 MG: 20 INJECTION, SOLUTION INTRAMUSCULAR; INTRAVENOUS; PARENTERAL at 07:05

## 2023-05-26 RX ADMIN — FENTANYL CITRATE 25 MCG: 50 INJECTION, SOLUTION INTRAMUSCULAR; INTRAVENOUS at 08:05

## 2023-05-26 RX ADMIN — PHENYLEPHRINE HYDROCHLORIDE 200 MCG: 10 INJECTION INTRAVENOUS at 08:05

## 2023-05-26 RX ADMIN — FENTANYL CITRATE 100 MCG: 50 INJECTION, SOLUTION INTRAMUSCULAR; INTRAVENOUS at 07:05

## 2023-05-26 RX ADMIN — PHENYLEPHRINE HYDROCHLORIDE 200 MCG: 10 INJECTION INTRAVENOUS at 07:05

## 2023-05-26 NOTE — ANESTHESIA POSTPROCEDURE EVALUATION
Anesthesia Post Evaluation    Patient: Kay Galarza    Procedure(s) Performed: Procedure(s) (LRB):  REMOVAL, EXTERNAL FIXATION DEVICE (Left)  SESAMOIDECTOMY (Left)  FUSION, FOOT (Left)    Final Anesthesia Type: general      Patient location during evaluation: PACU  Patient participation: Yes- Able to Participate  Level of consciousness: awake  Post-procedure vital signs: reviewed and stable  Pain management: adequate  Airway patency: patent    PONV status at discharge: No PONV  Anesthetic complications: no      Cardiovascular status: hemodynamically stable  Respiratory status: unassisted  Hydration status: euvolemic  Follow-up not needed.          Vitals Value Taken Time   /72 05/26/23 0947   Temp 36.8 °C (98.2 °F) 05/26/23 0910   Pulse 94 05/26/23 0948   Resp 48 05/26/23 0948   SpO2 90 % 05/26/23 0948   Vitals shown include unvalidated device data.      No case tracking events are documented in the log.      Pain/Pool Score: Pool Score: 8 (5/26/2023  9:30 AM)

## 2023-05-26 NOTE — ANESTHESIA PROCEDURE NOTES
Intubation    Date/Time: 5/26/2023 7:12 AM  Performed by: Crescencio Hammond III, CRNA  Authorized by: Roberto Perez MD     Intubation:     Induction:  Intravenous    Intubated:  Postinduction    Mask Ventilation:  Not attempted    Attempts:  1    Attempted By:  CRNA    Method of Intubation:  Direct    Laryngeal View Grade: Grade I - full view of cords      Difficult Airway Encountered?: No      Complications:  None    Airway Device:  Oral endotracheal tube    Airway Device Size:  7.0    Style/Cuff Inflation:  Cuffed (inflated to minimal occlusive pressure)    Tube secured:  22    Secured at:  The lips    Placement Verified By:  Capnometry    Complicating Factors:  None    Findings Post-Intubation:  BS equal bilateral and atraumatic/condition of teeth unchanged

## 2023-05-26 NOTE — DISCHARGE SUMMARY
[facility] - Surgery (Hospital)  Discharge Note  Short Stay    Procedure(s) (LRB):  REMOVAL, EXTERNAL FIXATION DEVICE (Left)  SESAMOIDECTOMY (Left)  FUSION, FOOT (Left)      OUTCOME: Patient tolerated treatment/procedure well without complication and is now ready for discharge.    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 Do not intentionally place weight upon or walk on the operative extremity.    2. When ambulating, use either crutches, a Knee-Scooter (Roll-A-Bout), rolling walker, standard walker or wheelchair.    3. During daytime/awake hours, if a CAST or POSTERIOR SPLINT is in place, ice the posterior aspect of the leg, behind the knee, 20 minutes on (w/ ice) and followed by 20 minutes off (w/o ice) until follow up; repeat accordingly until follow up. IF no CAST or POSTERIOR SPLINT is in place, ice the anterior aspect of the ankle, in a similar manner. During night time/sleep hours, simply elevating the limb above the level of the heart is sufficient.     4. Elevate the extremity above the level of the heart at all times when sitting/sleeping.    5. Take the pain mediations as prescribed for the initial 3 or so days, then only as needed.    6. Start Xarelto 10mg QD (with dinner) or Eliquis 2.5mg BID on tomorrow for DVT PPx.    7. Do not change the dressings.    8. Do not get the dressings wet.      9. Please be reminded of the harmful effects of nicotine/tobacco/cigarette/cigar/marijuana smoking, especially in relation to the lower extremity, particularly in reference to post operative healing. I do rec. complete or near complete cessation of any or all of the aforementioned until you are well out of the  post-operative period.    10. If you were prescribed more than one short acting opioid, e.g., (Morphine or Dilaudid), with Percocet or Norco or Tramadol, the Morphine (MSIR) or Dilaudid (Hydromorphone) is to be taken during the immediate initial days s/p, at an interval of every 6 hours or 5 hours or 4 hours, as needed for pain control. Once you are complete with the Morphine (MSIR) or Dilaudid (Hydromorphone), you may/should convert to the secondary medication. DO NOT take both medications together at any time. It is not advisable to start with the less potent medication, Percocet or Norco or Tramadol, and then convert to the stronger medication.        Clinical Reference Documents Added to Patient Instructions         Document    SURGICAL WOUND DISCHARGE INSTRUCTIONS ([place])            TIME SPENT ON DISCHARGE: 10 minutes

## 2023-05-26 NOTE — OP NOTE
[facility]  Podiatric Medicine & Surgery  Operative Report    SUMMARY     Date of Procedure: 5/26/2023    Procedure: Procedure(s):  REMOVAL, EXTERNAL FIXATION DEVICE  SESAMOIDECTOMY  FUSION, FOOT    Surgeon(s) and Role: Surgeon(s) and Role:     * Kirt Gray DPM - Primary    Pre-Operative Diagnosis: Pre-Op Diagnosis Codes:     * Charcot's joint of foot, left [M14.672]     * Osteoarthritis of left ankle or foot [M19.072]    Post-Operative Diagnosis: Post-Op Diagnosis Codes:     * Charcot's joint of foot, left [M14.672]     * Osteoarthritis of left ankle or foot [M19.072]     * 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 Removal of external fixator, LLE.   2. Midfoot fusion, LLE.   3. Tibial Sesamoidectomy, LLE.    Description of the Findings of the Procedure: The patient was seen in the Holding Room. The risks, benefits, complications, treatment options, and expected outcomes were discussed with the patient. The risks and potential complications of their problem and purposed treatment include but are not limited to infection, nerve injury, vascular injury, nonunion/malunion/delayed union of the surgical site, persistent pain, potential skin necrosis, deep vein thrombosis, possible pulmonary embolus, complications of the anesthetics and failure of the implant.  The patient concurred with the proposed plan, giving informed consent. The patient is aware that the procedure may be a part of a staged collection of procedures for definitive cure and/or alleviation of symptoms. The site of surgery properly noted/marked. Preoperative intravenous antibiotics are hanging at bedside, and are currently being administered via the heparin lock. The patient was taken to Operating Suite.    Once in the operative suite, the patient is transferred onto the operative table in the supine position. A  TIME-OUT is taken as per protocol to identify the proper patient, procedure to be performed, and laterality.  The patient is properly positioned on the operating room table for ease of dissection and for any ancillary imaging. Nursing and ancillary OR staff prepared the patient for the procedure. The patient is adequately sedated by the Attending Anesthesiologist and/or covering CRNA. Next, the operative limb was rendered sterile using chlorhexidine paint and scrub.  Sterile sheets and drapes were applied thereafter. Another TIME-OUT is taken as per protocol to identify the proper patient, procedure to be performed, and laterality.      Following this, the external fixator was removed in toto.  Of note, there is no instability of the tibia or foot, clinically.  After removal, fluoroscopy confirms no osseous pathology status post removal.    Following this, the foot is once again prepped with Chlorhexidine paint/Betadine paint.  Under live fluoroscopy, the midfoot is imaged.  There does appear to be a mild translucent line at the talonavicular joint, which is likely pseudoarthrosis.  The decision is made to perform percutaneous fixation/fusion across the midfoot.  Using standard technique, two 5.5 mm screws are inserted from the metatarsal base, into the talar bone with the assistance of fluoroscopy.    After insertion, there is no midfoot or hindfoot instability noted.  Following this, tibial sesamoidectomy was performed in standard fashion with the medial incision at the metatarsophalangeal joint. This is performed due to the chronic ulceration. Deep dissection with a tenotomy scissor.  Electrocautery as necessitated.  Capsule incision is made.  The tibial sesamoidectomy is identified and is dissected free.  The flexor tendon is intact.  Copious lavage with saline solution.    Closure of the area using Vicryl suture and Prolene.  Postop block is given with Marcaine plain.    The limb is copiously lavaged with  saline solution. All incisions are dressed with Xeroform/Adaptic nonadherent dressings followed by sterile 4 x 4 gauze, abdominal pad, Sheron/Kerlix and light ACE. Xie Compression is applied w/ a posterior splint.     The anesthesia is weaned. There were no complications to this procedure. Any final necessary imaging to be performed in the PACU if it was not performed here in the OR suite.  The patient is transferred to the [facility] bed/stretcher, \[facility] [place] [facility]\"". The patient is transferred to the PACU.    Significant Surgical Tasks Conducted by the Assistant(s), if Applicable: N/A    Complications: * No complications entered in OR log *    Estimated Blood Loss (EBL): Minimal    Drains: N/A    Implants:   Implant Name Type Inv. Item Serial No.  Lot No. LRB No. Used Action   5.5 x 90 Headless long thread    [employer]  Left 1 Implanted   5.5 x 90 Headless short thread screw    [employer]  Left 1 Implanted   KWIRE SMTH TRCR TIP [id number] - [id number]  [employer] TRCR TIP [id number]  [employer]  Left 4 Implanted and Explanted   13 HOLE ANATOMIC PLATE LEFT    [employer]  Left 1 Explanted   2.5 x 14 NONLOCKING    [employer]  Left 1 Explanted   3.5 x 12 LOCKING    [employer]  Left 1 Explanted   3.5 x 14 LOCKING    [employer]  Left 2 Explanted   3.5 x 26 LOCKING    [employer]  Left 1 Explanted   3.5 x 26 NONLOCKING    [employer]  Left 1 Explanted   3.8 x 45 RELEASE SCREW    [employer]  Left 1 Explanted   3.8 x 48 RELEASE SCREW    [employer]  Left 2 Explanted   MEDIAL HOOK PLATE    [employer]  Left 1 Explanted   2 hole post    [employer]  Left 3 Explanted   2mm washer    [employer]  Left 4 Explanted   4mm washer    [employer]  Left 6 Explanted   5.5 x 90 Headed short thread      Left 1 Explanted   5.5 x 90 Headless short thread    [employer]  Left 1 Explanted   5/8 Ring 180    [employer]  Left 1 Explanted   cerament bone void  filler    [employer] [id number] Left 1 Explanted   Connecting nut    [employer]  Left 21 Explanted   Connection bolt 16mm    [employer]  Left 18 Explanted   Foot plate 160    [employer]  Left 1 Explanted   Full 180 ring    [employer]  Left 1 Explanted   Half pin, self drilling, 06.0mm x 50mm, 180mm, shank length, HA coated    [employer]  Left 2 Explanted   half ring    [employer]  Left 1 Explanted   Wire fixation bolt    [employer]  Left 13 Explanted   Threaded pillar    [employer]  Left 8 Explanted   universal clamp    [employer]  Left 2 Explanted       Specimens: * No specimens in log *    Condition: stable    Disposition: PACU - hemodynamically stable.    Attestation: I performed the procedure.

## 2023-05-26 NOTE — H&P
Patient admitted to [facility] for surgical reconstruction concerning removal of external fixator, midfoot fusion and sesamoidectomy, left foot.     Internal/External H&P is noted and reviewed w/o changes after my clinical and history examination on today.

## 2023-05-26 NOTE — BRIEF OP NOTE
[facility] - Surgery (Hospital)  Brief Operative Note    Surgery Date: 5/26/2023     Surgeon(s) and Role:     * Kirt Gray DPM - Primary    Assisting Surgeon: None    Pre-op Diagnosis:  Charcot's joint of foot, left [M14.672]  Osteoarthritis of left ankle or foot [M19.072]    Post-op Diagnosis:  Post-Op Diagnosis Codes:     * Charcot's joint of foot, left [M14.672]     * Osteoarthritis of left ankle or foot [M19.072]    Procedure(s) (LRB):  REMOVAL, EXTERNAL FIXATION DEVICE (Left)  SESAMOIDECTOMY (Left)  FUSION, FOOT (Left)    Anesthesia: General    Operative Findings: As per Dx.    Estimated Blood Loss: * No values recorded between 5/26/2023  7:59 AM and 5/26/2023  9:05 AM *         Specimens:   Specimen (24h ago, onward)      None              Discharge Note    OUTCOME: Patient tolerated treatment/procedure well without complication and is now ready for discharge.    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 Do not intentionally place weight upon or walk on the operative extremity.    2. When ambulating, use either crutches, a Knee-Scooter (Roll-A-Bout), rolling walker, standard walker or wheelchair.    3. During daytime/awake hours, if a CAST or POSTERIOR SPLINT is in place, ice the posterior aspect of the leg, behind the knee, 20 minutes on (w/ ice) and followed by 20 minutes off (w/o ice) until follow up; repeat accordingly until follow up. IF no CAST or POSTERIOR SPLINT is in place, ice the anterior aspect of the ankle, in a similar manner. During night time/sleep hours, simply elevating the limb above the level of the heart is sufficient.     4. Elevate the extremity above the level of the heart at all times  when sitting/sleeping.    5. Take the pain mediations as prescribed for the initial 3 or so days, then only as needed.    6. Start Xarelto 10mg QD (with dinner) or Eliquis 2.5mg BID on tomorrow for DVT PPx.    7. Do not change the dressings.    8. Do not get the dressings wet.      9. Please be reminded of the harmful effects of nicotine/tobacco/cigarette/cigar/marijuana smoking, especially in relation to the lower extremity, particularly in reference to post operative healing. I do rec. complete or near complete cessation of any or all of the aforementioned until you are well out of the post-operative period.    10. If you were prescribed more than one short acting opioid, e.g., (Morphine or Dilaudid), with Percocet or Norco or Tramadol, the Morphine (MSIR) or Dilaudid (Hydromorphone) is to be taken during the immediate initial days s/p, at an interval of every 6 hours or 5 hours or 4 hours, as needed for pain control. Once you are complete with the Morphine (MSIR) or Dilaudid (Hydromorphone), you may/should convert to the secondary medication. DO NOT take both medications together at any time. It is not advisable to start with the less potent medication, Percocet or Norco or Tramadol, and then convert to the stronger medication.        Clinical Reference Documents Added to Patient Instructions         Document    SURGICAL WOUND DISCHARGE INSTRUCTIONS (Lao)

## 2023-05-26 NOTE — TRANSFER OF CARE
"Anesthesia Transfer of Care Note    Patient: Kay Galarza    Procedure(s) Performed: Procedure(s) (LRB):  REMOVAL, EXTERNAL FIXATION DEVICE (Left)  SESAMOIDECTOMY (Left)  FUSION, FOOT (Left)    Patient location: PACU    Anesthesia Type: general    Transport from OR: Transported from OR on room air with adequate spontaneous ventilation    Post pain: adequate analgesia    Post assessment: no apparent anesthetic complications and tolerated procedure well    Post vital signs: stable    Level of consciousness: awake    Nausea/Vomiting: no nausea/vomiting    Complications: none    Transfer of care protocol was followed      Last vitals:   Visit Vitals  BP (!) 142/69   Pulse 93   Temp 36.9 °C (98.4 °F) (Temporal)   Resp 18   Ht 5' 9" (1.753 m)   Wt 109.3 kg (241 lb)   SpO2 96%   Breastfeeding No   BMI 35.59 kg/m²     "

## 2023-05-26 NOTE — PATIENT INSTRUCTIONS
Weightbearing Status and Wound Care:  1. Do not intentionally place weight upon or walk on the operative extremity.    2. When ambulating, use either crutches, a Knee-Scooter (Roll-A-Bout), rolling walker, standard walker or wheelchair.    3. During daytime/awake hours, if a CAST or POSTERIOR SPLINT is in place, ice the posterior aspect of the leg, behind the knee, 20 minutes on (w/ ice) and followed by 20 minutes off (w/o ice) until follow up; repeat accordingly until follow up. IF no CAST or POSTERIOR SPLINT is in place, ice the anterior aspect of the ankle, in a similar manner. During night time/sleep hours, simply elevating the limb above the level of the heart is sufficient.     4. Elevate the extremity above the level of the heart at all times when sitting/sleeping.    5. Take the pain mediations as prescribed for the initial 3 or so days, then only as needed.    6. Start Xarelto 10mg QD (with dinner) or Eliquis 2.5mg BID on tomorrow for DVT PPx.    7. Do not change the dressings.    8. Do not get the dressings wet.      9. Please be reminded of the harmful effects of nicotine/tobacco/cigarette/cigar/marijuana smoking, especially in relation to the lower extremity, particularly in reference to post operative healing. I do rec. complete or near complete cessation of any or all of the aforementioned until you are well out of the post-operative period.    10. If you were prescribed more than one short acting opioid, e.g., (Morphine or Dilaudid), with Percocet or Norco or Tramadol, the Morphine (MSIR) or Dilaudid (Hydromorphone) is to be taken during the immediate initial days s/p, at an interval of every 6 hours or 5 hours or 4 hours, as needed for pain control. Once you are complete with the Morphine (MSIR) or Dilaudid (Hydromorphone), you may/should convert to the secondary medication. DO NOT take both medications together at any time. It is not advisable to start with the less potent medication, Percocet or  Norco or Tramadol, and then convert to the stronger medication.

## 2023-05-29 PROCEDURE — G0179 MD RECERTIFICATION HHA PT: HCPCS | Mod: ,,, | Performed by: PODIATRIST

## 2023-05-29 PROCEDURE — G0179 PR HOME HEALTH MD RECERTIFICATION: ICD-10-PCS | Mod: ,,, | Performed by: PODIATRIST

## 2023-06-10 NOTE — ANESTHESIA PREPROCEDURE EVALUATION
05/25/2023  Kay Galarza is a 68 y.o., female.    Patient Active Problem List   Diagnosis    Type 2 diabetes mellitus    Essential hypertension    Mild intermittent asthma    Severe obesity (BMI 35.0-39.9) with comorbidity    Open angle with borderline findings, low risk, bilateral    Special screening for malignant neoplasms, colon    Benign neoplasm of cecum    Benign neoplasm of ascending colon    Benign neoplasm of transverse colon    Benign neoplasm of descending colon    Benign neoplasm of rectosigmoid junction    Charcot's neuro arthropathy with dislocation of Lisfranc Joint of left foot    Type II diabetes mellitus with neurological manifestations    Lisfranc dislocation, left, sequela    HLD (hyperlipidemia)    Charcot's joint of foot, left    ILD (interstitial lung disease)    Cardiomegaly    Painful orthopaedic hardware    Abnormal nuclear stress test    Diabetic ulcer of left midfoot associated with type 2 diabetes mellitus, with fat layer exposed    Pre-op evaluation     Pre-op Assessment    I have reviewed the Patient Summary Reports.     I have reviewed the Nursing Notes. I have reviewed the NPO Status.   I have reviewed the Medications.     Review of Systems  Anesthesia Hx:  Denies Family Hx of Anesthesia complications.   Denies Personal Hx of Anesthesia complications.   Hematology/Oncology:  Hematology Normal   Oncology Normal     EENT/Dental:EENT/Dental Normal   Cardiovascular:   Hypertension ECG has been reviewed. 1-2023 [facility] - Normal coronary aa.  EF 65%   Pulmonary:   Asthma    Renal/:  Renal/ Normal     Hepatic/GI:  Hepatic/GI Normal    Neurological:  Neurology Normal    Endocrine:   Diabetes  Obesity / BMI > 30  Dermatological:  Skin Normal    Psych:  Psychiatric Normal           Physical Exam  General: Alert and Oriented    Airway:  Mallampati: II    Mouth Opening: Normal  TM Distance: Normal  Tongue: Normal  Neck ROM: Normal ROM        Anesthesia Plan  Type of Anesthesia, risks & benefits discussed:    Anesthesia Type: Gen ETT, Gen Supraglottic Airway  Intra-op Monitoring Plan: Standard ASA Monitors  Induction:  IV  Informed Consent: Informed consent signed with the Patient and all parties understand the risks and agree with anesthesia plan.  All questions answered.   ASA Score: 3  Day of Surgery Review of History & Physical: I have interviewed and examined the patient. I have reviewed the patient's H&P dated:   Anesthesia Plan Notes: Daughter interpreted    Ready For Surgery From Anesthesia Perspective.     .       complains of pain/discomfort

## 2023-06-12 ENCOUNTER — OFFICE VISIT (OUTPATIENT)
Dept: PODIATRY | Facility: CLINIC | Age: 68
End: 2023-06-12
Payer: MEDICARE

## 2023-06-12 ENCOUNTER — EXTERNAL HOME HEALTH (OUTPATIENT)
Dept: HOME HEALTH SERVICES | Facility: HOSPITAL | Age: 68
End: 2023-06-12
Payer: MEDICARE

## 2023-06-12 DIAGNOSIS — T81.31XD SURGICAL WOUND DEHISCENCE, SUBSEQUENT ENCOUNTER: ICD-10-CM

## 2023-06-12 DIAGNOSIS — M19.072 OSTEOARTHRITIS OF LEFT ANKLE OR FOOT: ICD-10-CM

## 2023-06-12 DIAGNOSIS — L21.9 SEBORRHEIC DERMATITIS: ICD-10-CM

## 2023-06-12 DIAGNOSIS — Z09 POSTOP CHECK: Primary | ICD-10-CM

## 2023-06-12 DIAGNOSIS — E11.49 TYPE II DIABETES MELLITUS WITH NEUROLOGICAL MANIFESTATIONS: ICD-10-CM

## 2023-06-12 DIAGNOSIS — M14.672 CHARCOT'S JOINT OF FOOT, LEFT: ICD-10-CM

## 2023-06-12 DIAGNOSIS — M25.572 PAIN IN JOINT INVOLVING LEFT ANKLE AND FOOT: ICD-10-CM

## 2023-06-12 PROCEDURE — 99499 NO LOS: ICD-10-PCS | Mod: S$GLB,,, | Performed by: PODIATRIST

## 2023-06-12 PROCEDURE — 99999 PR PBB SHADOW E&M-EST. PATIENT-LVL III: ICD-10-PCS | Mod: PBBFAC,,, | Performed by: PODIATRIST

## 2023-06-12 PROCEDURE — 99499 UNLISTED E&M SERVICE: CPT | Mod: S$GLB,,, | Performed by: PODIATRIST

## 2023-06-12 PROCEDURE — 99999 PR PBB SHADOW E&M-EST. PATIENT-LVL III: CPT | Mod: PBBFAC,,, | Performed by: PODIATRIST

## 2023-06-12 RX ORDER — KETOCONAZOLE 20 MG/ML
SHAMPOO, SUSPENSION TOPICAL
Qty: 120 ML | Refills: 3 | Status: SHIPPED | OUTPATIENT
Start: 2023-06-12

## 2023-06-12 NOTE — PROGRESS NOTES
Subjective:       Patient ID: Kay Galarza is a 68 y.o. female.    Chief Complaint: Other (Surgery f/u left foot)    HPI:  Kay Galarza presents to the office today, s/p 5/26/2023 LLE removal of external fixator, with tibial sesamoidectomy with midfoot fusion via percutaneous screws (s/p 3/13/2023 LLE Ex-Fix application with KAYLA). Daughter is present. States mild pains at present. Does have [facility]. States Vit. D. Is on DVT Ppx.    Hemoglobin A1C   Date Value Ref Range Status   12/07/2022 7.6 (H) 4.0 - 5.6 % Final     Comment:     ADA Screening Guidelines:  5.7-6.4%  Consistent with prediabetes  >or=6.5%  Consistent with diabetes    High levels of fetal hemoglobin interfere with the HbA1C  assay. Heterozygous hemoglobin variants (HbS, HgC, etc)do  not significantly interfere with this assay.   However, presence of multiple variants may affect accuracy.     05/25/2022 7.7 (H) 4.0 - 5.6 % Final     Comment:     ADA Screening Guidelines:  5.7-6.4%  Consistent with prediabetes  >or=6.5%  Consistent with diabetes    High levels of fetal hemoglobin interfere with the HbA1C  assay. Heterozygous hemoglobin variants (HbS, HgC, etc)do  not significantly interfere with this assay.   However, presence of multiple variants may affect accuracy.     01/26/2022 >14.0 (H) 4.0 - 5.6 % Final     Comment:     ADA Screening Guidelines:  5.7-6.4%  Consistent with prediabetes  >or=6.5%  Consistent with diabetes    High levels of fetal hemoglobin interfere with the HbA1C  assay. Heterozygous hemoglobin variants (HbS, HgC, etc)do  not significantly interfere with this assay.   However, presence of multiple variants may affect accuracy.         Review of patient's allergies indicates:   Allergen Reactions    Peanut butter [peanut] Shortness Of Breath and Itching    Pollen extracts      Seasonal allergies, sneezing       Past Medical History:   Diagnosis Date    Abnormal nuclear stress test  1/23/2023    Arthritis     DERIAN KNEES    Asthma     SEASONAL    Cataract     Diabetes mellitus     Diabetes mellitus, type 2     Diabetic retinopathy     DM (diabetes mellitus) 2007    BS 97 09/29/2020    Hyperlipidemia     Hypertension        Family History   Problem Relation Age of Onset    Cancer Father         Prostate Ca    Hypertension Father     Stroke Father     Diabetes Sister     Glaucoma Mother     Hypertension Mother     Glaucoma Maternal Grandmother     Blindness Neg Hx     Macular degeneration Neg Hx     Retinal detachment Neg Hx     Strabismus Neg Hx        Social History     Socioeconomic History    Marital status:    Tobacco Use    Smoking status: Never    Smokeless tobacco: Never   Substance and Sexual Activity    Alcohol use: Never    Drug use: No    Sexual activity: Yes     Social Determinants of Health     Financial Resource Strain: Unknown    Difficulty of Paying Living Expenses: Patient refused   Food Insecurity: Unknown    Worried About Running Out of Food in the Last Year: Patient refused    Ran Out of Food in the Last Year: Patient refused   Transportation Needs: Unknown    Lack of Transportation (Medical): Patient refused    Lack of Transportation (Non-Medical): Patient refused   Physical Activity: Unknown    Days of Exercise per Week: Patient refused    Minutes of Exercise per Session: 10 min   Stress: Unknown    Feeling of Stress : Patient refused   Social Connections: Unknown    Frequency of Communication with Friends and Family: Patient refused    Frequency of Social Gatherings with Friends and Family: Patient refused    Active Member of Clubs or Organizations: Patient refused    Attends Club or Organization Meetings: Patient refused    Marital Status: Patient refused   Housing Stability: Unknown    Unable to Pay for Housing in the Last Year: Patient refused    Unstable Housing in the Last Year: Patient refused       Past Surgical History:   Procedure Laterality Date     APPLICATION OF WOUND VACUUM-ASSISTED CLOSURE DEVICE Left 8/18/2020    Procedure: APPLICATION, WOUND VAC;  Surgeon: Kirt Gray DPM;  Location: [facility] OR;  Service: Podiatry;  Laterality: Left;    APPLICATION, EXTERNAL FIXATION DEVICE Left 3/13/2023    Procedure: APPLICATION, EXTERNAL FIXATION DEVICE;  Surgeon: Kirt Gray DPM;  Location: [facility] OR;  Service: Podiatry;  Laterality: Left;    CATARACT EXTRACTION W/  INTRAOCULAR LENS IMPLANT Right 07/18/2018    CATARACT EXTRACTION W/  INTRAOCULAR LENS IMPLANT Left 03/13/2019    COLONOSCOPY N/A 12/22/2018    Procedure: COLONOSCOPY;  Surgeon: Zak Dover MD;  Location: [facility] ENDO;  Service: Endoscopy;  Laterality: N/A;    COLONOSCOPY N/A 5/11/2022    Procedure: COLONOSCOPY;  Surgeon: Charles Berrios MD;  Location: [facility] ENDO;  Service: Endoscopy;  Laterality: N/A;    ENDOSCOPIC VEIN LASER TREATMENT Bilateral 1990's    FIXATION OF SYNDESMOSIS OF ANKLE Left 7/23/2020    Procedure: FIXATION, SYNDESMOSIS, ANKLE;  Surgeon: Kirt Gray DPM;  Location: [facility] OR;  Service: Podiatry;  Laterality: Left;    FOOT ARTHRODESIS Left 5/26/2023    Procedure: FUSION, FOOT;  Surgeon: Kirt Gray DPM;  Location: [facility] OR;  Service: Podiatry;  Laterality: Left;    FOOT HARDWARE REMOVAL Left 3/13/2023    Procedure: REMOVAL, HARDWARE, FOOT;  Surgeon: Kirt Gray DPM;  Location: [facility] OR;  Service: Podiatry;  Laterality: Left;    FUSION OF SUBTALAR JOINT Left 3/13/2023    Procedure: FUSION, SUBTALAR JOINT;  Surgeon: Kirt Gray DPM;  Location: [facility] OR;  Service: Podiatry;  Laterality: Left;    LEFT HEART CATHETERIZATION Left 1/24/2023    Procedure: CATHETERIZATION, HEART, LEFT;  Surgeon: Salma Bonilla MD;  Location: [facility] CATH LAB;  Service: Cardiology;  Laterality: Left;  pt given 130 start time    MANIPULATION WITH ANESTHESIA Left 7/23/2020    Procedure: MANIPULATION, WITH ANESTHESIA;  Surgeon: Kirt Gray DPM;  Location: [facility] OR;  Service: Podiatry;   Laterality: Left;    MIDFOOT ARTHRODESIS Left 7/23/2020    Procedure: FUSION, JOINT, MIDFOOT;  Surgeon: Kirt Gray DPM;  Location: [facility] OR;  Service: Podiatry;  Laterality: Left;  2nd tarsometatarsal joint dislocation via fusion    MIDFOOT ARTHRODESIS Left 3/13/2023    Procedure: FUSION, JOINT, MIDFOOT;  Surgeon: Kirt Gray DPM;  Location: [facility] OR;  Service: Podiatry;  Laterality: Left;    OPEN REDUCTION AND INTERNAL FIXATION (ORIF) OF INJURY OF ANKLE Left 7/23/2020    Procedure: ORIF, ANKLE;  Surgeon: Kirt Gray DPM;  Location: [facility] OR;  Service: Podiatry;  Laterality: Left;    RECONSTRUCTION WITH FUSION OF CHARCOT FOOT Left 8/18/2020    Procedure: RECONSTRUCTION, CHARCOT FOOT, WITH FUSION;  Surgeon: Kirt Gray DPM;  Location: [facility] OR;  Service: Podiatry;  Laterality: Left;    REMOVAL OF EXTERNAL FIXATION DEVICE Left 5/26/2023    Procedure: REMOVAL, EXTERNAL FIXATION DEVICE;  Surgeon: Kirt Gray DPM;  Location: [facility] OR;  Service: Podiatry;  Laterality: Left;    REMOVAL OF HARDWARE FROM LOWER EXTREMITY Left 1/27/2021    Procedure: REMOVAL, HARDWARE, LOWER EXTREMITY;  Surgeon: Kirt Gray DPM;  Location: [facility] OR;  Service: Podiatry;  Laterality: Left;    SESAMOIDECTOMY Left 5/26/2023    Procedure: SESAMOIDECTOMY;  Surgeon: Kirt Gray DPM;  Location: [facility] OR;  Service: Podiatry;  Laterality: Left;    WOUND DEBRIDEMENT Left 8/18/2020    Procedure: DEBRIDEMENT, WOUND;  Surgeon: Kirt Gray DPM;  Location: [facility] OR;  Service: Podiatry;  Laterality: Left;       Review of Systems   Constitutional:  Negative for chills, fatigue and fever.   HENT:  Negative for hearing loss.    Eyes:  Negative for photophobia and visual disturbance.   Respiratory:  Negative for cough, chest tightness, shortness of breath and wheezing.    Cardiovascular:  Negative for chest pain and palpitations.   Gastrointestinal:  Negative for constipation, diarrhea, nausea and vomiting.   Endocrine: Negative  for cold intolerance and heat intolerance.   Genitourinary:  Negative for flank pain.   Musculoskeletal:  Positive for gait problem. Negative for neck pain and neck stiffness.   Skin:  Negative for wound.   Neurological:  Negative for light-headedness and headaches.   Psychiatric/Behavioral:  Negative for sleep disturbance.         Objective:   There were no vitals taken for this visit.      LOWER EXTREMITY PHYSICAL EXAMINATION  VASCULAR: Proximal to distal temperature is warm to warm. No ipsilateral calf pain or tenderness is noted with palpation and compression. No palpable cords noted.    DERMATOLOGY: The incision at the medial 1st MTPJ is healed. The incision at the lateral heel persists with a depth of around 1mm-2mm. There are granular tissues noted. No probe to bone or osseous exposure. No drainage is noted. The ulceration at the plantar 1st MTPJ is resolved.     ORTHOPEDIC: Moderate pedal edema is noted. No pains are noted. ROM is not painful.    Assessment:     1. Postop check    2. Charcot's joint of foot, left    3. Osteoarthritis of left ankle or foot    4. Pain in joint involving left ankle and foot    5. Type II diabetes mellitus with neurological manifestations    6. Surgical wound dehiscence, subsequent encounter        Plan:     Postop check  -     SUBSEQUENT HOME HEALTH ORDERS    Charcot's joint of foot, left    Osteoarthritis of left ankle or foot    Pain in joint involving left ankle and foot    Type II diabetes mellitus with neurological manifestations    Surgical wound dehiscence, subsequent encounter  -     SUBSEQUENT HOME HEALTH ORDERS      Thorough discussion is had with the patient today, concerning the diagnosis, its etiology, and the treatment algorithm at present.     The wound (prior 3/13/23 STJ access incision) was surgically debrided after adequate prep with alcohol and/or betadine paint. Excisional wound debridement was performed using sharp #10/#15 blade/rounded scalpel and tissue  nipper, with removal of all non-viable skin and soft tissues; necrotic skin/tissue formation. The woundbase/wound bed was also debrided to encourage bleeding as to promote/stimulate healing. Debridement was excisional and included epidermal, dermal and subcutaneous tissues. Post debridement measurements are as above. Hemostasis was achieved. Patient tolerated procedure well and without complications. Local woundcare with collagen dressings and bandage thereafter.     Continue [facility].    WBAT with Sx. Shoe or CAM Walker.    F/U in 3-4 weeks.    Strict DMII control.    Sutures are removed w/o incident.    Local wound care with Abx ointment and/or collagen.        Future Appointments   Date Time Provider Department Center   6/12/2023  2:20 PM Antione Chavez MD [facility]

## 2023-06-15 ENCOUNTER — DOCUMENT SCAN (OUTPATIENT)
Dept: HOME HEALTH SERVICES | Facility: HOSPITAL | Age: 68
End: 2023-06-15
Payer: MEDICARE

## 2023-06-27 ENCOUNTER — LAB VISIT (OUTPATIENT)
Dept: LAB | Facility: HOSPITAL | Age: 68
End: 2023-06-27
Attending: FAMILY MEDICINE
Payer: MEDICARE

## 2023-06-27 ENCOUNTER — OFFICE VISIT (OUTPATIENT)
Dept: FAMILY MEDICINE | Facility: CLINIC | Age: 68
End: 2023-06-27
Attending: FAMILY MEDICINE
Payer: MEDICARE

## 2023-06-27 VITALS
HEART RATE: 108 BPM | DIASTOLIC BLOOD PRESSURE: 62 MMHG | OXYGEN SATURATION: 98 % | BODY MASS INDEX: 35.59 KG/M2 | TEMPERATURE: 99 F | SYSTOLIC BLOOD PRESSURE: 102 MMHG | HEIGHT: 69 IN

## 2023-06-27 DIAGNOSIS — A08.4 VIRAL GASTROENTERITIS: ICD-10-CM

## 2023-06-27 DIAGNOSIS — R11.0 NAUSEA: Primary | ICD-10-CM

## 2023-06-27 DIAGNOSIS — I95.89 OTHER SPECIFIED HYPOTENSION: ICD-10-CM

## 2023-06-27 PROCEDURE — 1160F PR REVIEW ALL MEDS BY PRESCRIBER/CLIN PHARMACIST DOCUMENTED: ICD-10-PCS | Mod: CPTII,S$GLB,, | Performed by: FAMILY MEDICINE

## 2023-06-27 PROCEDURE — 99214 OFFICE O/P EST MOD 30 MIN: CPT | Mod: S$GLB,,, | Performed by: FAMILY MEDICINE

## 2023-06-27 PROCEDURE — 85025 COMPLETE CBC W/AUTO DIFF WBC: CPT | Performed by: FAMILY MEDICINE

## 2023-06-27 PROCEDURE — 4010F PR ACE/ARB THEARPY RXD/TAKEN: ICD-10-PCS | Mod: CPTII,S$GLB,, | Performed by: FAMILY MEDICINE

## 2023-06-27 PROCEDURE — 1159F PR MEDICATION LIST DOCUMENTED IN MEDICAL RECORD: ICD-10-PCS | Mod: CPTII,S$GLB,, | Performed by: FAMILY MEDICINE

## 2023-06-27 PROCEDURE — 99999 PR PBB SHADOW E&M-EST. PATIENT-LVL V: ICD-10-PCS | Mod: PBBFAC,,, | Performed by: FAMILY MEDICINE

## 2023-06-27 PROCEDURE — 3074F PR MOST RECENT SYSTOLIC BLOOD PRESSURE < 130 MM HG: ICD-10-PCS | Mod: CPTII,S$GLB,, | Performed by: FAMILY MEDICINE

## 2023-06-27 PROCEDURE — 4010F ACE/ARB THERAPY RXD/TAKEN: CPT | Mod: CPTII,S$GLB,, | Performed by: FAMILY MEDICINE

## 2023-06-27 PROCEDURE — 1160F RVW MEDS BY RX/DR IN RCRD: CPT | Mod: CPTII,S$GLB,, | Performed by: FAMILY MEDICINE

## 2023-06-27 PROCEDURE — 1125F AMNT PAIN NOTED PAIN PRSNT: CPT | Mod: CPTII,S$GLB,, | Performed by: FAMILY MEDICINE

## 2023-06-27 PROCEDURE — 36415 COLL VENOUS BLD VENIPUNCTURE: CPT | Mod: PO | Performed by: FAMILY MEDICINE

## 2023-06-27 PROCEDURE — 3078F DIAST BP <80 MM HG: CPT | Mod: CPTII,S$GLB,, | Performed by: FAMILY MEDICINE

## 2023-06-27 PROCEDURE — 80053 COMPREHEN METABOLIC PANEL: CPT | Performed by: FAMILY MEDICINE

## 2023-06-27 PROCEDURE — 1101F PR PT FALLS ASSESS DOC 0-1 FALLS W/OUT INJ PAST YR: ICD-10-PCS | Mod: CPTII,S$GLB,, | Performed by: FAMILY MEDICINE

## 2023-06-27 PROCEDURE — 1125F PR PAIN SEVERITY QUANTIFIED, PAIN PRESENT: ICD-10-PCS | Mod: CPTII,S$GLB,, | Performed by: FAMILY MEDICINE

## 2023-06-27 PROCEDURE — 99214 PR OFFICE/OUTPT VISIT, EST, LEVL IV, 30-39 MIN: ICD-10-PCS | Mod: S$GLB,,, | Performed by: FAMILY MEDICINE

## 2023-06-27 PROCEDURE — 1101F PT FALLS ASSESS-DOCD LE1/YR: CPT | Mod: CPTII,S$GLB,, | Performed by: FAMILY MEDICINE

## 2023-06-27 PROCEDURE — 3008F PR BODY MASS INDEX (BMI) DOCUMENTED: ICD-10-PCS | Mod: CPTII,S$GLB,, | Performed by: FAMILY MEDICINE

## 2023-06-27 PROCEDURE — 3074F SYST BP LT 130 MM HG: CPT | Mod: CPTII,S$GLB,, | Performed by: FAMILY MEDICINE

## 2023-06-27 PROCEDURE — 1159F MED LIST DOCD IN RCRD: CPT | Mod: CPTII,S$GLB,, | Performed by: FAMILY MEDICINE

## 2023-06-27 PROCEDURE — 3078F PR MOST RECENT DIASTOLIC BLOOD PRESSURE < 80 MM HG: ICD-10-PCS | Mod: CPTII,S$GLB,, | Performed by: FAMILY MEDICINE

## 2023-06-27 PROCEDURE — 3288F PR FALLS RISK ASSESSMENT DOCUMENTED: ICD-10-PCS | Mod: CPTII,S$GLB,, | Performed by: FAMILY MEDICINE

## 2023-06-27 PROCEDURE — 99999 PR PBB SHADOW E&M-EST. PATIENT-LVL V: CPT | Mod: PBBFAC,,, | Performed by: FAMILY MEDICINE

## 2023-06-27 PROCEDURE — 3008F BODY MASS INDEX DOCD: CPT | Mod: CPTII,S$GLB,, | Performed by: FAMILY MEDICINE

## 2023-06-27 PROCEDURE — 3288F FALL RISK ASSESSMENT DOCD: CPT | Mod: CPTII,S$GLB,, | Performed by: FAMILY MEDICINE

## 2023-06-27 RX ORDER — LEVOCETIRIZINE DIHYDROCHLORIDE 5 MG/1
5 TABLET, FILM COATED ORAL NIGHTLY
Qty: 30 TABLET | Refills: 11 | Status: SHIPPED | OUTPATIENT
Start: 2023-06-27 | End: 2024-06-26

## 2023-06-27 RX ORDER — ONDANSETRON 8 MG/1
TABLET, ORALLY DISINTEGRATING ORAL
Qty: 20 TABLET | Refills: 0 | Status: SHIPPED | OUTPATIENT
Start: 2023-06-27 | End: 2024-01-04

## 2023-06-27 NOTE — PROGRESS NOTES
Subjective:       Patient ID: Kay Galarza is a 68 y.o. female.    Chief Complaint: Abdominal Pain and Diarrhea (/)    68 y old female with Several looses stools over the last 4 days . Associated with mid abdominal   pain  and nausea. No fever. No recent antibiotic intake . Taking Pepto bismol and Imodium .She has only been able to drink Pedialyte      Review of Systems   Constitutional: Negative.    HENT: Negative.     Eyes: Negative.    Respiratory: Negative.     Cardiovascular: Negative.    Gastrointestinal:  Positive for abdominal pain, diarrhea and nausea.   Genitourinary: Negative.    Musculoskeletal: Negative.    Skin: Negative.    Hematological: Negative.      Objective:      Physical Exam  Constitutional:       General: She is not in acute distress.     Appearance: She is well-developed. She is not diaphoretic.   HENT:      Head: Normocephalic and atraumatic.      Right Ear: External ear normal.      Left Ear: External ear normal.      Mouth/Throat:      Pharynx: No oropharyngeal exudate.   Eyes:      General: No scleral icterus.        Right eye: No discharge.         Left eye: No discharge.      Conjunctiva/sclera: Conjunctivae normal.      Pupils: Pupils are equal, round, and reactive to light.   Neck:      Thyroid: No thyromegaly.      Vascular: No JVD.      Trachea: No tracheal deviation.   Cardiovascular:      Rate and Rhythm: Normal rate and regular rhythm.      Heart sounds: Normal heart sounds. No murmur heard.    No friction rub. No gallop.   Pulmonary:      Effort: Pulmonary effort is normal. No respiratory distress.      Breath sounds: Normal breath sounds. No stridor. No wheezing or rales.   Chest:      Chest wall: No tenderness.   Abdominal:      General: Bowel sounds are normal. There is no distension.      Palpations: Abdomen is soft.      Tenderness: There is generalized abdominal tenderness. There is no guarding or rebound.   Musculoskeletal:         General: Normal range of  motion.      Cervical back: Normal range of motion and neck supple.   Lymphadenopathy:      Cervical: No cervical adenopathy.   Skin:     General: Skin is warm and dry.   Neurological:      Mental Status: She is alert and oriented to person, place, and time.   Psychiatric:         Behavior: Behavior normal.         Thought Content: Thought content normal.         Judgment: Judgment normal.       Assessment:     Kay was seen today for abdominal pain and diarrhea.    Diagnoses and all orders for this visit:    Nausea  -     ondansetron (ZOFRAN-ODT) 8 MG TbDL; 1 tab po q 6 hrs prn .  -     levocetirizine (XYZAL) 5 MG tablet; Take 1 tablet (5 mg total) by mouth every evening.    Viral gastroenteritis  -     CBC Auto Differential; Future  -     Comprehensive Metabolic Panel; Future    Other specified hypotension       Plan:   WS discussed.   Refrain from taking lisinopril / hctz this  evening   F.u in 3 d

## 2023-06-28 LAB
ALBUMIN SERPL BCP-MCNC: 3.4 G/DL (ref 3.5–5.2)
ALP SERPL-CCNC: 141 U/L (ref 55–135)
ALT SERPL W/O P-5'-P-CCNC: 10 U/L (ref 10–44)
ANION GAP SERPL CALC-SCNC: 13 MMOL/L (ref 8–16)
AST SERPL-CCNC: 22 U/L (ref 10–40)
BASOPHILS # BLD AUTO: 0.05 K/UL (ref 0–0.2)
BASOPHILS NFR BLD: 0.6 % (ref 0–1.9)
BILIRUB SERPL-MCNC: 0.7 MG/DL (ref 0.1–1)
BUN SERPL-MCNC: 28 MG/DL (ref 8–23)
CALCIUM SERPL-MCNC: 9 MG/DL (ref 8.7–10.5)
CHLORIDE SERPL-SCNC: 95 MMOL/L (ref 95–110)
CO2 SERPL-SCNC: 22 MMOL/L (ref 23–29)
CREAT SERPL-MCNC: 1.4 MG/DL (ref 0.5–1.4)
DIFFERENTIAL METHOD: ABNORMAL
EOSINOPHIL # BLD AUTO: 0.1 K/UL (ref 0–0.5)
EOSINOPHIL NFR BLD: 0.6 % (ref 0–8)
ERYTHROCYTE [DISTWIDTH] IN BLOOD BY AUTOMATED COUNT: 14.2 % (ref 11.5–14.5)
EST. GFR  (NO RACE VARIABLE): 40.5 ML/MIN/1.73 M^2
GLUCOSE SERPL-MCNC: 92 MG/DL (ref 70–110)
HCT VFR BLD AUTO: 44.3 % (ref 37–48.5)
HGB BLD-MCNC: 14.1 G/DL (ref 12–16)
IMM GRANULOCYTES # BLD AUTO: 0.05 K/UL (ref 0–0.04)
IMM GRANULOCYTES NFR BLD AUTO: 0.6 % (ref 0–0.5)
LYMPHOCYTES # BLD AUTO: 0.9 K/UL (ref 1–4.8)
LYMPHOCYTES NFR BLD: 10.7 % (ref 18–48)
MCH RBC QN AUTO: 27.6 PG (ref 27–31)
MCHC RBC AUTO-ENTMCNC: 31.8 G/DL (ref 32–36)
MCV RBC AUTO: 87 FL (ref 82–98)
MONOCYTES # BLD AUTO: 1.2 K/UL (ref 0.3–1)
MONOCYTES NFR BLD: 14.7 % (ref 4–15)
NEUTROPHILS # BLD AUTO: 5.9 K/UL (ref 1.8–7.7)
NEUTROPHILS NFR BLD: 72.8 % (ref 38–73)
NRBC BLD-RTO: 0 /100 WBC
PLATELET # BLD AUTO: 255 K/UL (ref 150–450)
PMV BLD AUTO: 11.2 FL (ref 9.2–12.9)
POTASSIUM SERPL-SCNC: 4.3 MMOL/L (ref 3.5–5.1)
PROT SERPL-MCNC: 7.1 G/DL (ref 6–8.4)
RBC # BLD AUTO: 5.1 M/UL (ref 4–5.4)
SODIUM SERPL-SCNC: 130 MMOL/L (ref 136–145)
WBC # BLD AUTO: 8.11 K/UL (ref 3.9–12.7)

## 2023-07-01 ENCOUNTER — PATIENT MESSAGE (OUTPATIENT)
Dept: FAMILY MEDICINE | Facility: CLINIC | Age: 68
End: 2023-07-01
Payer: MEDICARE

## 2023-07-04 DIAGNOSIS — M25.572 PAIN IN JOINT INVOLVING LEFT ANKLE AND FOOT: ICD-10-CM

## 2023-07-04 RX ORDER — ALBUTEROL SULFATE 90 UG/1
AEROSOL, METERED RESPIRATORY (INHALATION)
Qty: 54 G | Refills: 3 | Status: SHIPPED | OUTPATIENT
Start: 2023-07-04

## 2023-07-05 ENCOUNTER — LAB VISIT (OUTPATIENT)
Dept: LAB | Facility: HOSPITAL | Age: 68
End: 2023-07-05
Attending: FAMILY MEDICINE
Payer: MEDICARE

## 2023-07-05 ENCOUNTER — OFFICE VISIT (OUTPATIENT)
Dept: FAMILY MEDICINE | Facility: CLINIC | Age: 68
End: 2023-07-05
Attending: FAMILY MEDICINE
Payer: MEDICARE

## 2023-07-05 VITALS
WEIGHT: 239.63 LBS | TEMPERATURE: 99 F | DIASTOLIC BLOOD PRESSURE: 70 MMHG | SYSTOLIC BLOOD PRESSURE: 110 MMHG | BODY MASS INDEX: 35.49 KG/M2 | HEIGHT: 69 IN | HEART RATE: 89 BPM | OXYGEN SATURATION: 98 %

## 2023-07-05 DIAGNOSIS — E11.69 DM TYPE 2 WITH DIABETIC DYSLIPIDEMIA: Primary | ICD-10-CM

## 2023-07-05 DIAGNOSIS — N18.31 STAGE 3A CHRONIC KIDNEY DISEASE (CKD): ICD-10-CM

## 2023-07-05 DIAGNOSIS — F43.21 GRIEF: ICD-10-CM

## 2023-07-05 DIAGNOSIS — E11.69 DM TYPE 2 WITH DIABETIC DYSLIPIDEMIA: ICD-10-CM

## 2023-07-05 DIAGNOSIS — E78.5 DM TYPE 2 WITH DIABETIC DYSLIPIDEMIA: Primary | ICD-10-CM

## 2023-07-05 DIAGNOSIS — E78.5 DM TYPE 2 WITH DIABETIC DYSLIPIDEMIA: ICD-10-CM

## 2023-07-05 PROBLEM — Z01.818 PRE-OP EVALUATION: Status: RESOLVED | Noted: 2023-05-08 | Resolved: 2023-07-05

## 2023-07-05 LAB
ALBUMIN SERPL BCP-MCNC: 3.5 G/DL (ref 3.5–5.2)
ALP SERPL-CCNC: 152 U/L (ref 55–135)
ALT SERPL W/O P-5'-P-CCNC: 15 U/L (ref 10–44)
ANION GAP SERPL CALC-SCNC: 9 MMOL/L (ref 8–16)
AST SERPL-CCNC: 20 U/L (ref 10–40)
BASOPHILS # BLD AUTO: 0.05 K/UL (ref 0–0.2)
BASOPHILS NFR BLD: 0.5 % (ref 0–1.9)
BILIRUB SERPL-MCNC: 0.3 MG/DL (ref 0.1–1)
BUN SERPL-MCNC: 31 MG/DL (ref 8–23)
CALCIUM SERPL-MCNC: 10 MG/DL (ref 8.7–10.5)
CHLORIDE SERPL-SCNC: 103 MMOL/L (ref 95–110)
CO2 SERPL-SCNC: 29 MMOL/L (ref 23–29)
CREAT SERPL-MCNC: 1.2 MG/DL (ref 0.5–1.4)
DIFFERENTIAL METHOD: ABNORMAL
EOSINOPHIL # BLD AUTO: 0.2 K/UL (ref 0–0.5)
EOSINOPHIL NFR BLD: 1.8 % (ref 0–8)
ERYTHROCYTE [DISTWIDTH] IN BLOOD BY AUTOMATED COUNT: 14.4 % (ref 11.5–14.5)
EST. GFR  (NO RACE VARIABLE): 48.7 ML/MIN/1.73 M^2
ESTIMATED AVG GLUCOSE: 160 MG/DL (ref 68–131)
GLUCOSE SERPL-MCNC: 117 MG/DL (ref 70–110)
HBA1C MFR BLD: 7.2 % (ref 4–5.6)
HCT VFR BLD AUTO: 43.2 % (ref 37–48.5)
HGB BLD-MCNC: 13 G/DL (ref 12–16)
IMM GRANULOCYTES # BLD AUTO: 0.04 K/UL (ref 0–0.04)
IMM GRANULOCYTES NFR BLD AUTO: 0.4 % (ref 0–0.5)
LYMPHOCYTES # BLD AUTO: 1.2 K/UL (ref 1–4.8)
LYMPHOCYTES NFR BLD: 13.6 % (ref 18–48)
MCH RBC QN AUTO: 27.8 PG (ref 27–31)
MCHC RBC AUTO-ENTMCNC: 30.1 G/DL (ref 32–36)
MCV RBC AUTO: 92 FL (ref 82–98)
MONOCYTES # BLD AUTO: 0.5 K/UL (ref 0.3–1)
MONOCYTES NFR BLD: 5.3 % (ref 4–15)
NEUTROPHILS # BLD AUTO: 7.2 K/UL (ref 1.8–7.7)
NEUTROPHILS NFR BLD: 78.4 % (ref 38–73)
NRBC BLD-RTO: 0 /100 WBC
PLATELET # BLD AUTO: 391 K/UL (ref 150–450)
PMV BLD AUTO: 10.6 FL (ref 9.2–12.9)
POTASSIUM SERPL-SCNC: 5.6 MMOL/L (ref 3.5–5.1)
PROT SERPL-MCNC: 7.4 G/DL (ref 6–8.4)
RBC # BLD AUTO: 4.68 M/UL (ref 4–5.4)
SODIUM SERPL-SCNC: 141 MMOL/L (ref 136–145)
WBC # BLD AUTO: 9.12 K/UL (ref 3.9–12.7)

## 2023-07-05 PROCEDURE — 1160F PR REVIEW ALL MEDS BY PRESCRIBER/CLIN PHARMACIST DOCUMENTED: ICD-10-PCS | Mod: CPTII,S$GLB,, | Performed by: FAMILY MEDICINE

## 2023-07-05 PROCEDURE — 1126F AMNT PAIN NOTED NONE PRSNT: CPT | Mod: CPTII,S$GLB,, | Performed by: FAMILY MEDICINE

## 2023-07-05 PROCEDURE — 1101F PR PT FALLS ASSESS DOC 0-1 FALLS W/OUT INJ PAST YR: ICD-10-PCS | Mod: CPTII,S$GLB,, | Performed by: FAMILY MEDICINE

## 2023-07-05 PROCEDURE — 1101F PT FALLS ASSESS-DOCD LE1/YR: CPT | Mod: CPTII,S$GLB,, | Performed by: FAMILY MEDICINE

## 2023-07-05 PROCEDURE — 3288F PR FALLS RISK ASSESSMENT DOCUMENTED: ICD-10-PCS | Mod: CPTII,S$GLB,, | Performed by: FAMILY MEDICINE

## 2023-07-05 PROCEDURE — 4010F ACE/ARB THERAPY RXD/TAKEN: CPT | Mod: CPTII,S$GLB,, | Performed by: FAMILY MEDICINE

## 2023-07-05 PROCEDURE — 99999 PR PBB SHADOW E&M-EST. PATIENT-LVL V: ICD-10-PCS | Mod: PBBFAC,,, | Performed by: FAMILY MEDICINE

## 2023-07-05 PROCEDURE — 1159F PR MEDICATION LIST DOCUMENTED IN MEDICAL RECORD: ICD-10-PCS | Mod: CPTII,S$GLB,, | Performed by: FAMILY MEDICINE

## 2023-07-05 PROCEDURE — 3288F FALL RISK ASSESSMENT DOCD: CPT | Mod: CPTII,S$GLB,, | Performed by: FAMILY MEDICINE

## 2023-07-05 PROCEDURE — 1126F PR PAIN SEVERITY QUANTIFIED, NO PAIN PRESENT: ICD-10-PCS | Mod: CPTII,S$GLB,, | Performed by: FAMILY MEDICINE

## 2023-07-05 PROCEDURE — 83036 HEMOGLOBIN GLYCOSYLATED A1C: CPT | Performed by: FAMILY MEDICINE

## 2023-07-05 PROCEDURE — 1159F MED LIST DOCD IN RCRD: CPT | Mod: CPTII,S$GLB,, | Performed by: FAMILY MEDICINE

## 2023-07-05 PROCEDURE — 3078F DIAST BP <80 MM HG: CPT | Mod: CPTII,S$GLB,, | Performed by: FAMILY MEDICINE

## 2023-07-05 PROCEDURE — 99214 OFFICE O/P EST MOD 30 MIN: CPT | Mod: S$GLB,,, | Performed by: FAMILY MEDICINE

## 2023-07-05 PROCEDURE — 85025 COMPLETE CBC W/AUTO DIFF WBC: CPT | Performed by: FAMILY MEDICINE

## 2023-07-05 PROCEDURE — 3074F SYST BP LT 130 MM HG: CPT | Mod: CPTII,S$GLB,, | Performed by: FAMILY MEDICINE

## 2023-07-05 PROCEDURE — 1160F RVW MEDS BY RX/DR IN RCRD: CPT | Mod: CPTII,S$GLB,, | Performed by: FAMILY MEDICINE

## 2023-07-05 PROCEDURE — 80053 COMPREHEN METABOLIC PANEL: CPT | Performed by: FAMILY MEDICINE

## 2023-07-05 PROCEDURE — 3074F PR MOST RECENT SYSTOLIC BLOOD PRESSURE < 130 MM HG: ICD-10-PCS | Mod: CPTII,S$GLB,, | Performed by: FAMILY MEDICINE

## 2023-07-05 PROCEDURE — 3008F BODY MASS INDEX DOCD: CPT | Mod: CPTII,S$GLB,, | Performed by: FAMILY MEDICINE

## 2023-07-05 PROCEDURE — 3008F PR BODY MASS INDEX (BMI) DOCUMENTED: ICD-10-PCS | Mod: CPTII,S$GLB,, | Performed by: FAMILY MEDICINE

## 2023-07-05 PROCEDURE — 99999 PR PBB SHADOW E&M-EST. PATIENT-LVL V: CPT | Mod: PBBFAC,,, | Performed by: FAMILY MEDICINE

## 2023-07-05 PROCEDURE — 4010F PR ACE/ARB THEARPY RXD/TAKEN: ICD-10-PCS | Mod: CPTII,S$GLB,, | Performed by: FAMILY MEDICINE

## 2023-07-05 PROCEDURE — 99214 PR OFFICE/OUTPT VISIT, EST, LEVL IV, 30-39 MIN: ICD-10-PCS | Mod: S$GLB,,, | Performed by: FAMILY MEDICINE

## 2023-07-05 PROCEDURE — 36415 COLL VENOUS BLD VENIPUNCTURE: CPT | Mod: PO | Performed by: FAMILY MEDICINE

## 2023-07-05 PROCEDURE — 3078F PR MOST RECENT DIASTOLIC BLOOD PRESSURE < 80 MM HG: ICD-10-PCS | Mod: CPTII,S$GLB,, | Performed by: FAMILY MEDICINE

## 2023-07-05 RX ORDER — CELECOXIB 200 MG/1
200 CAPSULE ORAL DAILY
Qty: 30 CAPSULE | Refills: 1 | Status: SHIPPED | OUTPATIENT
Start: 2023-07-05 | End: 2023-07-05

## 2023-07-05 RX ORDER — GABAPENTIN 100 MG/1
CAPSULE ORAL
Qty: 90 CAPSULE | Refills: 0 | Status: SHIPPED | OUTPATIENT
Start: 2023-07-05 | End: 2023-09-11

## 2023-07-05 RX ORDER — SERTRALINE HYDROCHLORIDE 50 MG/1
50 TABLET, FILM COATED ORAL DAILY
Qty: 30 TABLET | Refills: 1 | Status: SHIPPED | OUTPATIENT
Start: 2023-07-05 | End: 2023-08-02

## 2023-07-05 RX ORDER — MELOXICAM 15 MG/1
15 TABLET ORAL DAILY
COMMUNITY
Start: 2023-07-04 | End: 2023-07-05

## 2023-07-05 RX ORDER — GLIMEPIRIDE 4 MG/1
TABLET ORAL
Qty: 90 TABLET | Refills: 1 | Status: SHIPPED | OUTPATIENT
Start: 2023-07-05

## 2023-07-05 NOTE — TELEPHONE ENCOUNTER
Care Due:                  Date            Visit Type   Department     Provider  --------------------------------------------------------------------------------                                MYCHART                              FOLLOWUP/OF  [facility] KANIKA RIDER  Last Visit: 06-      FICE VISIT   MEDICINE       Maximino-Andrae                              EP -                              PRIMARY      [facility] KANIKA RIDER  Next Visit: 07-      CARE ([facility])   MEDICINE       Maximino-Anderson                                                            Last  Test          Frequency    Reason                     Performed    Due Date  --------------------------------------------------------------------------------    HBA1C.......  6 months...  glimepiride..............  12- 06-    Lipid Panel.  12 months..  atorvastatin.............  01- 01-    [facility] Embedded Care Due Messages. Reference number: [id number].   7/04/2023 9:29:33 PM CDT

## 2023-07-05 NOTE — TELEPHONE ENCOUNTER
Refill Routing Note   Medication(s) are not appropriate for processing by [facility] for the following reason(s):      Medication outside of protocol  Required labs outdated  Required labs abnormal    [facility] action(s):  Defer  Route  Approve Labs due            Appointments  past 12m or future 3m with PCP    Date Provider   Last Visit   6/27/2023 Lucy Negron MD   Next Visit   7/5/2023 Lucy Negron MD   ED visits in past 90 days: 0        Note composed:9:59 PM 07/04/2023

## 2023-07-05 NOTE — PROGRESS NOTES
Subjective:       Patient ID: Kay Galarza is a 68 y.o. female.    Chief Complaint: Follow-up    68 y old female f.u today on Grief after her  passing . Sleeps well. Cries often and has anhedonia. Also feels hopeless. NO s/I . Also will like to f.u on labs from labs week . She was found to have dehydration . Eating better . Still with intermittent diarrhea.     Review of Systems   Constitutional: Negative.    HENT: Negative.     Eyes: Negative.    Respiratory: Negative.     Cardiovascular: Negative.    Gastrointestinal: Negative.    Genitourinary: Negative.    Musculoskeletal: Negative.    Skin: Negative.    Hematological: Negative.    Psychiatric/Behavioral:  Positive for dysphoric mood. The patient is nervous/anxious.      Objective:      Physical Exam  Constitutional:       General: She is not in acute distress.     Appearance: She is well-developed. She is not diaphoretic.   HENT:      Head: Normocephalic and atraumatic.      Right Ear: External ear normal.      Left Ear: External ear normal.      Mouth/Throat:      Pharynx: No oropharyngeal exudate.   Eyes:      General: No scleral icterus.        Right eye: No discharge.         Left eye: No discharge.      Conjunctiva/sclera: Conjunctivae normal.      Pupils: Pupils are equal, round, and reactive to light.   Neck:      Thyroid: No thyromegaly.      Vascular: No JVD.      Trachea: No tracheal deviation.   Cardiovascular:      Rate and Rhythm: Normal rate and regular rhythm.      Heart sounds: Normal heart sounds. No murmur heard.    No friction rub. No gallop.   Pulmonary:      Effort: Pulmonary effort is normal. No respiratory distress.      Breath sounds: Normal breath sounds. No stridor. No wheezing or rales.   Chest:      Chest wall: No tenderness.   Abdominal:      General: Bowel sounds are normal. There is no distension.      Palpations: Abdomen is soft.      Tenderness: There is no abdominal tenderness. There is no guarding or  rebound.   Musculoskeletal:         General: Normal range of motion.      Cervical back: Normal range of motion and neck supple.   Lymphadenopathy:      Cervical: No cervical adenopathy.   Skin:     General: Skin is warm and dry.   Neurological:      Mental Status: She is alert and oriented to person, place, and time.   Psychiatric:         Mood and Affect: Mood is depressed.         Behavior: Behavior normal.         Thought Content: Thought content normal.         Judgment: Judgment normal.       Assessment:       1. DM type 2 with diabetic dyslipidemia    2. Stage 3a chronic kidney disease (CKD)    3. Grief        Plan:     Kay was seen today for follow-up.    Diagnoses and all orders for this visit:    DM type 2 with diabetic dyslipidemia  -     Hemoglobin A1C; Future    Stage 3a chronic kidney disease (CKD)  -     CBC Auto Differential; Future  -     Comprehensive Metabolic Panel; Future    Grief    Other orders  -     sertraline (ZOLOFT) 50 MG tablet; Take 1 tablet (50 mg total) by mouth once daily.     Labs   AVOID nSAIDS  Sertraline . F.u in 1 m

## 2023-07-06 ENCOUNTER — TELEPHONE (OUTPATIENT)
Dept: FAMILY MEDICINE | Facility: CLINIC | Age: 68
End: 2023-07-06
Payer: MEDICARE

## 2023-07-06 DIAGNOSIS — E87.5 HYPERKALEMIA: ICD-10-CM

## 2023-07-06 DIAGNOSIS — R74.8 ALKALINE PHOSPHATASE ELEVATION: Primary | ICD-10-CM

## 2023-07-07 ENCOUNTER — PATIENT MESSAGE (OUTPATIENT)
Dept: FAMILY MEDICINE | Facility: CLINIC | Age: 68
End: 2023-07-07
Payer: MEDICARE

## 2023-07-07 ENCOUNTER — PATIENT MESSAGE (OUTPATIENT)
Dept: INFECTIOUS DISEASES | Facility: CLINIC | Age: 68
End: 2023-07-07
Payer: MEDICARE

## 2023-07-07 DIAGNOSIS — R79.89 ABNORMAL CBC: Primary | ICD-10-CM

## 2023-07-11 ENCOUNTER — DOCUMENT SCAN (OUTPATIENT)
Dept: HOME HEALTH SERVICES | Facility: HOSPITAL | Age: 68
End: 2023-07-11
Payer: MEDICARE

## 2023-07-13 ENCOUNTER — TELEPHONE (OUTPATIENT)
Dept: HEMATOLOGY/ONCOLOGY | Facility: CLINIC | Age: 68
End: 2023-07-13
Payer: MEDICARE

## 2023-07-13 ENCOUNTER — PATIENT MESSAGE (OUTPATIENT)
Dept: HEMATOLOGY/ONCOLOGY | Facility: CLINIC | Age: 68
End: 2023-07-13
Payer: MEDICARE

## 2023-07-20 ENCOUNTER — PES CALL (OUTPATIENT)
Dept: ADMINISTRATIVE | Facility: CLINIC | Age: 68
End: 2023-07-20
Payer: MEDICARE

## 2023-08-02 ENCOUNTER — DOCUMENT SCAN (OUTPATIENT)
Dept: HOME HEALTH SERVICES | Facility: HOSPITAL | Age: 68
End: 2023-08-02
Payer: MEDICARE

## 2023-08-02 DIAGNOSIS — E11.9 TYPE 2 DIABETES MELLITUS WITHOUT COMPLICATION: ICD-10-CM

## 2023-08-02 RX ORDER — SERTRALINE HYDROCHLORIDE 50 MG/1
50 TABLET, FILM COATED ORAL DAILY
Qty: 30 TABLET | Refills: 1 | Status: SHIPPED | OUTPATIENT
Start: 2023-08-02 | End: 2023-09-28

## 2023-08-02 NOTE — TELEPHONE ENCOUNTER
Refill Routing Note   Medication(s) are not appropriate for processing by [facility] for the following reason(s):      New or recently adjusted medication    [facility] action(s):  Defer Care Due:  None identified            Appointments  past 12m or future 3m with PCP    Date Provider   Last Visit   7/5/2023 Lucy Negron MD   Next Visit   8/4/2023 Lucy Negron MD   ED visits in past 90 days: 0        Note composed:1:42 PM 08/02/2023

## 2023-08-02 NOTE — TELEPHONE ENCOUNTER
No care due was identified.  [facility] Embedded Care Due Messages. Reference number: [id number].   8/02/2023 12:49:50 PM CDT

## 2023-08-11 DIAGNOSIS — M25.572 PAIN IN JOINT INVOLVING LEFT ANKLE AND FOOT: ICD-10-CM

## 2023-08-13 RX ORDER — CELECOXIB 200 MG/1
200 CAPSULE ORAL DAILY
Qty: 30 CAPSULE | Refills: 1 | OUTPATIENT
Start: 2023-08-13 | End: 2023-09-12

## 2023-08-13 RX ORDER — ERGOCALCIFEROL 1.25 MG/1
CAPSULE ORAL
Qty: 4 CAPSULE | Status: SHIPPED | OUTPATIENT
Start: 2023-08-13

## 2023-08-22 RX ORDER — LISINOPRIL AND HYDROCHLOROTHIAZIDE 20; 25 MG/1; MG/1
1 TABLET ORAL DAILY
Qty: 90 TABLET | Status: SHIPPED | OUTPATIENT
Start: 2023-08-22

## 2023-08-22 NOTE — TELEPHONE ENCOUNTER
No care due was identified.  [facility] Embedded Care Due Messages. Reference number: [id number].   8/22/2023 12:39:28 PM CDT

## 2023-08-22 NOTE — TELEPHONE ENCOUNTER
Refill Routing Note   Medication(s) are not appropriate for processing by [facility] for the following reason(s):      Required labs abnormal    [facility] action(s):  Defer Care Due:  None identified            Appointments  past 12m or future 3m with PCP    Date Provider   Last Visit   7/5/2023 Lucy Negron MD   Next Visit   Visit date not found Lucy Negron MD   ED visits in past 90 days: 0        Note composed:1:04 PM 08/22/2023

## 2023-09-01 ENCOUNTER — TELEPHONE (OUTPATIENT)
Dept: NEPHROLOGY | Facility: CLINIC | Age: 68
End: 2023-09-01
Payer: MEDICARE

## 2023-09-01 DIAGNOSIS — N18.2 CKD (CHRONIC KIDNEY DISEASE) STAGE 2, GFR 60-89 ML/MIN: Primary | ICD-10-CM

## 2023-09-07 ENCOUNTER — TELEPHONE (OUTPATIENT)
Dept: HEMATOLOGY/ONCOLOGY | Facility: CLINIC | Age: 68
End: 2023-09-07
Payer: MEDICARE

## 2023-09-08 NOTE — TELEPHONE ENCOUNTER
Care Due:                  Date            Visit Type   Department     Provider  --------------------------------------------------------------------------------                                EP -                              PRIMARY      [facility]  Lucy RIDER  Last Visit: 07-      CARE ([facility])   [facility]       Jamil  Next Visit: None Scheduled  None         None Found                                                            Last  Test          Frequency    Reason                     Performed    Due Date  --------------------------------------------------------------------------------    Lipid Panel.  12 months..  atorvastatin.............  01- 01-    [facility] Embedded Care Due Messages. Reference number: [id number].   9/08/2023 2:23:28 PM CDT

## 2023-09-09 RX ORDER — TRAZODONE HYDROCHLORIDE 150 MG/1
TABLET ORAL
Qty: 90 TABLET | Refills: 3 | Status: SHIPPED | OUTPATIENT
Start: 2023-09-09

## 2023-09-09 NOTE — TELEPHONE ENCOUNTER
Provider Staff:  Action required for this patient     Please see care gap opportunities below in Care Due Message.    Thanks!  [facility]     Appointments      Date Provider   Last Visit   7/5/2023 Lucy Negron MD   Next Visit   Visit date not found Lucy Negron MD     Refill Decision Note   Kay Galarza  is requesting a refill authorization.  Brief Assessment and Rationale for Refill:  Approve     Medication Therapy Plan:         Comments:     Note composed:3:07 PM 09/09/2023

## 2023-09-11 DIAGNOSIS — E83.42 HYPOMAGNESEMIA: ICD-10-CM

## 2023-09-11 RX ORDER — GABAPENTIN 100 MG/1
CAPSULE ORAL
Qty: 90 CAPSULE | Refills: 0 | Status: SHIPPED | OUTPATIENT
Start: 2023-09-11 | End: 2023-11-20

## 2023-09-11 RX ORDER — VITS A,C,E/LUTEIN/MINERALS 300MCG-200
TABLET ORAL
Qty: 120 TABLET | Status: SHIPPED | OUTPATIENT
Start: 2023-09-11

## 2023-09-11 NOTE — TELEPHONE ENCOUNTER
No care due was identified.  [facility] Embedded Care Due Messages. Reference number: [id number].   9/11/2023 3:08:44 PM CDT

## 2023-09-27 NOTE — TELEPHONE ENCOUNTER
No care due was identified.  [facility] Embedded Care Due Messages. Reference number: [id number].   9/27/2023 6:18:09 PM CDT

## 2023-09-28 RX ORDER — SERTRALINE HYDROCHLORIDE 50 MG/1
50 TABLET, FILM COATED ORAL DAILY
Qty: 90 TABLET | Refills: 3 | Status: SHIPPED | OUTPATIENT
Start: 2023-09-28

## 2023-09-28 NOTE — TELEPHONE ENCOUNTER
Refill Routing Note   Medication(s) are not appropriate for processing by [facility] for the following reason(s):      New or recently adjusted medication    [facility] action(s):  Defer Care Due:  None identified              Appointments  past 12m or future 3m with PCP    Date Provider   Last Visit   7/5/2023 Lucy Negron MD   Next Visit   Visit date not found Lucy Negron MD   ED visits in past 90 days: 0        Note composed:7:06 PM 09/27/2023

## 2023-10-10 ENCOUNTER — LAB VISIT (OUTPATIENT)
Dept: LAB | Facility: HOSPITAL | Age: 68
End: 2023-10-10
Attending: NURSE PRACTITIONER
Payer: MEDICARE

## 2023-10-10 ENCOUNTER — TELEPHONE (OUTPATIENT)
Dept: DIABETES | Facility: CLINIC | Age: 68
End: 2023-10-10

## 2023-10-10 ENCOUNTER — OFFICE VISIT (OUTPATIENT)
Dept: DIABETES | Facility: CLINIC | Age: 68
End: 2023-10-10
Payer: MEDICARE

## 2023-10-10 VITALS
HEART RATE: 74 BPM | BODY MASS INDEX: 35.53 KG/M2 | DIASTOLIC BLOOD PRESSURE: 72 MMHG | SYSTOLIC BLOOD PRESSURE: 138 MMHG | HEIGHT: 69 IN | WEIGHT: 239.88 LBS

## 2023-10-10 DIAGNOSIS — E11.42 DIABETIC PERIPHERAL NEUROPATHY ASSOCIATED WITH TYPE 2 DIABETES MELLITUS: ICD-10-CM

## 2023-10-10 DIAGNOSIS — N18.2 CKD STAGE 2 DUE TO TYPE 2 DIABETES MELLITUS: ICD-10-CM

## 2023-10-10 DIAGNOSIS — M14.672 CHARCOT'S JOINT OF FOOT, LEFT: ICD-10-CM

## 2023-10-10 DIAGNOSIS — I10 ESSENTIAL HYPERTENSION: ICD-10-CM

## 2023-10-10 DIAGNOSIS — E11.65 UNCONTROLLED TYPE 2 DIABETES MELLITUS WITH HYPERGLYCEMIA, WITH LONG-TERM CURRENT USE OF INSULIN: ICD-10-CM

## 2023-10-10 DIAGNOSIS — E11.3553 STABLE PROLIFERATIVE DIABETIC RETINOPATHY OF BOTH EYES ASSOCIATED WITH TYPE 2 DIABETES MELLITUS: ICD-10-CM

## 2023-10-10 DIAGNOSIS — E11.65 UNCONTROLLED TYPE 2 DIABETES MELLITUS WITH HYPERGLYCEMIA, WITH LONG-TERM CURRENT USE OF INSULIN: Primary | ICD-10-CM

## 2023-10-10 DIAGNOSIS — E78.2 MIXED HYPERLIPIDEMIA: ICD-10-CM

## 2023-10-10 DIAGNOSIS — Z79.4 UNCONTROLLED TYPE 2 DIABETES MELLITUS WITH HYPERGLYCEMIA, WITH LONG-TERM CURRENT USE OF INSULIN: ICD-10-CM

## 2023-10-10 DIAGNOSIS — Z79.4 UNCONTROLLED TYPE 2 DIABETES MELLITUS WITH HYPERGLYCEMIA, WITH LONG-TERM CURRENT USE OF INSULIN: Primary | ICD-10-CM

## 2023-10-10 DIAGNOSIS — E11.22 CKD STAGE 2 DUE TO TYPE 2 DIABETES MELLITUS: ICD-10-CM

## 2023-10-10 LAB
ESTIMATED AVG GLUCOSE: 160 MG/DL (ref 68–131)
HBA1C MFR BLD: 7.2 % (ref 4–5.6)

## 2023-10-10 PROCEDURE — 1160F PR REVIEW ALL MEDS BY PRESCRIBER/CLIN PHARMACIST DOCUMENTED: ICD-10-PCS | Mod: HCNC,CPTII,S$GLB, | Performed by: NURSE PRACTITIONER

## 2023-10-10 PROCEDURE — 3075F PR MOST RECENT SYSTOLIC BLOOD PRESS GE 130-139MM HG: ICD-10-PCS | Mod: HCNC,CPTII,S$GLB, | Performed by: NURSE PRACTITIONER

## 2023-10-10 PROCEDURE — 3051F PR MOST RECENT HEMOGLOBIN A1C LEVEL 7.0 - < 8.0%: ICD-10-PCS | Mod: HCNC,CPTII,S$GLB, | Performed by: NURSE PRACTITIONER

## 2023-10-10 PROCEDURE — 36415 COLL VENOUS BLD VENIPUNCTURE: CPT | Mod: HCNC | Performed by: NURSE PRACTITIONER

## 2023-10-10 PROCEDURE — 1101F PT FALLS ASSESS-DOCD LE1/YR: CPT | Mod: HCNC,CPTII,S$GLB, | Performed by: NURSE PRACTITIONER

## 2023-10-10 PROCEDURE — 4010F PR ACE/ARB THEARPY RXD/TAKEN: ICD-10-PCS | Mod: HCNC,CPTII,S$GLB, | Performed by: NURSE PRACTITIONER

## 2023-10-10 PROCEDURE — 4010F ACE/ARB THERAPY RXD/TAKEN: CPT | Mod: HCNC,CPTII,S$GLB, | Performed by: NURSE PRACTITIONER

## 2023-10-10 PROCEDURE — 1160F RVW MEDS BY RX/DR IN RCRD: CPT | Mod: HCNC,CPTII,S$GLB, | Performed by: NURSE PRACTITIONER

## 2023-10-10 PROCEDURE — 3288F PR FALLS RISK ASSESSMENT DOCUMENTED: ICD-10-PCS | Mod: HCNC,CPTII,S$GLB, | Performed by: NURSE PRACTITIONER

## 2023-10-10 PROCEDURE — 1159F PR MEDICATION LIST DOCUMENTED IN MEDICAL RECORD: ICD-10-PCS | Mod: HCNC,CPTII,S$GLB, | Performed by: NURSE PRACTITIONER

## 2023-10-10 PROCEDURE — 3078F DIAST BP <80 MM HG: CPT | Mod: HCNC,CPTII,S$GLB, | Performed by: NURSE PRACTITIONER

## 2023-10-10 PROCEDURE — 1101F PR PT FALLS ASSESS DOC 0-1 FALLS W/OUT INJ PAST YR: ICD-10-PCS | Mod: HCNC,CPTII,S$GLB, | Performed by: NURSE PRACTITIONER

## 2023-10-10 PROCEDURE — 1126F AMNT PAIN NOTED NONE PRSNT: CPT | Mod: HCNC,CPTII,S$GLB, | Performed by: NURSE PRACTITIONER

## 2023-10-10 PROCEDURE — 83036 HEMOGLOBIN GLYCOSYLATED A1C: CPT | Mod: HCNC | Performed by: NURSE PRACTITIONER

## 2023-10-10 PROCEDURE — 3078F PR MOST RECENT DIASTOLIC BLOOD PRESSURE < 80 MM HG: ICD-10-PCS | Mod: HCNC,CPTII,S$GLB, | Performed by: NURSE PRACTITIONER

## 2023-10-10 PROCEDURE — 99214 PR OFFICE/OUTPT VISIT, EST, LEVL IV, 30-39 MIN: ICD-10-PCS | Mod: HCNC,S$GLB,, | Performed by: NURSE PRACTITIONER

## 2023-10-10 PROCEDURE — 1126F PR PAIN SEVERITY QUANTIFIED, NO PAIN PRESENT: ICD-10-PCS | Mod: HCNC,CPTII,S$GLB, | Performed by: NURSE PRACTITIONER

## 2023-10-10 PROCEDURE — 95251 PR GLUCOSE MONITOR, 72 HOUR, PHYS INTERP: ICD-10-PCS | Mod: HCNC,S$GLB,, | Performed by: NURSE PRACTITIONER

## 2023-10-10 PROCEDURE — 1159F MED LIST DOCD IN RCRD: CPT | Mod: HCNC,CPTII,S$GLB, | Performed by: NURSE PRACTITIONER

## 2023-10-10 PROCEDURE — 3075F SYST BP GE 130 - 139MM HG: CPT | Mod: HCNC,CPTII,S$GLB, | Performed by: NURSE PRACTITIONER

## 2023-10-10 PROCEDURE — 3008F BODY MASS INDEX DOCD: CPT | Mod: HCNC,CPTII,S$GLB, | Performed by: NURSE PRACTITIONER

## 2023-10-10 PROCEDURE — 3051F HG A1C>EQUAL 7.0%<8.0%: CPT | Mod: HCNC,CPTII,S$GLB, | Performed by: NURSE PRACTITIONER

## 2023-10-10 PROCEDURE — 99214 OFFICE O/P EST MOD 30 MIN: CPT | Mod: HCNC,S$GLB,, | Performed by: NURSE PRACTITIONER

## 2023-10-10 PROCEDURE — 99999 PR PBB SHADOW E&M-EST. PATIENT-LVL V: ICD-10-PCS | Mod: PBBFAC,HCNC,, | Performed by: NURSE PRACTITIONER

## 2023-10-10 PROCEDURE — 3008F PR BODY MASS INDEX (BMI) DOCUMENTED: ICD-10-PCS | Mod: HCNC,CPTII,S$GLB, | Performed by: NURSE PRACTITIONER

## 2023-10-10 PROCEDURE — 95251 CONT GLUC MNTR ANALYSIS I&R: CPT | Mod: HCNC,S$GLB,, | Performed by: NURSE PRACTITIONER

## 2023-10-10 PROCEDURE — 99999 PR PBB SHADOW E&M-EST. PATIENT-LVL V: CPT | Mod: PBBFAC,HCNC,, | Performed by: NURSE PRACTITIONER

## 2023-10-10 PROCEDURE — 3288F FALL RISK ASSESSMENT DOCD: CPT | Mod: HCNC,CPTII,S$GLB, | Performed by: NURSE PRACTITIONER

## 2023-10-10 RX ORDER — INSULIN GLARGINE 300 U/ML
70 INJECTION, SOLUTION SUBCUTANEOUS NIGHTLY
Qty: 4 PEN | Refills: 5 | Status: SHIPPED | OUTPATIENT
Start: 2023-10-10 | End: 2024-03-28 | Stop reason: SDUPTHER

## 2023-10-10 RX ORDER — INSULIN ASPART 100 [IU]/ML
18 INJECTION, SOLUTION INTRAVENOUS; SUBCUTANEOUS
Qty: 30 ML | Refills: 5 | Status: SHIPPED | OUTPATIENT
Start: 2023-10-10

## 2023-10-10 NOTE — PATIENT INSTRUCTIONS
Continue Toujeo Max to 70 units daily.    Increase Novolog to 14 units three times a day before each main meal.     Continue Farxiga 10 mg daily and Amaryl 4 mg in the AM as current.      Schedule eye exam.

## 2023-10-10 NOTE — Clinical Note
Upgrade to Dexcom G7 *all components* to [facility] through [place]

## 2023-10-10 NOTE — PROGRESS NOTES
Subjective:         Patient ID: Kay Galarza is a 68 y.o. female.  Patient's current PCP is Lucy Negron MD.     Chief Complaint: Diabetes Mellitus      HPI  Kay Galarza is a 68 y.o. White female presenting for a follow up for diabetes. Patient has been diagnosed with type 2 diabetes since 2007 .  Received diabetes education: Yes --declines refresher    CURRENT DM MEDICATIONS:   Diabetes Medications               FARXIGA 10 mg tablet TAKE 1 TABLET (10 MG TOTAL) BY MOUTH ONCE DAILY.    glimepiride (AMARYL) 4 MG tablet TAKE 1 TABLET  BY MOUTH DAILY WITH BREAKFAST.    insulin glargine U-300 conc (TOUJEO MAX U-300 SOLOSTAR) 300 unit/mL (3 mL) insulin pen Inject 70 Units into the skin every evening.    NOVOLOG FLEXPEN U-100 INSULIN 100 unit/mL (3 mL) InPn pen Inject 18 Units into the skin 3 (three) times daily before meals.             Past failed treatment include: Xigduo,Trulicity- GI complaints    Blood glucose testing Continuous per Dexcom G6 - - referral to be sent for G7  Preferred lab: [place]    Any episodes of hypoglycemia? <1% per Dexcom    Complications related to diabetes: nephropathy, retinopathy, peripheral neuropathy, peripheral vascular disease, and charcot foot (L,2020)    Her blood sugar in the clinic today was:   Lab Results   Component Value Date    POCGLU 119 (A) 05/11/2022     Kay Galarza presents today for follow up visit to discuss diabetes management. She is concerned regarding accuracy of Dexcom G6- having some low alarms at night, however asymptomatic and when checked with finger-stick, glucose is normal. We reviewed how to troubleshoot this and when appropriate to remove the sensor and have replaced by Dexcom. Per Dexcom download, for the last 14 days: Average glucose of 168 mg/dL. She is 58% in range. 40% of readings are high, with 1% of readings > 250. <1% mild hypoglycemia with nothing severe. SD of 42 mg/dL. Estimated GMI  7.3%. Glycemic control is stable overnight, however having consistent glucose spikes associated with meals. Based on review, plan to increase Novolog with meals.  A1c is due.         She is overdue for dilated eye exam - she is currently refusing to go. Stressed the importance of this with patient and her daughter. She has a history of retinopathy. Discussed blindness as a complication from untreated DR and advised to re-schedule appt.     Diabetic ulcer R foot- followed by Dr. Gray.      CKD II- sees nephrology, Dr. Sena    Current diet: Needs to be re-scheduled with DM education.   Activity Level: No structured exercise    Lab Results   Component Value Date    HGBA1C 7.2 (H) 07/05/2023    HGBA1C 7.6 (H) 12/07/2022    HGBA1C 7.7 (H) 05/25/2022     STANDARDS OF CARE  Diabetes Management Status    Statin: Taking  ACE/ARB: Taking    Screening or Prevention Patient's value Goal Complete/Controlled?   HgA1C Testing and Control   Lab Results   Component Value Date    HGBA1C 7.2 (H) 07/05/2023      Annually/Less than 8% Yes   Lipid profile : 01/26/2022 Annually No   LDL control Lab Results   Component Value Date    LDLCALC 80.0 01/26/2022    Annually/Less than 100 mg/dl  No   Nephropathy screening Lab Results   Component Value Date    LABMICR 570.0 05/25/2022     Lab Results   Component Value Date    PROTEINUA 2+ (A) 05/25/2022     Lab Results   Component Value Date    UTPCR 0.65 (H) 05/25/2022      Annually No   Blood pressure BP Readings from Last 1 Encounters:   10/10/23 138/72    Less than 140/90 Yes   Dilated retinal exam : 04/23/2021 Annually No As above, advised to schedule   Foot exam   : 03/17/2022 Annually No Sees podiatry routinely      Labs reviewed and are noted below.    Lab Results   Component Value Date    WBC 9.12 07/05/2023    HGB 13.0 07/05/2023    HCT 43.2 07/05/2023     07/05/2023    CHOL 133 01/26/2022    TRIG 95 01/26/2022    HDL 34 (L) 01/26/2022    LDLCALC 80.0 01/26/2022    ALT 15  "07/05/2023    AST 20 07/05/2023     07/05/2023    K 5.6 (H) 07/05/2023     07/05/2023    ANIONGAP 9 07/05/2023    CREATININE 1.2 07/05/2023    ESTGFRAFRICA >60.0 05/25/2022    EGFRNONAA 57.6 (A) 05/25/2022    BUN 31 (H) 07/05/2023    CO2 29 07/05/2023    TSH 1.577 12/07/2022    INR 1.1 01/23/2023     (H) 07/05/2023    UTPCR 0.65 (H) 05/25/2022     Lab Results   Component Value Date    TSH 1.577 12/07/2022    CALCIUM 10.0 07/05/2023    PHOS 3.5 03/14/2023     No results found for: "CPEPTIDE"  No results found for: "GLUTAMICACID"  Glucose   Date Value Ref Range Status   07/05/2023 117 (H) 70 - 110 mg/dL Final     Anion Gap   Date Value Ref Range Status   07/05/2023 9 8 - 16 mmol/L Final     eGFR if    Date Value Ref Range Status   05/25/2022 >60.0 >60 mL/min/1.73 m^2 Final     eGFR if non    Date Value Ref Range Status   05/25/2022 57.6 (A) >60 mL/min/1.73 m^2 Final     Comment:     Calculation used to obtain the estimated glomerular filtration  rate (eGFR) is the CKD-EPI equation.          The following portions of the patient's history were reviewed and updated as appropriate: allergies, current medications, past family history, past medical history, past social history, past surgical history, and problem list.    Review of patient's allergies indicates:   Allergen Reactions    Peanut butter [peanut] Shortness Of Breath and Itching    Pollen extracts      Seasonal allergies, sneezing     Social History     Socioeconomic History    Marital status:    Tobacco Use    Smoking status: Never    Smokeless tobacco: Never   Substance and Sexual Activity    Alcohol use: Never    Drug use: No    Sexual activity: Yes     Social Determinants of Health     Financial Resource Strain: Unknown (3/3/2023)    Overall Financial Resource Strain (CARDIA)     Difficulty of Paying Living Expenses: Patient refused   Food Insecurity: Unknown (3/3/2023)    Hunger Vital Sign     Worried " About Running Out of Food in the Last Year: Patient refused     Ran Out of Food in the Last Year: Patient refused   Transportation Needs: Unknown (3/3/2023)    PRAPARE - Transportation     Lack of Transportation (Medical): Patient refused     Lack of Transportation (Non-Medical): Patient refused   Physical Activity: Unknown (3/3/2023)    Exercise Vital Sign     Days of Exercise per Week: Patient refused     Minutes of Exercise per Session: 10 min   Stress: Unknown (3/3/2023)    [employer] - Occupational Stress Questionnaire     Feeling of Stress : Patient refused   Social Connections: Unknown (3/3/2023)    Social Connection and Isolation Panel [NHANES]     Frequency of Communication with Friends and Family: Patient refused     Frequency of Social Gatherings with Friends and Family: Patient refused     Active Member of Clubs or Organizations: Patient refused     Attends Club or Organization Meetings: Patient refused     Marital Status: Patient refused   Housing Stability: Unknown (3/3/2023)    Housing Stability Vital Sign     Unable to Pay for Housing in the Last Year: Patient refused     Unstable Housing in the Last Year: Patient refused     Past Medical History:   Diagnosis Date    Abnormal nuclear stress test 1/23/2023    Arthritis     DERIAN KNEES    Asthma     SEASONAL    Cataract     Diabetes mellitus     Diabetes mellitus, type 2     Diabetic retinopathy     DM (diabetes mellitus) 2007    BS 97 09/29/2020    Hyperlipidemia     Hypertension      REVIEW OF SYSTEMS:  Eyes History of DR.   Cardiovascular: History of HTN and hyperlipidemia.  GI: H/o reflux and intermittent diarrhea. Declines GLP1s.  : Tolerating Farxiga well from a  standpoint.  Neuro: No neuropathy.  PSYCH: No tobacco use.  ENDO: See HPI.        Objective:      Vitals:    10/10/23 1317   BP: 138/72   Pulse: 74     RESPIRATORY: No respiratory distress  NEUROLOGIC: Cranial nerves II-XII grossly intact.   PSYCHIATRIC:  "Alert & oriented x3. Normal mood and affect.  FOOT EXAMINATION: UTD  Assessment:       1. Uncontrolled type 2 diabetes mellitus with hyperglycemia, with long-term current use of insulin    2. Essential hypertension    3. Mixed hyperlipidemia    4. Charcot's joint of foot, left    5. Diabetic peripheral neuropathy associated with type 2 diabetes mellitus    6. CKD stage 2 due to type 2 diabetes mellitus    7. Stable proliferative diabetic retinopathy of both eyes associated with type 2 diabetes mellitus          Plan:   Kay was seen today for diabetes mellitus.    Diagnoses and all orders for this visit:    Uncontrolled type 2 diabetes mellitus with hyperglycemia, with long-term current use of insulin  -     POCT Glucose, Hand-Held Device  -     Hemoglobin A1C; Future  -     NOVOLOG FLEXPEN U-100 INSULIN 100 unit/mL (3 mL) InPn pen; Inject 18 Units into the skin 3 (three) times daily before meals.  -     insulin glargine U-300 conc (TOUJEO MAX U-300 SOLOSTAR) 300 unit/mL (3 mL) insulin pen; Inject 70 Units into the skin every evening.    Chronic -     Continue Toujeo Max to 70 units daily.    Increase Novolog to 14 units three times a day before each main meal.     Continue Farxiga 10 mg daily and Amaryl 4 mg in the AM as current.      Schedule eye exam.    Continuous glucose monitoring report:    The patient's CGM was downloaded and was reviewed for the last 14 days. See HPI for interpretation & plan.     Essential hypertension    Mixed hyperlipidemia    Charcot's joint of foot, left    Diabetic peripheral neuropathy associated with type 2 diabetes mellitus    CKD stage 2 due to type 2 diabetes mellitus    Stable proliferative diabetic retinopathy of both eyes associated with type 2 diabetes mellitus    - Follow up: 2 months    Portions of this note may have been created with voice recognition software. Occasional "wrong-word" or "sound-a-like" substitutions may have occurred due to the inherent limitations of " voice recognition software. Please, read the note carefully and recognize, using context, where substitutions have occurred.             KAITY Jacobs, BC-ADM [facility]

## 2023-10-13 ENCOUNTER — LAB VISIT (OUTPATIENT)
Dept: LAB | Facility: HOSPITAL | Age: 68
End: 2023-10-13
Attending: INTERNAL MEDICINE
Payer: MEDICARE

## 2023-10-13 DIAGNOSIS — N18.2 CKD (CHRONIC KIDNEY DISEASE) STAGE 2, GFR 60-89 ML/MIN: ICD-10-CM

## 2023-10-13 PROCEDURE — 82570 ASSAY OF URINE CREATININE: CPT | Mod: HCNC | Performed by: INTERNAL MEDICINE

## 2023-10-13 PROCEDURE — 81001 URINALYSIS AUTO W/SCOPE: CPT | Mod: HCNC | Performed by: INTERNAL MEDICINE

## 2023-10-13 PROCEDURE — 80069 RENAL FUNCTION PANEL: CPT | Mod: HCNC | Performed by: INTERNAL MEDICINE

## 2023-10-13 PROCEDURE — 36415 COLL VENOUS BLD VENIPUNCTURE: CPT | Mod: HCNC,PO | Performed by: INTERNAL MEDICINE

## 2023-10-14 LAB
ALBUMIN SERPL BCP-MCNC: 3.7 G/DL (ref 3.5–5.2)
ANION GAP SERPL CALC-SCNC: 8 MMOL/L (ref 8–16)
BACTERIA #/AREA URNS AUTO: NORMAL /HPF
BILIRUB UR QL STRIP: NEGATIVE
BUN SERPL-MCNC: 25 MG/DL (ref 8–23)
CALCIUM SERPL-MCNC: 9.5 MG/DL (ref 8.7–10.5)
CHLORIDE SERPL-SCNC: 103 MMOL/L (ref 95–110)
CLARITY UR REFRACT.AUTO: CLEAR
CO2 SERPL-SCNC: 28 MMOL/L (ref 23–29)
COLOR UR AUTO: YELLOW
CREAT SERPL-MCNC: 0.9 MG/DL (ref 0.5–1.4)
CREAT UR-MCNC: 46 MG/DL (ref 15–325)
EST. GFR  (NO RACE VARIABLE): >60 ML/MIN/1.73 M^2
GLUCOSE SERPL-MCNC: 162 MG/DL (ref 70–110)
GLUCOSE UR QL STRIP: ABNORMAL
HGB UR QL STRIP: NEGATIVE
HYALINE CASTS UR QL AUTO: 0 /LPF
KETONES UR QL STRIP: ABNORMAL
LEUKOCYTE ESTERASE UR QL STRIP: NEGATIVE
MICROSCOPIC COMMENT: NORMAL
NITRITE UR QL STRIP: NEGATIVE
PH UR STRIP: 6 [PH] (ref 5–8)
PHOSPHATE SERPL-MCNC: 3.5 MG/DL (ref 2.7–4.5)
POTASSIUM SERPL-SCNC: 4.8 MMOL/L (ref 3.5–5.1)
PROT UR QL STRIP: ABNORMAL
PROT UR-MCNC: 64 MG/DL (ref 0–15)
PROT/CREAT UR: 1.39 MG/G{CREAT} (ref 0–0.2)
RBC #/AREA URNS AUTO: 2 /HPF (ref 0–4)
SODIUM SERPL-SCNC: 139 MMOL/L (ref 136–145)
SP GR UR STRIP: 1.02 (ref 1–1.03)
SQUAMOUS #/AREA URNS AUTO: 2 /HPF
URN SPEC COLLECT METH UR: ABNORMAL
WBC #/AREA URNS AUTO: 2 /HPF (ref 0–5)
YEAST UR QL AUTO: NORMAL

## 2023-10-16 ENCOUNTER — OFFICE VISIT (OUTPATIENT)
Dept: NEPHROLOGY | Facility: CLINIC | Age: 68
End: 2023-10-16
Payer: MEDICARE

## 2023-10-16 VITALS
DIASTOLIC BLOOD PRESSURE: 86 MMHG | BODY MASS INDEX: 35.53 KG/M2 | WEIGHT: 239.88 LBS | SYSTOLIC BLOOD PRESSURE: 134 MMHG | HEART RATE: 72 BPM | HEIGHT: 69 IN

## 2023-10-16 DIAGNOSIS — I10 PRIMARY HYPERTENSION: ICD-10-CM

## 2023-10-16 DIAGNOSIS — N18.2 CKD (CHRONIC KIDNEY DISEASE) STAGE 2, GFR 60-89 ML/MIN: Primary | ICD-10-CM

## 2023-10-16 DIAGNOSIS — R80.8 OTHER PROTEINURIA: ICD-10-CM

## 2023-10-16 PROCEDURE — 3079F DIAST BP 80-89 MM HG: CPT | Mod: HCNC,CPTII,S$GLB, | Performed by: INTERNAL MEDICINE

## 2023-10-16 PROCEDURE — 1101F PT FALLS ASSESS-DOCD LE1/YR: CPT | Mod: HCNC,CPTII,S$GLB, | Performed by: INTERNAL MEDICINE

## 2023-10-16 PROCEDURE — 3075F SYST BP GE 130 - 139MM HG: CPT | Mod: HCNC,CPTII,S$GLB, | Performed by: INTERNAL MEDICINE

## 2023-10-16 PROCEDURE — 3075F PR MOST RECENT SYSTOLIC BLOOD PRESS GE 130-139MM HG: ICD-10-PCS | Mod: HCNC,CPTII,S$GLB, | Performed by: INTERNAL MEDICINE

## 2023-10-16 PROCEDURE — 1101F PR PT FALLS ASSESS DOC 0-1 FALLS W/OUT INJ PAST YR: ICD-10-PCS | Mod: HCNC,CPTII,S$GLB, | Performed by: INTERNAL MEDICINE

## 2023-10-16 PROCEDURE — 3051F PR MOST RECENT HEMOGLOBIN A1C LEVEL 7.0 - < 8.0%: ICD-10-PCS | Mod: HCNC,CPTII,S$GLB, | Performed by: INTERNAL MEDICINE

## 2023-10-16 PROCEDURE — 4010F PR ACE/ARB THEARPY RXD/TAKEN: ICD-10-PCS | Mod: HCNC,CPTII,S$GLB, | Performed by: INTERNAL MEDICINE

## 2023-10-16 PROCEDURE — 3051F HG A1C>EQUAL 7.0%<8.0%: CPT | Mod: HCNC,CPTII,S$GLB, | Performed by: INTERNAL MEDICINE

## 2023-10-16 PROCEDURE — 3008F BODY MASS INDEX DOCD: CPT | Mod: HCNC,CPTII,S$GLB, | Performed by: INTERNAL MEDICINE

## 2023-10-16 PROCEDURE — 1160F PR REVIEW ALL MEDS BY PRESCRIBER/CLIN PHARMACIST DOCUMENTED: ICD-10-PCS | Mod: HCNC,CPTII,S$GLB, | Performed by: INTERNAL MEDICINE

## 2023-10-16 PROCEDURE — 1159F MED LIST DOCD IN RCRD: CPT | Mod: HCNC,CPTII,S$GLB, | Performed by: INTERNAL MEDICINE

## 2023-10-16 PROCEDURE — 1160F RVW MEDS BY RX/DR IN RCRD: CPT | Mod: HCNC,CPTII,S$GLB, | Performed by: INTERNAL MEDICINE

## 2023-10-16 PROCEDURE — 3008F PR BODY MASS INDEX (BMI) DOCUMENTED: ICD-10-PCS | Mod: HCNC,CPTII,S$GLB, | Performed by: INTERNAL MEDICINE

## 2023-10-16 PROCEDURE — 3288F PR FALLS RISK ASSESSMENT DOCUMENTED: ICD-10-PCS | Mod: HCNC,CPTII,S$GLB, | Performed by: INTERNAL MEDICINE

## 2023-10-16 PROCEDURE — 3079F PR MOST RECENT DIASTOLIC BLOOD PRESSURE 80-89 MM HG: ICD-10-PCS | Mod: HCNC,CPTII,S$GLB, | Performed by: INTERNAL MEDICINE

## 2023-10-16 PROCEDURE — 1126F AMNT PAIN NOTED NONE PRSNT: CPT | Mod: HCNC,CPTII,S$GLB, | Performed by: INTERNAL MEDICINE

## 2023-10-16 PROCEDURE — 99999 PR PBB SHADOW E&M-EST. PATIENT-LVL IV: ICD-10-PCS | Mod: PBBFAC,HCNC,, | Performed by: INTERNAL MEDICINE

## 2023-10-16 PROCEDURE — 4010F ACE/ARB THERAPY RXD/TAKEN: CPT | Mod: HCNC,CPTII,S$GLB, | Performed by: INTERNAL MEDICINE

## 2023-10-16 PROCEDURE — 99214 OFFICE O/P EST MOD 30 MIN: CPT | Mod: HCNC,S$GLB,, | Performed by: INTERNAL MEDICINE

## 2023-10-16 PROCEDURE — 99214 PR OFFICE/OUTPT VISIT, EST, LEVL IV, 30-39 MIN: ICD-10-PCS | Mod: HCNC,S$GLB,, | Performed by: INTERNAL MEDICINE

## 2023-10-16 PROCEDURE — 99999 PR PBB SHADOW E&M-EST. PATIENT-LVL IV: CPT | Mod: PBBFAC,HCNC,, | Performed by: INTERNAL MEDICINE

## 2023-10-16 PROCEDURE — 1159F PR MEDICATION LIST DOCUMENTED IN MEDICAL RECORD: ICD-10-PCS | Mod: HCNC,CPTII,S$GLB, | Performed by: INTERNAL MEDICINE

## 2023-10-16 PROCEDURE — 1126F PR PAIN SEVERITY QUANTIFIED, NO PAIN PRESENT: ICD-10-PCS | Mod: HCNC,CPTII,S$GLB, | Performed by: INTERNAL MEDICINE

## 2023-10-16 PROCEDURE — 3288F FALL RISK ASSESSMENT DOCD: CPT | Mod: HCNC,CPTII,S$GLB, | Performed by: INTERNAL MEDICINE

## 2023-10-16 PROCEDURE — 3066F NEPHROPATHY DOC TX: CPT | Mod: HCNC,CPTII,S$GLB, | Performed by: INTERNAL MEDICINE

## 2023-10-16 PROCEDURE — 3066F PR DOCUMENTATION OF TREATMENT FOR NEPHROPATHY: ICD-10-PCS | Mod: HCNC,CPTII,S$GLB, | Performed by: INTERNAL MEDICINE

## 2023-10-16 NOTE — PROGRESS NOTES
"Subjective:       Patient ID: Kay Galarza is a 68 y.o. female.    Chief Complaint: Chronic Kidney Disease    HPI    She presents to clinic today for routine follow-up.  Since her last office visit she has been doing well and has no specific or new complaints.  Her laboratory studies and medications were reviewed.  All Nephrology related questions were answered to her satisfaction.  Communication and translation was handled by the [employer] system.    Review of Systems   Constitutional: Negative.    HENT: Negative.     Respiratory: Negative.     Cardiovascular: Negative.    Gastrointestinal: Negative.    Genitourinary: Negative.    Musculoskeletal: Negative.    Skin: Negative.        /86   Pulse 72   Ht 5' 9" (1.753 m)   Wt 108.8 kg (239 lb 13.8 oz)   BMI 35.42 kg/m²     Lab Results   Component Value Date    WBC 9.12 07/05/2023    HGB 13.0 07/05/2023    HCT 43.2 07/05/2023    MCV 92 07/05/2023     07/05/2023      BMP  Lab Results   Component Value Date     10/13/2023    K 4.8 10/13/2023     10/13/2023    CO2 28 10/13/2023    BUN 25 (H) 10/13/2023    CREATININE 0.9 10/13/2023    CALCIUM 9.5 10/13/2023    ANIONGAP 8 10/13/2023    ESTGFRAFRICA >60.0 05/25/2022    EGFRNONAA 57.6 (A) 05/25/2022     CMP  Sodium   Date Value Ref Range Status   10/13/2023 139 136 - 145 mmol/L Final     Potassium   Date Value Ref Range Status   10/13/2023 4.8 3.5 - 5.1 mmol/L Final     Chloride   Date Value Ref Range Status   10/13/2023 103 95 - 110 mmol/L Final     CO2   Date Value Ref Range Status   10/13/2023 28 23 - 29 mmol/L Final     Glucose   Date Value Ref Range Status   10/13/2023 162 (H) 70 - 110 mg/dL Final     BUN   Date Value Ref Range Status   10/13/2023 25 (H) 8 - 23 mg/dL Final     Creatinine   Date Value Ref Range Status   10/13/2023 0.9 0.5 - 1.4 mg/dL Final     Calcium   Date Value Ref Range Status   10/13/2023 9.5 8.7 - 10.5 mg/dL Final     Total Protein   Date Value Ref Range Status "   07/05/2023 7.4 6.0 - 8.4 g/dL Final     Albumin   Date Value Ref Range Status   10/13/2023 3.7 3.5 - 5.2 g/dL Final     Total Bilirubin   Date Value Ref Range Status   07/05/2023 0.3 0.1 - 1.0 mg/dL Final     Comment:     For infants and newborns, interpretation of results should be based  on gestational age, weight and in agreement with clinical  observations.    Premature Infant recommended reference ranges:  Up to 24 hours.............<8.0 mg/dL  Up to 48 hours............<12.0 mg/dL  3-5 days..................<15.0 mg/dL  6-29 days.................<15.0 mg/dL       Alkaline Phosphatase   Date Value Ref Range Status   07/05/2023 152 (H) 55 - 135 U/L Final     AST   Date Value Ref Range Status   07/05/2023 20 10 - 40 U/L Final     ALT   Date Value Ref Range Status   07/05/2023 15 10 - 44 U/L Final     Anion Gap   Date Value Ref Range Status   10/13/2023 8 8 - 16 mmol/L Final     eGFR if    Date Value Ref Range Status   05/25/2022 >60.0 >60 mL/min/1.73 m^2 Final     eGFR if non    Date Value Ref Range Status   05/25/2022 57.6 (A) >60 mL/min/1.73 m^2 Final     Comment:     Calculation used to obtain the estimated glomerular filtration  rate (eGFR) is the CKD-EPI equation.        Current Outpatient Medications on File Prior to Visit   Medication Sig Dispense Refill    ACCU-CHEK GUIDE TEST STRIPS Strp TO CHECK BG 2 TIMES DAILY, TO USE WITH INSURANCE PREFERRED METER 200 strip 3    acetaminophen (TYLENOL) 500 MG tablet Take 500 mg by mouth every 6 (six) hours as needed for Pain.      albuterol (PROVENTIL/VENTOLIN HFA) 90 mcg/actuation inhaler INHALE 2 PUFFS INTO THE LUNGS EVERY 6  HOURS AS NEEDED FOR WHEEZING. RESCUE 54 g 3    atorvastatin (LIPITOR) 40 MG tablet TAKE 1 TABLET BY MOUTH ONCE DAILY. 90 tablet 1    B infantis/B ani/B koby/B bifid (PROBIOTIC 4X ORAL) Take by mouth Daily.      clobetasoL (TEMOVATE) 0.05 % external solution APPLY TOPICALLY 2 TIMES DAILY TO SCALP ONLY 50 mL PRN     FARXIGA 10 mg tablet TAKE 1 TABLET (10 MG TOTAL) BY MOUTH ONCE DAILY. 90 tablet 3    fluocinolone and shower cap 0.01 % Oil       fluticasone propionate (FLONASE) 50 mcg/actuation nasal spray 2 sprays (100 mcg total) by Each Nostril route once daily. 16 g 0    gabapentin (NEURONTIN) 100 MG capsule TAKE 1 CAPSULE  BY MOUTH 3  TIMES DAILY. 90 capsule 0    glimepiride (AMARYL) 4 MG tablet TAKE 1 TABLET  BY MOUTH DAILY WITH BREAKFAST. 90 tablet 1    insulin glargine U-300 conc (TOUJEO MAX U-300 SOLOSTAR) 300 unit/mL (3 mL) insulin pen Inject 70 Units into the skin every evening. 4 pen 5    ketoconazole (NIZORAL) 2 % shampoo APPLY TOPICALLY 3 TIMES A WEEK. 120 mL 3    lancets Misc To check BG 2 times daily, to use with insurance preferred meter 180 each 0    levocetirizine (XYZAL) 5 MG tablet Take 1 tablet (5 mg total) by mouth every evening. 30 tablet 11    lisinopriL-hydrochlorothiazide (PRINZIDE,ZESTORETIC) 20-25 mg Tab TAKE 1 TABLET BY MOUTH ONCE DAILY. 90 tablet PRN    magnesium oxide 200 mg magnesium Tab TAKE 3 TABLETS  BY MOUTH EVERY EVENING. 120 tablet PRN    NOVOLOG FLEXPEN U-100 INSULIN 100 unit/mL (3 mL) InPn pen Inject 18 Units into the skin 3 (three) times daily before meals. (Patient taking differently: Inject 14 Units into the skin 3 (three) times daily before meals.) 30 mL 5    nystatin-triamcinolone (MYCOLOG) ointment Apply topically 2 (two) times daily. 60 g 1    nystatin-triamcinolone (MYCOLOG) ointment Apply topically 2 (two) times daily.      omeprazole (PRILOSEC) 20 MG capsule TAKE 1 CAPSULE BY MOUTH TWICE A DAY. 180 capsule 3    ondansetron (ZOFRAN-ODT) 8 MG TbDL 1 tab po q 6 hrs prn . 20 tablet 0    polyethylene glycol (GLYCOLAX) 17 gram PwPk Take 17 g by mouth once daily. 30 packet 0    sertraline (ZOLOFT) 50 MG tablet Take 1 tablet (50 mg total) by mouth once daily. 90 tablet 3    traZODone (DESYREL) 150 MG tablet TAKE 1 TABLET  BY MOUTH NIGHTLY AS NEEDED FOR INSOMNIA. 90 tablet 3    VITAMIN  D2 1,250 mcg (50,000 unit) capsule TAKE 1 CAPSULE  BY MOUTH ONCE A WEEK FOR 4 DOSES 4 capsule PRN    blood-glucose meter kit To check BG 2 times daily, to use with insurance preferred meter 1 each 0     Current Facility-Administered Medications on File Prior to Visit   Medication Dose Route Frequency Provider Last Rate Last Admin    diphenhydrAMINE injection 25 mg  25 mg Intravenous Q6H PRN Dharmesh Macias MD        sodium chloride 0.9% flush 10 mL  10 mL Intravenous PRN Salma Bonilla MD                Objective:            Physical Exam  Constitutional:       Appearance: Normal appearance.   HENT:      Head: Normocephalic and atraumatic.   Eyes:      General: No scleral icterus.     Extraocular Movements: Extraocular movements intact.      Pupils: Pupils are equal, round, and reactive to light.   Pulmonary:      Effort: Pulmonary effort is normal.      Breath sounds: No stridor.   Musculoskeletal:      Right lower leg: No edema.      Left lower leg: No edema.   Skin:     General: Skin is warm and dry.   Neurological:      General: No focal deficit present.      Mental Status: She is alert and oriented to person, place, and time.   Psychiatric:         Mood and Affect: Mood normal.         Behavior: Behavior normal.       Assessment:       1. CKD (chronic kidney disease) stage 2, GFR 60-89 ml/min    2. Primary hypertension    3. Other proteinuria        Plan:       1. Creatinine has remained remarkably stable ranging between 0.9 and 1.4 over the past several years.  EGFR is essentially normal for her age.  Urinalysis is bland with low-grade proteinuria.  She is on a RAAS inhibitor as well as an SGLT 2 inhibitor.    Potassium is stable 4.8.  She is euvolemic on exam.    2. Blood pressure is well controlled on current regimen.    3. She continues to have low-grade proteinuria about 1 g by PC ratio.  Secondary to diabetic glomerulopathy.  Continue RAAS inhibition as well as the SGLT 2 inhibitor.  We discussed  the importance of hydration went on medications like Farxiga.      Crescencio Sena MD

## 2023-11-16 ENCOUNTER — PATIENT OUTREACH (OUTPATIENT)
Dept: ADMINISTRATIVE | Facility: HOSPITAL | Age: 68
End: 2023-11-16
Payer: MEDICARE

## 2023-11-16 ENCOUNTER — PATIENT MESSAGE (OUTPATIENT)
Dept: FAMILY MEDICINE | Facility: CLINIC | Age: 68
End: 2023-11-16
Payer: MEDICARE

## 2023-11-16 NOTE — PROGRESS NOTES
Working eye exam report; Chart searched; Called and spoke with pt's daughter - Scheduled 11/20/2023

## 2023-11-20 ENCOUNTER — OFFICE VISIT (OUTPATIENT)
Dept: OPHTHALMOLOGY | Facility: CLINIC | Age: 68
End: 2023-11-20
Payer: MEDICARE

## 2023-11-20 DIAGNOSIS — Z79.4 TYPE 2 DIABETES MELLITUS WITH BOTH EYES AFFECTED BY MODERATE NONPROLIFERATIVE RETINOPATHY AND MACULAR EDEMA, WITH LONG-TERM CURRENT USE OF INSULIN: Primary | ICD-10-CM

## 2023-11-20 DIAGNOSIS — Z96.1 PSEUDOPHAKIA: ICD-10-CM

## 2023-11-20 DIAGNOSIS — E11.3313 TYPE 2 DIABETES MELLITUS WITH BOTH EYES AFFECTED BY MODERATE NONPROLIFERATIVE RETINOPATHY AND MACULAR EDEMA, WITH LONG-TERM CURRENT USE OF INSULIN: Primary | ICD-10-CM

## 2023-11-20 DIAGNOSIS — Z83.511 FAMILY HISTORY OF GLAUCOMA: ICD-10-CM

## 2023-11-20 LAB
LEFT EYE DM RETINOPATHY: POSITIVE
RIGHT EYE DM RETINOPATHY: POSITIVE

## 2023-11-20 PROCEDURE — 2022F PR DILATED RETINAL EYE EXAM WITH INTERP/REVIEW: ICD-10-PCS | Mod: HCNC,CPTII,S$GLB, | Performed by: OPHTHALMOLOGY

## 2023-11-20 PROCEDURE — 3066F PR DOCUMENTATION OF TREATMENT FOR NEPHROPATHY: ICD-10-PCS | Mod: HCNC,CPTII,S$GLB, | Performed by: OPHTHALMOLOGY

## 2023-11-20 PROCEDURE — 99999 PR PBB SHADOW E&M-EST. PATIENT-LVL III: ICD-10-PCS | Mod: PBBFAC,HCNC,, | Performed by: OPHTHALMOLOGY

## 2023-11-20 PROCEDURE — 1159F MED LIST DOCD IN RCRD: CPT | Mod: HCNC,CPTII,S$GLB, | Performed by: OPHTHALMOLOGY

## 2023-11-20 PROCEDURE — 1126F AMNT PAIN NOTED NONE PRSNT: CPT | Mod: HCNC,CPTII,S$GLB, | Performed by: OPHTHALMOLOGY

## 2023-11-20 PROCEDURE — 3066F NEPHROPATHY DOC TX: CPT | Mod: HCNC,CPTII,S$GLB, | Performed by: OPHTHALMOLOGY

## 2023-11-20 PROCEDURE — 99214 OFFICE O/P EST MOD 30 MIN: CPT | Mod: HCNC,S$GLB,, | Performed by: OPHTHALMOLOGY

## 2023-11-20 PROCEDURE — 1126F PR PAIN SEVERITY QUANTIFIED, NO PAIN PRESENT: ICD-10-PCS | Mod: HCNC,CPTII,S$GLB, | Performed by: OPHTHALMOLOGY

## 2023-11-20 PROCEDURE — 92134 CPTRZ OPH DX IMG PST SGM RTA: CPT | Mod: HCNC,S$GLB,, | Performed by: OPHTHALMOLOGY

## 2023-11-20 PROCEDURE — 1160F RVW MEDS BY RX/DR IN RCRD: CPT | Mod: HCNC,CPTII,S$GLB, | Performed by: OPHTHALMOLOGY

## 2023-11-20 PROCEDURE — 99214 PR OFFICE/OUTPT VISIT, EST, LEVL IV, 30-39 MIN: ICD-10-PCS | Mod: HCNC,S$GLB,, | Performed by: OPHTHALMOLOGY

## 2023-11-20 PROCEDURE — 99999 PR PBB SHADOW E&M-EST. PATIENT-LVL III: CPT | Mod: PBBFAC,HCNC,, | Performed by: OPHTHALMOLOGY

## 2023-11-20 PROCEDURE — 4010F PR ACE/ARB THEARPY RXD/TAKEN: ICD-10-PCS | Mod: HCNC,CPTII,S$GLB, | Performed by: OPHTHALMOLOGY

## 2023-11-20 PROCEDURE — 2022F DILAT RTA XM EVC RTNOPTHY: CPT | Mod: HCNC,CPTII,S$GLB, | Performed by: OPHTHALMOLOGY

## 2023-11-20 PROCEDURE — 92134 POSTERIOR SEGMENT OCT RETINA (OCULAR COHERENCE TOMOGRAPHY)-BOTH EYES: ICD-10-PCS | Mod: HCNC,S$GLB,, | Performed by: OPHTHALMOLOGY

## 2023-11-20 PROCEDURE — 1159F PR MEDICATION LIST DOCUMENTED IN MEDICAL RECORD: ICD-10-PCS | Mod: HCNC,CPTII,S$GLB, | Performed by: OPHTHALMOLOGY

## 2023-11-20 PROCEDURE — 3051F HG A1C>EQUAL 7.0%<8.0%: CPT | Mod: HCNC,CPTII,S$GLB, | Performed by: OPHTHALMOLOGY

## 2023-11-20 PROCEDURE — 1160F PR REVIEW ALL MEDS BY PRESCRIBER/CLIN PHARMACIST DOCUMENTED: ICD-10-PCS | Mod: HCNC,CPTII,S$GLB, | Performed by: OPHTHALMOLOGY

## 2023-11-20 PROCEDURE — 3051F PR MOST RECENT HEMOGLOBIN A1C LEVEL 7.0 - < 8.0%: ICD-10-PCS | Mod: HCNC,CPTII,S$GLB, | Performed by: OPHTHALMOLOGY

## 2023-11-20 PROCEDURE — 4010F ACE/ARB THERAPY RXD/TAKEN: CPT | Mod: HCNC,CPTII,S$GLB, | Performed by: OPHTHALMOLOGY

## 2023-11-20 RX ORDER — GABAPENTIN 100 MG/1
CAPSULE ORAL
Qty: 90 CAPSULE | Refills: 0 | Status: SHIPPED | OUTPATIENT
Start: 2023-11-20 | End: 2024-01-09

## 2023-11-20 NOTE — TELEPHONE ENCOUNTER
Care Due:                  Date            Visit Type   Department     Provider  --------------------------------------------------------------------------------                                EP -                              PRIMARY      [facility] INTERNAL  Lucy RIDER  Last Visit: 07-      CARE ([facility])   MEDICINE       Jamil  Next Visit: None Scheduled  None         None Found                                                            Last  Test          Frequency    Reason                     Performed    Due Date  --------------------------------------------------------------------------------    Lipid Panel.  12 months..  atorvastatin.............  01- 01-    Mg Level....  12 months..  magnesium................  Not Found    Overdue    [employer] Embedded Care Due Messages. Reference number: [id number].   11/20/2023 10:37:35 AM CST

## 2023-11-20 NOTE — PROGRESS NOTES
===============================  Date today is 11/20/2023  Kay Galarza is a 68 y.o. female  Last visit [facility]: :6/2/2021   Last visit eye dept. Visit date not found    Uncorrected distance visual acuity was 20/50 in the right eye and 20/40 in the left eye.  Tonometry       Tonometry (Applanation, 9:29 AM)         Right Left    Pressure 20 23                  Not recorded       Not recorded       Not recorded       Chief Complaint   Patient presents with    PDR/ME     YEARLY DM EXAM     HPI     PDR/ME     Additional comments: YEARLY DM EXAM           Comments    FMX COAG: Mother, Grandmother   DM 1998   BDR OU / CME OS   PCIOL OD +21.5 SN60WF (distance) 7-18-18   PCIOL OS +21.5 SN60WF (distance) 3-13-19   AVASTIN OS last 4/30/2021          Last edited by Eli Kothari on 11/20/2023  9:03 AM.      Problem List Items Addressed This Visit          Eye/Vision problems    Type 2 diabetes mellitus - Primary    Relevant Orders    Posterior Segment OCT Retina-Both eyes (Completed)     Other Visit Diagnoses       Pseudophakia        Family history of glaucoma              Instructed to call 24/7 for any worsening of vision, visual distortion or pain.  Check OU independently daily.    Gave my office and personal cell phone number.  ________________  11/20/2023 today  Kay Galarza    :      Irregular stratification as though she had previously more severe DME    Dme oct ok  Tv not as clear    Mr =ni plano  Spk ou- rec at's qid  Pciol ou  Capsule ok  v clear  Macula 1-2+ dbh ou    oct looks surpsingly good  CE ou 2019  Will follow for now    RTC 6 months  Instructed to call 24/7 for any worsening of vision or symptoms. Check OU daily.   Gave my office and cell phone number.      =============================

## 2023-11-29 ENCOUNTER — TELEPHONE (OUTPATIENT)
Dept: FAMILY MEDICINE | Facility: CLINIC | Age: 68
End: 2023-11-29
Payer: MEDICARE

## 2023-11-29 NOTE — TELEPHONE ENCOUNTER
----- Message from Marita Macias sent at 11/29/2023  2:59 PM CST -----  Contact: self [phone]  Patient is being released from the hospital on today and needs to schedule an after hospital stay visit none populated for that time frame. Can you get her scheduled please? Please call back [phone]. Thanks ITW

## 2023-12-01 ENCOUNTER — PATIENT OUTREACH (OUTPATIENT)
Dept: ADMINISTRATIVE | Facility: CLINIC | Age: 68
End: 2023-12-01
Payer: MEDICARE

## 2023-12-01 RX ORDER — CARVEDILOL 6.25 MG/1
6.25 TABLET ORAL 2 TIMES DAILY
COMMUNITY
Start: 2023-11-29

## 2023-12-01 RX ORDER — BUTALBITAL, ACETAMINOPHEN AND CAFFEINE 50; 325; 40 MG/1; MG/1; MG/1
1 TABLET ORAL EVERY 6 HOURS PRN
COMMUNITY
Start: 2023-11-29 | End: 2023-12-09

## 2023-12-01 RX ORDER — ONDANSETRON HYDROCHLORIDE 8 MG/1
8 TABLET, FILM COATED ORAL EVERY 8 HOURS PRN
COMMUNITY
End: 2024-01-04

## 2023-12-01 RX ORDER — LISINOPRIL 20 MG/1
1 TABLET ORAL EVERY MORNING
COMMUNITY
Start: 2023-11-29 | End: 2024-01-04

## 2023-12-01 NOTE — PROGRESS NOTES
C3 nurse spoke with Kay Galarza's daughter, Cass, for a TCC post hospital discharge follow up call. The patient has a scheduled HOSFU appointment with Lucy Negron MD (PCP) on 12/5/23 @ 8:40am.

## 2024-01-03 ENCOUNTER — PATIENT MESSAGE (OUTPATIENT)
Dept: FAMILY MEDICINE | Facility: CLINIC | Age: 69
End: 2024-01-03
Payer: MEDICARE

## 2024-01-04 ENCOUNTER — OFFICE VISIT (OUTPATIENT)
Dept: HOME HEALTH SERVICES | Facility: CLINIC | Age: 69
End: 2024-01-04
Payer: MEDICARE

## 2024-01-04 VITALS
OXYGEN SATURATION: 96 % | TEMPERATURE: 98 F | HEIGHT: 69 IN | HEART RATE: 67 BPM | WEIGHT: 239 LBS | BODY MASS INDEX: 35.4 KG/M2

## 2024-01-04 DIAGNOSIS — I10 ESSENTIAL HYPERTENSION: ICD-10-CM

## 2024-01-04 DIAGNOSIS — N18.31 STAGE 3A CHRONIC KIDNEY DISEASE (CKD): ICD-10-CM

## 2024-01-04 DIAGNOSIS — Z99.89 DEPENDENCE ON OTHER ENABLING MACHINES AND DEVICES: ICD-10-CM

## 2024-01-04 DIAGNOSIS — E66.01 SEVERE OBESITY (BMI 35.0-39.9) WITH COMORBIDITY: ICD-10-CM

## 2024-01-04 DIAGNOSIS — Z12.11 SPECIAL SCREENING FOR MALIGNANT NEOPLASMS, COLON: ICD-10-CM

## 2024-01-04 DIAGNOSIS — Z00.00 ENCOUNTER FOR PREVENTIVE HEALTH EXAMINATION: Primary | ICD-10-CM

## 2024-01-04 DIAGNOSIS — E11.9 TYPE 2 DIABETES MELLITUS WITHOUT COMPLICATION, WITH LONG-TERM CURRENT USE OF INSULIN: ICD-10-CM

## 2024-01-04 DIAGNOSIS — J84.9 ILD (INTERSTITIAL LUNG DISEASE): ICD-10-CM

## 2024-01-04 DIAGNOSIS — Z79.4 TYPE 2 DIABETES MELLITUS WITHOUT COMPLICATION, WITH LONG-TERM CURRENT USE OF INSULIN: ICD-10-CM

## 2024-01-04 DIAGNOSIS — Z79.4 TYPE 2 DIABETES MELLITUS WITH BOTH EYES AFFECTED BY MODERATE NONPROLIFERATIVE RETINOPATHY AND MACULAR EDEMA, WITH LONG-TERM CURRENT USE OF INSULIN: ICD-10-CM

## 2024-01-04 DIAGNOSIS — E11.3313 TYPE 2 DIABETES MELLITUS WITH BOTH EYES AFFECTED BY MODERATE NONPROLIFERATIVE RETINOPATHY AND MACULAR EDEMA, WITH LONG-TERM CURRENT USE OF INSULIN: ICD-10-CM

## 2024-01-04 DIAGNOSIS — I77.1 TORTUOUS AORTA: ICD-10-CM

## 2024-01-04 DIAGNOSIS — E11.3553 STABLE PROLIFERATIVE DIABETIC RETINOPATHY OF BOTH EYES ASSOCIATED WITH TYPE 2 DIABETES MELLITUS: ICD-10-CM

## 2024-01-04 DIAGNOSIS — Z12.31 ENCOUNTER FOR SCREENING MAMMOGRAM FOR MALIGNANT NEOPLASM OF BREAST: ICD-10-CM

## 2024-01-04 DIAGNOSIS — I51.7 CARDIOMEGALY: ICD-10-CM

## 2024-01-04 DIAGNOSIS — Z86.73 HISTORY OF CVA (CEREBROVASCULAR ACCIDENT): ICD-10-CM

## 2024-01-04 DIAGNOSIS — H40.013 OPEN ANGLE WITH BORDERLINE FINDINGS, LOW RISK, BILATERAL: ICD-10-CM

## 2024-01-04 DIAGNOSIS — E78.2 MIXED HYPERLIPIDEMIA: ICD-10-CM

## 2024-01-04 DIAGNOSIS — J45.20 MILD INTERMITTENT ASTHMA WITHOUT COMPLICATION: ICD-10-CM

## 2024-01-04 DIAGNOSIS — R26.9 ABNORMALITY OF GAIT AND MOBILITY: ICD-10-CM

## 2024-01-04 DIAGNOSIS — M14.672 CHARCOT'S JOINT OF FOOT, LEFT: ICD-10-CM

## 2024-01-04 DIAGNOSIS — I48.91 ATRIAL FIBRILLATION, UNSPECIFIED TYPE: ICD-10-CM

## 2024-01-04 PROBLEM — L97.422 DIABETIC ULCER OF LEFT MIDFOOT ASSOCIATED WITH TYPE 2 DIABETES MELLITUS, WITH FAT LAYER EXPOSED: Status: RESOLVED | Noted: 2023-03-13 | Resolved: 2024-01-04

## 2024-01-04 PROBLEM — E11.621 DIABETIC ULCER OF LEFT MIDFOOT ASSOCIATED WITH TYPE 2 DIABETES MELLITUS, WITH FAT LAYER EXPOSED: Status: RESOLVED | Noted: 2023-03-13 | Resolved: 2024-01-04

## 2024-01-04 PROBLEM — E11.49 TYPE II DIABETES MELLITUS WITH NEUROLOGICAL MANIFESTATIONS: Status: RESOLVED | Noted: 2019-12-19 | Resolved: 2024-01-04

## 2024-01-04 PROBLEM — E11.40 TYPE 2 DIABETES MELLITUS WITH DIABETIC NEUROPATHY, WITH LONG-TERM CURRENT USE OF INSULIN: Status: ACTIVE | Noted: 2017-11-07

## 2024-01-04 PROCEDURE — 1126F AMNT PAIN NOTED NONE PRSNT: CPT | Mod: CPTII,S$GLB,, | Performed by: NURSE PRACTITIONER

## 2024-01-04 PROCEDURE — 1170F FXNL STATUS ASSESSED: CPT | Mod: CPTII,S$GLB,, | Performed by: NURSE PRACTITIONER

## 2024-01-04 PROCEDURE — 3288F FALL RISK ASSESSMENT DOCD: CPT | Mod: CPTII,S$GLB,, | Performed by: NURSE PRACTITIONER

## 2024-01-04 PROCEDURE — G0439 PPPS, SUBSEQ VISIT: HCPCS | Mod: S$GLB,,, | Performed by: NURSE PRACTITIONER

## 2024-01-04 PROCEDURE — 1159F MED LIST DOCD IN RCRD: CPT | Mod: CPTII,S$GLB,, | Performed by: NURSE PRACTITIONER

## 2024-01-04 PROCEDURE — 1101F PT FALLS ASSESS-DOCD LE1/YR: CPT | Mod: CPTII,S$GLB,, | Performed by: NURSE PRACTITIONER

## 2024-01-04 PROCEDURE — 1160F RVW MEDS BY RX/DR IN RCRD: CPT | Mod: CPTII,S$GLB,, | Performed by: NURSE PRACTITIONER

## 2024-01-04 PROCEDURE — G9919 SCRN ND POS ND PROV OF REC: HCPCS | Mod: CPTII,S$GLB,, | Performed by: NURSE PRACTITIONER

## 2024-01-04 NOTE — Clinical Note
Medicare awv complete. Health maintenance:  rsv, shingles, flu, and covid 19 2023-24 season vaccines due-encouraged pt to obtain at a pharmacy.  Mammogram, diabetes urine screening, and hep c screening due-message sent to ma to call pt's daughter to schedule. Foot exam done today.

## 2024-01-04 NOTE — PATIENT INSTRUCTIONS
Counseling and Referral of Other Preventative  (Italic type indicates deductible and co-insurance are waived)    Patient Name: Kay Galarza  Today's Date: 1/4/2024    Health Maintenance       Date Due Completion Date    Hepatitis C Screening Never done ---    RSV Vaccine (Age 60+ and Pregnant patients) (1 - 1-dose 60+ series) Never done ---    Shingles Vaccine (2 of 3) 01/02/2018 11/7/2017    Foot Exam 03/17/2023 3/17/2022 (Done)    Override on 3/17/2022: Done (podiatry)    Override on 5/28/2018: Done    Diabetes Urine Screening 05/25/2023 5/25/2022    Influenza Vaccine (1) 09/01/2023 12/12/2022    COVID-19 Vaccine (3 - 2023-24 season) 09/01/2023 7/19/2021    Mammogram 09/21/2023 9/21/2022    Hemoglobin A1c 05/27/2024 11/27/2023    Eye Exam 11/20/2024 11/20/2023    Override on 11/20/2023: Done    Lipid Panel 11/27/2024 11/27/2023    High Dose Statin 12/01/2024 12/1/2023    DEXA Scan 12/20/2025 12/20/2022    Colorectal Cancer Screening 05/11/2027 5/11/2022    TETANUS VACCINE 12/11/2027 12/11/2017        Orders Placed This Encounter   Procedures    Mammo Digital Screening Bilat    HEPATITIS C ANTIBODY     The following information is provided to all patients.  This information is to help you find resources for any of the problems found today that may be affecting your health:                  Living healthy guide: www.[facility].[place].gov      Understanding Diabetes: www.diabetes.org      Eating healthy: www.cdc.gov/healthyweight      CDC home safety checklist: www.cdc.gov/steadi/patient.html      Agency on Aging: www.goea.[place].gov      Alcoholics anonymous (AA): www.aa.org      Physical Activity: www.nikki.nih.gov/ew8dncp      Tobacco use: www.quitwithusla.org

## 2024-01-04 NOTE — PROGRESS NOTES
"Kay Galarza presented for an initial Medicare AWV today. The following components were reviewed and updated:    Medical history  Family History  Social history  Allergies and Current Medications  Health Risk Assessment  Health Maintenance  Care Team    **See Completed Assessments for Annual Wellness visit with in the encounter summary    The following assessments were completed:  Depression Screening  Cognitive function Screening    Timed Get Up Test  Whisper Test      Opioid documentation:      Patient does not have a current opioid prescription.          Vitals:    01/04/24 0912   BP: (!) (P) 148/89   Pulse: 67   Temp: 97.5 °F (36.4 °C)   TempSrc: Temporal   SpO2: 96%   Weight: 108.4 kg (239 lb)   Height: 5' 9" (1.753 m)     Body mass index is 35.29 kg/m².       Physical Exam  Constitutional:       Appearance: She is obese.   HENT:      Mouth/Throat:      Mouth: Mucous membranes are moist.   Cardiovascular:      Rate and Rhythm: Normal rate and regular rhythm.      Pulses: Normal pulses.           Dorsalis pedis pulses are 2+ on the right side and 2+ on the left side.        Posterior tibial pulses are 2+ on the right side and 2+ on the left side.      Heart sounds: Normal heart sounds.   Pulmonary:      Effort: Pulmonary effort is normal.      Breath sounds: Normal breath sounds.   Musculoskeletal:         General: Deformity (left foot and ankle s/p surgery) present.      Left foot: Decreased range of motion.   Feet:      Right foot:      Protective Sensation: 10 sites tested.  10 sites sensed.      Skin integrity: Dry skin present.      Toenail Condition: Right toenails are normal.      Left foot:      Protective Sensation: 10 sites tested.  0 sites sensed.      Skin integrity: Dry skin present.      Toenail Condition: Left toenails are normal.   Skin:     General: Skin is warm and dry.   Neurological:      General: No focal deficit present.      Mental Status: She is alert and oriented to person, " place, and time.   Psychiatric:         Mood and Affect: Mood normal.         Behavior: Behavior normal.           Diagnoses and health risks identified today and associated recommendations/orders:  1. Encounter for preventive health examination  Medicare awv complete. Health maintenance:  rsv, shingles, flu, and covid 19 2023-24 season vaccines due-encouraged pt to obtain at a pharmacy.  Mammogram, diabetes urine screening, and hep c screening due-message sent to ma to call pt's daughter to schedule. Foot exam done today.     2. Atrial fibrillation, unspecified type  Stable. Pt states she needs eliquis refilled. Refill sent for 1 month. Patient states she has an appointment with a neurologist next month.   - apixaban (ELIQUIS) 5 mg Tab; Take 1 tablet (5 mg total) by mouth 2 (two) times daily.  Dispense: 60 tablet; Refill: 0    3. Type 2 diabetes mellitus, with long-term current use of insulin   Latest Reference Range & Units 11/09/17 08:04 02/08/18 15:17 07/31/18 09:13 10/18/18 11:17 01/29/19 09:32 05/07/19 11:25 08/22/19 09:33 12/10/19 09:36 07/20/20 11:58 10/27/20 11:25 01/21/21 11:09 01/19/22 16:10 01/26/22 09:48 05/25/22 08:57 12/07/22 12:11 07/05/23 14:17 10/10/23 14:08 11/27/23 04:04   Hemoglobin A1C External <=6.5 % 10.9 (H) 9.1 (H) 10.9 (H) 9.9 (H) 12.0 (H) 10.5 (H) 8.3 (H) 7.5 (H) 9.7 (H) 5.5 6.4 (H) >14.0 (H) (C) >14.0 (H) 7.7 (H) 7.6 (H) 7.2 (H) 7.2 (H) 7.5 (H) (E)   Estimated Avg Glucose 68 - 131 mg/dL 266 (H) 214 (H) 266 (H) 237 (H) 298 (H) 255 (H) 192 (H) 169 (H) 232 (H) 111 137 (H) Unable to calculate (C) Unable to calculate 174 (H) 171 (H) 160 (H) 160 (H)    (H): Data is abnormally high  (C): Corrected  (E): External lab result  Chronic. Controlled. Continue current management. See med list. Patient has dexcom and bs ranges 120s-150s. Foot exam done today. Reviewed diabetic diet, diabetic foot care, preferred BS, and HgbA1C levels. Follow up with your PCP as instructed, podiatry and ophthalmology  yearly.    - HEPATITIS C ANTIBODY; Future    4. Tortuous aorta  Cxr 1/21/21  Aorta tortuous with underlying atherosclerotic calcification. Chronic and stable on statin medication. Continue current. F/u with pcp.      5. ILD (interstitial lung disease)  Chronic and stable. No acute issues. Continue current management. See med list above. Follow up with pulmonology.      6. Severe obesity (BMI 35.0-39.9) with comorbidity  Chronic. Recommend diet and exercise to lose weight. Follow up with your PCP as planned to discuss adjustments to your treatment plan.      7. Stage 2 chronic kidney disease (CKD)  stable. Continue current management. Recommend avoid nsaids and other nephrotoxic medications. Follow up with PCP/nephrologist.      8. Stable proliferative diabetic retinopathy of both eyes associated with type 2 diabetes mellitus  Chronic and stable. Continue current management. See med list above. Follow up with ophthalmology.      9. Type 2 diabetes mellitus with both eyes affected by moderate nonproliferative retinopathy and macular edema, with long-term current use of insulin  Chronic and stable. Continue current management. See med list above. Follow up with ophthalmology.      10. Essential hypertension  Chronic and stable on BP medication.  Continue current management.  See med list above. Recommend low sodium diet. Follow up with PCP.       11. Mixed hyperlipidemia  Lab Results   Component Value Date    CHOL 160 11/27/2023    CHOL 133 01/26/2022    CHOL 131 01/19/2022     Lab Results   Component Value Date    HDL 27 (L) 11/27/2023    HDL 34 (L) 01/26/2022    HDL 32 (L) 01/19/2022     Lab Results   Component Value Date    LDLCALC 116 11/27/2023    LDLCALC 80.0 01/26/2022    LDLCALC 70.2 01/19/2022     Lab Results   Component Value Date    TRIG 84 11/27/2023    TRIG 95 01/26/2022    TRIG 144 01/19/2022       Lab Results   Component Value Date    CHOLHDL 25.6 01/26/2022    CHOLHDL 24.4 01/19/2022    CHOLHDL 19.4 (L)  10/27/2020    Chronic and stable on statin medication. Continue current. F/u with pcp.      12. Cardiomegaly  Chronic and stable. Continue current management. See med list. Follow up with cardiology.      13. Mild intermittent asthma without complication  Chronic and stable. No acute issues. Continue current management. See med list above. Follow up with pulmonology.      14. Open angle with borderline findings, low risk, bilateral  Chronic and stable. Continue current management. See med list above. Follow up with ophthalmology.      15. Special screening for malignant neoplasms, colon  Next colonoscopy due 5/11/2027.    16. Charcot's joint of foot, left  Chronic and stable. Continue current management. See med list. Follow up with PCP/podiatry.     17. Encounter for screening mammogram for malignant neoplasm of breast  - Mammo Digital Screening Bilat; Future    18. History of CVA (cerebrovascular accident)  Due to thrombus. Afib. On eliquis.     19. Dependence on other enabling machines and devices  Chronic. Fall precautions recommended and discussed. Follow up with PCP.      20. Abnormality of gait and mobility  Chronic. Fall precautions recommended and discussed. Follow up with PCP.            Provided Kay with a 5-10 year written screening schedule and personal prevention plan. Recommendations were developed using the USPSTF age appropriate recommendations. Education, counseling, and referrals were provided as needed.  After Visit Summary printed and given to patient which includes a list of additional screenings\tests needed.    Follow up in about 1 year (around 1/4/2025) for annual wellness visit.      Carie Morrison, MARYANN      I offered to discuss advanced care planning, including how to pick a person who would make decisions for you if you were unable to make them for yourself, called a health care power of , and what kind of decisions you might make such as use of life sustaining treatments  such as ventilators and tube feeding when faced with a life limiting illness recorded on a living will that they will need to know. (How you want to be cared for as you near the end of your natural life)     X  Patient is unwilling to engage in a discussion regarding advance directives at this time.   Elidel Counseling: Patient may experience a mild burning sensation during topical application. Elidel is not approved in children less than 2 years of age. There have been case reports of hematologic and skin malignancies in patients using topical calcineurin inhibitors although causality is questionable.

## 2024-01-05 ENCOUNTER — PATIENT MESSAGE (OUTPATIENT)
Dept: INTERNAL MEDICINE | Facility: CLINIC | Age: 69
End: 2024-01-05
Payer: MEDICARE

## 2024-01-08 NOTE — TELEPHONE ENCOUNTER
No care due was identified.  Health [facility] Embedded Care Due Messages. Reference number: [id number].   1/08/2024 9:31:17 AM CST

## 2024-01-09 RX ORDER — GABAPENTIN 100 MG/1
CAPSULE ORAL
Qty: 90 CAPSULE | Refills: 0 | Status: SHIPPED | OUTPATIENT
Start: 2024-01-09 | End: 2024-02-20

## 2024-01-09 RX ORDER — OMEPRAZOLE 20 MG/1
CAPSULE, DELAYED RELEASE ORAL
Qty: 180 CAPSULE | Refills: 1 | Status: SHIPPED | OUTPATIENT
Start: 2024-01-09

## 2024-01-09 NOTE — TELEPHONE ENCOUNTER
Refill Routing Note   Medication(s) are not appropriate for processing by [facility] for the following reason(s):        Outside of protocol    [facility] action(s):  Route  Approve               Appointments  past 12m or future 3m with PCP    Date Provider   Last Visit   7/5/2023 Lucy Negron MD   Next Visit   Visit date not found Lucy Negron MD   ED visits in past 90 days: 0        Note composed:11:06 AM 01/09/2024

## 2024-02-20 RX ORDER — GABAPENTIN 100 MG/1
100 CAPSULE ORAL 3 TIMES DAILY
Qty: 90 CAPSULE | Refills: 0 | Status: SHIPPED | OUTPATIENT
Start: 2024-02-20 | End: 2024-03-27

## 2024-02-20 NOTE — TELEPHONE ENCOUNTER
Care Due:                  Date            Visit Type   Department     Provider  --------------------------------------------------------------------------------                                EP -                              PRIMARY      [facility] INTERNAL  Lucy RIDER  Last Visit: 07-      CARE ([facility])   MEDICINE       Jamil  Next Visit: None Scheduled  None         None Found                                                            Last  Test          Frequency    Reason                     Performed    Due Date  --------------------------------------------------------------------------------    HBA1C.......  6 months...  glimepiride..............  10-   04-    Lipid Panel.  12 months..  atorvastatin.............  Not Found    Overdue    Mg Level....  12 months..  magnesium................  Not Found    Overdue    [employer] Embedded Care Due Messages. Reference number: [id number].   2/20/2024 2:39:05 PM CST

## 2024-03-01 ENCOUNTER — TELEPHONE (OUTPATIENT)
Dept: NEUROLOGY | Facility: CLINIC | Age: 69
End: 2024-03-01
Payer: MEDICARE

## 2024-03-01 ENCOUNTER — PATIENT MESSAGE (OUTPATIENT)
Dept: FAMILY MEDICINE | Facility: CLINIC | Age: 69
End: 2024-03-01
Payer: MEDICARE

## 2024-03-01 DIAGNOSIS — I48.91 ATRIAL FIBRILLATION, UNSPECIFIED TYPE: ICD-10-CM

## 2024-03-01 NOTE — TELEPHONE ENCOUNTER
----- Message from Geno Jensen sent at 3/1/2024  8:55 AM CST -----  Contact: matthias/daughter  Pt daughter is calling in regards to her mother having a stroke and magdi PCP informed her to see a Neurology.please call back at .[phone]           Thanks  SYMONE

## 2024-03-01 NOTE — TELEPHONE ENCOUNTER
Spoke with patient daughter in regards to scheduling appt. Patient has been scheduled and added to the waiting list.

## 2024-03-14 ENCOUNTER — PATIENT MESSAGE (OUTPATIENT)
Dept: DIABETES | Facility: CLINIC | Age: 69
End: 2024-03-14
Payer: MEDICARE

## 2024-03-20 ENCOUNTER — PATIENT MESSAGE (OUTPATIENT)
Dept: FAMILY MEDICINE | Facility: CLINIC | Age: 69
End: 2024-03-20
Payer: MEDICARE

## 2024-03-20 DIAGNOSIS — G47.30 SLEEP APNEA, UNSPECIFIED TYPE: ICD-10-CM

## 2024-03-20 DIAGNOSIS — Z17.0 MALIGNANT NEOPLASM OF BREAST IN FEMALE, ESTROGEN RECEPTOR POSITIVE, UNSPECIFIED LATERALITY, UNSPECIFIED SITE OF BREAST: Primary | ICD-10-CM

## 2024-03-20 DIAGNOSIS — C50.919 MALIGNANT NEOPLASM OF BREAST IN FEMALE, ESTROGEN RECEPTOR POSITIVE, UNSPECIFIED LATERALITY, UNSPECIFIED SITE OF BREAST: Primary | ICD-10-CM

## 2024-03-26 NOTE — TELEPHONE ENCOUNTER
No care due was identified.  [facility] Embedded Care Due Messages. Reference number: [id number].   3/26/2024 5:33:28 PM CDT

## 2024-03-27 RX ORDER — GABAPENTIN 100 MG/1
100 CAPSULE ORAL 3 TIMES DAILY
Qty: 90 CAPSULE | Refills: 0 | Status: SHIPPED | OUTPATIENT
Start: 2024-03-27 | End: 2024-04-18

## 2024-03-28 ENCOUNTER — OFFICE VISIT (OUTPATIENT)
Dept: DIABETES | Facility: CLINIC | Age: 69
End: 2024-03-28
Payer: MEDICARE

## 2024-03-28 VITALS
WEIGHT: 229.5 LBS | HEIGHT: 69 IN | HEART RATE: 78 BPM | BODY MASS INDEX: 33.99 KG/M2 | SYSTOLIC BLOOD PRESSURE: 140 MMHG | DIASTOLIC BLOOD PRESSURE: 98 MMHG

## 2024-03-28 DIAGNOSIS — N18.2 CKD STAGE 2 DUE TO TYPE 2 DIABETES MELLITUS: ICD-10-CM

## 2024-03-28 DIAGNOSIS — E78.2 MIXED HYPERLIPIDEMIA: ICD-10-CM

## 2024-03-28 DIAGNOSIS — E11.3553 STABLE PROLIFERATIVE DIABETIC RETINOPATHY OF BOTH EYES ASSOCIATED WITH TYPE 2 DIABETES MELLITUS: ICD-10-CM

## 2024-03-28 DIAGNOSIS — E11.22 CKD STAGE 2 DUE TO TYPE 2 DIABETES MELLITUS: ICD-10-CM

## 2024-03-28 DIAGNOSIS — M14.672 CHARCOT'S JOINT OF FOOT, LEFT: ICD-10-CM

## 2024-03-28 DIAGNOSIS — E11.65 UNCONTROLLED TYPE 2 DIABETES MELLITUS WITH HYPERGLYCEMIA, WITH LONG-TERM CURRENT USE OF INSULIN: Primary | ICD-10-CM

## 2024-03-28 DIAGNOSIS — Z79.4 UNCONTROLLED TYPE 2 DIABETES MELLITUS WITH HYPERGLYCEMIA, WITH LONG-TERM CURRENT USE OF INSULIN: Primary | ICD-10-CM

## 2024-03-28 DIAGNOSIS — E11.42 DIABETIC PERIPHERAL NEUROPATHY ASSOCIATED WITH TYPE 2 DIABETES MELLITUS: ICD-10-CM

## 2024-03-28 DIAGNOSIS — I10 ESSENTIAL HYPERTENSION: ICD-10-CM

## 2024-03-28 LAB — GLUCOSE SERPL-MCNC: 223 MG/DL (ref 70–110)

## 2024-03-28 PROCEDURE — 3077F SYST BP >= 140 MM HG: CPT | Mod: HCNC,CPTII,S$GLB, | Performed by: NURSE PRACTITIONER

## 2024-03-28 PROCEDURE — 4010F ACE/ARB THERAPY RXD/TAKEN: CPT | Mod: HCNC,CPTII,S$GLB, | Performed by: NURSE PRACTITIONER

## 2024-03-28 PROCEDURE — 99214 OFFICE O/P EST MOD 30 MIN: CPT | Mod: HCNC,S$GLB,, | Performed by: NURSE PRACTITIONER

## 2024-03-28 PROCEDURE — 3008F BODY MASS INDEX DOCD: CPT | Mod: HCNC,CPTII,S$GLB, | Performed by: NURSE PRACTITIONER

## 2024-03-28 PROCEDURE — 1160F RVW MEDS BY RX/DR IN RCRD: CPT | Mod: HCNC,CPTII,S$GLB, | Performed by: NURSE PRACTITIONER

## 2024-03-28 PROCEDURE — 3288F FALL RISK ASSESSMENT DOCD: CPT | Mod: HCNC,CPTII,S$GLB, | Performed by: NURSE PRACTITIONER

## 2024-03-28 PROCEDURE — 1101F PT FALLS ASSESS-DOCD LE1/YR: CPT | Mod: HCNC,CPTII,S$GLB, | Performed by: NURSE PRACTITIONER

## 2024-03-28 PROCEDURE — 3080F DIAST BP >= 90 MM HG: CPT | Mod: HCNC,CPTII,S$GLB, | Performed by: NURSE PRACTITIONER

## 2024-03-28 PROCEDURE — 1126F AMNT PAIN NOTED NONE PRSNT: CPT | Mod: HCNC,CPTII,S$GLB, | Performed by: NURSE PRACTITIONER

## 2024-03-28 PROCEDURE — 1159F MED LIST DOCD IN RCRD: CPT | Mod: HCNC,CPTII,S$GLB, | Performed by: NURSE PRACTITIONER

## 2024-03-28 PROCEDURE — 99999 PR PBB SHADOW E&M-EST. PATIENT-LVL V: CPT | Mod: PBBFAC,HCNC,, | Performed by: NURSE PRACTITIONER

## 2024-03-28 PROCEDURE — 82962 GLUCOSE BLOOD TEST: CPT | Mod: HCNC,S$GLB,, | Performed by: NURSE PRACTITIONER

## 2024-03-28 RX ORDER — SEMAGLUTIDE 0.68 MG/ML
INJECTION, SOLUTION SUBCUTANEOUS
Qty: 3 ML | Refills: 5 | Status: SHIPPED | OUTPATIENT
Start: 2024-03-28

## 2024-03-28 RX ORDER — INSULIN GLARGINE 300 [IU]/ML
20 INJECTION, SOLUTION SUBCUTANEOUS NIGHTLY
Qty: 2 PEN | Refills: 5 | Status: SHIPPED | OUTPATIENT
Start: 2024-03-28

## 2024-03-28 NOTE — PROGRESS NOTES
Subjective:         Patient ID: Kay Galarza is a 68 y.o. female.  Patient's current PCP is Lucy Negron MD.     Chief Complaint: Diabetes Mellitus      HPI  Kay Galarza is a 68 y.o. White female presenting for a follow up for diabetes. Patient has been diagnosed with type 2 diabetes since 2007 .  Received diabetes education: Yes --declines refresher    CURRENT DM MEDICATIONS:   Diabetes Medications               dapagliflozin propanediol (FARXIGA) 10 mg tablet TAKE 1 TABLET (10 MG TOTAL) BY MOUTH ONCE DAILY.    glimepiride (AMARYL) 4 MG tablet TAKE 1 TABLET  BY MOUTH DAILY WITH BREAKFAST.    insulin glargine U-300 conc (TOUJEO MAX U-300 SOLOSTAR) 300 unit/mL (3 mL) insulin pen Inject 70 Units into the skin every evening. Has stopped taking due to overnight hypoglycemia.     NOVOLOG FLEXPEN U-100 INSULIN 100 unit/mL (3 mL) InPn pen Inject 18 Units into the skin 3 (three) times daily before meals. 14 units TID AC             Past failed treatment include: Xigduo,Trulicity- GI complaints    Blood glucose testing Continuous per Dexcom G7 [facility] -prefers to stay with mail order.   Preferred lab: [facility]    Any episodes of hypoglycemia? States experiencing hypoglycemia when taking Toujeo 70 units    Complications related to diabetes: nephropathy, retinopathy, peripheral neuropathy, peripheral vascular disease, and charcot foot (L,2020)    Her blood sugar in the clinic today was:   Lab Results   Component Value Date    POCGLU 223 (A) 03/28/2024     Kay Galarza presents today for follow up visit to discuss diabetes management. She is here today with her daughter to provide translation. Declines formal .    Unfortunately, her Dexcom G7  is not able to be downloaded today. She was advised to contact Dexcom for a replacement.     She reports the following: Fasting Bgs around the 200s. She stopped Toujeo between visits due to low Bgs, in the  40s, when taking 70 units daily. We discussed MOA of Toujeo and that her dose needs to be reduced, not discontinued completely. She verbalized understanding.    She is interested in Ozempic. She previously did not tolerate Trulicity well. We discussed MOA, most common side effects. She has no contraindications to GLP1 agonists, including no history of pancreatitis or gastroparesis, or personal nor family history of medullary thyroid CA. We discussed that this may ultimately lessen insulin needs and we reviewed hypoglycemia precautions.    Diabetic ulcer R foot- followed by Dr. Gray.      CKD II- sees nephrology, Dr. Sena    Current diet: 3 meals/day, occasional between meal snacks.  Activity Level: No structured exercise - Limited with Charcot foot    Lab Results   Component Value Date    HGBA1C 7.5 (H) 11/27/2023    HGBA1C 7.2 (H) 10/10/2023    HGBA1C 7.2 (H) 07/05/2023     STANDARDS OF CARE  Diabetes Management Status    Statin: Taking  ACE/ARB: Taking    Screening or Prevention Patient's value Goal Complete/Controlled?   HgA1C Testing and Control   Lab Results   Component Value Date    HGBA1C 7.5 (H) 11/27/2023      Annually/Less than 8% Yes   Lipid profile : 11/27/2023 Annually No   LDL control Lab Results   Component Value Date    LDLCALC 116 11/27/2023    Annually/Less than 100 mg/dl  No   Nephropathy screening Lab Results   Component Value Date    LABMICR 570.0 05/25/2022     Lab Results   Component Value Date    PROTEINUA 1+ (A) 10/13/2023     Lab Results   Component Value Date    UTPCR 1.39 (H) 10/13/2023      Annually Yes   Blood pressure BP Readings from Last 1 Encounters:   03/28/24 (!) 140/98    Less than 140/90 No   Dilated retinal exam : 11/20/2023 Annually Yes   Foot exam   : 01/04/2024 Annually Yes      Labs reviewed and are noted below.    Lab Results   Component Value Date    WBC 9.12 07/05/2023    HGB 13.0 07/05/2023    HCT 43.2 07/05/2023     07/05/2023    CHOL 160 11/27/2023    TRIG  "84 11/27/2023    HDL 27 (L) 11/27/2023    LDLCALC 116 11/27/2023    ALT 15 07/05/2023    AST 20 07/05/2023     10/13/2023    K 4.8 10/13/2023     10/13/2023    ANIONGAP 8 10/13/2023    CREATININE 0.9 10/13/2023    ESTGFRAFRICA >60.0 05/25/2022    EGFRNONAA 57.6 (A) 05/25/2022    BUN 25 (H) 10/13/2023    CO2 28 10/13/2023    TSH 1.391 11/27/2023    INR 1.1 11/26/2023     (H) 10/13/2023    UTPCR 1.39 (H) 10/13/2023     Lab Results   Component Value Date    TSH 1.391 11/27/2023    CALCIUM 9.5 10/13/2023    PHOS 3.5 10/13/2023     No results found for: "CPEPTIDE"  No results found for: "GLUTAMICACID"  Glucose   Date Value Ref Range Status   10/13/2023 162 (H) 70 - 110 mg/dL Final     Anion Gap   Date Value Ref Range Status   10/13/2023 8 8 - 16 mmol/L Final     eGFR if    Date Value Ref Range Status   05/25/2022 >60.0 >60 mL/min/1.73 m^2 Final     eGFR if non    Date Value Ref Range Status   05/25/2022 57.6 (A) >60 mL/min/1.73 m^2 Final     Comment:     Calculation used to obtain the estimated glomerular filtration  rate (eGFR) is the CKD-EPI equation.          The following portions of the patient's history were reviewed and updated as appropriate: allergies, current medications, past family history, past medical history, past social history, past surgical history, and problem list.    Review of patient's allergies indicates:   Allergen Reactions    Peanut butter [peanut] Shortness Of Breath and Itching    Pollen extracts      Seasonal allergies, sneezing     Social History     Socioeconomic History    Marital status:    Tobacco Use    Smoking status: Never    Smokeless tobacco: Never   Substance and Sexual Activity    Alcohol use: Never    Drug use: No    Sexual activity: Yes     Social Determinants of Health     Financial Resource Strain: Medium Risk (1/4/2024)    Overall Financial Resource Strain (CARDIA)     Difficulty of Paying Living Expenses: Somewhat " hard   Food Insecurity: No Food Insecurity (1/4/2024)    Hunger Vital Sign     Worried About Running Out of Food in the Last Year: Never true     Ran Out of Food in the Last Year: Never true   Transportation Needs: No Transportation Needs (1/4/2024)    PRAPARE - Transportation     Lack of Transportation (Medical): No     Lack of Transportation (Non-Medical): No   Physical Activity: Insufficiently Active (1/4/2024)    Exercise Vital Sign     Days of Exercise per Week: 7 days     Minutes of Exercise per Session: 10 min   Stress: Stress Concern Present (1/4/2024)    [employer] [place] [employer] - Occupational Stress Questionnaire     Feeling of Stress : To some extent   Social Connections: Moderately Isolated (1/4/2024)    Social Connection and Isolation Panel [NHANES]     Frequency of Communication with Friends and Family: More than three times a week     Frequency of Social Gatherings with Friends and Family: Twice a week     Attends Christian Services: More than 4 times per year     Active Member of Clubs or Organizations: No     Attends Club or Organization Meetings: Never     Marital Status:    Housing Stability: Low Risk  (1/4/2024)    Housing Stability Vital Sign     Unable to Pay for Housing in the Last Year: No     Number of Places Lived in the Last Year: 1     Unstable Housing in the Last Year: No     Past Medical History:   Diagnosis Date    Abnormal nuclear stress test 1/23/2023    Arthritis     DERIAN KNEES    Asthma     SEASONAL    Cataract     Diabetes mellitus     Diabetes mellitus, type 2     Diabetic retinopathy     DM (diabetes mellitus) 2007    BS 97 09/29/2020    Hyperlipidemia     Hypertension      REVIEW OF SYSTEMS:  Eyes History of    Cardiovascular: History of HTN and HLD.  GI: H/o reflux and intermittent diarrhea. No history of pancreatitis or gastroparesis.  : Tolerating Farxiga well from a  standpoint.  Neuro: No neuropathy.  PSYCH: No tobacco use.  ENDO: See HPI.         Objective:      Vitals:    03/28/24 1142   BP: (!) 140/98   Pulse: 78     RESPIRATORY: No respiratory distress  NEUROLOGIC: Cranial nerves II-XII grossly intact.   PSYCHIATRIC: Alert & oriented x3. Normal mood and affect.  FOOT EXAMINATION: UTD  Assessment:       1. Uncontrolled type 2 diabetes mellitus with hyperglycemia, with long-term current use of insulin    2. Essential hypertension    3. Mixed hyperlipidemia    4. Charcot's joint of foot, left    5. Diabetic peripheral neuropathy associated with type 2 diabetes mellitus    6. CKD stage 2 due to type 2 diabetes mellitus    7. Stable proliferative diabetic retinopathy of both eyes associated with type 2 diabetes mellitus            Plan:   Kay was seen today for diabetes mellitus.    Diagnoses and all orders for this visit:    Uncontrolled type 2 diabetes mellitus with hyperglycemia, with long-term current use of insulin  -     POCT Glucose, Hand-Held Device  -     insulin glargine U-300 conc (TOUJEO MAX U-300 SOLOSTAR) 300 unit/mL (3 mL) insulin pen; Inject 20 Units into the skin every evening. Increase by 2 units every 3 days until fasting sugar is below 130.  -     Hemoglobin A1C; Future  -     semaglutide (OZEMPIC) 0.25 mg or 0.5 mg (2 mg/3 mL) pen injector; Start 0.25 mg once a week for 4 weeks, then increase to 0.5 mg weekly thereafter.    Chronic -  Patient advised to contact Dexcom for a new G7  since hers is no longer able to be downloaded.    Re-start Toujeo 20 units nightly.    Start Ozempic 0.25 mg once a week for 4 weeks, then increase to 0.5 mg weekly thereafter.    Continue Novolog 14 units three times a day before each main meal while on the lower dose of Ozempic. When on the 0.5 mg dose, decrease to 10 units and keep me updated with how blood sugars are trending.    Continue Farxiga 10 mg daily and Amaryl 4 mg in the AM as current.      Essential hypertension    Mixed hyperlipidemia    Charcot's joint of foot, left    Diabetic  "peripheral neuropathy associated with type 2 diabetes mellitus    CKD stage 2 due to type 2 diabetes mellitus    Stable proliferative diabetic retinopathy of both eyes associated with type 2 diabetes mellitus    - Follow up: 3 months    Portions of this note may have been created with voice recognition software. Occasional "wrong-word" or "sound-a-like" substitutions may have occurred due to the inherent limitations of voice recognition software. Please, read the note carefully and recognize, using context, where substitutions have occurred.           Arlene Garcia,MARYANN-C, BC-ADM  [facility]   "

## 2024-03-28 NOTE — PATIENT INSTRUCTIONS
Re-start Toujeo 20 units nightly.    Start Ozempic 0.25 mg once a week for 4 weeks, then increase to 0.5 mg weekly thereafter.    Continue Novolog 14 units three times a day before each main meal while on the lower dose of Ozempic. When on the 0.5 mg dose, decrease to 10 units and keep me updated with how blood sugars are trending.    Continue Farxiga 10 mg daily and Amaryl 4 mg in the AM as current.

## 2024-04-01 DIAGNOSIS — E11.65 UNCONTROLLED TYPE 2 DIABETES MELLITUS WITH HYPERGLYCEMIA, WITH LONG-TERM CURRENT USE OF INSULIN: ICD-10-CM

## 2024-04-01 DIAGNOSIS — Z79.4 UNCONTROLLED TYPE 2 DIABETES MELLITUS WITH HYPERGLYCEMIA, WITH LONG-TERM CURRENT USE OF INSULIN: ICD-10-CM

## 2024-04-01 RX ORDER — DAPAGLIFLOZIN 10 MG/1
10 TABLET, FILM COATED ORAL DAILY
Qty: 90 TABLET | Refills: 3 | Status: SHIPPED | OUTPATIENT
Start: 2024-04-01

## 2024-04-03 ENCOUNTER — PATIENT MESSAGE (OUTPATIENT)
Dept: PULMONOLOGY | Facility: CLINIC | Age: 69
End: 2024-04-03
Payer: MEDICARE

## 2024-04-05 ENCOUNTER — TELEPHONE (OUTPATIENT)
Dept: CARDIOLOGY | Facility: HOSPITAL | Age: 69
End: 2024-04-05
Payer: MEDICARE

## 2024-04-05 NOTE — TELEPHONE ENCOUNTER
----- Message from Elizabeth Villatoro sent at 4/5/2024 10:14 AM CDT -----  Contact: Lacie/[facility]  Lacie is calling in regards to pt being diagnosed with triple negative breast cancer. Pt will be starting chemo and these are the medications Carboplatin, Paclitaxel, Pembrolizumab. Lacie stated needing more recent Echo before starting treatment. Please call back at [phone] Ask for Lacie fax number [phone]                          Thanks  JULIEN

## 2024-04-18 RX ORDER — GABAPENTIN 100 MG/1
100 CAPSULE ORAL 3 TIMES DAILY
Qty: 90 CAPSULE | Refills: 0 | Status: SHIPPED | OUTPATIENT
Start: 2024-04-18

## 2024-04-18 NOTE — TELEPHONE ENCOUNTER
No care due was identified.  Health [facility] Embedded Care Due Messages. Reference number: [id number].   4/18/2024 1:09:07 PM CDT

## 2024-04-23 ENCOUNTER — HOSPITAL ENCOUNTER (OUTPATIENT)
Dept: RADIOLOGY | Facility: HOSPITAL | Age: 69
Discharge: HOME | End: 2024-04-23
Attending: FAMILY MEDICINE
Payer: MEDICARE

## 2024-04-23 ENCOUNTER — OFFICE VISIT (OUTPATIENT)
Dept: FAMILY MEDICINE | Facility: CLINIC | Age: 69
End: 2024-04-23
Attending: FAMILY MEDICINE
Payer: MEDICARE

## 2024-04-23 VITALS
OXYGEN SATURATION: 96 % | SYSTOLIC BLOOD PRESSURE: 136 MMHG | HEART RATE: 68 BPM | DIASTOLIC BLOOD PRESSURE: 80 MMHG | WEIGHT: 231.69 LBS | TEMPERATURE: 97 F | BODY MASS INDEX: 34.32 KG/M2 | HEIGHT: 69 IN

## 2024-04-23 DIAGNOSIS — G47.33 OSA (OBSTRUCTIVE SLEEP APNEA): ICD-10-CM

## 2024-04-23 DIAGNOSIS — L21.9 SEBORRHEIC DERMATITIS: ICD-10-CM

## 2024-04-23 DIAGNOSIS — Z17.0 MALIGNANT NEOPLASM OF BREAST IN FEMALE, ESTROGEN RECEPTOR POSITIVE, UNSPECIFIED LATERALITY, UNSPECIFIED SITE OF BREAST: ICD-10-CM

## 2024-04-23 DIAGNOSIS — E11.69 DM TYPE 2 WITH DIABETIC DYSLIPIDEMIA: ICD-10-CM

## 2024-04-23 DIAGNOSIS — Z01.818 PRE-OPERATIVE CLEARANCE: ICD-10-CM

## 2024-04-23 DIAGNOSIS — R79.9 ABNORMAL FINDING OF BLOOD CHEMISTRY, UNSPECIFIED: ICD-10-CM

## 2024-04-23 DIAGNOSIS — Z01.818 PRE-OPERATIVE CLEARANCE: Primary | ICD-10-CM

## 2024-04-23 DIAGNOSIS — N18.31 STAGE 3A CHRONIC KIDNEY DISEASE (CKD): ICD-10-CM

## 2024-04-23 DIAGNOSIS — J84.9 ILD (INTERSTITIAL LUNG DISEASE): ICD-10-CM

## 2024-04-23 DIAGNOSIS — E78.5 DM TYPE 2 WITH DIABETIC DYSLIPIDEMIA: ICD-10-CM

## 2024-04-23 DIAGNOSIS — I48.91 ATRIAL FIBRILLATION, UNSPECIFIED TYPE: ICD-10-CM

## 2024-04-23 DIAGNOSIS — C50.919 MALIGNANT NEOPLASM OF BREAST IN FEMALE, ESTROGEN RECEPTOR POSITIVE, UNSPECIFIED LATERALITY, UNSPECIFIED SITE OF BREAST: ICD-10-CM

## 2024-04-23 DIAGNOSIS — Z86.73 HISTORY OF CVA (CEREBROVASCULAR ACCIDENT): ICD-10-CM

## 2024-04-23 PROBLEM — R94.39 ABNORMAL NUCLEAR STRESS TEST: Status: RESOLVED | Noted: 2023-01-23 | Resolved: 2024-04-23

## 2024-04-23 LAB
OHS QRS DURATION: 90 MS
OHS QTC CALCULATION: 502 MS

## 2024-04-23 PROCEDURE — 1160F RVW MEDS BY RX/DR IN RCRD: CPT | Mod: HCNC,CPTII,S$GLB, | Performed by: FAMILY MEDICINE

## 2024-04-23 PROCEDURE — 3008F BODY MASS INDEX DOCD: CPT | Mod: HCNC,CPTII,S$GLB, | Performed by: FAMILY MEDICINE

## 2024-04-23 PROCEDURE — 71046 X-RAY EXAM CHEST 2 VIEWS: CPT | Mod: 26,HCNC,, | Performed by: RADIOLOGY

## 2024-04-23 PROCEDURE — 1101F PT FALLS ASSESS-DOCD LE1/YR: CPT | Mod: HCNC,CPTII,S$GLB, | Performed by: FAMILY MEDICINE

## 2024-04-23 PROCEDURE — 93005 ELECTROCARDIOGRAM TRACING: CPT | Mod: HCNC,S$GLB,, | Performed by: FAMILY MEDICINE

## 2024-04-23 PROCEDURE — 1159F MED LIST DOCD IN RCRD: CPT | Mod: HCNC,CPTII,S$GLB, | Performed by: FAMILY MEDICINE

## 2024-04-23 PROCEDURE — 1126F AMNT PAIN NOTED NONE PRSNT: CPT | Mod: HCNC,CPTII,S$GLB, | Performed by: FAMILY MEDICINE

## 2024-04-23 PROCEDURE — 71046 X-RAY EXAM CHEST 2 VIEWS: CPT | Mod: TC,HCNC,PO

## 2024-04-23 PROCEDURE — 3079F DIAST BP 80-89 MM HG: CPT | Mod: HCNC,CPTII,S$GLB, | Performed by: FAMILY MEDICINE

## 2024-04-23 PROCEDURE — 99214 OFFICE O/P EST MOD 30 MIN: CPT | Mod: HCNC,S$GLB,, | Performed by: FAMILY MEDICINE

## 2024-04-23 PROCEDURE — 93010 ELECTROCARDIOGRAM REPORT: CPT | Mod: HCNC,S$GLB,, | Performed by: INTERNAL MEDICINE

## 2024-04-23 PROCEDURE — 3288F FALL RISK ASSESSMENT DOCD: CPT | Mod: HCNC,CPTII,S$GLB, | Performed by: FAMILY MEDICINE

## 2024-04-23 PROCEDURE — 99999 PR PBB SHADOW E&M-EST. PATIENT-LVL III: CPT | Mod: PBBFAC,HCNC,, | Performed by: FAMILY MEDICINE

## 2024-04-23 PROCEDURE — 3075F SYST BP GE 130 - 139MM HG: CPT | Mod: HCNC,CPTII,S$GLB, | Performed by: FAMILY MEDICINE

## 2024-04-23 PROCEDURE — 4010F ACE/ARB THERAPY RXD/TAKEN: CPT | Mod: HCNC,CPTII,S$GLB, | Performed by: FAMILY MEDICINE

## 2024-04-23 RX ORDER — KETOCONAZOLE 20 MG/ML
SHAMPOO, SUSPENSION TOPICAL
Qty: 120 ML | Refills: 3 | Status: SHIPPED | OUTPATIENT
Start: 2024-04-25

## 2024-04-23 RX ORDER — CLOBETASOL PROPIONATE 0.46 MG/ML
SOLUTION TOPICAL 2 TIMES DAILY
Qty: 50 ML | Status: SHIPPED | OUTPATIENT
Start: 2024-04-23

## 2024-04-23 NOTE — PROGRESS NOTES
Subjective:       Patient ID: Kay Galarza is a 68 y.o. female.    Chief Complaint: Pre-op Exam (Breast surgery with [facility])    68 y old female who was recetly diagnosed with L breast cancer here for pre op clearance for L breast sx on TBA by Dr. Maxime Perez  . Most recent surgery was Left foot surgery last year. She had a CVA last  fall due to non compliance with Eliquis . Endorses  MALLOY , CP and SOB with light activity . Glucose range from 150-220 . She stopped Toujeo a while back since glucoses were too low . Also diagnosed with sleep apnea. She has not gotten CPAP yet  .     Review of Systems   Constitutional: Negative.    HENT: Negative.     Eyes: Negative.    Respiratory:  Positive for shortness of breath.    Cardiovascular:  Positive for chest pain.   Gastrointestinal: Negative.    Genitourinary: Negative.    Musculoskeletal:  Positive for arthralgias.   Skin: Negative.    Hematological: Negative.        Objective:      Physical Exam  Constitutional:       General: She is not in acute distress.     Appearance: She is well-developed. She is not diaphoretic.   HENT:      Head: Normocephalic and atraumatic.      Right Ear: External ear normal.      Left Ear: External ear normal.      Mouth/Throat:      Pharynx: No oropharyngeal exudate.   Eyes:      General: No scleral icterus.        Right eye: No discharge.         Left eye: No discharge.      Conjunctiva/sclera: Conjunctivae normal.      Pupils: Pupils are equal, round, and reactive to light.   Neck:      Thyroid: No thyromegaly.      Vascular: No JVD.      Trachea: No tracheal deviation.   Cardiovascular:      Rate and Rhythm: Normal rate. Rhythm irregularly irregular.      Heart sounds: Normal heart sounds. No murmur heard.     No friction rub. No gallop.   Pulmonary:      Effort: Pulmonary effort is normal. No respiratory distress.      Breath sounds: Normal breath sounds. No stridor. No wheezing or rales.   Chest:      Chest wall: No  tenderness.   Abdominal:      General: Bowel sounds are normal. There is no distension.      Palpations: Abdomen is soft.      Tenderness: There is no abdominal tenderness. There is no guarding or rebound.   Musculoskeletal:         General: Normal range of motion.      Cervical back: Normal range of motion and neck supple.   Lymphadenopathy:      Cervical: No cervical adenopathy.   Skin:     General: Skin is warm and dry.   Neurological:      Mental Status: She is alert and oriented to person, place, and time.   Psychiatric:         Behavior: Behavior normal.         Thought Content: Thought content normal.         Judgment: Judgment normal.       Assessment:     Kay was seen today for pre-op exam.    Diagnoses and all orders for this visit:    Pre-operative clearance  -     CBC Auto Differential; Future  -     Comprehensive Metabolic Panel; Future  -     Hemoglobin A1C; Future  -     IN OFFICE EKG 12-LEAD (to Muse)  -     X-Ray Chest PA And Lateral; Future    Abnormal finding of blood chemistry, unspecified  -     Hemoglobin A1C; Future    History of CVA (cerebrovascular accident)    ILD (interstitial lung disease)    Atrial fibrillation, unspecified type    DM type 2 with diabetic dyslipidemia    Malignant neoplasm of breast in female, estrogen receptor positive, unspecified laterality, unspecified site of breast    Stage 3a chronic kidney disease (CKD)    JOHN (obstructive sleep apnea)       Plan:

## 2024-04-24 ENCOUNTER — PATIENT MESSAGE (OUTPATIENT)
Dept: FAMILY MEDICINE | Facility: CLINIC | Age: 69
End: 2024-04-24
Payer: MEDICARE

## 2024-05-01 NOTE — PROGRESS NOTES
Subjective:   Patient ID:  Kay Galarza is a 69 y.o. female who presents for cardiac consult of Pre-op Exam, Atrial Fibrillation, Chest Pain, Palpitations, Shortness of Breath, and Follow-up (Pt recently diagnosis with breast cancer. )      Referral by: No referring provider defined for this encounter.     Reason for consult:       HPI  The patient came in today for cardiac consult of Pre-op Exam, Atrial Fibrillation, Chest Pain, Palpitations, Shortness of Breath, and Follow-up (Pt recently diagnosis with breast cancer. )      Kay Galarza is a 69 y.o. female pt with Afib, breast CA, HTN, HLD, DM2, asthma, JOHN, h/o CVA, obesity presents for CV follow up.      03/03/2023:   Overall patient doing well heart catheterization showed no evidence of cardiac ischemia.  The patient has normal coronary anatomy.  The patient will be planned now to have foot surgery and even though she has intermittent chest discomfort shortness of breath she is stable heart function is normal and she is otherwise doing well.    5/2/24 -   Pt seen last year with Dr. Chavez here for preop CV eval.     From recent PCP visit  68 y old female who was recetly diagnosed with L breast cancer here for pre op clearance for L breast sx on TBA by Dr. Maxime Perez . Most recent surgery was Left foot surgery last year. She had a CVA last fall due to non compliance with Eliquis . Endorses MALLOY , CP and SOB with light activity . Glucose range from 150-220 . She stopped Toujeo a while back since glucoses were too low . Also diagnosed with sleep apnea. She has not gotten CPAP yet .     ECHO 4/2024 with normal bi V function, mild MR, restricted MV, mild AI, mild to mod TR, pulm HTN PASP 50-60 mmHg.     She will have a port placed for chemo and then have breast surgery 6 months after chemo.   Pt has asthma, has mild SOB at times/worse when laying down. She uses CPAP nightly, sees pulm.           ECHO 4/2024 CONCLUSIONS:   1. Normal  left ventricular cavity size. Normal left ventricular systolic function. LVEF   55 - 65%. Left ventricular diastolic function is indeterminate in this study due to the   presence of severe mitral annular calcification. Moderate eccentric hypertrophy. Abnormal   global longitudinal strain. GLS=-7.87%.   2. Mild mitral valve regurgitation. Severe mitral annular calcification. MAC is   predominantly anterior. Restricted motion of the posterior mitral leaflet.   3. Mild aortic valve regurgitation.   4. Mild to moderate tricuspid valve regurgitation. The estimated right ventricular   systolic pressure/PASP is 50-60 mmHg. Moderate pulmonary hypertension.     Results for orders placed during the hospital encounter of 01/18/23    Nuclear Stress - Cardiology Interpreted    Interpretation Summary    Equivocal myocardial perfusion scan.    There is a moderate to severe intensity, large sized, mostly fixed perfusion abnormality with some reversibilty in the anterolateral wall(s): breast attenuation defect noted on image review; however there is reversibility as well suggestive of possible significant coronary ischemia.  Clinical correlation strongly advised.    There are no other significant perfusion abnormalities.    The gated perfusion images showed an ejection fraction of 63% at rest. The gated perfusion images showed an ejection fraction of 66% post stress.    The ECG portion of the study is negative for ischemia.    During stress, occasional PVCs are noted.      Results for orders placed during the hospital encounter of 01/24/23    Cardiac catheterization    Conclusion    The ejection fraction was calculated to be 60%.    The pre-procedure left ventricular end diastolic pressure was 19.    The post-procedure left ventricular end diastolic pressure was 21.    The estimated blood loss was none.    The coronary arteries were normal..    The procedure log was documented by Documenter: Junie Pittman RN and verified by Kelly FARAH  MD Irene.    Date: 1/24/2023  Time: 1:00 PM    No results found for this or any previous visit.      No cardiac monitor results found for the past 12 months         Past Medical History:   Diagnosis Date    Abnormal nuclear stress test 1/23/2023    Arthritis     DERIAN KNEES    Asthma     SEASONAL    Cataract     Diabetes mellitus     Diabetes mellitus, type 2     Diabetic retinopathy     DM (diabetes mellitus) 2007    BS 97 09/29/2020    Hyperlipidemia     Hypertension        Past Surgical History:   Procedure Laterality Date    APPLICATION OF WOUND VACUUM-ASSISTED CLOSURE DEVICE Left 8/18/2020    Procedure: APPLICATION, WOUND VAC;  Surgeon: Kirt Gray DPM;  Location: [facility] OR;  Service: Podiatry;  Laterality: Left;    APPLICATION, EXTERNAL FIXATION DEVICE Left 3/13/2023    Procedure: APPLICATION, EXTERNAL FIXATION DEVICE;  Surgeon: Kirt Gray DPM;  Location: [facility] OR;  Service: Podiatry;  Laterality: Left;    CATARACT EXTRACTION W/  INTRAOCULAR LENS IMPLANT Right 07/18/2018    CATARACT EXTRACTION W/  INTRAOCULAR LENS IMPLANT Left 03/13/2019    COLONOSCOPY N/A 12/22/2018    Procedure: COLONOSCOPY;  Surgeon: Zak Dover MD;  Location: [facility] ENDO;  Service: Endoscopy;  Laterality: N/A;    COLONOSCOPY N/A 5/11/2022    Procedure: COLONOSCOPY;  Surgeon: Charles Berrios MD;  Location: [facility] ENDO;  Service: Endoscopy;  Laterality: N/A;    ENDOSCOPIC VEIN LASER TREATMENT Bilateral 1990's    FIXATION OF SYNDESMOSIS OF ANKLE Left 7/23/2020    Procedure: FIXATION, SYNDESMOSIS, ANKLE;  Surgeon: Kirt Gray DPM;  Location: [facility] OR;  Service: Podiatry;  Laterality: Left;    FOOT ARTHRODESIS Left 5/26/2023    Procedure: FUSION, FOOT;  Surgeon: Kirt Gray DPM;  Location: [facility] OR;  Service: Podiatry;  Laterality: Left;    FOOT HARDWARE REMOVAL Left 3/13/2023    Procedure: REMOVAL, HARDWARE, FOOT;  Surgeon: Kirt Gray DPM;  Location: [facility] OR;  Service: Podiatry;  Laterality: Left;    FUSION OF SUBTALAR  JOINT Left 3/13/2023    Procedure: FUSION, SUBTALAR JOINT;  Surgeon: Kirt Gray DPM;  Location: [facility] OR;  Service: Podiatry;  Laterality: Left;    LEFT HEART CATHETERIZATION Left 1/24/2023    Procedure: CATHETERIZATION, HEART, LEFT;  Surgeon: Salma Bonilla MD;  Location: [facility] CATH LAB;  Service: Cardiology;  Laterality: Left;  pt given 130 start time    MANIPULATION WITH ANESTHESIA Left 7/23/2020    Procedure: MANIPULATION, WITH ANESTHESIA;  Surgeon: Kirt Gray DPM;  Location: [facility] OR;  Service: Podiatry;  Laterality: Left;    MIDFOOT ARTHRODESIS Left 7/23/2020    Procedure: FUSION, JOINT, MIDFOOT;  Surgeon: Kirt Gray DPM;  Location: [facility] OR;  Service: Podiatry;  Laterality: Left;  2nd tarsometatarsal joint dislocation via fusion    MIDFOOT ARTHRODESIS Left 3/13/2023    Procedure: FUSION, JOINT, MIDFOOT;  Surgeon: Kirt Gray DPM;  Location: [facility] OR;  Service: Podiatry;  Laterality: Left;    OPEN REDUCTION AND INTERNAL FIXATION (ORIF) OF INJURY OF ANKLE Left 7/23/2020    Procedure: ORIF, ANKLE;  Surgeon: Kirt Gray DPM;  Location: [facility] OR;  Service: Podiatry;  Laterality: Left;    RECONSTRUCTION WITH FUSION OF CHARCOT FOOT Left 8/18/2020    Procedure: RECONSTRUCTION, CHARCOT FOOT, WITH FUSION;  Surgeon: Kirt Gray DPM;  Location: [facility] OR;  Service: Podiatry;  Laterality: Left;    REMOVAL OF EXTERNAL FIXATION DEVICE Left 5/26/2023    Procedure: REMOVAL, EXTERNAL FIXATION DEVICE;  Surgeon: Kirt Gray DPM;  Location: [facility] OR;  Service: Podiatry;  Laterality: Left;    REMOVAL OF HARDWARE FROM LOWER EXTREMITY Left 1/27/2021    Procedure: REMOVAL, HARDWARE, LOWER EXTREMITY;  Surgeon: Kirt Gray DPM;  Location: [facility] OR;  Service: Podiatry;  Laterality: Left;    SESAMOIDECTOMY Left 5/26/2023    Procedure: SESAMOIDECTOMY;  Surgeon: Kirt Gray DPM;  Location: [facility] OR;  Service: Podiatry;  Laterality: Left;    WOUND DEBRIDEMENT Left 8/18/2020    Procedure: DEBRIDEMENT,  WOUND;  Surgeon: Kirt Gray DPM;  Location: [facility] OR;  Service: Podiatry;  Laterality: Left;       Social History     Tobacco Use    Smoking status: Never    Smokeless tobacco: Never   Substance Use Topics    Alcohol use: Never    Drug use: No       Family History   Problem Relation Name Age of Onset    Cancer Father          Prostate Ca    Hypertension Father      Stroke Father      Diabetes Sister      Glaucoma Mother      Hypertension Mother      Glaucoma Maternal Grandmother      Blindness Neg Hx      Macular degeneration Neg Hx      Retinal detachment Neg Hx      Strabismus Neg Hx         Patient's Medications   New Prescriptions    FUROSEMIDE (LASIX) 20 MG TABLET    Take 1 tablet (20 mg total) by mouth daily as needed (edema, swelling, worsening breathing due to fluid). Take next 3 days and monitor BP; do not take if BP is below 110/70   Previous Medications    ACCU-CHEK GUIDE TEST STRIPS STRP    TO CHECK BG 2 TIMES DAILY, TO USE WITH INSURANCE PREFERRED METER    ACETAMINOPHEN (TYLENOL) 500 MG TABLET    Take 500 mg by mouth every 6 (six) hours as needed for Pain.    ALBUTEROL (PROVENTIL/VENTOLIN HFA) 90 MCG/ACTUATION INHALER    INHALE 2 PUFFS INTO THE LUNGS EVERY 6  HOURS AS NEEDED FOR WHEEZING. RESCUE    ATORVASTATIN (LIPITOR) 40 MG TABLET    TAKE 1 TABLET BY MOUTH ONCE DAILY.    B INFANTIS/B ANI/B JU/B BIFID (PROBIOTIC 4X ORAL)    Take by mouth Daily.    BLOOD-GLUCOSE METER KIT    To check BG 2 times daily, to use with insurance preferred meter    CLOBETASOL (TEMOVATE) 0.05 % EXTERNAL SOLUTION    Apply topically 2 (two) times daily.    DAPAGLIFLOZIN PROPANEDIOL (FARXIGA) 10 MG TABLET    TAKE 1 TABLET (10 MG TOTAL) BY MOUTH ONCE DAILY.    FLUTICASONE PROPIONATE (FLONASE) 50 MCG/ACTUATION NASAL SPRAY    2 sprays (100 mcg total) by Each Nostril route once daily.    GABAPENTIN (NEURONTIN) 100 MG CAPSULE    TAKE 1 CAPSULE  BY MOUTH THREE TIMES DAILY    GLIMEPIRIDE (AMARYL) 4 MG TABLET    Take 1 tablet (4 mg  total) by mouth daily with breakfast.    INSULIN GLARGINE U-300 CONC (TOUJEO MAX U-300 SOLOSTAR) 300 UNIT/ML (3 ML) INSULIN PEN    Inject 20 Units into the skin every evening. Increase by 2 units every 3 days until fasting sugar is below 130.    KETOCONAZOLE (NIZORAL) 2 % SHAMPOO    Apply topically twice a week.    LANCETS MISC    To check BG 2 times daily, to use with insurance preferred meter    LISINOPRIL-HYDROCHLOROTHIAZIDE (PRINZIDE,ZESTORETIC) 20-25 MG TAB    TAKE 1 TABLET BY MOUTH ONCE DAILY.    MAGNESIUM OXIDE 200 MG MAGNESIUM TAB    TAKE 3 TABLETS  BY MOUTH EVERY EVENING.    NOVOLOG FLEXPEN U-100 INSULIN 100 UNIT/ML (3 ML) INPN PEN    Inject 18 Units into the skin 3 (three) times daily before meals.    POLYETHYLENE GLYCOL (GLYCOLAX) 17 GRAM PWPK    Take 17 g by mouth once daily.    SEMAGLUTIDE (OZEMPIC) 0.25 MG OR 0.5 MG (2 MG/3 ML) PEN INJECTOR    Inject 0.25 mg once a week for 4 weeks, THEN increase to 0.5 mg weekly thereafter.    SERTRALINE (ZOLOFT) 50 MG TABLET    Take 1 tablet (50 mg total) by mouth once daily.    TRAZODONE (DESYREL) 150 MG TABLET    TAKE 1 TABLET  BY MOUTH NIGHTLY AS NEEDED FOR INSOMNIA.    VITAMIN D2 1,250 MCG (50,000 UNIT) CAPSULE    TAKE 1 CAPSULE  BY MOUTH ONCE A WEEK FOR 4 DOSES   Modified Medications    Modified Medication Previous Medication    APIXABAN (ELIQUIS) 5 MG TAB apixaban (ELIQUIS) 5 mg Tab       Take 1 tablet (5 mg total) by mouth 2 (two) times daily.    Take 1 tablet (5 mg total) by mouth 2 (two) times daily.    CARVEDILOL (COREG) 6.25 MG TABLET carvediloL (COREG) 6.25 MG tablet       Take 1 tablet (6.25 mg total) by mouth 2 (two) times daily.    Take 6.25 mg by mouth 2 (two) times daily.   Discontinued Medications    APIXABAN (ELIQUIS) 2.5 MG TAB    Take 1 tablet (2.5 mg total) by mouth 2 (two) times daily.       Review of Systems   Constitutional:  Positive for malaise/fatigue.   HENT: Negative.     Eyes: Negative.    Respiratory:  Positive for shortness of breath.  "   Cardiovascular:  Positive for leg swelling.   Gastrointestinal: Negative.    Genitourinary: Negative.    Musculoskeletal: Negative.    Skin: Negative.    Neurological: Negative.    Endo/Heme/Allergies: Negative.    Psychiatric/Behavioral: Negative.     All 12 systems otherwise negative.      Wt Readings from Last 3 Encounters:   05/02/24 105.6 kg (232 lb 12.9 oz)   04/23/24 105.1 kg (231 lb 11.3 oz)   03/28/24 104.1 kg (229 lb 8 oz)     Temp Readings from Last 3 Encounters:   04/23/24 96.8 °F (36 °C) (Tympanic)   01/04/24 97.5 °F (36.4 °C) (Temporal)   07/05/23 98.5 °F (36.9 °C) (Tympanic)     BP Readings from Last 3 Encounters:   05/02/24 138/78   04/23/24 136/80   03/28/24 (!) 140/98     Pulse Readings from Last 3 Encounters:   05/02/24 92   04/23/24 68   03/28/24 78       /78 (BP Location: Right arm, Patient Position: Sitting, BP Method: Large (Automatic))   Pulse 92   Ht 5' 9" (1.753 m)   Wt 105.6 kg (232 lb 12.9 oz)   SpO2 (!) 93%   BMI 34.38 kg/m²     Objective:   Physical Exam  Vitals and nursing note reviewed.   Constitutional:       General: She is not in acute distress.     Appearance: She is well-developed. She is obese. She is not diaphoretic.   HENT:      Head: Normocephalic and atraumatic.      Nose: Nose normal.   Eyes:      General: No scleral icterus.     Conjunctiva/sclera: Conjunctivae normal.   Neck:      Thyroid: No thyromegaly.      Vascular: No JVD.   Cardiovascular:      Rate and Rhythm: Normal rate. Rhythm irregular.      Heart sounds: S1 normal and S2 normal. Murmur heard.      No friction rub. No gallop. No S3 or S4 sounds.   Pulmonary:      Effort: Pulmonary effort is normal. No respiratory distress.      Breath sounds: Normal breath sounds. No stridor. No wheezing or rales.   Chest:      Chest wall: No tenderness.   Abdominal:      General: Bowel sounds are normal. There is no distension.      Palpations: Abdomen is soft. There is no mass.      Tenderness: There is no " abdominal tenderness. There is no rebound.   Genitourinary:     Comments: Deferred  Musculoskeletal:         General: No tenderness or deformity. Normal range of motion.      Cervical back: Normal range of motion and neck supple.      Right lower leg: Edema present.      Left lower leg: Edema present.   Lymphadenopathy:      Cervical: No cervical adenopathy.   Skin:     General: Skin is warm and dry.      Coloration: Skin is not pale.      Findings: No erythema or rash.   Neurological:      Mental Status: She is alert and oriented to person, place, and time.      Motor: No abnormal muscle tone.      Coordination: Coordination normal.   Psychiatric:         Behavior: Behavior normal.         Thought Content: Thought content normal.         Judgment: Judgment normal.         Lab Results   Component Value Date     04/23/2024    K 4.8 04/23/2024     04/23/2024    CO2 31 (H) 04/23/2024    BUN 35 (H) 04/23/2024    CREATININE 1.0 04/23/2024     (H) 04/23/2024    HGBA1C 7.6 (H) 04/23/2024    AST 17 04/23/2024    ALT 15 04/23/2024    ALBUMIN 3.7 04/23/2024    PROT 7.2 04/23/2024    BILITOT 0.5 04/23/2024    WBC 8.41 04/23/2024    HGB 16.1 (H) 04/23/2024    HCT 50.1 (H) 04/23/2024    MCV 93 04/23/2024     04/23/2024    INR 1.1 11/26/2023    INR 1.1 01/23/2023    TSH 1.391 11/27/2023    TSH 1.577 12/07/2022    CHOL 160 11/27/2023    CHOL 133 01/26/2022    HDL 27 (L) 11/27/2023    HDL 34 (L) 01/26/2022    LDLCALC 116 11/27/2023    LDLCALC 80.0 01/26/2022    TRIG 84 11/27/2023    TRIG 95 01/26/2022    BNP 42 12/10/2019         BNP (pg/mL)   Date Value   12/10/2019 42     INR (no units)   Date Value   11/26/2023 1.1   01/23/2023 1.1          Assessment:      1. Preop cardiovascular exam    2. Mixed hyperlipidemia    3. Chest pain, unspecified type    4. Abnormal EKG    5. Aortic valve insufficiency, etiology of cardiac valve disease unspecified    6. Hyperlipidemia, unspecified hyperlipidemia type    7.  Mild intermittent asthma, unspecified whether complicated    8. Severe obesity (BMI 35.0-39.9) with comorbidity    9. Type II diabetes mellitus with neurological manifestations    10. Cardiomegaly    11. History of CVA (cerebrovascular accident)    12. Atrial fibrillation, unspecified type        Plan:     PAF - in Afib   - cont Eliquis and BB  - needs to be on high dose 5mg BID    2. HTN, valvular heart disease, pulm HTN - PASP 50-60 mmHg  - titrate meds and continue to monitor valves/PASP  - restart Coreg   - more edema last few weeks - start lasix 20mg PRN - take for next 3-4 days and monitor     3. HLD-   Cont statin    4. DM2 A1c 7.6  - cont tx - on Ozempic and Farxiga, Inuslin  - f./u PCP and diabetic program     5. Chest pain, abnormal nuc stress, dyspnea   - prior [facility] neg 1/2023    6. Obesity BMI 34  - cont weight loss    7. Asthma, pulm HTN  - cont tx , f/u pulm    8. Breast CA  - cont tx per heme/onc and rad onc  - proceed for Port, chemo and surgery as sched    8. Pre-OP CV evaluation prior to Port Placement via IR and L breast sx Dr. Maxime Perez .   Moderately elevated periop risk of CV events for moderate risk procedures.  Ok to proceed to the scheduled surgery without further cardiac study. Recent ECHO reviewed.   OK to hold Eliquis 2 days before the procedure and resume ASAP postop.  Continue Coreg and Statin periop.    Visit today included increased complexity associated with the care of the episodic problem Afib addressed and managing the longitudinal care of the patient due to the serious and/or complex managed problem(s) .      Thank you for allowing me to participate in this patient's care. Please do not hesitate to contact me with any questions or concerns. Consult note has been forwarded to the referral physician.     Allen Quevedo MD, [facility]  Cardiovascular Disease  [facility], [place]  [phone] (P)

## 2024-05-02 ENCOUNTER — OFFICE VISIT (OUTPATIENT)
Dept: CARDIOLOGY | Facility: CLINIC | Age: 69
End: 2024-05-02
Payer: MEDICARE

## 2024-05-02 VITALS
SYSTOLIC BLOOD PRESSURE: 138 MMHG | HEART RATE: 92 BPM | WEIGHT: 232.81 LBS | HEIGHT: 69 IN | DIASTOLIC BLOOD PRESSURE: 78 MMHG | OXYGEN SATURATION: 93 % | BODY MASS INDEX: 34.48 KG/M2

## 2024-05-02 DIAGNOSIS — J45.20 MILD INTERMITTENT ASTHMA, UNSPECIFIED WHETHER COMPLICATED: ICD-10-CM

## 2024-05-02 DIAGNOSIS — Z86.73 HISTORY OF CVA (CEREBROVASCULAR ACCIDENT): ICD-10-CM

## 2024-05-02 DIAGNOSIS — E78.5 HYPERLIPIDEMIA, UNSPECIFIED HYPERLIPIDEMIA TYPE: ICD-10-CM

## 2024-05-02 DIAGNOSIS — Z01.810 PREOP CARDIOVASCULAR EXAM: Primary | ICD-10-CM

## 2024-05-02 DIAGNOSIS — E11.49 TYPE II DIABETES MELLITUS WITH NEUROLOGICAL MANIFESTATIONS: ICD-10-CM

## 2024-05-02 DIAGNOSIS — I48.91 ATRIAL FIBRILLATION, UNSPECIFIED TYPE: ICD-10-CM

## 2024-05-02 DIAGNOSIS — R07.9 CHEST PAIN, UNSPECIFIED TYPE: ICD-10-CM

## 2024-05-02 DIAGNOSIS — I35.1 AORTIC VALVE INSUFFICIENCY, ETIOLOGY OF CARDIAC VALVE DISEASE UNSPECIFIED: ICD-10-CM

## 2024-05-02 DIAGNOSIS — I51.7 CARDIOMEGALY: ICD-10-CM

## 2024-05-02 DIAGNOSIS — E66.01 SEVERE OBESITY (BMI 35.0-39.9) WITH COMORBIDITY: ICD-10-CM

## 2024-05-02 DIAGNOSIS — R94.31 ABNORMAL EKG: ICD-10-CM

## 2024-05-02 DIAGNOSIS — E78.2 MIXED HYPERLIPIDEMIA: ICD-10-CM

## 2024-05-02 PROCEDURE — 1101F PT FALLS ASSESS-DOCD LE1/YR: CPT | Mod: HCNC,CPTII,S$GLB, | Performed by: INTERNAL MEDICINE

## 2024-05-02 PROCEDURE — 3078F DIAST BP <80 MM HG: CPT | Mod: HCNC,CPTII,S$GLB, | Performed by: INTERNAL MEDICINE

## 2024-05-02 PROCEDURE — 99999 PR PBB SHADOW E&M-EST. PATIENT-LVL V: CPT | Mod: PBBFAC,HCNC,, | Performed by: INTERNAL MEDICINE

## 2024-05-02 PROCEDURE — 3075F SYST BP GE 130 - 139MM HG: CPT | Mod: HCNC,CPTII,S$GLB, | Performed by: INTERNAL MEDICINE

## 2024-05-02 PROCEDURE — 99214 OFFICE O/P EST MOD 30 MIN: CPT | Mod: HCNC,S$GLB,, | Performed by: INTERNAL MEDICINE

## 2024-05-02 PROCEDURE — 1159F MED LIST DOCD IN RCRD: CPT | Mod: HCNC,CPTII,S$GLB, | Performed by: INTERNAL MEDICINE

## 2024-05-02 PROCEDURE — 3288F FALL RISK ASSESSMENT DOCD: CPT | Mod: HCNC,CPTII,S$GLB, | Performed by: INTERNAL MEDICINE

## 2024-05-02 PROCEDURE — 4010F ACE/ARB THERAPY RXD/TAKEN: CPT | Mod: HCNC,CPTII,S$GLB, | Performed by: INTERNAL MEDICINE

## 2024-05-02 PROCEDURE — 3008F BODY MASS INDEX DOCD: CPT | Mod: HCNC,CPTII,S$GLB, | Performed by: INTERNAL MEDICINE

## 2024-05-02 PROCEDURE — 3051F HG A1C>EQUAL 7.0%<8.0%: CPT | Mod: HCNC,CPTII,S$GLB, | Performed by: INTERNAL MEDICINE

## 2024-05-02 PROCEDURE — 1125F AMNT PAIN NOTED PAIN PRSNT: CPT | Mod: HCNC,CPTII,S$GLB, | Performed by: INTERNAL MEDICINE

## 2024-05-02 PROCEDURE — G2211 COMPLEX E/M VISIT ADD ON: HCPCS | Mod: HCNC,S$GLB,, | Performed by: INTERNAL MEDICINE

## 2024-05-02 PROCEDURE — 1160F RVW MEDS BY RX/DR IN RCRD: CPT | Mod: HCNC,CPTII,S$GLB, | Performed by: INTERNAL MEDICINE

## 2024-05-02 RX ORDER — CARVEDILOL 6.25 MG/1
6.25 TABLET ORAL 2 TIMES DAILY
Qty: 180 TABLET | Refills: 1 | Status: SHIPPED | OUTPATIENT
Start: 2024-05-02

## 2024-05-02 RX ORDER — ATORVASTATIN CALCIUM 40 MG/1
TABLET, FILM COATED ORAL
Qty: 90 TABLET | Refills: 1 | Status: SHIPPED | OUTPATIENT
Start: 2024-05-02

## 2024-05-02 RX ORDER — FUROSEMIDE 20 MG/1
20 TABLET ORAL DAILY PRN
Qty: 30 TABLET | Refills: 3 | Status: SHIPPED | OUTPATIENT
Start: 2024-05-02 | End: 2025-05-02

## 2024-05-02 NOTE — TELEPHONE ENCOUNTER
Refill Decision Note   Kay Galarza  is requesting a refill authorization.  Brief Assessment and Rationale for Refill:  Approve     Medication Therapy Plan:        Pharmacist review requested: Yes   Comments:     Note composed:6:45 PM 05/02/2024

## 2024-05-02 NOTE — TELEPHONE ENCOUNTER
Care Due:                  Date            Visit Type   Department     Provider  --------------------------------------------------------------------------------                                EP -                              PRIMARY      [facility] INTERNAL  Lucy RIDER  Last Visit: 04-      CARE ([facility])   MEDICINE       Jamil  Next Visit: None Scheduled  None         None Found                                                            Last  Test          Frequency    Reason                     Performed    Due Date  --------------------------------------------------------------------------------    Mg Level....  12 months..  magnesium................  Not Found    Overdue    [employer] Embedded Care Due Messages. Reference number: [id number].   5/02/2024 9:57:59 AM CDT

## 2024-05-02 NOTE — Clinical Note
Please fax my [facility] to [facility] and surgeon Dr. Maxime Perez for cardiac risk eval thanks    Johnna Brar MD  [address]  [address]  [place] [address]  [phone] (Work)  [phone] (Fax)

## 2024-05-02 NOTE — TELEPHONE ENCOUNTER
Refill Routing Note   Medication(s) are not appropriate for processing by [facility] for the following reason(s):     [facility]DI not previously overridden by current provider--after initial override, the Refill Center will be able to continue overrides      Allergy or intoleranceAllergy/Contraindication: atorvastatin     [facility] action(s):  Defer           Pharmacist review requested: Yes     Appointments  past 12m or future 3m with PCP    Date Provider   Last Visit   4/23/2024 Lucy Negron MD   Next Visit   Visit date not found Lucy Negron MD   ED visits in past 90 days: 0        Note composed:6:06 PM 05/02/2024

## 2024-06-19 RX ORDER — ALBUTEROL SULFATE 90 UG/1
AEROSOL, METERED RESPIRATORY (INHALATION)
Qty: 54 G | Refills: 3 | Status: SHIPPED | OUTPATIENT
Start: 2024-06-19

## 2024-06-19 NOTE — TELEPHONE ENCOUNTER
Care Due:                  Date            Visit Type   Department     Provider  --------------------------------------------------------------------------------                                EP -                              PRIMARY      [facility]  Lucy RIDER  Last Visit: 04-      CARE ([facility])   [facility]       Jamil  Next Visit: None Scheduled  None         None Found                                                            Last  Test          Frequency    Reason                     Performed    Due Date  --------------------------------------------------------------------------------    Lipid Panel.  12 months..  atorvastatin.............  11- 06-    [facility] Embedded Care Due Messages. Reference number: [id number].   6/19/2024 5:10:24 PM CDT

## 2024-06-20 NOTE — TELEPHONE ENCOUNTER
Provider Staff:  Action required for this patient    Requires labs      Please see care gap opportunities below in Care Due Message.    Thanks!  [facility]     Appointments      Date Provider   Last Visit   4/23/2024 Lucy Negron MD   Next Visit   Visit date not found Lucy Negron MD     Refill Decision Note   Kay Cranenandez  is requesting a refill authorization.  Brief Assessment and Rationale for Refill:  Approve     Medication Therapy Plan:         Comments:     Note composed:8:00 PM 06/19/2024

## 2024-06-24 NOTE — TELEPHONE ENCOUNTER
No care due was identified.  [facility] Embedded Care Due Messages. Reference number: [id number].   6/24/2024 5:49:52 PM CDT

## 2024-06-25 RX ORDER — GABAPENTIN 100 MG/1
100 CAPSULE ORAL 3 TIMES DAILY
Qty: 90 CAPSULE | Refills: 0 | Status: SHIPPED | OUTPATIENT
Start: 2024-06-25

## 2024-07-09 ENCOUNTER — TELEPHONE (OUTPATIENT)
Dept: FAMILY MEDICINE | Facility: CLINIC | Age: 69
End: 2024-07-09
Payer: MEDICARE

## 2024-07-09 DIAGNOSIS — K92.1 BLOOD IN STOOL: Primary | ICD-10-CM

## 2024-07-09 NOTE — TELEPHONE ENCOUNTER
Return call to patient daughter/Cass  schedule GI appointment on 7/11/2024 2:30pm. Pt. Daughter verbalized understanding.

## 2024-07-09 NOTE — TELEPHONE ENCOUNTER
I will place refer for gi , but if it is too far out , she can be seen by me but if symptoms worsen she has to go to ER .

## 2024-07-09 NOTE — TELEPHONE ENCOUNTER
----- Message from Vonnie Marcum sent at 7/9/2024 11:55 AM CDT -----  Regarding: appt / colonoscopy  Name of who is calling:   Cass / Daughter      What is the request in detail: Requesting a call back in ref to blood in stool and would like to schedule a colonoscopy       Can the clinic reply by [facility]:yes      What number to call back if not [facility]:[phone]

## 2024-07-11 ENCOUNTER — HOSPITAL ENCOUNTER (OUTPATIENT)
Dept: RADIOLOGY | Facility: HOSPITAL | Age: 69
Discharge: HOME | End: 2024-07-11
Attending: PHYSICIAN ASSISTANT
Payer: MEDICARE

## 2024-07-11 ENCOUNTER — OFFICE VISIT (OUTPATIENT)
Dept: GASTROENTEROLOGY | Facility: CLINIC | Age: 69
End: 2024-07-11
Attending: FAMILY MEDICINE
Payer: MEDICARE

## 2024-07-11 VITALS
DIASTOLIC BLOOD PRESSURE: 86 MMHG | HEIGHT: 69 IN | SYSTOLIC BLOOD PRESSURE: 127 MMHG | WEIGHT: 230 LBS | BODY MASS INDEX: 34.07 KG/M2 | HEART RATE: 87 BPM

## 2024-07-11 DIAGNOSIS — K64.9 HEMORRHOIDS, UNSPECIFIED HEMORRHOID TYPE: ICD-10-CM

## 2024-07-11 DIAGNOSIS — K59.00 CONSTIPATION, UNSPECIFIED CONSTIPATION TYPE: ICD-10-CM

## 2024-07-11 DIAGNOSIS — R10.84 GENERALIZED ABDOMINAL PAIN: ICD-10-CM

## 2024-07-11 DIAGNOSIS — K59.00 CONSTIPATION, UNSPECIFIED CONSTIPATION TYPE: Primary | ICD-10-CM

## 2024-07-11 DIAGNOSIS — K92.1 BLOOD IN STOOL: ICD-10-CM

## 2024-07-11 PROCEDURE — 4010F ACE/ARB THERAPY RXD/TAKEN: CPT | Mod: HCNC,CPTII,S$GLB, | Performed by: PHYSICIAN ASSISTANT

## 2024-07-11 PROCEDURE — 3288F FALL RISK ASSESSMENT DOCD: CPT | Mod: HCNC,CPTII,S$GLB, | Performed by: PHYSICIAN ASSISTANT

## 2024-07-11 PROCEDURE — 1125F AMNT PAIN NOTED PAIN PRSNT: CPT | Mod: HCNC,CPTII,S$GLB, | Performed by: PHYSICIAN ASSISTANT

## 2024-07-11 PROCEDURE — 74019 RADEX ABDOMEN 2 VIEWS: CPT | Mod: 26,HCNC,, | Performed by: RADIOLOGY

## 2024-07-11 PROCEDURE — 99213 OFFICE O/P EST LOW 20 MIN: CPT | Mod: HCNC,S$GLB,, | Performed by: PHYSICIAN ASSISTANT

## 2024-07-11 PROCEDURE — 3074F SYST BP LT 130 MM HG: CPT | Mod: HCNC,CPTII,S$GLB, | Performed by: PHYSICIAN ASSISTANT

## 2024-07-11 PROCEDURE — 3079F DIAST BP 80-89 MM HG: CPT | Mod: HCNC,CPTII,S$GLB, | Performed by: PHYSICIAN ASSISTANT

## 2024-07-11 PROCEDURE — 3008F BODY MASS INDEX DOCD: CPT | Mod: HCNC,CPTII,S$GLB, | Performed by: PHYSICIAN ASSISTANT

## 2024-07-11 PROCEDURE — 1159F MED LIST DOCD IN RCRD: CPT | Mod: HCNC,CPTII,S$GLB, | Performed by: PHYSICIAN ASSISTANT

## 2024-07-11 PROCEDURE — 3051F HG A1C>EQUAL 7.0%<8.0%: CPT | Mod: HCNC,CPTII,S$GLB, | Performed by: PHYSICIAN ASSISTANT

## 2024-07-11 PROCEDURE — 99999 PR PBB SHADOW E&M-EST. PATIENT-LVL V: CPT | Mod: PBBFAC,HCNC,, | Performed by: PHYSICIAN ASSISTANT

## 2024-07-11 PROCEDURE — 74019 RADEX ABDOMEN 2 VIEWS: CPT | Mod: TC,HCNC

## 2024-07-11 PROCEDURE — 1101F PT FALLS ASSESS-DOCD LE1/YR: CPT | Mod: HCNC,CPTII,S$GLB, | Performed by: PHYSICIAN ASSISTANT

## 2024-07-11 RX ORDER — HYDROCORTISONE ACETATE PRAMOXINE HCL 2.5; 1 G/100G; G/100G
CREAM TOPICAL 2 TIMES DAILY
Qty: 30 G | Refills: 1 | Status: SHIPPED | OUTPATIENT
Start: 2024-07-11 | End: 2024-07-22

## 2024-07-11 RX ORDER — LEVOCETIRIZINE DIHYDROCHLORIDE 5 MG/1
TABLET, FILM COATED ORAL
COMMUNITY
Start: 2024-04-02

## 2024-07-11 RX ORDER — SEMAGLUTIDE 0.68 MG/ML
0.25 INJECTION, SOLUTION SUBCUTANEOUS
COMMUNITY
Start: 2024-03-28

## 2024-07-11 RX ORDER — OMEPRAZOLE 20 MG/1
CAPSULE, DELAYED RELEASE ORAL
COMMUNITY
Start: 2024-04-02

## 2024-07-11 RX ORDER — ONDANSETRON 8 MG/1
8 TABLET, ORALLY DISINTEGRATING ORAL EVERY 8 HOURS PRN
COMMUNITY
Start: 2024-06-25

## 2024-07-11 RX ORDER — PANTOPRAZOLE SODIUM 40 MG/1
40 TABLET, DELAYED RELEASE ORAL
COMMUNITY
Start: 2024-06-12 | End: 2024-07-12

## 2024-07-11 RX ORDER — POLYETHYLENE GLYCOL 3350, SODIUM SULFATE ANHYDROUS, SODIUM BICARBONATE, SODIUM CHLORIDE, POTASSIUM CHLORIDE 236; 22.74; 6.74; 5.86; 2.97 G/4L; G/4L; G/4L; G/4L; G/4L
4 POWDER, FOR SOLUTION ORAL ONCE
Qty: 4000 ML | Refills: 0 | Status: SHIPPED | OUTPATIENT
Start: 2024-07-11 | End: 2024-07-11

## 2024-07-11 NOTE — PROGRESS NOTES
Subjective:      Patient ID: Kay Galarza is a 69 y.o. female.    Chief Complaint: Rectal Bleeding    HPI  The patient has a history of colon polyps. Today she complains of rectal pain and abdominal pain. She is accompanied by her daughter who helps provide history.  services were also utilized during this visit. The patient started having pain about a week ago. Her daughter gave her a Dulcolax suppository yesterday and she had a very hard, small stool. Her daughter tried to disimpact her and got additional small volume stool out. She noted fresh blood. Her daughter feels the patient is scared to have a BM because of the rectal pain and hemorrhoids. The patient had a colonoscopy in 2022 with one small polyp removed. Prep was good. A five year repeat was recommended.   The patient's history is significant for breast cancer and she's on chemotherapy. She had an EBUS last week due to mediastinal lymphadenopathy. She has occasional nausea and vomiting. Stools were irregular prior to chemo but she tends to have more diarrhea. She takes Imodium as needed. She thinks her mother last took it about a week ago.     Review of Systems  As per HPI.     Objective:     Physical Exam  Constitutional:       Appearance: She is well-developed. She is ill-appearing.   HENT:      Head: Normocephalic and atraumatic.   Eyes:      Extraocular Movements: Extraocular movements intact.   Cardiovascular:      Rate and Rhythm: Normal rate and regular rhythm.   Pulmonary:      Effort: Pulmonary effort is normal. No respiratory distress.      Breath sounds: Normal breath sounds.   Abdominal:      General: Bowel sounds are normal. There is no distension.      Palpations: Abdomen is soft.      Tenderness: There is generalized abdominal tenderness. There is no guarding.   Genitourinary:     Comments: On external inspection, there is a non-thrombosed hemorrhoid at the 9:00 position. Due to tenderness, it was hard to evalute  for any fissures. Internal exam brief, but no stool in the vault and no palpable mass. No gross blood.   Skin:     General: Skin is dry.   Neurological:      Mental Status: She is alert and oriented to person, place, and time.      Cranial Nerves: No cranial nerve deficit.   Psychiatric:         Behavior: Behavior normal.         Assessment:     1. Constipation, unspecified constipation type    2. Blood in stool    3. Generalized abdominal pain    4. Hemorrhoids, unspecified hemorrhoid type        Plan:     Xray today to check stool burden.   Results showed significant stool so Golytely sent to pharmacy.   Medicated cream for hemorrhoids bid x 10 days.   Trie to encourage patient to have a BM when she felt the urge. Additional information about hemorrhoids was included in her AVS.   ER for worsening symptoms.     Orders Placed This Encounter   Procedures    X-Ray Abdomen Flat And Erect     Medications Ordered This Encounter   Medications    hydrocortisone-pramoxine (ANALPRAM-HC) 2.5-1 % Crea     Sig: Place rectally 2 (two) times daily. Needs applicator for internal use. for 10 days     Dispense:  30 g     Refill:  1    polyethylene glycol (GOLYTELY) 236-22.74-6.74 -5.86 gram suspension     Sig: Take 4,000 mLs (4 L total) by mouth once. Use as directed. for 1 dose     Dispense:  4000 mL     Refill:  0         Follow up in about 2 weeks (around 7/25/2024) for if symptoms continue.    Thank you for the opportunity to participate in the care of this patient.   Paulo Fuchs PA-C.

## 2024-07-17 ENCOUNTER — PATIENT MESSAGE (OUTPATIENT)
Dept: GASTROENTEROLOGY | Facility: CLINIC | Age: 69
End: 2024-07-17
Payer: MEDICARE

## 2024-07-23 ENCOUNTER — TELEPHONE (OUTPATIENT)
Dept: NEUROLOGY | Facility: CLINIC | Age: 69
End: 2024-07-23
Payer: MEDICARE

## 2024-08-06 DIAGNOSIS — Z00.00 ROUTINE ADULT HEALTH MAINTENANCE: ICD-10-CM

## 2024-08-06 DIAGNOSIS — Z76.89 ENCOUNTER TO ESTABLISH CARE: Primary | ICD-10-CM

## 2024-08-22 ENCOUNTER — OFFICE VISIT (OUTPATIENT)
Dept: CARDIOLOGY | Facility: CLINIC | Age: 69
End: 2024-08-22
Payer: MEDICARE

## 2024-08-22 ENCOUNTER — HOSPITAL ENCOUNTER (OUTPATIENT)
Dept: CARDIOLOGY | Facility: HOSPITAL | Age: 69
Discharge: HOME | End: 2024-08-22
Attending: INTERNAL MEDICINE
Payer: MEDICARE

## 2024-08-22 VITALS
SYSTOLIC BLOOD PRESSURE: 104 MMHG | HEART RATE: 66 BPM | HEIGHT: 69 IN | WEIGHT: 221.81 LBS | BODY MASS INDEX: 32.85 KG/M2 | OXYGEN SATURATION: 98 % | DIASTOLIC BLOOD PRESSURE: 68 MMHG

## 2024-08-22 DIAGNOSIS — R07.9 CHEST PAIN, UNSPECIFIED TYPE: ICD-10-CM

## 2024-08-22 DIAGNOSIS — I48.91 ATRIAL FIBRILLATION, UNSPECIFIED TYPE: ICD-10-CM

## 2024-08-22 DIAGNOSIS — I35.1 AORTIC VALVE INSUFFICIENCY, ETIOLOGY OF CARDIAC VALVE DISEASE UNSPECIFIED: ICD-10-CM

## 2024-08-22 DIAGNOSIS — E78.2 MIXED HYPERLIPIDEMIA: ICD-10-CM

## 2024-08-22 DIAGNOSIS — Z76.89 ENCOUNTER TO ESTABLISH CARE: ICD-10-CM

## 2024-08-22 DIAGNOSIS — E66.01 SEVERE OBESITY (BMI 35.0-39.9) WITH COMORBIDITY: ICD-10-CM

## 2024-08-22 DIAGNOSIS — J45.20 MILD INTERMITTENT ASTHMA, UNSPECIFIED WHETHER COMPLICATED: ICD-10-CM

## 2024-08-22 DIAGNOSIS — E11.49 TYPE II DIABETES MELLITUS WITH NEUROLOGICAL MANIFESTATIONS: ICD-10-CM

## 2024-08-22 DIAGNOSIS — Z00.00 ROUTINE ADULT HEALTH MAINTENANCE: ICD-10-CM

## 2024-08-22 DIAGNOSIS — R94.31 ABNORMAL EKG: ICD-10-CM

## 2024-08-22 DIAGNOSIS — Z86.73 HISTORY OF CVA (CEREBROVASCULAR ACCIDENT): ICD-10-CM

## 2024-08-22 DIAGNOSIS — I51.7 CARDIOMEGALY: ICD-10-CM

## 2024-08-22 DIAGNOSIS — E78.5 HYPERLIPIDEMIA, UNSPECIFIED HYPERLIPIDEMIA TYPE: ICD-10-CM

## 2024-08-22 DIAGNOSIS — Z01.810 PREOP CARDIOVASCULAR EXAM: Primary | ICD-10-CM

## 2024-08-22 LAB
OHS QRS DURATION: 72 MS
OHS QTC CALCULATION: 443 MS

## 2024-08-22 PROCEDURE — 3078F DIAST BP <80 MM HG: CPT | Mod: CPTII,S$GLB,, | Performed by: INTERNAL MEDICINE

## 2024-08-22 PROCEDURE — 99214 OFFICE O/P EST MOD 30 MIN: CPT | Mod: S$GLB,,, | Performed by: INTERNAL MEDICINE

## 2024-08-22 PROCEDURE — 1159F MED LIST DOCD IN RCRD: CPT | Mod: CPTII,S$GLB,, | Performed by: INTERNAL MEDICINE

## 2024-08-22 PROCEDURE — 93005 ELECTROCARDIOGRAM TRACING: CPT

## 2024-08-22 PROCEDURE — 3288F FALL RISK ASSESSMENT DOCD: CPT | Mod: CPTII,S$GLB,, | Performed by: INTERNAL MEDICINE

## 2024-08-22 PROCEDURE — 1126F AMNT PAIN NOTED NONE PRSNT: CPT | Mod: CPTII,S$GLB,, | Performed by: INTERNAL MEDICINE

## 2024-08-22 PROCEDURE — 3008F BODY MASS INDEX DOCD: CPT | Mod: CPTII,S$GLB,, | Performed by: INTERNAL MEDICINE

## 2024-08-22 PROCEDURE — 99999 PR PBB SHADOW E&M-EST. PATIENT-LVL III: CPT | Mod: PBBFAC,,, | Performed by: INTERNAL MEDICINE

## 2024-08-22 PROCEDURE — 1101F PT FALLS ASSESS-DOCD LE1/YR: CPT | Mod: CPTII,S$GLB,, | Performed by: INTERNAL MEDICINE

## 2024-08-22 PROCEDURE — 3051F HG A1C>EQUAL 7.0%<8.0%: CPT | Mod: CPTII,S$GLB,, | Performed by: INTERNAL MEDICINE

## 2024-08-22 PROCEDURE — 4010F ACE/ARB THERAPY RXD/TAKEN: CPT | Mod: CPTII,S$GLB,, | Performed by: INTERNAL MEDICINE

## 2024-08-22 PROCEDURE — 3074F SYST BP LT 130 MM HG: CPT | Mod: CPTII,S$GLB,, | Performed by: INTERNAL MEDICINE

## 2024-08-22 PROCEDURE — 1160F RVW MEDS BY RX/DR IN RCRD: CPT | Mod: CPTII,S$GLB,, | Performed by: INTERNAL MEDICINE

## 2024-08-22 PROCEDURE — 93010 ELECTROCARDIOGRAM REPORT: CPT | Mod: ,,, | Performed by: INTERNAL MEDICINE

## 2024-08-22 PROCEDURE — G2211 COMPLEX E/M VISIT ADD ON: HCPCS | Mod: S$GLB,,, | Performed by: INTERNAL MEDICINE

## 2024-08-22 RX ORDER — CARVEDILOL 3.12 MG/1
3.12 TABLET ORAL 2 TIMES DAILY
Qty: 60 TABLET | Refills: 3 | Status: SHIPPED | OUTPATIENT
Start: 2024-08-22

## 2024-08-22 NOTE — PROGRESS NOTES
Subjective:   Patient ID:  Kay Galarza is a 69 y.o. female who presents for cardiac consult of No chief complaint on file.      Referral by: No referring provider defined for this encounter.     Reason for consult:       HPI  The patient came in today for cardiac consult of No chief complaint on file.      Kay Galarza is a 69 y.o. female pt with Afib, breast CA, HTN, HLD, DM2, asthma, JOHN, h/o CVA, obesity presents for CV follow up.        5/2/24 -   Pt seen last year with Dr. Chavez here for preop CV eval.     From recent PCP visit  68 y old female who was recetly diagnosed with L breast cancer here for pre op clearance for L breast sx on TBA by Dr. Maxime Perez . Most recent surgery was Left foot surgery last year. She had a CVA last fall due to non compliance with Eliquis . Endorses MALLOY , CP and SOB with light activity . Glucose range from 150-220 . She stopped Toujeo a while back since glucoses were too low . Also diagnosed with sleep apnea. She has not gotten CPAP yet .     ECHO 4/2024 with normal bi V function, mild MR, restricted MV, mild AI, mild to mod TR, pulm HTN PASP 50-60 mmHg.     She will have a port placed for chemo and then have breast surgery 6 months after chemo.   Pt has asthma, has mild SOB at times/worse when laying down. She uses CPAP nightly, sees pulm.     8/22/24    She had recent eval with surgery - Breast cancer metastasized to axillary lymph node, left  To OR for left axillary node dissection    BP today low normal, has stopped Coreg, will decrease dose.   She has been losing weight as well.     ECG - Afib V rate 102, poor RWP - stable         ECHO 4/2024 CONCLUSIONS:   1. Normal left ventricular cavity size. Normal left ventricular systolic function. LVEF   55 - 65%. Left ventricular diastolic function is indeterminate in this study due to the   presence of severe mitral annular calcification. Moderate eccentric hypertrophy. Abnormal   global  longitudinal strain. GLS=-7.87%.   2. Mild mitral valve regurgitation. Severe mitral annular calcification. MAC is   predominantly anterior. Restricted motion of the posterior mitral leaflet.   3. Mild aortic valve regurgitation.   4. Mild to moderate tricuspid valve regurgitation. The estimated right ventricular   systolic pressure/PASP is 50-60 mmHg. Moderate pulmonary hypertension.     Results for orders placed during the hospital encounter of 01/18/23    Nuclear Stress - Cardiology Interpreted    Interpretation Summary    Equivocal myocardial perfusion scan.    There is a moderate to severe intensity, large sized, mostly fixed perfusion abnormality with some reversibilty in the anterolateral wall(s): breast attenuation defect noted on image review; however there is reversibility as well suggestive of possible significant coronary ischemia.  Clinical correlation strongly advised.    There are no other significant perfusion abnormalities.    The gated perfusion images showed an ejection fraction of 63% at rest. The gated perfusion images showed an ejection fraction of 66% post stress.    The ECG portion of the study is negative for ischemia.    During stress, occasional PVCs are noted.      Results for orders placed during the hospital encounter of 01/24/23    Cardiac catheterization    Conclusion    The ejection fraction was calculated to be 60%.    The pre-procedure left ventricular end diastolic pressure was 19.    The post-procedure left ventricular end diastolic pressure was 21.    The estimated blood loss was none.    The coronary arteries were normal..    The procedure log was documented by Documenter: Junie Pittman RN and verified by Kelly Bonilla MD.    Date: 1/24/2023  Time: 1:00 PM    No results found for this or any previous visit.      No cardiac monitor results found for the past 12 months         Past Medical History:   Diagnosis Date    Abnormal nuclear stress test 01/23/2023    Arthritis     DERIAN  KNEES    Asthma     SEASONAL    Cancer determined by biopsy of breast     Cataract     Diabetes mellitus     Diabetes mellitus, type 2     Diabetic retinopathy     DM (diabetes mellitus) 2007    BS 97 09/29/2020    Hyperlipidemia     Hypertension        Past Surgical History:   Procedure Laterality Date    APPLICATION OF WOUND VACUUM-ASSISTED CLOSURE DEVICE Left 08/18/2020    Procedure: APPLICATION, WOUND VAC;  Surgeon: Kirt Gray DPM;  Location: [facility] OR;  Service: Podiatry;  Laterality: Left;    APPLICATION, EXTERNAL FIXATION DEVICE Left 03/13/2023    Procedure: APPLICATION, EXTERNAL FIXATION DEVICE;  Surgeon: Kirt Gray DPM;  Location: [facility] OR;  Service: Podiatry;  Laterality: Left;    BIOPSY Bilateral     lungs    CATARACT EXTRACTION W/  INTRAOCULAR LENS IMPLANT Right 07/18/2018    CATARACT EXTRACTION W/  INTRAOCULAR LENS IMPLANT Left 03/13/2019    COLONOSCOPY N/A 12/22/2018    Procedure: COLONOSCOPY;  Surgeon: Zak Dover MD;  Location: [facility] ENDO;  Service: Endoscopy;  Laterality: N/A;    COLONOSCOPY N/A 05/11/2022    Procedure: COLONOSCOPY;  Surgeon: Charles Berrios MD;  Location: [facility] ENDO;  Service: Endoscopy;  Laterality: N/A;    ENDOSCOPIC VEIN LASER TREATMENT Bilateral 1990's    FIXATION OF SYNDESMOSIS OF ANKLE Left 07/23/2020    Procedure: FIXATION, SYNDESMOSIS, ANKLE;  Surgeon: Kirt Gray DPM;  Location: [facility] OR;  Service: Podiatry;  Laterality: Left;    FOOT ARTHRODESIS Left 05/26/2023    Procedure: FUSION, FOOT;  Surgeon: Kirt Gray DPM;  Location: [facility] OR;  Service: Podiatry;  Laterality: Left;    FOOT HARDWARE REMOVAL Left 03/13/2023    Procedure: REMOVAL, HARDWARE, FOOT;  Surgeon: Kirt Gray DPM;  Location: [facility] OR;  Service: Podiatry;  Laterality: Left;    FUSION OF SUBTALAR JOINT Left 03/13/2023    Procedure: FUSION, SUBTALAR JOINT;  Surgeon: Kirt Gray DPM;  Location: [facility] OR;  Service: Podiatry;  Laterality: Left;    LEFT HEART CATHETERIZATION Left  01/24/2023    Procedure: CATHETERIZATION, HEART, LEFT;  Surgeon: Salma Bonilla MD;  Location: [facility] CATH LAB;  Service: Cardiology;  Laterality: Left;  pt given 130 start time    MANIPULATION WITH ANESTHESIA Left 07/23/2020    Procedure: MANIPULATION, WITH ANESTHESIA;  Surgeon: Kirt Gray DPM;  Location: [facility] OR;  Service: Podiatry;  Laterality: Left;    MIDFOOT ARTHRODESIS Left 07/23/2020    Procedure: FUSION, JOINT, MIDFOOT;  Surgeon: Kirt Gray DPM;  Location: [facility] OR;  Service: Podiatry;  Laterality: Left;  2nd tarsometatarsal joint dislocation via fusion    MIDFOOT ARTHRODESIS Left 03/13/2023    Procedure: FUSION, JOINT, MIDFOOT;  Surgeon: Kirt Gray DPM;  Location: [facility] OR;  Service: Podiatry;  Laterality: Left;    OPEN REDUCTION AND INTERNAL FIXATION (ORIF) OF INJURY OF ANKLE Left 07/23/2020    Procedure: ORIF, ANKLE;  Surgeon: Kirt Gray DPM;  Location: [facility] OR;  Service: Podiatry;  Laterality: Left;    RECONSTRUCTION WITH FUSION OF CHARCOT FOOT Left 08/18/2020    Procedure: RECONSTRUCTION, CHARCOT FOOT, WITH FUSION;  Surgeon: Kirt Gray DPM;  Location: [facility] OR;  Service: Podiatry;  Laterality: Left;    REMOVAL OF EXTERNAL FIXATION DEVICE Left 05/26/2023    Procedure: REMOVAL, EXTERNAL FIXATION DEVICE;  Surgeon: Kirt Gray DPM;  Location: [facility] OR;  Service: Podiatry;  Laterality: Left;    REMOVAL OF HARDWARE FROM LOWER EXTREMITY Left 01/27/2021    Procedure: REMOVAL, HARDWARE, LOWER EXTREMITY;  Surgeon: Kirt Gray DPM;  Location: [facility] OR;  Service: Podiatry;  Laterality: Left;    SESAMOIDECTOMY Left 05/26/2023    Procedure: SESAMOIDECTOMY;  Surgeon: Kirt Gray DPM;  Location: [facility] OR;  Service: Podiatry;  Laterality: Left;    WOUND DEBRIDEMENT Left 08/18/2020    Procedure: DEBRIDEMENT, WOUND;  Surgeon: Kirt Gray DPM;  Location: [facility] OR;  Service: Podiatry;  Laterality: Left;       Social History     Tobacco Use    Smoking status: Never    Smokeless  tobacco: Never   Substance Use Topics    Alcohol use: Never    Drug use: No       Family History   Problem Relation Name Age of Onset    Cancer Father          Prostate Ca    Hypertension Father      Stroke Father      Diabetes Sister      Glaucoma Mother      Hypertension Mother      Glaucoma Maternal Grandmother      Blindness Neg Hx      Macular degeneration Neg Hx      Retinal detachment Neg Hx      Strabismus Neg Hx         Patient's Medications   New Prescriptions    No medications on file   Previous Medications    ACCU-CHEK GUIDE TEST STRIPS STRP    TO CHECK BG 2 TIMES DAILY, TO USE WITH INSURANCE PREFERRED METER    ACETAMINOPHEN (TYLENOL) 500 MG TABLET    Take 500 mg by mouth every 6 (six) hours as needed for Pain.    ALBUTEROL (PROVENTIL/VENTOLIN HFA) 90 MCG/ACTUATION INHALER    INHALE 2 PUFFS INTO THE LUNGS EVERY 6  HOURS AS NEEDED FOR WHEEZING. RESCUE    APIXABAN (ELIQUIS) 5 MG TAB    Take 1 tablet (5 mg total) by mouth 2 (two) times daily.    ATORVASTATIN (LIPITOR) 40 MG TABLET    TAKE 1 TABLET BY MOUTH ONCE DAILY.    B INFANTIS/B ANI/B JU/B BIFID (PROBIOTIC 4X ORAL)    Take by mouth Daily.    BLOOD-GLUCOSE METER KIT    To check BG 2 times daily, to use with insurance preferred meter    CARVEDILOL (COREG) 6.25 MG TABLET    Take 1 tablet (6.25 mg total) by mouth 2 (two) times daily.    CLOBETASOL (TEMOVATE) 0.05 % EXTERNAL SOLUTION    Apply topically 2 (two) times daily.    DAPAGLIFLOZIN PROPANEDIOL (FARXIGA) 10 MG TABLET    TAKE 1 TABLET (10 MG TOTAL) BY MOUTH ONCE DAILY.    FLUTICASONE PROPIONATE (FLONASE) 50 MCG/ACTUATION NASAL SPRAY    2 sprays (100 mcg total) by Each Nostril route once daily.    FUROSEMIDE (LASIX) 20 MG TABLET    Take 1 tablet (20 mg total) by mouth daily as needed (edema, swelling, worsening breathing due to fluid). Take next 3 days and monitor BP; do not take if BP is below 110/70    GABAPENTIN (NEURONTIN) 100 MG CAPSULE    TAKE 1 CAPSULE  BY MOUTH THREE TIMES DAILY    GLIMEPIRIDE  (AMARYL) 4 MG TABLET    Take 1 tablet (4 mg total) by mouth daily with breakfast.    INSULIN GLARGINE U-300 CONC (TOUJEO MAX U-300 SOLOSTAR) 300 UNIT/ML (3 ML) INSULIN PEN    Inject 20 Units into the skin every evening. Increase by 2 units every 3 days until fasting sugar is below 130.    KETOCONAZOLE (NIZORAL) 2 % SHAMPOO    Apply topically twice a week.    LANCETS MISC    To check BG 2 times daily, to use with insurance preferred meter    LEVOCETIRIZINE (XYZAL) 5 MG TABLET        LISINOPRIL-HYDROCHLOROTHIAZIDE (PRINZIDE,ZESTORETIC) 20-25 MG TAB    TAKE 1 TABLET BY MOUTH ONCE DAILY.    MAGNESIUM OXIDE 200 MG MAGNESIUM TAB    TAKE 3 TABLETS  BY MOUTH EVERY EVENING.    NOVOLOG FLEXPEN U-100 INSULIN 100 UNIT/ML (3 ML) INPN PEN    Inject 18 Units into the skin 3 (three) times daily before meals.    OMEPRAZOLE (PRILOSEC) 20 MG CAPSULE        ONDANSETRON (ZOFRAN-ODT) 8 MG TBDL    Take 8 mg by mouth every 8 (eight) hours as needed.    POLYETHYLENE GLYCOL (GLYCOLAX) 17 GRAM PWPK    Take 17 g by mouth once daily.    SEMAGLUTIDE (OZEMPIC) 0.25 MG OR 0.5 MG (2 MG/3 ML) PEN INJECTOR    Inject 0.25 mg once a week for 4 weeks, THEN increase to 0.5 mg weekly thereafter.    SEMAGLUTIDE (OZEMPIC) 0.25 MG OR 0.5 MG (2 MG/3 ML) PEN INJECTOR    Inject 0.25 mg into the skin.    SERTRALINE (ZOLOFT) 50 MG TABLET    Take 1 tablet (50 mg total) by mouth once daily.    TRAZODONE (DESYREL) 150 MG TABLET    TAKE 1 TABLET  BY MOUTH NIGHTLY AS NEEDED FOR INSOMNIA.    VITAMIN D2 1,250 MCG (50,000 UNIT) CAPSULE    TAKE 1 CAPSULE  BY MOUTH ONCE A WEEK FOR 4 DOSES   Modified Medications    No medications on file   Discontinued Medications    No medications on file       Review of Systems   Constitutional:  Positive for malaise/fatigue.   HENT: Negative.     Eyes: Negative.    Respiratory:  Positive for shortness of breath.    Cardiovascular:  Positive for leg swelling.   Gastrointestinal: Negative.    Genitourinary: Negative.    Musculoskeletal:  Negative.    Skin: Negative.    Neurological: Negative.    Endo/Heme/Allergies: Negative.    Psychiatric/Behavioral: Negative.     All 12 systems otherwise negative.      Wt Readings from Last 3 Encounters:   07/11/24 104.3 kg (230 lb)   05/02/24 105.6 kg (232 lb 12.9 oz)   04/23/24 105.1 kg (231 lb 11.3 oz)     Temp Readings from Last 3 Encounters:   04/23/24 96.8 °F (36 °C) (Tympanic)   01/04/24 97.5 °F (36.4 °C) (Temporal)   07/05/23 98.5 °F (36.9 °C) (Tympanic)     BP Readings from Last 3 Encounters:   07/11/24 127/86   05/02/24 138/78   04/23/24 136/80     Pulse Readings from Last 3 Encounters:   07/11/24 87   05/02/24 92   04/23/24 68       There were no vitals taken for this visit.    Objective:   Physical Exam  Vitals and nursing note reviewed.   Constitutional:       General: She is not in acute distress.     Appearance: She is well-developed. She is obese. She is not diaphoretic.   HENT:      Head: Normocephalic and atraumatic.      Nose: Nose normal.   Eyes:      General: No scleral icterus.     Conjunctiva/sclera: Conjunctivae normal.   Neck:      Thyroid: No thyromegaly.      Vascular: No JVD.   Cardiovascular:      Rate and Rhythm: Normal rate. Rhythm irregular.      Heart sounds: S1 normal and S2 normal. Murmur heard.      No friction rub. No gallop. No S3 or S4 sounds.   Pulmonary:      Effort: Pulmonary effort is normal. No respiratory distress.      Breath sounds: Normal breath sounds. No stridor. No wheezing or rales.   Chest:      Chest wall: No tenderness.   Abdominal:      General: Bowel sounds are normal. There is no distension.      Palpations: Abdomen is soft. There is no mass.      Tenderness: There is no abdominal tenderness. There is no rebound.   Genitourinary:     Comments: Deferred  Musculoskeletal:         General: No tenderness or deformity. Normal range of motion.      Cervical back: Normal range of motion and neck supple.      Right lower leg: Edema present.      Left lower leg:  Edema present.   Lymphadenopathy:      Cervical: No cervical adenopathy.   Skin:     General: Skin is warm and dry.      Coloration: Skin is not pale.      Findings: No erythema or rash.   Neurological:      Mental Status: She is alert and oriented to person, place, and time.      Motor: No abnormal muscle tone.      Coordination: Coordination normal.   Psychiatric:         Behavior: Behavior normal.         Thought Content: Thought content normal.         Judgment: Judgment normal.         Lab Results   Component Value Date     04/23/2024    K 4.8 04/23/2024     04/23/2024    CO2 31 (H) 04/23/2024    BUN 35 (H) 04/23/2024    CREATININE 1.0 04/23/2024     (H) 04/23/2024    HGBA1C 7.6 (H) 04/23/2024    AST 17 04/23/2024    ALT 15 04/23/2024    ALBUMIN 3.7 04/23/2024    PROT 7.2 04/23/2024    BILITOT 0.5 04/23/2024    WBC 8.41 04/23/2024    HGB 16.1 (H) 04/23/2024    HCT 50.1 (H) 04/23/2024    MCV 93 04/23/2024     04/23/2024    INR 1.1 11/26/2023    INR 1.1 01/23/2023    TSH 1.391 11/27/2023    TSH 1.577 12/07/2022    CHOL 160 11/27/2023    CHOL 133 01/26/2022    HDL 27 (L) 11/27/2023    HDL 34 (L) 01/26/2022    LDLCALC 116 11/27/2023    LDLCALC 80.0 01/26/2022    TRIG 84 11/27/2023    TRIG 95 01/26/2022    BNP 42 12/10/2019         BNP (pg/mL)   Date Value   12/10/2019 42     INR (no units)   Date Value   11/26/2023 1.1   01/23/2023 1.1          Assessment:      1. Preop cardiovascular exam    2. Hyperlipidemia, unspecified hyperlipidemia type    3. Mild intermittent asthma, unspecified whether complicated    4. Abnormal EKG    5. Chest pain, unspecified type    6. Aortic valve insufficiency, etiology of cardiac valve disease unspecified    7. Severe obesity (BMI 35.0-39.9) with comorbidity    8. Mixed hyperlipidemia    9. Type II diabetes mellitus with neurological manifestations    10. Atrial fibrillation, unspecified type    11. Cardiomegaly    12. History of CVA (cerebrovascular  accident)          Plan:     PAF - in Afib   - cont Eliquis and BB - dec BB dose   - needs to be on high dose 5mg BID    2. HTN, valvular heart disease, pulm HTN - PASP 50-60 mmHg  - titrate meds and continue to monitor valves/PASP  - restarted Coreg  - lower dose   - more edema at times - cont lasix 20mg PRN and extra doses PRN    3. HLD- h/o CVA   - Cont statin    4. DM2 A1c 7.6  - cont tx - on Ozempic and Farxiga, [place]  - f./u PCP and diabetic program     5. Chest pain, abnormal nuc stress, dyspnea   - prior [facility] neg 1/2023    6. Obesity BMI 34 --> BMI 32 - 221 lbs   - cont weight loss    7. Asthma, pulm HTN  - cont tx , f/u pulm    8. Breast CA  - cont tx per heme/onc and rad onc  - s/p port, chemo and surgery as sched    8. Pre-OP CV evaluation prior to L breast sx, left axillary node dissection Dr. Maxime Perez   Moderately elevated periop risk of CV events for moderate risk procedures.  Ok to proceed to the scheduled surgery without further cardiac study. Recent ECHO reviewed.   OK to hold Eliquis 2 days before the procedure and resume ASAP postop.  Continue Coreg and Statin periop.    Visit today included increased complexity associated with the care of the episodic problem Afib addressed and managing the longitudinal care of the patient due to the serious and/or complex managed problem(s) .      Thank you for allowing me to participate in this patient's care. Please do not hesitate to contact me with any questions or concerns. Consult note has been forwarded to the referral physician.     Allen Quevedo MD, [facility]  Cardiovascular Disease  [facility], [place]  [phone] (P)

## 2024-09-18 ENCOUNTER — PATIENT OUTREACH (OUTPATIENT)
Dept: ADMINISTRATIVE | Facility: HOSPITAL | Age: 69
End: 2024-09-18
Payer: MEDICARE

## 2024-09-18 NOTE — PROGRESS NOTES
Population Health Chart Review & Patient Outreach Details      Additional [facility] Health Notes:    [employer] Non-compliant report chart audits DIABETIC EYE EXAM.   Chart review completed for HM test overdue (mammograms, Colonoscopies, pap smears, DM labs, and/or EYE EXAMs)      Care Everywhere and media, updates requested and reviewed.                 Updates Requested / Reviewed:      Care Everywhere and          Health Maintenance Topics Overdue:      [facility] Score: 1     Urine Screening    Influenza Vaccine  Shingles/Zoster Vaccine  RSV Vaccine                  Health Maintenance Topic(s) Outreach Outcomes & Actions Taken:    Eye Exam - Outreach Outcomes & Actions Taken  : Diabetic Eye External Records Uploaded, Care Team & History Updated if Applicable

## 2024-09-25 RX ORDER — TRAZODONE HYDROCHLORIDE 150 MG/1
150 TABLET ORAL NIGHTLY PRN
Qty: 90 TABLET | Refills: 1 | Status: SHIPPED | OUTPATIENT
Start: 2024-09-25

## 2024-09-25 RX ORDER — LISINOPRIL AND HYDROCHLOROTHIAZIDE 20; 25 MG/1; MG/1
1 TABLET ORAL DAILY
Qty: 90 TABLET | Refills: 1 | Status: SHIPPED | OUTPATIENT
Start: 2024-09-25

## 2024-09-25 NOTE — TELEPHONE ENCOUNTER
No care due was identified.  Health [facility] Embedded Care Due Messages. Reference number: [id number].   9/25/2024 4:32:04 PM CDT

## 2024-09-26 NOTE — TELEPHONE ENCOUNTER
Refill Decision Note   Kay Galarza  is requesting a refill authorization.  Brief Assessment and Rationale for Refill:  Approve     Medication Therapy Plan:         Comments:     Note composed:9:08 PM 09/25/2024

## 2024-10-02 DIAGNOSIS — E11.9 TYPE 2 DIABETES MELLITUS WITHOUT COMPLICATION: ICD-10-CM

## 2024-10-14 DIAGNOSIS — Z79.4 UNCONTROLLED TYPE 2 DIABETES MELLITUS WITH HYPERGLYCEMIA, WITH LONG-TERM CURRENT USE OF INSULIN: ICD-10-CM

## 2024-10-14 DIAGNOSIS — E11.65 UNCONTROLLED TYPE 2 DIABETES MELLITUS WITH HYPERGLYCEMIA, WITH LONG-TERM CURRENT USE OF INSULIN: ICD-10-CM

## 2024-10-14 DIAGNOSIS — E83.42 HYPOMAGNESEMIA: ICD-10-CM

## 2024-10-14 RX ORDER — SEMAGLUTIDE 0.68 MG/ML
0.5 INJECTION, SOLUTION SUBCUTANEOUS
Qty: 3 ML | Refills: 5 | Status: SHIPPED | OUTPATIENT
Start: 2024-10-14

## 2024-10-14 RX ORDER — VITS A,C,E/LUTEIN/MINERALS 300MCG-200
TABLET ORAL
Qty: 120 TABLET | Status: SHIPPED | OUTPATIENT
Start: 2024-10-14

## 2024-10-14 NOTE — TELEPHONE ENCOUNTER
Care Due:                  Date            Visit Type   Department     Provider  --------------------------------------------------------------------------------                                EP -                              PRIMARY      [facility] INTERNAL  Lucy RIDER  Last Visit: 04-      CARE ([facility])   [facility]       Jamil  Next Visit: None Scheduled  None         None Found                                                            Last  Test          Frequency    Reason                     Performed    Due Date  --------------------------------------------------------------------------------    Lipid Panel.  12 months..  atorvastatin.............  01-   10-    Health [facility] Embedded Care Due Messages. Reference number: [id number].   10/14/2024 11:17:47 AM CDT

## 2024-10-14 NOTE — TELEPHONE ENCOUNTER
Refill Routing Note   Medication(s) are not appropriate for processing by [facility] for the following reason(s):        Outside of protocol    [facility] action(s):  Route               Appointments  past 12m or future 3m with PCP    Date Provider   Last Visit   4/23/2024 Lucy Negron MD   Next Visit   Visit date not found Lucy Negron MD   ED visits in past 90 days: 0        Note composed:3:11 PM 10/14/2024

## 2024-10-23 DIAGNOSIS — E11.65 UNCONTROLLED TYPE 2 DIABETES MELLITUS WITH HYPERGLYCEMIA, WITH LONG-TERM CURRENT USE OF INSULIN: ICD-10-CM

## 2024-10-23 DIAGNOSIS — Z79.4 UNCONTROLLED TYPE 2 DIABETES MELLITUS WITH HYPERGLYCEMIA, WITH LONG-TERM CURRENT USE OF INSULIN: ICD-10-CM

## 2024-10-23 RX ORDER — SERTRALINE HYDROCHLORIDE 50 MG/1
50 TABLET, FILM COATED ORAL DAILY
Qty: 90 TABLET | Refills: 1 | Status: SHIPPED | OUTPATIENT
Start: 2024-10-23

## 2024-10-23 NOTE — TELEPHONE ENCOUNTER
No care due was identified.  [facility] Embedded Care Due Messages. Reference number: [id number].   10/23/2024 4:50:15 PM CDT

## 2024-10-24 RX ORDER — INSULIN ASPART 100 [IU]/ML
18 INJECTION, SOLUTION INTRAVENOUS; SUBCUTANEOUS
Qty: 30 ML | Refills: 5 | Status: SHIPPED | OUTPATIENT
Start: 2024-10-24

## 2024-10-24 NOTE — TELEPHONE ENCOUNTER
Refill Decision Note   Kay Galarza  is requesting a refill authorization.  Brief Assessment and Rationale for Refill:  Approve     Medication Therapy Plan:         Comments:     Note composed:7:14 PM 10/23/2024

## 2024-11-11 DIAGNOSIS — E11.65 UNCONTROLLED TYPE 2 DIABETES MELLITUS WITH HYPERGLYCEMIA, WITH LONG-TERM CURRENT USE OF INSULIN: ICD-10-CM

## 2024-11-11 DIAGNOSIS — Z79.4 UNCONTROLLED TYPE 2 DIABETES MELLITUS WITH HYPERGLYCEMIA, WITH LONG-TERM CURRENT USE OF INSULIN: ICD-10-CM

## 2024-11-11 RX ORDER — INSULIN ASPART 100 [IU]/ML
18 INJECTION, SOLUTION INTRAVENOUS; SUBCUTANEOUS
Qty: 30 ML | Refills: 5 | Status: SHIPPED | OUTPATIENT
Start: 2024-11-11 | End: 2024-11-11 | Stop reason: SDUPTHER

## 2024-11-11 RX ORDER — INSULIN ASPART 100 [IU]/ML
18 INJECTION, SOLUTION INTRAVENOUS; SUBCUTANEOUS
Qty: 30 ML | Refills: 5 | Status: SHIPPED | OUTPATIENT
Start: 2024-11-11

## 2024-11-11 RX ORDER — INSULIN GLARGINE 300 [IU]/ML
20 INJECTION, SOLUTION SUBCUTANEOUS NIGHTLY
Qty: 2 PEN | Refills: 5 | Status: SHIPPED | OUTPATIENT
Start: 2024-11-11

## 2024-11-11 NOTE — TELEPHONE ENCOUNTER
----- Message from Tech April sent at 11/11/2024  9:29 AM CST -----  Regarding: Insulin  Good morning, can you please send a script for the pt insulin to [facility]? The pharmacy she use is closed and they dont have novolog.

## 2024-11-12 ENCOUNTER — PATIENT MESSAGE (OUTPATIENT)
Dept: DIABETES | Facility: CLINIC | Age: 69
End: 2024-11-12
Payer: MEDICARE

## 2024-11-25 ENCOUNTER — OFFICE VISIT (OUTPATIENT)
Dept: OPHTHALMOLOGY | Facility: CLINIC | Age: 69
End: 2024-11-25
Payer: MEDICARE

## 2024-11-25 DIAGNOSIS — Z79.4 TYPE 2 DIABETES MELLITUS WITH BOTH EYES AFFECTED BY PROLIFERATIVE RETINOPATHY AND MACULAR EDEMA, WITH LONG-TERM CURRENT USE OF INSULIN: Primary | ICD-10-CM

## 2024-11-25 DIAGNOSIS — Z83.511 FAMILY HISTORY OF GLAUCOMA: ICD-10-CM

## 2024-11-25 DIAGNOSIS — Z96.1 PSEUDOPHAKIA: ICD-10-CM

## 2024-11-25 DIAGNOSIS — E11.3513 TYPE 2 DIABETES MELLITUS WITH BOTH EYES AFFECTED BY PROLIFERATIVE RETINOPATHY AND MACULAR EDEMA, WITH LONG-TERM CURRENT USE OF INSULIN: Primary | ICD-10-CM

## 2024-11-25 DIAGNOSIS — H43.11 VITREOUS HEMORRHAGE, RIGHT: ICD-10-CM

## 2024-11-25 PROCEDURE — 3051F HG A1C>EQUAL 7.0%<8.0%: CPT | Mod: CPTII,S$GLB,, | Performed by: OPHTHALMOLOGY

## 2024-11-25 PROCEDURE — 4010F ACE/ARB THERAPY RXD/TAKEN: CPT | Mod: CPTII,S$GLB,, | Performed by: OPHTHALMOLOGY

## 2024-11-25 PROCEDURE — 1126F AMNT PAIN NOTED NONE PRSNT: CPT | Mod: CPTII,S$GLB,, | Performed by: OPHTHALMOLOGY

## 2024-11-25 PROCEDURE — 92134 CPTRZ OPH DX IMG PST SGM RTA: CPT | Mod: S$GLB,,, | Performed by: OPHTHALMOLOGY

## 2024-11-25 PROCEDURE — 2022F DILAT RTA XM EVC RTNOPTHY: CPT | Mod: CPTII,S$GLB,, | Performed by: OPHTHALMOLOGY

## 2024-11-25 PROCEDURE — 99214 OFFICE O/P EST MOD 30 MIN: CPT | Mod: S$GLB,,, | Performed by: OPHTHALMOLOGY

## 2024-11-25 PROCEDURE — 1160F RVW MEDS BY RX/DR IN RCRD: CPT | Mod: CPTII,S$GLB,, | Performed by: OPHTHALMOLOGY

## 2024-11-25 PROCEDURE — 1159F MED LIST DOCD IN RCRD: CPT | Mod: CPTII,S$GLB,, | Performed by: OPHTHALMOLOGY

## 2024-11-25 PROCEDURE — 99999 PR PBB SHADOW E&M-EST. PATIENT-LVL II: CPT | Mod: PBBFAC,,, | Performed by: OPHTHALMOLOGY

## 2024-11-25 NOTE — PROGRESS NOTES
===============================  Kay FRANCIS Galarza,  11/25/2024 today   69 y.o. female   Last visit [facility]: :11/20/2023   Last visit eye dept. Visit date not found  VA:  Uncorrected distance visual acuity was CF at 3' in the right eye and 20/100 in the left eye.  Tonometry       Tonometry (Applanation, 8:50 AM)         Right Left    Pressure 14 17                  Not recorded       Not recorded       Not recorded       Chief Complaint   Patient presents with    PDR/ME     YEARLY PDR/ME EXAM       ________________  11/25/2024 today  HPI     PDR/ME            Comments: YEARLY PDR/ME EXAM          Comments    [facility], Grandmother   DM 1998   BDR OU / CME OS   PCIOL OD +21.5 SN60WF (distance) 7-18-18   PCIOL OS +21.5 SN60WF (distance) 3-13-19   AVASTIN OS last 4/30/2021          Last edited by Eli Kothari on 11/25/2024  8:36 AM.      Problem List Items Addressed This Visit    None  Visit Diagnoses       Type 2 diabetes mellitus with both eyes affected by proliferative retinopathy and macular edema, with long-term current use of insulin    -  Primary    Relevant Orders    Posterior Segment OCT Retina-Both eyes (Completed)    Pseudophakia        Family history of glaucoma        Vitreous hemorrhage, right              DM with PDR  Symptomatic  VH od 8 months !  Rtc  6  weeks procedure day  Ivfa and prp  od   Optos out today   4 sessions   Consider ppv if indeed 8 months    RTC 6 weeks with IVFA and PRP OD  Instructed to call 24/7 for any worsening of vision or symptoms. Check OU daily.   Gave my office and cell phone number.      ===============================

## 2024-12-04 DIAGNOSIS — E11.9 TYPE 2 DIABETES MELLITUS WITHOUT COMPLICATION: ICD-10-CM

## 2025-01-02 NOTE — NURSING
Pt called me to room to ask why for insulin. Pt has a dex-com and it was reading pt's BG in the 240's Checked BG with hospital accucheck. BS less than 200 and did not need coverage. Notified pt that I could not give insulin based on her reading.    Fellow Resident

## 2025-01-08 ENCOUNTER — PROCEDURE VISIT (OUTPATIENT)
Dept: OPHTHALMOLOGY | Facility: CLINIC | Age: 70
End: 2025-01-08
Payer: MEDICARE

## 2025-01-08 DIAGNOSIS — Z79.4 TYPE 2 DIABETES MELLITUS WITH BOTH EYES AFFECTED BY PROLIFERATIVE RETINOPATHY AND MACULAR EDEMA, WITH LONG-TERM CURRENT USE OF INSULIN: Primary | ICD-10-CM

## 2025-01-08 DIAGNOSIS — E11.3513 TYPE 2 DIABETES MELLITUS WITH BOTH EYES AFFECTED BY PROLIFERATIVE RETINOPATHY AND MACULAR EDEMA, WITH LONG-TERM CURRENT USE OF INSULIN: Primary | ICD-10-CM

## 2025-01-10 NOTE — PROGRESS NOTES
===============================  Date today is 1/8/2025  Kay Galarza is a 69 y.o. female  Last visit [facility]: :11/25/2024   Last visit eye dept. 11/25/2024    Uncorrected distance visual acuity was CF at 3' in the right eye and 20/100 in the left eye.  Tonometry       Tonometry (icare, 11:11 AM)         Right Left    Pressure 17 17                  Not recorded       Not recorded       Not recorded       Chief Complaint   Patient presents with    Follow-up     IVFA / PRP OD     HPI     Follow-up            Comments: IVFA / PRP OD          Comments    States that her vision     [facility]HX COAG: Mother, Grandmother   DM 1998   BDR OU / CME OS   PCIOL OD +21.5 SN60WF (distance) 7-18-18   PCIOL OS +21.5 SN60WF (distance) 3-13-19   AVASTIN OS last 4/30/2021             Last edited by Daksha Beasley on 1/8/2025 10:58 AM.      Problem List Items Addressed This Visit    None  Visit Diagnoses       Type 2 diabetes mellitus with both eyes affected by proliferative retinopathy and macular edema, with long-term current use of insulin    -  Primary    Relevant Orders    Pan Retinal Photocoagulation - OD - Right Eye (Completed)          Instructed to call 24/7 for any worsening of vision, visual distortion or pain.  Check OU independently daily.    Gave my office and personal cell phone number.  ________________  1/8/2025 today  Kay Galarza    OD PDR  IVFA not done today  OD NVE [facility]- recommend PRP OD today  OS no NVD, no VH  PCIOL OU  OD trace foveal dot VH  1+ DBH  No NVD  OS no NVD  No VH  Proceed with PRP OD today    01/8/2025   Kay Galarza,   69 y.o. female   VA:  Uncorrected distance visual acuity was CF at 3' in the right eye and 20/100 in the left eye.  HPI     Follow-up            Comments: IVFA / PRP OD          Comments    States that her vision     FMHX COAG: Mother, Grandmother   DM 1998   BDR OU / CME OS   PCIOL OD +21.5 SN60WF (distance) 7-18-18   PCIOL  OS +21.5 SN60WF (distance) 3-13-19   AVASTIN OS last 4/30/2021             Last edited by Daksha Beasley on 1/8/2025 10:58 AM.      1/8/2025  Problem List Items Addressed This Visit    None  Visit Diagnoses       Type 2 diabetes mellitus with both eyes affected by proliferative retinopathy and macular edema, with long-term current use of insulin    -  Primary    Relevant Orders    Pan Retinal Photocoagulation - OD - Right Eye (Completed)                                                            Proc Note:   Laser PRP to OD   Dx: PDR/VH OD  I have explained the Risks, Benefits and Alternatives, of the procedure in detail.  The patient voices understanding and all questions have been answered.  The patient agrees to proceed as planned.   Topical Anesthesia with Proparacaine  Laser: Argon  Power: 240  Duration:30  Interval:400  Number: 446  Pt tolerated procedure well.  No complications were observed.    RTC 3 weeks for added PRP, refract, optos    Wilmer Knox MD.    =============================

## 2025-01-14 DIAGNOSIS — Z00.00 ENCOUNTER FOR MEDICARE ANNUAL WELLNESS EXAM: ICD-10-CM

## 2025-01-17 RX ORDER — ATORVASTATIN CALCIUM 40 MG/1
TABLET, FILM COATED ORAL
Qty: 90 TABLET | Refills: 0 | OUTPATIENT
Start: 2025-01-17

## 2025-01-17 NOTE — TELEPHONE ENCOUNTER
Refill Routing Note   Medication(s) are not appropriate for processing by [facility] for the following reason(s):        Required labs outdated    [facility] action(s):  Defer     Requires labs : Yes             Appointments  past 12m or future 3m with PCP    Date Provider   Last Visit   4/23/2024 Lucy Negron MD   Next Visit   Visit date not found Lucy Negron MD   ED visits in past 90 days: 0        Note composed:11:32 AM 01/17/2025

## 2025-01-17 NOTE — TELEPHONE ENCOUNTER
Care Due:                  Date            Visit Type   Department     Provider  --------------------------------------------------------------------------------                                EP -                              PRIMARY      [facility] INTERNAL  Lucy RIDER  Last Visit: 04-      CARE ([facility])   MEDICINE       Jamil  Next Visit: None Scheduled  None         None Found                                                            Last  Test          Frequency    Reason                     Performed    Due Date  --------------------------------------------------------------------------------    HBA1C.......  6 months...  glimepiride..............  04-   10-    Lipid Panel.  12 months..  atorvastatin.............  01- 01-    Mg Level....  12 months..  magnesium................  Not Found    Overdue    [employer] Embedded Care Due Messages. Reference number: [id number].   1/17/2025 11:13:02 AM CST

## 2025-01-21 ENCOUNTER — PATIENT MESSAGE (OUTPATIENT)
Dept: ADMINISTRATIVE | Facility: HOSPITAL | Age: 70
End: 2025-01-21
Payer: MEDICARE

## 2025-01-23 DIAGNOSIS — E11.9 TYPE 2 DIABETES MELLITUS WITHOUT COMPLICATION, UNSPECIFIED WHETHER LONG TERM INSULIN USE: ICD-10-CM

## 2025-01-31 RX ORDER — TRAZODONE HYDROCHLORIDE 150 MG/1
150 TABLET ORAL NIGHTLY
Qty: 90 TABLET | Refills: 0 | Status: SHIPPED | OUTPATIENT
Start: 2025-01-31

## 2025-01-31 RX ORDER — LISINOPRIL AND HYDROCHLOROTHIAZIDE 20; 25 MG/1; MG/1
1 TABLET ORAL DAILY
Qty: 90 TABLET | Refills: 0 | Status: SHIPPED | OUTPATIENT
Start: 2025-01-31

## 2025-01-31 NOTE — TELEPHONE ENCOUNTER
Care Due:                  Date            Visit Type   Department     Provider  --------------------------------------------------------------------------------                                EP -                              PRIMARY      [facility] INTERNAL  Lucy RIDER  Last Visit: 04-      CARE ([facility])   MEDICINE       Jamil  Next Visit: None Scheduled  None         None Found                                                            Last  Test          Frequency    Reason                     Performed    Due Date  --------------------------------------------------------------------------------    Office Visit  12 months..  magnesium................  04- 04-    CMP.........  12 months..  atorvastatin,              04- 04-                             glimepiride,                             lisinopriL-hydrochlorothi                             azide, magnesium.........    [employer] Embedded Care Due Messages. Reference number: [id number].   1/31/2025 3:40:46 PM CST

## 2025-02-01 RX ORDER — GLIMEPIRIDE 4 MG/1
4 TABLET ORAL
Qty: 7 TABLET | Refills: 0 | Status: SHIPPED | OUTPATIENT
Start: 2025-02-01

## 2025-02-01 NOTE — TELEPHONE ENCOUNTER
Refill Routing Note   Medication(s) are not appropriate for processing by [facility] for the following reason(s):        Required labs outdated    [facility] action(s):  Defer  Approve   Requires labs : Yes               Appointments  past 12m or future 3m with PCP    Date Provider   Last Visit   4/23/2024 Lucy Negron MD   Next Visit   Visit date not found Lucy Negron MD   ED visits in past 90 days: 0        Note composed:6:47 PM 01/31/2025

## 2025-02-06 RX ORDER — GLIMEPIRIDE 4 MG/1
4 TABLET ORAL
Qty: 7 TABLET | Refills: 0 | OUTPATIENT
Start: 2025-02-06

## 2025-02-06 NOTE — TELEPHONE ENCOUNTER
No care due was identified.  Health [facility] Embedded Care Due Messages. Reference number: [id number].   2/06/2025 5:34:11 PM CST

## 2025-02-07 NOTE — TELEPHONE ENCOUNTER
Refill Decision Note  Quick DC. Request already responded to by other means (e.g. phone or fax)    Kay Galarza  is requesting a refill authorization.  Brief Assessment and Rationale for Refill:  Quick Discontinue     Medication Therapy Plan:       Medication Reconciliation Completed: No   Comments:           Note composed:6:23 PM 02/06/2025

## 2025-02-10 ENCOUNTER — TELEPHONE (OUTPATIENT)
Dept: DIABETES | Facility: CLINIC | Age: 70
End: 2025-02-10
Payer: MEDICARE

## 2025-02-10 NOTE — TELEPHONE ENCOUNTER
Attempt to call patient regarding clarifying if patient is wearing cgm pt didn't answer sending message through portal

## 2025-02-19 ENCOUNTER — PROCEDURE VISIT (OUTPATIENT)
Dept: OPHTHALMOLOGY | Facility: CLINIC | Age: 70
End: 2025-02-19
Payer: MEDICARE

## 2025-02-19 DIAGNOSIS — H43.11 VITREOUS HEMORRHAGE, RIGHT: ICD-10-CM

## 2025-02-19 DIAGNOSIS — E11.3513 TYPE 2 DIABETES MELLITUS WITH BOTH EYES AFFECTED BY PROLIFERATIVE RETINOPATHY AND MACULAR EDEMA, WITH LONG-TERM CURRENT USE OF INSULIN: Primary | ICD-10-CM

## 2025-02-19 DIAGNOSIS — Z79.4 TYPE 2 DIABETES MELLITUS WITH BOTH EYES AFFECTED BY PROLIFERATIVE RETINOPATHY AND MACULAR EDEMA, WITH LONG-TERM CURRENT USE OF INSULIN: Primary | ICD-10-CM

## 2025-02-19 NOTE — PROGRESS NOTES
02/19/2025   Kaywhit Galarza,   69 y.o. female   VA:  Uncorrected distance visual acuity was CF@4' in the right eye and 20/60 in the left eye.  HPI     Laser Treatment            Comments: Pt here for PRP OD   Pt co seeing tv and reading up close             Comments    FMHX COAG: Mother, Grandmother   DM 1998   BDR OU / CME OS   PCIOL OD +21.5 SN60WF (distance) 7-18-18   PCIOL OS +21.5 SN60WF (distance) 3-13-19   AVASTIN OS last 4/30/2021             Last edited by Jonathan Sierra on 2/19/2025 10:38 AM.      2/19/2025  Problem List Items Addressed This Visit    None  Visit Diagnoses         Type 2 diabetes mellitus with both eyes affected by proliferative retinopathy and macular edema, with long-term current use of insulin    -  Primary    Relevant Orders    Color Fundus Photography - OU - Both Eyes (Completed)    Pan Retinal Photocoagulation - OD - Right Eye (Completed)      Vitreous hemorrhage, right        Relevant Orders    Color Fundus Photography - OU - Both Eyes (Completed)    Pan Retinal Photocoagulation - OD - Right Eye (Completed)                                                            Proc Note:   Laser PRP to OD   Dx: PDR/VH OD  I have explained the Risks, Benefits and Alternatives, of the procedure in detail.  The patient voices understanding and all questions have been answered.  The patient agrees to proceed as planned.   Topical Anesthesia with Proparacaine  Laser: Argon  Power: 220  Duration:40  Interval:400  Number: 600  Pt tolerated procedure well.  No complications were observed.    RTC 2-3 weeks for added PRP OD, ok to use +2.50 readers  Optos today:      Wilmer Knox MD.

## 2025-03-25 NOTE — TELEPHONE ENCOUNTER
Care Due:                  Date            Visit Type   Department     Provider  --------------------------------------------------------------------------------                                EP -                              PRIMARY      [facility] INTERNAL  Lucy ADRY  Last Visit: 04-      CARE ([facility])   MEDICINE       Jamil  Next Visit: None Scheduled  None         None Found                                                            Last  Test          Frequency    Reason                     Performed    Due Date  --------------------------------------------------------------------------------    HBA1C.......  6 months...  glimepiride..............  04-   10-    Lipid Panel.  12 months..  atorvastatin.............  01- 03-    Mg Level....  12 months..  magnesium................  Not Found    Overdue    [employer] Embedded Care Due Messages. Reference number: [id number].   3/25/2025 3:56:54 PM CDT

## 2025-03-26 RX ORDER — GLIMEPIRIDE 4 MG/1
4 TABLET ORAL
Qty: 7 TABLET | Refills: 0 | Status: SHIPPED | OUTPATIENT
Start: 2025-03-26

## 2025-03-26 NOTE — TELEPHONE ENCOUNTER
Refill Routing Note   Medication(s) are not appropriate for processing by [facility] for the following reason(s):        Required labs outdated    [facility] action(s):  Defer     Requires labs : Yes             Appointments  past 12m or future 3m with PCP    Date Provider   Last Visit   4/23/2024 Lucy Negron MD   Next Visit   Visit date not found Lucy Negron MD   ED visits in past 90 days: 0        Note composed:11:19 PM 03/25/2025

## 2025-04-08 DIAGNOSIS — I48.91 ATRIAL FIBRILLATION, UNSPECIFIED TYPE: ICD-10-CM

## 2025-04-23 RX ORDER — GLIMEPIRIDE 4 MG/1
4 TABLET ORAL
Qty: 7 TABLET | Refills: 0 | OUTPATIENT
Start: 2025-04-23

## 2025-04-23 NOTE — TELEPHONE ENCOUNTER
Care Due:                  Date            Visit Type   Department     Provider  --------------------------------------------------------------------------------                                EP -                              PRIMARY      [facility] INTERNAL  Lucy RIDER  Last Visit: 04-      CARE ([facility])   MEDICINE       Jamil  Next Visit: None Scheduled  None         None Found                                                            Last  Test          Frequency    Reason                     Performed    Due Date  --------------------------------------------------------------------------------    Office Visit  12 months..  atorvastatin,              04- 04-                             glimepiride,                             lisinopriL-hydrochlorothi                             azide, magnesium,                             sertraline, traZODone....    CMP.........  12 months..  atorvastatin,              04- 04-                             glimepiride,                             lisinopriL-hydrochlorothi                             azide, magnesium.........    [employer] Embedded Care Due Messages. Reference number: [id number].   4/23/2025 3:55:03 PM CDT

## 2025-04-23 NOTE — TELEPHONE ENCOUNTER
Refill Routing Note   Medication(s) are not appropriate for processing by [facility] for the following reason(s):        Required labs outdated    [facility] action(s):  Defer   Requires labs : Yes     Requires appointment : Yes             Appointments  past 12m or future 3m with PCP    Date Provider   Last Visit   4/23/2024 Lucy Negron MD   Next Visit   Visit date not found Lucy Negron MD   ED visits in past 90 days: 0        Note composed:5:37 PM 04/23/2025

## 2025-04-29 ENCOUNTER — TELEPHONE (OUTPATIENT)
Dept: DIABETES | Facility: CLINIC | Age: 70
End: 2025-04-29
Payer: MEDICARE

## 2025-04-29 NOTE — TELEPHONE ENCOUNTER
----- Message from Tasha sent at 4/29/2025  9:47 AM CDT -----  Regarding: Urgent  Type: Patient callWho called: Patient Daughter (Cass)Does the patient know what this is regarding? Requesting a call back in regards to needing to schedule an virtual appt ; out of diabetes medication ; needs virtual due to having radiation treatments daily ; please advise Would the patient rather a call back or response via [facility]? CallTellMist call back number:[phone] information:  ----- Message -----  From: Tasha Roach  Sent: 4/29/2025   9:53 AM CDT  To: [facility] [place] Clinical Staff  Subject: Urgent                                           Type: Patient callWho called: Patient Daughter (Cass)Does the patient know what this is regarding? Requesting a call back in regards to needing to schedule an virtual appt ; out of diabetes medication ; needs virtual due to having radiation treatments daily ; please advise Would the patient rather a call back or response via My [facility]? CallTellMist call back number:[phone] information:  
Spoke with Patient Daughter (Cass I offered her to see one our virtual provider pt 's daughter dined to see provider and the times that where given from providers that had open   Pt 's mom has dex com device that needs to be downloaded tomorrow so that pt can be seen virtual pt will be at the [facility] for 1 pm     
Yes

## 2025-04-30 DIAGNOSIS — E11.65 UNCONTROLLED TYPE 2 DIABETES MELLITUS WITH HYPERGLYCEMIA, WITH LONG-TERM CURRENT USE OF INSULIN: ICD-10-CM

## 2025-04-30 DIAGNOSIS — Z79.4 UNCONTROLLED TYPE 2 DIABETES MELLITUS WITH HYPERGLYCEMIA, WITH LONG-TERM CURRENT USE OF INSULIN: ICD-10-CM

## 2025-04-30 RX ORDER — DAPAGLIFLOZIN 10 MG/1
10 TABLET, FILM COATED ORAL DAILY
Qty: 90 TABLET | Refills: 3 | OUTPATIENT
Start: 2025-04-30

## 2025-05-01 ENCOUNTER — TELEPHONE (OUTPATIENT)
Dept: DIABETES | Facility: CLINIC | Age: 70
End: 2025-05-01
Payer: MEDICARE

## 2025-05-01 NOTE — TELEPHONE ENCOUNTER
Pt was scheduled for nurse visit yesterday on 04/30/25 where she didn't arrive no show nurse visit so that we could download dex com readings to add pt for a virtual visit pt never show up

## 2025-05-05 ENCOUNTER — TELEPHONE (OUTPATIENT)
Dept: DIABETES | Facility: CLINIC | Age: 70
End: 2025-05-05
Payer: MEDICARE

## 2025-05-05 ENCOUNTER — CLINICAL SUPPORT (OUTPATIENT)
Dept: DIABETES | Facility: CLINIC | Age: 70
End: 2025-05-05
Payer: MEDICARE

## 2025-05-05 DIAGNOSIS — E11.65 UNCONTROLLED TYPE 2 DIABETES MELLITUS WITH HYPERGLYCEMIA, WITH LONG-TERM CURRENT USE OF INSULIN: Primary | ICD-10-CM

## 2025-05-05 DIAGNOSIS — Z79.4 UNCONTROLLED TYPE 2 DIABETES MELLITUS WITH HYPERGLYCEMIA, WITH LONG-TERM CURRENT USE OF INSULIN: Primary | ICD-10-CM

## 2025-05-05 DIAGNOSIS — E11.65 UNCONTROLLED TYPE 2 DIABETES MELLITUS WITH HYPERGLYCEMIA, WITH LONG-TERM CURRENT USE OF INSULIN: ICD-10-CM

## 2025-05-05 DIAGNOSIS — Z79.4 UNCONTROLLED TYPE 2 DIABETES MELLITUS WITH HYPERGLYCEMIA, WITH LONG-TERM CURRENT USE OF INSULIN: ICD-10-CM

## 2025-05-05 RX ORDER — DAPAGLIFLOZIN 10 MG/1
10 TABLET, FILM COATED ORAL DAILY
Qty: 90 TABLET | Refills: 0 | Status: SHIPPED | OUTPATIENT
Start: 2025-05-05 | End: 2025-05-06 | Stop reason: SDUPTHER

## 2025-05-05 RX ORDER — GLIMEPIRIDE 4 MG/1
4 TABLET ORAL
Qty: 7 TABLET | Refills: 0 | Status: SHIPPED | OUTPATIENT
Start: 2025-05-05 | End: 2025-05-06

## 2025-05-05 RX ORDER — GLIMEPIRIDE 4 MG/1
4 TABLET ORAL
Qty: 30 TABLET | Refills: 0 | Status: SHIPPED | OUTPATIENT
Start: 2025-05-05 | End: 2025-05-06 | Stop reason: SDUPTHER

## 2025-05-05 RX ORDER — INSULIN ASPART 100 [IU]/ML
18 INJECTION, SOLUTION INTRAVENOUS; SUBCUTANEOUS
Qty: 30 ML | Refills: 0 | Status: SHIPPED | OUTPATIENT
Start: 2025-05-05 | End: 2025-05-06 | Stop reason: SDUPTHER

## 2025-05-05 NOTE — PROGRESS NOTES
Patient ID: Kay Galarza is a 70 y.o. female.  Patient's current PCP is Lucy Negron MD.   Collaborating Physician: ALLEN North MD    Chief Complaint: Diabetes Mellitus Consultation   HPI   Kay Galarza is a 70 y.o. White female presenting for a new consult with me, previously seen by HARSH Garcia NP for diabetes. LOV 3/2024   Patient has been diagnosed with type 2 diabetes since 2007 .    Pertinent to decision making is/are the following comorbidities:  HTN, HLD, cerebrovascular dx with hx CVA, atrial fibrillation, CKD3a, Interstitial lung disease, JOHN, diabetic retinopathy   Complications related to diabetes: nephropathy, retinopathy, peripheral neuropathy, peripheral vascular disease, and charcot foot L 2020   Recent diabetes related hospitalizations: None   Personal history of pancreatitis: denies  Family history of pancreatic cancer in first-degree relative: denies  Family history of MTC/MEN/endocrine tumors: denies       Previous diabetes education: Yes, has been years.   Current diet: 2-3 meals daily   Activity level: --     CURRENT DM MEDICATIONS:   Diabetes Medications              dapagliflozin propanediol (FARXIGA) 10 mg tablet TAKE 1 TABLET (10 MG TOTAL) BY MOUTH ONCE DAILY.    Out x 1 week     glimepiride (AMARYL) 4 MG tablet Take 1 tablet (4 mg total) by mouth daily with breakfast.    Out x 1 week     insulin aspart U-100 (NOVOLOG FLEXPEN U-100 INSULIN) 100 unit/mL (3 mL) InPn pen Inject 18 Units into the skin 3 (three) times daily before meals.    5 units after meals     insulin glargine U-300 conc (TOUJEO MAX U-300 SOLOSTAR) 300 unit/mL (3 mL) insulin pen Inject 20 Units into the skin every evening. Increase by 2 units every 3 days until fasting sugar is below 130. Titrating to 50 units    Not taking     semaglutide (OZEMPIC) 0.25 mg or 0.5 mg (2 mg/3 mL) pen injector Inject 0.5 mg into the skin every 7 days.    Not taking    Ozempic -- stopped when starting  Chemotherapy due to lack of appetite and nausea    Past failed treatment(s) include:   Xigduo,Trulicity- GI complaints     Her blood sugar in the clinic today was:   Lab Results   Component Value Date    POCGLU 223 (A) 03/28/2024       Blood glucose testing continuous per Dexcom G7 // [facility]   Hypoglycemia? No   Glucose trends:     CGM Interpretation:   Per CGM download, for the last 14 days, glycemic control is unstable. There is a pattern of: global hyperglycemia, both fasting and pre/post meal. Most pronounced hyperglycemia is post-prandial with mid-day and evening meal times. Based on report, basal and bolus insulin doses will need to be restarted/increased.       Kay Galarza presents to clinic today to discuss diabetes management. Accompanied by her daughter, Cass, who is interpreting for the visit; declined virtual .   Last seen by diabetes management in March 2023.   Of note, she is following with Oncology with [facility] for carcinoma of the left breast with mets to lymph node s/p resection, chemotherapy, and radiation therapy. No longer on chemo. Last radiation treatment tomorrow.   She has been out of Farxiga and Glimepiride. No longer taking Toujeo - states she previously had overnight low BG with Toujeo so she stopped this. Stopped Ozempic when on chemotherapy due to lack of appetite and nausea. Current regimen includes Novolog 5 units during/after meals.   Overall, she notes her appetite is improving since being off of Chemo. Overall, she is feeling fairly well. She was tolerating her Farxiga and Glimepiride well without SE. Notes she drinks water throughout the day to stay hydrated.   Has seen Cardiology, , with history of A fib, valvular heart disease, pulm HTN, hx CVA     She is overdue for A1c, lipids, DM urine - daughter states she has difficulty getting to the lab due to all of her current appointments. She will be seeing PCP 5/19/2025 - advised we can  "wait until she sees her PCP to schedule labs.       Statin: Taking  ACE/ARB: Taking    Labs reviewed and are noted below.    Screening or Prevention Patient's value Goal Complete/Controlled?   HgA1C Testing and Control   Lab Results   Component Value Date    HGBA1C 7.6 (H) 04/23/2024      Annually/Less than 8% No   Lipid profile : 11/27/2023 Annually No   LDL control Lab Results   Component Value Date    LDLCALC 116 11/27/2023    Annually/Less than 100 mg/dl  No   Nephropathy screening Lab Results   Component Value Date    MICALBCREAT 425.4 (H) 05/25/2022     Lab Results   Component Value Date    PROTEINUA 1+ (A) 10/13/2023    Annually No   Blood pressure BP Readings from Last 1 Encounters:   08/22/24 104/68    Less than 140/90 Yes   Dilated retinal exam : 02/19/2025 Annually Yes    Foot exam   : 01/04/2024 Annually No       Wt Readings from Last 3 Encounters:   08/22/24 0815 100.6 kg (221 lb 12.5 oz)   07/11/24 1433 104.3 kg (230 lb)   05/02/24 0816 105.6 kg (232 lb 12.9 oz)         Lab Results   Component Value Date    TSH 1.391 11/27/2023    CALCIUM 10.4 04/23/2024    PHOS 3.5 10/13/2023     No results found for: "CPEPTIDE"  No results found for: "GLUTAMICACID"  Glucose   Date Value Ref Range Status   04/23/2024 120 (H) 70 - 110 mg/dL Final     Anion Gap   Date Value Ref Range Status   04/23/2024 7 (L) 8 - 16 mmol/L Final     eGFR if    Date Value Ref Range Status   05/25/2022 >60.0 >60 mL/min/1.73 m^2 Final     eGFR if non    Date Value Ref Range Status   05/25/2022 57.6 (A) >60 mL/min/1.73 m^2 Final     Comment:     Calculation used to obtain the estimated glomerular filtration  rate (eGFR) is the CKD-EPI equation.              Review of patient's allergies indicates:   Allergen Reactions    Peanut butter [peanut] Shortness Of Breath and Itching    Pollen extracts      Seasonal allergies, sneezing     Social History[1]  Past Medical History:   Diagnosis Date    Abnormal nuclear " stress test 01/23/2023    Arthritis     DERIAN KNEES    Asthma     SEASONAL    Cancer determined by biopsy of breast     Cataract     Diabetes mellitus     Diabetes mellitus, type 2     Diabetic retinopathy     DM (diabetes mellitus) 2007    BS 97 09/29/2020    Hyperlipidemia     Hypertension        Review of Systems   Constitutional:  Negative for diaphoresis, malaise/fatigue and weight loss.   Eyes:  Negative for blurred vision.   Respiratory:  Negative for shortness of breath.    Cardiovascular:  Negative for chest pain and leg swelling.   Gastrointestinal:  Negative for abdominal pain, constipation, diarrhea, heartburn, nausea and vomiting.   Genitourinary:  Negative for dysuria, frequency and urgency.   Musculoskeletal:  Negative for falls.   Skin:  Negative for rash.   Neurological:  Negative for dizziness, weakness and headaches.   Endo/Heme/Allergies:  Negative for polydipsia.   Psychiatric/Behavioral:  The patient is not nervous/anxious.          Physical Exam  Constitutional:       Appearance: Normal appearance.   Pulmonary:      Effort: Pulmonary effort is normal.   Neurological:      General: No focal deficit present.      Mental Status: She is alert and oriented to person, place, and time.   Psychiatric:         Mood and Affect: Mood normal.         Behavior: Behavior normal.         Thought Content: Thought content normal.         Judgment: Judgment normal.             Assessment and Plan:   Diagnoses and all orders for this visit:    Type 2 diabetes mellitus with diabetic neuropathy, with long-term current use of insulin    Severe obesity (BMI 35.0-39.9) with comorbidity    Stage 3a chronic kidney disease (CKD)    Stable proliferative diabetic retinopathy of both eyes associated with type 2 diabetes mellitus    History of CVA (cerebrovascular accident)    Essential hypertension    Mixed hyperlipidemia    JOHN (obstructive sleep apnea)    Uncontrolled type 2 diabetes mellitus with hyperglycemia, with  long-term current use of insulin  -     dapagliflozin propanediol (FARXIGA) 10 mg tablet; TAKE 1 TABLET (10 MG TOTAL) BY MOUTH ONCE DAILY.  -     insulin aspart U-100 (NOVOLOG FLEXPEN U-100 INSULIN) 100 unit/mL (3 mL) InPn pen; Inject 14 Units into the skin 3 (three) times daily before meals. Inject 14 units into the skin 3 times daily 10 minutes before meals.  -     insulin glargine U-300 conc (TOUJEO MAX U-300 SOLOSTAR) 300 unit/mL (3 mL) insulin pen; Inject 20 Units into the skin once daily. Administer 20 units once a day. We will plan to titrate up as indicated to 50 units    Other orders  -     glimepiride (AMARYL) 4 MG tablet; Take 1 tablet (4 mg total) by mouth daily with breakfast.         Treatment plan for today's visit:   - Restart Toujeo 20 units once a day -- instructed to give at same time each day.   - Increase Novolog from 5 units to 14 units ac meals -- instructed to administer 10 minutes before meals. Avoid administering during/after meals to avoid subsequent hypoglycemia.   - Continue Farxiga 10 mg daily -- stay well-hydrated with water!   - Continue Glimepiride 4 mg daily with breakfast.     - Will plan on labs with PCP 2025 due to limited availability with getting to the lab right now. Orders are in for A1c, lipids, DM urine screening.   - Advised to contact me if she experiences any low blood sugars (70 or less).   - We can further discuss restarting GLP1 RA therapy once she is back to her regular eating regimen and not having any further GI symptoms.   - Follow up with me virtually in 1 week to reevaluate.     Patient education:   - Carbohydrates: Limit to 30-45 grams with each meal. Never eat carbs by themselves - always add protein. Make snacks low carb or non-carb only.    - Blood sugar goals:   Fastin-130 mg/dl  2 hours after meal:  mg/dl  Before bed: 100-150 mg/dl    - Carry glucose tablets/soft peppermints/regular juice or Coke product with you at all times to treat a low  blood sugar episode (less than 70).  If your blood sugar is between 50-70, Chew 4 tablets or drink 1/2 cup of juice or regular Coke product.   If your blood sugar is below 50, double the treatment.   Re-check blood sugar in 15 minutes. If still low, repeat this.   Always call the clinic to give an update for any low blood sugar episodes.    - Exercise: Goal is 150 minutes per week, which is about 30 minutes/day, 5 days/week. Start slowly and increase as tolerated.        Follow up: 1 week    Visit today included increased complexity associated with the care of the episodic problems addressed and managing the longitudinal care of the patient due to the serious and/or complex managed problem(s)       Lauren O. Landry, FNP-C [facility]          [1]   Social History  Socioeconomic History    Marital status:    Tobacco Use    Smoking status: Never    Smokeless tobacco: Never   Substance and Sexual Activity    Alcohol use: Never    Drug use: No    Sexual activity: Yes     Social Drivers of Health     Financial Resource Strain: Low Risk  (1/9/2025)    Received from [facility]    Overall Financial Resource Strain (CARDIA)     Difficulty of Paying Living Expenses: Not hard at all   Food Insecurity: No Food Insecurity (1/9/2025)    Received from [facility]    Hunger Vital Sign     Worried About Running Out of Food in the Last Year: Never true     Ran Out of Food in the Last Year: Never true   Transportation Needs: No Transportation Needs (1/9/2025)    Received from [facility]    PRAPARE - Transportation     Lack of Transportation (Medical): No     Lack of Transportation (Non-Medical): No   Physical Activity: Inactive (1/9/2025)    Received from [facility]    Exercise Vital Sign     Days of Exercise per Week: 0 days     Minutes of Exercise per Session: 0 min   Stress: Patient Declined (12/3/2024)    Received from [facility]    [employer] -  Occupational Stress Questionnaire     Feeling of Stress : Patient declined   Housing Stability: Unknown (1/9/2025)    Received from [facility]    Housing Stability Vital Sign     Unable to Pay for Housing in the Last Year: No     Homeless in the Last Year: No

## 2025-05-05 NOTE — TELEPHONE ENCOUNTER
----- Message from Altagracia sent at 5/5/2025  8:54 AM CDT -----  Contact: IMAN SCHNEIDER [[id number]]  .Type:  Patient Requesting CallWho Called:IMAN SCHNEIDER [[id number]]Does the patient know what this is regarding?:Patients daughter called in stating she spoke to nurse Tete in regards to bringing her moms dexcom g7 in for reading. She is wanting to know if she can stop by todayWould the patient rather a call back or a response via [facility]? Call backBest Call Back Number:[phone] Information:  ----- Message -----  From: Altagracia Wang  Sent: 5/5/2025   8:56 AM CDT  To: [facility] Diabetes Education Clinical Support Sta#    .Type:  Patient Requesting CallWho Called:IMAN SCHNEIDER [[id number]]Does the patient know what this is regarding?:Patients daughter called in stating she spoke to nurse Tete in regards to bringing her moms dexcom g7 in for reading. She is wanting to know if she can stop by todayWould the patient rather a call back or a response via [facility]? Call backBest Call Back Number:[phone] Information:

## 2025-05-05 NOTE — TELEPHONE ENCOUNTER
No care due was identified.  Health [facility] Embedded Care Due Messages. Reference number: [id number].   5/05/2025 8:18:44 AM CDT

## 2025-05-05 NOTE — TELEPHONE ENCOUNTER
Spoke with pt's daughter. Pt's daughter said she missed nurse visit last week to have her mother's dexcom downloaded. Pt's daughter asked to come today. Pt is scheduled for nurse visit for download today. I also assisted in scheduling the pt for a virtual visit with provider for tomorrow at 10.

## 2025-05-05 NOTE — PROGRESS NOTES
Pt's daughter came into the office today to have dex com dl for future appointment it was successfully downloaded add itno

## 2025-05-06 ENCOUNTER — PATIENT MESSAGE (OUTPATIENT)
Dept: DIABETES | Facility: CLINIC | Age: 70
End: 2025-05-06

## 2025-05-06 ENCOUNTER — OFFICE VISIT (OUTPATIENT)
Dept: DIABETES | Facility: CLINIC | Age: 70
End: 2025-05-06
Payer: MEDICARE

## 2025-05-06 DIAGNOSIS — Z79.4 TYPE 2 DIABETES MELLITUS WITH DIABETIC NEUROPATHY, WITH LONG-TERM CURRENT USE OF INSULIN: Primary | ICD-10-CM

## 2025-05-06 DIAGNOSIS — N18.31 STAGE 3A CHRONIC KIDNEY DISEASE (CKD): ICD-10-CM

## 2025-05-06 DIAGNOSIS — E78.2 MIXED HYPERLIPIDEMIA: ICD-10-CM

## 2025-05-06 DIAGNOSIS — E11.3553 STABLE PROLIFERATIVE DIABETIC RETINOPATHY OF BOTH EYES ASSOCIATED WITH TYPE 2 DIABETES MELLITUS: ICD-10-CM

## 2025-05-06 DIAGNOSIS — Z86.73 HISTORY OF CVA (CEREBROVASCULAR ACCIDENT): ICD-10-CM

## 2025-05-06 DIAGNOSIS — E11.40 TYPE 2 DIABETES MELLITUS WITH DIABETIC NEUROPATHY, WITH LONG-TERM CURRENT USE OF INSULIN: Primary | ICD-10-CM

## 2025-05-06 DIAGNOSIS — I10 ESSENTIAL HYPERTENSION: ICD-10-CM

## 2025-05-06 DIAGNOSIS — Z79.4 UNCONTROLLED TYPE 2 DIABETES MELLITUS WITH HYPERGLYCEMIA, WITH LONG-TERM CURRENT USE OF INSULIN: ICD-10-CM

## 2025-05-06 DIAGNOSIS — E11.65 UNCONTROLLED TYPE 2 DIABETES MELLITUS WITH HYPERGLYCEMIA, WITH LONG-TERM CURRENT USE OF INSULIN: ICD-10-CM

## 2025-05-06 DIAGNOSIS — G47.33 OSA (OBSTRUCTIVE SLEEP APNEA): ICD-10-CM

## 2025-05-06 DIAGNOSIS — E66.01 SEVERE OBESITY (BMI 35.0-39.9) WITH COMORBIDITY: ICD-10-CM

## 2025-05-06 RX ORDER — GLIMEPIRIDE 4 MG/1
4 TABLET ORAL
Qty: 30 TABLET | Refills: 2 | Status: SHIPPED | OUTPATIENT
Start: 2025-05-06

## 2025-05-06 RX ORDER — INSULIN GLARGINE 300 [IU]/ML
20 INJECTION, SOLUTION SUBCUTANEOUS DAILY
Qty: 6 ML | Refills: 2 | Status: SHIPPED | OUTPATIENT
Start: 2025-05-06

## 2025-05-06 RX ORDER — DAPAGLIFLOZIN 10 MG/1
10 TABLET, FILM COATED ORAL DAILY
Qty: 90 TABLET | Refills: 1 | Status: SHIPPED | OUTPATIENT
Start: 2025-05-06

## 2025-05-06 RX ORDER — INSULIN ASPART 100 [IU]/ML
14 INJECTION, SOLUTION INTRAVENOUS; SUBCUTANEOUS
Qty: 15 ML | Refills: 2 | Status: SHIPPED | OUTPATIENT
Start: 2025-05-06

## 2025-05-06 NOTE — Clinical Note
Please schedule 1 week follow up virtual.  She has dexcom  - she will need to come by to download it before appointment   Thanks!

## 2025-05-06 NOTE — PATIENT INSTRUCTIONS
Medications adjusted for today's visit include:   Restart Toujeo 20 units once a day - administer every morning at the same time.    Increase Novolog to 14 units 10 minutes before meals   Continue Farxiga 10 mg daily -- stay well-hydrated with water!   Continue Glimepiride 4 mg daily with breakfast     Follow up in 1 week to reevaluate blood sugars.     Let me know if you experience any low blood sugars (<70)       Follow up with PCP as scheduled 2025 -- you will need some updated lab work.       Patient education:   Carbohydrates: Limit to 30-45 grams with each meal. Never eat carbs by themselves - always add protein. Make snacks low carb or non-carb only.    Blood sugar goals:   Fastin-130 mg/dl  2 hours after meal:  mg/dl  Before bed: 100-150 mg/dl    Carry glucose tablets/soft peppermints/regular juice or Coke product with you at all times to treat a low blood sugar episode (less than 70).  If your blood sugar is between 50-70, Chew 4 tablets or drink 1/2 cup of juice or regular Coke product.   If your blood sugar is below 50, double the treatment.   Re-check blood sugar in 15 minutes. If still low, repeat this.   Always call the clinic to give an update for any low blood sugar episodes.    Exercise: Goal is 150 minutes per week, which is about 30 minutes/day, 5 days/week. Start slowly and increase as tolerated.      Traducción al Español  Los medicamentos ajustados para la visita de charlee incluyen:   Reiniciar Toujeo 20 unidades paris vez al día; administrar todas las mañanas a la misma hora.   Aumentar Novolog a 14 unidades 10 minutos antes de las comidas.   Continuar Farxiga 10 mg al día; ¡mantenerse mellisa hidratado con agua!   Continuar Glimepirida 4 mg al día con el desayuno.    Consultar en paris semana para reevaluar los niveles de azúcar en david.    Avisarme si experimenta algún nivel bajo de azúcar en david (<70).    Consultar con el médico de cabecera según lo programado el  19/5/2025.    Información para el paciente:   Carbohidratos: Limitar a 30-45 gramos con cada comida. Nunca consumir carbohidratos solos; siempre añadir proteínas. Preparar refrigerios bajos en carbohidratos o sin carbohidratos.     Objetivos de azúcar en david:    ¿ En ayunas:  mg/dl?    ¿ 2 horas después de comer:  mg/dl  ¿Antes de acostarse: 100-150 mg/dl     Lleve siempre consigo tabletas de glucosa, mentas suaves, jugo regular o Coca[employer]Cola para tratar un episodio de hipoglucemia (menos de 70).  o Si shelby nivel de azúcar está entre 50 y 70, mastique 4 tabletas o kilo 1/2 taza de jugo o Coca[employer]Cola regular.  o Si shelby nivel de azúcar está por debajo de 50, duplique el tratamiento.  o Vuelva a medir shelby nivel de azúcar en 15 minutos. Si el nivel persiste bajo, repita el procedimiento.  o Llame siempre a la clínica para informarle sobre cualquier episodio de hipoglucemia.     Ejercicio: El objetivo es 150 minutos a la semana, lo que equivale a unos 30 minutos al día, 5 días a la semana. Comience lentamente y aumente según la tolerancia.

## 2025-05-12 ENCOUNTER — TELEPHONE (OUTPATIENT)
Dept: DIABETES | Facility: CLINIC | Age: 70
End: 2025-05-12
Payer: MEDICARE

## 2025-05-12 NOTE — TELEPHONE ENCOUNTER
Attempted to call pt daughter to reminder of nv scheduled. No answer, VM full. Portal message sent

## 2025-05-12 NOTE — TELEPHONE ENCOUNTER
----- Message from Sylwia Ivey NP sent at 5/12/2025 10:06 AM CDT -----  Regarding: Dexcom  Good morning, Can you please remind this patient's daughter that we will need her to bring the Dexcom for upload before her visit on Wednesday. Thank you, Sylwia Ivey, [facility]

## 2025-05-22 ENCOUNTER — LAB VISIT (OUTPATIENT)
Dept: LAB | Facility: HOSPITAL | Age: 70
End: 2025-05-22
Attending: FAMILY MEDICINE
Payer: MEDICARE

## 2025-05-22 ENCOUNTER — OFFICE VISIT (OUTPATIENT)
Dept: FAMILY MEDICINE | Facility: CLINIC | Age: 70
End: 2025-05-22
Attending: FAMILY MEDICINE
Payer: MEDICARE

## 2025-05-22 VITALS
WEIGHT: 221.81 LBS | HEIGHT: 69 IN | SYSTOLIC BLOOD PRESSURE: 114 MMHG | BODY MASS INDEX: 32.85 KG/M2 | OXYGEN SATURATION: 97 % | DIASTOLIC BLOOD PRESSURE: 78 MMHG | HEART RATE: 96 BPM | TEMPERATURE: 97 F

## 2025-05-22 DIAGNOSIS — E11.69 DM TYPE 2 WITH DIABETIC DYSLIPIDEMIA: ICD-10-CM

## 2025-05-22 DIAGNOSIS — E78.5 DM TYPE 2 WITH DIABETIC DYSLIPIDEMIA: ICD-10-CM

## 2025-05-22 DIAGNOSIS — R32 URINARY INCONTINENCE, UNSPECIFIED TYPE: ICD-10-CM

## 2025-05-22 DIAGNOSIS — R41.3 MEMORY LOSS: ICD-10-CM

## 2025-05-22 DIAGNOSIS — R29.6 FREQUENT FALLS: ICD-10-CM

## 2025-05-22 DIAGNOSIS — E11.69 DM TYPE 2 WITH DIABETIC DYSLIPIDEMIA: Primary | ICD-10-CM

## 2025-05-22 DIAGNOSIS — F32.A DEPRESSION, UNSPECIFIED DEPRESSION TYPE: ICD-10-CM

## 2025-05-22 DIAGNOSIS — L21.9 SEBORRHEIC DERMATITIS: ICD-10-CM

## 2025-05-22 DIAGNOSIS — E78.5 DM TYPE 2 WITH DIABETIC DYSLIPIDEMIA: Primary | ICD-10-CM

## 2025-05-22 PROBLEM — C77.3 BREAST CANCER METASTASIZED TO AXILLARY LYMPH NODE, LEFT: Status: ACTIVE | Noted: 2024-04-26

## 2025-05-22 PROBLEM — I51.7 CARDIOMEGALY: Status: RESOLVED | Noted: 2021-01-26 | Resolved: 2025-05-22

## 2025-05-22 PROBLEM — C50.912 MALIGNANT NEOPLASM OF LEFT FEMALE BREAST: Status: ACTIVE | Noted: 2024-03-19

## 2025-05-22 PROBLEM — C50.912 BREAST CANCER METASTASIZED TO AXILLARY LYMPH NODE, LEFT: Status: ACTIVE | Noted: 2024-04-26

## 2025-05-22 PROBLEM — T84.84XA PAINFUL ORTHOPAEDIC HARDWARE: Status: RESOLVED | Noted: 2021-01-27 | Resolved: 2025-05-22

## 2025-05-22 PROBLEM — D12.3 BENIGN NEOPLASM OF TRANSVERSE COLON: Status: RESOLVED | Noted: 2018-12-22 | Resolved: 2025-05-22

## 2025-05-22 PROBLEM — I77.1 TORTUOUS AORTA: Status: RESOLVED | Noted: 2024-01-04 | Resolved: 2025-05-22

## 2025-05-22 PROBLEM — Z12.11 SPECIAL SCREENING FOR MALIGNANT NEOPLASMS, COLON: Status: RESOLVED | Noted: 2018-12-22 | Resolved: 2025-05-22

## 2025-05-22 LAB
ABSOLUTE EOSINOPHIL (OHS): 0.18 K/UL
ABSOLUTE MONOCYTE (OHS): 0.38 K/UL (ref 0.3–1)
ABSOLUTE NEUTROPHIL COUNT (OHS): 4.67 K/UL (ref 1.8–7.7)
ALBUMIN SERPL BCP-MCNC: 3.7 G/DL (ref 3.5–5.2)
ALBUMIN/CREAT UR: 168.6 UG/MG
ALP SERPL-CCNC: 156 UNIT/L (ref 40–150)
ALT SERPL W/O P-5'-P-CCNC: 18 UNIT/L (ref 10–44)
ANION GAP (OHS): 10 MMOL/L (ref 8–16)
AST SERPL-CCNC: 24 UNIT/L (ref 11–45)
BASOPHILS # BLD AUTO: 0.05 K/UL
BASOPHILS NFR BLD AUTO: 0.9 %
BILIRUB SERPL-MCNC: 0.5 MG/DL (ref 0.1–1)
BUN SERPL-MCNC: 47 MG/DL (ref 8–23)
CALCIUM SERPL-MCNC: 9.9 MG/DL (ref 8.7–10.5)
CHLORIDE SERPL-SCNC: 100 MMOL/L (ref 95–110)
CHOLEST SERPL-MCNC: 119 MG/DL (ref 120–199)
CHOLEST/HDLC SERPL: 4.8 {RATIO} (ref 2–5)
CO2 SERPL-SCNC: 25 MMOL/L (ref 23–29)
CREAT SERPL-MCNC: 1.3 MG/DL (ref 0.5–1.4)
CREAT UR-MCNC: 35 MG/DL (ref 15–325)
EAG (OHS): 194 MG/DL (ref 68–131)
ERYTHROCYTE [DISTWIDTH] IN BLOOD BY AUTOMATED COUNT: 15.1 % (ref 11.5–14.5)
FOLATE SERPL-MCNC: 12.7 NG/ML (ref 4–24)
GFR SERPLBLD CREATININE-BSD FMLA CKD-EPI: 44 ML/MIN/1.73/M2
GLUCOSE SERPL-MCNC: 346 MG/DL (ref 70–110)
HBA1C MFR BLD: 8.4 % (ref 4–5.6)
HCT VFR BLD AUTO: 45.1 % (ref 37–48.5)
HDLC SERPL-MCNC: 25 MG/DL (ref 40–75)
HDLC SERPL: 21 % (ref 20–50)
HGB BLD-MCNC: 14.3 GM/DL (ref 12–16)
IMM GRANULOCYTES # BLD AUTO: 0.03 K/UL (ref 0–0.04)
IMM GRANULOCYTES NFR BLD AUTO: 0.5 % (ref 0–0.5)
LDLC SERPL CALC-MCNC: 50.4 MG/DL (ref 63–159)
LYMPHOCYTES # BLD AUTO: 0.33 K/UL (ref 1–4.8)
MCH RBC QN AUTO: 30.6 PG (ref 27–31)
MCHC RBC AUTO-ENTMCNC: 31.7 G/DL (ref 32–36)
MCV RBC AUTO: 96 FL (ref 82–98)
MICROALBUMIN UR-MCNC: 59 UG/ML (ref ?–5000)
NONHDLC SERPL-MCNC: 94 MG/DL
NUCLEATED RBC (/100WBC) (OHS): 0 /100 WBC
PLATELET # BLD AUTO: 219 K/UL (ref 150–450)
PMV BLD AUTO: 10.5 FL (ref 9.2–12.9)
POTASSIUM SERPL-SCNC: 5.2 MMOL/L (ref 3.5–5.1)
PROT SERPL-MCNC: 7.1 GM/DL (ref 6–8.4)
RBC # BLD AUTO: 4.68 M/UL (ref 4–5.4)
RELATIVE EOSINOPHIL (OHS): 3.2 %
RELATIVE LYMPHOCYTE (OHS): 5.9 % (ref 18–48)
RELATIVE MONOCYTE (OHS): 6.7 % (ref 4–15)
RELATIVE NEUTROPHIL (OHS): 82.8 % (ref 38–73)
SODIUM SERPL-SCNC: 135 MMOL/L (ref 136–145)
TRIGL SERPL-MCNC: 218 MG/DL (ref 30–150)
TSH SERPL-ACNC: 1.07 UIU/ML (ref 0.4–4)
VIT B12 SERPL-MCNC: 513 PG/ML (ref 210–950)
WBC # BLD AUTO: 5.64 K/UL (ref 3.9–12.7)

## 2025-05-22 PROCEDURE — 3008F BODY MASS INDEX DOCD: CPT | Mod: CPTII,S$GLB,, | Performed by: FAMILY MEDICINE

## 2025-05-22 PROCEDURE — G2211 COMPLEX E/M VISIT ADD ON: HCPCS | Mod: S$GLB,,, | Performed by: FAMILY MEDICINE

## 2025-05-22 PROCEDURE — 36415 COLL VENOUS BLD VENIPUNCTURE: CPT | Mod: PO

## 2025-05-22 PROCEDURE — 3078F DIAST BP <80 MM HG: CPT | Mod: CPTII,S$GLB,, | Performed by: FAMILY MEDICINE

## 2025-05-22 PROCEDURE — 4010F ACE/ARB THERAPY RXD/TAKEN: CPT | Mod: CPTII,S$GLB,, | Performed by: FAMILY MEDICINE

## 2025-05-22 PROCEDURE — 1125F AMNT PAIN NOTED PAIN PRSNT: CPT | Mod: CPTII,S$GLB,, | Performed by: FAMILY MEDICINE

## 2025-05-22 PROCEDURE — 82607 VITAMIN B-12: CPT

## 2025-05-22 PROCEDURE — 99215 OFFICE O/P EST HI 40 MIN: CPT | Mod: S$GLB,,, | Performed by: FAMILY MEDICINE

## 2025-05-22 PROCEDURE — 80061 LIPID PANEL: CPT

## 2025-05-22 PROCEDURE — 82746 ASSAY OF FOLIC ACID SERUM: CPT

## 2025-05-22 PROCEDURE — 3074F SYST BP LT 130 MM HG: CPT | Mod: CPTII,S$GLB,, | Performed by: FAMILY MEDICINE

## 2025-05-22 PROCEDURE — 1160F RVW MEDS BY RX/DR IN RCRD: CPT | Mod: CPTII,S$GLB,, | Performed by: FAMILY MEDICINE

## 2025-05-22 PROCEDURE — 84443 ASSAY THYROID STIM HORMONE: CPT

## 2025-05-22 PROCEDURE — 3288F FALL RISK ASSESSMENT DOCD: CPT | Mod: CPTII,S$GLB,, | Performed by: FAMILY MEDICINE

## 2025-05-22 PROCEDURE — 1101F PT FALLS ASSESS-DOCD LE1/YR: CPT | Mod: CPTII,S$GLB,, | Performed by: FAMILY MEDICINE

## 2025-05-22 PROCEDURE — 99999 PR PBB SHADOW E&M-EST. PATIENT-LVL V: CPT | Mod: PBBFAC,,, | Performed by: FAMILY MEDICINE

## 2025-05-22 PROCEDURE — 83036 HEMOGLOBIN GLYCOSYLATED A1C: CPT

## 2025-05-22 PROCEDURE — 80053 COMPREHEN METABOLIC PANEL: CPT

## 2025-05-22 PROCEDURE — 1159F MED LIST DOCD IN RCRD: CPT | Mod: CPTII,S$GLB,, | Performed by: FAMILY MEDICINE

## 2025-05-22 PROCEDURE — 85025 COMPLETE CBC W/AUTO DIFF WBC: CPT

## 2025-05-22 PROCEDURE — 82570 ASSAY OF URINE CREATININE: CPT | Performed by: FAMILY MEDICINE

## 2025-05-22 RX ORDER — SERTRALINE HYDROCHLORIDE 100 MG/1
100 TABLET, FILM COATED ORAL DAILY
Qty: 90 TABLET | Refills: 1 | Status: SHIPPED | OUTPATIENT
Start: 2025-05-22

## 2025-05-22 RX ORDER — OMEPRAZOLE 20 MG/1
20 CAPSULE, DELAYED RELEASE ORAL DAILY
Qty: 90 CAPSULE | Refills: 1 | Status: SHIPPED | OUTPATIENT
Start: 2025-05-22

## 2025-05-22 RX ORDER — KETOCONAZOLE 20 MG/ML
SHAMPOO, SUSPENSION TOPICAL
Qty: 120 ML | Refills: 3 | Status: SHIPPED | OUTPATIENT
Start: 2025-05-22

## 2025-05-22 RX ORDER — CLOBETASOL PROPIONATE 0.5 MG/ML
SOLUTION TOPICAL 2 TIMES DAILY
Qty: 50 ML | Status: SHIPPED | OUTPATIENT
Start: 2025-05-22

## 2025-05-22 NOTE — PROGRESS NOTES
Subjective:       Patient ID: Kay Galarza is a 70 y.o. female.    Chief Complaint: Annual Exam    70 y old female with t2 dm , HTN , left breast  cancer, depression , afib here for f.u .  Currently on  pembrolizumab . She completed radiation 2 w ago . Feels hopeless, still mourns her late  . Glucose have been in the 300's . Also forgetting conversations . Having urinary incontinence and difficulty with her balance . She is frustrated since she doesn't  know how to apply her CGM sensor , she has sustained several falls over the course of the previous months .       Review of Systems   Constitutional: Negative.    HENT: Negative.     Eyes: Negative.    Respiratory: Negative.     Cardiovascular: Negative.    Gastrointestinal: Negative.    Genitourinary: Negative.    Musculoskeletal: Negative.    Skin: Negative.    Hematological: Negative.    Psychiatric/Behavioral:  Positive for dysphoric mood.        Objective:      Physical Exam  Constitutional:       General: She is not in acute distress.     Appearance: She is well-developed. She is not diaphoretic.   HENT:      Head: Normocephalic and atraumatic.      Right Ear: External ear normal.      Left Ear: External ear normal.      Mouth/Throat:      Pharynx: No oropharyngeal exudate.   Eyes:      General: No scleral icterus.        Right eye: No discharge.         Left eye: No discharge.      Conjunctiva/sclera: Conjunctivae normal.      Pupils: Pupils are equal, round, and reactive to light.   Neck:      Thyroid: No thyromegaly.      Vascular: No JVD.      Trachea: No tracheal deviation.   Cardiovascular:      Rate and Rhythm: Normal rate and regular rhythm.      Heart sounds: Normal heart sounds. No murmur heard.     No friction rub. No gallop.   Pulmonary:      Effort: Pulmonary effort is normal. No respiratory distress.      Breath sounds: Normal breath sounds. No stridor. No wheezing or rales.   Chest:      Chest wall: No tenderness.    Abdominal:      General: Bowel sounds are normal. There is no distension.      Palpations: Abdomen is soft.      Tenderness: There is no abdominal tenderness. There is no guarding or rebound.   Musculoskeletal:         General: Normal range of motion.      Cervical back: Normal range of motion and neck supple.   Lymphadenopathy:      Cervical: No cervical adenopathy.   Skin:     General: Skin is warm and dry.   Neurological:      Mental Status: She is alert and oriented to person, place, and time.   Psychiatric:         Mood and Affect: Mood is depressed.         Behavior: Behavior normal.         Thought Content: Thought content normal.         Judgment: Judgment normal.         Assessment:     Kay was seen today for annual exam.    Diagnoses and all orders for this visit:    DM type 2 with diabetic dyslipidemia  -     CBC Auto Differential; Future  -     Comprehensive Metabolic Panel; Future  -     Hemoglobin A1C; Future  -     Lipid Panel; Future  -     Microalbumin/Creatinine Ratio, Urine    Memory loss  -     Vitamin B12; Future  -     Folate; Future  -     TSH; Future  -     CT Head Without Contrast; Future    Urinary incontinence, unspecified type  -     Ambulatory referral/consult to Urology; Future    Seborrheic dermatitis  -     clobetasoL (TEMOVATE) 0.05 % external solution; Apply topically 2 (two) times daily.  -     ketoconazole (NIZORAL) 2 % shampoo; Apply topically twice a week.    Depression, unspecified depression type  -     sertraline (ZOLOFT) 100 MG tablet; Take 1 tablet (100 mg total) by mouth once daily.    Frequent falls  -     Ambulatory referral/consult to Home Health; Future    Other orders  -     omeprazole (PRILOSEC) 20 MG capsule; Take 1 capsule (20 mg total) by mouth once daily.       Plan:   1.- Labs   2.- Labs . CT   3.- See Urology   4.- Med rf \  5.- Increase Zoloft . F.u in 6 weeks   6.- PT /OT     Time spent: 60  minutes in face to face discussion concerning diagnosis,  prognosis, review of lab and test results, benefits of treatment as well as management of disease, counseling of patient and coordination of care between various health care providers . Greater than half the time spent was used for coordination of care and counseling of patient.

## 2025-05-26 ENCOUNTER — TELEPHONE (OUTPATIENT)
Dept: FAMILY MEDICINE | Facility: CLINIC | Age: 70
End: 2025-05-26
Payer: MEDICARE

## 2025-05-26 ENCOUNTER — RESULTS FOLLOW-UP (OUTPATIENT)
Dept: FAMILY MEDICINE | Facility: CLINIC | Age: 70
End: 2025-05-26

## 2025-05-26 DIAGNOSIS — E87.5 HYPERKALEMIA: Primary | ICD-10-CM

## 2025-07-10 RX ORDER — LISINOPRIL AND HYDROCHLOROTHIAZIDE 20; 25 MG/1; MG/1
1 TABLET ORAL DAILY
Qty: 90 TABLET | Refills: 0 | Status: SHIPPED | OUTPATIENT
Start: 2025-07-10

## 2025-07-10 NOTE — TELEPHONE ENCOUNTER
Care Due:                  Date            Visit Type   Department     Provider  --------------------------------------------------------------------------------                                EP -                              PRIMARY      [facility] INTERNAL  Lucy RIDER  Last Visit: 05-      CARE ([facility])   MEDICINE       Jamil  Next Visit: None Scheduled  None         None Found                                                            Last  Test          Frequency    Reason                     Performed    Due Date  --------------------------------------------------------------------------------    Mg Level....  12 months..  magnesium................  Not Found    Overdue    [employer] Embedded Care Due Messages. Reference number: [id number].   7/10/2025 2:02:37 PM CDT

## 2025-07-11 ENCOUNTER — PATIENT OUTREACH (OUTPATIENT)
Dept: ADMINISTRATIVE | Facility: HOSPITAL | Age: 70
End: 2025-07-11
Payer: MEDICARE

## 2025-07-11 NOTE — PROGRESS NOTES
VBHM Score: 2     Mammogram  Foot Exam    Shingles/Zoster Vaccine  RSV Vaccine          Health Maintenance Topic(s) Outreach Outcomes & Actions Taken:    Breast Cancer Screening - Outreach Outcomes & Actions Taken  : per chart review, patient had an MRI of the breast, bilateral, with and without contrast on 10/30/24 at [facility], results in care everywhere, message sent to [facility] dept

## 2025-07-11 NOTE — Clinical Note
MRI of the Breast/Bilateral with and without contrast, 10/30/24, [facility], results in care everywhere

## 2025-07-21 NOTE — TELEPHONE ENCOUNTER
No care due was identified.  Health [facility] Embedded Care Due Messages. Reference number: [id number].   7/21/2025 10:59:09 AM CDT

## 2025-07-22 ENCOUNTER — PATIENT MESSAGE (OUTPATIENT)
Dept: DIABETES | Facility: CLINIC | Age: 70
End: 2025-07-22
Payer: MEDICARE

## 2025-07-22 RX ORDER — TRAZODONE HYDROCHLORIDE 150 MG/1
150 TABLET ORAL NIGHTLY
Qty: 90 TABLET | Refills: 0 | Status: SHIPPED | OUTPATIENT
Start: 2025-07-22

## 2025-07-22 RX ORDER — BLOOD-GLUCOSE,RECEIVER,CONT
1 EACH MISCELLANEOUS ONCE
Qty: 1 EACH | Refills: 0 | Status: SHIPPED | OUTPATIENT
Start: 2025-07-22 | End: 2025-07-26

## 2025-07-23 RX ORDER — ATORVASTATIN CALCIUM 40 MG/1
40 TABLET, FILM COATED ORAL DAILY
Qty: 90 TABLET | Refills: 1 | Status: SHIPPED | OUTPATIENT
Start: 2025-07-23

## 2025-07-23 NOTE — TELEPHONE ENCOUNTER
No care due was identified.  [facility] Embedded Care Due Messages. Reference number: [id number].   7/23/2025 2:47:05 PM CDT

## 2025-08-11 RX ORDER — FUROSEMIDE 20 MG/1
20 TABLET ORAL DAILY PRN
Qty: 30 TABLET | Refills: 3 | Status: SHIPPED | OUTPATIENT
Start: 2025-08-11 | End: 2026-08-11

## 2025-08-11 RX ORDER — ALBUTEROL SULFATE 90 UG/1
2 INHALANT RESPIRATORY (INHALATION) EVERY 6 HOURS PRN
Qty: 54 G | Refills: 3 | Status: SHIPPED | OUTPATIENT
Start: 2025-08-11

## 2025-08-25 ENCOUNTER — EXTERNAL HOME HEALTH (OUTPATIENT)
Dept: HOME HEALTH SERVICES | Facility: HOSPITAL | Age: 70
End: 2025-08-25
Payer: MEDICARE

## 2025-08-30 PROBLEM — Z71.89 ADVANCED CARE PLANNING/COUNSELING DISCUSSION: Status: ACTIVE | Noted: 2025-08-30

## 2025-08-30 PROBLEM — J81.0 ACUTE PULMONARY EDEMA: Status: ACTIVE | Noted: 2025-08-30

## 2025-08-30 PROBLEM — N39.0 URINARY TRACT INFECTION WITHOUT HEMATURIA, SITE UNSPECIFIED: Status: ACTIVE | Noted: 2025-08-30

## 2025-08-30 PROBLEM — J96.01 ACUTE HYPOXEMIC RESPIRATORY FAILURE: Status: ACTIVE | Noted: 2025-08-30

## 2025-08-30 PROBLEM — E66.811 CLASS 1 OBESITY WITH SERIOUS COMORBIDITY IN ADULT: Chronic | Status: ACTIVE | Noted: 2017-11-10

## 2025-09-02 ENCOUNTER — DOCUMENTATION ONLY (OUTPATIENT)
Dept: CARDIOLOGY | Facility: CLINIC | Age: 70
End: 2025-09-02
Payer: MEDICARE

## 2025-09-02 PROBLEM — G93.41 ENCEPHALOPATHY, METABOLIC: Status: ACTIVE | Noted: 2025-09-02

## 2025-09-04 PROBLEM — Z51.5 ENCOUNTER FOR PALLIATIVE CARE: Status: ACTIVE | Noted: 2025-08-30

## (undated) DEVICE — NDL SAFETY 25G X 1.5 ECLIPSE

## (undated) DEVICE — BANDAGE ESMARK ELASTIC ST 4X9

## (undated) DEVICE — SUT MONOCRYL 4.0 PS2 CP496G

## (undated) DEVICE — PAD CAST SPECIALIST STRL 4

## (undated) DEVICE — 2.4 DRILLBIT

## (undated) DEVICE — SEE MEDLINE ITEM 157027

## (undated) DEVICE — SPONGE DERMACEA GAUZE 4X4

## (undated) DEVICE — KIT SYR REUSABLE

## (undated) DEVICE — ELECTRODE REM PLYHSV RETURN 9

## (undated) DEVICE — SEE MEDLINE ITEM 154981

## (undated) DEVICE — SEE MEDLINE ITEM 146347

## (undated) DEVICE — GLOVE BIOGEL ORTHOPEDIC 7.5

## (undated) DEVICE — PAD UNDERPAD 30X30

## (undated) DEVICE — SUT ETHILON 3/0 18IN PS-1

## (undated) DEVICE — DRESSING SPONGE 16PLY 4X4 NS

## (undated) DEVICE — MANIFOLD 4 PORT

## (undated) DEVICE — COVER OVERHEAD SURG LT BLUE

## (undated) DEVICE — 3.5 DRILL BIT

## (undated) DEVICE — SYR 10CC LUER LOCK

## (undated) DEVICE — SUT PROLENE 3-0 PS-2 BL 18IN

## (undated) DEVICE — WIRE C TROCAR TIP .062
Type: IMPLANTABLE DEVICE | Site: FOOT | Status: NON-FUNCTIONAL
Removed: 2020-08-18

## (undated) DEVICE — BLADE SURG #15 CARBON STEEL

## (undated) DEVICE — SEE MEDLINE ITEM 157216

## (undated) DEVICE — SUT VICRYL 3-0 27 SH

## (undated) DEVICE — BURR OVAL DMND 4.0MM LONG.

## (undated) DEVICE — DRAPE THREE-QTR REINF 53X77IN

## (undated) DEVICE — GLOVE SURG BIOGEL LATEX SZ 7.5

## (undated) DEVICE — APPLICATOR CHLORAPREP ORN 26ML

## (undated) DEVICE — GAUZE SPONGE 4X4 12PLY

## (undated) DEVICE — TIP SUCTION YANKAUER

## (undated) DEVICE — BANDAGE ROLL COTTN 4.5INX4.1YD

## (undated) DEVICE — DRAPE MOBILE C-ARM

## (undated) DEVICE — SUT VICRYL PLUS 0 CT1 36IN

## (undated) DEVICE — SUPPORT ULNA NERVE PROTECTOR

## (undated) DEVICE — DRESSING GAUZE XEROFORM 5X9

## (undated) DEVICE — UNDERGLOVES BIOGEL PI SIZE 8

## (undated) DEVICE — BLADE SCALP OPHTL RND TIP

## (undated) DEVICE — KIT MANIFOLD LOW PRESS TUBING

## (undated) DEVICE — PAD ABD 8X10 STERILE

## (undated) DEVICE — DRAPE U SPLIT SHEET 54X76IN

## (undated) DEVICE — SEE MEDLINE ITEM 152528

## (undated) DEVICE — HEADREST ROUND DISP FOAM 9IN

## (undated) DEVICE — CATH INFINITI MULTIPAK JR4 5FR

## (undated) DEVICE — DRESSING XEROFORM FOIL PK 1X8

## (undated) DEVICE — SUT VICRYL 2-0 CT-1 VCP345H

## (undated) DEVICE — KIT INTRODUCER STIFFEN MICRO

## (undated) DEVICE — STRAP ARMBOARD DISP 1.5X32IN

## (undated) DEVICE — 2.8 DRILL BIT

## (undated) DEVICE — BURR CARBIDE OVAL STERILE 4MM

## (undated) DEVICE — SEE MEDLINE ITEM 152522

## (undated) DEVICE — DECANTER VIAL ASEPTIC TRANSFER

## (undated) DEVICE — KWIRE SMTH TRCR TIP 1.6X150MM
Type: IMPLANTABLE DEVICE | Site: FOOT | Status: NON-FUNCTIONAL
Removed: 2023-05-26

## (undated) DEVICE — COVER LIGHT HANDLE 80/CA

## (undated) DEVICE — TOWEL OR DISP STRL BLUE 4/PK

## (undated) DEVICE — CATH JR4 5FR

## (undated) DEVICE — BANDAGE ACE DOUBLE STER 6IN

## (undated) DEVICE — GLOVE SURG PLYSPHRN ORTH SZ7.5

## (undated) DEVICE — PAD CAST SPECIALIST STRL 6

## (undated) DEVICE — NDL SAFETY 22G X 1.5 ECLIPSE

## (undated) DEVICE — SUT VICRYL CTD 2-0 GI 27 SH

## (undated) DEVICE — SEE MEDLINE ITEM 157131

## (undated) DEVICE — DRAPE LARGE FOR V.A.C. SYS

## (undated) DEVICE — SEE MEDLINE ITEM 152572

## (undated) DEVICE — TOURNIQUET SB QC DP 24X4IN

## (undated) DEVICE — CATH PIG145 INFINITI 5X110CM

## (undated) DEVICE — KWIRE SMTH TRCR TIP 1.6X150MM
Type: IMPLANTABLE DEVICE | Site: FOOT | Status: NON-FUNCTIONAL
Removed: 2023-03-13

## (undated) DEVICE — BLADE SAW SM OSC SAGITTAL .39M

## (undated) DEVICE — GUIDEWIRE EMERALD .035IN 260CM

## (undated) DEVICE — BLADE SURG CARBON STEEL #10

## (undated) DEVICE — GUIDEWIRE WHOLEY HI TORQ 175CM

## (undated) DEVICE — SEE MEDLINE ITEM 152622

## (undated) DEVICE — KIT GLIDESHEATH SLEND 6FR 10CM

## (undated) DEVICE — DRESSING VAC WHITE FOAM SMALL

## (undated) DEVICE — SUT VICRYL 2-0 36 CT-1

## (undated) DEVICE — Device

## (undated) DEVICE — OMNIPAQUE 300MG 150ML VIAL

## (undated) DEVICE — KWIRE SMTH TRCR TIP 2.3X230MM
Type: IMPLANTABLE DEVICE | Site: FOOT | Status: NON-FUNCTIONAL
Removed: 2023-03-13

## (undated) DEVICE — TOURNIQUET SB QC DP 18X4IN

## (undated) DEVICE — TOURNIQUET SB QC DP 34X4IN

## (undated) DEVICE — BAND TR COMP DEVICE REG 24CM

## (undated) DEVICE — SEE MEDLINE ITEM 146308

## (undated) DEVICE — GLOVE BIOGEL PI ORTHO PRO 7.5

## (undated) DEVICE — CHLORAPREP 10.5 ML APPLICATOR

## (undated) DEVICE — PACK BASIC SETUP SC BR

## (undated) DEVICE — BANDAGE DERMACEA STRETCH 4X1IN

## (undated) DEVICE — FOAM APP FILM BARRIER NO STING

## (undated) DEVICE — NDL HYPODERMIC BLUNT 18G 1.5IN

## (undated) DEVICE — GLOVE SURGICAL LATEX SZ 6

## (undated) DEVICE — PACK HEART CATH BR

## (undated) DEVICE — COVER CAMERA OPERATING ROOM

## (undated) DEVICE — DRAPE STERI INSTRUMENT 1018

## (undated) DEVICE — ADHESIVE MASTISOL VIAL 48/BX

## (undated) DEVICE — SEE MEDLINE ITEM 146298

## (undated) DEVICE — DRAPE ANGIO BRACH 38X44IN

## (undated) DEVICE — SEE MEDLINE ITEM 146231

## (undated) DEVICE — SUT ETHILON 3-0 PS2 18 BLK

## (undated) DEVICE — SUT 4-0 ETHILON 18 PS-2

## (undated) DEVICE — 2.8 DRILL

## (undated) DEVICE — BNDG COFLEX FOAM LF2 ST 3X5YD

## (undated) DEVICE — BLADE LONG 31.0MM X 9.0MM

## (undated) DEVICE — BANDAGE MATRIX HK LOOP 4IN 5YD

## (undated) DEVICE — SLEEVE ECLIPSE GOWN STRL 23IN

## (undated) DEVICE — DRAPE C-ARMOR EQUIPMENT COVER

## (undated) DEVICE — CATH JL4 5FR

## (undated) DEVICE — ANGIOTOUCH KIT

## (undated) DEVICE — DRAPE T EXTRM SURG 121X128X90

## (undated) DEVICE — KIT DRSNG GRNUFM MED 18X12X5CM

## (undated) DEVICE — 2.4 DRILL

## (undated) DEVICE — 3.5 DRILL

## (undated) DEVICE — DRESSING N ADH OIL EMUL 3X8

## (undated) DEVICE — CANISTER VACCUUM DRSNG KCI

## (undated) DEVICE — BLADE #15 STERILE CARBON

## (undated) DEVICE — SPONGE GAUZE 4X4 12 PLY STRL

## (undated) DEVICE — UNDERGLOVES BIOGEL PI SZ 6 LF